# Patient Record
Sex: MALE
[De-identification: names, ages, dates, MRNs, and addresses within clinical notes are randomized per-mention and may not be internally consistent; named-entity substitution may affect disease eponyms.]

---

## 2019-07-18 ENCOUNTER — HOSPITAL ENCOUNTER (EMERGENCY)
Dept: HOSPITAL 88 - ER | Age: 79
LOS: 1 days | Discharge: OTHER INSTITUTION | End: 2019-07-19
Payer: COMMERCIAL

## 2019-07-18 VITALS — WEIGHT: 250 LBS | HEIGHT: 69 IN | BODY MASS INDEX: 37.03 KG/M2

## 2019-07-18 DIAGNOSIS — I25.2: ICD-10-CM

## 2019-07-18 DIAGNOSIS — E78.5: ICD-10-CM

## 2019-07-18 DIAGNOSIS — J44.9: ICD-10-CM

## 2019-07-18 DIAGNOSIS — K21.9: ICD-10-CM

## 2019-07-18 DIAGNOSIS — F03.90: ICD-10-CM

## 2019-07-18 DIAGNOSIS — I27.9: ICD-10-CM

## 2019-07-18 DIAGNOSIS — I50.9: ICD-10-CM

## 2019-07-18 DIAGNOSIS — R07.89: Primary | ICD-10-CM

## 2019-07-18 DIAGNOSIS — Z95.5: ICD-10-CM

## 2019-07-18 LAB
ALBUMIN SERPL-MCNC: 3.1 G/DL (ref 3.5–5)
ALBUMIN/GLOB SERPL: 0.6 {RATIO} (ref 0.8–2)
ALP SERPL-CCNC: 140 IU/L (ref 40–150)
ALT SERPL-CCNC: 20 IU/L (ref 0–55)
ANION GAP SERPL CALC-SCNC: 14.6 MMOL/L (ref 8–16)
BASOPHILS # BLD AUTO: 0.1 10*3/UL (ref 0–0.1)
BASOPHILS NFR BLD AUTO: 0.6 % (ref 0–1)
BUN SERPL-MCNC: 24 MG/DL (ref 7–26)
BUN/CREAT SERPL: 21 (ref 6–25)
CALCIUM SERPL-MCNC: 8.7 MG/DL (ref 8.4–10.2)
CHLORIDE SERPL-SCNC: 100 MMOL/L (ref 98–107)
CK MB SERPL-MCNC: 2.5 NG/ML (ref 0–5)
CK SERPL-CCNC: 39 IU/L (ref 30–200)
CO2 SERPL-SCNC: 27 MMOL/L (ref 22–29)
DEPRECATED NEUTROPHILS # BLD AUTO: 6.2 10*3/UL (ref 2.1–6.9)
EGFRCR SERPLBLD CKD-EPI 2021: > 60 ML/MIN (ref 60–?)
EOSINOPHIL # BLD AUTO: 0.3 10*3/UL (ref 0–0.4)
EOSINOPHIL NFR BLD AUTO: 3 % (ref 0–6)
ERYTHROCYTE [DISTWIDTH] IN CORD BLOOD: 18.2 % (ref 11.7–14.4)
GLOBULIN PLAS-MCNC: 4.8 G/DL (ref 2.3–3.5)
GLUCOSE SERPLBLD-MCNC: 264 MG/DL (ref 74–118)
HCT VFR BLD AUTO: 27.5 % (ref 38.2–49.6)
HGB BLD-MCNC: 8.5 G/DL (ref 14–18)
LYMPHOCYTES # BLD: 1.4 10*3/UL (ref 1–3.2)
LYMPHOCYTES NFR BLD AUTO: 16.1 % (ref 18–39.1)
MCH RBC QN AUTO: 26.6 PG (ref 28–32)
MCHC RBC AUTO-ENTMCNC: 30.9 G/DL (ref 31–35)
MCV RBC AUTO: 86.2 FL (ref 81–99)
MONOCYTES # BLD AUTO: 0.7 10*3/UL (ref 0.2–0.8)
MONOCYTES NFR BLD AUTO: 7.8 % (ref 4.4–11.3)
NEUTS SEG NFR BLD AUTO: 71.9 % (ref 38.7–80)
PLATELET # BLD AUTO: 234 X10E3/UL (ref 140–360)
POTASSIUM SERPL-SCNC: 3.6 MMOL/L (ref 3.5–5.1)
RBC # BLD AUTO: 3.19 X10E6/UL (ref 4.3–5.7)
SODIUM SERPL-SCNC: 138 MMOL/L (ref 136–145)

## 2019-07-18 PROCEDURE — 36415 COLL VENOUS BLD VENIPUNCTURE: CPT

## 2019-07-18 PROCEDURE — 71045 X-RAY EXAM CHEST 1 VIEW: CPT

## 2019-07-18 PROCEDURE — 93005 ELECTROCARDIOGRAM TRACING: CPT

## 2019-07-18 PROCEDURE — 84484 ASSAY OF TROPONIN QUANT: CPT

## 2019-07-18 PROCEDURE — 82550 ASSAY OF CK (CPK): CPT

## 2019-07-18 PROCEDURE — 99284 EMERGENCY DEPT VISIT MOD MDM: CPT

## 2019-07-18 PROCEDURE — 85025 COMPLETE CBC W/AUTO DIFF WBC: CPT

## 2019-07-18 PROCEDURE — 80053 COMPREHEN METABOLIC PANEL: CPT

## 2019-07-18 PROCEDURE — 82553 CREATINE MB FRACTION: CPT

## 2019-07-18 NOTE — XMS REPORT
Summary of Care: 2/3/16 - 2/3/16

                             Created on: 2097



DESIREE MCKEON

External Reference #: [id number]

: 1940

Sex: Male



Demographics







                          Address                   [address]

[place]  [address]-

 

                          Home Phone                [phone]

 

                          Preferred Language        English

 

                          Marital Status            

 

                          Hoahaoism Affiliation     None

 

                          Race                      White/

 

                          Ethnic Group              Non-





Author







                          Author                    [facility]

 

                          Organization              [facility]

 

                          Address                   Unknown

 

                          Phone                     Unavailable







Encounter





HQ Torie_scott(FIN) [id number] Date(s): 2/3/16 - 2/3/16

[facility] [address] [place] [address]-     [phone]
[phone]

Discharge Disposition: Home

Attending Physician: Betty Dover MD





Vital Signs





No data available for this section



Problem List







    



              Condition     Effective Dates     Status       Health Status     Informant

 

    



                           Acute MI(Confirmed)1      Resolved  

 

    



                           Atrial                    Active  



                                         fibrillation(Confirm    



                                         ed)    

 

    



                           CAD (coronary artery      Active  



                                         disease)(Confirmed)    

 

    



                           Diabetes(Confirmed)       Resolved  

 

    



                           Diastolic heart           Active  



                                         failure(Confirmed)    

 

    



                           Histoplasmosis(Confi      Resolved  



                                         rmed)    

 

    



                           Hypercholesteremia(C      Resolved  



                                         onfirmed)    

 

    



                           Hypertension(Confirm      Resolved  



                                         ed)    

 

    



                           HTN                       Active  



                                         (hypertension)(Confi    



                                         rmed)    

 

    



                           Hypothyroidism(Confi      Active  



                                         rmed)    

 

    



                           Diabetes mellitus         Active  



                                         type 2, insulin    



                                         dependent(Confirmed)    

 

    



                           PAUL (obstructive          Active  



                                         sleep    



                                         apnea)(Confirmed)    

 

    



                           Poliomyelitides(Conf      Resolved  



                                         irmed)    

 

    



                           Sleep                     Resolved  



                                         apnea(Confirmed)    

 

    



                           Stented coronary          Resolved  



                                         artery(Confirmed)2    

 

    



                           Systolic heart            Active  



                                         failure(Confirmed)    

 

    



                           Whooping                  Resolved  



                                         cough(Confirmed)    







1x 3



225 cardiac stents



Allergies, Adverse Reactions, Alerts







   



                 Substance       Reaction        Severity        Status

 

   



                           NKDA                      Active







Medications





No data available for this section



Results





No data available for this section



Immunizations







  



                     Vaccine             Date                Refusal Reason

 

  



                           pneumococcal 23-valent vaccine     3/31/15 

 

  



                     pneumococcal 23-valent vaccine     3/30/15             Patient Refuses







Procedures







   



                 Procedure       Date            Related Diagnosis     Body Site

 

   



                                         CABG x 3 - Coronary artery bypass grafts x 3   

 

   



                                         Cardiac catheterization, left heart   







Social History







 



                           Social History Type       Response

 

 



                           Substance Abuse           Use: None.

 

 



                           Alcohol                   Never

 

 



                           Smoking Status            Former smoker; Exposure to Tobacco Smoke None; Cigarette Smoking

 Last 365



                                         Days No; Reg Smoking Cessation Counseling No







Assessment and Plan





No data available for this section

## 2019-07-18 NOTE — XMS REPORT
Summary of Care: 16 - 16

                             Created on: 2025



DESIREE MCKEON

External Reference #: [id number]

: 1940

Sex: Male



Demographics







                          Address                   [address]

[place]  [address]

 

                          Home Phone                [phone]

 

                          Preferred Language        English

 

                          Marital Status            

 

                          Zoroastrian Affiliation     None

 

                          Race                      White/

 

                          Ethnic Group              Non-





Author







                          Author                    [facility]

 

                          Organization              [facility]

 

                          Address                   Unknown

 

                          Phone                     Unavailable







Encounter





CHLOÉ Pedersen(DAVID) [id number] Date(s): 16 - 16

[facility] [address] [place] [address]-     [phone]
[phone]

Discharge Diagnosis: Hx of insomnia

Discharge Disposition: Home

Attending Physician: Jeni Glaeson MD





Vital Signs







                    1                   2                   3



                                         Most recent to   



                                         oldest [Reference   



                                         Range]:   

 

                                        172.72 cm 

                    (16 4:37 PM)                         



                                         Height   

 

                                        98.0 DegF 

                          (16 10:00 PM)        98.4 DegF 

                          (16 7:00 PM)         97.4 DegF 

                                        (16 4:37 PM)



                                         Temperature Oral   



                                         [96.4-99.1 DegF]   

 

                                        130/79 mmHg 

                          (16 10:00 PM)        136/116 mmHg 

                          (16 7:00 PM)         116/70 mmHg 

                                        (16 6:43 PM)



                                         Blood Pressure   



                                         [/60-90 mmHg]   

 

                                        20 BRMIN 

                          (16 10:00 PM)        17 BRMIN 

                          (16 6:43 PM)         16 BRMIN 

                                        (16 6:03 PM)



                                         Respiratory Rate   



                                         [14-20 BRMIN]   

 

                                        72 bpm 

                    (16 4:37 PM)                         



                                         Peripheral Pulse   



                                         Rate [ bpm]   

 

                                        90.909 kg 

                    (16 4:37 PM)                         



                                         Weight   

 

                                        30.47 m2 

                    (16 4:37 PM)                         



                                         Body Mass Index   







Problem List







    



              Condition     Effective Dates     Status       Health Status     Informant

 

    



                           Acute MI(Confirmed)1      Resolved  

 

    



                           Atrial                    Active  



                                         fibrillation(Confirm    



                                         ed)    

 

    



                           CAD (coronary artery      Active  



                                         disease)(Confirmed)    

 

    



                           Diabetes(Confirmed)       Resolved  

 

    



                           Diastolic heart           Active  



                                         failure(Confirmed)    

 

    



                           Histoplasmosis(Confi      Resolved  



                                         rmed)    

 

    



                           Hypercholesteremia(C      Resolved  



                                         onfirmed)    

 

    



                           Hypertension(Confirm      Resolved  



                                         ed)    

 

    



                           HTN                       Active  



                                         (hypertension)(Confi    



                                         rmed)    

 

    



                           Hypothyroidism(Confi      Active  



                                         rmed)    

 

    



                           Diabetes mellitus         Active  



                                         type 2, insulin    



                                         dependent(Confirmed)    

 

    



                           PAUL (obstructive          Active  



                                         sleep    



                                         apnea)(Confirmed)    

 

    



                           Poliomyelitides(Conf      Resolved  



                                         irmed)    

 

    



                           Sleep                     Resolved  



                                         apnea(Confirmed)    

 

    



                           Stented coronary          Resolved  



                                         artery(Confirmed)2    

 

    



                           Systolic heart            Active  



                                         failure(Confirmed)    

 

    



                           Whooping                  Resolved  



                                         cough(Confirmed)    







1x 3



225 cardiac stents



Allergies, Adverse Reactions, Alerts







   



                 Substance       Reaction        Severity        Status

 

   



                           NKDA                      Active







Medications





Insulin regular

6 unit, 0.06 mL, Route: IV, Drug form: INJ, ONCE, Dosing Weight 90.909, kg, Star
t date: 16 18:25:00 CDT, Stop date: 16 18:25:00 CDT

Notes: (Same as: Humulin R and NovoLIN R)WASTE: F/P - Black; E - [employer] Trash
Bin  (Do not shake)

Start Date: 16

Stop Date: 16

Status: Completed



Results





ELECTROLYTES





  



                     Most recent to      1                   2



                                         oldest [Reference  



                                         Range]:  

 

  



                           Sodium Lvl [135-145       134 mEq/L 



                           mEq/L]                    *LOW* 



                                         (16 5:17 PM) 

 

  



                           Potassium Lvl             3.8 mEq/L 



                           [3.5-5.1 mEq/L]           (16 5:17 PM) 

 

  



                           Chloride Lvl [      99 mEq/L 



                           mEq/L]                    (16 5:17 PM) 

 

  



                           CO2 [24-32 mEq/L]         28 mEq/L 



                                         (16 5:17 PM) 

 

  



                           AGAP [10.0-20.0           10.8 mEq/L 



                           mEq/L]                    (16 5:17 PM) 







CHEM PANEL





  



                     Most recent to      1                   2



                                         oldest [Reference  



                                         Range]:  

 

  



                           Creatinine Lvl            0.94 mg/dL 



                           [0.50-1.40 mg/dL]         (16 5:17 PM) 

 

  



                           eGFR                      79 mL/min/1.73m2 1 



                                         *NA* 



                                         (16 5:17 PM) 

 

  



                           BUN [7-22 mg/dL]          13 mg/dL 



                                         (16 5:17 PM) 

 

  



                           B/C Ratio [6-25]          14 



                                         (16 5:17 PM) 

 

  



                           Glucose Lvl [70-99        361 mg/dL 



                           mg/dL]                    *HI* 



                                         (16 5:17 PM) 

 

  



                           Total Protein             7.4 g/dL 



                           [6.4-8.4 g/dL]            (16 5:17 PM) 

 

  



                           Albumin Lvl [3.5-5.0      3.2 g/dL 



                           g/dL]                     *LOW* 



                                         (16: PM) 

 

  



                           Globulin [2.0-4.0         4.2 g/dL 



                           g/dL]                     *HI* 



                                         (16:17 PM) 

 

  



                           A/G Ratio [0.7-1.6]       0.8 



                                         (16: PM) 

 

  



                           Calcium Lvl               8.4 mg/dL 



                           [8.5-10.5 mg/dL]          *LOW* 



                                         (16: PM) 

 

  



                           ALT [0-65 unit/L]         56 unit/L 



                                         (16: PM) 

 

  



                           AST [0-37 unit/L]         62 unit/L 



                                         *HI* 



                                         (16: PM) 

 

  



                           Alk Phos [          137 unit/L 



                           unit/L]                   *HI* 



                                         (16 5:17 PM) 

 

  



                           Bili Total [0.2-1.3       0.4 mg/dL 



                           mg/dL]                    (16:17 PM) 







1Result Comment: The eGFR is calculated using the CKD-EPI formula. In most 
young, healthy individuals the eGFR will be >90 mL/min/1.73m2. The eGFR declines
with age. An eGFR of 60-89 may be normal in some populations, particularly the 
elderly, for whom the CKD-EPI formula has not been extensively validated. Use of
the eGFR is not recommended in the following populations:



Individuals with unstable creatinine concentrations, including pregnant patients
and those with serious co-morbid conditions.



Patients with extremes in muscle mass or diet. 



The data above are obtained from the National Kidney Disease Education Program (
NKDEP) which additionally recommends that when the eGFR is used in patients with
extremes of body mass index for purposes of drug dosing, the eGFR should be mul
tiplied by the estimated BMI.



CARDIAC ENZYMES





  



                     Most recent to      1                   2



                                         oldest [Reference  



                                         Range]:  

 

  



                           Total CK [          71 unit/L 



                           unit/L]                   (16 5:17 PM) 

 

  



                           CK MB [0.5-3.6            2.4 ng/mL 



                           ng/mL]                    (16 5:17 PM) 

 

  



                           CK MB Index               3.4 



                           [0.0-2.5]                 *HI* 



                                         (16 5:17 PM) 

 

  



                     Troponin-I          <0.02 ng/mL         <0.02 ng/mL



                     [0.00-0.40 ng/mL]     (16 8:47 PM)     (16 5:17 PM)

 

  



                           BNP [<=100 pg/mL]         84 pg/mL 



                                         (16 5:17 PM) 







URINE AND STOOL





  



                     Most recent to      1                   2



                                         oldest [Reference  



                                         Range]:  

 

  



                           UA Turbidity [Clear]      Clear 



                                         (16 5:21 PM) 

 

  



                           UA Color                  Ltyellow 



                                         *NA* 



                                         (16 5:21 PM) 

 

  



                           UA pH [5.0-8.0]           5.0 



                                         (16 5:21 PM) 

 

  



                           UA Spec Grav              1.005 



                           [<=1.030]                 (16 5:21 PM) 

 

  



                           UA Glucose [Negative      500 mg/dL 



                           mg/dL]                    *ABN* 



                                         (16 5:21 PM) 

 

  



                           UA Blood [Negative]       Negative 



                                         (16 5:21 PM) 

 

  



                           UA Ketones [Negative      Negative mg/dL 



                           mg/dL]                    *NA* 



                                         (16 5:21 PM) 

 

  



                           UA Protein [Negative      Negative mg/dL 



                           mg/dL]                    (16 5:21 PM) 

 

  



                           UA Urobilinogen           <=1.0 mg/dL 



                           [0.1-1.0 mg/dL]           *NA* 



                                         (16 5:21 PM) 

 

  



                           UA Bili [Negative]        Negative 



                                         *NA* 



                                         (16 5:21 PM) 

 

  



                           UA Leuk Est               Negative 



                           [Negative]                (16 5:21 PM) 

 

  



                           UA Nitrite                Negative 



                           [Negative]                (16 5:21 PM) 

 

  



                           UA RBC [0-2 /HPF]         1 /HPF 



                                         (16 5:21 PM) 

 

  



                           UA Sq Epi [Few /LPF]      Occasional /LPF 



                                         *NA* 



                                         (16 5:21 PM) 







HEMATOLOGY





  



                     Most recent to      1                   2



                                         oldest [Reference  



                                         Range]:  

 

  



                           WBC [3.7-10.4 K/CMM]      7.7 K/CMM 



                                         (16 5:17 PM) 

 

  



                           RBC [4.70-6.10            4.52 M/CMM 



                           M/CMM]                    *LOW* 



                                         (16 5:17 PM) 

 

  



                           Hgb [14.0-18.0 g/dL]      10.9 g/dL 



                                         *LOW* 



                                         (16 5:17 PM) 

 

  



                           Hct [42.0-54.0 %]         35.4 % 



                                         *LOW* 



                                         (16 5:17 PM) 

 

  



                           MCV [80.0-94.0 fL]        78.3 fL 



                                         *LOW* 



                                         (16 5:17 PM) 

 

  



                           MCH [27.0-31.0 pg]        24.2 pg 



                                         *LOW* 



                                         (16 5:17 PM) 

 

  



                           MCHC [32.0-36.0           30.9 g/dL 



                           g/dL]                     *LOW* 



                                         (16:17 PM) 

 

  



                           RDW [11.5-14.5 %]         16.5 % 



                                         *HI* 



                                         (16 5:17 PM) 

 

  



                           Platelet [133-450         197 K/CMM 



                           K/CMM]                    (16 5:17 PM) 

 

  



                           MPV [7.4-10.4 fL]         8.9 fL 



                                         (16 5:17 PM) 

 

  



                           Segs [45.0-75.0]          ::::: 



                                         *NA* 



                                         (16 5:17 PM) 

 

  



                           Lymphocytes               ::::: 



                           [20.0-40.0]               *NA* 



                                         (16 5:17 PM) 

 

  



                           Monocytes [2.0-12.0]      ::::: 



                                         *NA* 



                                         (16 5:17 PM) 

 

  



                           Eosinophils               ::::: 



                           [0.0-4.0]                 *NA* 



                                         (16 5:17 PM) 

 

  



                           Basophils [0.0-1.0]       ::::: 



                                         *NA* 



                                         (16 5:17 PM) 

 

  



                           Segs-Bands #              ::::: 



                           [1.5-8.1]                 *NA* 



                                         (16 5:17 PM) 

 

  



                           Lymphocytes #             ::::: 



                           [1.0-5.5]                 *NA* 



                                         (16 5:17 PM) 

 

  



                           Monocytes #               ::::: 



                           [0.0-0.8]                 *NA* 



                                         (16 5:17 PM) 

 

  



                           Eosinophils #             ::::: 



                           [0.0-0.5]                 *NA* 



                                         (16 5:17 PM) 

 

  



                           Basophils #               ::::: 



                           [0.0-0.2]                 *NA* 



                                         (16 5:17 PM) 

 

  



                           Microcyte [None           1+ 



                           Seen]                     *ABN* 



                                         (16 5:17 PM) 

 

  



                           PT [12.0-14.7             21.7 seconds 



                           seconds]                  *HI* 



                                         (16 5:17 PM) 

 

  



                           INR [0.85-1.17]           1.85 



                                         *HI* 



                                         (16 5:17 PM) 







Immunizations







  



                     Vaccine             Date                Refusal Reason

 

  



                           pneumococcal 23-valent vaccine     3/31/15 

 

  



                     pneumococcal 23-valent vaccine     3/30/15             Patient Refuses







Procedures







   



                 Procedure       Date            Related Diagnosis     Body Site

 

   



                                         CABG x 3 - Coronary artery bypass grafts x 3   

 

   



                                         Cardiac catheterization, left heart   







Social History







 



                           Social History Type       Response

 

 



                           Substance Abuse           Use: None.

 

 



                           Alcohol                   Never

 

 



                           Smoking Status            Former smoker; Exposure to Tobacco Smoke None; Cigarette Smoking

 Last 365



                                         Days No; Reg Smoking Cessation Counseling No







Assessment and Plan





No data available for this section

## 2019-07-18 NOTE — XMS REPORT
Summary of Care: 18 - 18

                             Created on: 2071



DESIREE MCKEON

External Reference #: [id number]

: 1940

Sex: Male



Demographics







                          Address                   [address]

[place]  [address]

 

                          Home Phone                [phone]

 

                          Preferred Language        English

 

                          Marital Status            

 

                          Caodaism Affiliation     None

 

                          Race                      Other

 

                                        Additional Race(s)  

 

                          Ethnic Group              Non-





Author







                          Author                    [facility]

 

                          Organization              [facility]

 

                          Address                   Unknown

 

                          Phone                     Unavailable







Encounter





CHLOÉ Pedersen(DAVID) [id number] Date(s): 18 - 18

[facility] [address] [place] [address]-     [phone]
[phone]

Encounter Diagnosis

Unspecified displaced fracture of surgical neck of right humerus, initial encoun
ter for closed fracture (Final) - 18

Chronic diastolic (congestive) heart failure (Final) - 

Acute posthemorrhagic anemia (Final) - 

Urinary tract infection, site not specified (Final) - 

Encounter for immunization (Final) - 

Type 2 diabetes mellitus with hyperglycemia (Final) - 

Paroxysmal atrial fibrillation (Final) - 

Hypertensive heart disease with heart failure (Final) - 

Morbid (severe) obesity due to excess calories (Final) - 

Fall on same level, unspecified, initial encounter (Final) - 

Hypertensive urgency (Final) - 

Hypothyroidism, unspecified (Final) - 

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 
(Final) - 

Gastro-esophageal reflux disease without esophagitis (Final) - 

Contusion of right upper arm, initial encounter (Final) - 

Strain of muscle(s) and tendon(s) of the rotator cuff of right shoulder, initial
encounter (Final) - 

Enterococcus as the cause of diseases classified elsewhere (Final) - 

Body mass index (BMI) 33.0-33.9, adult (Final) - 

Long term (current) use of anticoagulants (Final) - 

Discharge Disposition: Skilled Nursing Facility

Attending Physician: Katie Mandujano MD

Admitting Physician: Katie Mandujano MD





Vital Signs







                    1                   2                   3



                                         Most recent to   



                                         oldest [Reference   



                                         Range]:   

 

                                        175.26 cm 

                          (18 5:04 PM)         170.18 cm 

                          (18 11:58 PM)          



                                         Height   

 

                                        98.5 DegF 

                          (18 11:33 AM)        98.5 DegF 

                          (18 8:03 AM)         97.7 DegF 

                                        (18 3:45 AM)



                                         Temperature Oral   



                                         [96.4-99.1 DegF]   

 

                                        131/64 mmHg 

                          (18 11:33 AM)        130/67 mmHg 

                          (18 8:03 AM)         124/71 mmHg 

                                        (18 3:45 AM)



                                         Blood Pressure   



                                         [/60-90 mmHg]   

 

                                        18 BRMIN 

                          (18 11:33 AM)        18 BRMIN 

                          (18 8:03 AM)         16 BRMIN 

                                        (18 3:45 AM)



                                         Respiratory Rate   



                                         [14-20 BRMIN]   

 

                                        72 bpm 

                          (18 11:33 AM)        75 bpm 

                          (18 8:03 AM)         70 bpm 

                                        (18 3:45 AM)



                                         Peripheral Pulse   



                                         Rate [ bpm]   

 

                                        101.903 kg 

                          (18 5:04 PM)         79.545 kg 

                          (18 11:58 PM)          



                                         Weight   

 

                                        33.18 m2 

                          (18 5:04 PM)         27.47 m2 

                          (18 11:58 PM)          



                                         Body Mass Index   







Problem List







    



              Condition     Effective Dates     Status       Health Status     Informant

 

    



                           Acute MI(Confirmed)1      Resolved  

 

    



                           Atrial                    Active  



                                         fibrillation(Confirm    



                                         ed)    

 

    



                           Atrial                    Active  



                                         fibrillation(Confirm    



                                         ed)    

 

    



                           CHF (congestive           Active  



                                         heart    



                                         failure)(Confirmed)    

 

    



                           CAD (coronary artery      Active  



                                         disease)(Confirmed)    

 

    



                           Diabetes(Confirmed)       Active  

 

    



                           Diastolic heart           Active  



                                         failure(Confirmed)    

 

    



                           Histoplasmosis(Confi      Resolved  



                                         rmed)    

 

    



                           Hx MRSA                   Resolved  



                                         infection(Confirmed)    

 

    



                           Hypercholesteremia(C      Resolved  



                                         onfirmed)    

 

    



                           Hypertension(Confirm      Active  



                                         ed)    

 

    



                           Hypertension(Confirm      Resolved  



                                         ed)    

 

    



                           HTN                       Active  



                                         (hypertension)(Confi    



                                         rmed)    

 

    



                           Hypothyroidism(Confi      Active  



                                         rmed)    

 

    



                           Diabetes mellitus         Active  



                                         type 2, insulin    



                                         dependent(Confirmed)    

 

    



                           PAUL (obstructive          Active  



                                         sleep    



                                         apnea)(Confirmed)    

 

    



                           Poliomyelitides(Conf      Resolved  



                                         irmed)    

 

    



                           Sleep                     Active  



                                         apnea(Confirmed)    

 

    



                           Stented coronary          Resolved  



                                         artery(Confirmed)2    

 

    



                           Systolic heart            Active  



                                         failure(Confirmed)    

 

    



                           Whooping                  Resolved  



                                         cough(Confirmed)    







1x 3



225 cardiac stents



Allergies, Adverse Reactions, Alerts







   



                 Substance       Reaction        Severity        Status

 

   



                           sulfa drugs               Active

 

   



                           Reglan                    Active

 

   



                           iodine                    Active

 

   



                           Latex                     Active







Medications





acetaminophen-hydrocodone 325 mg-5 mg oral tablet

2 tab, Route: PO, Drug Form: TAB, Dosing Weight 79.545, kg, Q4H, PRN Pain Score 
7-10, Start date: 18 2:48:00 CST, Duration: 30 day, Stop date: 18 2:
47:00 CST

Notes: (Same as: Norco 325/5)  Do not exceed 4gm/day of acetaminophen.

Start Date: 18

Stop Date: 18

Status: Discontinued



AMIODarone

200 mg, 1 tab, Route: PO, Drug form: TAB, Daily, Dosing Weight 79.545, kg, Start
date: 18 9:00:00 CST, Duration: 30 day, Stop date: 18 9:00:00 CST

Notes: (Same as: Cordarone)

Start Date: 18

Stop Date: 18

Status: Discontinued



aspirin 81 mg tablet, enteric coated

81 mg=1 tab, PO, Daily, # 90 tab, 3 Refill(s), Pharmacy: [id number]
[id number]

Start Date: 18

Status: Ordered



atorvastatin

5 mg, 0.5 tab, Route: PO, Drug form: TAB, Bedtime, Dosing Weight 79.545, kg, Sta
rt date: 18 23:39:00 CST, Duration: 30 day, Stop date: 18 21:00:00 C
ST

Notes: (Same As: Lipitor)

Start Date: 18

Stop Date: 18

Status: Discontinued



cefepime + Sodium Chloride 0.9%  mL

1 gm, Route: IV, Q8H, Dosing Weight 79.545, kg, Start date: 18 16:00:00 CS
T, Duration: 5 day, Stop date: 18 8:00:00 CST, ABX Indication: Pneumonia

Notes: (Same As: Maxipime)  *** MEDICATION WASTE ***Product Size:  1000 mgProduc
t Wasted:  ___ mg

Start Date: 18

Stop Date: 18

Status: Discontinued



clopidogrel

75 mg, 1 tab, Route: PO, Drug form: TAB, Daily, Dosing Weight 79.545, kg, Start 
date: 18 9:00:00 CST, Duration: 30 day, Stop date: 18 9:00:00 CST

Notes: (Same As: Plavix)

Start Date: 18

Stop Date: 18

Status: Discontinued



Dextrose 50% Syringe

25 gm, 50 mL, Route: IVP, Drug Form: INJ, Dosing Weight 79.545, kg, PRN, PRN Blo
od Glucose Results, Start date: 18 2:50:00 CST, Duration: 30 day, Stop hazel
e: 18 2:49:00 CST

Start Date: 18

Stop Date: 18

Status: Discontinued



Dextrose 50% Syringe

12.5 gm, 25 mL, Route: IVP, Drug Form: INJ, Dosing Weight 79.545, kg, PRN, PRN B
lood Glucose Results, Start date: 18 2:50:00 CST, Duration: 30 day, Stop d
ate: 18 2:49:00 CST

Start Date: 18

Stop Date: 18

Status: Discontinued



fentaNYL

25 microgram, Route: IVP, ONCE, Dosing Weight 79.545, kg, Priority: STAT, Start 
date: 18 1:22:00 CST, Stop date: 18 1:22:00 CST

Start Date: 18

Stop Date: 18

Status: Completed



gabapentin 600 mg oral tablet

600 mg, 2 cap, Route: PO, Drug form: CAP, Q8H, Dosing Weight 79.545, kg, Start d
ate: 18 0:00:00 CST, Duration: 30 day, Stop date: 18 16:00:00 CST

Notes: (Same as: Neurontin)

Start Date: 18

Stop Date: 18

Status: Discontinued



glucagon

1 mg, Route: IM, Drug form: PDR/INJ, PRN, Dosing Weight 79.545, kg, PRN Blood Gl
ucose Results, Start date: 18 2:50:00 CST, Duration: 30 day, Stop date:  2:49:00 CST

Start Date: 18

Stop Date: 18

Status: Discontinued



hydrALAZINE

10 mg, 0.5 mL, Route: IVP, Drug form: INJ, Q4H, Dosing Weight 79.545, kg, PRN Hy
pertension, Start date: 18 20:32:00 CST, Duration: 30 day, Stop date:  20:31:00 CST, sbp>[id number]

Notes: (Same as: Apresoline)Push over 5 minutes

Start Date: 18

Stop Date: 18

Status: Discontinued



hydrALAZINE

10 mg, 0.5 mL, Route: IVP, Drug form: INJ, Q4H, Dosing Weight 79.545, kg, Start 
date: 18 4:00:00 CST, Duration: 30 day, Stop date: 18 0:00:00 CST

Notes: (Same as: Apresoline)Push over 5 minutes

Start Date: 18

Stop Date: 1/10/18

Status: Discontinued



Imdur

30 mg, 1 tab, Route: PO, Drug form: ERTAB, QAM, Dosing Weight 79.545, kg, Start 
date: 18 6:30:00 CST, Duration: 30 day, Stop date: 18 6:30:00 CST

Notes: (Same as:Imdur)"Do Not Crush"  Take on empty stomach/ full glass of water
.  Do not crush

Start Date: 18

Stop Date: 18

Status: Discontinued



influenza virus vaccine, inactivated

0.5 mL, Route: IM, Drug Form: SUSP, Daily, Start date: 18 9:00:00 CST, Dur
ation: 1 doses or times, Stop date: 18 9:00:00 CST

Notes: (Same as: Fluzone Quadrivalent, Fluarix Quadrivalent)For 3 years of age a
nd older (0.5 mL IM)Shake well before use

Start Date: 18

Stop Date: 18

Status: Completed



insulin glargine 100 units/mL subcutaneous solution

20 unit, 0.2 mL, Route: SUB-Q, Drug form: SOLN, Bedtime, Dosing Weight 101.903, 
kg, Start date: 01/15/18 22:53:00 CST, Duration: 30 day, Stop date: 18 21:
00:00 CST

Notes: (Same as: Lantus)Do not hold insulin without contacting prescriberWASTE: 
F/P - Black; E - Municipal Trash Bin  "single patient use only"

Start Date: 1/15/18

Stop Date: 18

Status: Discontinued



insulin lispro

1 unit, 0.01 mL, Route: SUB-Q, Drug form: SOLN, Bedtime, Dosing Weight 79.545, k
g, PRN Blood Glucose Results, Start date: 18 2:50:00 CST, Duration: 30 day
, Stop date: 18 2:49:00 CST

Notes: Roll in palms of hands gently;  Do not shake `vigorously. (Same as: Malia duque )"Single Patient Use Only "WASTE: F/P - Black; E - [facility] Trash Bin  Stabl
e for 28 days at room temperature.Expires in _____ days from ______________Date

Start Date: 18

Stop Date: 18

Status: Discontinued



insulin lispro

4 unit, 0.04 mL, Route: SUB-Q, Drug form: SOLN, Bedtime, Dosing Weight 79.545, k
g, PRN Blood Glucose Results, Start date: 18 2:50:00 CST, Duration: 30 day
, Stop date: 18 2:49:00 CST

Notes: Roll in palms of hands gently;  Do not shake `vigorously. (Same as: Malia duque )"Single Patient Use Only "WASTE: F/P - Black; E - [facility] Trash [facility]  Stabl
e for 28 days at room temperature.Expires in _____ days from ______________Date

Start Date: 18

Stop Date: 18

Status: Discontinued



insulin lispro

2 unit, 0.02 mL, Route: SUB-Q, Drug form: SOLN, Bedtime, Dosing Weight 79.545, k
g, PRN Blood Glucose Results, Start date: 18 2:50:00 CST, Duration: 30 day
, Stop date: 18 2:49:00 CST

Notes: Roll in palms of hands gently;  Do not shake `vigorously. (Same as: Humal
og )"Single Patient Use Only "WASTE: F/P - Black; E - [facility] Trash [facility]  Stabl
e for 28 days at room temperature.Expires in _____ days from ______________Date

Start Date: 18

Stop Date: 18

Status: Discontinued



insulin lispro

3 unit, 0.03 mL, Route: SUB-Q, Drug form: SOLN, Bedtime, Dosing Weight 79.545, k
g, PRN Blood Glucose Results, Start date: 18 2:50:00 CST, Duration: 30 day
, Stop date: 18 2:49:00 CST

Notes: Roll in palms of hands gently;  Do not shake `vigorously. (Same as: Humal
og )"Single Patient Use Only "WASTE: F/P - Black; E - [facility] Trash [facility]  Stabl
e for 28 days at room temperature.Expires in _____ days from ______________Date

Start Date: 18

Stop Date: 18

Status: Discontinued



insulin lispro

10 unit, 0.1 mL, Route: SUB-Q, Drug form: SOLN, TID-Before Meals, Dosing Weight 
79.545, kg, PRN Blood Glucose Results, Start date: 18 2:50:00 CST, Duratio
n: 30 day, Stop date: 18 2:49:00 CST

Notes: Roll in palms of hands gently;  Do not shake `vigorously. (Same as: Humal
og )"Single Patient Use Only "WASTE: F/P - Black; E - [facility] Trash Bin  Stabl
e for 28 days at room temperature.Expires in _____ days from ______________Date

Start Date: 18

Stop Date: 18

Status: Discontinued



insulin lispro

8 unit, 0.08 mL, Route: SUB-Q, Drug form: SOLN, TID-Before Meals, Dosing Weight 
79.545, kg, PRN Blood Glucose Results, Start date: 18 2:50:00 CST, Duratio
n: 30 day, Stop date: 18 2:49:00 CST

Notes: Roll in palms of hands gently;  Do not shake `vigorously. (Same as: Humal
og )"Single Patient Use Only "WASTE: F/P - Black; E - [facility] Trash Bin  Stabl
e for 28 days at room temperature.Expires in _____ days from ______________Date

Start Date: 18

Stop Date: 18

Status: Discontinued



insulin lispro

6 unit, 0.06 mL, Route: SUB-Q, Drug form: SOLN, TID-Before Meals, Dosing Weight 
79.545, kg, PRN Blood Glucose Results, Start date: 18 2:50:00 CST, Duratio
n: 30 day, Stop date: 18 2:49:00 CST

Notes: Roll in palms of hands gently;  Do not shake `vigorously. (Same as: Humal
og )"Single Patient Use Only "WASTE: F/P - Black; E - [facility] Trash Bin  Stabl
e for 28 days at room temperature.Expires in _____ days from ______________Date

Start Date: 18

Stop Date: 18

Status: Discontinued



insulin lispro

2 unit, 0.02 mL, Route: SUB-Q, Drug form: SOLN, TID-Before Meals, Dosing Weight 
79.545, kg, PRN Blood Glucose Results, Start date: 18 2:50:00 CST, Duratio
n: 30 day, Stop date: 18 2:49:00 CST

Notes: Roll in palms of hands gently;  Do not shake `vigorously. (Same as: Humal
og )"Single Patient Use Only "WASTE: F/P - Black; E - [facility] Trash Bin  Stabl
e for 28 days at room temperature.Expires in _____ days from ______________Date

Start Date: 18

Stop Date: 18

Status: Discontinued



insulin lispro

4 unit, 0.04 mL, Route: SUB-Q, Drug form: SOLN, TID-Before Meals, Dosing Weight 
79.545, kg, PRN Blood Glucose Results, Start date: 18 2:50:00 CST, Duratio
n: 30 day, Stop date: 18 2:49:00 CST

Notes: Roll in palms of hands gently;  Do not shake `vigorously. (Same as: Humal
og )"Single Patient Use Only "WASTE: F/P - Black; E - [facility] Trash Bin  Stabl
e for 28 days at room temperature.Expires in _____ days from ______________Date

Start Date: 18

Stop Date: 18

Status: Discontinued



insulin lispro

10 unit, 0.1 mL, Route: SUB-Q, Drug form: SOLN, ONCE, Dosing Weight 79.545, kg, 
Priority: NOW, Start date: 01/10/18 3:42:00 CST, Stop date: 01/10/18 3:42:00 CST

Notes: Roll in palms of hands gently;  Do not shake `vigorously. (Same as: Humal
og )"Single Patient Use Only "WASTE: F/P - Black; E - [facility] Trash Bin  Stabl
e for 28 days at room temperature.Expires in _____ days from ______________Date

Start Date: 1/10/18

Stop Date: 1/10/18

Status: Completed



insulin lispro

15 unit, 0.15 mL, Route: SUB-Q, Drug form: SOLN, ONCE, Dosing Weight 79.545, kg,
Priority: NOW, Start date: 18 20:22:00 CST, Stop date: 18 20:22:00 
CST

Notes: Roll in palms of hands gently;  Do not shake `vigorously. (Same as: Humal
og )"Single Patient Use Only "WASTE: F/P - Black; E - [facility] Trash Bin  Stabl
e for 28 days at room temperature.Expires in _____ days from ______________Date

Start Date: 18

Stop Date: 18

Status: Completed



Januvia

See Instructions, PO Daily, 0 Refill(s)

Start Date: 18

Status: Ordered



Lantus 100 units/mL

85 unit, 0.85 mL, Route: SUB-Q, Drug form: SOLN, Daily, Dosing Weight 79.545, kg
, Start date: 18 9:00:00 CST, Duration: 30 day, Stop date: 02/10/18 9:00:0
0 CST

Notes: (Same as: Lantus)Do not hold insulin without contacting prescriberWASTE: 
F/P - Black; E - [facility] Trash Bin  "single patient use only"

Start Date: 18

Stop Date: 18

Status: Discontinued



Lantus 100 units/mL

72 unit, 0.72 mL, Route: SUB-Q, Drug form: SOLN, Daily, Dosing Weight 79.545, kg
, Start date: 01/10/18 9:00:00 CST, Duration: 30 day, Stop date: 18 9:00:0
0 CST

Notes: (Same as: Lantus)Do not hold insulin without contacting prescriberWASTE: 
F/P - Black; E - [facility] Trash Bin  "single patient use only"

Start Date: 1/10/18

Stop Date: 1/10/18

Status: Discontinued



Lantus Solostar Pen 100 units/mL subcutaneous solution

42 unit, 0.42 mL, Route: SUB-Q, Drug form: SOLN, Bedtime, Dosing Weight 79.545, 
kg, Start date: 01/10/18 21:00:00 CST, Duration: 30 day, Stop date: 18 21:
00:00 CST

Notes: (Same as: Lantus)Do not hold insulin without contacting prescriberWASTE: 
F/P - Black; E - Municipal Trash Bin  "single patient use only"

Start Date: 1/10/18

Stop Date: 1/15/18

Status: Discontinued



Lasix

20 mg, 2 mL, Route: IVP, Drug form: INJ, ONCE, Dosing Weight 101.903, kg, Start 
date: 18 11:01:00 CST, Stop date: 18 11:01:00 CST

Notes: (Same as: Lasix)

Start Date: 18

Stop Date: 18

Status: Completed



Levaquin 500 mg oral tablet

500 mg=1 tab, PO, Q24H, X 7 day, # 7 tab, 0 Refill(s), Pharmacy: [facility] [id number]

Start Date: 18

Stop Date: 18

Status: Completed



Maxipime + sterile water 10 mL

1 gm, Route: IVP, ONCE, Start date: 18 16:07:00 CST, Stop date: 18 1
6:07:00 CST, ABX Indication: Pneumonia

Notes: (Same As: Maxipime)  *** MEDICATION WASTE ***Product Size:  1000 mgProduc
t Wasted:  ___ mg

Start Date: 18

Stop Date: 18

Status: Completed



morphine Sulfate

6 mg, 3 mL, Route: PO, Drug form: SOLN, Q3H, Dosing Weight 79.545, kg, PRN Pain 
Score 7-10, Priority: STAT, Start date: 18 3:34:00 CST, Duration: 30 day, 
Stop date: 18 3:33:00 CST

Notes: (Same as:MORPhine Sulfate)

Start Date: 18

Stop Date: 18

Status: Discontinued



morphine Sulfate

2 mg, Route: IVP, Q4H, Dosing Weight 79.545, kg, PRN Pain Score 7-10, Start date
: 18 2:48:00 CST, Duration: 30 day, Stop date: 18 2:47:00 CST

Start Date: 18

Stop Date: 18

Status: Discontinued



morphine Sulfate

4 mg, Route: IVP, ONCE, Dosing Weight 79.545, kg, Priority: STAT, Start date:  0:25:00 CST, Stop date: 18 0:25:00 CST

Start Date: 18

Stop Date: 18

Status: Completed



Norco 10/325 oral tablet

1 tab, Route: PO, Drug Form: TAB, Dosing Weight 79.545, kg, ONCE, STAT, Start da
te: 18 1:52:00 CST, Stop date: 18 1:52:00 CST

Notes: Do not exceed 4gm/day of acetaminophen.  (Same as: Norco 325/10)

Start Date: 18

Stop Date: 18

Status: Completed



Norco 10/325 oral tablet

1 tab, PO, TID, PRN for pain, # 90 tab, 0 Refill(s), given to patient

Start Date: 18

Stop Date: 18

Status: Completed



normal saline 0.9%  mL

250 mL, Rate: 20 ml/hr, Infuse over: 12.5 hr, Route: IV, Dosing Weight 101.903 k
g, Total Volume: 250, Start date: 18 13:39:00 CST, Duration: 30 day, Stop 
date: 18 13:38:00 CST, 2.25, m2

Start Date: 18

Stop Date: 18

Status: Discontinued



ondansetron

4 mg, 2 mL, Route: IVP, Drug form: INJ, Q6H, Dosing Weight 79.545, kg, PRN Nause
a & Vomiting, Start date: 18 2:48:00 CST, Duration: 30 day, Stop date: 
18 2:47:00 CST

Notes: (Same as: Zofran)  *** MEDICATION WASTE ***Product Size:  4 mgProduct Was
sonali:  ___ mg

Start Date: 18

Stop Date: 18

Status: Discontinued



ondansetron

4 mg, Route: IVP, Drug form: INJ, ONCE, Dosing Weight 79.545, kg, Priority: STAT
, Start date: 18 0:25:00 CST, Stop date: 18 0:25:00 CST

Start Date: 18

Stop Date: 18

Status: Completed



pantoprazole

40 mg, 1 tab, Route: PO, Drug form: ECTAB, BID-Before Meals, Dosing Weight 79.54
5, kg, Start date: 18 7:30:00 CST, Duration: 30 day, Stop date: 18 1
6:30:00 CST

Notes: Tablet should not be chewed or crushed.(Same as: Protonix)

Start Date: 18

Stop Date: 18

Status: Discontinued



pneumococcal 13-valent vaccine

0.5 mL, Route: IM, Drug Form: INJ, Daily, Start date: 18 9:00:00 CST, Dura
tion: 1 doses or times, Stop date: 18 9:00:00 CST

Notes: Shake well prior to use  (Same as: Prevnar 13)

Start Date: 18

Stop Date: 18

Status: Completed



potassium chloride

40 mEq, 2 tab, Route: PO, Drug form: ERTAB, ONCE, Dosing Weight 101.903, kg, Sta
rt date: 18 8:38:00 CST, Stop date: 18 8:38:00 CST

Notes: (Same as: K-Shanique 20)"Do Not Crush"  With food and full glass of water

Start Date: 18

Stop Date: 18

Status: Completed



potassium chloride

40 mEq, 2 tab, Route: PO, Drug form: ERTAB, ONCE, Dosing Weight 79.545, kg, Star
t date: 18 12:08:00 CST, Stop date: 18 12:08:00 CST

Notes: (Same as: K-Dur 20)"Do Not Crush"  With food and full glass of water

Start Date: 18

Stop Date: 18

Status: Completed



Ranexa 500 mg oral tablet, extended release

500 mg, 1 tab, Route: PO, Drug form: TAB, BID, Dosing Weight 79.545, kg, Start d
ate: 18 9:00:00 CST, Duration: 30 day, Stop date: 18 17:00:00 CST

Notes: Same as Ranexa"Do Not Crush"

Start Date: 18

Stop Date: 18

Status: Discontinued



sertraline

100 mg, 1 tab, Route: PO, Drug form: TAB, Daily, Dosing Weight 79.545, kg, Start
date: 18 9:00:00 CST, Duration: 30 day, Stop date: 18 9:00:00 CST

Notes: (Same as: Zoloft)

Start Date: 18

Stop Date: 18

Status: Discontinued



Synthroid

50 microgram, 1 tab, Route: PO, Drug form: TAB, Q630AM, Dosing Weight 79.545, kg
, Start date: 18 6:30:00 CST, Duration: 30 day, Stop date: 18 6:30:0
0 CST

Notes: Take 1 hour before or 2 hours after meal; Enteral feeds may interefere wi
th the absorption of this medication.(Same as:Levothroid, Synthroid)

Start Date: 18

Stop Date: 18

Status: Discontinued



tamsulosin

0.4 mg, 1 cap, Route: PO, Drug form: CAP, Daily, Dosing Weight 79.545, kg, Start
date: 18 9:00:00 CST, Duration: 30 day, Stop date: 18 9:00:00 CST

Notes: (Same As: Flomax)  "Do Not Crush"

Start Date: 18

Stop Date: 18

Status: Discontinued



torsemide

20 mg, 1 tab, Route: PO, Drug form: TAB, BID, Dosing Weight 79.545, kg, Start da
te: 18 9:00:00 CST, Duration: 30 day, Stop date: 18 17:00:00 CST

Notes: (Same As: Demadex)

Start Date: 18

Stop Date: 18

Status: Discontinued



trazodone 50 mg oral tablet

50 mg, 1 tab, Route: PO, Drug form: TAB, Bedtime, Dosing Weight 79.545, kg, PRN 
Insomnia, Start date: 18 23:32:00 CST, Duration: 30 day, Stop date:  23:31:00 CST

Notes: (Same As: Desyrel)

Start Date: 18

Stop Date: 18

Status: Discontinued



Valium

5 mg, 1 tab, Route: PO, Drug form: TAB, ONCE, Dosing Weight 79.545, kg, Priority
: STAT, Start date: 18 2:10:00 CST, Stop date: 18 2:10:00 CST

Notes: (Same as: Valium)

Start Date: 18

Stop Date: 18

Status: Completed



Xarelto

20 mg, 1 tab, Route: PO, Drug form: TAB, QPM, Dosing Weight 79.545, kg, Start da
te: 18 23:39:00 CST, Duration: 30 day, Stop date: 18 17:00:00 CST

Notes: (Same as: Xarelto)Administer with food

Start Date: 18

Stop Date: 18

Status: Discontinued



Results





BLOOD BANK RESULTS





                    1                   2                   3



                                         Most recent to   



                                         oldest [Reference   



                                         Range]:   

 

                                        A NEG 

*Unknown*

                    (18 5:52 AM)                          



                                         ABO/Rh   

 

                                        Negative 

                    (18 5:52 AM)                          



                                         Antibody Scrn   

 

                                        Product available 

                    (18 11:01 AM)                          



                                         RBC product   







ELECTROLYTES





                    1                   2                   3



                                         Most recent to   



                                         oldest [Reference   



                                         Range]:   

 

                                        137 mEq/L 

                          (18 6:30 AM)         137 mEq/L 

                          (1/15/18 4:56 AM)         135 mEq/L 

                                        (18 5:52 AM) 



                                         Sodium Lvl [135-145   



                                         mEq/L]   

 

                                        3.2 mEq/L 

*LOW*

                          (18 6:30 AM)         3.6 mEq/L 

                          (1/15/18 4:56 AM)         3.6 mEq/L 

                                        (18 5:52 AM) 



                                         Potassium Lvl   



                                         [3.5-5.1 mEq/L]   

 

                                        94 mEq/L 

*LOW*

                          (18 6:30 AM)         93 mEq/L 

*LOW*

                          (1/15/18 4:56 AM)         93 mEq/L 

*LOW*

                                        (18 5:52 AM) 



                                         Chloride Lvl [   



                                         mEq/L]   

 

                                        34 mEq/L 

*HI*

                          (18 6:30 AM)         36 mEq/L 

*HI*

                          (1/15/18 4:56 AM)         33 mEq/L 

*HI*

                                        (18 5:52 AM) 



                                         CO2 [24-32 mEq/L]   

 

                                        12.2 mEq/L 

                          (18 6:30 AM)         11.6 mEq/L 

                          (1/15/18 4:56 AM)         12.6 mEq/L 

                                        (18 5:52 AM) 



                                         AGAP [10.0-20.0   



                                         mEq/L]   







CHEM PANEL





                    1                   2                   3



                                         Most recent to   



                                         oldest [Reference   



                                         Range]:   

 

                                        0.85 mg/dL 

                          (18 6:30 AM)         0.77 mg/dL 

                          (1/15/18 4:56 AM)         0.78 mg/dL 

                                        (18 5:52 AM) 



                                         Creatinine Lvl   



                                         [0.50-1.40 mg/dL]   

 

                                        85 mL/min/1.73m2 1

*NA*

                          (18 6:30 AM)         88 mL/min/1.73m2 2

*NA*

                          (1/15/18 4:56 AM)         88 mL/min/1.73m2 3

*NA*

                                        (18 5:52 AM) 



                                         eGFR   

 

                                        20 mg/dL 

                          (18 6:30 AM)         19 mg/dL 

                          (1/15/18 4:56 AM)         20 mg/dL 

                                        (18 5:52 AM) 



                                         BUN [7-22 mg/dL]   

 

                                        17 

                    (18 3:13 AM)                          



                                         B/C Ratio [6-25]   

 

                                        179 mg/dL 

*HI*

                          (18 6:30 AM)         69 mg/dL 

*LOW*

                          (1/15/18 4:56 AM)         68 mg/dL 

*LOW*

                                        (18 5:52 AM) 



                                         Glucose Lvl [70-99   



                                         mg/dL]   

 

                                        7.5 g/dL 

                    (18 3:13 AM)                          



                                         Total Protein   



                                         [6.4-8.4 g/dL]   

 

                                        3.2 g/dL 

*LOW*

                    (18 3:13 AM)                          



                                         Albumin Lvl [3.5-5.0   



                                         g/dL]   

 

                                        4.3 g/dL 

*HI*

                    (18 3:13 AM)                          



                                         Globulin [2.7-4.2   



                                         g/dL]   

 

                                        0.7 

                    (18 3:13 AM)                          



                                         A/G Ratio [0.7-1.6]   

 

                                        8.0 mg/dL 

*LOW*

                          (18 6:30 AM)         7.7 mg/dL 

*LOW*

                          (1/15/18 4:56 AM)         8.0 mg/dL 

*LOW*

                                        (18 5:52 AM) 



                                         Calcium Lvl   



                                         [8.5-10.5 mg/dL]   

 

                                        1.9 mg/dL 

                    (18 3:13 AM)                          



                                         Magnesium Lvl   



                                         [1.8-2.4 mg/dL]   

 

                                        34 unit/L 

                    (18 3:13 AM)                          



                                         ALT [0-65 unit/L]   

 

                                        37 unit/L 

                    (18 3:13 AM)                          



                                         AST [0-37 unit/L]   

 

                                        111 unit/L 

                    (18 3:13 AM)                          



                                         Alk Phos [   



                                         unit/L]   

 

                                        0.3 mg/dL 

                    (18 3:13 AM)                          



                                         Bili Total [0.2-1.3   



                                         mg/dL]   

 

                                        1.7 mMol/L 

                    (18 12:01 PM)                          



                                         Lactic Acid Lvl   



                                         [0.5-2.2 mMol/L]   







1Result Comment: The eGFR is calculated using the CKD-EPI formula. In most 
young, healthy individuals the eGFR will be >90 mL/min/1.73m2. The eGFR declines
with age. An eGFR of 60-89 may be normal in some populations, particularly the 
elderly, for whom the CKD-EPI formula has not been extensively validated. Use of
the eGFR is not recommended in the following populations:



Individuals with unstable creatinine concentrations, including pregnant patients
and those with serious co-morbid conditions.



Patients with extremes in muscle mass or diet. 



The data above are obtained from the National Kid[employer] Education Program (
NK[employer]) which additionally recommends that when the eGFR is used in patients with
extremes of body mass index for purposes of drug dosing, the eGFR should be mul
tiplied by the estimated BMI.



2Result Comment: The eGFR is calculated using the CKD-EPI formula. In most 
young, healthy individuals the eGFR will be >90 mL/min/1.73m2. The eGFR declines
with age. An eGFR of 60-89 may be normal in some populations, particularly the 
elderly, for whom the CKD-EPI formula has not been extensively validated. Use of
the eGFR is not recommended in the following populations:



Individuals with unstable creatinine concentrations, including pregnant patients
and those with serious co-morbid conditions.



Patients with extremes in muscle mass or diet. 



The data above are obtained from the National Kid[employer] Education Program (
[employer]) which additionally recommends that when the eGFR is used in patients with
extremes of body mass index for purposes of drug dosing, the eGFR should be mul
tiplied by the estimated BMI.



3Result Comment: The eGFR is calculated using the CKD-EPI formula. In most 
young, healthy individuals the eGFR will be >90 mL/min/1.73m2. The eGFR declines
with age. An eGFR of 60-89 may be normal in some populations, particularly the 
elderly, for whom the CKD-EPI formula has not been extensively validated. Use of
the eGFR is not recommended in the following populations:



Individuals with unstable creatinine concentrations, including pregnant patients
and those with serious co-morbid conditions.



Patients with extremes in muscle mass or diet. 



The data above are obtained from the National Kid[employer] Education Program (
[employer]) which additionally recommends that when the eGFR is used in patients with
extremes of body mass index for purposes of drug dosing, the eGFR should be mul
tiplied by the estimated BMI.



CARDIAC ENZYMES





                    1                   2                   3



                                         Most recent to   



                                         oldest [Reference   



                                         Range]:   

 

                                        97 unit/L 

                    (18 12:52 AM)                          



                                         Total CK [   



                                         unit/L]   

 

                                        3.1 ng/mL 

                    (18 12:52 AM)                          



                                         CK MB [0.5-3.6   



                                         ng/mL]   

 

                                        3.2 

*HI*

                    (18 12:52 AM)                          



                                         CK MB Index   



                                         [0.0-2.5]   

 

                                        <0.02 ng/mL 

                          (18 7:20 AM)          <0.02 ng/mL 

                          (18 3:13 AM)          <0.02 ng/mL 

                                        (18 12:52 AM) 



                                         Troponin-I   



                                         [0.00-0.40 ng/mL]   







ANEMIA STUDY





                    1                   2                   3



                                         Most recent to   



                                         oldest [Reference   



                                         Range]:   

 

                                        36 ug/dl 

*LOW*

                    (18 5:51 AM)                          



                                         Iron [ ug/dl]   

 

                                        64 ng/mL 

                    (18 5:51 AM)                          



                                         Ferritin Lvl [   



                                         ng/mL]   

 

                                        12 % 

                    (18 5:51 AM)                          



                                         % Satur Fe [12-57 %]   

 

                                        275 ug/dl 

                    (18 5:51 AM)                          



                                         UIBC [110-370 ug/dl]   

 

                                        311 ug/dl 

                    (18 5:51 AM)                          



                                         TIBC [228-428 ug/dl]   







URINE AND STOOL





                    1                   2                   3



                                         Most recent to   



                                         oldest [Reference   



                                         Range]:   

 

                                        Slight 

*ABN*

                    (1/15/18 6:16 PM)                          



                                         UA Turbidity [Clear]   

 

                                        Yellow 

*NA*

                    (1/15/18 6:16 PM)                          



                                         UA Color [Yellow]   

 

                                        6.0 

                    (1/15/18 6:16 PM)                          



                                         UA pH [5.0-8.0]   

 

                                        1.008 

                    (1/15/18 6:16 PM)                          



                                         UA Spec Grav   



                                         [<=1.030]   

 

                                        Negative mg/dL 

*NA*

                    (1/15/18 6:16 PM)                          



                                         UA Glucose [Negative   



                                         mg/dL]   

 

                                        Negative 

                    (1/15/18 6:16 PM)                          



                                         UA Blood [Negative]   

 

                                        Negative mg/dL 

*NA*

                    (1/15/18 6:16 PM)                          



                                         UA Ketones [Negative   



                                         mg/dL]   

 

                                        Negative mg/dL 

                    (1/15/18 6:16 PM)                          



                                         UA Protein [Negative   



                                         mg/dL]   

 

                                        4.0 mg/dL 

*HI*

                    (1/15/18 6:16 PM)                          



                                         UA Urobilinogen   



                                         [0.1-1.0 mg/dL]   

 

                                        Negative 

*NA*

                    (1/15/18 6:16 PM)                          



                                         UA Bili [Negative]   

 

                                        Large 

*ABN*

                    (1/15/18 6:16 PM)                          



                                         UA Leuk Est   



                                         [Negative]   

 

                                        Negative 

                    (1/15/18 6:16 PM)                          



                                         UA Nitrite   



                                         [Negative]   

 

                                        99 /HPF 

*HI*

                    (1/15/18 6:16 PM)                          



                                         UA WBC [0-5 /HPF]   

 

                                        3 /HPF 

*HI*

                    (1/15/18 6:16 PM)                          



                                         UA RBC [0-2 /HPF]   

 

                                        Occasional /HPF 

*NA*

                    (1/15/18 6:16 PM)                          



                                         UA Bacteria [None   



                                         Seen /HPF]   

 

                                        None Seen 

*NA*

                    (1/15/18 6:16 PM)                          



                                         UA Sq Epi   

 

                                        1 /LPF 

                    (1/15/18 6:16 PM)                          



                                         UA Hyal Cast [0-2   



                                         /LPF]   

 

                                        Positive 

*ABN*

                    (18 8:38 PM)                          



                                         Occult Bld Stl   



                                         [Negative]   







HEMATOLOGY





                    1                   2                   3



                                         Most recent to   



                                         oldest [Reference   



                                         Range]:   

 

                                        12.4 K/CMM 

*HI*

                          (18 6:30 AM)         15.8 K/CMM 

*HI*

                          (1/15/18 4:56 AM)         13.6 K/CMM 

*HI*

                                        (18 5:52 AM) 



                                         WBC [3.7-10.4 K/CMM]   

 

                                        3.73 M/CMM 

*LOW*

                          (18 6:30 AM)         3.59 M/CMM 

*LOW*

                          (1/15/18 4:56 AM)         3.20 M/CMM 

*LOW*

                                        (18 5:52 AM) 



                                         RBC [4.70-6.10   



                                         M/CMM]   

 

                                        9.4 g/dL 

*LOW*

                          (18 6:30 AM)         9.3 g/dL 

*LOW*

                          (1/15/18 4:56 AM)         7.7 g/dL 

*LOW*

                                        (18 5:52 AM) 



                                         Hgb [14.0-18.0 g/dL]   

 

                                        29.8 % 

*LOW*

                          (18 6:30 AM)         28.5 % 

*LOW*

                          (1/15/18 4:56 AM)         24.8 % 

*LOW*

                                        (18 5:52 AM) 



                                         Hct [42.0-54.0 %]   

 

                                        79.7 fL 

*LOW*

                          (18 6:30 AM)         79.2 fL 

*LOW*

                          (1/15/18 4:56 AM)         77.5 fL 

*LOW*

                                        (18 5:52 AM) 



                                         MCV [80.0-94.0 fL]   

 

                                        25.2 pg 

*LOW*

                          (18 6:30 AM)         26.0 pg 

*LOW*

                          (1/15/18 4:56 AM)         24.2 pg 

*LOW*

                                        (18 5:52 AM) 



                                         MCH [27.0-31.0 pg]   

 

                                        31.6 g/dL 

*LOW*

                          (18 6:30 AM)         32.8 g/dL 

                          (1/15/18 4:56 AM)         31.2 g/dL 

*LOW*

                                        (18 5:52 AM) 



                                         MCHC [32.0-36.0   



                                         g/dL]   

 

                                        18.3 % 

*HI*

                          (18 6:30 AM)         18.1 % 

*HI*

                          (1/15/18 4:56 AM)         18.4 % 

*HI*

                                        (18 5:52 AM) 



                                         RDW [11.5-14.5 %]   

 

                                        8.4 fL 

                          (18 6:30 AM)         8.9 fL 

                          (1/15/18 4:56 AM)         8.7 fL 

                                        (18 5:52 AM) 



                                         MPV [7.4-10.4 fL]   

 

                                        288 K/CMM 

                          (18 6:30 AM)         308 K/CMM 

                          (1/15/18 4:56 AM)         277 K/CMM 

                                        (18 5:52 AM) 



                                         Platelet [133-450   



                                         K/CMM]   

 

                                        76.1 % 

*HI*

                          (18 6:30 AM)         77.0 % 

*HI*

                          (1/15/18 4:56 AM)         73.4 % 

                                        (18 5:52 AM) 



                                         Segs [45.0-75.0 %]   

 

                                        11.5 % 

*LOW*

                          (18 6:30 AM)         10.7 % 

*LOW*

                          (1/15/18 4:56 AM)         15.8 % 

*LOW*

                                        (18 5:52 AM) 



                                         Lymphocytes   



                                         [20.0-40.0 %]   

 

                                        8.9 % 

                          (18 6:30 AM)         9.0 % 

                          (1/15/18 4:56 AM)         7.2 % 

                                        (18 5:52 AM) 



                                         Monocytes [2.0-12.0   



                                         %]   

 

                                        2.4 % 

                          (18 6:30 AM)         2.5 % 

                          (1/15/18 4:56 AM)         2.8 % 

                                        (18 5:52 AM) 



                                         Eosinophils [0.0-4.0   



                                         %]   

 

                                        1.1 % 

*HI*

                          (18 6:30 AM)         0.8 % 

                          (1/15/18 4:56 AM)         0.8 % 

                                        (18 5:52 AM) 



                                         Basophils [0.0-1.0   



                                         %]   

 

                                        9.4 K/CMM 

*HI*

                          (18 6:30 AM)         12.2 K/CMM 

*HI*

                          (1/15/18 4:56 AM)         10.0 K/CMM 

*HI*

                                        (18 5:52 AM) 



                                         Segs-Bands #   



                                         [1.5-8.1 K/CMM]   

 

                                        1.4 K/CMM 

                          (18 6:30 AM)         1.7 K/CMM 

                          (1/15/18 4:56 AM)         2.1 K/CMM 

                                        (18 5:52 AM) 



                                         Lymphocytes #   



                                         [1.0-5.5 K/CMM]   

 

                                        1.1 K/CMM 

*HI*

                          (18 6:30 AM)         1.4 K/CMM 

*HI*

                          (1/15/18 4:56 AM)         1.0 K/CMM 

*HI*

                                        (18 5:52 AM) 



                                         Monocytes # [0.0-0.8   



                                         K/CMM]   

 

                                        0.3 K/CMM 

                          (18 6:30 AM)         0.4 K/CMM 

                          (1/15/18 4:56 AM)         0.4 K/CMM 

                                        (18 5:52 AM) 



                                         Eosinophils #   



                                         [0.0-0.5 K/CMM]   

 

                                        0.1 K/CMM 

                          (18 6:30 AM)         0.1 K/CMM 

                          (1/15/18 4:56 AM)         0.1 K/CMM 

                                        (18 5:52 AM) 



                                         Basophils # [0.0-0.2   



                                         K/CMM]   

 

                                        1+ 

*ABN*

                          (18 5:52 AM)         1+ 

*ABN*

                          (18 5:09 AM)         1+ 

*ABN*

                                        (18 5:51 AM) 



                                         Microcyte [None   



                                         Seen]   

 

                                        19.9 seconds 

*HI*

                    (18 12:52 AM)                          



                                         PT [12.0-14.7   



                                         seconds]   

 

                                        1.68 

*HI*

                    (18 12:52 AM)                          



                                         INR [0.85-1.17]   

 

                                        32.1 seconds 

                    (18 12:52 AM)                          



                                         PTT [22.9-35.8   



                                         seconds]   







Immunizations





Given and Recorded





   



                 Vaccine         Date            Status          Refusal Reason

 

   



                     influenza virus vaccine, inactivated     18              Given 

 

   



                     pneumococcal 13-valent vaccine     18              Given 

 

   



                     diphtheria/pertussis, acel/tetanus adult     16              Given 

 

   



                     pneumococcal 23-valent vaccine     3/31/15             Given 









Not Given





   



                 Vaccine         Date            Status          Refusal Reason

 

   



                 pneumococcal 23-valent vaccine     3/30/15         Not Given       Patient Refuses







Procedures







    



              Procedure     Date         Related Diagnosis     Body Site     Status

 

    



                           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

 

    



                           Cardiac ablation using fluoroscopy guidance        Completed

 

    



                           Cardiac catheterization, left heart        Completed

 

    



                           Stent placement2          Completed







1ablation stents



2x27



Social History







 



                           Social History Type       Response

 

 



                           Substance Abuse           Use: None.

 

 



                           Alcohol                   Past

 

 



                           Smoking Status            Former smoker; Exposure to Tobacco Smoke None; Cigarette Smoking

 Last 365



                                         Days No; Reg Smoking Cessation Counseling No; Other Tobacco Frequency quit



                                         30 years ago;



                                         entered on: 18







Assessment and Plan

Extracted from:





  



                     Title: Clinical Document     Author: Jovita Ford MD     Date: 18











Progress Note

Gastroenterology and Hepatology



Chronic GI blood loss anemia-iron deficiency

Chronic anticoagulation and antiplatelet therapy for history of coronary artery 
disease and atrial fibrillation

GERD

Coronary artery disease/atrial fibrillation/CHF, increased risk for sedation

Status post fall and right shoulder fracture



RECOMMENDATIONS AND PLAN:

Given no active bleeding and recent endoscopic evaluation no plans for repeat 
EGD and colonoscopy at this time.

Follow-up with his outpatient cardiologist as discussed yesterday with in 1-2 
weeks after discharge.



SUBJECTIVE:

Patient seen and examined.  No reports of hematemesis or rectal bleeding.  
Hemoglobin has been stable.  Hemoglobin today is 9.4.  No nausea vomiting fever.



Review of Systems: 

                                        (-)=Negative,(+)=Positive

                                        1) Const: (-) fever, (-) weight change

                                        2) Skin: (-) rash, (-) bleeding

                                        3) HEENT: (-) difficulty swallowing, (-) swelling

                                        4) Eyes: (-) vision changes, (-) bleeding

                                        5) Neuro: (+) weakness, (-) headaches

                                        6) Resp: (+) dyspnea on exertion, (-) cough

                                        7) Cardio: (-) chest pain, (-) orthopnea

                                        8) GI: (-) blood in stool, (-) reflux

                                        9) : (-) dysuria, (-) bloody discharge

                                        10) Endo: (-) heat intolerance, (-) cold intolerance





OBJECTIVE:



Vitals: See below

General: NAD

Psych: alert , oriented x 3

Neck:supple

CVS: s1s2 RRR

Resp: CTA Bilaterally

Abd : Soft, NT, ND , BS +

Ext : No edema

Skin: No rash or echymossis

CNS:  No gross motor/sensory defect



Radiology Studies: Reviewed.



Labs: Reviewed. 













VitalsTmp(F)Tmp(C)[id number]

                                         11:3398.536.30xuam534/64---[id number] 4.0L/m---

                                         08:42-------------------------98 4.0L/m---

                                         08:0398.536.96jipc614/67---[id number]------

                                         03:4597.736.48nppm396/71---507753------

                                        01/15 23:5097.836.65rrer969/69---[id number]------



                                        24 Hr Tmax: 98.5F (36.94c) at  11:33Vital Signs are the last 5 in the past 

48 hours.

                                        24 Hr Tmin:  97.7F (36.50c) at  03:45Weights are the last 5 in 60 days, plus

 initial.



DateWt(kg)Wt(lb)Ht(cm)Ht(in)MethodBMI

                                        01.90 [phone] 69.00Measured 33.2

                                         (initial) 79.55 175.00Estimated 27.5

                                        170.18 67.00Stated



Most Recent Scores:



                                        18Pain Intensity NRS (0-10)6

                                        18Johns Jiménez Fall Score15

                                        18Glasgow Coma Score15

                                        18Braden Score16



Lines, Tubes, and Drains:



                                        01/15/2018 00:00 Peripheral Lines: Forearm Left 20 gauge Over the needle catheter



                                        2018 12:30 Peripheral Lines: Forearm Left 22 gauge Over the needle catheter





                                        (no surgical procedures documented)



I&ORecordInOutBal

                                        1624hr Tot  100    0  100

                                        1524hr Tot 1003  750  253



                                        24hr Labs

                                         1132

POC Performing LocatioSee Note  

Glucose XMB706  H

                                         0801

POC Performing LocatioSee Note  

Glucose SKB311  H

                                         0630

Glucose Vjf418  H

BUN20  

Creatinine Lvl0.85  

Sodium Ufc641  

Potassium Lvl3.2  L

Chloride Lvl94  L

  H

AGAP12.2  

Calcium Lvl8.0  L

eGFR85  

WBC12.4  H

RBC3.73  L

Hgb9.4  L

Hct29.8  L

MCV79.7  L

MCH25.2  L

MCHC31.6  L

RDW18.3  H

Eahcsuus159  

MPV8.4  

Segs76.1  H

Monocytes8.9  

Zdpagfyfdly91.5  L

Eosinophils2.4  

Basophils1.1  H

Segs-Bands #9.4  H

Lymphocytes #1.4  

Monocytes #1.1  H

Eosinophils #0.3  

Basophils #0.1  

                                         0157

POC Performing LocatioSee Note  

Glucose GOA352  H

                                        01/15 2235

POC Performing LocatioSee Note  

Glucose FKQ836  H

                                        01/15 2023

POC Performing LocatioSee Note  

Glucose KKV881  H

                                        01/15 1816

UA ColorYellow  

UA TurbiditySlight  

UA Spec Grav1.008  

UA pH6.0  

UA ProteinNegative  

UA GlucoseNegative  

UA KetonesNegative  

UA BiliNegative  

UA BloodNegative  

UA Urobilinogen4.0  H

UA NitriteNegative  

UA Leuk EstLarge  

UA RBC3  H

UA WBC99  H

UA BacteriaOccasional  

UA Sq EpiNone Seen  

UA Hyal Cast1  

                                        01/15 1636

POC Performing LocatioSee Note  

Glucose UPW801  H



Scheduled Meds (13):

AMIODarone 200 mg PO Daily

[eMAR Schedule: (18)  09:00]

[Future Dose:  18 09:00]

atorvastatin 5 mg PO Bedtime

[Last Rescheduled Dt/Tm: 18 23:39:00 CST]

[eMAR Schedule: (18)  21:00]

[Future Dose:  18 21:00]

cefepime + Sodium Chloride 0.9%  mL 1 gm IV Q8H 25 ml/hr

[Last Rescheduled Dt/Tm: 18 0:00:00 CST]

[eMAR Schedule: (18)  00:00, 08:00, 16:00]

gabapentin (gabapentin 600 mg oral tablet) 600 mg PO Q8H

[Last Rescheduled Dt/Tm: 18 0:00:00 CST]

[eMAR Schedule: (18)  00:00, 08:00, 16:00]

insulin glargine (Lantus 100 units/mL) 85 unit SUB-Q Daily 0 ml/hr

[eMAR Schedule: (18)  09:00]

[Future Dose:  18 09:00]

insulin glargine (insulin glargine 100 units/mL subcutaneous solution) 20 unit 
SUB-Q Bedtime 0 ml/hr

[Last Rescheduled Dt/Tm: 18 21:00:00 CST]

[eMAR Schedule: (18)  21:00]

[Future Dose:  18 21:00]

isosorbide mononitrate (Imdur) 30 mg PO QAM

[Last Rescheduled Dt/Tm: 18 6:30:00 CST]

[eMAR Schedule: (18)  06:30]

[Future Dose:  18 06:30]

levothyroxine (Synthroid) 50 microgram PO Q630AM

[Last Rescheduled Dt/Tm: 18 6:30:00 CST]

[eMAR Schedule: (18)  06:30]

[Future Dose:  18 06:30]

pantoprazole 40 mg PO BID-Before Meals

[Last Rescheduled Dt/Tm: 18 7:30:00 CST]

[eMAR Schedule: (18)  07:30, 16:30]

ranolazine (Ranexa 500 mg oral tablet, extended release) 500 mg PO BID

[eMAR Schedule: (18)  09:00, 17:00]

sertraline 100 mg PO Daily

[eMAR Schedule: (18)  09:00]

[Future Dose:  18 09:00]

tamsulosin 0.4 mg PO Daily

[eMAR Schedule: (18)  09:00]

[Future Dose:  18 09:00]

torsemide 20 mg PO BID

[eMAR Schedule: (18)  09:00, 17:00]

Unscheduled Meds: None

PRN Meds (17):

Dextrose 50% in Water IV (Dextrose 50% Syringe) 12.5 gm IVP PRN

Dextrose 50% in Water IV (Dextrose 50% Syringe) 25 gm IVP PRN

acetaminophen-hydrocodone (acetaminophen-hydrocodone 325 mg-5 mg oral tablet) 2 
tab PO Q4H

glucagon 1 mg IM PRN

hydrALAZINE 10 mg IVP Q4H

insulin lispro 2 unit SUB-Q TID-Before Meals

insulin lispro 4 unit SUB-Q TID-Before Meals

insulin lispro 6 unit SUB-Q TID-Before Meals

insulin lispro 8 unit SUB-Q TID-Before Meals

insulin lispro 10 unit SUB-Q TID-Before Meals

insulin lispro 1 unit SUB-Q Bedtime

insulin lispro 2 unit SUB-Q Bedtime

insulin lispro 3 unit SUB-Q Bedtime

insulin lispro 4 unit SUB-Q Bedtime

morphine Sulfate 6 mg PO Q3H

ondansetron 4 mg IVP Q6H

trazodone (trazodone 50 mg oral tablet) 50 mg PO Bedtime

One Time Meds (1):

                                        (Ordered) potassium chloride 40 mEq PO ONCE

Continuous Infusions (1):

Sodium Chloride 0.9%  mL (normal saline 0.9%  mL) 250 mL 20 ml/hr



Type of Bladder Control:  Voluntary

Type of Urinary Elimination:  Incontinent





Extracted from:





  



                     Title: Clinical Document     Author: Juliann Garibay     Date: 18



                                         MD 









                                        Chief Complaint:

Preop CV risk assessment for EGD and colonoscopy

HPI:

Patient is a 77-year-old male that presents this time to the hospital after 
mechanical fall.  He developed anemia that triggers concerns of possible GI 
bleed and cardiology was consulted for preop risk assessment for EGD and 
colonoscopy.



From a cardiac standpoint he has history of paroxysmal atrial fibrillation and 
is taking Xarelto as outpatient.  He also has history of CAD status post 
multiple PCI and takes clopidogrel.



EKG shows normal sinus rhythm without ST or T-wave abnormalities.  His 
disoriented and provides minimal information for this interview.



Review of his records reveals a nuclear stress test performed in 2017 
that showed partially reversible, mild to moderate inferolateral defect.



The patient denies any recent chest pain.

Review of Systems

General/Constitutional:

Fatigue no. Weakness no. Weight gain no. Headaches no.

Allergy/Immunology:

Colds no. Cough no.

HEENT/Neck:

Dizziness no. Change in vision no.

Respiratory:

Chest congestion no. Cough no. Pain with breathing no. Shortness of breath no. 
Swelling of the legs no. Wheezing no.

Cardiovascular:

Chest pain no. Claudication no. Dyspnea on exertion no. Palpitations no.

Gastrointestinal:

Abdominal pain no. Change in bowel habits no. Constipation no. Diarrhea no. 
Nausea no.

Hematology:

Easy bleeding no. Easy bruising no.

Musculoskeletal:

Back pain y. Myalgias no



Past medical history:

Stented coronary artery

Acute MI

Hypercholesteremia

Poliomyelitides

Whooping cough

Histoplasmosis

Hypertension

Past Surgical History:

Cardiac catheterization, left heart

CABG x 3 - Coronary artery bypass grafts x 3

Cardiac ablation using fluoroscopy guidance

Stent placement

Family History:



Brother: Cancer of prostate; Heart attack; Leukemia

Social History:

Alcohol

Details: Past

Tobacco

Details: Use: Former smoker.  Tobacco smoke exposure: None.  Did the Patient 
Smoke Cigarettes Anytime During the Last 365 Days? No.  Cessation Counseling 
Provided? No.

Details: Use: Former smoker.  Tobacco smoke exposure: None.  Other Tobacco 
Frequency quit 30 years ago.  Did the Patient Smoke Cigarettes Anytime During 
the Last 365 Days? No.  Cessation Counseling Provided? No.

Substance Abuse

Details: Use: None.

List of Medications:



Scheduled Meds (15):

                                        18 AMIODarone 200 mg PO Daily

                                        18 atorvastatin 5 mg PO Bedtime

                                        18 cefepime + Sodium Chloride 0.9%  mL 1 gm IV Q8H 25 ml/hr

                                        18 clopidogrel 75 mg PO Daily

                                        18 gabapentin (gabapentin 600 mg oral tablet) 600 mg PO Q8H

                                        01/10/18 insulin glargine (Lantus Solostar Pen 100 units/mL subcutaneous solution)

 42 unit SUB-Q Bedtime 0 ml/hr

                                        18 insulin glargine (Lantus 100 units/mL) 85 unit SUB-Q Daily 0 ml/hr

                                        18 isosorbide mononitrate (Imdur) 30 mg PO QAM

                                        18 levothyroxine (Synthroid) 50 microgram PO Q630AM

                                        18 pantoprazole 40 mg PO BID-Before Meals

                                        18 ranolazine (Ranexa 500 mg oral tablet, extended release) 500 mg PO BID

                                        18 rivaroxaban (Xarelto) 20 mg PO QPM

                                        18 sertraline 100 mg PO Daily

                                        18 tamsulosin 0.4 mg PO Daily

                                        18 torsemide 20 mg PO BID



Continuous Infusions (1):

                                        18 Sodium Chloride 0.9%  mL (normal saline 0.9%  mL) 250 mL 20

 ml/hr



Scheduled Meds (15):AMIODarone, atorvastatin, cefepime + Sodium Chloride 0.9% IV
100 mL, clopidogrel, gabapentin (gabapentin 600 mg oral tablet), insulin 
glargine (Lantus Solostar Pen 100 units/mL subcutaneous solution), insulin 
glargine (Lantus 100 units/mL), isosorbide mononitrate (Imdur), levothyroxine 
(Synthroid), pantoprazole, ranolazine (Ranexa 500 mg oral tablet, extended 
release), rivaroxaban (Xarelto), sertraline, tamsulosin, torsemide

Unscheduled Meds: None

PRN Meds (17):Dextrose 50% in Water IV (Dextrose 50% Syringe), Dextrose 50% in 
Water IV (Dextrose 50% Syringe), acetaminophen-hydrocodone (acetaminophen-
hydrocodone 325 mg-5 mg oral tablet), glucagon, hydrALAZINE, insulin lispro, 
insulin lispro, insulin lispro, insulin lispro, insulin lispro, insulin lispro, 
insulin lispro, insulin lispro, insulin lispro, morphine Sulfate, ondansetron, 
trazodone (trazodone 50 mg oral tablet)

One Time Meds (2):(Completed) cefepime + sterile water 10 mL (Maxipime + sterile
water 10 mL), (Ordered) furosemide (Lasix)

Continuous Infusions (1):Sodium Chloride 0.9%  mL (normal saline 0.9% IV 
250 mL)



Labs (Last four charted values)

WBC                 H 13.6()H 13.5()H 12.0()H 15.5()

Hgb                 L 7.7()L 7.5()L 7.9()L 8.9()

Hct                 L 24.8()L 23.9()L 24.9()L 27.7()

Plt                 277()258()264()254()

Na                  135()135()L 134()L 133()

K                   3.6()L 3.2()3.6()3.5()

CO2                 H 33()H 34()28()29()

Cl                  L 93()L 92()99()98()

Cr                  0.78()0.82()0.90()0.90()

BUN                 20()18()15()16()

Glucose Random      L 68()H 186()H 293()H 293()

Mg                  1.9()

Ca                  L 8.0()L 7.8()L 8.0()L 8.1()

PT                  H 19.9()

INR                 H 1.68()

PTT                 32.1()

Troponin            <0.02()<0.02()<0.02()

CK MB               3.1()

Total CK            97()Laboratory:



Physical Exam:



VitalsTmp(F)[id number]

                                         16:4697.113493/72--99---

                                         15:2897.017949/9744369---

                                         11:5097.789208/080159---

                                         08:0598.306024/60--98---

                                         07:34------------1897 36%





                                        24 Hr Tmax: 98.1F (36.72c) at  08:05Vital Signs are the last 5 in the past 

48 hours.



HEENT: Neck Supple, No JVD, no carotid bruits

CVS: Regular rate, Normal S1S2 no murmur rubs or gallops

LUNGS: Clear to auscultation

ABD: Soft, Non-tender, + BS, no bruits or organomegaly

EXT: No ankle edema, palpable distal pulses

Skin: No ulcers

Neuro: Grossly non-focal



Assessment and plan:

                                        77-year-old male presenting with mechanical fall and right shoulder fracture.  During

 hospitalization had drop in hemoglobin with positive occult blood in the 
stools.  He has been on Xarelto and clopidogrel due to paroxysmal atrial 
fibrillation and PCI.  Review of records from [facility] showed that the last 
PCI was performed in 2016.  Present he is in normal sinus rhythm.  He also 
had episodes of junctional escape in the past but this was in the presence of 
amiodarone and both tach simultaneously.



I will recommend discontinuation of Xarelto and clopidogrel.  He should be 
allowed to proceed with planned EGD and colonoscopy at moderate to high but 
acceptable CV risk.  He had abnormal MPI with moderate partially reversible 
inferolateral defect a month ago.  We will follow him closely



List of diagnosis:

Preop EGD colonoscopy CV risk assessment

GI bleed

Acute anemia

Mechanical fall with right humeral fracture

CAD status post multiple PCI and CABG

Chronic diastolic CHF

MPI in 2017 with partially reversible moderate inferolateral defect

Hypertension

Diabetes

Hyperlipidemia

Paroxysmal atrial fibrillation chads 2 vasc: 4

??  SSS, junctional escape while on both tach and amiodarone simultaneously in 
the past

## 2019-07-18 NOTE — XMS REPORT
Continuity of Care Document

                             Created on: 2019



DESIREE MCKEON

External Reference #: [id number]

: 1940

Sex: Male



Demographics







                          Address                   [address] [place] [address]  [id number]

 

                          Home Phone                [phone]

 

                          Preferred Language        English

 

                          Marital Status            Unknown

 

                          M[employer]on Affiliation     Unknown

 

                          Race                      Unknown

 

                          Ethnic Group              Unknown





Author







                          Author                    [facility]

 

                          Organization              [facility]

 

                          Address                   Unknown

 

                          Phone                     Unavailable







Care Team Providers







                    Care Team Member Name    Role                Phone

 

                    [facility]    Unavailable         Unavailable



                                    



Problems

                    





                    Problem                            Status                            Onset Date     

                          Classification                            Date Reported       

                          Comments                            Source                    

 

                    CHEST PAIN                            Active                            2019  

                                                                                       

                                        [facility]                    

 

                    ACUTE CHEST PAIN                            Active                            2019

                                                                                       

                                        [facility]                    

 

                    NON-ST ELEVATION MI                            Active                            2019

                                                                                       

                                        [facility]                    

 

                    ABNORMAL LABS                            Active                            2019

                                                                                       

                                        [facility]                    

 

                    Other chest pain                                                        2018  

                                                      04/15/2019                     

                                                      [facility]                    

 

                                        Fracture of nasal bones, initial encounter for closed fracture                  

                                                2018                                                        [facility]

                    

 

                    CKD                                                        2018                                    

                                        [facility]                    

 

                    Sundowning                                                        2018                             

                                        [facility]                    

 

                    Dementia                                                        2018                               

                                        [facility]                    

 

                    Hyperkalemia                                                        2018                         

                                                      [facility]                    

 

                    Closed head injury                                                        2018                   

                                                      [facility]                    

 

                    Closed fracture nasal bone                                                        2018                   

                                                      [facility]                    

 

                    FALL                            Active                            10/23/2018        

                                                                                       

                                        [facility]                    

 

                    NOSE BLEED                            Active                            10/11/2018  

                                                                                       

                                        [facility]                    

 

                          EPISTAXIS, CHF EXACERBATION                            Active                     

                    10/11/2018                                                                      

                                                      [facility]                    

 

                          FALL AT HOME, GENERALIZED WEAKNESS                            Active              

                    2018                                                               

                                                      [facility]                    

 

                    SEPSIS                            Active                            2018      

                                                                                       

                                        [facility]                    

 

                          LEUKOCYTOSIS, ELEVATED TROPONIN                            Active                 

                    2018                                                                  

                                                      [facility]                    

 

                          ACUTE CHEST PAIN, ACUTE PULMONARY EDEMA                            Active         

                    2018                                                          

                                                      [facility]                   

 

 

                    LEFT ARM PAIN                            Active                            2018

                                                                                       

                                        [facility]                    

 

                          CHANGE IN MENTAL STATUS                            Active                         

                    2018                                                                          

                                                      [facility]                    

 

                                        Hemorrhage due to vascular prosthetic devices, implants and grafts, initial encounter

                                                        2018                                        

                                        [facility]                    

 

                    HYPOGLUCEMIA, PICC                            Active                            02/10/2018

                                                                                       

                                        [facility]                    

 

                    BLEEDING PICC LINE                            Active                            02/10/2018

                                                                                       

                                        [facility]                    

 

                                        Unspecified displaced fracture of surgical neck of right humerus, initial encounter

 for closed fracture                                                        2018                   

                                                      [facility]                    

 

                          ACCIDENTAL FALL, HYPERTENSIVE URGENCY                            Active           

                    2018                                                            

                                                      [facility]                    

 

                          Discharge Diagnosis: Bilateral lower extremity edema                              

                          2017                                                        [facility]  

                  

 

                    Discharge Diagnosis: SOB                                                        2017                   

                                                       [facility]                    

 

                    CHEST PAIN / SOB                            Active                            2017

                                                                                       

                                         [facility]                    

 

                          ACUTE CHEST PAIN, EPISODE OF GERERALIZED                            Active        

                    2017                                                         

                                                        [facility]                  

  

 

                    CELLULITIS                            Active                            2016  

                                                                                       

                                         [facility]                    

 

                    Discharge Diagnosis: Fall                                                        2016                   

                                                       [facility]                    

 

                          Discharge Diagnosis: Cellulitis                                                   

                    2016      

                                                       [facility]                    

 

                    ACUTE ENCEPHALOPATHY                            Active                            2016

                                                                                       

                                         [facility]                    

 

                          Discharge Diagnosis: Hx of insomnia                                               

                    2016  

                                                       [facility]                   

 

 

                    WEAKNESS                            Active                            2016    

                                                                                       

                                         [facility]                    

 

                    S00.03XA                            Active                            2016    

                                                                                       

                                         [facility]                    

 

                    S00.03XA**STAT**                            Active                            2016

                                                                                       

                                         [facility]                    

 

                          Discharge Diagnosis: Accidental fall                                              

                    10/30/2015                                                        2015 

                                                        [facility]                  

  

 

                                        Discharge Diagnosis: Disease related peripheral neuropathy                      

                                                10/30/2015                                           

                    2015                                                         [facility]

                    

 

                          Discharge Diagnosis: Episode of generalized weakness                              

                          2015                                                 

                    08/15/2015                                                         [facility]  

                  

 

                    LEG PAIN                            Active                            2015    

                                                                                       

                                         [facility]                    

 

                    LETHARGY                            Active                            2015    

                                                                                       

                                         [facility]                    

 

                    DIRECT ADM                            Active                            2015  

                                                                                       

                                         [facility]                    

 

                    NSTEMI                            Active                            2015      

                                                                                       

                                         [facility]                    

 

                          Discharge Diagnosis: Diabetic hypoglycemia                                        

                    2015

                                                         [facility]                 

   

 

                    OVERDOSE                            Active                            2015    

                                                                                       

                                         [facility]                    

 

                    ALTERED MENTAL STATUS                            Active                            2015

                                                                                       

                                         [facility]                    

 

                    MENTAL STATUS CHANGE                            Active                            2015

                                                                                       

                                        [facility]                    

 

                    CHEST PAIN, NSTEMI                            Active                            2015

                                                                                       

                                        [facility]                    

 

                    Acute MI1                            Resolved                                       

                          Problem                            06/17/2019                       

                          x 3                            [facility]                    

 

                    Diabetes                            Active                                          

                    Problem                            2019                            

                                        [facility]                    

 

                    Histoplasmosis                            Resolved                                  

                          Problem                            2019                  

                                                      [facility]                    

 

                    Hypercholesteremia                            Resolved                              

                          Problem                            2019              

                                                      [facility]                    

 

                    Hypertension                            Active                                      

                          Problem                            2019                      

                                                      [facility]                    

 

                    Poliomyelitides                            Resolved                                 

                          Problem                            2019                 

                                                      [facility]                    

 

                    Sleep apnea                            Active                                       

                          Problem                            2019                       

                                                      [facility]                    

 

                          Stented coronary artery2                            Resolved                      

                                                Problem                            2019        

                          25 cardiac stents                            [facility]        

            

 

                    Whooping cough                            Resolved                                  

                          Problem                            2019                  

                                                      [facility]                    

 

                    Atrial fibrillation                            Active                               

                          Problem                            2019               

                                                      [facility]                    

 

                    CAD (Confirmed)                            Active                                   

                          Problem                            2019                   

                                                      [facility]                    

 

                          Diastolic heart failure                            Active                         

                                                Problem                            2019           

                                                      [facility]                    

 

                    HTN (Confirmed)                            Active                                   

                          Problem                            2019                   

                                                       [facility]                    

 

                    Hypothyroidism                            Active                                    

                          Problem                            2019                    

                                                      [facility]                    

 

                          Diabetes mellitus type 2, insulin dependent                            Active     

                                                   Problem                            2019

                                                        [facility]                 

   

 

                    PAUL (Confirmed)                            Active                                   

                          Problem                            2019                   

                                                      [facility]                    

 

                    Systolic heart failure                            Active                            

                            Problem                            2019            

                                                      [facility]                    

 

                    Final:                                                                              

                                                2015                                           

                                        [facility]                    

 

                          Chronic diastolic heart failure                                                   

                                                                            04/15/2019                

                                                      [facility]                    

 

                                        Type 2 diabetes mellitus with hypoglycemia without coma                         

                                                                                                    2019

                                                        [facility]                 

   

 

                          Unspecified atrial fibrillation                                                   

                                                                            2019                

                                                      [facility]                    

 

                          Urinary tract infection, site not specified                                       

                                                                            2018    

                                                      [facility]                    

 

                          Hypertensive heart disease with heart failure                                     

                                                                            2019  

                                                      [facility]                   

 

 

                          Unspecified fall, initial encounter                                               

                                                                            2019            

                                                      [facility]                    

 

                          Presence of aortocoronary bypass graft                                            

                                                                            2019         

                                                      [facility]                    

 

                                        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 

                                                                                       

                          2019                                                   

                                        [facility]                    

 

                                        Other displaced fracture of upper end of right humerus, subsequent encounter for

 fracture with routine healing                                                     

                                                                            2018                  

                                                      [facility]                    

 

                    CHF (Confirmed)                            Active                                   

                          Problem                            2019                   

                                                      [facility]                    

 

                    Hx MRSA infection                            Resolved                               

                          Problem                            2019               

                                                      [facility]                    

 

                    Acute posthemorrhagic anemia                                                        

                                                        2019                   

                                                      [facility]                    

 

                    Encounter for immunization                                                          

                                                      04/15/2019                     

                                                      [facility]                    

 

                          Type 2 diabetes mellitus with hyperglycemia                                       

                                                                            2019    

                                                      [facility]                    

 

                          Paroxysmal atrial fibrillation                                                    

                                                                            2019                 

                                                      [facility]                    

 

                          Morbid obesity due to excess calories                                             

                                                                            2018          

                                                      [facility]                    

 

                          Fall on same level, unspecified, initial encounter                                

                                                                                2018

                                                        [facility]                 

   

 

                    Hypertensive urgency                                                                

                                                2018                             

                                        [facility]                    

 

                    Hypothyroidism, unspecified                                                         

                                                       2019                    

                                                      [facility]                    

 

                          Gastro-esophageal reflux disease without esophagitis                              

                                                                                  2018

                                                        [facility]                 

   

 

                          Contusion of right upper arm, initial encounter                                   

                                                                             2018

                                                        [facility]                 

   

 

                                        Strain of muscle and tendon(s) of the rotator cuff of right shoulder, initial encounter

                                                                                       

                          2018                                                  

                                        [facility]                    

 

                                        Enterococcus as the cause of diseases classified elsewhere                      

                                                                                                     

                    2018                                                        [facility]      

              

 

                          Body mass index 33.0-33.9, adult                                                  

                                                                            2018               

                                                      [facility]                    

 

                          Long term use of anticoagulants                                                   

                                                                            2019                

                                                      [facility]                    

 

                          Pneumonia, unspecified organism                                                   

                                                                            2019                

                                                      [facility]                    

 

                    Encephalopathy, unspecified                                                         

                                                       2019                    

                                                      [facility]                    

 

                                        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              

                                                                                             

                    2019                                                        [facility]

                    

 

                          Presence of coronary angioplasty implant and graft                                

                                                                                2019

                                                        [facility]                 

   

 

                          Dependence on supplemental oxygen                                                 

                                                                            2019              

                                                      [facility]                    

 

                    Dependence on wheelchair                                                            

                                                      2019                       

                                                      [facility]                    

 

                          Anemia in other chronic diseases classified elsewhere                             

                                                                                   2019

                                                        [facility]                 

   

 

                          Pure hypercholesterolemia, unspecified                                            

                                                                            2019         

                                                      [facility]                    

 

                    Hyperlipidemia, unspecified                                                         

                                                       2019                    

                                                      [facility]                    

 

                          Obstructive sleep apnea (pediatric)                                               

                                                                            2019            

                                                      [facility]                    

 

                                        Calculus of gallbladder without cholecystitis without obstruction               

                                                                                              

                    2019                                                        [facility]

                    

 

                    Repeated falls                                                                      

                                                2019                                   

                                        [facility]                    

 

                    Long term use of insulin                                                            

                                                      2019                       

                                                      [facility]                    

 

                                        Personal history of transient ischemic attack , and cerebral infarction without 

residual deficits                                                                    

                                                2019                                 

                                        [facility]                    

 

                          Personal history of nicotine dependence                                           

                                                                            2019        

                                                      [facility]                    

 

                          Moderate protein-calorie malnutrition                            Active           

                                                Problem                            2019

                                                        [facility]                 

   

 

                          Unspecified dementia without behavioral disturbance                               

                                                                                 2019

                                                        [facility]                 

   

 

                          Delirium due to known physiological condition                                     

                                                                            2019  

                                                      [facility]                   

 

 

                                        Type 2 diabetes mellitus with diabetic chronic kidney disease                   

                                                                                                  

                    2019                                                        [facility]   

                 

 

                                        Hypertensive heart and chronic kidney disease with heart failure and stage 1 through

 stage 4 chronic kidney disease, or unspecified chronic kidney disease          
                                                                                          

                    2019                                                        [facility]

                    

 

                          Chronic kidney disease, unspecified                                               

                                                                            2019            

                                                      [facility]                    

 

                    Heart failure, unspecified                                                          

                                                      2019                     

                                                      [facility]                    

 

                    Long term use of aspirin                                                            

                                                      2019                       

                                                      [facility]                    

 

                          Moderate protein-calorie malnutrition                                             

                                                                            2019          

                                                      [facility]                    

 

                    Old myocardial infarction                                                           

                                                      2019                      

                                                      [facility]                    

 

                    Shortness of breath                                                                 

                                                2019                              

                                        [facility]                    

 

                                        Contusion of eyeball and orbital tissues, right eye, initial encounter          

                                                                                         

                    2019                                                        [facility]

                    

 

                          Other fall on same level, initial encounter                                       

                                                                            2019    

                                                      [facility]                    

 

                    History of falling                                                                  

                                                2019                               

                                        [facility]                    

 

                                        Personal history of Methicillin resistant Staphylococcus aureus infection       

                                                                                       

                    2019                                                        [facility]                    

 

                    Other long term drug therapy                                                        

                                                        2019                   

                                                      [facility]                    

 

                    Hormone replacement therapy                                                         

                                                       2019                    

                                                      [facility]                    

 

                          Fall from non-moving wheelchair, initial encounter                                

                                                                                2019

                                                        [facility]                 

   

 

                          Unspecified injury of head, initial encounter                                     

                                                                            2019  

                                                      [facility]                   

 

 

                    Dehydration                                                                         

                                                2019                                      

                                        [facility]                    

 

                          Other specified abnormal findings of blood chemistry                              

                                                                                  2019

                                                        [facility]                 

   

 

                                        Unspecified combined systolic and diastolic (congestive) heart failure          

                                                                                         

                    2019                                                        [facility]

                    

 

                          Type 2 diabetes mellitus without complications                                    

                                                                            2019 

                                                       [facility]                  

  

 

                          Fall from chair, initial encounter                                                

                                                                            2019             

                                                      [facility]                    

 

                                        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

 side                                                                                

                                                2019                                             

                                        [facility]                    

 

                    Epistaxis                                                                           

                                                2019                                        

                                        [facility]                    

 

                          Acute and chronic respiratory failure with hypoxia                                

                                                                                2019

                                                        [facility]                 

   

 

                    Metabolic encephalopathy                                                            

                                                      2019                       

                                                      [facility]                    

 

                          Acute on chronic diastolic heart failure                                          

                                                                            2019       

                                                      [facility]                    

 

                          Other restrictive cardiomyopathy                                                  

                                                                            2019               

                                                      [facility]                    

 

                          Adverse effect of methadone, initial encounter                                    

                                                                            2019 

                                                       [facility]                  

  

 

                    Long QT syndrome                                                                    

                                                2019                                 

                                        [facility]                    

 

                          Pulmonary hypertension, unspecified                                               

                                                                            2019            

                                                      [facility]                    

 

                          Nonrheumatic mitral insufficiency                                                 

                                                                            2019              

                                                      [facility]                    

 

                          Respiratory failure, unspecified with hypoxia                                     

                                                                            2019  

                                                      [facility]                   

 

 

                    Sleep apnea, unspecified                                                            

                                                      2019                       

                                                      [facility]                    

 

                          Personal history of poliomyelitis                                                 

                                                                            2019              

                                                      [facility]                    

 

                    Hypotension, unspecified                                                            

                                                      2019                       

                                                      [facility]                    

 

                                        Type 2 diabetes mellitus with diabetic neuropathy, unspecified                  

                                                                                                 

                    2019                                                        [facility]  

                  

 

                    Essential tremor                                                                    

                                                2019                                 

                                        [facility]                    

 

                    Syncope and collapse                                                                

                                                2019                             

                                        [facility]                    

 

                          Iron deficiency anemia, unspecified                                               

                                                                            04/15/2019            

                                                      [facility]                    

 

                    Hypokalemia                                                                         

                                                04/15/2019                                      

                                        [facility]                    

 

                    CHEST PAIN NOS                            Active                                    

                                                                                       

                                        [facility]                    

 

                    ALTERED MENTAL STATUS                            Active                             

                                                                                       

                                        [facility]                    

 

                          AMI NOS-INITIAL EPISODE                            Active                         

                                                                                                      

                                        [facility]                    

 

                    W06.XXXA, M54.17                            Active                                  

                                                                                       

                                        [facility]                    

 

                          CONTUSION OF SCALP, INITIAL ENCOUNTER                            Active           

                                                                                          

                                                      [facility]                    

 

                          RADICULOPATHY, LUMBOSACRAL REGION                            Active               

                                                                                              

                                                      [facility]                    

 

                          FALL FROM BED, INITIAL ENCOUNTER                            Active                

                                                                                               

                                                      [facility]                    

 

                          CELLULITIS, UNSPECIFIED                            Active                         

                                                                                                      

                                        [facility]                    

 

                          CHEST PAIN, UNSPECIFIED                            Active                         

                                                                                                      

                                        [facility]                    

 

                    WEAKNESS                            Active                                          

                                                                                             

                                        [facility]                    

 

                          UNSPECIFIED FALL, INITIAL ENCOUNTER                            Active             

                                                                                            

                                                      [facility]                    

 

                          HYPOGLYCEMIA, UNSPECIFIED                            Active                       

                                                                                                    

                                        [facility]                    

 

                          ENCOUNTER FOR ADJUSTMENT AND MANAGEMENT                            Active         

                                                                                        

                                                      [facility]                    

 

                          ALTERED MENTAL STATUS, UNSPECIFIED                            Active              

                                                                                             

                                                      [facility]                    

 

                    ACUTE PULMONARY EDEMA                            Active                             

                                                                                       

                                        [facility]                    

 

                          ELEVATED WHITE BLOOD CELL COUNT, UNSPECI                            Active        

                                                                                       

                                                      [facility]                    

 

                          ABNORMAL LEVELS OF OTHER SERUM ENZYMES                            Active          

                                                                                         

                                                      [facility]                    

 

                    EPISTAXIS                            Active                                         

                                                                                            

                                        [facility]                    

 

                          HEART FAILURE, UNSPECIFIED                            Active                      

                                                                                                     

                                                      [facility]                    

 

                          NON-ST ELEVATION (NSTEMI) MYOCARDIAL INF                            Active        

                                                                                       

                                                      [facility]                    



                                                                                
                                                                                
                                                                                
                                                                                
                                                                                
                                                                                
                                                                                
                                                                                
                                                                                
                                                                                
                                                                                
                                                                                
                                                                                
                                                                                
                                                                                
                                                                                
                                                                                
                                                                                
                                                                                
                                                                                
                                                                                
                                                                                
                                                                                
                                                                                
                                                                                
                                                                                
                                                                                
                                                                                
                                                                                
                                                                                
                                                                                
                                                                                
                                                                                
                                                                                
                                      



Medications

                    





                    Medication                            Details                            Route      

                          Status                            Patient Instructions         

                          Ordering Provider                            Order Date           

                                        Source                    

 

                          Vantin 200 mg oral tablet                            200 mg=1 tab, PO, Q12H, X 8 day,

 # 16 tab, 0 Refill(s), Pharmacy: [id number]                    
                                                Active                                              

                                                2019                            [facility]

                    

 

                          Rocephin + sterile water 10 mL                            1 gm, Route: IVP, [id number],

 Dosing Weight 88.636, kg, Start date: 19 11:00:00 CDT, Duration: 6 day, 
Stop date: 19 11:00:00 CDT, ABX Indication: Urinary Tract InfectionNotes: 
(Same As: Rocephin).  Use with 100 mL NS and infuse over 30 min   *** MEDICATION
WASTE *** Product Size:  1000 mg Product Wasted:  ___ mg                        
                                                Inactive                                                

                                                2019                            [facility] 

                   

 

                          Folic Acid                            1 mg, 1 tab, Route: PO, Drug form: TAB, Daily,

 Dosing Weight 88.636, kg, Start date: 19 9:00:00 CDT, Duration: 30 day, 
Stop date: 19 9:00:00 CDTNotes: (Same as: Folvite)                        
                                                Inactive                                                

                                                2019                            [facility] 

                   

 

                          Docusate Sodium 100 MG Oral Capsule [Colace]                            100 mg, 1 

cap, Route: PO, Drug form: CAP, BID, Dosing Weight 88.636, kg, Start date: 
19 9:00:00 CDT, Duration: 30 day, Stop date: 19 17:00:00 CDTNotes: 
(Same as: Colace) (Do Not Crush)                                                   

                    Inactive                                                                          

                          2019                            [facility]                    

 

                          Zyrtec                            10 mg, 2 tab, Route: PO, Drug form: TAB, Daily, 

Dosing Weight 88.636, kg, Start date: 19 9:00:00 CDT, Duration: 30 day, 
Stop date: 19 9:00:00 CDTNotes: (Same As: Zyrtec)                            

                            Inactive                                                 

                                                2019                            [facility]  

                  

 

                          Aspirin 81 MG Enteric Coated Tablet                            81 mg, 1 tab, Route:

 PO, Drug form: ECTAB, Daily, Dosing Weight 88.636, kg, Start date: 19 
9:00:00 CDT, Duration: 30 day, Stop date: 19 9:00:00 CDTNotes: Do not 
crush or chew. (Same As: Ecotrin)                                                  

                    Inactive                                                                         

                          2019                            [facility]                    

 

                          Amiodarone                            200 mg, 1 tab, Route: PO, Drug form: TAB, Daily,

 Dosing Weight 88.636, kg, Start date: 19 9:00:00 CDT, Duration: 30 day, 
Stop date: 19 9:00:00 CDTNotes: (Same as: Cordarone)                      
                                                Inactive                                             

                                                2019                            [facility]

                    

 

                          Ticagrelor                            90 mg, 1 tab, Route: PO, Drug form: TAB, BID,

 Dosing Weight 88.636, kg, Start date: 19 9:00:00 CDT, Duration: 30 day, 
Stop date: 19 17:00:00 CDTNotes: (Same as: Brilinta)                      
                                                Inactive                                             

                                                2019                            [facility]

                    

 

                          tamsulosin                            0.4 mg, 1 cap, Route: PO, Drug form: CAP, Daily,

 Dosing Weight 88.636, kg, Start date: 19 9:00:00 CDT, Duration: 30 day, 
Stop date: 19 9:00:00 CDTNotes: (Same As: Flomax)  "Do Not Crush"         
                                                Inactive                                

                                                      2019                       

                                        [facility]                    

 

                          Zoloft                            25 mg, 0.5 tab, Route: PO, Drug form: TAB, Daily,

 Dosing Weight 88.636, kg, Start date: 19 9:00:00 CDT, Duration: 30 day, 
Stop date: 19 9:00:00 CDTNotes: (Same as:  Zoloft)                        
                                                Inactive                                               

                                                2019                            [facility]

                    

 

                          pantoprazole                            40 mg, 1 tab, Route: PO, Drug form: ECTAB,

 Daily, Dosing Weight 88.636, kg, Start date: 19 9:00:00 CDT, Duration: 30
 day, Stop date: 19 9:00:00 CDTNotes: Tablet should not be chewed or cr
ushed. (Same as: Protonix)                                                        Inactive

                                                                                    2019

                                        [facility]                    

 

                          Potassium Chloride                            20 mEq, 1 tab, Route: PO, Drug form:

 ERTAB, BID, Dosing Weight 88.636, kg, Start date: 19 9:00:00 CDT, 
Duration: 30 day, Stop date: 19 17:00:00 CDTNotes: (Same as: K-Dur 20) "Do
 Not Crush"  Give with food and full glass of water  For patients unable to 
swallow tablet, dissolve in one half glass of water. Allow about 2 minutes for 
the tablets to disintegrate. Stir before giving to prepare slurry and 
administer. Please exclude Patients with feeding tube less than 14 French 
(Dobhoff, J-tube etc) and pediatric and  patients.                      
                                                Inactive                                             

                                                2019                            [facility]

                    

 

                          Magnesium Oxide                            400 mg, 1 tab, Route: PO, Drug form: TAB,

 Daily, Dosing Weight 88.636, kg, Start date: 19 9:00:00 CDT, Duration: 30
 day, Stop date: 19 9:00:00 CDTNotes: (Same as: Mag-Ox 400) Magnesium ox
quynh [id number] elemental magnesium Dose=____mg magnesium oxide (___mg elemental
 magnesium)                                                        Inactive          

                                                                            2019   

                                        [facility]                    

 

                          Thyroxine                            75 microgram, 1 tab, Route: PO, Drug form: TAB,

 Daily, Dosing Weight 88.636, kg, Start date: 19 9:00:00 CDT, Duration: 30
 day, Stop date: 19 9:00:00 CDTNotes: Take 1 hour before or 2 hours after 
meal; Enteral feeds may interefere with the absorption of this medication. (Same
 as:Synthroid, Levothroid)                                                        Inactive

                                                                                    2019

                                        [facility]                    

 

                          Isosorbide                            30 mg, 1 tab, Route: PO, Drug form: ERTAB, QAM,

 Dosing Weight 88.636, kg, Start date: 19 9:00:00 CDT, Duration: 30 day, 
Stop date: 19 9:00:00 CDTNotes: (Same as:Imdur) "Do Not Crush&quot;  Take 
on empty stomach/ full glass of water.  Do not crush                               

                          Inactive                                                    

                                                2019                            [facility]     

               

 

                          gabapentin 100 MG Oral Capsule                            100 mg, 1 cap, Route: PO,

 Drug form: CAP, TID, Dosing Weight 88.636, kg, Start date: 19 9:00:00 
CDT, Duration: 30 day, Stop date: 19 17:00:00 CDTNotes: (Same as: 
Neurontin)                                                        Inactive           

                                                                            2019    

                                        [facility]                    

 

                                        potassium chloride 20 mEq oral tablet, extended release                         

                                        20 mEq, 1 tab, Route: PO, Drug form: ERTAB, ONCE, Dosing Weight 88.636, kg, Start

 date: 19 8:49:00 CDT, Stop date: 19 8:49:00 CDTNotes: (Same as: K-
Dur 20) "Do Not Crush"  Give with food and full glass of water  For patients 
unable to swallow tablet, dissolve in one half glass of water. Allow about 2 
minutes for the tablets to disintegrate. Stir before giving to prepare slurry an
d administer. Please exclude Patients with feeding tube less than 14 French 
(Dobhoff, J-tube etc) and pediatric and  patients.                      
                                                Inactive                                             

                                                2019                            [facility]

                    

 

                          Insulin Lispro                            5 unit, 0.05 mL, Route: SUB-Q, Drug form:

 SOLN, TID-Before Meals, Dosing Weight 88.636, kg, PRN Blood Glucose Results, 
Start date: 19 22:30:00 CDT, Duration: 30 day, Stop date: 19 22:29:0
0 CDTNotes: (Same as: Humalog) Roll in palms of hands gently; Do not shake 
vigorously.    WASTE: F/P - Black; E - [facility]ash [facility]    Stable for 28 days
 at room temperature. Expires in _____ days from ______________Date             
                                                No Longer Active                            

                                                        2019                   

                                        [facility]                    

 

                          Dextrose 50% Syringe                            25 gm, 50 mL, Route: IVP, Drug Form:

 INJ, Dosing Weight 88.636, kg, PRN, PRN Blood Glucose Results, Start date: 
19 22:30:00 CDT, Duration: 30 day, Stop date: 19 22:29:00 CDT       
                                                      No Longer Active                    

                                                                            2019             

                                        [facility]                    

 

                          Glucagon                            1 mg, Route: IM, Drug form: PDR/INJ, PRN, Dosing

 Weight 88.636, kg, PRN Blood Glucose Results, Start date: 19 22:30:00 
CDT, Duration: 30 day, Stop date: 19 22:29:00 CDT                            

                            No Longer Active                                         

                                                2019                            [facility]

                    

 

                          atorvastatin                            40 mg, 1 tab, Route: PO, Drug form: TAB, Bedtime,

 Dosing Weight 88.636, kg, Start date: 19 21:00:00 CDT, Duration: 30 day, 
Stop date: 19 21:00:00 CDTNotes: (Same as: Lipitor)                       
                                                No Longer Active                                      

                                                2019                            [facility]                    

 

                          metoprolol tartrate                            25 mg, 1 tab, Route: PO, Drug form:

 TAB, Q12H, Dosing Weight 88.636, kg, Start date: 19 21:00:00 CDT, 
Duration: 30 day, Stop date: 19 9:00:00 CDTNotes: (Same as: Lopressor)    
                                                      No Longer Active                 

                                                                            2019          

                                        [facility]                    

 

                                        Insulin Glargine 100 UNT/ML Injectable Solution [Lantus]                        

                                        20 unit, 0.2 mL, Route: SUB-Q, Drug form: SOLN, Bedtime, Dosing Weight 88.636, 

kg, Start date: 19 21:00:00 CDT, Duration: 30 day, Stop date: 19 
21:00:00 CDTNotes: (Same as: Lantus) Do not hold insulin without contacting 
prescriber  WASTE: F/P - Black; E - [facility] Trash Bin  "single patient use 
only"  Stable for 28 days at room temperature   Expires in  _____ days from 
______________Date                                                        No Longer Active

                                                                                    2019

                                        [facility]                    

 

                          cholestyramine 4 g/9 g oral powder                            4 gm, PO, BID, PRN diarrhea,

 # 1134 gm, 0 Refill(s)                                                        Active

                                                                                    2019

                                        [facility]                    

 

                          bisacodyl 5 mg oral enteric coated tablet                            10 mg=2 tab, 

PO, Daily, PRN Constipation, # 20 tab, 0 Refill(s)                                 

                       Active                                                          

                          2019                            [facility]         

           

 

                                        Regular Insulin, Human 100 UNT/ML Injectable Solution [Humulin R]               

                                        SUB-Q, Before Meals & Bedtime, -250 give 2 units -300 give

 4 units -350 give 6 units -822 give 8 units Bg over 400 call MD, 0 
Refill(s)                                                        No Longer Active    

                                                                                2019

                                        [facility]                    

 

                          Artificial Tears                            BOTH EYES, PRN, for dry eye, 0 Refill(s)

                                                        Active                       

                                                                            2019                

                                        [facility]                    

 

                          melatonin 3 mg oral tablet                            3 mg=1 tab, PO, Bedtime, PRN

 for insomnia, # 60 tab, 0 Refill(s)                                               

                    Active                                                                        

                          2019                            [facility]                    

 

                          Acetaminophen 325 MG Oral Tablet                            650 mg, 2 tab, Route: 

PO, Drug form: TAB, Q6H, Dosing Weight 88.636, kg, PRN Pain 1-3/Temp > 100.4 F, 
Start date: 19 17:15:00 CDT, Duration: 30 day, Stop date: 19 
17:14:00 CDTNotes: Do not exceed 4 gm/day.  (Same as: Tylenol)                  
                                                No Longer Active                                  

                                                      2019                         

                                        [facility]                    

 

                          Enoxaparin                            40 mg, 0.4 mL, Route: SUB-Q, Drug form: INJ,

 [id number], Dosing Weight 88.636, kg, Start date: 19 17:08:00 CDT, Stop 
date: 19 17:08:00 CDTNotes: (Same as: Lovenox)                               

                          No Longer Active                                            

                                                2019                            [facility]

                    

 

                          magnesium oxide 400 mg oral tablet                            400 mg=1 tab, PO, Daily,

 0 Refill(s)                                                        Active           

                                                                            2019    

                                        [facility]                    

 

                          Thyroxine                            75 microgram, PO, Daily, 0 Refill(s)         

                                                Active                                  

                                                      2019                         

                                        [facility]                    

 

                          Potassium Chloride                            20 mEq, PO, BID, 0 Refill(s)        

                                                Active                                 

                                                      2019                        

                                        [facility]                    

 

                          Zyrtec                            10 mg, PO, Daily, 0 Refill(s)                   

                                                Active                                            

                                                2019                            [facility]

                    

 

                          Sertraline 25 MG Oral Tablet [Zoloft]                            25 mg=1 tab, PO, 

Daily, 0 Refill(s)                                                        Active     

                                                                               2019

                                        [facility]                    

 

                          torsemide                            20 mg, PO, Daily, # 20 tab, 0 Refill(s)      

                                                  Active                               

                                                      2019                      

                                        [facility]                    

 

                                        Insulin Glargine 100 UNT/ML Injectable Solution [Lantus]                        

                          20 unit, SUB-Q, Bedtime, # 10 mL, 3 Refill(s)                                    

                    Active                                                             

                          2019                            [facility]            

        

 

                          ticagrelor 90 mg oral tablet                            90 mg=1 tab, PO, BID, 0 Refill(s)

                                                        Active                       

                                                                            2019                

                                        [facility]                    

 

                          tamsulosin 0.4 mg oral capsule                            0.4 mg=1 cap, PO, Daily,

 0 Refill(s)                                                        Active           

                                                                            2019    

                                        [facility]                    

 

                          pantoprazole                            40 mg, PO, Daily, # 30 tab, 0 Refill(s)   

                                                     Active                            

                                                        2019                   

                                        [facility]                    

 

                          Folic Acid 1 MG Oral Tablet                            1 mg=1 tab, PO, Daily, # 30

 tab, 0 Refill(s)                                                        Active      

                                                                              2019

                                        [facility]                    

 

                          gabapentin 100 MG Oral Capsule                            100 mg=1 cap, PO, TID, #

 90 cap, 1 Refill(s)                                                        Active   

                                                                                 2019

                                        [facility]                    

 

                          Docusate Sodium 100 MG Oral Capsule [Colace]                            100 mg=1 cap,

 PO, BID, # 60 cap, 0 Refill(s)                                                    

                    Active                                                                             

                          2019                            [facility]                    

 

                          atorvastatin 40 mg oral tablet                            40 mg=1 tab, PO, Bedtime,

 # 30 tab, 0 Refill(s)                                                        Active 

                                                                                   2019

                                        [facility]                    

 

                          Lasix                            40 mg, 4 mL, Route: IVP, Drug form: INJ, BID Diuretic,

 Dosing Weight 88.636, kg, Start date: 19 16:00:00 CDT, Duration: 30 day, 
Stop date: 19 8:00:00 CDTNotes: (Same as: Lasix)   *** MEDICATION WASTE 
*** Product Size:  40 mg Product Wasted:  ___ mg                                   

                          No Longer Active                                                

                                                2019                            [facility] 

                   

 

                          Morphine                            6 mg, 3 mL, Route: PO, Drug form: SOLN, Q4H, Dosing

 Weight 88.636, kg, PRN Pain Score 7-10, Start date: 19 14:09:00 CDT, 
Duration: 30 day, Stop date: 19 14:08:00 CDTNotes: (Same as:MORPhine 
Sulfate)                                                        No Longer Active     

                                                                               2019

                                        [facility]                    

 

                                        Acetaminophen 325 MG / Hydrocodone Bitartrate 5 MG Oral Tablet [Norco 5/325]    

                                        1 tab, Route: PO, Drug Form: TAB, Dosing Weight 88.636, kg,

 Q6H, PRN Pain Score 4-6, Start date: 19 14:09:00 CDT, Duration: 30 day, 
Stop date: 19 14:08:00 CDTNotes: (Same as: Norco 325/5)  Do not exceed 
4gm/day of acetaminophen.                                                        No Longer

 Active                                                                              

                          2019                            [facility]                    

 

                          Acetaminophen                            650 mg, 2 tab, Route: PO, Drug form: TAB,

 Q4H, Dosing Weight 88.636, kg, PRN Pain 1-3/Temp > 100.4 F, Start date: 
19 14:08:00 CDT, Duration: 30 day, Stop date: 19 14:07:00 CDTNotes: 
Do not exceed 4 gm/day.  (Same as: Tylenol)                                        

                          No Longer Active                                                     

                                                2019                            [facility]      

              

 

                          Dextrose 50% Syringe                            12.5 gm, 25 mL, Route: IVP, Drug Form:

 INJ, Dosing Weight 88.636, kg, PRN, PRN Blood Glucose Results, Start date: 
19 14:08:00 CDT, Duration: 30 day, Stop date: 19 14:07:00 CDT       
                                                      No Longer Active                     

                                                                            2019              

                                        [facility]                    

 

                          Glucagon                            1 mg, Route: IM, Drug form: PDR/INJ, PRN, Dosing

 Weight 88.636, kg, PRN Blood Glucose Results, Start date: 19 14:08:00 
CDT, Duration: 30 day, Stop date: 19 14:07:00 CDT                            

                            No Longer Active                                         

                                                2019                            [facility]

                    

 

                          Ceftriaxone                            1 gm, Route: IVPB, ONCE, Dosing Weight 88.636,

 kg, Priority: STAT, Start date: 19 10:31:00 CDT, Stop date: 19 
10:31:00 CDT, ABX Indication: Urinary Tract InfectionNotes: (Same As: Rocephin).
 Use with 100 mL NS and infuse over 30 min   *** MEDICATION WASTE *** Product 
Size:  1000 mg Product Wasted:  ___ mg                                             

                    Inactive                                                                    

                          2019                            [facility]                   

 

 

                          Aspirin                            325 mg, 1 tab, Route: PO, Drug form: TAB, ONCE,

 Dosing Weight 88.636, kg, Priority: STAT, Start date: 19 8:04:00 CDT, 
Stop date: 19 8:04:00 CDTNotes: Take with food.                              

                          Inactive                                                   

                                                2019                            [facility]    

                

 

                          Ativan                            0.5 mg, Route: PO, Drug form: TAB, ONCE, Dosing 

Weight 86.364, kg, Priority: STAT, Start date: 18 16:36:00 CST, Stop date:
18 16:36:00 CST                                                        Inactive

                                                                                    2018

                                        [facility]                    

 

                          Kayexalate                            30 gm, Route: PO, ONCE, Dosing Weight 86.364,

 kg, Priority: STAT, Start date: 18 16:18:00 CST, Stop date: 18 
16:18:00 CST                                                        Inactive         

                                                                            2018  

                                        [facility]                    

 

                          Sodium Chloride 0.9% (Bolus) IV                            1,000 mL, 1000 ml/hr, Infuse

 Over: 1 hr, Route: IV, 1,000, Drug form: INJ, ONCE, Priority: STAT, Dosing 
Weight 86.364 kg, Start date: 18 22:27:00 CST, Stop date: 18 
22:27:00 CST                                                        Inactive         

                                                                            2018  

                                        [facility]                    

 

                          NS (Bolus) IV                            500 mL, 500 ml/hr, Infuse Over: 1 hr, Route:

 IV, 500, Drug form: INJ, ONCE, Priority: STAT, Dosing Weight 86.364 kg, Start 
date: 10/23/18 16:38:00 CDT, Stop date: 10/23/18 16:38:00 CDT                   
                                                Inactive                                           

                                                10/23/2018                            [facility]

                    

 

                          Saline Flush 0.9%                            10 mL, Route: IVP, Drug Form: INJ, Dosing

 Weight 86.364, kg, PRN, PRN Line Flush, Start date: 10/23/18 15:49:00 CDT, 
Duration: 30 day, Stop date: 18 14:48:00 CSTNotes: (Same as: BD Posiflush)
                                                       Inactive                      

                                                                            10/23/2018               

                                        [facility]                    

 

                          bumetanide 1 mg oral tablet                            2 mg=2 tab, PO, Daily, 0 Refill(s)

                                                        No Longer Active             

                                                                            10/18/2018      

                                        [facility]                    

 

                          AMIODarone 200 mg oral tablet                            200 mg=1 tab, PO, Daily, 

0 Refill(s)                                                        No Longer Active  

                                                                                  10/18/2018

                                        [facility]                    

 

                          Bumex                            2 mg, 2 tab, Route: PO, Drug form: TAB, Daily, Dosing

 Weight 94.045, kg, Start date: 10/17/18 9:00:00 CDT, Duration: 30 day, Stop 
date: 11/15/18 9:00:00 CSTNotes: (Same As: Bumex)                                  

                          No Longer Active                                               

                                                10/17/2018                            [facility]

                    

 

                          Bumex                            2 mg, 8 mL, Route: IVP, Drug form: INJ, ONCE, Dosing

 Weight 94.045, kg, Start date: 10/16/18 15:07:00 CDT, Stop date: 10/16/18 
15:07:00 CDTNotes: (Same As: Bumex)                                                

                    Inactive                                                                       

                          10/16/2018                            [facility]                    

 

                          Amiodarone                            100 mg, 0.5 tab, Route: PO, Drug form: TAB, 

Daily, Dosing Weight 94.045, kg, Start date: 10/16/18 9:00:00 CDT, Duration: 30 
day, Stop date: 18 9:00:00 CSTNotes: (Same as: Cordarone)                 
                                                No Longer Active                                 

                                                      10/16/2018                        

                                        [facility]                    

 

                          normal saline 0.9%  mL                            250 mL, Rate: 30 ml/hr, Infuse

 over: 8.3 hr, Route: IV, Dosing Weight 94.045 kg, Total Volume: 250, Start 
date: 10/14/18 21:08:00 CDT, Duration: 1 day, Stop date: 10/15/18 21:19:00 CDT, 
2.16, m2                                                        No Longer Active     

                                                                               10/15/2018

                                        [facility]                    

 

                          Lasix                            10 mg, 1 mL, Route: IVP, Drug form: INJ, ONCALL, 

Dosing Weight 94.045, kg, Start date: 10/14/18 17:00:00 CDT, Duration: 30 day, 
Stop date: 18 15:59:00 CSTNotes: (Same as: Lasix)   *** MEDICATION WASTE 
*** Product Size:  40 mg Product Wasted:  ___ mg                                   

                          No Longer Active                                                

                                                10/14/2018                            [facility] 

                   

 

                          Potassium Chloride                            40 mEq, 2 tab, Route: PO, Drug form:

 ERTAB, ONCE, Dosing Weight 94.045, kg, Start date: 10/14/18 14:22:00 CDT, Stop 
date: 10/14/18 14:22:00 CDTNotes: (Same as: K-Dur 20) "Do Not Crush" For pat
ients unable to swallow tablet, dissolve in one half glass of water. Allow about
2 minutes for the tablets to disintegrate. Stir before giving to prepare slurry 
and administer. Please exclude Patient&#[id number];s with feeding tube less than 14 
French (Dobhoff, J-tube etc) and pediatric and  patients.  With food and
full glass of water                                                        Inactive  

                                                                                  10/14/2018

                                        [facility]                    

 

                          potassium phosphate                            15 mmol, Route: IVPB, PRN, Dosing Weight

 94.045, kg, PRN Abnormal Lab Result, For NON-ICU Patients Only., Start date: 
10/14/18 13:26:00 CDT, Duration: 30 day, Stop date: 18 12:25:00 CST       
                                                Inactive                               

                                                      10/14/2018                      

                                        [facility]                    

 

                          Potassium Chloride                            10 mEq, Route: IVPB, PRN, Dosing Weight

 94.045, kg, PRN Abnormal Lab Result, For NON-ICU Patients Only, Start date: 
10/14/18 13:26:00 CDT, Duration: 30 day, Stop date: 18 12:25:00 CST       
                                                Inactive                               

                                                      10/14/2018                      

                                        [facility]                    

 

                                        potassium phosphate-sodium phosphate 250 mg-280 mg-160 mg oral powder for reconstitution

                                        2 pkt, Route: PO, Dosing Weight 94.045, kg, PRN, PRN Abnormal

 Lab Result, For NON-ICU Patients Only, Start date: 10/14/18 13:26:00 CDT, 
Duration: 30 day, Stop date: 18 12:25:00 CST                                 

                       Inactive                                                        

                            10/14/2018                            [facility]       

             

 

                          Calcium Gluconate                            2 gm, Route: IVPB, PRN, Dosing Weight

 94.045, kg, PRN Abnormal Lab Result, For NON-ICU Patients Only., Start date: 
10/14/18 13:26:00 CDT, Duration: 30 day, Stop date: 18 12:25:00 CST       
                                                Inactive                               

                                                      10/14/2018                      

                                        [facility]                    

 

                          Magnesium Sulfate                            2 gm, Route: IVPB, PRN, Dosing Weight

 94.045, kg, PRN Abnormal Lab Result, For NON-ICU Patients Only., Start date: 
10/14/18 13:26:00 CDT, Duration: 30 day, Stop date: 18 12:25:00 CST       
                                                Inactive                               

                                                      10/14/2018                      

                                        [facility]                    

 

                          Magnesium Oxide                            800 mg, Route: PO, PRN, Dosing Weight 94.045,

 kg, PRN Abnormal Lab Result, For NON-ICU Patients Only., Start date: 10/14/18 
13:26:00 CDT, Duration: 30 day, Stop date: 18 12:25:00 CST                
                                                Inactive                                        

                                                10/14/2018                            [facility]

                    

 

                          sodium phosphate                            30 mmol, Route: IVPB, PRN, Dosing Weight

 94.045, kg, PRN Abnormal Lab Result, For NON-ICU Patients Only., Start date: 
10/14/18 13:26:00 CDT, Duration: 30 day, Stop date: 18 12:25:00 CST       
                                                Inactive                               

                                                      10/14/2018                      

                                        [facility]                    

 

                          Amiodarone                            200 mg, 1 tab, Route: PO, Drug form: TAB, Daily,

 Dosing Weight 94.045, kg, Start date: 10/14/18 9:00:00 CDT, Duration: 30 day, 
Stop date: 18 9:00:00 CSTNotes: (Same as: Cordarone)                      
                                                No Longer Active                                      

                                                10/14/2018                            [facility]                    

 

                                        12 HR ranolazine 500 MG Extended Release Tablet [Ranexa]                        

                                        500 mg, 1 tab, Route: PO, Drug form: TAB, Daily, Dosing Weight 94.045, kg, Start

 date: 10/14/18 9:00:00 CDT, Duration: 30 day, Stop date: 18 9:00:00 
CSTNotes: Same as Ranexa "Do Not Crush"                                            

                    No Longer Active                                                           

                          10/14/2018                            [facility]          

          

 

                          pantoprazole                            40 mg, 1 tab, Route: PO, Drug form: ECTAB,

 Daily, Dosing Weight 94.045, kg, Start date: 10/14/18 9:00:00 CDT, Duration: 30
 day, Stop date: 18 9:00:00 CSTNotes: Tablet should not be chewed or cr
ushed. (Same as: Protonix)                                                        No 

Longer Active                                                                        

                          10/14/2018                            [facility]                    

 

                          Mag-Ox 400                            400 mg, 1 tab, Route: PO, Drug form: TAB, Daily,

 Dosing Weight 94.045, kg, Start date: 10/14/18 9:00:00 CDT, Duration: 30 day, 
Stop date: 18 9:00:00 CSTNotes: (Same as: Mag-Ox 400) Magnesium oxide 
[id number] elemental magnesium Dose=____mg magnesium oxide (___mg elemental 
magnesium)                                                        No Longer Active   

                                                                                 10/14/2018

                                        [facility]                    

 

                          Lisinopril                            2.5 mg, 0.5 tab, Route: PO, Drug form: TAB, 

Daily, Dosing Weight 94.045, kg, Start date: 10/14/18 9:00:00 CDT, Duration: 30 
day, Stop date: 18 9:00:00 CSTNotes: (Same as: Prinivil, Zestril)         
                                                      No Longer Active                      

                                                                            10/14/2018               

                                        [facility]                    

 

                          Isosorbide                            30 mg, 1 tab, Route: PO, Drug form: ERTAB, QAM,

 Dosing Weight 94.045, kg, Start date: 10/14/18 9:00:00 CDT, Duration: 30 day, 
Stop date: 18 9:00:00 CSTNotes: (Same as:Imdur) "Do Not Crush&quot;  Take 
on empty stomach/ full glass of water.  Do not crush                               

                          No Longer Active                                            

                                                10/14/2018                            [facility]

                    

 

                          tamsulosin                            0.4 mg, 1 cap, Route: PO, Drug form: CAP, Daily,

 Dosing Weight 94.045, kg, Start date: 10/14/18 9:00:00 CDT, Duration: 30 day, 
Stop date: 18 9:00:00 CSTNotes: (Same As: Flomax)  "Do Not Crush"         
                                                      No Longer Active                      

                                                                            10/14/2018               

                                        [facility]                    

 

                          Sertraline                            100 mg, 1 tab, Route: PO, Drug form: TAB, Daily,

 Dosing Weight 94.045, kg, Start date: 10/14/18 9:00:00 CDT, Duration: 30 day, 
Stop date: 18 9:00:00 CSTNotes: (Same as: Zoloft)                            

                            No Longer Active                                         

                                                10/14/2018                            [facility]

                    

 

                          Synthroid                            50 microgram, 1 tab, Route: PO, Drug form: TAB,

 Q630AM, Dosing Weight 94.045, kg, Start date: 10/14/18 6:30:00 CDT, Duration: 
30 day, Stop date: 18 6:30:00 CSTNotes: Take 1 hour before or 2 hours 
after meal; Enteral feeds may interefere with the absorption of this 
medication.(Same as:Levothroid, Synthroid)                                         

                    No Longer Active                                                        

                            10/14/2018                            [facility]       

             

 

                          Lorazepam                            1 mg, 1 tab, Route: PO, Drug form: TAB, Bedtime,

 Dosing Weight 94.045, kg, Start date: 10/13/18 23:00:00 CDT, Duration: 30 day, 
Stop date: 18 23:00:00 CSTNotes: (Same as: Ativan)                        
                                                No Longer Active                                       

                                                10/14/2018                            [facility]

                    

 

                          atorvastatin                            5 mg, 0.5 tab, Route: PO, Drug form: TAB, 

Bedtime, Dosing Weight 94.045, kg, Start date: 10/13/18 21:00:00 CDT, Duration: 
30 day, Stop date: 18 21:00:00 CSTNotes: (Same As: Lipitor)               
                                                No Longer Active                              

                                                      10/14/2018                     

                                        [facility]                    

 

                          niacin 1000 mg oral tablet, extended release                            2,000 mg, 

4 tab, Route: PO, Drug form: ERTAB, Bedtime, Dosing Weight 94.045, kg, Start 
date: 10/13/18 21:00:00 CDT, Duration: 30 day, Stop date: 18 21:00:00 
CSTNotes: (Same as: Niaspan) "Do Not Crush"  Non-Formulary Item  With food.     
                                                      No Longer Active                  

                                                                            10/14/2018           

                                        [facility]                    

 

                          metoprolol tartrate                            25 mg, 1 tab, Route: PO, Drug form:

 TAB, Q12H, Dosing Weight 94.045, kg, Start date: 10/13/18 21:00:00 CDT, 
Duration: 30 day, Stop date: 18 9:00:00 CSTNotes: (Same as: Lopressor)    
                                                      No Longer Active                 

                                                                            10/14/2018          

                                        [facility]                    

 

                          Lorazepam                            0.5 mg, 1 tab, Route: PO, Drug form: TAB, Daily,

 Dosing Weight 94.045, kg, Start date: 10/13/18 17:30:00 CDT, Duration: 30 day, 
Stop date: 18 9:00:00 CSTNotes: (Same as: Ativan)                            

                            No Longer Active                                         

                                                10/13/2018                            [facility]

                    

 

                                        potassium chloride 20 mEq oral tablet, extended release                         

                                        20 mEq, 1 tab, Route: PO, Drug form: ERTAB, BID, Dosing Weight 94.045, kg, Start

 date: 10/13/18 17:00:00 CDT, Duration: 30 day, Stop date: 18 9:00:00 
CSTNotes: (Same as: K-Dur 20) "Do Not Crush" For patients unable to swallow 
tablet, dissolve in one half glass of water. Allow about 2 minutes for the 
tablets to disintegrate. Stir before giving to prepare slurry and administer. 
Please exclude Patients with feeding tube less than 14 French (Dobhoff, J-tube 
etc) and pediatric and  patients.  With food and full glass of water    
                                                      No Longer Active                 

                                                                            10/13/2018          

                                        [facility]                    

 

                          gabapentin 600 MG Oral Tablet                            600 mg, 2 cap, Route: PO,

 Drug form: CAP, TID, Dosing Weight 94.045, kg, Start date: 10/13/18 17:00:00 
CDT, Duration: 30 day, Stop date: 18 13:00:00 CSTNotes: (Same as: 
Neurontin)                                                        No Longer Active   

                                                                                 10/13/2018

                                        [facility]                    

 

                          Aspirin 81 MG Enteric Coated Tablet                            81 mg, 1 tab, Route:

 PO, Drug form: ECTAB, Daily, Dosing Weight 94.045, kg, Start date: 10/13/18 
13:19:00 CDT, Duration: 30 day, Stop date: 18 9:00:00 CSTNotes: Do not 
crush or chew. (Same As: Ecotrin)                                                  

                    No Longer Active                                                                 

                          10/13/2018                            [facility]                

    

 

                          Tylenol                            650 mg, 2 tab, Route: PO, Drug form: TAB, Q6H, 

Dosing Weight 94.045, kg, PRN Pain Score 1-5, Start date: 10/13/18 13:01:00 CDT,
 Duration: 30 day, Stop date: 18 13:00:00 CSTNotes: Do not exceed 4 
gm/day.  (Same as: Tylenol)                                                        No

 Longer Active                                                                       

                          10/13/2018                            [facility]                    

 

                          Trazodone Hydrochloride 50 MG Oral Tablet                            50 mg, 1 tab,

 Route: PO, Drug form: TAB, Bedtime, Dosing Weight 94.045, kg, PRN Insomnia, 
Start date: 10/13/18 13:01:00 CDT, Duration: 30 day, Stop date: 18 
13:00:00 CSTNotes: (Same As: Desyrel)                                              

                    No Longer Active                                                             

                          10/13/2018                            [facility]            

        

 

                          Famotidine                            20 mg, 1 tab, Route: PO, Drug form: TAB, Q12H,

 Dosing Weight 94.045, kg, PRN Heartburn, Start date: 10/13/18 13:01:00 CDT, 
Duration: 30 day, Stop date: 18 13:00:00 CSTNotes: (Same as: Pepcid)      
                                                      No Longer Active                   

                                                                            10/13/2018            

                                        [facility]                    

 

                                        Acetaminophen 325 MG / Hydrocodone Bitartrate 10 MG Oral Tablet [Norco 10/325]  

                                        1 tab, Route: PO, Drug Form: TAB, Dosing Weight 94.045, kg,

 Q6H, PRN Pain Score 4-6, Start date: 10/13/18 2:15:00 CDT, Duration: 30 day, 
Stop date: 18 2:14:00 CSTNotes: Do not exceed 4gm/day of acetaminophen.  
(Same as: Norco 325/10)                                                        No Longer

 Active                                                                              

                          10/13/2018                            [facility]                    

 

                          Zosyn + Sodium Chloride 0.9%  mL                            3.375 gm, Route:

 IVPB, ABXQ8H, Dosing Weight 82.273, kg, CrCl >=20 ml/min infuse over 4 hours, 
Start date: 10/12/18 19:00:00 CDT, Duration: 7 day, Stop date: 10/19/18 11:00:00
CDT, ABX Indication: PneumoniaNotes: (Same as: Zosyn) Dosing based on 
Piperacillin component   *** MEDICATION WASTE *** Product Size:  3375 mg Product
Wasted:  ___ mg                                                        No Longer Active

                                                                                    10/13/2018

                                        [facility]                    

 

                                        Nurse please UPDATE patient's HEIGHT-WEIGHT-ALLERGY in [facility]                    

                                        Nurse please UPDATE patient's HE[facility]-WEIGHT-ALLERGY in [facility], 1, Drug form:

 MISC, Route: MISC, Q5Min, 10/12/18 10:20:00 CDT, Duration: 1 day, Stop date: 
10/13/18 10:15:00 CDT                                                        Inactive

                                                                                    10/12/2018

                                        [facility]                    

 

                          Saline Flush 0.9%                            10 ml, Route: IVP, Drug Form: INJ, Dosing

 Weight 82.273, kg, Q12H, Start date: 10/12/18 9:00:00 CDT, Duration: 30 day, 
Stop date: 11/10/18 21:00:00 CSTNotes: (Same as: BD Posiflush)                  
                                                No Longer Active                                  

                                                      10/12/2018                         

                                        [facility]                    

 

                          Lasix                            40 mg, 4 mL, Route: IV, Drug form: INJ, Q8H, Dosing

 Weight 82.273, kg, Start date: 10/12/18 8:00:00 CDT, Duration: 30 day, Stop 
date: 18 0:00:00 CSTNotes: (Same as: Lasix)   *** MEDICATION WASTE *** 
Product Size:  40 mg Product Wasted:  ___ mg                                       

                          No Longer Active                                                    

                                                10/12/2018                            [facility]     

               

 

                                        Nurse please UPDATE patient's HEIGHT-WEIGHT-ALLERGY in [facility]                    

                                        Nurse please UPDATE patient's HEIGHT-WEIGHT-ALLERGY in [facility], 1, Drug form:

 MISC, Route: MISC, Q30Min, 10/12/18 8:00:00 CDT, Duration: 5 doses or times, 
Stop date: 10/12/18 10:00:00 CDT                                                   

                    Inactive                                                                          

                          10/12/2018                            [facility]                    

 

                          Insulin Lispro                            1 unit, 0.01 mL, Route: SUB-Q, Drug form:

 SOLN, TID-Before Meals, Dosing Weight 82.273, kg, PRN Blood Glucose Results, 
Start date: 10/12/18 7:34:00 CDT, Duration: 30 day, Stop date: 18 7:33:00 
CSTNotes: (Same as: Humalog ) Roll in palms of hands gently;  Do not shake 
`vigorously. "Single Patient Use Only "     WASTE: F/P - Black; E - Municipal 
Trash Bin  Stable for 28 days at room temperature. Expires in _____ days from 
______________Date                                                        No Longer Active

                                                                                    10/12/2018

                                        [facility]                    

 

                          Dextrose 50% Syringe                            12.5 gm, 25 mL, Route: IVP, Drug Form:

 INJ, Dosing Weight 82.273, kg, PRN, PRN Blood Glucose Results, Start date: 
10/12/18 7:34:00 CDT, Duration: 30 day, Stop date: 18 6:33:00 CST         
                                                      No Longer Active                       

                                                                            10/12/2018                

                                        [facility]                    

 

                          Glucagon                            1 mg, Route: IM, Drug form: PDR/INJ, PRN, Dosing

 Weight 82.273, kg, PRN Blood Glucose Results, Start date: 10/12/18 7:34:00 CDT,
Duration: 30 day, Stop date: 18 6:33:00 CST                                  

                          No Longer Active                                               

                                                10/12/2018                            [facility]

                    

 

                          Magnesium Sulfate                            2 gm, 50 mL, Route: IVPB, Drug form: 

INJ, PRN, Dosing Weight 82.273, kg, PRN Abnormal Lab Result, For NON-ICU 
Patients Only., Start date: 10/12/18 7:26:00 CDT, Duration: 30 day, Stop date: 
18 6:25:00 CSTNotes: WASTE: F/P - Sink; E - [facility] Trash Bin           
                                                      No Longer Active                         

                                                                            10/12/2018                  

                                        [facility]                    

 

                          sodium phosphate                            30 mmol, 10 mL, Route: IVPB, PRN, Dosing

 Weight 82.273, kg, PRN Abnormal Lab Result, For NON-ICU Patients Only., Start 
date: 10/12/18 7:26:00 CDT, Duration: 30 day, Stop date: 18 6:25:00 CSTNot
es: Infuse over 4 hour. Do not infuse phosphorous concurrently in the same line 
as TPN or IVF that contains calcium. For double lumen central lines, phosphorous
may be infused in a separate lumen from TPN.                                       

                          No Longer Active                                                    

                                                10/12/2018                            [facility]     

               

 

                          Magnesium Oxide                            800 mg, 2 tab, Route: PO, Drug form: TAB,

 PRN, Dosing Weight 82.273, kg, PRN Abnormal Lab Result, For NON-ICU Patients 
Only., Start date: 10/12/18 7:26:00 CDT, Duration: 30 day, Stop date: 18 6
:25:00 CSTNotes: (Same as: Mag-Ox 400) Magnesium oxide 196mf=515hq elemental 
magnesium Dose=____mg magnesium oxide (___mg elemental magnesium)               
                                                No Longer Active                               

                                                      10/12/2018                      

                                        [facility]                    

 

                          Calcium Gluconate                            2 gm, 20 mL, Route: IVPB, PRN, Dosing

 Weight 82.273, kg, PRN Abnormal Lab Result, For NON-ICU Patients Only., Start 
date: 10/12/18 7:26:00 CDT, Duration: 30 day, Stop date: 18 6:25:00 
CSTNotes: WASTE: F/P - Sink; E - [facility] Trash Bin                               

                          No Longer Active                                            

                                                10/12/2018                            [facility]

                    

 

                          Saline Flush 0.9%                            10 ml, Route: IVP, Drug Form: INJ, Dosing

 Weight 82.273, kg, PRN, PRN Line Flush, Start date: 10/12/18 7:26:00 CDT, 
Duration: 30 day, Stop date: 18 6:25:00 CSTNotes: (Same as: BD Posiflush) 
                                                      No Longer Active               

                                                                            10/12/2018        

                                        [facility]                    

 

                          Potassium Chloride                            20 mEq, 1 tab, Route: PO, Drug form:

 ERTAB, PRN, Dosing Weight 82.273, kg, PRN Abnormal Lab Result, For NON-ICU 
Patients Only, Start date: 10/12/18 7:26:00 CDT, Duration: 30 day, Stop date: 
18 6:25:00 CSTNotes: (Same as: K-Dur 20) "Do Not Crush" For patients 
unable to swallow tablet, dissolve in one half glass of water. Allow about 2 
minutes for the tablets to disintegrate. Stir before giving to prepare slurry 
and administer. Please exclude Patients with feeding tube less than 14 French 
(Dobhoff, J-tube etc) and pediatric and  patients.  With food and full 
glass of water                                                        No Longer Active

                                                                                    10/12/2018

                                        [facility]                    

 

                          potassium phosphate                            15 mmol, 5 mL, Route: IVPB, PRN, Dosing

 Weight 82.273, kg, PRN Abnormal Lab Result, For NON-ICU Patients Only., Start 
date: 10/12/18 7:26:00 CDT, Duration: 30 day, Stop date: 18 6:25:00 
CSTNotes: (Same as: K Phosphate.)  Do not infuse phosphorous concurrently in the
same line as TPN or IVF that contains calcium. For double lumen central lines, 
phosphorous may be infused in a separate lumen from TPN.  1 mMol phoshate has 
1.47 mEq potassium  Infuse over 4 hours                                            

                    No Longer Active                                                           

                          10/12/2018                            [facility]          

          

 

                                        potassium phosphate-sodium phosphate 250 mg-280 mg-160 mg oral powder for reconstitution

                                        2 pkt, Route: PO, Drug Form: PDR/REC, Dosing Weight 82.273,

 kg, PRN, PRN Abnormal Lab Result, For NON-ICU Patients Only, Start date: 
10/12/18 7:26:00 CDT, Duration: 30 day, Stop date: 18 6:25:00 CSTNotes: 
(Same as: Phos-NaK)  Each 1.5 gm pkt has 250mg phosphorous. Mix w/2.5oz water 
and stir.                                                        No Longer Active    

                                                                                10/12/2018

                                        [facility]                    

 

                          Zosyn                            3.375 gm, Route: IVPB, ABXQ8H, Dosing Weight 82.273,

 kg, CrCl >=20 ml/min infuse over 4 hours, Start date: 10/12/18 7:00:00 CDT, 
Duration: 7 day, Stop date: 10/18/18 23:00:00 CDT, ABX Indication: 
PneumoniaNotes: (Same as: Zosyn) Dosing based on Piperacillin component   *** 
MEDICATION WASTE *** Product Size:  3375 mg Product Wasted:  ___ mg             
                                                Inactive                                     

                                                10/12/2018                            [facility]                    

 

                          Lasix                            40 mg, Route: IVP, Drug form: INJ, ONCE, Dosing Weight

 82.273, kg, Priority: STAT, Start date: 10/12/18 3:18:00 CDT, Stop date: 
10/12/18 3:18:00 CDT                                                        Inactive 

                                                                                   10/12/2018

                                        [facility]                    

 

                          Aspirin                            324 mg, Route: CHEW, Drug form: CHEWTAB, ONCE, 

Dosing Weight 82.273, kg, Priority: STAT, Start date: 10/12/18 3:18:00 CDT, Stop
date: 10/12/18 3:18:00 CDT                                                        Inactive

                                                                                    10/12/2018

                                        [facility]                    

 

                          Levaquin                            750 mg, 3 tab, Route: PO, Drug form: TAB, [id number],

 Dosing Weight 82.273, kg, Start date: 10/08/18 21:00:00 CDT, Duration: 10 day, 
Stop date: 10/16/18 23:00:00 CDT, ABX Indication: PneumoniaNotes: Do not give 
w/antacids, dairy pdt & minerals  Take 1 hr before or 2 hr after dairy pdt (Same
as:Levaquin)                                                        No Longer Active 

                                                                                   10/09/2018

                                        [facility]                    

 

                          Alprazolam 0.25 MG Oral Tablet [Xanax]                            0.25 mg, 1 tab, 

Route: PO, Drug form: TAB, Q8H, Dosing Weight 82.273, kg, PRN Anxiety, Start 
date: 10/05/18 13:18:00 CDT, Duration: 30 day, Stop date: 18 13:17:00 
CSTNotes: With food or milk (Same as: Xanax)                                       

                          No Longer Active                                                    

                                                10/05/2018                            [facility]     

               

 

                          Insulin Lispro                            4 unit, Route: SUB-Q, ONCE, Dosing Weight

 82.273, kg, Priority: NOW, Start date: 10/04/18 20:54:00 CDT, Stop date: 
10/04/18 20:54:00 CDT                                                        Inactive

                                                                                    10/05/2018

                                        [facility]                    

 

                                        Insulin Glargine 100 UNT/ML Injectable Solution [Lantus]                        

                                        10 unit, 0.1 mL, Route: SUB-Q, Drug form: SOLN, Bedtime, Dosing Weight 73, kg, 

Start date: 18 21:00:00 CDT, Duration: 30 day, Stop date: 10/27/18 
21:00:00 CDTNotes: (Same as: Lantus) Do not hold insulin without contacting 
prescriber  WASTE: F/P - Black; E - [facility] Trash Bin  "single patient use 
only"                                                        No Longer Active        

                                                                            2018 

                                        [facility]                    

 

                          atorvastatin                            5 mg, 0.5 tab, Route: PO, Drug form: TAB, 

Bedtime, Dosing Weight 73, kg, Start date: 18 21:00:00 CDT, Duration: 30 
day, Stop date: 10/27/18 21:00:00 CDTNotes: (Same As: Lipitor)                  
                                                No Longer Active                                 

                                                      2018                        

                                        [facility]                    

 

                          niacin 500 mg oral tablet, extended release                            2,000 mg, 4

 tab, Route: PO, Drug form: ERTAB, Bedtime, Dosing Weight 73, kg, Start date: 
18 21:00:00 CDT, Duration: 30 day, Stop date: 10/27/18 21:00:00 CDTNotes: 
(Same as: Niaspan) "Do Not Crush"  Non-Formulary Item  With food.               
                                                No Longer Active                              

                                                      2018                     

                                        [facility]                    

 

                          Xarelto                            20 mg, 1 tab, Route: PO, Drug form: TAB, QPM, Dosing

 Weight 73, kg, Start date: 18 17:00:00 CDT, Duration: 30 day, Stop date: 
10/27/18 17:00:00 CDTNotes: (Same as: Xarelto) Administer with food             
                                                No Longer Active                            

                                                        2018                   

                                        [facility]                    

 

                          Sertraline                            100 mg, 1 tab, Route: PO, Drug form: TAB, Daily,

 Dosing Weight 73, kg, Start date: 18 9:00:00 CDT, Duration: 30 day, Stop 
date: 10/27/18 9:00:00 CDTNotes: (Same as: Zoloft)                                 

                          No Longer Active                                              

                                                2018                            [facility]

                    

 

                                        12 HR ranolazine 500 MG Extended Release Tablet [Ranexa]                        

                                        500 mg, 1 tab, Route: PO, Drug form: TAB, Daily, Dosing Weight 73, kg, Start date:

 18 9:00:00 CDT, Duration: 30 day, Stop date: 10/27/18 9:00:00 CDTNotes: 
Same as Ranexa "Do Not Crush"                                                        

No Longer Active                                                                     

                          2018                            [facility]                    



 

                          pantoprazole                            40 mg, 1 tab, Route: PO, Drug form: ECTAB,

 Daily, Dosing Weight 73, kg, Start date: 18 9:00:00 CDT, Duration: 30 
day, Stop date: 10/27/18 9:00:00 CDTNotes: Tablet should not be chewed or crushe
d. (Same as: Protonix)                                                        No Longer

 Active                                                                              

                          2018                            [facility]                    

 

                          metoprolol tartrate                            25 mg, 1 tab, Route: PO, Drug form:

 TAB, Q12H, Dosing Weight 73, kg, Start date: 18 9:00:00 CDT, Duration: 30
 day, Stop date: 10/27/18 21:00:00 CDTNotes: (Same as: Lopressor)               
                                                No Longer Active                              

                                                      2018                     

                                        [facility]                    

 

                          Lisinopril                            2.5 mg, 0.5 tab, Route: PO, Drug form: TAB, 

Daily, Dosing Weight 73, kg, Start date: 18 9:00:00 CDT, Duration: 30 day,
 Stop date: 10/27/18 9:00:00 CDTNotes: (Same as: Prinivil, Zestril)             
                                                No Longer Active                            

                                                        2018                   

                                        [facility]                    

 

                                        Insulin Glargine 100 UNT/ML Injectable Solution [Lantus]                        

                                        30 unit, 0.3 mL, Route: SUB-Q, Drug form: SOLN, Daily, Dosing Weight 73, kg, Start

 date: 18 9:00:00 CDT, Duration: 30 day, Stop date: 10/27/18 9:00:00 
CDTNotes: (Same as: Lantus) Do not hold insulin without contacting prescriber  
WASTE: F/P - Black; E - Municipal Trash Bin  "single patient use only"          
                                                      No Longer Active                       

                                                                            2018                

                                        [facility]                    

 

                          gabapentin 600 MG Oral Tablet                            600 mg, 2 cap, Route: PO,

 Drug form: CAP, Q12H, Dosing Weight 73, kg, Start date: 18 9:00:00 CDT, 
Duration: 30 day, Stop date: 10/27/18 21:00:00 CDTNotes: (Same as: Neurontin)   
                                                      No Longer Active                

                                                                            2018         

                                        [facility]                    

 

                          Aspirin 81 MG Enteric Coated Tablet                            81 mg, 1 tab, Route:

 PO, Drug form: ECTAB, Daily, Dosing Weight 73, kg, Start date: 18 9:00:00
 CDT, Duration: 30 day, Stop date: 10/27/18 9:00:00 CDTNotes: Do not crush or 
chew. (Same As: Ecotrin)                                                        No Longer

 Active                                                                              

                          2018                            [facility]                    

 

                          Amiodarone                            200 mg, 1 tab, Route: PO, Drug form: TAB, Daily,

 Dosing Weight 73, kg, Start date: 18 9:00:00 CDT, Duration: 30 day, Stop 
date: 10/27/18 9:00:00 CDTNotes: (Same as: Cordarone)                              

                          No Longer Active                                           

                                                2018                            [facility]

                    

 

                          Docusate                            100 mg, 1 cap, Route: PO, Drug form: CAP, BID,

 Dosing Weight 73, kg, Start date: 18 9:00:00 CDT, Duration: 30 day, Stop 
date: 10/27/18 17:00:00 CDTNotes: (Same as: Colace) (Do Not Crush)              
                                                No Longer Active                             

                                                       2018                    

                                        [facility]                    

 

                          torsemide                            20 mg, 1 tab, Route: PO, Drug form: TAB, Daily,

 Dosing Weight 73, kg, Start date: 18 9:00:00 CDT, Duration: 30 day, Stop 
date: 10/27/18 9:00:00 CDTNotes: (Same As: Demadex)                                

                          No Longer Active                                             

                                                2018                            [facility]

                    

 

                          tamsulosin                            0.4 mg, 1 cap, Route: PO, Drug form: CAP, Daily,

 Dosing Weight 73, kg, Start date: 18 9:00:00 CDT, Duration: 30 day, Stop 
date: 10/27/18 9:00:00 CDTNotes: (Same As: Flomax)  "Do Not Crush"              
                                                No Longer Active                             

                                                       2018                    

                                        [facility]                    

 

                          Isosorbide                            30 mg, 1 tab, Route: PO, Drug form: ERTAB, Before

 Breakfast, Dosing Weight 73, kg, Start date: 18 7:30:00 CDT, Duration: 30
 day, Stop date: 10/27/18 7:30:00 CDTNotes: (Same as:Imdur) "Do Not Crush"  Take
 on empty stomach/ full glass of water.  Do not crush                              

                          No Longer Active                                           

                                                2018                            [facility]

                    

 

                          Synthroid                            50 microgram, 1 tab, Route: PO, Drug form: TAB,

 Q630AM, Dosing Weight 73, kg, Start date: 18 6:30:00 CDT, Duration: 30 
day, Stop date: 10/27/18 6:30:00 CDTNotes: Take 1 hour before or 2 hours after 
meal; Enteral feeds may interefere with the absorption of this medication.(Same 
as:Levothroid, Synthroid)                                                        No Longer

 Active                                                                              

                          2018                            [facility]                    

 

                          Glucagon                            1 mg, Route: IM, Drug form: PDR/INJ, PRN, Dosing

 Weight 73, kg, PRN Blood Glucose Results, Start date: 18 22:46:00 CDT, 
Duration: 30 day, Stop date: 10/27/18 22:45:00 CDT                                 

                          No Longer Active                                              

                                                2018                            [facility]

                    

 

                          Insulin Lispro                            1 unit, 0.01 mL, Route: SUB-Q, Drug form:

 SOLN, TID-Before Meals, Dosing Weight 73, kg, PRN Blood Glucose Results, Start 
date: 18 22:46:00 CDT, Duration: 30 day, Stop date: 10/27/18 22:45:00 CD
TNotes: (Same as: Humalog ) Roll in palms of hands gently;  Do not shake 
`vigorously. "Single Patient Use Only "     WASTE: F/P - Black; E - Municipal 
Trash Bin  Stable for 28 days at room temperature. Expires in _____ days from 
______________Date                                                        No Longer Active

                                                                                    2018

                                        [facility]                    

 

                          Dextrose 50% Syringe                            12.5 gm, 25 mL, Route: IVP, Drug Form:

 INJ, Dosing Weight 73, kg, PRN, PRN Blood Glucose Results, Start date: 18
 22:46:00 CDT, Duration: 30 day, Stop date: 10/27/18 22:45:00 CDT               
                                                No Longer Active                              

                                                      2018                     

                                        [facility]                    

 

                          Famotidine                            20 mg, 1 tab, Route: PO, Drug form: TAB, Q12H,

 Dosing Weight 73, kg, PRN Heartburn, Start date: 18 22:39:00 CDT, 
Duration: 30 day, Stop date: 10/27/18 22:38:00 CDTNotes: (Same as: Pepcid)      
                                                      No Longer Active                   

                                                                            2018            

                                        [facility]                    

 

                          Trazodone Hydrochloride 50 MG Oral Tablet                            50 mg, 1 tab,

 Route: PO, Drug form: TAB, Bedtime, Dosing Weight 73, kg, PRN Insomnia, Start 
date: 18 22:39:00 CDT, Duration: 30 day, Stop date: 10/27/18 22:38:00 
CDTNotes: (Same As: Desyrel)                                                        No

 Longer Active                                                                       

                          2018                            [facility]                    

 

                          Ondansetron                            4 mg, 2 mL, Route: IVP, Drug form: INJ, Q6H,

 Dosing Weight 73, kg, PRN Nausea & Vomiting, Start date: 18 22:37:00 CDT,
 Duration: 30 day, Stop date: 10/27/18 22:36:00 CDTNotes: (Same as: Zofran)   
*** MEDICATION WASTE *** Product Size:  4 mg Product Wasted:  ___ mg            
                                                      No Longer Active                         

                                                                            2018                  

                                        [facility]                    

 

                          Acetaminophen                            650 mg, 2 tab, Route: PO, Drug form: TAB,

 Q4H, Dosing Weight 73, kg, PRN Pain 1-3/Temp > 100.4 F, Start date: 18 
22:37:00 CDT, Duration: 30 day, Stop date: 10/27/18 22:36:00 CDTNotes: Do not ex
ceed 4 gm/day.  (Same as: Tylenol)                                                 

                    No Longer Active                                                                

                          2018                            [facility]               

     

 

                                        Acetaminophen 325 MG / Hydrocodone Bitartrate 5 MG Oral Tablet                  

                                        1 tab, Route: PO, Drug Form: TAB, Dosing Weight 73, kg, Q4H, PRN Pain Score

 4-6, Start date: 18 22:37:00 CDT, Duration: 30 day, Stop date: 10/27/18 
22:36:00 CDTNotes: (Same as: Norco 325/5)  Do not exceed 4gm/day of 
acetaminophen.                                                        No Longer Active

                                                                                    2018

                                        [facility]                    

 

                          Sodium Chloride 0.9% (Bolus) IV                            1,000 mL, 1000 ml/hr, Infuse

 Over: 1 hr, Route: IV, 1,000, Drug form: INJ, ONCE, Priority: STAT, Dosing 
Weight 73 kg, Start date: 18 21:38:00 CDT, Stop date: 18 21:38:00 
CDT                                                        Inactive                  

                                                                            2018           

                                        [facility]                    

 

                          Aspirin 81 MG Enteric Coated Tablet                            81 mg=1 tab, PO, Daily,

 # 30 tab, 11 Refill(s), Pharmacy: [facility] [id number]                   
                                                No Longer Active                                  

                                                      2018                         

                                        [facility]                    

 

                          metoprolol tartrate 25 mg oral tablet                            25 mg=1 tab, PO, 

Q12H, Hold if heart rate less than 60 bpm or systolic blood pressure less than 
100 mmHg, # 60 tab, 3 Refill(s), Pharmacy: [facility] [id number]           
                                                      No Longer Active                        

                                                                            2018                 

                                        [facility]                    

 

                          Aspirin 81 MG Enteric Coated Tablet                            81 mg=1 tab, PO, Daily,

 0 Refill(s)                                                        Inactive         

                                                                            2018  

                                        [facility]                    

 

                                        isosorbide mononitrate 30 mg oral tablet, extended release                      

                                        30 mg=1 tab, PO, QAM, # 30 tab, 3 Refill(s), Pharmacy: [facility] [id number]

                                                        No Longer Active             

                                                                            2018      

                                        [facility]                    

 

                                        12 HR ranolazine 500 MG Extended Release Tablet [Ranexa]                        

                          500 mg=1 tab, PO, Daily, # 30 tab, 3 Refill(s), other                            

                            No Longer Active                                         

                                                2018                            [facility]

                    

 

                                        potassium chloride 20 mEq oral tablet, extended release                         

                                        20 mEq, PO, BID, # 60 tab, 0 Refill(s), Pharmacy: [facility] [id number]    

                                                      No Longer Active                 

                                                                            2018          

                                        [facility]                    

 

                          Magnesium Sulfate                            2 gm, 50 mL, Route: IVPB, Drug form: 

INJ, ONCE, Dosing Weight 82.273, kg, Start date: 18 14:34:00 CDT, Stop 
date: 18 14:34:00 CDTNotes: WASTE: F/P - Sink; E - Municipal Trash Bin    
                                                      Inactive                         

                                                                            2018                  

                                        [facility]                    

 

                          Insulin Lispro                            15 unit, 0.15 mL, Route: SUB-Q, Drug form:

 SOLN, TID-Before Meals, Dosing Weight 82.273, kg, PRN Blood Glucose Results, 
Start date: 18 9:14:00 CDT, Duration: 30 day, Stop date: 10/26/18 9:13:00 
CDTNotes: (Same as: Humalog ) Roll in palms of hands gently;  Do not shake 
`vigorously. "Single Patient Use Only "     WASTE: F/P - Black; E - Municipal 
Trash Bin  Stable for 28 days at room temperature. Expires in _____ days from 
______________Date                                                        Inactive   

                                                                                 2018

                                        [facility]                    

 

                          Dextrose 50% Syringe                            25 gm, 50 mL, Route: IVP, Drug Form:

 INJ, Dosing Weight 82.273, kg, PRN, PRN Blood Glucose Results, Start date: 
18 9:14:00 CDT, Duration: 30 day, Stop date: 10/26/18 9:13:00 CDT         
                                                Inactive                                

                                                      2018                       

                                        [facility]                    

 

                          Glucagon                            1 mg, Route: IM, Drug form: PDR/INJ, PRN, Dosing

 Weight 82.273, kg, PRN Blood Glucose Results, Start date: 18 9:14:00 CDT,
 Duration: 30 day, Stop date: 10/26/18 9:13:00 CDT                                 

                       Inactive                                                        

                            2018                            [facility]       

             

 

                          Insulin Lispro                            1 unit, 0.01 mL, Route: SUB-Q, Drug form:

 SOLN, Bedtime, Dosing Weight 82.273, kg, PRN Blood Glucose Results, Start date:
 18 22:27:00 CDT, Duration: 30 day, Stop date: 10/25/18 22:26:00 CDTNotes:
 (Same as: Humalog ) Roll in palms of hands gently;  Do not shake `vigorously. 
"Single Patient Use Only "     WASTE: F/P - Black; E - Municipal Trash Bin  
Stable for 28 days at room temperature. Expires in _____ days from 
______________Date                                                        No Longer Active

                                                                                    2018

                                        [facility]                    

 

                          Dextrose 50% Syringe                            12.5 gm, 25 mL, Route: IVP, Drug Form:

 INJ, Dosing Weight 82.273, kg, PRN, PRN Blood Glucose Results, Start date: 
18 22:27:00 CDT, Duration: 30 day, Stop date: 10/25/18 22:26:00 CDT       
                                                      No Longer Active                    

                                                                            2018             

                                        [facility]                    

 

                          Glucagon                            1 mg, Route: IM, Drug form: PDR/INJ, PRN, Dosing

 Weight 82.273, kg, PRN Blood Glucose Results, Start date: 18 22:27:00 
CDT, Duration: 30 day, Stop date: 10/25/18 22:26:00 CDT                            

                            No Longer Active                                         

                                                2018                            [facility]

                    

 

                          Potassium Chloride                            40 mEq, 2 tab, Route: PO, Drug form:

 ERTAB, ONCE, Dosing Weight 82.273, kg, Start date: 18 22:25:00 CDT, Stop 
date: 18 22:25:00 CDTNotes: (Same as: K-Dur 20) "Do Not Crush" For pat
ients unable to swallow tablet, dissolve in one half glass of water. Allow about
 2 minutes for the tablets to disintegrate. Stir before giving to prepare slurry
 and administer. Please exclude Patient&#[id number];s with feeding tube less than 14 
French (Dobhoff, J-tube etc) and pediatric and  patients.  With food and
 full glass of water                                                        Inactive 

                                                                                   2018

                                        [facility]                    

 

                          Xarelto                            20 mg, 1 tab, Route: PO, Drug form: TAB, QPM, Dosing

 Weight 82.273, kg, Start date: 18 17:00:00 CDT, Duration: 30 day, Stop 
date: 10/24/18 17:00:00 CDTNotes: (Same as: Xarelto) Administer with food       
                                                      No Longer Active                    

                                                                            2018             

                                        [facility]                    

 

                                        12 HR ranolazine 500 MG Extended Release Tablet [Ranexa]                        

                                        500 mg, 1 tab, Route: PO, Drug form: TAB, BID, Dosing Weight 82.273, kg, Start 

date: 18 17:00:00 CDT, Duration: 30 day, Stop date: 10/25/18 9:00:00 
CDTNotes: Same as Ranexa "Do Not Crush"                                            

                    No Longer Active                                                           

                          2018                            [facility]          

          

 

                          Lisinopril                            2.5 mg, 0.5 tab, Route: PO, Drug form: TAB, 

Daily, Dosing Weight 82.273, kg, Priority: NOW, Start date: 18 12:03:00 
CDT, Duration: 30 day, Stop date: 10/25/18 9:00:00 CDTNotes: (Same as: Prinivil,
 Zestril)                                                        No Longer Active    

                                                                                2018

                                        [facility]                    

 

                          Lasix                            40 mg, 4 mL, Route: IVP, Drug form: INJ, ONCE, Dosing

 Weight 82.273, kg, Start date: 18 9:26:00 CDT, Stop date: 18 
9:26:00 CDTNotes: (Same as: Lasix)   *** MEDICATION WASTE *** Product Size:  40 
mg Product Wasted:  ___ mg                                                        Inactive

                                                                                    2018

                                        [facility]                    

 

                          Potassium Chloride 1.33 MEQ/ML Oral Solution                            40 mEq, 2 

tab, Route: PO, Drug form: ERTAB, ONCE, Dosing Weight 82.273, kg, Start date: 
18 9:25:00 CDT, Stop date: 18 9:25:00 CDTNotes: (Same as: K-Dur 20) 
For patients unable to swallow tablet, dissolve in one half glass of water. 
Allow about 2 minutes for the tablets to disintegrate. Stir before giving to 
prepare slurry and administer. Please exclude Patients with feeding tube less 
than 14 French (Dobhoff, J-tube etc) and pediatric and  patients.  With 
food and full glass of water                                                        Inactive

                                                                                    2018

                                        [facility]                    

 

                          isosorbide mononitrate extended release                            30 mg, 1 tab, Route:

 PO, Drug form: ERTAB, QAM, Dosing Weight 82.273, kg, Priority: NOW, Start date:
 18 9:25:00 CDT, Duration: 30 day, Stop date: 10/25/18 9:00:00 CDTNotes: 
(Same as:Imdur) "Do Not Crush"  Take on empty stomach/ full glass of water.  Do 
not crush                                                        No Longer Active    

                                                                                2018

                                        [facility]                    

 

                          Aspirin 81 MG Enteric Coated Tablet                            81 mg, 1 tab, Route:

 PO, Drug form: ECTAB, Daily, Dosing Weight 82.273, kg, Priority: NOW, Start 
date: 18 9:23:00 CDT, Duration: 30 day, Stop date: 10/25/18 9:00:00 
CDTNotes: Do not crush or chew. (Same As: Ecotrin)                                 

                          No Longer Active                                              

                                                2018                            [facility]

                    

 

                          metoprolol tartrate                            25 mg, 1 tab, Route: PO, Drug form:

 TAB, Q12H, Dosing Weight 82.273, kg, Priority: NOW, Start date: 18 
9:22:00 CDT, Duration: 30 day, Stop date: 10/25/18 9:00:00 CDTNotes: (Same as: 
Lopressor)                                                        No Longer Active   

                                                                                 2018

                                        [facility]                    

 

                          Potassium Chloride                            40 mEq, 2 tab, Route: PO, Drug form:

 ERTAB, ONCE, Dosing Weight 82.273, kg, Start date: 18 6:26:00 CDT, Stop 
date: 18 6:26:00 CDTNotes: (Same as: K-Dur 20) "Do Not Crush" For patients
 unable to swallow tablet, dissolve in one half glass of water. Allow about 2 
minutes for the tablets to disintegrate. Stir before giving to prepare slurry 
and administer. Please exclude Patient&#[id number];s with feeding tube less than 14 
French (Dobhoff, J-tube etc) and pediatric and  patients.  With food and
 full glass of water                                                        Inactive 

                                                                                   2018

                                        [facility]                    

 

                          Azithromycin                            500 mg, Route: IVPB, [id number], Dosing Weight

 82.273, kg, Start date: 18 2:00:00 CDT, Duration: 5 day, Stop date: 
18 2:00:00 CDT, ABX Indication: PneumoniaNotes: (Same As: Zithromax IV)   
                                                      No Longer Active                

                                                                            2018         

                                        [facility]                    

 

                          Rocephin + sterile water 10 mL                            1 gm, Route: IVP, [id number],

 Dosing Weight 82.273, kg, Start date: 18 2:00:00 CDT, Duration: 5 day, 
Stop date: 18 2:00:00 CDT, ABX Indication: PneumoniaNotes: (Same As: 
Rocephin).  Use with 100 mL NS and infuse over 30 min   *** MEDICATION WASTE ***
 Product Size:  1000 mg Product Wasted:  ___ mg                                    

                          No Longer Active                                                 

                                                2018                            [facility]  

                  

 

                          Ondansetron                            4 mg, 2 mL, Route: IVP, Drug form: INJ, Q6H,

 Dosing Weight 82.273, kg, PRN Nausea & Vomiting, Start date: 18 1:03:00 
CDT, Duration: 30 day, Stop date: 10/25/18 1:02:00 CDTNotes: (Same as: Zofran)  
 *** MEDICATION WASTE *** Product Size:  4 mg Product Wasted:  ___ mg           
                                                      No Longer Active                        

                                                                            2018                 

                                        [facility]                    

 

                          Acetaminophen                            650 mg, 2 tab, Route: PO, Drug form: TAB,

 Q4H, Dosing Weight 82.273, kg, PRN Pain 1-3/Temp > 100.4 F, Start date: 
18 1:03:00 CDT, Duration: 30 day, Stop date: 10/25/18 1:02:00 CDTNotes: Do
 not exceed 4 gm/day.  (Same as: Tylenol)                                          

                    No Longer Active                                                         

                           2018                            [facility]        

            

 

                                        Acetaminophen 325 MG / Hydrocodone Bitartrate 5 MG Oral Tablet                  

                                        1 tab, Route: PO, Drug Form: TAB, Dosing Weight 82.273, kg, Q4H, PRN Pain

 Score 4-6, Start date: 18 1:03:00 CDT, Duration: 30 day, Stop date: 
10/25/18 1:02:00 CDTNotes: (Same as: Norco 325/5)  Do not exceed 4gm/day of 
acetaminophen.                                                        No Longer Active

                                                                                    2018

                                        [facility]                    

 

                          Morphine                            2 mg, 1 mL, Route: IVP, Drug form: INJ, Q4H, Dosing

 Weight 82.273, kg, PRN Pain Score 7-10, Start date: 18 1:03:00 CDT, 
Duration: 30 day, Stop date: 10/25/18 1:02:00 CDTNotes: (Same as:MORPhine 
Sulfate)                                                        No Longer Active     

                                                                               2018

                                        [facility]                    

 

                          Magnesium Sulfate                            2 gm, 50 mL, Route: IV, Drug form: INJ,

 ONCE, Dosing Weight 82.273, kg, Priority: STAT, Start date: 18 19:26:00 
CDT, Stop date: 18 19:26:00 CDTNotes: WASTE: F/P - Sink; E - [facility] 
Trash Bin                                                        Inactive            

                                                                            2018     

                                        [facility]                    

 

                          K-Dur 20                            40 mEq, 2 tab, Route: PO, Drug form: ERTAB, ONCE,

 Start date: 18 19:11:00 CDT, Stop date: 18 19:11:00 CDTNotes: (Same
 as: K-Dur 20) "Do Not Crush" For patients unable to swallow tablet, dissolve in
 one half glass of water. Allow about 2 minutes for the tablets to disintegrate.
 Stir before giving to prepare slurry and administer. Please exclude Patients 
with feeding tube less than 14 French (Dobhoff, J-tube etc) and pediatric and 
 patients.  With food and full glass of water                              

                          Inactive                                                   

                                                2018                            [facility]    

                

 

                          Potassium Chloride 1.33 MEQ/ML Oral Solution                            40 mEq, 30

 mL, Route: PO, Drug form: LIQ, ONCE, Dosing Weight 82.273, kg, Priority: STAT, 
Start date: 18 17:47:00 CDT, Stop date: 18 17:47:00 CDTNotes: (Same 
as: Potassium Chloride)                                                        Inactive

                                                                                    2018

                                        [facility]                    

 

                          Lorazepam                            0.5 mg, 1 tab, Route: PO, Drug form: TAB, Bedtime,

 Dosing Weight 86.165, kg, Start date: 18 21:00:00 CDT, Duration: 30 day, 
Stop date: 18 21:00:00 CDTNotes: (Same as: Ativan)                        
                                                No Longer Active                                       

                                                2018                            [facility]

                    

 

                          torsemide                            20 mg, 1 tab, Route: PO, Drug form: TAB, Daily,

 Dosing Weight 86.165, kg, Start date: 18 9:00:00 CDT, Duration: 30 day, 
Stop date: 18 9:00:00 CDTNotes: (Same As: Demadex)                        
                                                No Longer Active                                       

                                                2018                            [facility]

                    

 

                          metoprolol tartrate 25 mg oral tablet                            12.5 mg=0.5 tab, 

PO, Q12H, 0 Refill(s)                                                        No Longer

 Active                                                                              

                          2018                            [facility]                    

 

                                        Insulin Glargine 100 UNT/ML Injectable Solution [Lantus]                        

                          10 unit, SUB-Q, Bedtime, # 3 mL, 0 Refill(s), other                              

                          No Longer Active                                           

                                                2018                            [facility]

                    

 

                          Doxycycline Monohydrate 100 MG Oral Tablet                            100 mg=1 tab,

 PO, Q12H, X 14 day, # 28 tab, 0 Refill(s), other                                  

                          No Longer Active                                               

                                                2018                            [facility]

                    

 

                                        Amoxicillin 875 MG / Clavulanate 125 MG Oral Tablet [Augmentin 875-mg]          

                                        875 mg=1 tab, PO, Q12H, X 14 day, # 28 tab, 0 Refill(s), other   

                                                      No Longer Active                

                                                                            2018         

                                        [facility]                    

 

                          insulin glargine                            20 unit, 0.2 mL, Route: SUB-Q, Drug form:

 SOLN, Bedtime, Start date: 18 21:00:00 CDT, Duration: 30 day, Stop date: 
18 21:00:00 CDTNotes: (Same as: Lantus) Do not hold insulin without cont
acting prescriber  WASTE: F/P - Black; E - [facility] Trash Bin  "single patient 
use only"                                                        No Longer Active    

                                                                                2018

                                        [facility]                    

 

                          metoprolol tartrate                            12.5 mg, 0.5 tab, Route: PO, Drug form:

 TAB, Q12H, Dosing Weight 86.165, kg, Priority: NOW, Start date: 18 
11:12:00 CDT, Duration: 30 day, Stop date: 18 9:00:00 CDTNotes: (Same as: 
Lopressor)                                                        No Longer Active   

                                                                                 2018

                                        [facility]                    

 

                          Ativan                            0.5 mg, 1 tab, Route: PO, Drug form: TAB, Daily,

 Dosing Weight 111.364, kg, Start date: 18 10:15:00 CDT, Duration: 30 day,
Stop date: 18 9:00:00 CDTNotes: (Same as: Ativan)                            

                            No Longer Active                                         

                                                2018                             [facility]

                    

 

                                        potassium chloride 20 mEq oral tablet, extended release                         

                                        40 mEq, 2 tab, Route: PO, Drug form: ERTAB, ONCE, Dosing Weight 86.165, kg, Start

 date: 18 9:40:00 CDT, Stop date: 18 9:40:00 CDTNotes: (Same as: K-
Dur 20) "Do Not Crush" For patients unable to swallow tablet, dissolve in one 
half glass of water. Allow about 2 minutes for the tablets to disintegrate. Stir
before giving to prepare slurry and administer. Please exclude Patient&#8217;s 
with feeding tube less than 14 French (Dobhoff, J-tube etc) and pediatric and 
 patients.  With food and full glass of water                              

                          Inactive                                                   

                                                2018                            [facility]    

                

 

                          Magnesium Sulfate                            2 gm, 50 mL, Route: IVPB, Drug form: 

INJ, ONCE, Dosing Weight 86.165, kg, Start date: 18 9:40:00 CDT, Stop 
date: 18 9:40:00 CDTNotes: WASTE: F/P - Sink; E - [facility] Trash [facility]     
                                                  Inactive                             

                                                       2018                    

                                        [facility]                    

 

                          Potassium Chloride                            40 mEq, 2 tab, Route: PO, Drug form:

 ERTAB, ONCE, Dosing Weight 86.165, kg, Start date: 18 15:32:00 CDT, Stop 
date: 18 15:32:00 CDTNotes: (Same as: K-Dur 20) "Do Not Crush" For pat
ients unable to swallow tablet, dissolve in one half glass of water. Allow about
2 minutes for the tablets to disintegrate. Stir before giving to prepare slurry 
and administer. Please exclude Patient&#8217;s with feeding tube less than 14 
French (Dobhoff, J-tube etc) and pediatric and  patients.  With food and
full glass of water                                                        Inactive  

                                                                                  2018

                                        [facility]                    

 

                          Lasix                            40 mg, 4 mL, Route: IVP, Drug form: INJ, TID, Dosing

 Weight 86.165, kg, Start date: 18 13:00:00 CDT, Duration: 30 day, Stop 
date: 09/15/18 9:00:00 CDTNotes: (Same as: Lasix)   *** MEDICATION WASTE *** 
Product Size:  40 mg Product Wasted:  ___ mg                                       

                          No Longer Active                                                    

                                                2018                            [facility]     

               

 

                          meropenem                            500 mg, Route: IVPB, [id number], Dosing Weight 86.165,

 kg, CrCL >=50ml/min, Extended infusion, infuse over 3 hours, Start date: 
18 3:00:00 CDT, Duration: 5 day, Stop date: 18 22:00:00 CDT, ABX 
Indication: PneumoniaNotes: Same as Merrem   *** MEDICATION WASTE *** Product 
Size:  500 mg Product Wasted:  ___ mg                                              

                    No Longer Active                                                             

                          2018                            [facility]            

        

 

                          benzonatate                            200 mg, 2 cap, Route: PO, Drug form: CAP, TID,

 Dosing Weight 86.165, kg, Priority: STAT, Start date: 08/15/18 22:30:00 CDT, 
Duration: 30 day, Stop date: 18 17:00:00 CDTNotes: (Same As: Tessalon 
Perles) "Do Not Crush"                                                        No Longer

 Active                                                                              

                          2018                            [facility]                    

 

                          meropenem + sterile water 10 mL                            500 mg, Route: IVP, ONCE,

 Start date: 08/15/18 20:39:00 CDT, Stop date: 08/15/18 20:39:00 CDT, ABX 
Indication: PneumoniaNotes: Same as Merrem   *** MEDICATION WASTE *** Product 
Size:  500 mg Product Wasted:  ___ mg                                              

                    Inactive                                                                     

                          2018                            [facility]                    



 

                                        **RN-Wait to give 9:00 vanc on 8/15 till trough drawn******                     

                                        **RN-Wait to give 9:00 vanc  on 8/15 till trough drawn******, Attn:RN, Drug 

form: MISC, Route: MISC, ONCE, 08/15/18 8:00:00 CDT, Stop date: 08/15/18 8:00:00
CDT                                                        Inactive                  

                                                                            08/15/2018           

                                        [facility]                    

 

                          Robitussin                            100 mg, 5 mL, Route: PO, Drug Form: LIQ, Dosing

 Weight 86.165, kg, Q4H, NOW, Start date: 18 14:58:00 CDT, Duration: 30 
day, Stop date: 18 12:00:00 CDTNotes: (Same as: Robitussin)               
                                                No Longer Active                               

                                                      2018                      

                                        [facility]                    

 

                          Lasix                            40 mg, 4 mL, Route: IVP, Drug form: INJ, Daily, Dosing

 Weight 86.165, kg, Priority: NOW, Start date: 18 14:57:00 CDT, Duration: 
30 day, Stop date: 18 9:00:00 CDTNotes: (Same as: Lasix)   *** MEDICATION 
WASTE *** Product Size:  40 mg Product Wasted:  ___ mg                             

                           No Longer Active                                          

                                                2018                            [facility]

                    

 

                          torsemide                            20 mg, 1 tab, Route: PO, Drug form: TAB, Daily,

 Dosing Weight 86.165, kg, Start date: 18 9:00:00 CDT, Duration: 30 day, 
Stop date: 18 9:00:00 CDTNotes: (Same As: Demadex)                        
                                                Inactive                                                

                                                2018                            [facility] 

                   

 

                          niacin 1000 mg oral tablet, extended release                            2,000 mg, 

4 tab, Route: PO, Drug form: ERTAB, Bedtime, Dosing Weight 111.364, kg, Start 
date: 18 21:00:00 CDT, Duration: 30 day, Stop date: 18 21:00:00 
CDTNotes: (Same as: Niaspan) "Do Not Crush"  Non-Formulary Item  With food.     
                                                      No Longer Active                   

                                                                            2018            

                                        [facility]                    

 

                          Lorazepam                            1 mg, 1 tab, Route: PO, Drug form: TAB, Bedtime,

 Dosing Weight 111.364, kg, Start date: 18 21:00:00 CDT, Duration: 30 day,
Stop date: 18 21:00:00 CDTNotes: (Same as: Ativan)                        
                                                No Longer Active                                        

                                                2018                            [facility]

                    

 

                                        Insulin Glargine 100 UNT/ML Injectable Solution [Lantus]                        

                                        40 unit, Route: SUB-Q, Drug form: SOLN, Bedtime, Dosing Weight 111.364, kg, Start

 date: 18 21:00:00 CDT, Duration: 30 day, Stop date: 18 21:00:00 CDT
                                                        No Longer Active             

                                                                            2018      

                                        [facility]                    

 

                          atorvastatin                            5 mg, 0.5 tab, Route: PO, Drug form: TAB, 

Bedtime, Dosing Weight 111.364, kg, Start date: 18 21:00:00 CDT, Duration:
 30 day, Stop date: 18 21:00:00 CDTNotes: (Same As: Lipitor)              
                                                No Longer Active                             

                                                       2018                    

                                        [facility]                    

 

                          Primidone                            12.5 mg, Route: PO, Bedtime, Dosing Weight 86.165,

 kg, Start date: 18 21:00:00 CDT, Duration: 30 day, Stop date: 18 
21:00:00 CDT                                                        Inactive         

                                                                            2018  

                                        [facility]                    

 

                          Vancomycin                            1,000 mg, Route: IVPB, [id number], Dosing Weight

 86.165, kg, Start date: 18 19:00:00 CDT, Duration: 10 day, Stop date: 
18 9:00:00 CDT, ABX Indication: Other (specify in Comments)Notes: TIME CRI
TICAL MEDICATION (Same As: Vancocin)  Infusion rate  2001 mg: infuse over 2.5 
hours  For adult patients only: Round to nearest 250 mg per Medical Staff 
approval   *** MEDICATION WASTE *** Product Size:  1000 mg Product Wasted:  ___ 
mg                                                        No Longer Active           

                                                                            2018    

                                        [facility]                    

 

                          Xarelto                            20 mg, Route: PO, Drug form: TAB, QPM, Dosing Weight

 111.364, kg, Start date: 18 17:00:00 CDT, Duration: 30 day, Stop date: 
18 17:00:00 CDT                                                        No Longer

 Active                                                                              

                          2018                            [facility]                    

 

                          cefepime                            1 gm, Route: IVPB, [id number], Dosing Weight 86.165,

 kg, (CrCl 30 - 49 ml/min), Start date: 18 16:00:00 CDT, Duration: 10 day,
 Stop date: 18 4:00:00 CDT, ABX Indication: PneumoniaNotes: (Same As: 
Maxipime)   *** MEDICATION WASTE *** Product Size:  1000 mg Product Wasted:  ___
 mg                                                        No Longer Active          

                                                                            2018   

                                        [facility]                    

 

                                        Albuterol 0.833 MG/ML / Ipratropium Bromide 0.167 MG/ML Inhalant Solution       

                                        3 ml, Route: NEB, Drug Form: SOLN, Dosing Weight 86.165, kg, RQ4H,

 Start date: 18 15:00:00 CDT, Duration: 30 day, Stop date: 18 
11:00:00 CDTNotes: (Same as: Duoneb)                                               

                    No Longer Active                                                              

                          2018                            [facility]             

       

 

                          Insulin Lispro                            2 unit, 0.02 mL, Route: SUB-Q, Drug form:

 SOLN, Bedtime, Dosing Weight 86.165, kg, PRN Blood Glucose Results, Start date:
 18 13:56:00 CDT, Duration: 30 day, Stop date: 18 13:55:00 CDTNotes:
 (Same as: Humalog ) Roll in palms of hands gently;  Do not shake `vigorously. 
"Single Patient Use Only "     WASTE: F/P - Black; E - [facility] Trash Bin  
Stable for 28 days at room temperature. Expires in _____ days from 
______________Date                                                        No Longer Active

                                                                                    2018

                                        [facility]                    

 

                          Glucagon                            1 mg, Route: IM, Drug form: PDR/INJ, PRN, Dosing

 Weight 86.165, kg, PRN Blood Glucose Results, Start date: 18 13:56:00 
CDT, Duration: 30 day, Stop date: 18 13:55:00 CDT                            

                            No Longer Active                                         

                                                2018                            [facility]

                    

 

                          Dextrose 50% Syringe                            25 gm, 50 mL, Route: IVP, Drug Form:

 INJ, Dosing Weight 86.165, kg, PRN, PRN Blood Glucose Results, Start date: 
18 13:56:00 CDT, Duration: 30 day, Stop date: 18 13:55:00 CDT       
                                                      No Longer Active                    

                                                                            2018             

                                        [facility]                    

 

                          Bumex                            1 mg, 4 mL, Route: IVP, Drug form: INJ, ONCE, Dosing

 Weight 86.165, kg, Start date: 18 9:49:00 CDT, Stop date: 18 
9:49:00 CDTNotes: (Same As: Bumex)                                                 

                    Inactive                                                                        

                          2018                            [facility]                    

 

                          tamsulosin                            0.4 mg, 1 cap, Route: PO, Drug form: CAP, Daily,

 Dosing Weight 111.364, kg, Start date: 18 9:00:00 CDT, Duration: 30 day, 
Stop date: 18 9:00:00 CDTNotes: (Same As: Flomax)  "Do Not Crush"         
                                                      No Longer Active                      

                                                                            2018               

                                        [facility]                    

 

                          insulin glargine                            76 unit, 0.76 mL, Route: SUB-Q, Drug form:

 SOLN, Daily, Start date: 18 9:00:00 CDT, Duration: 30 day, Stop date: 
18 9:00:00 CDTNotes: (Same as: Lantus) Do not hold insulin without contact
ing prescriber  WASTE: F/P - Black; E - [facility]  "single patient use
 only"                                                        No Longer Active       

                                                                             2018

                                        [facility]                    

 

                                        12 HR ranolazine 500 MG Extended Release Tablet [Ranexa]                        

                                        500 mg, 1 tab, Route: PO, Drug form: TAB, BID, Dosing Weight 111.364, kg, Start

 date: 18 9:00:00 CDT, Duration: 30 day, Stop date: 18 17:00:00 
CDTNotes: Same as Ranexa "Do Not Crush"                                            

                    No Longer Active                                                           

                          2018                            [facility]          

          

 

                          Potassium Chloride                            20 mEq, 1 tab, Route: PO, Drug form:

 ERTAB, Daily, Dosing Weight 111.364, kg, Start date: 18 9:00:00 CDT, 
Duration: 30 day, Stop date: 18 9:00:00 CDTNotes: (Same as: K-Dur 20) "Do 
Not Crush" For patients unable to swallow tablet, dissolve in one half glass of 
water. Allow about 2 minutes for the tablets to disintegrate. Stir before giving
 to prepare slurry and administer. Please exclude Patients with feeding tube 
less than 14 French (Dobhoff, J-tube etc) and pediatric and  patients.  
With food and full glass of water                                                  

                    No Longer Active                                                                 

                          2018                            [facility]                

    

 

                          pantoprazole                            40 mg, 1 tab, Route: PO, Drug form: ECTAB,

 Daily, Dosing Weight 111.364, kg, Start date: 18 9:00:00 CDT, Duration: 
30 day, Stop date: 18 9:00:00 CDTNotes: Tablet should not be chewed or c
rushed. (Same as: Protonix)                                                        Inactive

                                                                                    2018

                                        [facility]                    

 

                          Lorazepam                            0.5 mg, 1 tab, Route: PO, Drug form: TAB, Daily,

 Dosing Weight 111.364, kg, Start date: 18 9:00:00 CDT, Duration: 30 day, 
Stop date: 18 9:00:00 CDTNotes: (Same as: Ativan)                            

                            No Longer Active                                         

                                                2018                            [facility]

                    

 

                          Lisinopril                            2.5 mg, 0.5 tab, Route: PO, Drug form: TAB, 

Daily, Dosing Weight 111.364, kg, Start date: 18 9:00:00 CDT, Duration: 30
 day, Stop date: 18 9:00:00 CDTNotes: (Same as: Prinivil, Zestril)        
                                                      No Longer Active                     

                                                                            2018              

                                        [facility]                    

 

                                        Insulin Glargine 100 UNT/ML Injectable Solution [Lantus]                        

                                        76 unit, Route: SUB-Q, Drug form: SOLN, Daily, Dosing Weight 111.364, kg, Start

 date: 18 9:00:00 CDT, Duration: 30 day, Stop date: 18 9:00:00 CDT  
                                                      No Longer Active               

                                                                            2018        

                                        [facility]                    

 

                          gabapentin 600 MG Oral Tablet                            600 mg, 2 cap, Route: PO,

 Drug form: CAP, Q12H, Dosing Weight 111.364, kg, Start date: 18 9:00:00 
CDT, Duration: 30 day, Stop date: 18 21:00:00 CDTNotes: (Same as: 
Neurontin)                                                        No Longer Active   

                                                                                 2018

                                        [facility]                    

 

                          Aspirin 81 MG Enteric Coated Tablet                            81 mg, 1 tab, Route:

 PO, Drug form: ECTAB, Daily, Dosing Weight 86.165, kg, Start date: 18 
9:00:00 CDT, Duration: 30 day, Stop date: 18 9:00:00 CDTNotes: Do not 
crush or chew. (Same As: Ecotrin)                                                  

                    No Longer Active                                                                 

                          2018                            [facility]                

    

 

                          Amiodarone                            200 mg, 1 tab, Route: PO, Drug form: TAB, Daily,

 Dosing Weight 111.364, kg, Start date: 18 9:00:00 CDT, Duration: 30 day, 
Stop date: 18 9:00:00 CDTNotes: (Same as: Cordarone)                      
                                                No Longer Active                                     

                                                2018                            [facility]                    

 

                          Sertraline                            100 mg, 1 tab, Route: PO, Drug form: TAB, Daily,

 Dosing Weight 111.364, kg, Start date: 18 9:00:00 CDT, Duration: 30 day, 
Stop date: 18 9:00:00 CDTNotes: (Same as: Zoloft)                            

                            No Longer Active                                         

                                                2018                            [facility]

                    

 

                          Synthroid                            50 microgram, 1 tab, Route: PO, Drug form: TAB,

 Q630AM, Dosing Weight 111.364, kg, Start date: 18 6:30:00 CDT, Duration: 
30 day, Stop date: 18 6:30:00 CDTNotes: Take 1 hour before or 2 hours 
after meal; Enteral feeds may interefere with the absorption of this 
medication.(Same as:Levothroid, Synthroid)                                         

                    No Longer Active                                                        

                            2018                            [facility]       

             

 

                          insulin glargine                            40 unit, 0.4 mL, Route: SUB-Q, Drug form:

 SOLN, Bedtime, Start date: 18 22:27:00 CDT, Duration: 30 day, Stop date: 
18 21:00:00 CDTNotes: (Same as: Lantus) Do not hold insulin without cont
acting prescriber  WASTE: F/P - Black; E - [facility] Trash [facility]  "single patient 
use only"                                                        No Longer Active    

                                                                                2018

                                        [facility]                    

 

                          rivaroxaban                            20 mg, 1 tab, Route: PO, Drug form: TAB, QPM,

 Start date: 18 22:25:00 CDT, Duration: 30 day, Stop date: 18 
17:00:00 CDTNotes: (Same as: Xarelto) Administer with food                      
                                                No Longer Active                                     

                                                2018                            [facility]                    

 

                          rivaroxaban                            15 mg, 1 tab, Route: PO, Drug form: TAB, Bedtime,

 Start date: 18 22:21:00 CDT, Duration: 30 day, Stop date: 18 
21:00:00 CDTNotes: (Same as: Xarelto) Administer with food                      
                                                Inactive                                             

                                                2018                            [facility]

                    

 

                                        Acetaminophen 325 MG / Hydrocodone Bitartrate 10 MG Oral Tablet [Norco 10/325]  

                                        1 tab, Route: PO, Drug Form: TAB, Dosing Weight 111.364, 

kg, Q6H, PRN Pain Score 4-6, Start date: 18 22:01:00 CDT, Duration: 30 
day, Stop date: 18 22:00:00 CDTNotes: Do not exceed 4gm/day of 
acetaminophen.  (Same as: Norco 325/10)                                            

                    No Longer Active                                                           

                          2018                            [facility]          

          

 

                          Famotidine                            20 mg, 1 tab, Route: PO, Drug form: TAB, Q12H,

 Dosing Weight 111.364, kg, Start date: 18 22:01:00 CDT, Duration: 30 day,
Stop date: 09/10/18 21:00:00 CDTNotes: (Same as: Pepcid)                        
                                                No Longer Active                                        

                                                2018                            [facility]

                    

 

                          Benadryl                            25 mg, 1 tab, Route: PO, Drug form: TAB, Daily,

 Dosing Weight 111.364, kg, PRN Itching, Start date: 18 22:01:00 CDT, 
Duration: 30 day, Stop date: 18 22:00:00 CDT                                 

                          No Longer Active                                              

                                                2018                            [facility]

                    

 

                          Trazodone Hydrochloride 50 MG Oral Tablet                            50 mg, 1 tab,

 Route: PO, Drug form: TAB, Bedtime, Dosing Weight 111.364, kg, PRN Insomnia, 
Start date: 18 22:01:00 CDT, Duration: 30 day, Stop date: 18 
22:00:00 CDTNotes: (Same As: Desyrel)                                              

                    No Longer Active                                                             

                          2018                            [facility]            

        

 

                          Sodium Chloride 0.9% IV 1,000 mL                            1,000 mL, Rate: 125 ml/hr,

 Infuse over: 8 hr, Route: IV, Dosing Weight 111.364 kg, Total Volume: 1,000, 
Start date: 18 21:59:00 CDT, Duration: 30 day, Stop date: 18 
21:58:00 CDT, 2.34, m2                                                        No Longer

 Active                                                                              

                          2018                            [facility]                    

 

                          Saline Flush 0.9%                            10 ml, Route: IVP, Drug Form: INJ, Dosing

 Weight 111.364, kg, PRN, PRN Line Flush, Start date: 18 21:59:00 CDT, 
Duration: 30 day, Stop date: 18 21:58:00 CDTNotes: (Same as: BD Posiflush)
                                                       No Longer Active              

                                                                            2018       

                                        [facility]                    

 

                          Ondansetron                            4 mg, Route: IVP, Q6H, Dosing Weight 111.364,

 kg, PRN Nausea & Vomiting, Start date: 18 21:59:00 CDT, Duration: 30 day,
Stop date: 18 21:58:00 CDT                                                   

                    Inactive                                                                          

                          2018                            [facility]                    

 

                          Acetaminophen                            650 mg, 2 tab, Route: PO, Drug form: TAB,

 Q4H, Dosing Weight 111.364, kg, PRN Pain 1-3/Temp > 100.4 F, Start date: 
18 21:59:00 CDT, Duration: 30 day, Stop date: 18 21:58:00 CDTNotes: 
Do not exceed 4 gm/day.  (Same as: Tylenol)                                        

                          No Longer Active                                                     

                                                2018                            [facility]      

              

 

                          Vancomycin                            2,250 mg, Route: IVPB, ONCE, Dosing Weight 111.364,

 kg, Priority: STAT, Start date: 18 20:38:00 CDT, Stop date: 18 
20:38:00 CDT, ABX Indication: ED - Suspected SepsisNotes: TIME CRITICAL MED
ICATION (Same As: Vancocin)  Infusion rate  2001 mg: infuse over 2.5 hours  For 
adult patients only: Round to nearest 250 mg per Medical Staff approval   *** 
MEDICATION WASTE *** Product Size:  1000 mg Product Wasted:  _750_ mg           
                                                Inactive                                   

                                                 2018                            

[facility]                    

 

                          Zosyn                            3.375 gm, Route: IVPB, ONCE, Dosing Weight 111.364,

 kg, Priority: STAT, Start date: 18 20:38:00 CDT, Stop date: 18 
20:38:00 CDT, ABX Indication: ED - Suspected SepsisNotes: (Same as: Zosyn) 
Dosing based on Piperacillin component   *** MEDICATION WASTE *** Product Size: 
3375 mg Product Wasted:  ___ mg                                                    

                    Inactive                                                                           

                          2018                            [facility]                    

 

                                        Acetaminophen 325 MG / Hydrocodone Bitartrate 10 MG Oral Tablet [Norco 10/325]  

                                        1 tab, PO, Q6H, PRN Pain Score 4-6, 0 Refill(s)          

                                                      No Longer Active                       

                                                                            2018                

                                        [facility]                    

 

                          LORazepam 0.5 mg oral tablet                            1 mg=2 tab, PO, Bedtime, 0

 Refill(s)                                                        No Longer Active   

                                                                                 2018

                                        [facility]                    

 

                                        Acetaminophen 300 MG / Codeine Phosphate 30 MG Oral Tablet [Tylenol with Codeine

 #3]                                    1 - 2 tab, PO, Q4H, PRN Pain, X 4 day, # 36 tab, 0 

Refill(s), other                                                        Active       

                                                                             2018

                                        [facility]                    

 

                          insulin glargine                            70 unit, 0.7 mL, Route: SUB-Q, Drug form:

 SOLN, Daily, Start date: 18 9:00:00 CDT, Duration: 30 day, Stop date: 
18 9:00:00 CDTNotes: (Same as: Lantus) Do not hold insulin without 
contacting prescriber  WASTE: F/P - Black; E - [facility]  "single 
patient use only"                                                        Inactive    

                                                                                2018

                                        [facility]                    

 

                                        Insulin Glargine 100 UNT/ML Injectable Solution [Lantus]                        

                                        Route: SUB-Q, Drug form: SOLN, Daily, Dosing Weight 89.091, kg, Start date: 18

 9:00:00 CDT, Duration: 30 day, Stop date: 18 9:00:00 CDT                 
                                                No Longer Active                                

                                                      2018                       

                                        [facility]                    

 

                                        Insulin Glargine 100 UNT/ML Injectable Solution [Lantus]                        

                                        Route: SUB-Q, Drug form: SOLN, Bedtime, Dosing Weight 89.091, kg, Start date: 18

 21:00:00 CDT, Duration: 30 day, Stop date: 18 21:00:00 CDT               
                                                Inactive                                      

                                                2018                            [facility]                    

 

                          insulin glargine                            40 unit, 0.4 mL, Route: SUB-Q, Drug form:

 SOLN, Bedtime, Start date: 18 21:00:00 CDT, Duration: 30 day, Stop date: 
18 21:00:00 CDTNotes: (Same as: Lantus) Do not hold insulin without cont
acting prescriber  WASTE: F/P - Black; E - [facility]  "single patient 
use only"                                                        No Longer Active    

                                                                                2018

                                        [facility]                    

 

                          Insulin Lispro                            10 unit, 0.1 mL, Route: SUB-Q, Drug form:

 SOLN, ONCE, Dosing Weight 89.091, kg, Start date: 18 16:30:00 CDT, Stop 
date: 18 16:30:00 CDTNotes: (Same as: Humalog ) Roll in palms of hands ge
ntly;  Do not shake `vigorously. "Single Patient Use Only "     WASTE: F/P - 
Black; E - [facility]  Stable for 28 days at room temperature. Expires 
in _____ days from ______________Date                                              

                    Inactive                                                                     

                          2018                            [facility]                    



 

                                        isosorbide mononitrate 60 mg oral tablet, extended release                      

                                        60 mg=1 tab, PO, QAM, # 30 tab, 0 Refill(s), Pharmacy: [facility]

                                                        On Hold                      

                                                                            2018               

                                        [facility]                    

 

                          Humalog                            5 unit, 0.05 mL, Route: SUB-Q, Drug form: SOLN,

 ONCE, Start date: 18 12:55:00 CDT, Stop date: 18 12:55:00 CDTNotes:
 Roll in palms of hands gently;  Do not shake `vigorously. (Same as: Humalog )  
"Single Patient Use Only "  WASTE: F/P - Black; E - [facility]  Stable 
for 28 days at room temperature. Expires in _____ days from ______________Date  
                                                      Inactive                       

                                                                            2018                

                                        [facility]                    

 

                          Insulin, Aspart, Human                            5 unit, Route: SUB-Q, ONCE, Dosing

 Weight 89.091, kg, Start date: 18 12:53:00 CDT, Stop date: 18 
12:53:00 CDT                                                        Inactive         

                                                                            2018  

                                         [facility]                    

 

                          torsemide                            20 mg, 2 tab, Route: PO, Drug form: TAB, Daily,

 Dosing Weight 89.091, kg, Start date: 18 9:00:00 CDT, Duration: 30 day, 
Stop date: 18 9:00:00 CDTNotes: (Same As: Demadex)                        
                                                No Longer Active                                       

                                                2018                             [facility]

                    

 

                          K-Dur 20                            40 mEq, 2 tab, Route: PO, Drug form: ERTAB, ONCE,

 Dosing Weight 89.091, kg, Priority: NOW, Start date: 18 18:04:00 CDT, 
Stop date: 18 18:04:00 CDTNotes: (Same as: K-Dur 20) "Do Not Crush" For 
patients unable to swallow tablet, dissolve in one half glass of water. Allow 
about 2 minutes for the tablets to disintegrate. Stir before giving to prepare 
slurry and administer. Please exclude Patients with feeding tube less than 14 
French (Dobhoff, J-tube etc) and pediatric and  patients.  With food and
 full glass of water                                                        Inactive 

                                                                                   2018

                                         [facility]                    

 

                          Potassium Chloride                            40 mEq, 2 tab, Route: PO, Drug form:

 ERTAB, Q4H, Dosing Weight 89.091, kg, Priority: NOW, Start date: 18 
11:59:00 CDT, Duration: 2 doses or times, Stop date: 18 12:00:00 CDTNotes:
 (Same as: K-Dur 20) "Do Not Crush" For patients unable to swallow tablet, 
dissolve in one half glass of water. Allow about 2 minutes for the tablets to 
disintegrate. Stir before giving to prepare slurry and administer. Please 
exclude Patients with feeding tube less than 14 French (Dobhoff, J-tube etc) 
and pediatric and  patients.  With food and full glass of water         
                                                Inactive                                

                                                      2018                       

                                         [facility]                    

 

                          Xarelto                            20 mg, 1 tab, Route: PO, Drug form: TAB, QPM, Dosing

 Weight 89.091, kg, Start date: 18 17:00:00 CDT, Duration: 30 day, Stop 
date: 18 17:00:00 CDTNotes: (Same as: Xarelto) Administer with food       
                                                      No Longer Active                    

                                                                            2018             

                                        [facility]                    

 

                          Lisinopril                            5 mg, 1 tab, Route: PO, Drug form: TAB, Daily,

 Dosing Weight 89.091, kg, Start date: 18 9:00:00 CDT, Duration: 30 day, 
Stop date: 18 9:00:00 CDTNotes: (Same as: Prinivil, Zestril)              
                                                No Longer Active                             

                                                       2018                    

                                        [facility]                    

 

                          Potassium Chloride                            20 mEq, 1 tab, Route: PO, Drug form:

 ERTAB, Daily, Dosing Weight 89.091, kg, Start date: 18 9:00:00 CDT, 
Duration: 30 day, Stop date: 18 9:00:00 CDTNotes: (Same as: K-Dur 20) "Do 
Not Crush" For patients unable to swallow tablet, dissolve in one half glass of 
water. Allow about 2 minutes for the tablets to disintegrate. Stir before giving
 to prepare slurry and administer. Please exclude Patients with feeding tube 
less than 14 French (Dobhoff, J-tube etc) and pediatric and  patients.  
With food and full glass of water                                                  

                    No Longer Active                                                                 

                          2018                            [facility]                

    

 

                          Amiodarone                            200 mg, 1 tab, Route: PO, Drug form: TAB, Daily,

 Dosing Weight 89.091, kg, Start date: 18 9:00:00 CDT, Duration: 30 day, 
Stop date: 18 9:00:00 CDTNotes: (Same as: Cordarone)                      
                                                No Longer Active                                     

                                                2018                            [facility]                    

 

                          Mag-Ox 400                            400 mg, 1 tab, Route: PO, Drug form: TAB, Daily,

 Dosing Weight 89.091, kg, Start date: 18 9:00:00 CDT, Duration: 30 day, 
Stop date: 18 9:00:00 CDTNotes: (Same as: Mag-Ox 400) Magnesium oxide 
[id number] elemental magnesium Dose=____mg magnesium oxide (___mg elemental 
magnesium)                                                        No Longer Active   

                                                                                 2018

                                        [facility]                    

 

                          pantoprazole                            40 mg, 1 tab, Route: PO, Drug form: ECTAB,

 Daily, Dosing Weight 89.091, kg, Start date: 18 9:00:00 CDT, Duration: 30
 day, Stop date: 18 9:00:00 CDTNotes: Tablet should not be chewed or cr
ushed. (Same as: Protonix)                                                        No 

Longer Active                                                                        

                          2018                            [facility]                    

 

                          Imdur                            30 mg, Route: PO, Drug form: ERTAB, QAM, Dosing Weight

 89.091, kg, Start date: 18 9:00:00 CDT, Duration: 30 day, Stop date: 
18 9:00:00 CDT                                                        No Longer

 Active                                                                              

                          2018                            [facility]                    

 

                          torsemide                            20 mg, Route: PO, Drug form: TAB, Daily, Dosing

 Weight 89.091, kg, Start date: 18 9:00:00 CDT, Duration: 30 day, Stop 
date: 18 9:00:00 CDT                                                        No 

Longer Active                                                                        

                          2018                            [facility]                    

 

                          tamsulosin                            0.4 mg, 1 cap, Route: PO, Drug form: CAP, Daily,

 Dosing Weight 89.091, kg, Start date: 18 9:00:00 CDT, Duration: 30 day, 
Stop date: 18 9:00:00 CDTNotes: (Same As: Flomax)  "Do Not Crush"         
                                                      No Longer Active                      

                                                                            2018               

                                        [facility]                    

 

                          Sertraline                            100 mg, 1 tab, Route: PO, Drug form: TAB, Daily,

 Dosing Weight 89.091, kg, Start date: 18 9:00:00 CDT, Duration: 30 day, 
Stop date: 18 9:00:00 CDTNotes: (Same as: Zoloft)                            

                            No Longer Active                                         

                                                2018                            [facility]

                    

 

                          Synthroid                            50 microgram, 1 tab, Route: PO, Drug form: TAB,

 Q630AM, Dosing Weight 89.091, kg, Start date: 18 6:30:00 CDT, Duration: 
30 day, Stop date: 18 6:30:00 CDTNotes: Take 1 hour before or 2 hours 
after meal; Enteral feeds may interefere with the absorption of this 
medication.(Same as:Levothroid, Synthroid)                                         

                    No Longer Active                                                        

                            2018                            [facility]       

             

 

                          atorvastatin                            5 mg, Route: PO, Drug form: TAB, Bedtime, 

Dosing Weight 89.091, kg, Start date: 18 21:00:00 CDT, Duration: 30 day, 
Stop date: 18 21:00:00 CDT                                                   

                    Inactive                                                                          

                          2018                            [facility]                    

 

                          niacin 1000 mg oral tablet, extended release                            2,000 mg, 

4 tab, Route: PO, Drug form: ERTAB, Bedtime, Dosing Weight 89.091, kg, Start 
date: 18 21:00:00 CDT, Duration: 30 day, Stop date: 18 21:00:00 
CDTNotes: (Same as: Niaspan) "Do Not Crush"  Non-Formulary Item  With food.     
                                                      No Longer Active                  

                                                                            2018           

                                        [facility]                    

 

                          Xarelto                            20 mg, 1 tab, Route: PO, Drug form: TAB, QPM, Dosing

 Weight 89.091, kg, Start date: 18 17:00:00 CDT, Duration: 30 day, Stop 
date: 18 17:00:00 CDTNotes: (Same as: Xarelto) Administer with food       
                                                 Inactive                              

                                                      2018                     

                                        [facility]                    

 

                          gabapentin 600 MG Oral Tablet                            600 mg, 2 cap, Route: PO,

 Drug form: CAP, TID, Dosing Weight 89.091, kg, Start date: 18 17:00:00 
CDT, Duration: 30 day, Stop date: 18 13:00:00 CDTNotes: (Same as: 
Neurontin)                                                        No Longer Active   

                                                                                 2018

                                        [facility]                    

 

                          ranolazine                            500 mg, 1 tab, Route: PO, Drug form: TAB, BID,

 Dosing Weight 89.091, kg, Start date: 18 17:00:00 CDT, Duration: 30 day, 
Stop date: 18 9:00:00 CDTNotes: Same as Ranexa "Do Not Crush"             
                                                No Longer Active                            

                                                        2018                   

                                        [facility]                    

 

                                        12 HR ranolazine 500 MG Extended Release Tablet [Ranexa]                        

                                        500 mg, 1 tab, Route: PO, Drug form: TAB, BID, Dosing Weight 89.091, kg, Start 

date: 18 17:00:00 CDT, Duration: 30 day, Stop date: 18 9:00:00 CDT  
                                                      Inactive                       

                                                                            2018                

                                        [facility]                    

 

                          lidocaine topical 5% ointment                            1 appl, Route: TOP, TID, 

Drug form: OINT, Start date: 18 17:00:00 CDT, Duration: 30 day, Stop date:
 18 13:00:00 CDTNotes: (Same as: Xylocaine)                                  

                          No Longer Active                                               

                                                2018                            [facility]

                    

 

                          Bumex                            2 mg, 8 mL, Route: IVP, Drug form: INJ, BID, Dosing

 Weight 89.091, kg, Start date: 18 17:00:00 CDT, Duration: 30 day, Stop 
date: 18 9:00:00 CDTNotes: (Same As: Bumex)                                  

                          No Longer Active                                               

                                                2018                            [facility]

                    

 

                          Famotidine                            20 mg, 1 tab, Route: PO, Drug form: TAB, Q12H,

 Dosing Weight 89.091, kg, PRN Heartburn, Start date: 18 13:11:00 CDT, 
Duration: 30 day, Stop date: 18 13:10:00 CDTNotes: (Same as: Pepcid)      
                                                      No Longer Active                   

                                                                            2018            

                                        [facility]                    

 

                          Tylenol                            650 mg, 2 tab, Route: PO, Drug form: TAB, Q6H, 

Dosing Weight 89.091, kg, PRN Pain Score 1-5, Start date: 18 13:11:00 CDT,
 Duration: 30 day, Stop date: 18 13:10:00 CDTNotes: Do not exceed 4 
gm/day.  (Same as: Tylenol)                                                        No

 Longer Active                                                                       

                          2018                            [facility]                    

 

                                        Acetaminophen 325 MG / Hydrocodone Bitartrate 5 MG Oral Tablet [Norco 5/325]    

                                        1 tab, Route: PO, Drug Form: TAB, Dosing Weight 89.091, kg,

 Q4H, PRN Pain Score 6-10, Start date: 18 13:11:00 CDT, Duration: 30 day, 
Stop date: 18 13:10:00 CDTNotes: (Same as: Norco 325/5)  Do not exceed 
4gm/day of acetaminophen.                                                        No Longer

 Active                                                                              

                          2018                            [facility]                    

 

                          Trazodone Hydrochloride 50 MG Oral Tablet                            50 mg, 1 tab,

 Route: PO, Drug form: TAB, Bedtime, Dosing Weight 89.091, kg, PRN Sleep, Start 
date: 18 13:11:00 CDT, Duration: 30 day, Stop date: 18 13:10:00 
CDTNotes: (Same As: Desyrel)                                                        No

 Longer Active                                                                       

                          2018                            [facility]                    

 

                          Nitroglycerin 0.4 MG Sublingual Tablet [Nitrostat]                            0.4 

mg, 1 tab, Route: SL, Drug form: TAB, Q5Min, Dosing Weight 89.091, kg, PRN Chest
 Pain, Start date: 18 10:15:00 CDT, Duration: 3 doses or times, Stop date:
 Limited # of timesNotes: (Same as:Nitroquick, Nitrostat) "Do Not Crush"  
Sublingual tablet                                                        No Longer Active

                                                                                    2018

                                        [facility]                    

 

                          Imdur                            60 mg, 2 tab, Route: PO, Drug form: ERTAB, QAM, Dosing

 Weight 89.091, kg, Priority: NOW, Start date: 18 10:15:00 CDT, Duration: 
30 day, Stop date: 18 9:00:00 CDTNotes: (Same as:Imdur) "Do Not Crush"  
Take on empty stomach/ full glass of water.  Do not crush                       
                                                No Longer Active                                      

                                                2018                            [facility]                    

 

                          Calcium Gluconate                            2 gm, 20 mL, Route: IVPB, PRN, Dosing

 Weight 89.091, kg, PRN Abnormal Lab Result, For NON-ICU Patients Only., Start 
date: 18 10:10:00 CDT, Duration: 30 day, Stop date: 18 10:09:00 
CDTNotes: WASTE: F/P - Sink; E - Municipal Trash Bin                               

                          No Longer Active                                            

                                                2018                            [facility]

                    

 

                          Magnesium Sulfate                            1 gm, 100 mL, Route: IVPB, Drug form:

 INJ, PRN, Dosing Weight 89.091, kg, PRN Abnormal Lab Result, For NON-ICU 
Patients Only., Start date: 18 10:10:00 CDT, Duration: 30 day, Stop date: 
18 10:09:00 CDTNotes: WASTE: F/P - Sink; E - Municipal Trash Bin          
                                                      No Longer Active                       

                                                                            2018                

                                        [facility]                    

 

                          sodium phosphate                            30 mmol, 10 mL, Route: IVPB, PRN, Dosing

 Weight 89.091, kg, PRN Abnormal Lab Result, For NON-ICU Patients Only., Start 
date: 18 10:10:00 CDT, Duration: 30 day, Stop date: 18 10:09:00 CDT 
                                                       No Longer Active              

                                                                            2018       

                                        [facility]                    

 

                          Magnesium Oxide                            800 mg, 2 tab, Route: PO, Drug form: TAB,

 PRN, Dosing Weight 89.091, kg, PRN Abnormal Lab Result, For NON-ICU Patients 
Only., Start date: 18 10:10:00 CDT, Duration: 30 day, Stop date: 18 
10:09:00 CDTNotes: (Same as: Mag-Ox 400) Magnesium oxide [id number] elemental 
magnesium Dose=____mg magnesium oxide (___mg elemental magnesium)               
                                                No Longer Active                              

                                                      2018                     

                                        [facility]                    

 

                          Potassium Chloride                            20 mEq, 1 tab, Route: PO, Drug form:

 ERTAB, PRN, Dosing Weight 89.091, kg, PRN Abnormal Lab Result, For NON-ICU 
Patients Only, Start date: 18 10:10:00 CDT, Duration: 30 day, Stop date: 
18 10:09:00 CDTNotes: (Same as: K-Dur 20) "Do Not Crush" For patients 
unable to swallow tablet, dissolve in one half glass of water. Allow about 2 
minutes for the tablets to disintegrate. Stir before giving to prepare slurry 
and administer. Please exclude Patients with feeding tube less than 14 French 
(Dobhoff, J-tube etc) and pediatric and  patients.  With food and full 
glass of water                                                        No Longer Active

                                                                                    2018

                                        [facility]                    

 

                          potassium phosphate                            30 mmol, 10 mL, Route: IVPB, PRN, Dosing

 Weight 89.091, kg, PRN Abnormal Lab Result, For NON-ICU Patients Only., Start 
date: 18 10:10:00 CDT, Duration: 30 day, Stop date: 18 10:09:00 CDTN
otes: (Same as: K Phosphate.)  1 mMol phoshate has 1.47 mEq potassium  Infuse 
over 4 hours                                                        No Longer Active 

                                                                                   2018

                                        [facility]                    

 

                                        potassium phosphate-sodium phosphate 250 mg-280 mg-160 mg oral powder for reconstitution

                                        2 pkt, Route: PO, Drug Form: PDR/REC, Dosing Weight 89.091,

 kg, PRN, PRN Abnormal Lab Result, For NON-ICU Patients Only, Start date: 
18 10:10:00 CDT, Duration: 30 day, Stop date: 18 10:09:00 CDTNotes: 
(Same as: Phos-NaK)  Each 1.5 gm pkt has 250mg phosphorous. Mix w/2.5oz water 
and stir.                                                        No Longer Active    

                                                                                2018

                                        [facility]                    

 

                          Saline Flush 0.9%                            10 ml, Route: IVP, Drug Form: INJ, Dosing

 Weight 89.091, kg, Q12H, Start date: 18 9:00:00 CDT, Duration: 30 day, 
Stop date: 18 21:00:00 CDTNotes: (Same as: BD Posiflush)                  
                                                No Longer Active                                 

                                                      2018                        

                                        [facility]                    

 

                          Aspirin 81 MG Chewable Tablet                            81 mg, 1 tab, Route: PO, 

Drug form: CHEWTAB, Daily, Dosing Weight 89.091, kg, Start date: 18 
9:00:00 CDT, Duration: 30 day, Stop date: 18 9:00:00 CDTNotes: Take with 
food.                                                        No Longer Active        

                                                                            2018 

                                        [facility]                    

 

                          Insulin Lispro                            10 unit, 0.1 mL, Route: SUB-Q, Drug form:

 SOLN, TID-Before Meals, Dosing Weight 89.091, kg, PRN Blood Glucose Results, 
Start date: 18 8:40:00 CDT, Duration: 30 day, Stop date: 18 8:39:00 
CDTNotes: (Same as: Humalog ) Roll in palms of hands gently;  Do not shake 
`vigorously. "Single Patient Use Only "     WASTE: F/P - Black; E - Municipal 
Trash Bin  Stable for 28 days at room temperature. Expires in _____ days from 
______________Date                                                        No Longer Active

                                                                                    2018

                                        [facility]                    

 

                          Dextrose 50% Syringe                            25 gm, 50 mL, Route: IVP, Drug Form:

 INJ, Dosing Weight 89.091, kg, PRN, PRN Blood Glucose Results, Start date: 
18 8:40:00 CDT, Duration: 30 day, Stop date: 18 8:39:00 CDT         
                                                      No Longer Active                      

                                                                            2018               

                                        [facility]                    

 

                          Glucagon                            1 mg, Route: IM, Drug form: PDR/INJ, PRN, Dosing

 Weight 89.091, kg, PRN Blood Glucose Results, Start date: 18 8:40:00 CDT,
 Duration: 30 day, Stop date: 18 8:39:00 CDT                                 

                       Inactive                                                        

                            2018                            [facility]       

             

 

                          Saline Flush 0.9%                            10 ml, Route: IVP, Drug Form: INJ, Dosing

 Weight 89.091, kg, PRN, PRN Line Flush, Start date: 18 5:13:00 CDT, 
Duration: 30 day, Stop date: 18 5:12:00 CDTNotes: (Same as: BD Posiflush) 
                                                       No Longer Active              

                                                                            2018       

                                        [facility]                    

 

                          Fentanyl                            50 microgram, Route: IV, ONCE, Dosing Weight 104.545,

 kg, Start date: 18 3:00:00 CDT, Stop date: 18 3:00:00 CDT          
                                                Inactive                                 

                                                      2018                        

                                        [facility]                    

 

                          Aspirin                            81 mg, Route: PO, Drug form: ECTAB, ONCE, Dosing

 Weight 104.545, kg, Priority: STAT, Start date: 18 2:43:00 CDT, Stop 
date: 18 2:43:00 CDT                                                        Inactive

                                                                                    2018

                                        [facility]                    

 

                                        Albuterol 0.833 MG/ML / Ipratropium Bromide 0.167 MG/ML Inhalant Solution       

                                        3 mL, Route: NEB, Drug Form: SOLN, Dosing Weight 104.545, kg, 

ONCE, STAT, Start date: 18 23:58:00 CDT, Stop date: 18 23:58:00 CDT 
                                                       No Longer Active              

                                                                            2018       

                                        [facility]                    

 

                          Fentanyl                            50 microgram, Route: IVP, ONCE, Dosing Weight 

104.545, kg, Priority: STAT, Start date: 18 23:57:00 CDT, Stop date: 
18 23:57:00 CDT                                                        No Longer

 Active                                                                              

                          2018                            [facility]                    

 

                          Insulin Lispro                            4 unit, 0.04 mL, Route: SUB-Q, Drug form:

 SOLN, Bedtime, Dosing Weight 113.636, kg, PRN Blood Glucose Results, Start 
date: 04/15/18 14:43:00 CDT, Duration: 30 day, Stop date: 05/15/18 14:42:00 
CDTNotes: (Same as: Humalog ) Roll in palms of hands gently;  Do not shake 
`vigorously. "Single Patient Use Only "     WASTE: F/P - Black; E - [facility] 
Trash Bin  Stable for 28 days at room temperature. Expires in _____ days from 
______________Date                                                        No Longer Active

                                                                                    04/15/2018

                                        [facility]                    

 

                          Dextrose 50% Syringe                            12.5 gm, 25 mL, Route: IVP, Drug Form:

 INJ, Dosing Weight 113.636, kg, PRN, PRN Blood Glucose Results, Start date: 
04/15/18 14:43:00 CDT, Duration: 30 day, Stop date: 05/15/18 14:42:00 CDT       
                                                      No Longer Active                    

                                                                            04/15/2018             

                                        [facility]                    

 

                          Glucagon                            1 mg, Route: IM, Drug form: PDR/INJ, PRN, Dosing

 Weight 113.636, kg, PRN Blood Glucose Results, Start date: 04/15/18 14:43:00 
CDT, Duration: 30 day, Stop date: 05/15/18 14:42:00 CDT                            

                            No Longer Active                                         

                                                04/15/2018                            [facility]

                    

 

                          magnesium citrate 58.2 MG/ML Oral Solution                            300 ml, Route:

 PO, Drug Form: LIQ, Dosing Weight 113.636, kg, ONCE, Start date: 04/15/18 
11:31:00 CDT, Stop date: 04/15/18 11:31:00 CDTNotes: (Same as: Citrate of 
Magnesia) Concentration: 1.745 gm / 30 mL                                          

                    Inactive                                                                 

                          04/15/2018                            [facility]                

    

 

                          Dulcolax Laxative                            10 mg, 1 supp, Route: TX, Drug form: 

SUPP, Daily, Dosing Weight 113.636, kg, PRN Constipation, Start date: 04/15/18 
11:31:00 CDT, Duration: 30 day, Stop date: 05/15/18 11:30:00 CDTNotes: (Same As:
 Dulcolax, Bisco-Lax)                                                        No Longer

 Active                                                                              

                          04/15/2018                            [facility]                    

 

                          Lorazepam                            0.5 mg, 1 tab, Route: PO, Drug form: TAB, Q12H,

 Dosing Weight 113.636, kg, PRN as needed for anxiety, Start date: 18 
22:11:00 CDT, Duration: 30 day, Stop date: 18 22:10:00 CDTNotes: (Same as:
 Ativan)                                                        No Longer Active     

                                                                               04/15/2018

                                        [facility]                    

 

                                        3 ML Insulin Glargine 100 UNT/ML Prefilled Syringe [Lantus]                     

                                        17 unit, 0.17 mL, Route: SUB-Q, Drug form: SOLN, Bedtime, Dosing Weight 113.636,

 kg, Start date: 18 21:00:00 CDT, Duration: 30 day, Stop date: 18 
21:00:00 CDTNotes: (Same as: Lantus) Do not hold insulin without contacting 
prescriber  WASTE: F/P - Black; E - [facility] Trash Bin  "single patient use 
only"                                                        No Longer Active        

                                                                            04/15/2018 

                                        [facility]                    

 

                          niacin 1000 mg oral tablet, extended release                            2,000 mg, 

8 cap, Route: PO, Drug form: ERCAP, Bedtime, Dosing Weight 113.636, kg, Start 
date: 18 21:00:00 CDT, Duration: 30 day, Stop date: 18 21:00:00 
CDTNotes: With food.                                                        No Longer

 Active                                                                              

                          04/15/2018                            [facility]                    

 

                          atorvastatin                            5 mg, 0.5 tab, Route: PO, Drug form: TAB, 

Bedtime, Dosing Weight 113.636, kg, Start date: 18 21:00:00 CDT, Duration:
 30 day, Stop date: 18 21:00:00 CDTNotes: (Same As: Lipitor)              
                                                No Longer Active                             

                                                       04/15/2018                    

                                        [facility]                    

 

                          Xarelto                            20 mg, 1 tab, Route: PO, Drug form: TAB, QPM, Dosing

 Weight 113.636, kg, Start date: 18 17:00:00 CDT, Duration: 30 day, Stop 
date: 18 17:00:00 CDTNotes: (Same as: Xarelto) Administer with food       
                                                      No Longer Active                    

                                                                            2018             

                                        [facility]                    

 

                                        Insulin Glargine 100 UNT/ML Injectable Solution [Lantus]                        

                          70 units, SUB-Q, Daily, 0 Refill(s)                                              

                    Active                                                                       

                          2018                            [facility]                    

 

                          torsemide 20 mg oral tablet                            20 mg=1 tab, PO, Daily, 0 Refill(s)

                                                        Active                       

                                                                            2018                

                                        [facility]                    

 

                          Potassium Chloride                            20 mEq, PO, Daily, 0 Refill(s)      

                                                  Active                               

                                                      2018                      

                                        [facility]                    

 

                          Amiodarone                            200 mg, 1 tab, Route: PO, Drug form: TAB, Daily,

 Dosing Weight 113.636, kg, Start date: 18 9:00:00 CDT, Duration: 30 day, 
Stop date: 18 9:00:00 CDTNotes: (Same as: Cordarone)                      
                                                No Longer Active                                     

                                                2018                            [facility]                    

 

                          Aspirin 81 MG Enteric Coated Tablet                            81 mg, 1 tab, Route:

 PO, Drug form: ECTAB, Daily, Dosing Weight 113.636, kg, Start date: 18 
9:00:00 CDT, Duration: 30 day, Stop date: 18 9:00:00 CDTNotes: Do not 
crush or chew. (Same As: Ecotrin)                                                  

                    No Longer Active                                                                 

                          2018                            [facility]                

    

 

                          Saline Flush 0.9%                            10 ml, Route: IVP, Drug Form: INJ, Dosing

 Weight 113.636, kg, Q12H, Start date: 18 9:00:00 CDT, Duration: 30 day, 
Stop date: 18 21:00:00 CDTNotes: (Same as: BD Posiflush)                  
                                                No Longer Active                                 

                                                      2018                        

                                        [facility]                    

 

                          Mag-Ox 400                            400 mg, 1 tab, Route: PO, Drug form: TAB, Daily,

 Dosing Weight 113.636, kg, Start date: 18 9:00:00 CDT, Duration: 30 day, 
Stop date: 18 9:00:00 CDTNotes: (Same as: Mag-Ox 400) Magnesium oxide 
610wt=014pl elemental magnesium Dose=____mg magnesium oxide (___mg elemental 
magnesium)                                                        No Longer Active   

                                                                                 2018

                                        [facility]                    

 

                          Potassium Chloride                            20 mEq, 1 tab, Route: PO, Drug form:

 ERTAB, BID, Dosing Weight 113.636, kg, Start date: 18 9:00:00 CDT, 
Duration: 30 day, Stop date: 18 17:00:00 CDTNotes: (Same as: K-Dur 20) "Do
 Not Crush" For patients unable to swallow tablet, dissolve in one half glass of
 water. Allow about 2 minutes for the tablets to disintegrate. Stir before 
giving to prepare slurry and administer. Please exclude Patients with feeding 
tube less than 14 French (Dobhoff, J-tube etc) and pediatric and  
patients.  With food and full glass of water                                       

                          No Longer Active                                                    

                                                2018                            [facility]     

               

 

                          Lisinopril                            2.5 mg, 0.5 tab, Route: PO, Drug form: TAB, 

Daily, Dosing Weight 113.636, kg, Start date: 18 9:00:00 CDT, Duration: 30
 day, Stop date: 18 9:00:00 CDTNotes: (Same as: Prinivil, Zestril)        
                                                      No Longer Active                     

                                                                            2018              

                                        [facility]                    

 

                          tamsulosin                            0.4 mg, 1 cap, Route: PO, Drug form: CAP, Daily,

 Dosing Weight 113.636, kg, Start date: 18 9:00:00 CDT, Duration: 30 day, 
Stop date: 18 9:00:00 CDTNotes: (Same As: Flomax)  "Do Not Crush"         
                                                      No Longer Active                      

                                                                            2018               

                                        [facility]                    

 

                          Sertraline                            100 mg, 1 tab, Route: PO, Drug form: TAB, Daily,

 Dosing Weight 113.636, kg, Start date: 18 9:00:00 CDT, Duration: 30 day, 
Stop date: 18 9:00:00 CDTNotes: (Same as: Zoloft)                            

                            No Longer Active                                         

                                                2018                            [facility]

                    

 

                                        Insulin Glargine 100 UNT/ML Injectable Solution [Lantus]                        

                                        17 unit, 0.17 mL, Route: SUB-Q, Drug form: SOLN, Daily, Dosing Weight 113.636, 

kg, Start date: 18 9:00:00 CDT, Duration: 30 day, Stop date: 18 
9:00:00 CDTNotes: (Same as: Lantus) Do not hold insulin without contacting 
prescriber  WASTE: F/P - Black; E - [facility] Trash Bin  "single patient use 
only"                                                        No Longer Active        

                                                                            2018 

                                        [facility]                    

 

                                        12 HR ranolazine 500 MG Extended Release Tablet [Ranexa]                        

                                        500 mg, 1 tab, Route: PO, Drug form: TAB, BID, Dosing Weight 113.636, kg, Start

 date: 18 9:00:00 CDT, Duration: 30 day, Stop date: 18 17:00:00 
CDTNotes: Same as Ranexa "Do Not Crush"                                            

                    No Longer Active                                                           

                          2018                            [facility]          

          

 

                          torsemide                            20 mg, 1 tab, Route: PO, Drug form: TAB, BID 

Diuretic, Dosing Weight 113.636, kg, Start date: 18 8:00:00 CDT, Duration:
 30 day, Stop date: 18 16:00:00 CDTNotes: (Same As: Demadex)              
                                                No Longer Active                             

                                                       2018                    

                                        [facility]                    

 

                          pantoprazole                            40 mg, 1 tab, Route: PO, Drug form: ECTAB,

 Before Breakfast, Dosing Weight 113.636, kg, Start date: 18 7:30:00 CDT, 
Duration: 30 day, Stop date: 18 7:30:00 CDTNotes: Tablet should not be 
chewed or crushed. (Same as: Protonix)                                             

                    No Longer Active                                                            

                          2018                            [facility]           

         

 

                          Synthroid                            50 microgram, 1 tab, Route: PO, Drug form: TAB,

 Q630AM, Dosing Weight 113.636, kg, Start date: 18 6:30:00 CDT, Duration: 
30 day, Stop date: 18 6:30:00 CDTNotes: Take 1 hour before or 2 hours 
after meal; Enteral feeds may interefere with the absorption of this 
medication.(Same as:Levothroid, Synthroid)                                         

                    No Longer Active                                                        

                            2018                            [facility]       

             

 

                          Imdur                            30 mg, 1 tab, Route: PO, Drug form: ERTAB, Q630AM,

 Dosing Weight 113.636, kg, Start date: 18 6:30:00 CDT, Duration: 30 day, 
Stop date: 18 6:30:00 CDTNotes: (Same as:Imdur) "Do Not Crush"  Take on 
empty stomach/ full glass of water.  Do not crush                                  

                          No Longer Active                                               

                                                2018                            [facility]

                    

 

                          gabapentin 600 MG Oral Tablet                            600 mg, 2 cap, Route: PO,

 Drug form: CAP, Q8H, Dosing Weight 113.636, kg, Start date: 18 0:00:00 
CDT, Duration: 30 day, Stop date: 18 16:00:00 CDTNotes: (Same as: 
Neurontin)                                                        No Longer Active   

                                                                                 2018

                                        [facility]                    

 

                          Famotidine                            20 mg, 1 tab, Route: PO, Drug form: TAB, Q12H,

 Dosing Weight 113.636, kg, PRN Heartburn, Start date: 18 23:37:00 CDT, 
Duration: 30 day, Stop date: 18 23:36:00 CDTNotes: (Same as: Pepcid)      
                                                      No Longer Active                   

                                                                            2018            

                                        [facility]                    

 

                          Tylenol                            650 mg, 2 tab, Route: PO, Drug form: TAB, Q6H, 

Dosing Weight 113.636, kg, PRN Pain Score 1-5, Start date: 18 23:37:00 
CDT, Duration: 30 day, Stop date: 18 23:36:00 CDTNotes: Do not exceed 4 
gm/day.  (Same as: Tylenol)                                                        No

 Longer Active                                                                       

                          2018                            [facility]                    

 

                          Saline Flush 0.9%                            10 ml, Route: IVP, Drug Form: INJ, Dosing

 Weight 113.636, kg, PRN, PRN Line Flush, Start date: 18 22:44:00 CDT, 
Duration: 30 day, Stop date: 18 22:43:00 CDTNotes: (Same as: BD Posiflush)
                                                        No Longer Active             

                                                                            2018      

                                        [facility]                    

 

                          Acetaminophen                            650 mg, 2 tab, Route: PO, Drug form: TAB,

 Q4H, Dosing Weight 113.636, kg, PRN Pain 1-3/Temp > 100.4 F, Start date: 
18 22:44:00 CDT, Duration: 30 day, Stop date: 18 22:43:00 CDTNotes: 
Do not exceed 4 gm/day.  (Same as: Tylenol)                                        

                          No Longer Active                                                     

                                                2018                            [facility]      

              

 

                          Aspirin                            325 mg, 1 tab, Route: PO, Drug form: TAB, ONCE,

 Dosing Weight 113.636, kg, Priority: STAT, Start date: 18 20:24:00 CDT, 
Stop date: 18 20:24:00 CDTNotes: Take with food.                             

                           Inactive                                                  

                                                2018                            [facility]   

                 

 

                          Saline Flush 0.9%                            10 mL, Route: IVP, Drug Form: INJ, Dosing

 Weight 113.636, kg, PRN, PRN Line Flush, Start date: 18 14:21:00 CDT, 
Duration: 30 day, Stop date: 18 14:20:00 CDTNotes: (Same as: BD Posiflush)
                                                        No Longer Active             

                                                                            2018      

                                        [facility]                    

 

                                        Insulin Glargine 100 UNT/ML Injectable Solution [Lantus]                        

                                        40 unit, 0.4 mL, Route: SUB-Q, Drug form: SOLN, Daily, Dosing Weight 93.182, kg,

 Priority: NOW, Start date: 18 18:42:00 CST, Duration: 30 day, Stop date: 
18 9:00:00 CDTNotes: (Same as: Lantus) Do not hold insulin without 
contacting prescriber  WASTE: F/P - Black; E - [facility] Trash [facility]  "single 
patient use only"                                                        No Longer Active

                                                                                    02/15/2018

                                        [facility]                    

 

                          Mag-Ox 400                            400 mg, 1 tab, Route: PO, Drug form: TAB, Daily,

 Dosing Weight 93.182, kg, Start date: 18 9:00:00 CST, Duration: 30 day, 
Stop date: 18 9:00:00 CDTNotes: (Same as: Mag-Ox 400) Magnesium oxide 
980mo=182gq elemental magnesium Dose=____mg magnesium oxide (___mg elemental 
magnesium)                                                        No Longer Active   

                                                                                 2018

                                        [facility]                    

 

                          Lisinopril                            2.5 mg, 0.5 tab, Route: PO, Drug form: TAB, 

Daily, Dosing Weight 93.182, kg, Start date: 18 9:00:00 CST, Duration: 30 
day, Stop date: 18 9:00:00 CDTNotes: (Same as: Prinivil, Zestril)         
                                                      No Longer Active                      

                                                                            2018               

                                        [facility]                    

 

                          Imdur                            30 mg, 1 tab, Route: PO, Drug form: ERTAB, QAM, Dosing

 Weight 93.182, kg, Start date: 18 9:00:00 CST, Duration: 30 day, Stop 
date: 18 9:00:00 CDTNotes: (Same as:Imdur) "Do Not Crush&quot;  Take on 
empty stomach/ full glass of water.  Do not crush                                  

                          No Longer Active                                               

                                                2018                            [facility]

                    

 

                          tamsulosin                            0.4 mg, 1 cap, Route: PO, Drug form: CAP, Daily,

 Dosing Weight 93.182, kg, Start date: 18 9:00:00 CST, Duration: 30 day, 
Stop date: 18 9:00:00 CDTNotes: (Same As: Flomax)  "Do Not Crush"         
                                                      No Longer Active                      

                                                                            2018               

                                        [facility]                    

 

                          Aspirin 81 MG Enteric Coated Tablet                            81 mg, 1 tab, Route:

 PO, Drug form: ECTAB, Daily, Dosing Weight 93.182, kg, Start date: 18 
9:00:00 CST, Duration: 30 day, Stop date: 18 9:00:00 CDTNotes: Do not 
crush or chew. (Same As: Ecotrin)                                                  

                    No Longer Active                                                                 

                          2018                            [facility]                

    

 

                          Sertraline                            100 mg, 1 tab, Route: PO, Drug form: TAB, Daily,

 Dosing Weight 93.182, kg, Start date: 18 9:00:00 CST, Duration: 30 day, 
Stop date: 18 9:00:00 CDTNotes: (Same as: Zoloft)                            

                            No Longer Active                                         

                                                2018                            [facility]

                    

 

                          Amiodarone                            200 mg, 1 tab, Route: PO, Drug form: TAB, Daily,

 Dosing Weight 93.182, kg, Start date: 18 9:00:00 CST, Duration: 30 day, 
Stop date: 18 9:00:00 CDTNotes: (Same as: Cordarone)                      
                                                No Longer Active                                     

                                                2018                            [facility]                    

 

                          pantoprazole                            40 mg, 1 tab, Route: PO, Drug form: ECTAB,

 Daily, Dosing Weight 93.182, kg, Start date: 18 9:00:00 CST, Duration: 30
 day, Stop date: 18 9:00:00 CDTNotes: Tablet should not be chewed or cr
ushed. (Same as: Protonix)                                                        No 

Longer Active                                                                        

                          2018                            [facility]                    

 

                          Synthroid                            50 microgram, 1 tab, Route: PO, Drug form: TAB,

 Q6AM, Dosing Weight 93.182, kg, Start date: 18 6:00:00 CST, Duration: 30 
day, Stop date: 18 6:00:00 CDTNotes: Take 1 hour before or 2 hours after 
meal; Enteral feeds may interefere with the absorption of this medication.(Same 
as:Levothroid, Synthroid)                                                        No Longer

 Active                                                                              

                          2018                            [facility]                    

 

                          niacin 1000 mg oral tablet, extended release                            2,000 mg, 

8 cap, Route: PO, Drug form: ERCAP, Bedtime, Dosing Weight 93.182, kg, Start 
date: 18 21:00:00 CST, Duration: 30 day, Stop date: 18 21:00:00 
CDTNotes: With food.                                                        No Longer

 Active                                                                              

                          2018                            [facility]                    

 

                          atorvastatin                            5 mg, 0.5 tab, Route: PO, Drug form: TAB, 

Bedtime, Dosing Weight 93.182, kg, Start date: 18 21:00:00 CST, Duration: 
30 day, Stop date: 18 21:00:00 CDTNotes: (Same As: Lipitor)               
                                                No Longer Active                              

                                                      2018                     

                                        [facility]                    

 

                          Metformin hydrochloride 1000 MG Oral Tablet                            1,000 mg, 2

 tab, Route: PO, Drug form: TAB, BID-Meals, Dosing Weight 93.182, kg, Start 
date: 18 17:00:00 CST, Duration: 30 day, Stop date: 18 8:00:00 
CDTNotes: (Same as: Glucophage)  Take with meal                                    

                          No Longer Active                                                 

                                                2018                            [facility]  

                  

 

                          torsemide                            20 mg, 1 tab, Route: PO, Drug form: TAB, BID,

 Dosing Weight 93.182, kg, Start date: 18 17:00:00 CST, Duration: 30 day, 
Stop date: 18 9:00:00 CDTNotes: (Same As: Demadex)                        
                                                No Longer Active                                       

                                                2018                            [facility]

                    

 

                          Xarelto                            20 mg, 1 tab, Route: PO, Drug form: TAB, QPM, Dosing

 Weight 93.182, kg, Start date: 18 17:00:00 CST, Duration: 30 day, Stop 
date: 18 17:00:00 CDTNotes: (Same as: Xarelto) Administer with food       
                                                      No Longer Active                    

                                                                            2018             

                                        [facility]                    

 

                                        12 HR ranolazine 500 MG Extended Release Tablet [Ranexa]                        

                                        500 mg, 1 tab, Route: PO, Drug form: TAB, BID, Dosing Weight 93.182, kg, Start 

date: 18 17:00:00 CST, Duration: 30 day, Stop date: 18 9:00:00 
CDTNotes: Same as Ranexa "Do Not Crush"                                            

                    No Longer Active                                                           

                          2018                            [facility]          

          

 

                          Potassium Chloride                            20 mEq, 1 tab, Route: PO, Drug form:

 ERTAB, BID, Dosing Weight 93.182, kg, Start date: 18 17:00:00 CST, 
Duration: 30 day, Stop date: 18 9:00:00 CDTNotes: (Same as: K-Dur 20) "Do 
Not Crush"  With food and full glass of water                                      

                          No Longer Active                                                   

                                                2018                            [facility]    

                

 

                          gabapentin 600 MG Oral Tablet                            600 mg, 2 cap, Route: PO,

 Drug form: CAP, TID, Dosing Weight 93.182, kg, Start date: 18 13:00:00 
CST, Duration: 30 day, Stop date: 18 9:00:00 CDTNotes: (Same as: 
Neurontin)                                                        No Longer Active   

                                                                                 2018

                                        [facility]                    

 

                          Lorazepam                            0.5 mg, 1 tab, Route: PO, Drug form: TAB, Q12H,

 Dosing Weight 93.182, kg, PRN Anxiety, Start date: 18 9:27:00 CST, 
Duration: 30 day, Stop date: 18 9:26:00 CDTNotes: (Same as: Ativan)       
                                                      No Longer Active                    

                                                                            2018             

                                        [facility]                    

 

                                        Acetaminophen 325 MG / Hydrocodone Bitartrate 5 MG Oral Tablet [Norco 5/325]    

                                        1 tab, Route: PO, Drug Form: TAB, Dosing Weight 93.182, kg,

 Q4H, PRN Pain Score 6-10, Start date: 18 9:27:00 CST, Duration: 30 day, 
Stop date: 18 9:26:00 CDTNotes: (Same as: Norco 325/5)  Do not exceed 
4gm/day of acetaminophen.                                                        No Longer

 Active                                                                              

                          2018                            [facility]                    

 

                          Trazodone Hydrochloride 50 MG Oral Tablet                            50 mg, 1 tab,

 Route: PO, Drug form: TAB, PRN, Dosing Weight 93.182, kg, PRN Insomnia, Start 
date: 18 9:27:00 CST, Duration: 30 day, Stop date: 18 10:26:00 
CDTNotes: (Same As: Desyrel)                                                        No

 Longer Active                                                                       

                          2018                            [facility]                    

 

                          Famotidine                            20 mg, 1 tab, Route: PO, Drug form: TAB, Q12H,

 Dosing Weight 93.182, kg, PRN Heartburn, Start date: 18 9:27:00 CST, 
Duration: 30 day, Stop date: 18 9:26:00 CDTNotes: (Same as: Pepcid)       
                                                      No Longer Active                     

                                                                            2018              

                                        [facility]                    

 

                          Tylenol                            650 mg, 2 tab, Route: PO, Drug form: TAB, Q6H, 

Dosing Weight 93.182, kg, PRN Pain Score 1-5, Start date: 18 9:27:00 CST, 
Duration: 30 day, Stop date: 18 9:26:00 CDTNotes: Do not exceed 4 gm/day. 
(Same as: Tylenol)                                                        No Longer Active

                                                                                    2018

                                        [facility]                    

 

                          Acetaminophen 325 MG Oral Tablet [Tylenol]                            650 mg=2 tab,

 PO, Q6H, PRN Pain Score 1-5, 0 Refill(s)                                          

                    Active                                                                   

                          2018                            [facility]                  

  

 

                          Famotidine                            20 mg=1 tab, PO, Q12H, PRN Heartburn, # 60 tab,

 0 Refill(s)                                                        Active           

                                                                            2018    

                                        [facility]                    

 

                                        Acetaminophen 325 MG / Hydrocodone Bitartrate 5 MG Oral Tablet [Norco 5/325]    

                                        1 tab, PO, Q4H, PRN Pain Score 6-10, 0 Refill(s)           

                                                Active                                    

                                                2018                            [facility]                    

 

                          Loperamide Hydrochloride 2 MG Oral Tablet [Imodium]                            2 mg=1

 tab, PO, Q8H, PRN Loose Stools, # 60 tab, 0 Refill(s)                             

                           Active                                                    

                                                2018                            [facility]     

               

 

                          LORazepam 0.5 mg oral tablet                            0.5 mg=1 tab, PO, Q12H, PRN

 as needed for anxiety, 0 Refill(s)                                                

                    Active                                                                         

                          2018                            [facility]                    

 

                          rivaroxaban 20 MG Oral Tablet [Xarelto]                            20 mg=1 tab, PO,

 QPM, # 30 tab, 3 Refill(s)                                                        Active

                                                                                    2018

                                        [facility]                    

 

                          Insulin Lispro                            1 unit, 0.01 mL, Route: SUB-Q, Drug form:

 SOLN, TID-Before Meals, Dosing Weight 93.182, kg, PRN Blood Glucose Results, 
Start date: 02/10/18 21:10:00 CST, Duration: 30 day, Stop date: 18 21:09:0
0 CDTNotes: Roll in palms of hands gently;  Do not shake `vigorously. (Same as: 
Humalog )  "Single Patient Use Only "  WASTE: F/P - Black; E - Municipal Trash 
Bin  Stable for 28 days at room temperature. Expires in _____ days from 
______________Date                                                        No Longer Active

                                                                                    2018

                                        [facility]                    

 

                          Dextrose 50% Syringe                            12.5 gm, 25 mL, Route: IVP, Drug Form:

 INJ, Dosing Weight 93.182, kg, PRN, PRN Blood Glucose Results, Start date: 
02/10/18 21:10:00 CST, Duration: 30 day, Stop date: 18 22:09:00 CDT       
                                                      No Longer Active                    

                                                                            2018             

                                        [facility]                    

 

                          Glucagon                            1 mg, Route: IM, Drug form: PDR/INJ, PRN, Dosing

 Weight 93.182, kg, PRN Blood Glucose Results, Start date: 02/10/18 21:10:00 
CST, Duration: 30 day, Stop date: 18 22:09:00 CDT                            

                            No Longer Active                                         

                                                2018                            [facility]

                    

 

                          Docusate                            100 mg, 1 cap, Route: PO, Drug form: CAP, BID,

 Dosing Weight 93.182, kg, Start date: 02/10/18 17:00:00 CST, Duration: 30 day, 
Stop date: 18 9:00:00 CDTNotes: (Same as: Colace) (Do Not Crush)          
                                                      No Longer Active                       

                                                                            02/10/2018                

                                        [facility]                    

 

                          meropenem                            500 mg, Route: IV, ABXQ6H, Dosing Weight 93.182,

 kg, Start date: 02/10/18 17:00:00 CST, Duration: 30 day, Stop date: 18 
11:00:00 CDT, ABX Indication: Urinary Tract InfectionNotes: Same as Merrem   ***
 MEDICATION WASTE *** Product Size:  500 mg Product Wasted:  ___ mg             
                                                No Longer Active                            

                                                        02/10/2018                   

                                        [facility]                    

 

                          D5W 1/2NS 1,000 mL                            1,000 mL, Rate: 75 ml/hr, Infuse over:

 13.3 hr, Route: IV, Dosing Weight 93.182 kg, Total Volume: 1,000, Start date: 
02/10/18 12:03:00 CST, Duration: 30 day, Stop date: 18 12:02:00 CDT, 2.15,
 m2                                                        No Longer Active          

                                                                            02/10/2018   

                                        [facility]                    

 

                          meropenem + sterile water 10 mL                            500 mg, Route: IV, ONCE,

 Start date: 02/10/18 10:26:00 CST, Stop date: 02/10/18 10:26:00 CST, ABX 
Indication: Urinary Tract InfectionNotes: Same as Merrem   *** MEDICATION WASTE 
*** Product Size:  500 mg Product Wasted:  ___ mg                                  

                      Inactive                                                         

                           02/10/2018                            [facility]        

            

 

                          Ondansetron                            4 mg, 2 mL, Route: IVP, Drug form: INJ, Q6H,

 Dosing Weight 93.182, kg, PRN Nausea & Vomiting, Start date: 02/10/18 9:48:00 
CST, Duration: 30 day, Stop date: 18 9:47:00 CDTNotes: (Same as: Zofran)  
 *** MEDICATION WASTE *** Product Size:  4 mg Product Wasted:  ___ mg           
                                                      No Longer Active                        

                                                                            02/10/2018                 

                                        [facility]                    

 

                          D10W 1,000 mL                            1,000 mL, Rate: 100 ml/hr, Infuse over: 10

 hr, Route: IV, Dosing Weight 93.182 kg, Total Volume: 1,000, Start date: 
02/10/18 7:59:00 CST, Duration: 30 day, Stop date: 18 7:58:00 CDT, 2.15, 
m2                                                        No Longer Active           

                                                                            02/10/2018    

                                        [facility]                    

 

                          Dextrose 50% in Water (bolus) IV                            25 gm, Route: IVP, Dosing

 Weight 93.182, kg, ONCE, Start date: 02/10/18 4:20:00 CST, Stop date: 02/10/18 
4:20:00 CST                                                        Inactive          

                                                                            02/10/2018   

                                        [facility]                    

 

                          Aspirin 81 MG Enteric Coated Tablet                            81 mg=1 tab, PO, Daily,

 # 90 tab, 3 Refill(s), Pharmacy: [facility] [id number]                    
                                                Active                                             

                                                2018                            [facility]

                    

 

                          Levofloxacin 500 MG Oral Tablet [Levaquin]                            500 mg=1 tab,

 PO, Q24H, X 7 day, # 7 tab, 0 Refill(s), Pharmacy: [facility] [id number]  
                                                      No Longer Active               

                                                                            2018        

                                        [facility]                    

 

                          Potassium Chloride                            40 mEq, 2 tab, Route: PO, Drug form:

 ERTAB, ONCE, Dosing Weight 101.903, kg, Start date: 18 8:38:00 CST, Stop 
date: 18 8:38:00 CSTNotes: (Same as: K-Dur 20) "Do Not Crush"  With food 
and full glass of water                                                        Inactive

                                                                                    2018

                                        [facility]                    

 

                          Insulin Glargine 100 UNT/ML Injectable Solution                            20 unit,

 0.2 mL, Route: SUB-Q, Drug form: SOLN, Bedtime, Dosing Weight 101.903, kg, 
Start date: 01/15/18 22:53:00 CST, Duration: 30 day, Stop date: 18 
21:00:00 CSTNotes: (Same as: Lantus) Do not hold insulin without contacting 
prescriber  WASTE: F/P - Black; E - Municipal Trash Bin  "single patient use 
only"                                                        No Longer Active        

                                                                            2018 

                                        [facility]                    

 

                          normal saline 0.9%  mL                            250 mL, Rate: 20 ml/hr, Infuse

 over: 12.5 hr, Route: IV, Dosing Weight 101.903 kg, Total Volume: 250, Start 
date: 18 13:39:00 CST, Duration: 30 day, Stop date: 18 13:38:00 CST,
 2.25, m2                                                        No Longer Active    

                                                                                2018

                                        [facility]                    

 

                          Lasix                            20 mg, 2 mL, Route: IVP, Drug form: INJ, ONCE, Dosing

 Weight 101.903, kg, Start date: 18 11:01:00 CST, Stop date: 18 
11:01:00 CSTNotes: (Same as: Lasix)                                                

                    Inactive                                                                       

                          2018                            [facility]                    

 

                          Maxipime + sterile water 10 mL                            1 gm, Route: IVP, ONCE, 

Start date: 18 16:07:00 CST, Stop date: 18 16:07:00 CST, ABX 
Indication: PneumoniaNotes: (Same As: Maxipime)   *** MEDICATION WASTE *** 
Product Size:  1000 mg Product Wasted:  ___ mg                                     

                    Inactive                                                            

                          2018                            [facility]           

         

 

                          cefepime                            1 gm, Route: IV, Q8H, Dosing Weight 79.545, kg,

 Start date: 18 16:00:00 CST, Duration: 5 day, Stop date: 18 8:00:00
 CST, ABX Indication: PneumoniaNotes: (Same As: Maxipime)   *** MEDICATION WASTE
 *** Product Size:  1000 mg Product Wasted:  ___ mg                                

                          No Longer Active                                             

                                                2018                            [facility]

                    

 

                          Potassium Chloride                            40 mEq, 2 tab, Route: PO, Drug form:

 ERTAB, ONCE, Dosing Weight 79.545, kg, Start date: 18 12:08:00 CST, Stop 
date: 18 12:08:00 CSTNotes: (Same as: K-Dur 20) "Do Not Crush"  With food 
and full glass of water                                                        Inactive

                                                                                    2018

                                        [facility]                    

 

                                        Insulin Glargine 100 UNT/ML Injectable Solution [Lantus]                        

                                        85 unit, 0.85 mL, Route: SUB-Q, Drug form: SOLN, Daily, Dosing Weight 79.545, kg,

 Start date: 18 9:00:00 CST, Duration: 30 day, Stop date: 02/10/18 9:00:00
 CSTNotes: (Same as: Lantus) Do not hold insulin without contacting prescriber  
WASTE: F/P - Black; E - Municipal Trash Bin  "single patient use only"          
                                                      No Longer Active                       

                                                                            2018                

                                        [facility]                    

 

                          Hydralazine                            10 mg, 0.5 mL, Route: IVP, Drug form: INJ, 

Q4H, Dosing Weight 79.545, kg, PRN Hypertension, Start date: 18 20:32:00 
CST, Duration: 30 day, Stop date: 02/10/18 20:31:00 CST, [id number]: (Same as:
 Apresoline) Push over 5 minutes                                                   

                    No Longer Active                                                                  

                          2018                            [facility]                 

   

 

                                        3 ML Insulin Glargine 100 UNT/ML Prefilled Syringe [Lantus]                     

                                        42 unit, 0.42 mL, Route: SUB-Q, Drug form: SOLN, Bedtime, Dosing Weight 79.545,

 kg, Start date: 01/10/18 21:00:00 CST, Duration: 30 day, Stop date: 18 
21:00:00 CSTNotes: (Same as: Lantus) Do not hold insulin without contacting 
prescriber  WASTE: F/P - Black; E - Municipal Trash Bin  "single patient use 
only"                                                        No Longer Active        

                                                                            2018 

                                        [facility]                    

 

                                        Insulin Glargine 100 UNT/ML Injectable Solution [Lantus]                        

                                        72 unit, 0.72 mL, Route: SUB-Q, Drug form: SOLN, Daily, Dosing Weight 79.545, kg,

 Start date: 01/10/18 9:00:00 CST, Duration: 30 day, Stop date: 18 9:00:00
 CSTNotes: (Same as: Lantus) Do not hold insulin without contacting prescriber  
WASTE: F/P - Black; E - Municipal Trash Bin  "single patient use only"          
                                                Inactive                                 

                                                      01/10/2018                        

                                        [facility]                    

 

                          Insulin Lispro                            10 unit, 0.1 mL, Route: SUB-Q, Drug form:

 SOLN, ONCE, Dosing Weight 79.545, kg, Priority: NOW, Start date: 01/10/18 
3:42:00 CST, Stop date: 01/10/18 3:42:00 CSTNotes: Roll in palms of hands 
gently;  Do not shake `vigorously. (Same as: Humalog )  "Single Patient Use Only
 "  WASTE: F/P - Black; E - [employer] Trash Bin  Stable for 28 days at room 
temperature. Expires in _____ days from ______________Date                      
                                                Inactive                                             

                                                01/10/2018                            [facility]

                    

 

                          Insulin Lispro                            15 unit, 0.15 mL, Route: SUB-Q, Drug form:

 SOLN, ONCE, Dosing Weight 79.545, kg, Priority: NOW, Start date: 18 
20:22:00 CST, Stop date: 18 20:22:00 CSTNotes: Roll in palms of hands 
gently;  Do not shake `vigorously. (Same as: Humalog )  "Single Patient Use Only
 "  WASTE: F/P - Black; E - [facility] Trash Bin  Stable for 28 days at room 
temperature. Expires in _____ days from ______________Date                      
                                                Inactive                                             

                                                01/10/2018                            [facility]

                    

 

                          tamsulosin                            0.4 mg, 1 cap, Route: PO, Drug form: CAP, Daily,

 Dosing Weight 79.545, kg, Start date: 18 9:00:00 CST, Duration: 30 day, 
Stop date: 18 9:00:00 CSTNotes: (Same As: Flomax)  "Do Not Crush"         
                                                      No Longer Active                      

                                                                            2018               

                                        [facility]                    

 

                          clopidogrel                            75 mg, 1 tab, Route: PO, Drug form: TAB, Daily,

 Dosing Weight 79.545, kg, Start date: 18 9:00:00 CST, Duration: 30 day, 
Stop date: 18 9:00:00 CSTNotes: (Same As: Plavix)                            

                            No Longer Active                                         

                                                2018                            [facility]

                    

 

                          Sertraline                            100 mg, 1 tab, Route: PO, Drug form: TAB, Daily,

 Dosing Weight 79.545, kg, Start date: 18 9:00:00 CST, Duration: 30 day, 
Stop date: 18 9:00:00 CSTNotes: (Same as: Zoloft)                            

                            No Longer Active                                         

                                                2018                            [facility]

                    

 

                          Amiodarone                            200 mg, 1 tab, Route: PO, Drug form: TAB, Daily,

 Dosing Weight 79.545, kg, Start date: 18 9:00:00 CST, Duration: 30 day, 
Stop date: 18 9:00:00 CSTNotes: (Same as: Cordarone)                      
                                                No Longer Active                                     

                                                2018                            [facility]                    

 

                                        12 HR ranolazine 500 MG Extended Release Tablet [Ranexa]                        

                                        500 mg, 1 tab, Route: PO, Drug form: TAB, BID, Dosing Weight 79.545, kg, Start 

date: 18 9:00:00 CST, Duration: 30 day, Stop date: 18 17:00:00 
CSTNotes: Same as Ranexa "Do Not Crush"                                            

                    No Longer Active                                                           

                          2018                            [facility]          

          

 

                          torsemide                            20 mg, 1 tab, Route: PO, Drug form: TAB, BID,

 Dosing Weight 79.545, kg, Start date: 18 9:00:00 CST, Duration: 30 day, 
Stop date: 18 17:00:00 CSTNotes: (Same As: Demadex)                       
                                                No Longer Active                                      

                                                2018                            [facility]                    

 

                          pantoprazole                            40 mg, 1 tab, Route: PO, Drug form: ECTAB,

 BID-Before Meals, Dosing Weight 79.545, kg, Start date: 18 7:30:00 CST, 
Duration: 30 day, Stop date: 18 16:30:00 CSTNotes: Tablet should not be 
chewed or crushed. (Same as: Protonix)                                             

                    No Longer Active                                                            

                          2018                            [facility]           

         

 

                          Synthroid                            50 microgram, 1 tab, Route: PO, Drug form: TAB,

 Q630AM, Dosing Weight 79.545, kg, Start date: 18 6:30:00 CST, Duration: 
30 day, Stop date: 18 6:30:00 CSTNotes: Take 1 hour before or 2 hours 
after meal; Enteral feeds may interefere with the absorption of this 
medication.(Same as:Levothroid, Synthroid)                                         

                    No Longer Active                                                        

                            2018                            [facility]       

             

 

                          Imdur                            30 mg, 1 tab, Route: PO, Drug form: ERTAB, QAM, Dosing

 Weight 79.545, kg, Start date: 18 6:30:00 CST, Duration: 30 day, Stop 
date: 18 6:30:00 CSTNotes: (Same as:Imdur) "Do Not Crush&quot;  Take on 
empty stomach/ full glass of water.  Do not crush                                  

                          No Longer Active                                               

                                                2018                            [facility]

                    

 

                          gabapentin 600 MG Oral Tablet                            600 mg, 2 cap, Route: PO,

 Drug form: CAP, Q8H, Dosing Weight 79.545, kg, Start date: 18 0:00:00 
CST, Duration: 30 day, Stop date: 18 16:00:00 CSTNotes: (Same as: 
Neurontin)                                                        No Longer Active   

                                                                                 2018

                                        [facility]                    

 

                          atorvastatin                            5 mg, 0.5 tab, Route: PO, Drug form: TAB, 

Bedtime, Dosing Weight 79.545, kg, Start date: 18 23:39:00 CST, Duration: 
30 day, Stop date: 18 21:00:00 CSTNotes: (Same As: Lipitor)               
                                                No Longer Active                              

                                                      2018                     

                                        [facility]                    

 

                          Xarelto                            20 mg, 1 tab, Route: PO, Drug form: TAB, QPM, Dosing

 Weight 79.545, kg, Start date: 18 23:39:00 CST, Duration: 30 day, Stop 
date: 18 17:00:00 CSTNotes: (Same as: Xarelto) Administer with food       
                                                      No Longer Active                    

                                                                            2018             

                                        [facility]                    

 

                          Trazodone Hydrochloride 50 MG Oral Tablet                            50 mg, 1 tab,

 Route: PO, Drug form: TAB, Bedtime, Dosing Weight 79.545, kg, PRN Insomnia, 
Start date: 18 23:32:00 CST, Duration: 30 day, Stop date: 18 
23:31:00 CSTNotes: (Same As: Desyrel)                                              

                    No Longer Active                                                             

                          2018                            [facility]            

        

 

                                        Acetaminophen 325 MG / Hydrocodone Bitartrate 10 MG Oral Tablet [Norco 10/325]  

                                        1 tab, PO, TID, PRN for pain, # 90 tab, 0 Refill(s), given

 to patient                                                        No Longer Active  

                                                                                  2018

                                        [facility]                    

 

                                        Streptococcus pneumoniae serotype 1 capsular antigen diphtheria [id number] protein conjugate

 vaccine / Streptococcus pneumoniae serotype 14 capsular antigen diphtheria 
[id number] protein conjugate vaccine / Streptococcus pneumoniae serotype 18C 
capsular antigen d                            0.5 mL, Route: IM, Drug Form: INJ, Daily,

 Start date: 18 9:00:00 CST, Duration: 1 doses or times, Stop date: 
18 9:00:00 CSTNotes: Shake well prior to use  (Same as: Prevnar 13)       
                                                Inactive                               

                                                      2018                      

                                        [facility]                    

 

                          influenza virus vaccine, inactivated                            0.5 mL, Route: IM,

 Drug Form: SUSP, Daily, Start date: 18 9:00:00 CST, Duration: 1 doses or 
times, Stop date: 18 9:00:00 CSTNotes: (Same as: Fluzone Quadrivalent, 
Fluarix Quadrivalent)  For 3 years of age and older (0.5 mL IM) Shake well 
before use                                                        Inactive           

                                                                            2018    

                                        [facility]                    

 

                          Januvia                            See Instructions, PO Daily, 0 Refill(s)        

                                                Active                                 

                                                      2018                        

                                        [facility]                    

 

                          Hydralazine                            10 mg, 0.5 mL, Route: IVP, Drug form: INJ, 

Q4H, Dosing Weight 79.545, kg, Start date: 18 4:00:00 CST, Duration: 30 
day, Stop date: 18 0:00:00 CSTNotes: (Same as: Apresoline) Push over 5 m
inutes                                                        No Longer Active       

                                                                             2018

                                        [facility]                    

 

                          Morphine                            6 mg, 3 mL, Route: PO, Drug form: SOLN, Q3H, Dosing

 Weight 79.545, kg, PRN Pain Score 7-10, Priority: STAT, Start date: 18 
3:34:00 CST, Duration: 30 day, Stop date: 18 3:33:00 CSTNotes: (Same 
as:MORPhine Sulfate)                                                        No Longer

 Active                                                                              

                          2018                            [facility]                    

 

                          Insulin Lispro                            1 unit, 0.01 mL, Route: SUB-Q, Drug form:

 SOLN, Bedtime, Dosing Weight 79.545, kg, PRN Blood Glucose Results, Start date:
18 2:50:00 CST, Duration: 30 day, Stop date: 18 2:49:00 CSTNotes: Ro
ll in palms of hands gently;  Do not shake `vigorously. (Same as: Humalog )  
"Single Patient Use Only "  WASTE: F/P - Black; E - [employer] Trash Bin  Stable 
for 28 days at room temperature. Expires in _____ days from ______________Date  
                                                      No Longer Active                

                                                                            2018         

                                        [facility]                    

 

                          Glucagon                            1 mg, Route: IM, Drug form: PDR/INJ, PRN, Dosing

 Weight 79.545, kg, PRN Blood Glucose Results, Start date: 18 2:50:00 CST,
Duration: 30 day, Stop date: 18 2:49:00 CST                                  

                          No Longer Active                                               

                                                2018                            [facility]

                    

 

                          Dextrose 50% Syringe                            25 gm, 50 mL, Route: IVP, Drug Form:

 INJ, Dosing Weight 79.545, kg, PRN, PRN Blood Glucose Results, Start date: 
18 2:50:00 CST, Duration: 30 day, Stop date: 18 2:49:00 CST         
                                                      No Longer Active                       

                                                                            2018                

                                        [facility]                    

 

                          Ondansetron                            4 mg, 2 mL, Route: IVP, Drug form: INJ, Q6H,

 Dosing Weight 79.545, kg, PRN Nausea & Vomiting, Start date: 18 2:48:00 
CST, Duration: 30 day, Stop date: 18 2:47:00 CSTNotes: (Same as: Zofran)  
*** MEDICATION WASTE *** Product Size:  4 mg Product Wasted:  ___ mg            
                                                No Longer Active                            

                                                        2018                   

                                        [facility]                    

 

                                        Acetaminophen 325 MG / Hydrocodone Bitartrate 5 MG Oral Tablet                  

                                        2 tab, Route: PO, Drug Form: TAB, Dosing Weight 79.545, kg, Q4H, PRN Pain

 Score 7-10, Start date: 18 2:48:00 CST, Duration: 30 day, Stop date: 
18 2:47:00 CSTNotes: (Same as: Norco 325/5)  Do not exceed 4gm/day of 
acetaminophen.                                                        No Longer Active

                                                                                    2018

                                        [facility]                    

 

                          Morphine                            2 mg, Route: IVP, Q4H, Dosing Weight 79.545, kg,

 PRN Pain Score 7-10, Start date: 18 2:48:00 CST, Duration: 30 day, Stop 
date: 18 2:47:00 CST                                                        Inactive

                                                                                    2018

                                        [facility]                    

 

                          Valium                            5 mg, 1 tab, Route: PO, Drug form: TAB, ONCE, Dosing

 Weight 79.545, kg, Priority: STAT, Start date: 18 2:10:00 CST, Stop date:
18 2:10:00 CSTNotes: (Same as: Valium)                                       

                    Inactive                                                              

                          2018                            [facility]             

       

 

                                        Acetaminophen 325 MG / Hydrocodone Bitartrate 10 MG Oral Tablet [Norco 10/325]  

                                        1 tab, Route: PO, Drug Form: TAB, Dosing Weight 79.545, kg,

 ONCE, STAT, Start date: 18 1:52:00 CST, Stop date: 18 1:52:00 
CSTNotes: Do not exceed 4gm/day of acetaminophen.  (Same as: Norco 325/10)      
                                                 Inactive                              

                                                      2018                     

                                        [facility]                    

 

                          Fentanyl                            25 microgram, Route: IVP, ONCE, Dosing Weight 

79.545, kg, Priority: STAT, Start date: 18 1:22:00 CST, Stop date: 
18 1:22:00 CST                                                        Inactive 

                                                                                   2018

                                        [facility]                    

 

                          Ondansetron                            4 mg, Route: IVP, Drug form: INJ, ONCE, Dosing

 Weight 79.545, kg, Priority: STAT, Start date: 18 0:25:00 CST, Stop date:
18 0:25:00 CST                                                        Inactive 

                                                                                   2018

                                        [facility]                    

 

                          Morphine                            4 mg, Route: IVP, ONCE, Dosing Weight 79.545, 

kg, Priority: STAT, Start date: 18 0:25:00 CST, Stop date: 18 
0:25:00 CST                                                        Inactive          

                                                                            2018   

                                        [facility]                    

 

                          Sertraline                            100 mg, 1 tab, Route: PO, Drug form: TAB, Daily,

 Dosing Weight 99.688, kg, Start date: 17 9:00:00 CST, Duration: 30 day, 
Stop date: 01/10/18 9:00:00 CSTNotes: (Same as: Zoloft)                            

                            No Longer Active                                         

                                                2017                            [facility]

                    

 

                          clopidogrel                            75 mg, 1 tab, Route: PO, Drug form: TAB, Daily,

 Dosing Weight 99.688, kg, Start date: 17 9:00:00 CST, Duration: 30 day, 
Stop date: 01/10/18 9:00:00 CSTNotes: (Same As: Plavix)                            

                            No Longer Active                                         

                                                2017                            [facility]

                    

 

                          Amiodarone                            200 mg, 1 tab, Route: PO, Drug form: TAB, Daily,

 Dosing Weight 99.688, kg, Start date: 17 9:00:00 CST, Duration: 30 day, 
Stop date: 01/10/18 9:00:00 CSTNotes: (Same as: Cordarone)                      
                                                No Longer Active                                      

                                                2017                            [facility]                    

 

                          Mag-Ox 400                            400 mg, 1 tab, Route: PO, Drug form: TAB, Daily,

 Dosing Weight 99.688, kg, Start date: 17 9:00:00 CST, Duration: 30 day, 
Stop date: 01/10/18 9:00:00 CSTNotes: (Same as: Mag-Ox 400) Magnesium oxide 
[id number] elemental magnesium Dose=____mg magnesium oxide (___mg elemental 
magnesium)                                                        No Longer Active   

                                                                                 2017

                                        [facility]                    

 

                          Imdur                            30 mg, 1 tab, Route: PO, Drug form: ERTAB, QAM, Dosing

 Weight 99.688, kg, Start date: 17 9:00:00 CST, Duration: 30 day, Stop 
date: 01/10/18 9:00:00 CST                                                        No 

Longer Active                                                                        

                          2017                            [facility]                    

 

                          Lisinopril                            2.5 mg, 0.5 tab, Route: PO, Drug form: TAB, 

Daily, Dosing Weight 99.688, kg, Start date: 17 9:00:00 CST, Duration: 30 
day, Stop date: 01/10/18 9:00:00 CSTNotes: (Same as: Prinivil, Zestril)         
                                                      No Longer Active                       

                                                                            2017                

                                        [facility]                    

 

                          torsemide                            30 mg, Route: PO, Drug form: TAB, Daily, Dosing

 Weight 99.688, kg, Start date: 17 9:00:00 CST, Duration: 30 day, Stop 
date: 01/10/18 9:00:00 CST                                                        No 

Longer Active                                                                        

                          2017                            [facility]                    

 

                          Synthroid                            50 microgram, 1 tab, Route: PO, Drug form: TAB,

 Q630AM, Dosing Weight 99.688, kg, Start date: 17 6:30:00 CST, Duration: 
30 day, Stop date: 01/10/18 6:30:00 CST                                            

                    No Longer Active                                                           

                          2017                            [facility]          

          

 

                                        3 ML Insulin Glargine 100 UNT/ML Prefilled Syringe [Lantus]                     

                                        42 unit, 0.42 mL, Route: SUB-Q, Drug form: SOLN, Bedtime, Dosing Weight 99.688,

 kg, Start date: 17 21:00:00 CST, Duration: 30 day, Stop date: 18 
21:00:00 CSTNotes: (Same as: Lantus) Do not hold insulin without contacting 
prescriber  WASTE: F/P - Black; E - [facility] Trash Bin  "single patient use 
only"                                                        Inactive                

                                                                            2017         

                                        [facility]                    

 

                          atorvastatin                            5 mg, 0.5 tab, Route: PO, Drug form: TAB, 

Bedtime, Dosing Weight 99.688, kg, Start date: 17 21:00:00 CST, Duration: 
30 day, Stop date: 18 21:00:00 CSTNotes: (Same As: Lipitor)               
                                                Inactive                                      

                                                2017                            [facility]                    

 

                          niacin 500 mg oral capsule, extended release                            2,000 mg, 

4 tab, Route: PO, Drug form: ERTAB, Bedtime, Dosing Weight 99.688, kg, Start 
date: 17 21:00:00 CST, Duration: 30 day, Stop date: 18 21:00:00 
CSTNotes: (Same as: Niaspan) "Do Not Crush"  Non-Formulary Item  With food.     
                                                   Inactive                            

                                                        2017                   

                                        [facility]                    

 

                          Xarelto                            20 mg, Route: PO, Drug form: TAB, QPM, Dosing Weight

 99.688, kg, Start date: 17 17:00:00 CST, Duration: 30 day, Stop date: 
18 17:00:00 CST                                                        Inactive

                                                                                    2017

                                        [facility]                    

 

                          pregabalin                            600 mg, Route: PO, Drug form: CAP, BID, Dosing

 Weight 99.688, kg, Start date: 17 17:00:00 CST, Duration: 30 day, Stop 
date: 01/10/18 9:00:00 CST                                                        Inactive

                                                                                    2017

                                        [facility]                    

 

                          Potassium Chloride                            20 mEq, 1 tab, Route: PO, Drug form:

 ERTAB, BID, Dosing Weight 99.688, kg, Start date: 17 17:00:00 CST, 
Duration: 30 day, Stop date: 01/10/18 9:00:00 CSTNotes: (Same as: K-Dur 20)     
                                                   Inactive                            

                                                        2017                   

                                        [facility]                    

 

                          Metformin hydrochloride 1000 MG Oral Tablet                            1,000 mg, 2

 tab, Route: PO, Drug form: TAB, BID-Meals, Dosing Weight 99.688, kg, Start 
date: 17 17:00:00 CST, Duration: 30 day, Stop date: 01/10/18 8:00:00 
CSTNotes: (Same as: Glucophage)  Take with meal                                    

                    Inactive                                                           

                          2017                            [facility]          

          

 

                          Metformin                            500 mg, Route: PO, BID, Dosing Weight 99.688,

 kg, Start date: 17 17:00:00 CST, Duration: 30 day, Stop date: 01/10/18 
9:00:00 CST                                                        Inactive          

                                                                            2017   

                                        [facility]                    

 

                          torsemide                            20 mg, 1 tab, Route: PO, Drug form: TAB, BID,

 Dosing Weight 99.688, kg, Start date: 17 17:00:00 CST, Duration: 30 day, 
Stop date: 01/10/18 9:00:00 CSTNotes: (Same As: Demadex)                        
                                                Inactive                                               

                                                2017                            [facility]

                    

 

                          pantoprazole                            40 mg, 1 tab, Route: PO, Drug form: ECTAB,

 Before Dinner, Dosing Weight 99.688, kg, Start date: 17 16:30:00 CST, 
Duration: 30 day, Stop date: 18 16:30:00 CSTNotes: Tablet should not be ch
ewed or crushed. (Same as: Protonix)                                               

                    Inactive                                                                      

                          2017                            [facility]                    

 

                          gabapentin 600 MG Oral Tablet                            600 mg, 2 cap, Route: PO,

 Drug form: CAP, TID, Dosing Weight 99.688, kg, Start date: 17 15:00:00 
CST, Duration: 30 day, Stop date: 01/10/18 9:00:00 CSTNotes: (Same as: 
Neurontin)                                                        Inactive           

                                                                            2017    

                                        [facility]                    

 

                          Saline Flush 0.9%                            10 ml, Route: IVP, Drug Form: INJ, Dosing

 Weight 90.909, kg, Q12H, Start date: 17 9:00:00 CST, Duration: 30 day, 
Stop date: 18 21:00:00 CSTNotes: (Same as: BD Posiflush)                  
                                                Inactive                                         

                                                2017                            [facility]

                    

 

                                        12 HR ranolazine 500 MG Extended Release Tablet [Ranexa]                        

                          500 mg=1 tab, PO, BID, 0 Refill(s)                                               

                    Active                                                                        

                          2017                            [facility]                    

 

                          rivaroxaban 20 MG Oral Tablet [Xarelto]                            20 mg=1 tab, PO,

 QPM, # 30 tab, 3 Refill(s)                                                        Active

                                                                                    2017

                                        [facility]                    

 

                          ondansetron 4 mg oral tablet                            4 mg=1 tab, PO, Q6H, 0 Refill(s)

                                                        Active                       

                                                                            2017                

                                        [facility]                    

 

                          pantoprazole 40 mg oral enteric coated tablet                            40 mg=1 tab,

 PO, Daily, 0 Refill(s)                                                        Active

                                                                                    2017

                                        [facility]                    

 

                          tamsulosin 0.4 mg oral capsule                            0.4 mg=1 cap, PO, Daily,

 0 Refill(s)                                                        Active           

                                                                            2017    

                                        [facility]                    

 

                          sertraline 100 mg oral tablet                            100 mg=1 tab, PO, Daily, 

0 Refill(s)                                                        Active            

                                                                            2017     

                                        [facility]                    

 

                          torsemide 20 mg oral tablet                            20 mg=1 tab, PO, BID, 0 Refill(s)

                                                        Active                       

                                                                            2017                

                                        [facility]                    

 

                          pregabalin 300 mg oral capsule                            600 mg=2 cap, PO, BID, 0

 Refill(s)                                                        Active             

                                                                            2017      

                                        [facility]                    

 

                          Trazodone Hydrochloride 50 MG Oral Tablet                            50 mg=1 tab, 

PO, PRN, 0 Refill(s)                                                        Active   

                                                                                 2017

                                        [facility]                    

 

                          Magnesium Oxide 400 MG Oral Tablet [Mag-Ox 400]                            400 mg=1

 tab, PO, Daily, 0 Refill(s)                                                        Active

                                                                                    2017

                                        [facility]                    

 

                          Metformin                            500 mg, PO, BID, 0 Refill(s)                 

                                                Active                                          

                                                2017                            [facility]

                    

 

                          Aspirin 81 MG Chewable Tablet                            81 mg, 1 tab, Route: PO, 

Drug form: CHEWTAB, Daily, Dosing Weight 90.909, kg, Start date: 17 
2:45:00 CST, Duration: 30 day, Stop date: 18 9:00:00 CSTNotes: Take with 
food.                                                        Inactive                

                                                                            2017         

                                        [facility]                    

 

                          Nitroglycerin                            0.4 mg, 1 tab, Route: SL, Drug form: TAB,

 Q5Min, Dosing Weight 90.909, kg, PRN Chest Pain, Start date: 17 2:39:00 
CST, Duration: 3 doses or times, Stop date: Limited # of timesNotes: (Same as:N
itroquick, Nitrostat) "Do Not Crush"  Sublingual tablet                            

                            Inactive                                                 

                                                2017                            [facility]  

                  

 

                          Ondansetron                            4 mg, 1 tab, Route: PO, Drug form: TABDIS, 

Q8H, Dosing Weight 90.909, kg, PRN Nausea & Vomiting, Start date: 17 
2:39:00 CST, Duration: 30 day, Stop date: 01/10/18 2:38:00 CSTNotes: (Same as: 
Zofran ODT)                                                        Inactive          

                                                                            2017   

                                        [facility]                    

 

                          Morphine                            2 mg, 1 mL, Route: IVP, Drug form: INJ, Q15Min,

 Dosing Weight 90.909, kg, PRN Chest Pain, Start date: 17 2:39:00 CST, 
Duration: 2 doses or times, Stop date: Limited # of timesNotes: (Same as:MO
RPhine Sulfate)                                                        Inactive      

                                                                              2017

                                        [facility]                    

 

                          Saline Flush 0.9%                            10 ml, Route: IVP, Drug Form: INJ, Dosing

 Weight 90.909, kg, PRN, PRN Line Flush, Start date: 17 2:39:00 CST, 
Duration: 30 day, Stop date: 01/10/18 2:38:00 CSTNotes: (Same as: BD Posiflush) 
                                                      Inactive                       

                                                                            2017                

                                        [facility]                    

 

                          Saline Flush 0.9%                            10 mL, Route: IVP, Drug Form: INJ, Dosing

 Weight 90.909, kg, PRN, PRN Line Flush, Start date: 17 0:40:00 CST, 
Duration: 30 day, Stop date: 01/10/18 0:39:00 CSTNotes: (Same as: BD Posiflush) 
                                                      Inactive                       

                                                                            2017                

                                        [facility]                    

 

                          Aspirin                            325 mg, 1 tab, Route: PO, Drug form: TAB, ONCE,

 Dosing Weight 100, kg, Priority: STAT, Start date: 17 18:27:00 CDT, Stop 
date: 17 18:27:00 CDTNotes: Take with food.                                  

                      Inactive                                                         

                           2017                            [facility]        

            

 

                          Saline Flush 0.9%                            10 mL, Route: IVP, Drug Form: INJ, Dosing

 Weight 84.545, kg, PRN, PRN Line Flush, Start date: 17 14:24:00 CDT, 
Duration: 30 day, Stop date: 10/22/17 14:23:00 CDTNotes: (Same as: BD Posiflush)
                                                       Inactive                      

                                                                            2017               

                                        [facility]                    

 

                          Levemir FlexPen                            42 unit, 0.42 mL, Route: SUB-Q, Drug form:

 INJ, Bedtime, Start date: 17 21:00:00 CST, Duration: 30 day, Stop date: 
17 21:00:00 CDTNotes: Same as Levemir Do not hold insulin without contac
ting prescriber  WASTE: F/P - Black; E - Municipal Trash Bin  "single patient 
use only"                                                        No Longer Active    

                                                                                2017

                                        [facility]                    

 

                                        3 ML Insulin Glargine 100 UNT/ML Prefilled Syringe [Lantus]                     

                                        42 unit, Route: SUB-Q, Drug form: SOLN, Bedtime, Dosing Weight 84.545, kg, Start

 date: 17 21:00:00 CST, Duration: 30 day, Stop date: 17 21:00:00 CDT
                                                        Inactive                     

                                                                            2017              

                                        [facility]                    

 

                          atorvastatin                            5 mg, 0.5 tab, Route: PO, Drug form: TAB, 

Bedtime, Dosing Weight 84.545, kg, Start date: 17 21:00:00 CST, Duration: 
30 day, Stop date: 17 21:00:00 CDTNotes: (Same As: Lipitor)               
                                                No Longer Active                              

                                                      2017                     

                                        [facility]                    

 

                          Xarelto                            20 mg, 1 tab, Route: PO, Drug form: TAB, QPM, Dosing

 Weight 84.545, kg, Start date: 17 17:00:00 CST, Duration: 30 day, Stop 
date: 17 17:00:00 CDTNotes: (Same as: Xarelto) Administer with food       
                                                      No Longer Active                    

                                                                            2017             

                                        [facility]                    

 

                          Insulin Lispro                            Route: SUB-Q, Drug form: SOLN, TID-Before

 Meals, Dosing Weight 84.545, kg, Start date: 17 11:30:00 CST, Duration: 
30 day, Stop date: 17 7:30:00 CDT                                            

                    Inactive                                                                   

                          2017                            [facility]                  

  

 

                          Insulin, Aspart, Human                            10 unit, 0.1 mL, Route: SUB-Q, Drug

 form: SOLN, TID-Before Meals, Dosing Weight 84.545, kg, PRN Blood Glucose 
Results, Start date: 17 11:13:00 CST, Duration: 30 day, Stop date: 
17 11:12:00 CDTNotes: Roll in palms of hands gently;  Do not shake 
vigorously. (Same as: NovoLOG) "single patient use only"  WASTE: F/P - Black; E 
- Municipal Trash Bin  Stable for 28 days at room temperature. Expires in _____ 
days from ______________Date                                                        No

 Longer Active                                                                       

                          2017                            [facility]                    

 

                          Dextrose 50% Syringe                            25 gm, 50 mL, Route: IVP, Drug Form:

 INJ, Dosing Weight 84.545, kg, PRN, PRN Blood Glucose Results, Start date: 
17 11:13:00 CST, Duration: 30 day, Stop date: 17 12:12:00 CDT       
                                                      No Longer Active                    

                                                                            2017             

                                        [facility]                    

 

                          Glucagon                            1 mg, Route: IM, Drug form: PDR/INJ, PRN, Dosing

 Weight 84.545, kg, PRN Blood Glucose Results, Start date: 17 11:13:00 
CST, Duration: 30 day, Stop date: 17 12:12:00 CDT                            

                            No Longer Active                                         

                                                2017                            [facility]

                    

 

                          Lasix                            40 mg, 4 mL, Route: IVP, Drug form: INJ, Daily, Dosing

 Weight 84.545, kg, Priority: NOW, Start date: 17 9:29:00 CST, Duration: 
30 day, Stop date: 17 9:00:00 CDTNotes: (Same as: Lasix)   *** MEDICATION 
WASTE *** Product Size:  40 mg Product Wasted:  ___ mg                             

                           No Longer Active                                          

                                                2017                            [facility]

                    

 

                                        potassium chloride 20 mEq oral tablet, extended release                         

                                        40 mEq, 2 tab, Route: PO, Drug form: ERTAB, Daily, Dosing Weight 84.545, kg, Priority:

 NOW, Start date: 17 9:29:00 CST, Duration: 30 day, Stop date: 17 
9:00:00 CDTNotes: (Same as: K-Dur 20) "Do Not Crush"  With food and full glass 
of water                                                        No Longer Active     

                                                                               2017

                                        [facility]                    

 

                          Sertraline                            100 mg, 1 tab, Route: PO, Drug form: TAB, Daily,

 Dosing Weight 84.545, kg, Start date: 17 9:00:00 CST, Duration: 30 day, 
Stop date: 17 9:00:00 CDTNotes: (Same as: Zoloft)                            

                            No Longer Active                                         

                                                2017                            [facility]

                    

 

                                        12 HR ranolazine 500 MG Extended Release Tablet [Ranexa]                        

                                        500 mg, 1 tab, Route: PO, Drug form: TAB, BID, Dosing Weight 84.545, kg, Start 

date: 17 9:00:00 CST, Duration: 30 day, Stop date: 17 17:00:00 
CDTNotes: Same as Ranexa "Do Not Crush"                                            

                    No Longer Active                                                           

                          2017                            [facility]          

          

 

                          pantoprazole                            40 mg, 1 tab, Route: PO, Drug form: ECTAB,

 BID, Dosing Weight 84.545, kg, Start date: 17 9:00:00 CST, Duration: 30 
day, Stop date: 17 17:00:00 CDTNotes: Tablet should not be chewed or cru
shed. (Same as: Protonix)                                                        No Longer

 Active                                                                              

                          2017                            [facility]                    

 

                          Lisinopril                            2.5 mg, 0.5 tab, Route: PO, Drug form: TAB, 

Daily, Dosing Weight 84.545, kg, Start date: 17 9:00:00 CST, Duration: 30 
day, Stop date: 17 9:00:00 CDTNotes: (Same as: Prinivil, Zestril)         
                                                      No Longer Active                      

                                                                            2017               

                                        [facility]                    

 

                          lidocaine topical 5% ointment                            1 appl, Route: TOP, TID, 

Drug form: OINT, Start date: 17 9:00:00 CST, Duration: 30 day, Stop date: 
17 17:00:00 CDTNotes: (Same as: Xylocaine)                                   

                          No Longer Active                                                

                                                2017                            [facility] 

                   

 

                          Synthroid                            50 microgram, 1 tab, Route: PO, Drug form: TAB,

 Daily, Dosing Weight 84.545, kg, Start date: 17 9:00:00 CST, Duration: 30
 day, Stop date: 17 9:00:00 CDTNotes: Take 1 hour before or 2 hours after 
meal; Enteral feeds may interefere with the absorption of this medication.(Same 
as:Levothroid, Synthroid)                                                        No Longer

 Active                                                                              

                          2017                            [facility]                    

 

                          Imdur                            30 mg, 1 tab, Route: PO, Drug form: ERTAB, QAM, Dosing

 Weight 84.545, kg, Start date: 17 9:00:00 CST, Duration: 30 day, Stop 
date: 17 9:00:00 CDTNotes: (Same as:Imdur) "Do Not Crush&quot;  Take on 
empty stomach/ full glass of water.  Do not crush                                  

                          No Longer Active                                               

                                                2017                            [facility]

                    

 

                          gabapentin 600 MG Oral Tablet                            600 mg, 2 cap, Route: PO,

 Drug form: CAP, TID, Dosing Weight 84.545, kg, Start date: 17 9:00:00 
CST, Duration: 30 day, Stop date: 17 17:00:00 CDTNotes: (Same as: 
Neurontin)                                                        No Longer Active   

                                                                                 2017

                                        [facility]                    

 

                          Benadryl                            25 mg, 1 tab, Route: PO, Drug form: TAB, Daily,

 Dosing Weight 84.545, kg, Start date: 17 9:00:00 CST, Duration: 30 day, 
Stop date: 17 9:00:00 CDT                                                    

                    No Longer Active                                                                   

                          2017                            [facility]                  

  

 

                          clopidogrel                            75 mg, 1 tab, Route: PO, Drug form: TAB, Daily,

 Dosing Weight 84.545, kg, Start date: 17 9:00:00 CST, Duration: 30 day, 
Stop date: 17 9:00:00 CDTNotes: (Same As: Plavix)                            

                            No Longer Active                                         

                                                2017                            [facility]

                    

 

                          Amiodarone                            200 mg, 1 tab, Route: PO, Drug form: TAB, Daily,

 Dosing Weight 84.545, kg, Start date: 17 9:00:00 CST, Duration: 30 day, 
Stop date: 17 9:00:00 CDTNotes: (Same as: Cordarone)                      
                                                No Longer Active                                     

                                                2017                            [facility] [facility]                    

 

                                        Acetaminophen 325 MG / Hydrocodone Bitartrate 10 MG Oral Tablet [Norco 10/325]  

                                        1 tab, Route: PO, Drug Form: TAB, Dosing Weight 84.545, kg,

 QID, PRN Pain Score 4-6, Start date: 17 8:13:00 CST, Duration: 30 day, 
Stop date: 17 8:12:00 CDTNotes: Do not exceed 4gm/day of acetaminophen.  
(Same as: Norco 325/10)                                                        No Longer

 Active                                                                              

                          2017                            [facility]                    

 

                          potassium chloride                            40 mEq, 30 mL, Route: PO, Drug form:

 LIQ, ONCE, Dosing Weight 102.5, kg, PRN Abnormal Lab Result, Electrolyte 
replacement, Start date: 17 7:46:00 CSTNotes: (Same as: Potassium 
Chloride)                                                        Inactive            

                                                                            2017     

                                        [facility]                    

 

                          Sodium Chloride 0.154 MEQ/ML Injectable Solution                            1,000 

mL, Rate: 25 ml/hr, Infuse over: 40 hr, Route: IV, Dosing Weight 102.5 kg, Total
Volume: 1,000, Start date: 01/10/17 10:38:00 CST, Duration: 1 day, Stop date: 
17 10:37:00 CST                                                        Inactive

                                                                                    01/10/2017

                                        [facility]                    

 

                          polyethylene glycol 3350 with electrolytes                            4 Liter, Route:

 PO, Drug Form: PDR/REC, Dosing Weight 102.5, kg, ONCE, Start date: 17 
11:11:00 CST, Duration: 1 doses or times, Stop date: 17 11:11:00 CSTNotes:
(polyethylene glycol electrolyte solution 4 Liter  bottle)  (Same as: Golytely, 
Colyte)                                                        Inactive              

                                                                            2017       

                                        [facility]                    

 

                                        Acetaminophen 325 MG / Hydrocodone Bitartrate 10 MG Oral Tablet                 

                                        1 tab, Route: PO, Drug Form: TAB, Dosing Weight 102.5, kg, Q4H, PRN Pain

 Score 1-3, Start date: 17 10:26:00 CST, Duration: 30 day, Stop date: 
17 10:25:00 CSTNotes: Do not exceed 4gm/day of acetaminophen.  (Same as: 
Norco 325/10)                                                        No Longer Active

                                                                                    2017

                                        [facility]                    

 

                          Dilaudid                            0.5 mg, 0.5 mL, Route: IVP, Drug form: INJ, Q4H,

 Dosing Weight 102.5, kg, PRN Pain Score 7-10, Start date: 17 10:25:00 
CST, Duration: 30 day, Stop date: 17 10:24:00 CST                            

                            No Longer Active                                         

                                                2017                            [facility]

                    

 

                          Morphine                            2 mg, 0.5 mL, Route: IVP, Drug form: SOLN, ONCE,

 Dosing Weight 102.5, kg, Start date: 17 17:35:00 CST, Stop date: 17
17:35:00 CSTNotes: (Same as:MORPhine Sulfate)                                      

                    Inactive                                                             

                          2017                            [facility]            

        

 

                          lidocaine topical 5% ointment                            1 appl, Route: TOP, TID, 

Drug form: OINT, Start date: 17 13:00:00 CST, Duration: 30 day, Stop date:
17 9:00:00 CSTNotes: (Same as: Xylocaine)                                    

                          No Longer Active                                                 

                                                2017                            [facility]  

                  

 

                          Sodium Chloride 0.154 MEQ/ML Injectable Solution                            250 mL,

 Rate: 30 ml/hr, Infuse over: 8.3 hr, Route: IV, Dosing Weight 102.5 kg, Total 
Volume: 250, Start date: 17 10:11:00 CST, Duration: 30 day, Stop date: 
17 10:10:00 CST                                                        No Longer

 Active                                                                              

                          2017                            [facility]                    

 

                          Lasix                            40 mg, 4 mL, Route: IVP, Drug form: INJ, Q12H, Dosing

 Weight 102.5, kg, Start date: 17 9:00:00 CST, Duration: 30 day, Stop 
date: 17 21:00:00 CSTNotes: (Same as: Lasix)   *** MEDICATION WASTE *** 
Product Size:  40 mg Product Wasted:  ___ mg                                       

                          No Longer Active                                                    

                                                2017                            [facility]     

               

 

                          Aspirin 325 MG Enteric Coated Tablet                            325 mg, Route: PO,

 Drug form: ECTAB, Daily, Dosing Weight 102.5, kg, Start date: 17 9:00:00 
CST, Duration: 30 day, Stop date: 17 9:00:00 CST                             

                           No Longer Active                                          

                                                2017                            [facility]

                    

 

                          Klor-Con                            20 mEq, 1 tab, Route: PO, Drug form: ERTAB, TID,

 Dosing Weight 102.5, kg, Start date: 17 9:00:00 CST, Duration: 30 day, 
Stop date: 17 17:00:00 CSTNotes: (Same as: K-Dur 20) "Do Not Crush"  With 
food and full glass of water                                                        No

 Longer Active                                                                       

                          2017                            [facility]                    

 

                          lidocaine topical 5% ointment                            1 appl, TOP, TID, # 35 gm,

 0 Refill(s)                                                        Active           

                                                                            2017    

                                        [facility]                    

 

                          Atropine                            0.5 mg, 5 mL, Route: IV, Drug form: INJ, PRN, 

Dosing Weight 102.5, kg, PRN Bradycardia, Start date: 17 1:45:00 CST, 
Duration: 30 day, Stop date: 17 1:44:00 CST                                  

                          No Longer Active                                               

                                                2017                            [facility]

                    

 

                          Nitroglycerin 0.4 MG Sublingual Tablet                            0.4 mg, 1 tab, Route:

 SL, Drug form: TAB, Q5Min, Dosing Weight 102.5, kg, PRN Chest Pain, Start date:
17 1:45:00 CST, Duration: 30 day, Stop date: 17 1:44:00 CSTNotes: 
(Same as:Nitroquick, Nitrostat) "Do Not Crush"  Sublingual tablet               
                                                No Longer Active                               

                                                      2017                      

                                        [facility]                    

 

                          Saline Flush 0.9%                            10 ml, Route: IVP, Drug Form: INJ, Dosing

 Weight 102.5, kg, Q12H, Start date: 17 21:00:00 CST, Duration: 30 day, 
Stop date: 17 9:00:00 CSTNotes: (Same as: BD Posiflush)                   
                                                No Longer Active                                   

                                                 2017                            

[facility]                    

 

                          *Rn pls get Fleet Enema from [facility]*                            *Rn pls get Fleet Enema

 from [facility]*, Reminder, Drug form: MISC, Route: MISC, Q3H, 17 18:49:00 CST, 
Stop date: 17 0:00:00 CST                                                    

                    Inactive                                                                           

                          2017                            [facility]                    

 

                          Fleet Enema                            133 mL, Route: TX, Dosing Weight 102.5, kg,

 ONCE, Start date: 17 18:19:00 CST, Stop date: 17 18:19:00 CST      
                                                 Inactive                              

                                                      2017                     

                                        [facility]                    

 

                          Saline Flush 0.9%                            10 ml, Route: IVP, Drug Form: INJ, Dosing

 Weight 102.5, kg, PRN, PRN Line Flush, Start date: 17 14:52:00 CST, 
Duration: 30 day, Stop date: 17 14:51:00 CSTNotes: (Same as: BD Posiflush)
                                                       No Longer Active              

                                                                            2017       

                                        [facility]                    

 

                          Draw vancomcyin trough level 0-30 min prior to dose                            Draw

 vancomcyin trough level 0-30 min prior to dose, see order comments, Drug form: 
MISC, Route: MISC, Q24H, 17 9:00:00 CST, Duration: 1 doses or times, Stop 
date: 17 9:00:00 CST                                                        Inactive

                                                                                    2017

                                        [facility]                    

 

                          Deep Sea Nasal Spray                            1 spray, Route: Each Affected Nostril,

 PRN, Drug form: SOLN, PRN Congestion, Start date: 17 8:45:00 CST, 
Duration: 30 day, Stop date: 17 8:44:00 CSTNotes: (Same as: Gordon, Deep 
Sea Nasal Spray).                                                        No Longer Active

                                                                                    2017

                                        [facility]                    

 

                                        Sodium Chloride 0.111 MEQ/ML Nasal Spray [Ocean brand of sodium chloride]       

                                        1 appl, Route: Each Affected Nostril, PRN, Drug form: GEL, PRN

 Nasal dryness, Start date: 17 8:29:00 CST, Duration: 30 day, Stop date: 
17 8:28:00 CSTNotes: (Same As: Ayr)                                          

                    Inactive                                                                 

                          2017                            [facility]                

    

 

                          Lasix                            40 mg, 4 mL, Route: IVP, Drug form: INJ, Q8H, Dosing

 Weight 102.5, kg, Start date: 17 8:00:00 CST, Duration: 30 day, Stop 
date: 17 0:00:00 CSTNotes: (Same as: Lasix)   *** MEDICATION WASTE *** 
Product Size:  40 mg Product Wasted:  ___ mg                                       

                          No Longer Active                                                    

                                                2017                            [facility]     

               

 

                          Ativan                            0.5 mg, 1 tab, Route: PO, Drug form: TAB, TID, Dosing

 Weight 102.5, kg, PRN Anxiety, Start date: 17 7:55:00 CST, Duration: 30 
day, Stop date: 17 7:54:00 CSTNotes: (Same as: Ativan)                    
                                                No Longer Active                                    

                                                2017                            [facility]                    

 

                          Miralax                            17 gm, 1 pkt, Route: PO, Drug form: PWDR, BID, 

Dosing Weight 102.5, kg, BID until BM, Start date: 17 9:00:00 CST, 
Duration: 30 day, Stop date: 17 17:00:00 CSTNotes: Dissolve in 8 oz of wa
ter or juice. (Same as: Miralax)                                                   

                    No Longer Active                                                                  

                          2017                            [facility]                 

   

 

                          Lasix                            40 mg, 4 mL, Route: IVP, Drug form: INJ, Daily, Dosing

 Weight 102.5, kg, Start date: 17 9:00:00 CST, Duration: 30 day, Stop 
date: 17 9:00:00 CSTNotes: (Same as: Lasix)   *** MEDICATION WASTE *** 
Product Size:  40 mg Product Wasted:  ___ mg                                       

                          No Longer Active                                                    

                                                2017                            [facility]     

               

 

                          Furosemide                            40 mg, 4 mL, Route: IVP, Drug form: INJ, ONCE,

 Dosing Weight 102.5, kg, Start date: 17 20:33:00 CST, Stop date: 17
20:33:00 CSTNotes: (Same as: Lasix)   *** MEDICATION WASTE *** Product Size:  40
mg Product Wasted:  ___ mg                                                        Inactive

                                                                                    2017

                                        [facility]                    

 

                          Vancomycin                            1,000 mg, Route: IVPB, [id number], Dosing Weight

 92.727, kg, Start date: 17 10:00:00 CST, Duration: 30 day, Stop date: 
17 22:00:00 CSTNotes: TIME CRITICAL MEDICATION (Same As: Vancocin)  Infusi
on rate  2001 mg: infuse over 2.5 hours   *** MEDICATION WASTE *** Product Size:
 1000 mg Product Wasted:  ___ mg                                                   

                    No Longer Active                                                                  

                          2017                            [facility]                 

   

 

                          Insulin, Aspart, Human                            4 unit, 0.04 mL, Route: SUB-Q, Drug

 form: SOLN, Bedtime, Dosing Weight 92.727, kg, PRN Blood Glucose Results, Start
date: 17 9:41:00 CST, Duration: 30 day, Stop date: 17 9:40:00 
CSTNotes: Roll in palms of hands gently;  Do not shake vigorously. (Same as: 
NovoLOG) "single patient use only"  WASTE: F/P - Black; E - Municipal Trash Bin 
Stable for 28 days at room temperature. Expires in _____ days from 
______________Date                                                        No Longer Active

                                                                                    2017

                                        [facility]                    

 

                          Dextrose 50% Syringe                            25 gm, 50 mL, Route: IVP, Drug Form:

 INJ, Dosing Weight 92.727, kg, PRN, PRN Blood Glucose Results, Start date: 
17 9:27:00 CST, Duration: 30 day, Stop date: 17 9:26:00 CST         
                                                      No Longer Active                       

                                                                            2017                

                                        [facility]                    

 

                          Insulin, Aspart, Human                            15 unit, 0.15 mL, Route: SUB-Q, 

Drug form: SOLN, TID-Before Meals, Dosing Weight 92.727, kg, PRN Blood Glucose 
Results, Start date: 17 9:27:00 CST, Duration: 30 day, Stop date: 17
9:26:00 CSTNotes: Roll in palms of hands gently;  Do not shake vigorously. (Same
as: NovoLOG) "single patient use only"  WASTE: F/P - Black; E - Municipal Trash 
Bin  Stable for 28 days at room temperature. Expires in _____ days from 
______________Date                                                        No Longer Active

                                                                                    2017

                                        [facility]                    

 

                          insulin detemir                            20 unit, 0.2 mL, Route: SUB-Q, Drug form:

 INJ, Q12H, Dosing Weight 92.727, kg, Priority: STAT, Start date: 17 
9:27:00 CST, Duration: 30 day, Stop date: 17 9:00:00 CSTNotes: Same as 
Levemir Do not hold insulin without contacting prescriber  WASTE: F/P - Black; E
- Municipal Trash Bin  "single patient use only"                                   

                          No Longer Active                                                

                                                2017                            [facility] 

                   

 

                          Glucagon                            1 mg, Route: IM, Drug form: PDR/INJ, PRN, Dosing

 Weight 92.727, kg, PRN Blood Glucose Results, Start date: 17 9:27:00 CST,
Duration: 30 day, Stop date: 17 9:26:00 CST                                  

                          No Longer Active                                               

                                                2017                            [facility]

                    

 

                          Multaq                            400 mg, 1 tab, Route: PO, Drug form: TAB, BID, Dosing

 Weight 92.727, kg, Start date: 17 9:00:00 CST, Duration: 30 day, Stop 
date: 17 21:00:00 CSTNotes: Same as: Multaq                                  

                      Inactive                                                         

                           2017                            [facility]        

            

 

                          Fentanyl                            25 microgram, 0.5 mL, Route: IV, Drug form: INJ,

 Q4H, Dosing Weight 92.727, kg, PRN Pain Score 7-10, Start date: 17 
3:28:00 CST, Duration: 30 day, Stop date: 17 3:27:00 CSTNotes: (Same as: 
Sublimaze)  Preservative free.                                                     

                    No Longer Active                                                                    

                          2017                            [facility]                   

 

 

                          Lactulose                            30 ml, Route: OGT, Drug Form: SYRP, Dosing Weight

 92.727, kg, TID, Start date: 17 13:00:00 CST, Duration: 30 day, Stop 
date: 17 9:00:00 CST                                                        Inactive

                                                                                    2017

                                        [facility]                    

 

                          sodium phosphate + D5W 235 mL                            45 mmol, 15 mL, Route: IVPB,

 PRN, Dosing Weight 92.727, kg, PRN Abnormal Lab Result, Start date: 17 
10:13:00 CST, Duration: 30 day, Stop date: 17 10:12:00 CST, FOR ICU USE 
ONLY                                                        No Longer Active         

                                                                            2017  

                                        [facility]                    

 

                          sodium phosphate + D5W 245 mL                            15 mmol, 5 mL, Route: IVPB,

 PRN, Dosing Weight 92.727, kg, PRN Abnormal Lab Result, Start date: 17 
10:13:00 CST, Duration: 30 day, Stop date: 17 10:12:00 CST, FOR ICU USE 
ONLY                                                        No Longer Active         

                                                                            2017  

                                        [facility]                    

 

                          sodium phosphate + D5W 240 mL                            30 mmol, 10 mL, Route: IVPB,

 PRN, Dosing Weight 92.727, kg, PRN Abnormal Lab Result, Start date: 17 
10:13:00 CST, Duration: 30 day, Stop date: 17 10:12:00 CST, FOR ICU USE 
ONLY                                                        No Longer Active         

                                                                            2017  

                                        [facility]                    

 

                          potassium chloride                            20 mEq, 15 mL, Route: NJ, Drug form:

 LIQ, PRN, Dosing Weight 92.727, kg, PRN Abnormal Lab Result, Start date: 
17 10:13:00 CST, Duration: 30 day, Stop date: 17 10:12:00 CST, FOR 
ICU USE ONLYNotes: (Same as: Potassium Chloride)                                   

                          No Longer Active                                                

                                                2017                            [facility] 

                   

 

                          potassium phosphate + sodium chloride 0.9%  mL                            30

 mmol, 10 mL, Route: IVPB, PRN, Dosing Weight 92.727, kg, PRN Abnormal Lab 
Result, Start date: 17 10:13:00 CST, Duration: 30 day, Stop date: 17
10:12:00 CST, FOR ICU USE ONLYNotes: (Same as: K Phosphate.)  1 mMol phoshate 
has 1.47 mEq potassium  Infuse over 4 hours                                        

                          No Longer Active                                                     

                                                2017                            [facility]      

              

 

                          Magnesium Sulfate                            2 gm, 50 mL, Route: IVPB, Drug form: 

INJ, PRN, Dosing Weight 92.727, kg, PRN Abnormal Lab Result, Start date: 
17 10:13:00 CST, Duration: 30 day, Stop date: 17 10:12:00 CST, FOR 
ICU USE ONLYNotes: WASTE: F/P - Sink; E - Municipal Trash Bin                   
                                                No Longer Active                                   

                                                 2017                            

[facility]                    

 

                                        potassium phosphate-sodium phosphate 250 mg-280 mg-160 mg oral powder for reconstitution

                                        2 pkt, Route: PO, Drug Form: PDR/REC, Dosing Weight 92.727,

 kg, PRN, PRN Abnormal Lab Result, FOR ICU USE ONLY, Start date: 17 
10:13:00 CST, Duration: 30 day, Stop date: 17 10:12:00 CSTNotes: (Same as:
Phos-NaK)  Each 1.5 gm pkt has 250mg phosphorous. Mix w/2.5oz water and stir.   
                                                      No Longer Active                 

                                                                            2017          

                                        [facility]                    

 

                          potassium phosphate + sodium chloride 0.9%  mL                            45

 mmol, 15 mL, Route: IVPB, PRN, Dosing Weight 92.727, kg, PRN Abnormal Lab 
Result, Start date: 17 10:13:00 CST, Duration: 30 day, Stop date: 17
10:12:00 CST, FOR ICU USE ONLYNotes: (Same as: K Phosphate.)  1 mMol phoshate 
has 1.47 mEq potassium  Infuse over 4 hours                                        

                          No Longer Active                                                     

                                                2017                            [facility]      

              

 

                          Magnesium Oxide                            800 mg, 2 tab, Route: PO, Drug form: TAB,

 PRN, Dosing Weight 92.727, kg, PRN Abnormal Lab Result, FOR ICU USE ONLY, Start
date: 17 10:13:00 CST, Duration: 30 day, Stop date: 17 10:12:00 CSTN
otes: (Same as: Mag-Ox 400) Magnesium oxide [id number] elemental magnesium 
Dose=____mg magnesium oxide (___mg elemental magnesium)                            

                            No Longer Active                                         

                                                2017                            [facility]

                    

 

                          Calcium Carbonate 500 MG Chewable Tablet                            500 mg, 1 tab,

 Route: PO, Drug form: CHEWTAB, PRN, Dosing Weight 92.727, kg, PRN Abnormal Lab 
Result, FOR ICU USE ONLY, Start date: 17 10:13:00 CST, Duration: 30 day, 
Stop date: 17 10:12:00 CSTNotes: (Same As: Tums) Calcium Carbonate 500 
qx=710 mg elemental calcium   Dose=______ mg calcium carbonate (______ mg 
elemental calcium)                                                        No Longer Active

                                                                                    2017

                                        [facility]                    

 

                          Calcium Gluconate                            1 gm, 10 mL, Route: IVPB, PRN, Dosing

 Weight 92.727, kg, PRN Abnormal Lab Result, Start date: 17 10:13:00 CST, 
Duration: 30 day, Stop date: 17 10:12:00 CST, FOR ICU USE ONLYNotes: 
WASTE: F/P - Sink; E - Municipal Trash Bin                                         

                    No Longer Active                                                        

                            2017                            [facility]       

             

 

                          potassium chloride                            20 mEq, 100 mL, Route: IVPB, Drug form:

 INJ, PRN, Dosing Weight 92.727, kg, PRN Abnormal Lab Result, Via central line, 
Start date: 17 19:45:00 CST, Duration: 30 day, Stop date: 17 19:44:0
0 CST, FOR ICU USE ONLYNotes: (Same as: KCL)  Infuse no faster than 10 mEq/hr if
given peripherally.                                                        No Longer 

Active                                                                               

                          2017                            [facility]                    

 

                          sodium phosphate + D5W 250 mL                            15 mmol, 5 mL, Route: IVPB,

 PRN, Dosing Weight 92.727, kg, PRN Abnormal Lab Result, Start date: 17 
19:45:00 CST, Duration: 30 day, Stop date: 17 19:44:00 CST, FOR ICU USE 
ONLY                                                        No Longer Active         

                                                                            2017  

                                        [facility]                    

 

                          potassium phosphate + sodium chloride 0.9%  mL                            45

 mmol, 15 mL, Route: IVPB, PRN, Dosing Weight 92.727, kg, PRN Abnormal Lab 
Result, Start date: 17 19:45:00 CST, Duration: 30 day, Stop date: 17
19:44:00 CST, FOR ICU USE ONLYNotes: (Same as: K Phosphate.)  1 mMol phoshate 
has 1.47 mEq potassium  Infuse over 4 hours                                        

                          No Longer Active                                                     

                                                2017                            [facility]      

              

 

                                        potassium phosphate-sodium phosphate 250 mg-280 mg-160 mg oral powder for reconstitution

                                        2 pkt, Route: PO, Drug Form: PDR/REC, Dosing Weight 92.727,

 kg, PRN, PRN Abnormal Lab Result, FOR ICU USE ONLY, Start date: 17 
19:45:00 CST, Duration: 30 day, Stop date: 17 19:44:00 CSTNotes: (Same as:
Phos-NaK)  Each 1.5 gm pkt has 250mg phosphorous. Mix w/2.5oz water and stir.   
                                                      No Longer Active                 

                                                                            2017          

                                        [facility]                    

 

                          Calcium Carbonate 500 MG Chewable Tablet                            1,000 mg, 2 tab,

 Route: PO, Drug form: CHEWTAB, PRN, Dosing Weight 92.727, kg, PRN Abnormal Lab 
Result, FOR ICU USE ONLY, Start date: 17 19:45:00 CST, Duration: 30 day, 
Stop date: 17 19:44:00 CSTNotes: (Same As: Tums) Calcium Carbonate 500 
oz=521 mg elemental calcium   Dose=______ mg calcium carbonate (______ mg 
elemental calcium)                                                        No Longer Active

                                                                                    2017

                                        [facility]                    

 

                          Calcium Gluconate                            1 gm, 10 mL, Route: IVPB, PRN, Dosing

 Weight 92.727, kg, PRN Abnormal Lab Result, Start date: 17 19:45:00 CST, 
Duration: 30 day, Stop date: 17 19:44:00 CST, FOR ICU USE ONLYNotes: 
WASTE: F/P - Sink; E - [employer] Trash Bin                                         

                    No Longer Active                                                        

                            2017                            [facility]       

             

 

                          Magnesium Oxide                            800 mg, 2 tab, Route: PO, Drug form: TAB,

 PRN, Dosing Weight 92.727, kg, PRN Abnormal Lab Result, FOR ICU USE ONLY, Start
date: 17 19:45:00 CST, Duration: 30 day, Stop date: 17 19:44:00 CSTN
otes: (Same as: Mag-Ox 400) Magnesium oxide [id number] elemental magnesium 
Dose=____mg magnesium oxide (___mg elemental magnesium)                            

                            No Longer Active                                         

                                                2017                            [facility]

                    

 

                          Magnesium Sulfate                            2 gm, 50 mL, Route: IVPB, Drug form: 

INJ, PRN, Dosing Weight 92.727, kg, PRN Abnormal Lab Result, Start date: 
17 19:45:00 CST, Duration: 30 day, Stop date: 17 19:44:00 CST, FOR 
ICU USE ONLYNotes: WASTE: F/P - Sink; E - [employer] Trash Bin                   
                                                No Longer Active                                   

                                                 2017                            

[facility]                    

 

                          pantoprazole                            40 mg, Route: IVP, Drug form: INJ, Daily, 

Dosing Weight 92.727, kg, Patient is NPO, Start date: 17 9:00:00 CST, 
Duration: 30 day, Stop date: 17 9:00:00 CSTNotes: For IV push reconstitute
with 10 ml 0.9% sodium chloride and push over 2 minutes. (Same as: Protonix)    
                                                      No Longer Active                  

                                                                            2017           

                                        [facility]                    

 

                          Dextrose 50% Syringe                            12.5 gm, 25 mL, Route: IVP, Drug Form:

 INJ, Dosing Weight 92.727, kg, PRN, PRN Blood Glucose Results, Start date: 
17 1:04:00 CST, Duration: 30 day, Stop date: 17 1:03:00 CST         
                                                      No Longer Active                       

                                                                            2017                

                                        [facility]                    

 

                                        Insulin regular 100 unit + sodium chloride 0.9% INJ 99 mL                       

                                        99 mL, Rate: Start Insulin Drip Per ICU Protocol, Dosing Weight 92.727, kg, Route:

 IVPB, Total Volume: 100, Start Date: 17 1:04:00 CST, Duration: 30 day, 
Stop date: 17 1:03:00 CST, Replace Every: 24 hrNotes: (Same as: Humulin R 
and NovoLIN R)  WASTE: F/P - Black; E - Municipal Trash Bin  (Do not shake)     
                                                      No Longer Active                   

                                                                            2017            

                                        [facility]                    

 

                          Vancomycin                            1 gm, Route: IV, ONCE, Dosing Weight 92.727,

 kg, Start date: 17 1:02:00 CST, Stop date: 17 1:02:00 CSTNotes: 
TIME CRITICAL MEDICATION (Same As: Vancocin)  Infusion rate  2001 mg: infuse o
jon 2.5 hours   *** MEDICATION WASTE *** Product Size:  1000 mg Product Wasted: 
___ mg                                                        Inactive               

                                                                            2017        

                                        [facility]                    

 

                          Zosyn                            3.375 gm, Route: IVPB, ABXQ8H, Dosing Weight 92.727,

 kg, CrCl >=20 ml/min infuse over 4 hours, Start date: 17 1:00:00 CST, 
Duration: 30 day, Stop date: 17 1:30:00 CSTNotes: (Same as: Zosyn)  Dosing
based on Piperacillin component                                                    

                    No Longer Active                                                                   

                          2017                            [facility]                  

  

 

                          Insulin, Aspart, Human                            12 unit, 0.12 mL, Route: SUB-Q, 

Drug form: SOLN, Sliding Scale, Dosing Weight 92.727, kg, PRN Blood Glucose 
Results, Start date: 16 19:53:00 CST, Duration: 30 day, Stop date: 
17 19:52:00 CSTNotes: Roll in palms of hands gently;  Do not shake 
vigorously. (Same as: NovoLOG) "single patient use only"  WASTE: F/P - Black; E 
- Municipal Trash Bin  Stable for 28 days at room temperature. Expires in _____ 
days from ______________Date                                                        No

 Longer Active                                                                       

                          2017                            [facility]                    

 

                          Glucagon                            1 mg, Route: IM, Drug form: PDR/INJ, PRN, Dosing

 Weight 92.727, kg, PRN Blood Glucose Results, Start date: 16 19:53:00 
CST, Duration: 30 day, Stop date: 17 19:52:00 CST                            

                            No Longer Active                                         

                                                2017                            [facility]

                    

 

                          Dextrose 50% Syringe                            25 gm, 50 mL, Route: IVP, Drug Form:

 INJ, Dosing Weight 92.727, kg, PRN, PRN Blood Glucose Results, Start date: 
16 19:53:00 CST, Duration: 30 day, Stop date: 17 19:52:00 CST       
                                                      No Longer Active                     

                                                                            2017              

                                        [facility]                    

 

                          Fentanyl                            1,250 microgram, 250 mL, Rate: Titrate, Start 

Dose: 50 microgram/hr, Titration: 25 micrograms/hour every 15 minutes, Goal(s): 
-2, Max Dose: 300 microgram/hr, Route: IV, Dosing Weight 92.727 kg, Total Vo
lume: 250, Start date: 16 19:33:00 CST, Dur...Notes: Concentration: 5 
microgram / ml                                                        No Longer Active

                                                                                    2017

                                        [facility]                    

 

                          Midazolam                            50 mg, 50 mL, Rate: Titrate, Start Dose: 1 mg/hr,

 Titration: Rebolus 1 mg IV and/or Titrate by 1 milligram/hour every 30 minutes,
Goal(s): -2, Max Dose: 10 mg/hr, Route: IV, Dosing Weight 92.727 kg, Total 
Volume: 50, Start date: 16 19:33:00 CST,...Notes: (Same as: Versed)       
                                                      No Longer Active                     

                                                                            2017              

                                        [facility]                    

 

                                        sodium bicarbonate 8.4% additive 150 mEq + D5W 1,000 mL                         

                                        1,000 mL, Rate: 100 ml/hr, Infuse over: 11.5 hr, Route: IV, Dosing Weight 92.727

 kg, Total Volume: 1,150, Start date: 16 19:27:00 CST, Duration: 30 day, 
Stop date: 17 19:26:00 CSTNotes: (sodium bicarb 8.4% (1 mEq/ml) 50 ml VL) 
                                                      No Longer Active               

                                                                            2017        

                                        [facility]                    

 

                          Sodium Chloride 0.154 MEQ/ML Injectable Solution                            1,000 

mL, 1,000 ml/hr, Infuse Over: 1 hr, Route: IV, 1,000, Drug form: INJ, ONCE, 
Priority: STAT, Dosing Weight 92.727 kg, Start date: 16 19:22:00 CST, 
Duration: 1 doses or times, Stop date: 16 19:22:00 CST                    
                                                Inactive                                            

                                                2017                            [facility]

                    

 

                                        EPINEPHrine 1 mg/1 ml (PF) INJ AMP 8 mg + sodium chloride 0.9%  mL       

                                        8 mg, 8 mL, Rate: Titrate, Start Dose: 5 microgram/min, Titration:

 0.5 microgram/min every 2 min, Goal(s): MAP >=65 mmHg, Max Dose: 35 
microgram/min, Route: IV, Dosing Weight 92.727 kg, Total Volume: 250, Priority: 
STAT, Start date: 16 19:17:00...Notes: (Same as: Adrenalin)  [employer] - 
Injectable drug used as inhalation treatment.   *** MEDICATION WASTE *** Product
Size:  1 mg Product Wasted:  ___ mg                                                

                    No Longer Active                                                               

                          2017                            [facility]              

      

 

                          Dopamine                            800 mg, 250 mL, Rate: Titrate, Start Dose: 5 microgram/kg/min,

 Titration: 2.5 microgram/kg/min every 15 minutes, Goal(s): MAP >=65 mmHg, Max 
Dose: 20 microgram/kg/min, Route: IV, Dosing Weight 92.727 kg, Total Volume: 
250, Start date: 16 17:42:...Notes: (Same as: Intropin) Administer by 
either central venous catheter or peripherally-inserted central catheter (PICC) 
line.  Final conc=3.2 mg/ml. Premix solution.                                      

                          No Longer Active                                                   

                                                2016                            [facility]    

                

 

                          Ativan                            2 mg, 1 mL, Route: IVP, Drug form: INJ, ONCE, Dosing

 Weight 92.727, kg, PRN Anxiety, Priority: STAT, Start date: 16 16:59:00 
CSTNotes: (Same as: Ativan)                                                        Inactive

                                                                                    2016

                                        [facility]                    

 

                          Norepinephrine                            8 mg, 8 mL, Rate: Titrate, Start Dose: 5

 microgram/min, Titration: 2 microgram/min every 2-5 minutes, Goal(s): MAP >=65 
mmHg, Max Dose: 70 microgram/min, Route: IV, Dosing Weight 92.727 kg, Total Vol
ume: 250, Start date: 16 16:58:00 CST, Durati...Notes: Not for direct 
administration - DILUTE. Protect from light. (Same as:Levophed). Administer by 
either central venous catheter or peripherally-inserted central catheter (PICC) 
line.                                                        No Longer Active        

                                                                            2016 

                                        [facility]                    

 

                          Flexeril                            10 mg, 1 tab, Route: PO, Drug form: TAB, TID, 

Dosing Weight 92.727, kg, PRN Spasm, Start date: 16 11:08:00 CST, 
Duration: 30 day, Stop date: 17 11:07:00 CSTNotes: (Same As: Flexeril)    
                                                      No Longer Active                  

                                                                            2016           

                                        [facility]                    

 

                          Flexeril                            10 mg, Route: PO, Drug form: TAB, TID, Dosing 

Weight 92.727, kg, PRN Spasm, Start date: 16 10:59:00 CST, Duration: 30 
day, Stop date: 17 10:58:00 CST                                              

                    Inactive                                                                     

                          2016                            [facility]                    



 

                          Lisinopril                            2.5 mg, 0.5 tab, Route: PO, Drug form: TAB, 

Daily, Dosing Weight 92.727, kg, Start date: 16 9:00:00 CST, Duration: 30 
day, Stop date: 17 9:00:00 CSTNotes: (Same as: Prinivil, Zestril)         
                                                      No Longer Active                       

                                                                            2016                

                                        [facility]                    

 

                          Imdur                            30 mg, 1 tab, Route: PO, Drug form: ERTAB, QAM, Dosing

 Weight 92.727, kg, Start date: 16 9:00:00 CST, Duration: 30 day, Stop 
date: 17 9:00:00 CSTNotes: (Same as:Imdur) "Do Not Crush&quot;  Take on 
empty stomach/ full glass of water.  Do not crush                                  

                          No Longer Active                                               

                                                2016                            [facility]

                    

 

                          Synthroid                            50 microgram, 1 tab, Route: PO, Drug form: TAB,

 Q630AM, Dosing Weight 92.727, kg, Start date: 16 6:30:00 CST, Duration: 
30 day, Stop date: 17 6:30:00 CSTNotes: Take 1 hour before or 2 hours 
after meal; Enteral feeds may interefere with the absorption of this 
medication.(Same as:Levothroid, Synthroid)                                         

                    No Longer Active                                                        

                            2016                            [facility]       

             

 

                          Levemir FlexPen                            42 unit, 0.42 mL, Route: SUB-Q, Drug form:

 INJ, Bedtime, Start date: 16 21:00:00 CST, Duration: 30 day, Stop date: 
17 21:00:00 CSTNotes: Same as Levemir Do not hold insulin without contac
ting prescriber  WASTE: F/P - Black; E - Municipal Trash Bin  "single patient 
use only"                                                        No Longer Active    

                                                                                2016

                                        [facility]                    

 

                          niacin                            2,000 mg, 4 tab, Route: PO, Drug form: ERTAB, Bedtime,

 Dosing Weight 92.727, kg, Start date: 16 21:00:00 CST, Duration: 30 day, 
Stop date: 17 21:00:00 CSTNotes: (Same as: Niaspan) "Do Not Crush"  Non-
Formulary Item  With food.                                                        No 

Longer Active                                                                        

                          2016                            [facility]                    

 

                                        3 ML Insulin Glargine 100 UNT/ML Prefilled Syringe [Lantus]                     

                                        42 unit, Route: SUB-Q, Drug form: SOLN, Bedtime, Dosing Weight 92.727, kg, Start

 date: 16 21:00:00 CST, Duration: 30 day, Stop date: 17 21:00:00 CST
                                                        Inactive                     

                                                                            2016              

                                        [facility]                    

 

                          atorvastatin                            5 mg, 0.5 tab, Route: PO, Drug form: TAB, 

Bedtime, Dosing Weight 92.727, kg, Start date: 16 21:00:00 CST, Duration: 
30 day, Stop date: 17 21:00:00 CSTNotes: (Same As: Lipitor)               
                                                No Longer Active                              

                                                      2016                     

                                        [facility]                    

 

                          Xarelto                            20 mg, 1 tab, Route: PO, Drug form: TAB, QPM, Dosing

 Weight 92.727, kg, Start date: 16 17:00:00 CST, Duration: 30 day, Stop 
date: 17 17:00:00 CSTNotes: (Same as: Xarelto) Administer with food       
                                                      No Longer Active                    

                                                                            2016             

                                        [facility]                    

 

                          Metformin hydrochloride 1000 MG Oral Tablet                            1,000 mg, 2

 tab, Route: PO, Drug form: TAB, BID-Meals, Dosing Weight 92.727, kg, Start 
date: 16 17:00:00 CST, Duration: 30 day, Stop date: 17 8:00:00 
CSTNotes: (Same as: Glucophage)  Take with meal                                    

                          No Longer Active                                                 

                                                2016                            [facility]  

                  

 

                          gabapentin 600 MG Oral Tablet                            600 mg, 2 cap, Route: PO,

 Drug form: CAP, Q8H, Dosing Weight 92.727, kg, Start date: 16 16:00:00 
CST, Duration: 30 day, Stop date: 17 8:00:00 CSTNotes: (Same as: 
Neurontin)                                                        No Longer Active   

                                                                                 2016

                                        [facility]                    

 

                          Ancef + sodium chloride 0.9%  mL                            1 gm, Route: IVPB,

 ABXQ8H, Dosing Weight 92.727, kg, Start date: 16 16:00:00 CST, Duration: 
30 day, Stop date: 17 8:00:00 CSTNotes: (Same As: Ancef, Kefzol)    *** 
MEDICATION WASTE *** Product Size:  1000 mg Product Wasted:  ___ mg             
                                                No Longer Active                            

                                                        2016                   

                                        [facility]                    

 

                          Benadryl                            25 mg, 1 tab, Route: PO, Drug form: TAB, Daily,

 Dosing Weight 92.727, kg, Start date: 16 9:00:00 CST, Duration: 30 day, 
Stop date: 17 9:00:00 CST                                                    

                    No Longer Active                                                                   

                          2016                            [facility]                  

  

 

                          Sertraline                            100 mg, 1 tab, Route: PO, Drug form: TAB, Daily,

 Dosing Weight 92.727, kg, Start date: 16 9:00:00 CST, Duration: 30 day, 
Stop date: 17 9:00:00 CSTNotes: (Same as: Zoloft)                            

                            No Longer Active                                         

                                                2016                            [facility]

                    

 

                                        12 HR ranolazine 500 MG Extended Release Tablet [Ranexa]                        

                                        500 mg, 1 tab, Route: PO, Drug form: TAB, BID, Dosing Weight 92.727, kg, Start 

date: 16 9:00:00 CST, Duration: 30 day, Stop date: 17 17:00:00 
CSTNotes: Same as Ranexa "Do Not Crush"                                            

                    No Longer Active                                                           

                          2016                            [facility]          

          

 

                          torsemide                            30 mg, 3 tab, Route: PO, Drug form: TAB, Daily,

 Dosing Weight 92.727, kg, Start date: 16 9:00:00 CST, Duration: 30 day, 
Stop date: 17 9:00:00 CSTNotes: (Same As: Demadex)                        
                                                No Longer Active                                       

                                                2016                            [facility]

                    

 

                          potassium chloride                            20 mEq, 1 tab, Route: PO, Drug form:

 ERTAB, BID, Dosing Weight 92.727, kg, Start date: 16 9:00:00 CST, 
Duration: 30 day, Stop date: 17 17:00:00 CSTNotes: (Same as: K-Dur 20) "Do
 Not Crush"  With food and full glass of water                                     

                          No Longer Active                                                  

                                                2016                            [facility]   

                 

 

                          pantoprazole                            40 mg, 1 tab, Route: PO, Drug form: ECTAB,

 BID, Dosing Weight 92.727, kg, Start date: 16 9:00:00 CST, Duration: 30 
day, Stop date: 17 17:00:00 CSTNotes: Tablet should not be chewed or cru
shed. (Same as: Protonix)                                                        No Longer

 Active                                                                              

                          2016                            [facility]                    

 

                          Levemir FlexPen                            72 unit, 0.72 mL, Route: SUB-Q, Drug form:

 INJ, QAM, Start date: 16 9:00:00 CST, Duration: 30 day, Stop date: 
17 9:00:00 CSTNotes: Same as Levemir Do not hold insulin without 
contacting prescriber  WASTE: F/P - Black; E - Municipal Trash Bin  "single 
patient use only"                                                        No Longer Active

                                                                                    2016

                                        [facility]                    

 

                                        Insulin Glargine 100 UNT/ML Injectable Solution [Lantus]                        

                                        72 unit, Route: SUB-Q, Drug form: SOLN, Daily, Dosing Weight 92.727, kg, Start 

date: 16 9:00:00 CST, Duration: 30 day, Stop date: 17 9:00:00 CST   
                                                      Inactive                        

                                                                            2016                 

                                        [facility]                    

 

                          Multaq                            400 mg, 1 tab, Route: PO, Drug form: TAB, BID, Dosing

 Weight 92.727, kg, Start date: 16 9:00:00 CST, Duration: 30 day, Stop 
date: 17 17:00:00 CSTNotes: Same as: Multaq                                  

                          No Longer Active                                               

                                                2016                            [facility]

                    

 

                          clopidogrel                            75 mg, 1 tab, Route: PO, Drug form: TAB, Daily,

 Dosing Weight 92.727, kg, Start date: 16 9:00:00 CST, Duration: 30 day, 
Stop date: 17 9:00:00 CSTNotes: (Same As: Plavix)                            

                            No Longer Active                                         

                                                2016                            [facility]

                    

 

                                        Streptococcus pneumoniae serotype 1 capsular antigen diphtheria [id number] protein conjugate

 vaccine / Streptococcus pneumoniae serotype 14 capsular antigen diphtheria 
[id number] protein conjugate vaccine / Streptococcus pneumoniae serotype 18C 
capsular antigen d                            0.5 mL, Route: IM, Daily, Start date:

 16 9:00:00 CST, Duration: 1 doses or times, Stop date: 16 9:00:00 
CST                                                        No Longer Active          

                                                                            2016   

                                        [facility]                    

 

                          Amiodarone                            200 mg, 1 tab, Route: PO, Drug form: TAB, Daily,

 Dosing Weight 92.727, kg, Start date: 16 9:00:00 CST, Duration: 30 day, 
Stop date: 17 9:00:00 CSTNotes: (Same as: Cordarone)                      
                                                No Longer Active                                     

                                                2016                            [facility]                    

 

                          Docusate                            100 mg, 1 cap, Route: PO, Drug form: CAP, BID,

 Dosing Weight 91.364, kg, Start date: 16 9:00:00 CST, Duration: 30 day, 
Stop date: 17 17:00:00 CSTNotes: (Same as: Colace) (Do Not Crush)         
                                                      No Longer Active                      

                                                                            2016               

                                        [facility]                    

 

                          sodium phosphate + D5W 250 mL                            15 mmol, 5 mL, Route: IVPB,

 PRN, Dosing Weight 92.727, kg, PRN Abnormal Lab Result, Start date: 16 
8:11:00 CST, Duration: 30 day, Stop date: 17 8:10:00 CST                  
                                                No Longer Active                                 

                                                      2016                        

                                        [facility]                    

 

                                        Acetaminophen 325 MG / Hydrocodone Bitartrate 5 MG Oral Tablet [Norco 5/325]    

                                        1 tab, Route: PO, Drug Form: TAB, Dosing Weight 92.727, kg,

 Q4H, PRN Pain Score 7-10, Start date: 16 8:10:00 CST, Duration: 30 day, 
Stop date: 17 8:09:00 CSTNotes: (Same as: Norco 325/5)  Do not exceed 
4gm/day of acetaminophen.                                                        No Longer

 Active                                                                              

                          2016                            [facility]                    

 

                          Acetaminophen                            650 mg, 2 tab, Route: PO, Drug form: TAB,

 Q6H, Dosing Weight 92.727, kg, PRN Pain 1-3/Temp > 99.5 F, Start date: 16
8:10:00 CST, Duration: 30 day, Stop date: 17 8:09:00 CSTNotes: Do not e
xceed 4 gm/day.  (Same as: Tylenol)                                                

                    No Longer Active                                                               

                          2016                            [facility]              

      

 

                          tramadol hydrochloride 50 MG Oral Tablet                            50 mg, 1 tab, 

Route: PO, Drug form: TAB, Q6H, Dosing Weight 92.727, kg, PRN Pain Score 4-6, 
Start date: 16 8:10:00 CST, Duration: 30 day, Stop date: 17 8:09:00 
CSTNotes: Not to exceed 400mg/day. (Same As: Ultram)                               

                          No Longer Active                                            

                                                2016                            [facility]

                    

 

                          Alprazolam 0.5 MG Oral Tablet [Xanax]                            0.5 mg, 1 tab, Route:

 PO, Drug form: TAB, BID, Dosing Weight 92.727, kg, PRN as needed for anxiety, 
Start date: 16 8:07:00 CST, Duration: 30 day, Stop date: 17 8:06:00 
CSTNotes: With food or milk (Same as: Xanax)                                       

                          No Longer Active                                                    

                                                2016                            [facility]     

               

 

                          Insulin, Aspart, Human                            1 unit, 0.01 mL, Route: SUB-Q, Drug

 form: SOLN, Bedtime, Dosing Weight 91.364, kg, PRN Blood Glucose Results, Start
 date: 16 19:00:00 CST, Duration: 30 day, Stop date: 17 18:59:00 
CSTNotes: Roll in palms of hands gently;  Do not shake vigorously. (Same as: 
NovoLOG) "single patient use only"  WASTE: F/P - Black; E - Municipal Trash Bin 
 Stable for 28 days at room temperature. Expires in _____ days from 
______________Date                                                        No Longer Active

                                                                                    2016

                                        [facility]                    

 

                          Dextrose 50% Syringe                            12.5 gm, 25 mL, Route: IVP, Drug Form:

 INJ, Dosing Weight 91.364, kg, PRN, PRN Blood Glucose Results, Start date: 
16 19:00:00 CST, Duration: 30 day, Stop date: 17 18:59:00 CST       
                                                      No Longer Active                    

                                                                            2016             

                                        [facility]                    

 

                          Glucagon                            1 mg, Route: IM, Drug form: PDR/INJ, PRN, Dosing

 Weight 91.364, kg, PRN Blood Glucose Results, Start date: 16 19:00:00 
CST, Duration: 30 day, Stop date: 17 18:59:00 CST                            

                            No Longer Active                                         

                                                2016                            [facility]

                    

 

                                        Acetaminophen 325 MG / Hydrocodone Bitartrate 5 MG Oral Tablet                  

                                        1 tab, Route: PO, Drug Form: TAB, Dosing Weight 91.364, kg, Q4H, PRN Pain

 Score 4-6, Start date: 16 18:43:00 CST, Duration: 30 day, Stop date: 
17 18:42:00 CSTNotes: (Same as: Norco 325/5)  Do not exceed 4gm/day of 
acetaminophen.                                                        No Longer Active

                                                                                    2016

                                        [facility]                    

 

                          Morphine                            2 mg, 1 mL, Route: IVP, Drug form: INJ, Q4H, Dosing

 Weight 91.364, kg, PRN Pain Score 7-10, Start date: 16 18:43:00 CST, 
Duration: 30 day, Stop date: 17 18:42:00 CSTNotes: (Same as:MORPhine 
Sulfate)                                                        No Longer Active     

                                                                               2016

                                        [facility]                    

 

                          Acetaminophen                            650 mg, 2 tab, Route: PO, Drug form: TAB,

 Q4H, Dosing Weight 91.364, kg, PRN Pain 1-3/Temp > 100.4 F, Start date: 
16 18:43:00 CST, Duration: 30 day, Stop date: 17 18:42:00 CSTNotes: 
Do not exceed 4 gm/day.  (Same as: Tylenol)                                        

                          No Longer Active                                                     

                                                2016                            [facility]      

              

 

                          Ondansetron                            4 mg, 2 mL, Route: IVP, Drug form: INJ, Q6H,

 Dosing Weight 91.364, kg, PRN Nausea & Vomiting, Start date: 16 18:43:00 
CST, Duration: 30 day, Stop date: 17 18:42:00 CSTNotes: (Same as: Zofran) 
  *** MEDICATION WASTE *** Product Size:  4 mg Product Wasted:  ___ mg          
                                                      No Longer Active                       

                                                                            2016                

                                        [facility]                    

 

                          Zosyn                            3.375 gm, Route: IVPB, [id number], Dosing Weight 91.364,

 kg, CrCl >=20 ml/min infuse over 4 hours, Start date: 16 18:00:00 CST, 
Duration: 30 day, Stop date: 17 10:00:00 CSTNotes: (Same as: Zosyn)  
Dosing based on Piperacillin component                                             

                    No Longer Active                                                            

                          2016                            [facility]           

         

 

                          Vancomycin                            1,000 mg, Route: IVPB, [id number], Dosing Weight

 91.364, kg, Start date: 16 18:00:00 CST, Duration: 30 day, Stop date: 
17 9:00:00 CSTNotes: TIME CRITICAL MEDICATION (Same As: Vancocin)  Infusio
n rate  2001 mg: infuse over 2.5 hours   *** MEDICATION WASTE *** Product Size: 
 1000 mg Product Wasted:  ___ mg                                                   

                    No Longer Active                                                                  

                          2016                            [facility]                 

   

 

                                        Sulfamethoxazole 800 MG / Trimethoprim 160 MG Oral Tablet [Bactrim]             

                                        1 tab, PO, BID, X 14 day, # 28 tab, 0 Refill(s)                     

                                                Active                                              

                                                2016                            [facility]

                    

 

                          Saline Flush 0.9%                            10 mL, Route: IVP, Drug Form: INJ, Dosing

 Weight 91.364, kg, PRN, PRN Line Flush, Start date: 16 18:18:00 CST, 
Duration: 30 day, Stop date: 17 18:17:00 CSTNotes: (Same as: BD Posiflush)
                                                        No Longer Active             

                                                                            2016      

                                        [facility]                    

 

                          torsemide                            30 mg, 3 tab, Route: PO, Drug form: TAB, Daily,

 Dosing Weight 95.455, kg, Start date: 16 9:00:00 CDT, Duration: 30 day, 
Stop date: 16 9:00:00 CDTNotes: (Same As: Demadex)                        
                                                No Longer Active                                       

                                                2016                            [facility]

                    

 

                          Lisinopril                            2.5 mg, 0.5 tab, Route: PO, Drug form: TAB, 

Daily, Dosing Weight 95.455, kg, Start date: 16 9:00:00 CDT, Duration: 30 
day, Stop date: 16 9:00:00 CDTNotes: (Same as: Prinivil, Zestril)         
                                                      No Longer Active                      

                                                                            2016               

                                        [facility]                    

 

                          Imdur                            30 mg, 1 tab, Route: PO, Drug form: ERTAB, QAM, Dosing

 Weight 95.455, kg, Start date: 16 9:00:00 CDT, Duration: 30 day, Stop 
date: 16 9:00:00 CDTNotes: (Same as:Imdur) "Do Not Crush&quot;  Take on 
empty stomach/ full glass of water.  Do not crush                                  

                          No Longer Active                                               

                                                2016                            [facility]

                    

 

                          Synthroid                            50 microgram, 1 tab, Route: PO, Drug form: TAB,

 Daily, Dosing Weight 95.455, kg, Start date: 16 9:00:00 CDT, Duration: 30
 day, Stop date: 16 9:00:00 CDTNotes: Take 1 hour before or 2 hours after 
meal; Enteral feeds may interefere with the absorption of this medication.(Same 
as:Levothroid, Synthroid)                                                        No Longer

 Active                                                                              

                          2016                            [facility]                    

 

                                        Insulin Glargine 100 UNT/ML Injectable Solution [Lantus]                        

                                        72 unit, Route: SUB-Q, Drug form: SOLN, Daily, Dosing Weight 95.455, kg, Start 

date: 16 9:00:00 CDT, Duration: 30 day, Stop date: 16 9:00:00 CDT   
                                                      No Longer Active                

                                                                            2016         

                                        [facility]                    

 

                          clopidogrel                            75 mg, 1 tab, Route: PO, Drug form: TAB, Daily,

 Dosing Weight 95.455, kg, Start date: 16 9:00:00 CDT, Duration: 30 day, 
Stop date: 16 9:00:00 CDTNotes: (Same As: Plavix)                            

                            No Longer Active                                         

                                                2016                            [facility]

                    

 

                          Amiodarone                            200 mg, 1 tab, Route: PO, Drug form: TAB, Daily,

 Dosing Weight 95.455, kg, Start date: 16 9:00:00 CDT, Duration: 30 day, 
Stop date: 16 9:00:00 CDTNotes: (Same as: Cordarone)                      
                                                No Longer Active                                     

                                                2016                            [facility]                    

 

                          Ativan                            0.5 mg, 1 tab, Route: PO, Drug form: TAB, BID, Dosing

 Weight 95.455, kg, PRN as needed for anxiety, Start date: 16 22:27:00 
CDT, Duration: 30 day, Stop date: 16 22:26:00 CDTNotes: (Same as: Ativan) 
                                                       No Longer Active              

                                                                            2016       

                                        [facility]                    

 

                                        Acetaminophen 325 MG / Hydrocodone Bitartrate 10 MG Oral Tablet [Norco 10/325]  

                                        1 tab, Route: PO, Drug Form: TAB, Dosing Weight 95.455, kg,

 Q6H, PRN Pain Score 4-6, Start date: 16 22:26:00 CDT, Duration: 30 day, 
Stop date: 16 22:25:00 CDTNotes: Do not exceed 4gm/day of acetaminophen.  
(Same as: Norco 325/10)                                                        No Longer

 Active                                                                              

                          2016                            [facility]                    

 

                          Levemir FlexPen                            42 unit, 0.42 mL, Route: SUB-Q, Drug form:

 INJ, Bedtime, Start date: 16 21:00:00 CDT, Duration: 30 day, Stop date: 
16 21:00:00 CDTNotes: Same as Levemir Do not hold insulin without contac
ting prescriber  WASTE: F/P - Black; E - Municipal Trash Bin  "single patient 
use only"                                                        No Longer Active    

                                                                                2016

                                        [facility]                    

 

                          niacin 1000 mg oral tablet, extended release                            2,000 mg, 

4 tab, Route: PO, Drug form: ERTAB, Bedtime, Dosing Weight 95.455, kg, Start 
date: 16 21:00:00 CDT, Duration: 30 day, Stop date: 16 21:00:00 
CDTNotes: (Same as: Niaspan) "Do Not Crush"  Non-Formulary Item  With food.     
                                                      No Longer Active                   

                                                                            2016            

                                        [facility]                    

 

                                        3 ML Insulin Glargine 100 UNT/ML Prefilled Syringe [Lantus]                     

                                        42 unit, Route: SUB-Q, Drug form: SOLN, Bedtime, Dosing Weight 95.455, kg, Start

 date: 16 21:00:00 CDT, Duration: 30 day, Stop date: 16 21:00:00 CDT
                                                        Inactive                     

                                                                            2016              

                                        [facility]                    

 

                          Ativan                            2 mg, 1 mL, Route: IVP, Drug form: INJ, ONCE, Dosing

 Weight 95.455, kg, PRN Anxiety, Start date: 16 18:12:00 CDTNotes: (Same 
as: Ativan)                                                        No Longer Active  

                                                                                  2016

                                        [facility]                    

 

                          potassium chloride                            20 mEq, 1 tab, Route: PO, Drug form:

 ERTAB, BID, Dosing Weight 95.455, kg, Start date: 16 17:00:00 CDT, 
Duration: 30 day, Stop date: 16 9:00:00 CDTNotes: (Same as: K-Dur 20) "Do 
Not Crush"  With food and full glass of water                                      

                          No Longer Active                                                   

                                                2016                            [facility]    

                

 

                                        12 HR ranolazine 500 MG Extended Release Tablet [Ranexa]                        

                                        500 mg, 1 tab, Route: PO, Drug form: TAB, BID, Dosing Weight 95.455, kg, Start 

date: 16 17:00:00 CDT, Duration: 30 day, Stop date: 16 9:00:00 
CDTNotes: Same as Ranexa "Do Not Crush"                                            

                    No Longer Active                                                           

                          2016                            [facility]          

          

 

                          pantoprazole                            40 mg, 1 tab, Route: PO, Drug form: ECTAB,

 BID, Dosing Weight 95.455, kg, Start date: 16 17:00:00 CDT, Duration: 30 
day, Stop date: 16 9:00:00 CDTNotes: Tablet should not be chewed or cru
shed. (Same as: Protonix)                                                        No Longer

 Active                                                                              

                          2016                            [facility]                    

 

                          Haldol                            0.5 mg, 0.1 mL, Route: IM, Drug form: INJ, ONCE,

 Dosing Weight 95.455, kg, Start date: 16 16:30:00 CDT, Stop date: 
16 16:30:00 CDTNotes: (Same as: Haldol)                                      

                    Inactive                                                             

                          2016                            [facility]            

        

 

                          Haldol                            0.5 mg, 0.01 mL, Route: IM, Drug form: INJ, ONCE,

 Dosing Weight 95.455, kg, Start date: 16 11:27:00 CDT, Stop date: 
16 11:27:00 CDTNotes: (Same as: Haldol Decanoate)  not for IV use, long 
acting formulation.                                                        Inactive  

                                                                                  2016

                                        [facility]                    

 

                          Enoxaparin                            40 mg, 0.4 mL, Route: SUB-Q, Drug form: INJ,

 [id number], Dosing Weight 95.455, kg, Start date: 16 10:00:00 CDT, 
Duration: 30 day, Stop date: 16 10:00:00 CDTNotes: (Same as: Lovenox)     
                                                      No Longer Active                  

                                                                            2016           

                                        [facility]                    

 

                          Insulin, Aspart, Human                            4 unit, 0.04 mL, Route: SUB-Q, Drug

 form: SOLN, Bedtime, Dosing Weight 95.455, kg, PRN Blood Glucose Results, Start
 date: 16 9:27:00 CDT, Duration: 30 day, Stop date: 16 9:26:00 
CDTNotes: Roll in palms of hands gently;  Do not shake vigorously. (Same as: 
NovoLOG) "single patient use only"  WASTE: F/P - Black; E - Municipal Trash Bin 
 Stable for 28 days at room temperature. Expires in _____ days from 
______________Date                                                        No Longer Active

                                                                                    2016

                                        [facility]                    

 

                          Dextrose 50% Syringe                            25 gm, 50 mL, Route: IVP, Drug Form:

 INJ, Dosing Weight 95.455, kg, PRN, PRN Blood Glucose Results, Start date: 
16 9:27:00 CDT, Duration: 30 day, Stop date: 16 9:26:00 CDT         
                                                      No Longer Active                      

                                                                            2016               

                                        [facility]                    

 

                          Glucagon                            1 mg, Route: IM, Drug form: PDR/INJ, PRN, Dosing

 Weight 95.455, kg, PRN Blood Glucose Results, Start date: 16 9:27:00 CDT,
 Duration: 30 day, Stop date: 16 9:26:00 CDT                                 

                          No Longer Active                                              

                                                2016                            [facility]

                    

 

                          Saline Flush 0.9%                            10 ml, Route: IVP, Drug Form: INJ, Dosing

 Weight 90.909, kg, Q12H, Start date: 16 9:00:00 CDT, Duration: 30 day, 
Stop date: 16 21:00:00 CDTNotes: (Same as: BD Posiflush)                  
                                                No Longer Active                                 

                                                      2016                        

                                        [facility]                    

 

                          Ativan                            1 mg, 0.5 mL, Route: IV, Drug form: INJ, ONCE, Dosing

 Weight 95.455, kg, Start date: 16 5:22:00 CDT, Stop date: 16 
5:22:00 CDTNotes: (Same as: Ativan)                                                

                    Inactive                                                                       

                          2016                            [facility]                    

 

                          rivaroxaban 20 MG Oral Tablet [Xarelto]                            20 mg=1 tab, PO,

 QPM, # 30 tab, 3 Refill(s)                                                        Active

                                                                                    2016

                                        [facility]                    

 

                          lisinopril 2.5 mg oral tablet                            2.5 mg=1 tab, PO, Daily, 

# 90 tab, 1 Refill(s)                                                        Active  

                                                                                  2016

                                        [facility]                    

 

                          AMIODarone 200 mg oral tablet                            200 mg=1 tab, PO, Daily, 

# 90 tab, 3 Refill(s)                                                        Active  

                                                                                  2016

                                        [facility]                    

 

                          Saline Flush 0.9%                            10 ml, Route: IVP, Drug Form: INJ, Dosing

 Weight 90.909, kg, PRN, PRN Line Flush, Start date: 16 3:48:00 CDT, 
Duration: 30 day, Stop date: 16 3:47:00 CDTNotes: (Same as: BD Posiflush) 
                                                       No Longer Active              

                                                                            2016       

                                        [facility]                    

 

                          Aspirin                            325 mg, Route: PO, Drug form: ECTAB, ONCE, Dosing

 Weight 90.909, kg, Priority: STAT, Start date: 16 0:43:00 CDT, Stop date:
 16 0:43:00 CDT                                                        Inactive

                                                                                    2016

                                        [facility]                    

 

                          Aspirin 300 MG Rectal Suppository                            300 mg, 1 supp, Route:

 TX, Drug form: SUPP, ONCE, Dosing Weight 90.909, kg, Priority: STAT, Start 
date: 16 0:29:00 CDT, Stop date: 16 0:29:00 CDTNotes: Refrigerate.  
                                                      Inactive                       

                                                                            2016                

                                        [facility]                    

 

                          niacin 1000 mg oral tablet, extended release                            2,000 mg, 

8 cap, Route: PO, Drug form: ERCAP, Bedtime, Dosing Weight 98.636, kg, Start 
date: 16 21:00:00 CDT, Duration: 30 day, Stop date: 07/10/16 21:00:00 
CDTNotes: With food.                                                        Inactive 

                                                                                   2016

                                        [facility]                    

 

                          Insulin Glargine                            42 unit, Route: SUB-Q, Drug form: SOLN,

 Bedtime, Dosing Weight 98.636, kg, Start date: 16 21:00:00 CDT, Duration:
 30 day, Stop date: 07/10/16 21:00:00 CDT                                          

                    Inactive                                                                 

                          2016                            [facility]                

    

 

                                        12 HR ranolazine 500 MG Extended Release Tablet [Ranexa]                        

                          500 mg=1 tab, PO, BID, # 60 tab, 0 Refill(s)                                     

                    Active                                                              

                          2016                            [facility]             

       

 

                          Saline Flush 0.9%                            10 ml, Route: IVP, Drug Form: INJ, Dosing

 Weight 98.636, kg, Q12H, Start date: 16 9:00:00 CDT, Duration: 30 day, 
Stop date: 07/10/16 21:00:00 CDTNotes: (Same as: BD Posiflush)                  
                                                Inactive                                         

                                                2016                            [facility]

                    

 

                          clopidogrel                            75 mg, 1 tab, Route: PO, Drug form: TAB, Daily,

 Dosing Weight 98.636, kg, Start date: 16 9:00:00 CDT, Duration: 30 day, 
Stop date: 07/10/16 9:00:00 CDTNotes: (Same As: Plavix)                            

                            Inactive                                                 

                                                2016                            [facility]  

                  

 

                          potassium chloride                            20 mEq, 1 tab, Route: PO, Drug form:

 ERTAB, BID, Dosing Weight 98.636, kg, Start date: 16 9:00:00 CDT, 
Duration: 30 day, Stop date: 07/10/16 17:00:00 CDTNotes: (Same as: K-Dur 20) "Do
 Not Crush"  With food and full glass of water                                     

                    Inactive                                                            

                          2016                            [facility]           

         

 

                          Sertraline                            100 mg, 1 tab, Route: PO, Drug form: TAB, Daily,

 Dosing Weight 98.636, kg, Start date: 16 9:00:00 CDT, Duration: 30 day, 
Stop date: 07/10/16 9:00:00 CDTNotes: (Same as: Zoloft)                            

                            Inactive                                                 

                                                2016                            [facility]  

                  

 

                          pantoprazole                            40 mg, 1 tab, Route: PO, Drug form: ECTAB,

 BID, Dosing Weight 98.636, kg, Start date: 16 9:00:00 CDT, Duration: 30 
day, Stop date: 07/10/16 17:00:00 CDTNotes: Tablet should not be chewed or cru
shed. (Same as: Protonix)                                                        Inactive

                                                                                    2016

                                        [facility]                    

 

                          Imdur                            30 mg, 1 tab, Route: PO, Drug form: ERTAB, QAM, Dosing

 Weight 98.636, kg, Start date: 16 9:00:00 CDT, Duration: 30 day, Stop 
date: 07/10/16 9:00:00 CDTNotes: (Same as:Imdur) "Do Not Crush&quot;  Take on 
empty stomach/ full glass of water.  Do not crush                                  

                      Inactive                                                         

                           2016                            [facility]        

            

 

                          Lantus                            72 unit, Route: SUB-Q, Daily, Dosing Weight 98.636,

 kg, Start date: 16 9:00:00 CDT, Duration: 30 day, Stop date: 07/10/16 
9:00:00 CDT                                                        Inactive          

                                                                            2016   

                                        [facility]                    

 

                          gabapentin 600 MG Oral Tablet                            600 mg, 2 cap, Route: PO,

 Drug form: CAP, TID, Dosing Weight 98.636, kg, Start date: 16 9:00:00 
CDT, Duration: 30 day, Stop date: 07/10/16 17:00:00 CDTNotes: (Same as: 
Neurontin)                                                        Inactive           

                                                                            2016    

                                        [facility]                    

 

                          Benadryl                            25 mg, 1 tab, Route: PO, Drug form: TAB, Daily,

 Dosing Weight 98.636, kg, Start date: 16 9:00:00 CDT, Duration: 30 day, 
Stop date: 07/10/16 9:00:00 CDT                                                    

                    Inactive                                                                           

                          2016                            [facility]                    

 

                          Multaq                            400 mg, 1 tab, Route: PO, Drug form: TAB, BID, Dosing

 Weight 98.636, kg, Start date: 16 9:00:00 CDT, Duration: 30 day, Stop 
date: 07/10/16 17:00:00 CDTNotes: Same as: Multaq                                  

                      Inactive                                                         

                           2016                            [facility]        

            

 

                          Metformin hydrochloride 1000 MG Oral Tablet                            1,000 mg, 2

 tab, Route: PO, Drug form: TAB, BID-Meals, Dosing Weight 98.636, kg, Start 
date: 16 8:00:00 CDT, Duration: 30 day, Stop date: 07/10/16 17:00:00 
CDTNotes: (Same as: Glucophage)  Take with meal                                    

                    Inactive                                                           

                          2016                            [facility]          

          

 

                          Insulin Lispro                            Route: SUB-Q, Drug form: SOLN, TID-Before

 Meals, Dosing Weight 98.636, kg, Start date: 16 7:30:00 CDT, Duration: 30
 day, Stop date: 07/10/16 16:30:00 CDT                                             

                    Inactive                                                                    

                          2016                            [facility]                   

 

 

                          Synthroid                            50 microgram, 1 tab, Route: PO, Drug form: TAB,

 Q630AM, Dosing Weight 98.636, kg, Start date: 16 6:30:00 CDT, Duration: 
30 day, Stop date: 07/10/16 6:30:00 CDTNotes: Take 1 hour before or 2 hours 
after meal; Enteral feeds may interefere with the absorption of this 
medication.(Same as:Levothroid, Synthroid)                                         

                    Inactive                                                                

                          2016                            [facility]               

     

 

                          Nitroglycerin 0.02 MG/MG Topical Ointment                            0.5 inch, Route:

 TOP, Drug Form: OINT, Dosing Weight 98.636, kg, TID, Start date: 16 
6:00:00 CDT, Duration: 30 day, Stop date: 07/10/16 18:00:00 CDTNotes: 1 gram is 
approximately 1 inch of nitroglycerin ointment    (20 mg NTG per gram) (Same 
as:Nitro-Bid)                                                        Inactive        

                                                                            2016 

                                        [facility]                    

 

                          torsemide                            20 mg, 1 tab, Route: PO, Drug form: TAB, BID,

 Dosing Weight 98.636, kg, Start date: 16 0:22:00 CDT, Duration: 30 day, 
Stop date: 07/10/16 17:00:00 CDTNotes: (Same As: Demadex)                       
                                                Inactive                                              

                                                2016                            [facility]

                    

 

                          atorvastatin                            5 mg, 0.5 tab, Route: PO, Drug form: TAB, 

Bedtime, Dosing Weight 98.636, kg, Start date: 16 0:22:00 CDT, Duration: 
30 day, Stop date: 07/10/16 21:00:00 CDTNotes: (Same As: Lipitor)               
                                                Inactive                                      

                                                2016                            [facility]                    

 

                          Insulin, Aspart, Human                            4 unit, 0.04 mL, Route: SUB-Q, Drug

 form: SOLN, Bedtime, Dosing Weight 98.636, kg, PRN Blood Glucose Results, Start
 date: 16 0:09:00 CDT, Duration: 30 day, Stop date: 16 0:08:00 
CDTNotes: Roll in palms of hands gently;  Do not shake vigorously. (Same as: 
NovoLOG) "single patient use only"  WASTE: F/P - Black; E - Municipal Trash Bin 
 Stable for 28 days at room temperature. Expires in _____ days from 
______________Date                                                        Inactive   

                                                                                 2016

                                        [facility]                    

 

                          Dextrose 50% Syringe                            25 gm, 50 mL, Route: IVP, Drug Form:

 INJ, Dosing Weight 98.636, kg, PRN, PRN Blood Glucose Results, Start date: 
16 0:09:00 CDT, Duration: 30 day, Stop date: 16 0:08:00 CDT         
                                                Inactive                                

                                                      2016                       

                                        [facility]                    

 

                          Glucagon                            1 mg, Route: IM, Drug form: PDR/INJ, PRN, Dosing

 Weight 98.636, kg, PRN Blood Glucose Results, Start date: 16 0:09:00 CDT,
 Duration: 30 day, Stop date: 16 0:08:00 CDT                                 

                       Inactive                                                        

                            2016                            [facility]       

             

 

                          Morphine                            2 mg, 1 mL, Route: IV, Drug form: INJ, Q3H, Dosing

 Weight 98.636, kg, PRN Pain Score 7-10, Start date: 16 0:08:00 CDT, 
Duration: 30 day, Stop date: 16 0:07:00 CDTNotes: (Same as:MORPhine 
Sulfate)                                                        Inactive             

                                                                            2016      

                                        [facility]                    

 

                          metoprolol tartrate                            25 mg, 1 tab, Route: PO, Drug form:

 TAB, Q6H, Dosing Weight 98.636, kg, Start date: 16 0:00:00 CDT, Duration:
 30 day, Stop date: 07/10/16 18:00:00 CDTNotes: (Same as: Lopressor)            
                                                Inactive                                   

                                                 2016                            

[facility]                    

 

                          Alprazolam 0.5 MG Oral Tablet [Xanax]                            0.5 mg, 1 tab, Route:

 TX, Drug form: TAB, BID, Dosing Weight 98.636, kg, PRN as needed for anxiety, 
Start date: 06/10/16 23:56:00 CDT, Duration: 30 day, Stop date: 07/10/16 
23:55:00 CDTNotes: With food or milk (Same as: Xanax)                              

                          No Longer Active                                           

                                                2016                            [facility]

                    

 

                                        Acetaminophen 325 MG / Hydrocodone Bitartrate 10 MG Oral Tablet [Norco 10/325]  

                                        1 tab, Route: PO, Drug Form: TAB, Dosing Weight 98.636, kg,

 QID, PRN Pain Score 6-10, Start date: 06/10/16 23:56:00 CDT, Duration: 30 day, 
Stop date: 07/10/16 23:55:00 CDTNotes: Do not exceed 4gm/day of acetaminophen.  
(Same as: Norco 325/10)                                                        No Longer

 Active                                                                              

                          2016                            [facility]                    

 

                          Benadryl                            25 mg, PO, Daily, 0 Refill(s)                 

                                                Active                                          

                                                2016                            [facility]

                    

 

                          torsemide 20 mg oral tablet                            20 mg=1 tab, PO, BID, 0 Refill(s)

                                                        Active                       

                                                                            2016                

                                        [facility]                    

 

                          Saline Flush 0.9%                            10 ml, Route: IVP, Drug Form: INJ, Dosing

 Weight 98.636, kg, PRN, PRN Line Flush, Start date: 06/10/16 22:58:00 CDT, 
Duration: 30 day, Stop date: 07/10/16 22:57:00 CDTNotes: (Same as: BD Posiflush)
                                                       No Longer Active              

                                                                            2016       

                                        [facility]                    

 

                          Ondansetron                            4 mg, 1 tab, Route: PO, Drug form: TAB, Q8H,

 Dosing Weight 98.636, kg, PRN Nausea & Vomiting, Start date: 06/10/16 22:58:00 
CDT, Duration: 30 day, Stop date: 07/10/16 22:57:00 CDTNotes: (Same as: Zofran) 
                                                      No Longer Active               

                                                                            2016        

                                        [facility]                    

 

                          Nitroglycerin                            0.4 mg, 1 tab, Route: SL, Drug form: TAB,

 Q5Min, Dosing Weight 98.636, kg, PRN Chest Pain, Start date: 06/10/16 22:58:00 
CDT, Duration: 3 doses or times, Stop date: Limited # of timesNotes: (Same as:
Nitroquick, Nitrostat) "Do Not Crush"  Sublingual tablet                        
                                                No Longer Active                                        

                                                2016                            [facility]

                    

 

                          Morphine                            2 mg, 1 mL, Route: IVP, Drug form: INJ, Q15Min,

 Dosing Weight 98.636, kg, PRN Chest Pain, Start date: 06/10/16 22:58:00 CDT, 
Duration: 2 doses or times, Stop date: Limited # of timesNotes: (Same as:M
ORPhine Sulfate)                                                        No Longer Active

                                                                                    2016

                                        [facility]                    

 

                          Morphine                            4 mg, 2 mL, Route: IVP, Drug form: INJ, ONCE, 

Dosing Weight 98.636, kg, Priority: STAT, Start date: 06/10/16 19:05:00 CDT, 
Stop date: 06/10/16 19:05:00 CDTNotes: (Same as:MORPhine Sulfate)               
                                                Inactive                                       

                                                2016                            [facility]

                    

 

                          Nitroglycerin                            0.4 mg, 1 tab, Route: SL, Drug form: TAB,

 ONCE, Dosing Weight 98.636, kg, Priority: STAT, Start date: 06/10/16 19:05:00 
CDT, Stop date: 06/10/16 19:05:00 CDTNotes: (Same as:Nitroquick, Nitrostat) "Do 
Not Crush"  Sublingual tablet                                                        

Inactive                                                                             

                          2016                            [facility]                    

 

                          Saline Flush 0.9%                            10 mL, Route: IVP, Drug Form: INJ, Dosing

 Weight 98.636, kg, PRN, PRN Line Flush, Start date: 06/10/16 18:28:00 CDT, 
Duration: 30 day, Stop date: 07/10/16 18:27:00 CDTNotes: (Same as: BD Posiflush)
                                                       No Longer Active              

                                                                            06/10/2016       

                                        [facility]                    

 

                          Insulin regular                            6 unit, 0.06 mL, Route: IV, Drug form: 

INJ, ONCE, Dosing Weight 90.909, kg, Start date: 16 18:25:00 CDT, Stop 
date: 16 18:25:00 CDTNotes: (Same as: Humulin R and NovoLIN R)  WASTE: F/P
- Black; E - [employer] Trash [employer]  (Do not shake)                                   

                     Inactive                                                          

                          2016                            [facility]         

           

 

                          Cephalexin 500 MG Oral Capsule [Keflex]                            500 mg=1 cap, PO,

 QID, X 10 day, # 40 cap, 0 Refill(s)                                              

                    Active                                                                       

                          2015                            [facility]                    

 

                          Triamcinolone Acetonide 0.005 MG/MG Topical Ointment                            1 

appl, TOP, BID, X 14 day, # 15 gm, 0 Refill(s)                                     

                    Active                                                              

                          2015                            [facility]             

       

 

                          Morphine                            2 mg, Route: IVP, Drug form: INJ, ONCE, Dosing

 Weight 94.091, kg, Priority: STAT, Start date: 08/12/15 18:09:00, Stop date: 
08/12/15 18:09:00                                                        Inactive    

                                                                                2015

                                        [facility]                    

 

                          Morphine                            2 mg, Route: IVP, ONCE, Dosing Weight 94.091, 

kg, Start date: 08/12/15 17:08:00, Stop date: 08/12/15 17:08:00                 
                                                Inactive                                        

                                                2015                            [facility]

                    

 

                          torsemide 10 mg oral tablet                            30 mg=3 tab, PO, Daily, # 90

 tab, 0 Refill(s)                                                        Active      

                                                                              2015

                                        [facility]                    

 

                          Furosemide 40 MG Oral Tablet [Lasix]                            40 mg=1 tab, PO, BID,

 # 30 tab, 0 Refill(s)                                                        Inactive

                                                                                    2015

                                        [facility]                    

 

                          Levemir FlexPen                            50 unit, 0.5 mL, Route: SUB-Q, Drug form:

 INJ, QAM, Start date: 06/22/15 9:00:00, Duration: 30 day, Stop date: 07/21/15 
9:00:00Notes: Same as Levemir Do not hold insulin without contacting prescriber 
 "single patient use only"                                                        Inactive

                                                                                    2015

                                        [facility]                    

 

                          Insulin, Aspart, Human                            3 unit, 0.03 mL, Route: SUB-Q, Drug

 form: SOLN, TID-Before Meals, Dosing Weight 105, kg, PRN Blood Glucose Results,
 Start date: 06/21/15 16:24:00, Duration: 30 day, Stop date: 07/21/15 
16:23:00Notes: Roll in palms of hands gently;  Do not shake vigorously. (Same 
as: NovoLOG) "single patient use only"  Stable for 28 days at room temperature. 
Expires in _____ days from ______________Date                                      

                          No Longer Active                                                   

                                                2015                            [facility]    

                

 

                          Glucagon                            1 mg, Route: IM, Drug form: PDR/INJ, PRN, Dosing

 Weight 105, kg, PRN Blood Glucose Results, Start date: 06/21/15 16:24:00, 
Duration: 30 day, Stop date: 07/21/15 16:23:00                                     

                          No Longer Active                                                  

                                                2015                            [facility]   

                 

 

                          Dextrose 50% Syringe                            25 gm, 50 mL, Route: IVP, Drug Form:

 INJ, Dosing Weight 105, kg, PRN, PRN Blood Glucose Results, Start date: 
06/21/15 16:24:00, Duration: 30 day, Stop date: 07/21/15 16:23:00               
                                                No Longer Active                              

                                                      2015                     

                                        [facility]                    

 

                          Levemir FlexPen                            65 unit, 0.65 mL, Route: SUB-Q, Drug form:

 INJ, Bedtime, Start date: 06/20/15 21:00:00, Stop date: 07/19/15 21:00:00Notes:
 Same as Levemir Do not hold insulin without contacting prescriber  "single p
atient use only"                                                        No Longer Active

                                                                                    2015

                                        [facility]                    

 

                          nitroglycerin 0.4 mg sublingual tablet                            0.4 mg, 1 tab, Route:

 SL, Drug form: TAB, Q5Min, PRN Chest Pain, Start date: 06/20/15 14:23:00, 
Duration: 30 day, Stop date: 07/20/15 14:22:00Notes: (Same as:Nitroquick, 
Nitrostat) "Do Not Crush"  Sublingual tablet                                       

                          No Longer Active                                                    

                                                2015                            [facility]     

               

 

                          atropine                            0.5 mg, 5 mL, Route: IVP, Drug form: INJ, PRN,

 PRN Bradycardia, Start date: 06/20/15 14:23:00, Duration: 30 day, Stop date: 
07/20/15 14:22:00                                                        No Longer Active

                                                                                    2015

                                        [facility]                    

 

                          Potassium Chloride 20 MEQ Extended Release Tablet                            20 mEq,

 1 tab, Route: PO, Drug form: ERTAB, Q12H, Dosing Weight 105, kg, Priority: 
Routine, Start date: 06/20/15 9:00:00, Duration: 30 day, Stop date: 07/19/15 
21:00:00Notes: (Same as: K-Dur 20) "Do Not Crush"  With food and full glass of 
water                                                        No Longer Active        

                                                                            2015 

                                        [facility]                    

 

                          Lasix                            40 mg, 4 mL, Route: IVP, Drug form: INJ, BID, Dosing

 Weight 105, kg, Start date: 06/20/15 9:00:00, Duration: 30 day, Stop date: 
07/19/15 17:00:00Notes: (Same as: Lasix)   *** MEDICATION WASTE *** Product 
Size:  40 mg Product Wasted:  ___ mg                                               

                    No Longer Active                                                              

                          2015                            [facility]             

       

 

                          Lovenox                            40 mg, 0.4 mL, Route: SUB-Q, Drug form: INJ, [id number],

 Dosing Weight 105, kg, Priority: Routine, Start date: 06/20/15 8:00:00, 
Duration: 30 day, Stop date: 07/19/15 8:00:00Notes: (Same as: Lovenox)          
                                                      No Longer Active                       

                                                                            2015                

                                        [facility]                    

 

                          atorvastatin                            5 mg, 0.5 tab, Route: PO, Drug form: TAB, 

Bedtime, Dosing Weight 103.182, kg, Start date: 06/19/15 21:00:00, Duration: 30 
day, Stop date: 07/18/15 21:00:00Notes: (Same As: Lipitor)                      
                                                No Longer Active                                     

                                                2015                            [facility]                    

 

                          niacin 250 mg oral capsule, extended release                            2,000 mg, 

8 cap, Route: PO, Drug form: ERCAP, Bedtime, Dosing Weight 103.182, kg, Start 
date: 06/19/15 21:00:00, Duration: 30 day, Stop date: 07/18/15 21:00:00Notes: 
With food.                                                        No Longer Active   

                                                                                 2015

                                        [facility]                    

 

                          Insulin Glargine                            42 unit, Route: SUB-Q, Drug form: SOLN,

 Bedtime, Dosing Weight 103.182, kg, Start date: 06/19/15 21:00:00, Duration: 30
 day, Stop date: 07/18/15 21:00:00                                                 

                    No Longer Active                                                                

                          2015                            [facility]               

     

 

                          Levemir FlexPen                            42 unit, 0.42 mL, Route: SUB-Q, Drug form:

 INJ, Bedtime, Start date: 06/19/15 21:00:00, Duration: 30 day, Stop date: 
07/18/15 21:00:00Notes: Same as Levemir Do not hold insulin without contacting 
prescriber  "single patient use only"                                              

                    No Longer Active                                                             

                          2015                            [facility]            

        

 

                          pantoprazole                            40 mg, Route: IVP, Drug form: INJ, Before 

Dinner, Dosing Weight 103.182, kg, Patient is NPO, Start date: 06/19/15 
16:30:00, Duration: 30 day, Stop date: 07/18/15 16:30:00Notes: For IV push 
reconstitute with 10 ml 0.9% sodium chloride and push over 2 minutes. (Same as: 
Protonix)                                                        No Longer Active    

                                                                                2015

                                        [facility]                    

 

                          Morphine                            1 mg, 0.5 mL, Route: IV, Drug form: INJ, Q4H, 

Dosing Weight 105, kg, PRN Pain Score 1-3, Start date: 06/19/15 15:57:00, 
Duration: 30 day, Stop date: 07/19/15 15:56:00Notes: (Same as:MORPhine Sulfate) 
                                                       No Longer Active              

                                                                            2015       

                                        [facility]                    

 

                          Calcium Carbonate 500 MG Chewable Tablet                            1,000 mg, 2 tab,

 Route: PO, Drug form: CHEWTAB, PRN, Dosing Weight 105, kg, PRN Abnormal Lab 
Result, FOR ICU USE ONLY, Start date: 06/19/15 13:42:00, Duration: 30 day, Stop 
date: 07/19/15 13:41:00Notes: (Same As: Tums) Calcium Carbonate 500 fi=186 mg 
elemental calcium   Dose=______ mg calcium carbonate (______ mg elemental 
calcium)                                                        No Longer Active     

                                                                               2015

                                        [facility]                    

 

                          Calcium Gluconate                            1 gm, 10 mL, Route: IVPB, PRN, Dosing

 Weight 105, kg, PRN Abnormal Lab Result, Start date: 06/19/15 13:42:00, 
Duration: 30 day, Stop date: 07/19/15 13:41:00, FOR ICU USE ONLYSpecial 
Instructions: FOR ICU USE ONLY                                                     

                    No Longer Active                                                                    

                          2015                            [facility]                   

 

 

                          Magnesium Oxide                            800 mg, 2 tab, Route: PO, Drug form: TAB,

 PRN, Dosing Weight 105, kg, PRN Abnormal Lab Result, FOR ICU USE ONLY, Start 
date: 06/19/15 13:42:00, Duration: 30 day, Stop date: 07/19/15 13:41:00Notes: (S
olman as: Mag-Ox 400) Magnesium oxide [id number] elemental magnesium Dose=____mg 
magnesium oxide (___mg elemental magnesium)                                        

                          No Longer Active                                                     

                                                2015                            [facility]      

              

 

                          potassium chloride                            10 mEq, 100 mL, Route: IVPB, Drug form:

 INJ, PRN, Dosing Weight 105, kg, PRN Abnormal Lab Result, Via peripheral line, 
Start date: 06/19/15 13:42:00, Duration: 30 day, Stop date: 07/19/15 13:41:00, F
OR ICU USE ONLYSpecial Instructions: FOR ICU USE ONLYNotes: Infuse at a rate of 
10 mEq/hr. (Same as: KCL)                                                        No Longer

 Active                                                                              

                          2015                            [facility]                    

 

                          sodium phosphate + Dextrose 5% in Water  mL                            45 mmol,

 15 mL, Route: IVPB, PRN, Dosing Weight 105, kg, PRN Abnormal Lab Result, Start 
date: 06/19/15 13:42:00, Duration: 30 day, Stop date: 07/19/15 13:41:00, FOR ICU
 USE ONLYSpecial Instructions: FOR ICU USE ONLY                                    

                          No Longer Active                                                 

                                                2015                            [facility]  

                  

 

                          sodium phosphate + Dextrose 5% in Water  mL                            30 mmol,

 10 mL, Route: IVPB, PRN, Dosing Weight 105, kg, PRN Abnormal Lab Result, Start 
date: 06/19/15 13:42:00, Duration: 30 day, Stop date: 07/19/15 13:41:00, FOR ICU
 USE ONLYSpecial Instructions: FOR ICU USE ONLY                                    

                          No Longer Active                                                 

                                                2015                            [facility]  

                  

 

                          sodium phosphate + Dextrose 5% in Water  mL                            15 mmol,

 5 mL, Route: IVPB, PRN, Dosing Weight 105, kg, PRN Abnormal Lab Result, Start 
date: 06/19/15 13:42:00, Duration: 30 day, Stop date: 07/19/15 13:41:00, FOR ICU
 USE ONLYSpecial Instructions: FOR ICU USE ONLY                                    

                          No Longer Active                                                 

                                                2015                            [facility]  

                  

 

                          Neutra-Phos                            2 pkt, Route: PO, Drug Form: PDR/REC, Dosing

 Weight 105, kg, PRN, PRN Abnormal Lab Result, FOR ICU USE ONLY, Start date: 
06/19/15 13:42:00, Duration: 30 day, Stop date: 07/19/15 13:41:00Notes: (Same 
as: Neutra-Phos)  Each 1.25 gm pkt has 250mg phosphorous. Mix w/2.5oz water and 
stir.                                                        No Longer Active        

                                                                            2015 

                                        [facility]                    

 

                          potassium phosphate + Sodium Chloride 0.9%  mL                            45

 mmol, 15 mL, Route: IVPB, PRN, Dosing Weight 105, kg, PRN Abnormal Lab Result, 
Start date: 06/19/15 13:42:00, Duration: 30 day, Stop date: 07/19/15 13:41:00, 
FOR ICU USE ONLYSpecial Instructions: FOR ICU USE ONLYNotes: (Same as: K 
Phosphate.)  1 mMol phoshate has 1.47 mEq potassium  Infuse over 4 hours        
                                                      No Longer Active                     

                                                                            2015              

                                        [facility]                    

 

                          potassium phosphate + Sodium Chloride 0.9%  mL                            30

 mmol, 10 mL, Route: IVPB, PRN, Dosing Weight 105, kg, PRN Abnormal Lab Result, 
Start date: 06/19/15 13:42:00, Duration: 30 day, Stop date: 07/19/15 13:41:00, 
FOR ICU USE ONLYSpecial Instructions: FOR ICU USE ONLYNotes: (Same as: K 
Phosphate.)  1 mMol phoshate has 1.47 mEq potassium  Infuse over 4 hours        
                                                      No Longer Active                     

                                                                            2015              

                                        [facility]                    

 

                          potassium phosphate + Sodium Chloride 0.9%  mL                            15

 mmol, 5 mL, Route: IVPB, PRN, Dosing Weight 105, kg, PRN Abnormal Lab Result, 
Start date: 06/19/15 13:42:00, Duration: 30 day, Stop date: 07/19/15 13:41:00, 
FOR ICU USE ONLYSpecial Instructions: FOR ICU USE ONLYNotes: (Same as: K 
Phosphate.)  1 mMol phoshate has 1.47 mEq potassium  Infuse over 4 hours        
                                                      No Longer Active                     

                                                                            2015              

                                        [facility]                    

 

                          Magnesium Sulfate                            2 gm, 50 mL, Route: IVPB, Drug form: 

INJ, PRN, Dosing Weight 105, kg, PRN Abnormal Lab Result, Start date: 06/19/15 
13:42:00, Duration: 30 day, Stop date: 07/19/15 13:41:00, FOR ICU USE 
ONLYSpecial Instructions: FOR ICU USE ONLY                                         

                    No Longer Active                                                        

                            2015                            [facility]       

             

 

                          Multaq                            400 mg, 1 tab, Route: PO, Drug form: TAB, BID, Dosing

 Weight 103.182, kg, Start date: 06/19/15 9:00:00, Duration: 30 day, Stop date: 
07/18/15 17:00:00Notes: Same as: Multaq                                            

                    No Longer Active                                                           

                          2015                            [facility]          

          

 

                          clopidogrel                            75 mg, 1 tab, Route: PO, Drug form: TAB, Daily,

 Dosing Weight 103.182, kg, Start date: 06/19/15 9:00:00, Duration: 30 day, Stop
 date: 07/18/15 9:00:00Notes: (Same As: Plavix)                                    

                          No Longer Active                                                 

                                                2015                            [facility]  

                  

 

                          aspirin 325 mg tablet                            325 mg, 1 tab, Route: PO, Drug form:

 TAB, Daily, Start date: 06/19/15 9:00:00, Duration: 30 day, Stop date: 07/18/15
 9:00:00Notes: Take with food.                                                     

                    No Longer Active                                                                    

                          2015                            [facility]                   

 

 

                          gabapentin 600 MG Oral Tablet                            600 mg, 2 cap, Route: PO,

 Drug form: CAP, TID, Dosing Weight 103.182, kg, Start date: 06/19/15 9:00:00, 
Duration: 30 day, Stop date: 07/18/15 17:00:00Notes: (Same as: Neurontin)       
                                                      No Longer Active                    

                                                                            2015             

                                        [facility]                    

 

                          pantoprazole                            40 mg, Route: PO, Drug form: ECTAB, BID, Dosing

 Weight 103.182, kg, Start date: 06/19/15 9:00:00, Duration: 30 day, Stop date: 
07/18/15 17:00:00                                                        No Longer Active

                                                                                    2015

                                        [facility]                    

 

                          Imdur                            30 mg, 1 tab, Route: PO, Drug form: ERTAB, QAM, Dosing

 Weight 103.182, kg, Start date: 06/19/15 9:00:00, Duration: 30 day, Stop date: 
07/18/15 9:00:00Notes: (Same as:Imdur) "Do Not Crush"  Take on empty stomach/ 
full glass of water.  Do not crush                                                 

                    No Longer Active                                                                

                          2015                            [facility]               

     

 

                          Lasix                            40 mg, 4 mL, Route: IVP, Drug form: INJ, Q8H, Dosing

 Weight 105, kg, Priority: NOW, Start date: 06/19/15 8:39:00, Duration: 30 day, 
Stop date: 07/19/15 8:00:00Notes: (Same as: Lasix)   *** MEDICATION WASTE *** 
Product Size:  40 mg Product Wasted:  ___ mg                                       

                          No Longer Active                                                    

                                                2015                            [facility]     

               

 

                          Synthroid                            50 microgram, 1 tab, Route: PO, Drug form: TAB,

 Q630AM, Dosing Weight 103.182, kg, Start date: 06/19/15 6:30:00, Duration: 30 
day, Stop date: 07/18/15 6:30:00Notes: Take 1 hour before or 2 hours after meal;
 Enteral feeds may interefere with the absorption of this medication.(Same 
as:Levothroid, Synthroid)                                                        No Longer

 Active                                                                              

                          2015                            [facility]                    

 

                          Benadryl                            50 mg, 2 tab, Route: PO, Drug form: TAB, Before

 Transfusion, Dosing Weight 103.182, kg, PRN Blood Transfusion, Start date: 
06/18/15 22:34:00, Duration: 30 day, Stop date: 07/18/15 22:33:00               
                                                No Longer Active                              

                                                      2015                     

                                        [facility]                    

 

                          Tylenol                            650 mg, 2 tab, Route: PO, Drug form: TAB, Before

 Transfusion, Dosing Weight 103.182, kg, PRN Pain Score 1-3, Start date: 
06/18/15 22:32:00, Duration: 30 day, Stop date: 07/18/15 22:31:00Notes: Do not 
exceed 4 gm/day.  (Same as: Tylenol)                                               

                    No Longer Active                                                              

                          2015                            [facility]             

       

 

                          Aspirin 325 MG Oral Tablet                            325 mg, Route: PO, Drug form:

 TAB, Daily, Dosing Weight 103.182, kg, Priority: STAT, Start date: 06/18/15 
21:46:00, Duration: 30 day, Stop date: 07/18/15 9:00:00                            

                            Inactive                                                 

                                                2015                            [facility]  

                  

 

                          Ondansetron                            4 mg, 2 mL, Route: IVP, Drug form: INJ, Q8H,

 Dosing Weight 103.182, kg, PRN Nausea & Vomiting, Start date: 06/18/15 
21:46:00, Duration: 30 day, Stop date: 07/18/15 21:45:00Notes: (Same as: Zofran)
   *** MEDICATION WASTE *** Product Size:  4 mg Product Wasted:  ___ mg         
                                                      No Longer Active                      

                                                                            2015               

                                        [facility]                    

 

                                        Acetaminophen 325 MG / Hydrocodone Bitartrate 10 MG Oral Tablet                 

                                        1 tab, Route: PO, Drug Form: TAB, Dosing Weight 103.182, kg, Q4H, PRN Pain

 Score 4-6, Start date: 06/18/15 21:46:00, Duration: 30 day, Stop date: 07/18/15
 21:45:00Notes: Do not exceed 4gm/day of acetaminophen.  (Same as: Norco 325/10)
                                                        No Longer Active             

                                                                            2015      

                                        [facility]                    

 

                          Morphine                            2 mg, 1 mL, Route: IVP, Drug form: INJ, Q3H, Dosing

 Weight 103.182, kg, PRN Pain Score 4-6, Start date: 06/18/15 21:46:00, 
Duration: 30 day, Stop date: 07/18/15 21:45:00Notes: (Same as:MORPhine Sulfate) 
                                                       No Longer Active              

                                                                            2015       

                                        [facility]                    

 

                          Insulin, Aspart, Human                            5 unit, 0.05 mL, Route: SUB-Q, Drug

 form: SOLN, Sliding Scale, Dosing Weight 103.182, kg, PRN Blood Glucose 
Results, Start date: 06/18/15 21:43:00, Duration: 30 day, Stop date: 07/18/15 
21:42:00Notes: Roll in palms of hands gently;  Do not shake vigorously. (Same 
as: NovoLOG) "single patient use only"  Stable for 28 days at room temperature. 
Expires in _____ days from ______________Date                                      

                          No Longer Active                                                   

                                                2015                            [facility]    

                

 

                          Glucagon                            1 mg, Route: IM, Drug form: PDR/INJ, PRN, Dosing

 Weight 103.182, kg, PRN Blood Glucose Results, Start date: 06/18/15 21:43:00, 
Duration: 30 day, Stop date: 07/18/15 21:42:00                                     

                          No Longer Active                                                  

                                                2015                            [facility]   

                 

 

                          Dextrose 50% Syringe                            25 gm, 50 mL, Route: IVP, Drug Form:

 INJ, Dosing Weight 103.182, kg, PRN, PRN Blood Glucose Results, Start date: 
06/18/15 21:43:00, Duration: 30 day, Stop date: 07/18/15 21:42:00               
                                                No Longer Active                              

                                                      2015                     

                                        [facility]                    

 

                          Aspirin                            325 mg, 1 tab, Route: PO, Drug form: TAB, ONCE,

 Dosing Weight 103.182, kg, Start date: 06/18/15 21:33:00, Stop date: 06/18/15 
21:33:00Notes: Take with food.                                                     

                    Inactive                                                                            

                          2015                            [facility]                    

 

                          Alprazolam 0.5 MG Oral Tablet [Xanax]                            0.5 mg, 1 tab, Route:

 PO, Drug form: TAB, BID, Dosing Weight 103.182, kg, PRN as needed for anxiety, 
Start date: 06/18/15 21:29:00, Duration: 30 day, Stop date: 07/18/15 
21:28:00Notes: With food or milk (Same as: Xanax)                                  

                          No Longer Active                                               

                                                2015                            [facility]

                    

 

                                        3 ML Insulin Lispro 100 UNT/ML Prefilled Syringe [Humalog]                      

                                        sliding scale, SUB-Q, TID-Before Meals, # 3 mL, 0 Refill(s)                  

                                                Active                                           

                                                2015                            [facility]

                    

 

                                        Acetaminophen 325 MG / Hydrocodone Bitartrate 10 MG Oral Tablet [Norco 10/325]  

                                        1 tab, PO, QID, PRN for pain, # 24 tab, 0 Refill(s)      

                                                  Active                               

                                                      2015                      

                                        [facility]                    

 

                          Alprazolam 0.5 MG Oral Tablet [Xanax]                            0.5 mg=1 tab, PO,

 BID, PRN anxiety, stress, # 20 tab, 0 Refill(s)                                   

                     Active                                                            

                          2015                            [facility]           

         

 

                          torsemide 10 mg oral tablet                            30 mg=3 tab, PO, Daily, # 90

 tab, 0 Refill(s)                                                        No Longer Active

                                                                                    2015

                                        [facility]                    

 

                          sertraline 100 mg oral tablet                            100 mg=1 tab, PO, Daily, 

# 30 tab, 0 Refill(s)                                                        Active  

                                                                                  2015

                                        [facility]                    

 

                          pantoprazole 40 mg oral enteric coated tablet                            40 mg=1 tab,

 PO, BID, # 90 tab, 0 Refill(s)                                                    

                    Active                                                                             

                          2015                            [facility]                    

 

                                        Potassium Chloride 20 MEQ Extended Release Tablet [Klor-Con]                    

                          20 mEq=1 tab, PO, BID, # 180 tab, 0 Refill(s)                                

                        Active                                                         

                           2015                            [facility]        

            

 

                          niacin 1000 mg oral tablet, extended release                            2,000 mg=2

 tab, PO, Bedtime, # 90 tab, 0 Refill(s)                                           

                    Active                                                                    

                          2015                            [facility]                   

 

 

                          Metformin hydrochloride 1000 MG Oral Tablet                            1,000 mg=1 

tab, PO, BID-Meals, # 30 tab, 0 Refill(s)                                          

                    Active                                                                   

                          2015                            [facility]                  

  

 

                          Levothyroxine Sodium 0.05 MG Oral Tablet [Synthroid]                            50

 microgram=1 tab, PO, Daily, # 30 tab, 0 Refill(s)                                 

                       Active                                                          

                          2015                            [facility]         

           

 

                                        24 HR Isosorbide Mononitrate 30 MG Extended Release Tablet [Imdur]              

                          30 mg=1 tab, PO, QAM, # 30 tab, 0 Refill(s)                            

                            Active                                                   

                                                2015                            [facility]    

                

 

                          gabapentin 600 MG Oral Tablet                            600 mg=1 tab, PO, TID, # 

270 tab, 0 Refill(s)                                                        Active   

                                                                                 2015

                                        [facility]                    

 

                          clopidogrel 75 mg oral tablet                            75 mg=1 tab, PO, Daily, #

 30 tab, 0 Refill(s)                                                        Active   

                                                                                 2015

                                        [facility]                    

 

                          atorvastatin 10 mg oral tablet                            5 mg=0.5 tab, PO, Bedtime,

 # 30 tab, 0 Refill(s)                                                        Active 

                                                                                   2015

                                        [facility]                    

 

                                        heparin additive 25,000 unit [12 unit/kg/hr] + Premix Diluent Dextrose 5% 500 mL

                                        500 mL, Rate: 20.09 ml/hr, Infuse over: 24.9 hr, Route:

 IV, Dosing Weight 83.7 kg, Total Volume: 500 mL, Start date: 06/18/15 18:04:00,
Duration: 30 day, Stop date: 07/18/15 18:03:00                                     

                          No Longer Active                                                  

                                                2015                            [facility]   

                 

 

                                        heparin sodium, porcine 5000 UNT/ML Injectable Solution                         

                                        4,000 unit, 4 mL, Route: IV, Drug form: INJ, ONCE, Dosing Weight 103.182, kg, Priority:

 STAT, Start date: 06/18/15 18:04:00, Stop date: 06/18/15 18:04:00              
                                                Inactive                                      

                                                2015                            [facility]                    

 

                                        heparin sodium, porcine 1000 UNT/ML Injectable Solution                         

                                        Route: IVP, PRN, 5,000 unit, 5 mL, Drug form: INJ, PRN, Heparin Protocol, Start 

date: 06/18/15 18:04:00 Stop date: 07/18/15 18:03:00, 30 day                    
                                                No Longer Active                                    

                                                2015                            [facility]                    

 

                          Sodium Chloride 0.154 MEQ/ML Injectable Solution                            500 mL,

 500 ml/hr, Infuse Over: 1 hr, Route: IV, ONCE, Priority: STAT, Dosing Weight 
103.182 kg, Start date: 06/18/15 16:33:00, Duration: 1 doses or times, Stop 
date: 06/18/15 16:33:00                                                        Inactive

                                                                                    2015

                                        [facility]                    

 

                          Temazepam                            15 mg, 1 cap, Route: PO, Drug form: CAP, Bedtime,

 Dosing Weight 105.7, kg, PRN Sleep, Start date: 03/31/15 16:26:00, Duration: 30
day, Stop date: 04/30/15 16:25:00Notes: (Same As: Restoril)                     
                                                Inactive                                             

                                                2015                            [facility]

                    

 

                          temazepam 7.5 mg oral capsule                            7.5 mg=1 cap, PO, Bedtime,

 PRN Sleep, # 5 cap, 0 Refill(s)                                                   

                    Active                                                                            

                          2015                            [facility]                    

 

                          gabapentin 600 MG Oral Tablet                            600 mg=1 tab, PO, BID, # 

60 tab, 0 Refill(s)                                                        Active    

                                                                                2015

                                        [facility]                    

 

                                        isosorbide mononitrate 30 mg oral tablet, extended release                      

                          30 mg=1 tab, PO, QAM, # 30 tab, 0 Refill(s)                                    

                    Active                                                             

                          2015                            [facility]            

        

 

                          doxycycline monohydrate 100 mg oral tablet                            100 mg=1 tab,

 PO, [id number], # 10 tab, 0 Refill(s)                                                

                    Active                                                                         

                          2015                            [facility]                    

 

                          levothyroxine 50 mcg (0.05 mg) oral tablet                            50 microgram=1

 tab, PO, Q630AM, # 30 tab, 0 Refill(s)                                            

                    Active                                                                     

                          2015                            [facility]                    



 

                          pantoprazole 40 mg oral enteric coated tablet                            40 mg=1 tab,

 PO, Daily, # 30 tab, 0 Refill(s)                                                  

                    Active                                                                           

                          2015                            [facility]                    

 

                          sertraline 100 mg oral tablet                            100 mg=1 tab, PO, Daily, 

# 30 tab, 0 Refill(s)                                                        Active  

                                                                                  2015

                                        [facility]                    

 

                          torsemide 20 mg oral tablet                            20 mg=1 tab, PO, Daily, # 30

 tab, 0 Refill(s)                                                        Active      

                                                                              2015

                                        [facility]                    

 

                          atorvastatin 10 mg oral tablet                            10 mg=1 tab, PO, Bedtime,

 # 30 tab, 0 Refill(s)                                                        Active 

                                                                                   2015

                                        [facility]                    

 

                          clopidogrel 75 mg oral tablet                            75 mg=1 tab, PO, Daily, #

 30 tab, 0 Refill(s)                                                        Active   

                                                                                 2015

                                        [facility]                    

 

                          Potassium Chloride 20 MEQ Extended Release Tablet                            20 mEq=1

 tab, PO, BID, # 60 tab, 0 Refill(s)                                               

                    Active                                                                        

                          2015                            [facility]                    

 

                          niacin 1000 mg oral tablet, extended release                            2,000 mg=2

 tab, PO, Bedtime, # 60 tab, 0 Refill(s)                                           

                    Active                                                                    

                          2015                            [facility]                   

 

 

                          Metformin hydrochloride 1000 MG Oral Tablet                            1,000 mg=1 

tab, PO, BID, # 60 tab, 0 Refill(s)                                                

                    Active                                                                         

                          2015                            [facility]                    

 

                          dronedarone 400 mg oral tablet                            400 mg=1 tab, PO, BID, #

 60 tab, 0 Refill(s)                                                        Active   

                                                                                 2015

                                        [facility]                    

 

                          gabapentin 600 MG Oral Tablet                            600 mg, 2 cap, Route: PO,

 Drug form: CAP, BID, Dosing Weight 105.7, kg, Priority: NOW, Start date: 
03/31/15 9:55:00, Duration: 30 day, Stop date: 04/30/15 9:00:00Notes: (Same as: 
Neurontin)                                                        Inactive           

                                                                            2015    

                                        [facility]                    

 

                          torsemide                            20 mg, 1 tab, Route: PO, Drug form: TAB, Daily,

 Dosing Weight 105.7, kg, Start date: 03/31/15 9:45:00, Duration: 30 day, Stop 
date: 04/30/15 9:00:00Notes: (Same As: Demadex)                                    

                    Inactive                                                           

                          2015                            [facility]          

          

 

                          gabapentin 600 MG Oral Tablet                            600 mg, PO, BID, tab, 0 Refill(s)

                                                        Inactive                     

                                                                            2015              

                                        [facility]                    

 

                                        Acetaminophen 325 MG / Hydrocodone Bitartrate 5 MG Oral Tablet                  

                                        1 tab, Route: PO, Drug Form: TAB, Dosing Weight 105.7, kg, Q4H, PRN Pain 

Score 1-3, Start date: 03/30/15 21:48:00, Duration: 30 day, Stop date: 04/29/15 
21:47:00Notes: (Same as: Norco 325/5)  Do not exceed 4gm/day of acetaminophen.  
                                                      No Longer Active                

                                                                            2015         

                                        [facility]                    

 

                          Nitroglycerin 0.4 MG Sublingual Tablet                            0.4 mg, 1 tab, Route:

 SL, Drug form: TAB, Q5Min, Dosing Weight 105.7, kg, PRN Chest Pain, Start date:
03/30/15 18:32:00, Duration: 30 day, Stop date: 04/29/15 18:31:00Notes: (Same 
as:Nitroquick, Nitrostat) "Do Not Crush"  Sublingual tablet                     
                                                No Longer Active                                     

                                                2015                            [facility]                    

 

                          Atropine                            0.5 mg, 5 mL, Route: IVP, Drug form: INJ, ONCE,

 Dosing Weight 105.7, kg, PRN Bradycardia, Start date: 03/30/15 18:32:00        
                                                      No Longer Active                      

                                                                            2015               

                                        [facility]                    

 

                          Doxycycline                            100 mg, 1 tab, Route: PO, Drug form: TAB, [id number],

 Dosing Weight 105.7, kg, Start date: 03/30/15 18:00:00, Duration: 30 day, Stop 
date: 04/29/15 6:00:00Notes: NO MILK/ANTACIDS/IRON  Take 1 hour before or 2 
hours after dairy products                                                        No 

Longer Active                                                                        

                          2015                            [facility]                    

 

                                        pneumococcal capsular polysaccharide type 1 vaccine / pneumococcal capsular polysaccharide

 type 10A vaccine / pneumococcal capsular polysaccharide type 11A vaccine / 
pneumococcal capsular polysaccharide type 12F vaccine / pneumococcal capsular 
polysacchar                             0.5 ml, Route: IM, Drug Form: INJ, Daily, Start

 date: 03/30/15 9:00:00, Duration: 1 doses or times, Stop date: 03/30/15 
9:00:00Notes: (Same as: Pneumovax 23)  Refrigerate                                 

                       Inactive                                                        

                            2015                            [facility]       

             

 

                          Zoloft                            100 mg, 1 tab, Route: PO, Drug form: TAB, Daily,

 Dosing Weight 104.545, kg, Start date: 03/30/15 9:00:00, Duration: 30 day, Stop
date: 04/28/15 9:00:00Notes: (Same as: Zoloft)                                     

                          No Longer Active                                                  

                                                2015                            [facility]   

                 

 

                          Protonix                            40 mg, 1 tab, Route: PO, Drug form: ECTAB, Daily,

 Dosing Weight 104.545, kg, Start date: 03/30/15 9:00:00, Duration: 30 day, Stop
date: 04/28/15 9:00:00Notes: Tablet should not be chewed or crushed. (Same as: 
Protonix)                                                        No Longer Active    

                                                                                2015

                                        [facility]                    

 

                          Plavix                            75 mg, 1 tab, Route: PO, Drug form: TAB, Daily, 

Dosing Weight 104.545, kg, Start date: 03/30/15 9:00:00, Duration: 30 day, Stop 
date: 04/28/15 9:00:00Notes: (Same As: Plavix)                                     

                          No Longer Active                                                  

                                                2015                            [facility]   

                 

 

                          Imdur                            30 mg, 1 tab, Route: PO, Drug form: ERTAB, QAM, Dosing

 Weight 105.7, kg, Start date: 03/30/15 9:00:00, Duration: 30 day, Stop date: 
04/28/15 9:00:00Notes: (Same as:Imdur) "Do Not Crush"  Take on empty stomach/ 
full glass of water.  Do not crush                                                 

                    No Longer Active                                                                

                          2015                            [facility]               

     

 

                          Saline Flush 0.9%                            10 ml, Route: IVP, Drug Form: INJ, Dosing

 Weight 105.7, kg, Q12H, Start date: 03/30/15 9:00:00, Duration: 30 day, Stop 
date: 04/28/15 21:00:00Notes: (Same as: BD Posiflush)                              

                          No Longer Active                                           

                                                2015                            [facility]

                    

 

                          pantoprazole                            40 mg, Route: IVP, Drug form: INJ, Daily, 

Dosing Weight 105.7, kg, Start date: 03/30/15 9:00:00, Duration: 30 day, Stop 
date: 04/28/15 9:00:00                                                        Inactive

                                                                                    2015

                                        [facility]                    

 

                          Multaq                            400 mg, 1 tab, Route: PO, Drug form: TAB, BID, Dosing

 Weight 105.7, kg, Start date: 03/30/15 9:00:00, Duration: 30 day, Stop date: 
04/28/15 21:00:00Notes: Same as: Multaq                                            

                    No Longer Active                                                           

                          2015                            [facility]          

          

 

                          Enoxaparin                            40 mg, 0.4 mL, Route: SUB-Q, Drug form: INJ,

 [id number], Dosing Weight 105.7, kg, Start date: 03/30/15 9:00:00, Duration: 30 
day, Stop date: 04/28/15 9:00:00Notes: (Same as: Lovenox)                       
                                                No Longer Active                                       

                                                2015                            [facility]

                    

 

                          Docusate                            100 mg, 1 cap, Route: PO, Drug form: CAP, BID,

 Dosing Weight 105.7, kg, PRN Constipation, Start date: 03/30/15 8:21:00, 
Duration: 30 day, Stop date: 04/29/15 8:20:00Notes: (Same as: Colace) (Do Not C
rush)                                                        No Longer Active        

                                                                            2015 

                                        [facility]                    

 

                          Ondansetron                            4 mg, Route: IV, Drug form: INJ, Q8H, Dosing

 Weight 105.7, kg, PRN Nausea, Start date: 03/30/15 8:21:00, Duration: 30 day, 
Stop date: 04/29/15 8:20:00                                                        Inactive

                                                                                    2015

                                        [facility]                    

 

                          Synthroid                            50 microgram, 1 tab, Route: PO, Drug form: TAB,

 [id number], Dosing Weight 104.545, kg, Start date: 03/30/15 6:30:00, Duration: 30 
day, Stop date: 04/28/15 6:30:00Notes: Take 1 hour before or 2 hours after meal;
Enteral feeds may interefere with the absorption of this medication.(Same 
as:Levothroid, Synthroid)                                                        No Longer

 Active                                                                              

                          2015                            [facility]                    

 

                          Aspirin 300 MG Rectal Suppository                            300 mg, 1 supp, Route:

 TX, Drug form: SUPP, Daily, Dosing Weight 105.7, kg, Start date: 03/30/15 
1:38:00, Duration: 30 day, Stop date: 04/28/15 9:00:00Notes: Refrigerate.       
                                                Inactive                               

                                                      2015                      

                                        [facility]                    

 

                          Saline Flush 0.9%                            10 ml, Route: IVP, Drug Form: INJ, Dosing

 Weight 105.7, kg, PRN, PRN Line Flush, Start date: 03/30/15 1:30:00, Duration: 
30 day, Stop date: 04/29/15 1:29:00Notes: (Same as: BD Posiflush)               
                                                No Longer Active                               

                                                      2015                      

                                        [facility]                    

 

                          Vancomycin                            1 gm, 200 mL, Route: IVPB, Drug form: INJ, [id number],

 Dosing Weight 104.545, kg, Priority: STAT, Start date: 03/30/15 1:30:00, 
Duration: 30 day, Stop date: 04/28/15 11:00:00                                     

                    Inactive                                                            

                          2015                            [facility]           

         

 

                          Insulin, Aspart, Human                            3 unit, 0.03 mL, Route: SUB-Q, Drug

 form: SOLN, TID-Before Meals, Dosing Weight 105.7, kg, PRN Blood Glucose 
Results, Start date: 03/30/15 1:29:00, Duration: 30 day, Stop date: 04/29/15 
1:28:00Notes: Roll in palms of hands gently;  Do not shake vigorously. (Same as:
NovoLOG) "single patient use only"  Stable for 28 days at room temperature. 
Expires in _____ days from ______________Date                                      

                          No Longer Active                                                   

                                                2015                            [facility]    

                

 

                          Glucagon                            1 mg, Route: IM, Drug form: PDR/INJ, PRN, Dosing

 Weight 105.7, kg, PRN Blood Glucose Results, Start date: 03/30/15 1:29:00, 
Duration: 30 day, Stop date: 04/29/15 1:28:00                                      

                          No Longer Active                                                   

                                                2015                            [facility]    

                

 

                          Dextrose 50% Syringe                            12.5 gm, 25 mL, Route: IVP, Drug Form:

 INJ, Dosing Weight 105.7, kg, PRN, PRN Blood Glucose Results, Start date: 
03/30/15 1:29:00, Duration: 30 day, Stop date: 04/29/15 1:28:00                 
                                                No Longer Active                                 

                                                      2015                        

                                        [facility]                    

 

                          **Pt's own med-Niacin 1000mg ER tab 2000mg PO Bedtime                            **Pt's

 own med-Niacin 1000mg ER tab 2000mg PO Bedtime, 2,000 mg, Drug form: MISC, 
Route: PO, Bedtime, 03/29/15 22:28:00, Duration: 30 day, Stop date: 04/28/15 
21:00:00                                                        No Longer Active     

                                                                               2015

                                        [facility]                    

 

                          Levemir FlexPen                            42 unit, 0.42 mL, Route: SUB-Q, Drug form:

 INJ, Bedtime, Start date: 03/29/15 21:00:00, Duration: 30 day, Stop date: 
04/27/15 21:00:00Notes: Same as Levemir Do not hold insulin without contacting 
prescriber  "single patient use only"                                              

                    No Longer Active                                                             

                          2015                            [facility]            

        

 

                          niacin 1000 mg oral tablet, extended release                            2,000 mg, 

2 tab, Route: PO, Drug form: ERTAB, Bedtime, Dosing Weight 104.545, kg, Start 
date: 03/29/15 21:00:00, Duration: 30 day, Stop date: 04/27/15 21:00:00         
                                                Inactive                                 

                                                      2015                        

                                        [facility]                    

 

                          Insulin Glargine                            42 unit, Route: SUB-Q, Drug form: SOLN,

 Bedtime, Dosing Weight 104.545, kg, Start date: 03/29/15 21:00:00, Duration: 30
day, Stop date: 04/27/15 21:00:00                                                  

                    Inactive                                                                         

                          2015                            [facility]                    

 

                          atorvastatin                            10 mg, 1 tab, Route: PO, Drug form: TAB, Bedtime,

 Dosing Weight 104.545, kg, Start date: 03/29/15 21:00:00, Duration: 30 day, 
Stop date: 04/27/15 21:00:00Notes: (Same As: Lipitor)                              

                          No Longer Active                                           

                                                2015                            [facility]

                    

 

                          Acyclovir                            840 mg, Route: IVPB, Q8H, Dosing Weight 104.545,

 kg, Priority: STAT, Start date: 03/29/15 20:59:00, Duration: 30 day, Stop date:
04/28/15 22:00:00, HSV encephalitis or immunocompromised patientsSpecial 
Instructions: HSV encephalitis or immunocompromised patientsNotes: (Same as: 
Zovirax)                                                        No Longer Active     

                                                                               2015

                                        [facility]                    

 

                                        3 ML Insulin Glargine 100 UNT/ML Prefilled Syringe [Lantus]                     

                          42 unit, SUB-Q, Bedtime, # 10 ml, 0 Refill(s)                                 

                       Active                                                          

                          2015                            [facility]         

           

 

                          clopidogrel 75 MG Oral Tablet [Plavix]                            75 mg=1 tab, PO,

 Daily, # 30 tab, 0 Refill(s)                                                        

No Longer Active                                                                     

                          2015                            [facility]                    



 

                                        24 HR Isosorbide Mononitrate 30 MG Extended Release Tablet [Imdur]              

                          30 mg=1 tab, PO, QAM, # 30 tab, 0 Refill(s)                            

                            No Longer Active                                         

                                                2015                            [facility]

                    

 

                          atorvastatin 10 mg oral tablet                            10 mg=1 tab, PO, Bedtime,

 # 30 tab, 0 Refill(s)                                                        No Longer

 Active                                                                              

                          2015                            [facility]                    

 

                          niacin 1000 mg oral tablet, extended release                            2,000 mg=2

 tab, PO, Bedtime, # 30 tab, 0 Refill(s)                                           

                    No Longer Active                                                          

                          2015                            [facility]         

           

 

                          torsemide 20 mg oral tablet                            20 mg=1 tab, PO, BID, # 30 

tab, 0 Refill(s)                                                        No Longer Active

                                                                                    2015

                                        [facility]                    

 

                          normal saline 0.9% IV 1,000 mL                            1,000 mL, Rate: 999 ml/hr,

 Infuse over: 1 hr, Route: IV, Dosing Weight 104.545 kg, Total Volume: 1,000, 
Start date: 03/29/15 19:16:00, Duration: 30 day, Stop date: 04/28/15 19:15:00   
                                                      No Longer Active                

                                                                            2015         

                                        [facility]                    

 

                          Zosyn                            3.375 gm, 100 mL, Route: IVPB, Drug form: PDR/INJ,

 [id number], Dosing Weight 104.545, kg, Priority: STAT, Start date: 03/29/15 
19:16:00, Duration: 30 day, Stop date: 04/28/15 11:16:00Notes: (Same as: Zosyn) 
 Infuse over 4 hours.  Activate and reconstitute before use.  Dosing based on 
Piperacillin component                                                        No Longer

 Active                                                                              

                          2015                            [facility]                    

 

                          Sodium Chloride 0.154 MEQ/ML Injectable Solution                            1,000 

mL, 1000 ml/hr, Infuse Over: 1 hr, Route: IV, 1,000, Drug form: INJ, ONCE, 
Priority: STAT, Dosing Weight 104.545 kg, Start date: 03/29/15 19:02:00, 
Duration: 1 doses or times, Stop date: 03/29/15 19:02:00                        
                                                Inactive                                               

                                                2015                            [facility]

                    

 

                          Naloxone                            1 mg, 2.5 mL, Route: IVP, Drug form: INJ, ONCE,

 Dosing Weight 104.545, kg, Priority: STAT, Start date: 03/29/15 18:35:00, Stop 
date: 03/29/15 18:35:00Notes: Same as Narcan                                       

                    Inactive                                                              

                          2015                            [facility]             

       

 

                          Vancomycin                            2 gm, 500 mL, Route: IVPB, Drug form: SOLN, 

ONCE, Dosing Weight 104.545, kg, Priority: STAT, Start date: 03/29/15 18:17:00, 
Stop date: 03/29/15 18:17:00Notes: Same as: Vancocin  Infusion rate  2001 mg: 
infuse over 2.5 hours                                                        Inactive

                                                                                    2015

                                        [facility]                    

 

                          Zosyn                            3.375 gm, 100 mL, Route: IVPB, Drug form: PDR/INJ,

 ONCE, Dosing Weight 104.545, kg, Priority: STAT, Start date: 03/29/15 18:17:00,
 Stop date: 03/29/15 18:17:00Notes: (Same as: Zosyn)  Infuse over 4 hours.  
Activate and reconstitute before use.  Dosing based on Piperacillin component   
                                                      Inactive                        

                                                                            2015                 

                                        [facility]                    

 

                          Sodium Chloride 0.154 MEQ/ML Injectable Solution                            1,000 

mL, 1,000 ml/hr, Infuse Over: 1 hr, Route: IV, 1,000, Drug form: INJ, ONCE, 
Priority: STAT, Dosing Weight 104.545 kg, Start date: 03/29/15 16:45:00, 
Duration: 1 doses or times, Stop date: 03/29/15 16:45:00                        
                                                Inactive                                               

                                                2015                            [facility]

                    

 

                          Saline Flush 0.9%                            10 mL, Route: IVP, Drug Form: INJ, Dosing

 Weight 104.545, kg, PRN, PRN Line Flush, Start date: 03/29/15 16:45:00, 
Duration: 30 day, Stop date: 04/28/15 16:44:00Notes: (Same as: BD Posiflush)    
                                                      No Longer Active                 

                                                                            2015          

                                        [facility]                    

 

                          Cephalexin 500 MG Oral Capsule [Keflex]                            500 mg=1 cap, PO,

 TID, # 21 cap, 0 Refill(s)                                                        Inactive

                                                                                    2015

                                        [facility]                    

 

                                        120 ACTUAT Fluticasone propionate 0.05 MG/ACTUAT Nasal Inhaler [Flonase]        

                                        2 spray, Route: Each Affected Nostril, Drug Form: SPRY, Dosing 

Weight 103.182, kg, Daily, Start date: 01/16/15 9:00:00, Duration: 30 day, Stop 
date: 02/14/15 9:00:00Notes: (Same as: Flonase)                                    

                    Inactive                                                           

                          2015                            [facility]          

          

 

                          Lasix                            40 mg, 4 mL, Route: IVP, Drug form: INJ, Daily, Dosing

 Weight 103.182, kg, Start date: 01/16/15 9:00:00, Duration: 30 day, Stop date: 
02/14/15 9:00:00Notes: (Same as: Lasix)                                            

                    Inactive                                                                   

                          2015                            [facility]                  

  

 

                          Niacin                            1,000 mg, Route: PO, Bedtime, Dosing Weight 103.182,

 kg, Start date: 01/15/15 21:00:00, Duration: 30 day, Stop date: 02/13/15 
21:00:00                                                        Inactive             

                                                                            2015      

                                        [facility]                    

 

                          Cefuroxime 500 MG Oral Tablet [Ceftin]                            500 mg, 1 tab, Route:

 PO, Drug form: TAB, [id number], Dosing Weight 103.182, kg, Start date: 01/15/15 
21:00:00, Duration: 30 day, Stop date: 02/14/15 9:00:00Notes: With food. (Same 
As: Ceftin)                                                        No Longer Active  

                                                                                  2015

                                        [facility]                    

 

                          Robitussin                            400 mg, 20 mL, Route: PO, Drug Form: SYRP, Dosing

 Weight 103.182, kg, Q6H, PRN Cough, Start date: 01/15/15 19:09:00, Duration: 30
 day, Stop date: 02/14/15 19:08:00Notes: (Same as: Robitussin)                  
                                                No Longer Active                                 

                                                      2015                        

                                        [facility]                    

 

                          NovoLog                            10 unit, 0.1 mL, Route: SUB-Q, Drug form: SOLN,

 TID-Before Meals, Dosing Weight 103.182, kg, Start date: 01/15/15 11:30:00, 
Duration: 30 day, Stop date: 02/14/15 7:30:00Notes: Roll in palms of hands gen
tly;  Do not shake vigorously. (Same as: NovoLOG) "single patient use only"  
Stable for 28 days at room temperature. Expires in _____ days from 
______________Date                                                        No Longer Active

                                                                                    01/15/2015

                                        [facility]                    

 

                          clopidogrel                            75 mg, 1 tab, Route: PO, Drug form: TAB, Daily,

 Dosing Weight 103.182, kg, Start date: 01/15/15 9:30:00, Duration: 30 day, Stop
 date: 02/14/15 9:00:00Notes: (Same As: Plavix)                                    

                          No Longer Active                                                 

                                                01/15/2015                            [facility]  

                  

 

                          Aspirin 81 MG Enteric Coated Tablet                            81 mg, 1 tab, Route:

 PO, Drug form: ECTAB, Daily, Dosing Weight 103.182, kg, Start date: 01/15/15 
9:30:00, Duration: 30 day, Stop date: 02/14/15 9:00:00Notes: Do not crush or 
chew. (Same As: Ecotrin)                                                        No Longer

 Active                                                                              

                          01/15/2015                            [facility]                    

 

                          torsemide                            10 mg, 1 tab, Route: PO, Drug form: TAB, Daily,

 Dosing Weight 103.182, kg, Start date: 01/15/15 9:00:00, Duration: 30 day, Stop
 date: 02/13/15 9:00:00Notes: (Same As: Demadex)                                   

                     Inactive                                                          

                          01/15/2015                            [facility]         

           

 

                          Zoloft                            100 mg, 2 tab, Route: PO, Drug form: TAB, Daily,

 Dosing Weight 103.182, kg, Start date: 01/15/15 9:00:00, Duration: 30 day, Stop
 date: 02/13/15 9:00:00Notes: (Same as:  Zoloft)                                   

                          No Longer Active                                                

                                                01/15/2015                            [facility] 

                   

 

                          Potassium Chloride 20 MEQ Extended Release Tablet                            20 mEq,

 1 tab, Route: PO, Drug form: ERTAB, BID, Dosing Weight 103.182, kg, Start date:
 01/15/15 9:00:00, Duration: 30 day, Stop date: 02/13/15 17:00:00Notes: (Same 
as: K-Dur 20) "Do Not Crush"  With food and full glass of water                 
                                                No Longer Active                                

                                                      01/15/2015                       

                                        [facility]                    

 

                          Protonix                            40 mg, 1 tab, Route: PO, Drug form: ECTAB, Daily,

 Dosing Weight 103.182, kg, Start date: 01/15/15 9:00:00, Duration: 30 day, Stop
 date: 02/13/15 9:00:00Notes: Tablet should not be chewed or crushed. (Same as: 
Protonix)                                                        No Longer Active    

                                                                                01/15/2015

                                        [facility]                    

 

                          Lisinopril                            2.5 mg, 0.5 tab, Route: PO, Drug form: TAB, 

Daily, Dosing Weight 103.182, kg, Start date: 01/15/15 9:00:00, Duration: 30 
day, Stop date: 02/13/15 9:00:00Notes: (Same as: Prinivil, Zestril)             
                                                No Longer Active                            

                                                        01/15/2015                   

                                        [facility]                    

 

                          Metformin hydrochloride 1000 MG Oral Tablet                            1,000 mg, 2

 tab, Route: PO, Drug form: TAB, BID, Dosing Weight 103.182, kg, Start date: 
01/15/15 9:00:00, Duration: 30 day, Stop date: 02/13/15 17:00:00Notes: (Same as:
 Glucophage)  Take with meal                                                        No

 Longer Active                                                                       

                          01/15/2015                            [facility]                    

 

                          Synthroid                            50 microgram, 1 tab, Route: PO, Drug form: TAB,

 Q630AM, Dosing Weight 103.182, kg, Start date: 01/15/15 9:00:00, Duration: 30 
day, Stop date: 02/14/15 6:30:00Notes: Take 1 hour before or 2 hours after meal;
 Enteral feeds may interefere with the absorption of this medication.(Same 
as:Levothroid, Synthroid)                                                        No Longer

 Active                                                                              

                          01/15/2015                            [facility]                    

 

                          Isosorbide                            30 mg, 1 tab, Route: PO, Drug form: ERTAB, QAM,

 Dosing Weight 103.182, kg, Start date: 01/15/15 9:00:00, Duration: 30 day, Stop
 date: 02/13/15 9:00:00Notes: (Same as:Imdur) "Do Not Crush"  Take on empty 
stomach/ full glass of water.  Do not crush                                        

                          No Longer Active                                                     

                                                01/15/2015                            [facility]      

              

 

                          Lantus                            70 unit, Route: SUB-Q, Daily, Dosing Weight 103.182,

 kg, Start date: 01/15/15 9:00:00, Duration: 30 day, Stop date: 02/13/15 9:00:00
                                                        Inactive                     

                                                                            01/15/2015              

                                        [facility]                    

 

                          Multaq                            400 mg, 1 tab, Route: PO, Drug form: TAB, BID, Dosing

 Weight 103.182, kg, Start date: 01/15/15 9:00:00, Duration: 30 day, Stop date: 
02/13/15 17:00:00Notes: Same as: Multaq                                            

                    No Longer Active                                                           

                          01/15/2015                            [facility]          

          

 

                          Saline Flush 0.9%                            10 ml, Route: IVP, Drug Form: INJ, Dosing

 Weight 103.182, kg, Q12H, Start date: 01/15/15 9:00:00, Duration: 30 day, Stop 
date: 02/13/15 21:00:00Notes: (Same as: BD Posiflush)                              

                          No Longer Active                                           

                                                01/15/2015                            [facility]

                    

 

                          Levemir FlexPen                            70 unit, 0.7 mL, Route: SUB-Q, Drug form:

 INJ, Daily, Start date: 01/15/15 9:00:00, Duration: 30 day, Stop date: 02/13/15
 9:00:00Notes: Same as Levemir Do not hold insulin without contacting prescriber
  "single patient use only"                                                        No

 Longer Active                                                                       

                          01/15/2015                            [facility]                    

 

                                        Albuterol 0.833 MG/ML / Ipratropium Bromide 0.167 MG/ML Inhalant Solution [DuoNeb]

                                        3 mL, Route: INHALATION, Drug Form: SOLN, Dosing Weight

 103.182, kg, PRN, PRN Respiratory Protocol, Start date: 01/15/15 8:50:00, 
Duration: 30 day, Stop date: 02/14/15 8:49:00Notes: (Same as: Duoneb)           
                                                      No Longer Active                        

                                                                            01/15/2015                 

                                        [facility]                    

 

                          Alprazolam 0.5 MG Oral Tablet [Xanax]                            0.5 mg, 1 tab, Route:

 PO, Drug form: TAB, BID, Dosing Weight 103.182, kg, PRN as needed for anxiety, 
Start date: 01/15/15 8:36:00, Stop date: 02/14/15 8:35:00Notes: With food or 
milk (Same as: Xanax)                                                        No Longer

 Active                                                                              

                          01/15/2015                            [facility]                    

 

                                        Acetaminophen 325 MG / Hydrocodone Bitartrate 5 MG Oral Tablet                  

                                        2 tab, Route: PO, Drug Form: TAB, Dosing Weight 103.182, kg, Q6H, PRN Pain

 Score 4-6, Start date: 01/15/15 8:36:00, Stop date: 02/14/15 8:35:00Notes: 
(Same as: Norco 325/5)  Do not exceed 4gm/day of acetaminophen.                 
                                                No Longer Active                                

                                                      01/15/2015                       

                                        [facility]                    

 

                          atropine                            0.5 mg, 5 mL, Route: IVP, Drug form: INJ, PRN,

 PRN Bradycardia, Start date: 01/15/15 2:49:00, Duration: 30 day, Stop date: 
02/14/15 2:48:00                                                        No Longer Active

                                                                                    01/15/2015

                                        [facility]                    

 

                          Saline Flush 0.9%                            10 ml, Route: IVP, Drug Form: INJ, Dosing

 Weight 103.182, kg, PRN, PRN Line Flush, Start date: 01/15/15 2:27:00, 
Duration: 30 day, Stop date: 02/14/15 2:26:00Notes: (Same as: BD Posiflush)     
                                                      No Longer Active                  

                                                                            01/15/2015           

                                        [facility]                    

 

                                        heparin additive 25,000 unit [12 unit/kg/hr] + Premix Diluent Dextrose 5% 500 mL

                                        500 mL, Rate: 20.09 ml/hr, Infuse over: 24.9 hr, Route:

 IV, Dosing Weight 83.69 kg, Total Volume: 500 mL, Start date: 01/15/15 2:27:00,
Duration: 30 day, Stop date: 02/14/15 2:26:00                                      

                    Inactive                                                             

                          01/15/2015                            [facility]            

        

 

                                        heparin sodium, porcine 5000 UNT/ML Injectable Solution                         

                                        4,000 unit, 4 mL, Route: IV, Drug form: INJ, ONCE, Dosing Weight 103.182, kg, Priority:

 STAT, Start date: 01/15/15 2:27:00, Stop date: 01/15/15 2:27:00                
                                                Inactive                                        

                                                01/15/2015                            [facility]

                    

 

                                        heparin sodium, porcine 1000 UNT/ML Injectable Solution                         

                                        Route: IVP, PRN, 2,500 unit, 2.5 mL, Drug form: INJ, PRN, Heparin Protocol, Start

 date: 01/15/15 2:27:00 Stop date: 02/14/15 2:26:00, 30 day                     
                                                Inactive                                             

                                                01/15/2015                            [facility]

                    

 

                          Nitroglycerin                            0.4 mg, 1 tab, Route: SL, Drug form: TAB,

 Q5Min, Dosing Weight 103.182, kg, PRN Chest Pain, Start date: 01/15/15 2:27:00,
Duration: 3 doses or times, Stop date: Limited # of timesNotes: (Same as:Nitroqu
ick, Nitrostat) "Do Not Crush"  Sublingual tablet                                  

                          No Longer Active                                               

                                                01/15/2015                            [facility]

                    

 

                          Morphine                            2 mg, 1 mL, Route: IVP, Drug form: INJ, Q15Min,

 Dosing Weight 103.182, kg, PRN Chest Pain, Start date: 01/15/15 2:27:00, 
Duration: 2 doses or times, Stop date: Limited # of timesNotes: (Same as:MORPh
ine Sulfate)                                                        No Longer Active 

                                                                                   01/15/2015

                                        [facility]                    

 

                          Ondansetron                            4 mg, 1 tab, Route: PO, Drug form: TAB, Q8H,

 Dosing Weight 103.182, kg, PRN Nausea & Vomiting, Start date: 01/15/15 2:27:00,
Duration: 30 day, Stop date: 02/14/15 2:26:00Notes: (Same as: Zofran)           
                                                      No Longer Active                         

                                                                            01/15/2015                  

                                        [facility]                    

 

                          Acetaminophen                            650 mg, 2 tab, Route: PO, Drug form: TAB,

 Q4H, Dosing Weight 103.182, kg, PRN Headache 1-5, Start date: 01/15/15 2:27:00,
Duration: 30 day, Stop date: 02/14/15 2:26:00Notes: Do not exceed 4 gm/day.  
(Same as: Tylenol)                                                        No Longer Active

                                                                                    01/15/2015

                                        [facility]                    

 

                                        Acetaminophen 300 MG / Hydrocodone Bitartrate 5 MG Oral Tablet [Vicodin 5/300]  

                          0 Refill(s)                                                

                    Active                                                                         

                          01/15/2015                            [facility]                    

 

                          Alprazolam 0.5 MG Oral Tablet [Xanax]                            0.5 mg=1 tab, PO,

 BID, anxiety, stress, # 20 tab, 0 Refill(s)                                       

                    Active                                                                

                          01/15/2015                            [facility]               

     

 

                          Sertraline 100 MG Oral Tablet [Zoloft]                            100 mg=1 tab, PO,

 Daily, # 30 tab, 0 Refill(s)                                                        

Active                                                                               

                          01/15/2015                            [facility]                    

 

                    Niacin                            0 Refill(s)                                       

                          No Longer Active                                                    

                                                01/15/2015                            [facility]     

               

 

                          Levothyroxine Sodium 0.05 MG Oral Tablet [Synthroid]                            50

 microgram=1 tab, PO, Daily, # 30 tab, 0 Refill(s)                                 

                       Active                                                          

                          01/15/2015                            [facility]         

           

 

                          pantoprazole 40 MG Enteric Coated Tablet [Protonix]                            40 

mg=1 tab, PO, Daily, # 30 tab, 0 Refill(s)                                         

                    Active                                                                  

                          01/15/2015                            [facility]                 

   

 

                          Potassium Chloride 20 MEQ Extended Release Tablet                            20 mEq=1

 tab, PO, BID, # 10 tab, 0 Refill(s)                                               

                    Active                                                                        

                          01/15/2015                            [facility]                    

 

                          torsemide 10 mg oral tablet                            10 mg=1 tab, PO, Daily, # 30

 tab, 0 Refill(s)                                                        Active      

                                                                              01/15/2015

                                        [facility]                    

 

                          dronedarone 400 MG Oral Tablet [Multaq]                            400 mg=1 tab, PO,

 BID, # 60 tab, 0 Refill(s)                                                        Active

                                                                                    01/15/2015

                                        [facility]                    

 

                          lisinopril 2.5 mg oral tablet                            2.5 mg=1 tab, PO, Daily, 

# 30 tab, 0 Refill(s)                                                        Active  

                                                                                  01/15/2015

                                        [facility]                    

 

                          NovoLog                            10 unit, TID-Before Meals, pt has a sliding scale,

 0 Refill(s)Special Instructions: pt has a sliding scale                        
                                                Active                                                 

                                                01/15/2015                            [facility]  

                  

 

                          Lantus                            70 unit, SUB-Q, Daily, 0 Refill(s)              

                                                Active                                       

                                                01/15/2015                            [facility]

                    

 

                    Isosorbide                            0 Refill(s)                                   

                     Active                                                            

                          01/15/2015                            [facility]           

         

 

                          Metformin hydrochloride 1000 MG Oral Tablet                            1,000 mg=1 

tab, PO, BID, # 30 tab, 0 Refill(s)                                                

                    Active                                                                         

                          01/15/2015                            [facility]                    

 

                          Morphine                            2 mg, Route: IVP, ONCE, Dosing Weight 103.182,

 kg, Priority: STAT, Start date: 01/14/15 23:29:00, Stop date: 01/14/15 23:29:00
                                                        No Longer Active             

                                                                            01/15/2015      

                                        [facility]                    

 

                          Nitroglycerin                            0.4 mg, 1 tab, Route: SL, Drug form: TAB,

 Q5Min, Dosing Weight 103.182, kg, PRN Chest Pain, Start date: 01/14/15 
23:29:00, Duration: 3 doses or times, Stop date: Limited # of timesNotes: (Same 
as:Nitroquick, Nitrostat) "Do Not Crush"  Sublingual tablet                     
                                                No Longer Active                                    

                                                01/15/2015                            [facility]                    

 

                          aspirin                            324 mg, Route: PO, ONCE, Dosing Weight 103.182,

 kg, Priority: STAT, Start date: 01/14/15 23:29:00, Stop date: 01/14/15 23:29:00
                                                        No Longer Active             

                                                                            01/15/2015      

                                        [facility]                    

 

                          Saline Flush 0.9%                            10 mL, Route: IVP, Drug Form: INJ, Dosing

 Weight 103.182, kg, PRN, PRN Line Flush, Start date: 01/14/15 23:29:00, 
Duration: 30 day, Stop date: 02/13/15 23:28:00Notes: (Same as: BD Posiflush)    
                                                      No Longer Active                 

                                                                            01/15/2015          

                                        [facility]                    



                                                                                
                                                                                
                                                                                
                                                                                
                                                                                
                                                                                
                                                                                
                                                                                
                                                                                
                                                                                
                                                                                
                                                                                
                                                                                
                                                                                
                                                                                
                                                                                
                                                                                
                                                                                
                                                                                
                                                                                
                                                                                
                                                                                
                                                                                
                                                                                
                                                                                
                                                                                
                                                                                
                                                                                
                                                                                
                                                                                
                                                                                
                                                                                
                                                                                
                                                                                
                                                                                
                                                                                
                                                                                
                                                                                
                                                                                
                                                                                
                                                                                
                                                                                
                                                                                
                                                                                
                                                                                
                                                                                
                                                                                
                                                                                
                                                                                
                                                                                
                                                                                
                                                                                
                                                                                
                                                                                
                                                                                
                                                                                
                                                                                
                                                                                
                                                                                
                                                                                
                                                                                
                                                                                
                                                                                
                                                                                
                                                                                
                                                                                
                                                                                
                                                                                
                                                                                
                                                                                
                                                                                
                                                                                
                                                                                
                                                                                
                                                                                
                                                                                
                                                                                
                                                                                
                                                                                
                                                                                
                                                                                
                                                                                
                                                                                
                                                                                
                                                                                
                                                                                
                                                                                
                                                                                
                                                                                
                                                                                
                                                                                
                                                                                
                                                                                
                                                                                
                                                                                
                                                                                
                                                                                
                                                                                
                                                                                
                                                                                
                                                                                
                                                                                
                                                                                
                                                                                
                                                                                
                                                                                
                                                                                
                                                                                
                                                                                
                                                                                
                                                                                
                                                                                
                                                                                
                                                                                
                                                                                
                                                                                
                                                                                
                                                                                
                                                                                
                                                                                
                                                                                
                                                                                
                                                                                
                                                                                
                                                                                
                                                                                
                                                                                
                                                                                
                                                                                
                                                                                
                                                                                
                                                                                
                                                                                
                                                                                
                                                                                
                                                                                
                                                                                
                                                                                
                                                                                
                                                                                
                                                                                
                                                                                
                                                                                
                                                                                
                                                                                
                                                                                
                                                                                
                                                                                
                                                                                
                                                                                
                                                                                
                                                                                
                                                                                
                                                                                
                                                                                
                                                                                
                                                                                
                                                                                
                                                                                
                                                                                
                                                                                
                                                                                
                                                                                
                                                                                
                                                                                
                                                                                
                                    



Allergies, Adverse Reactions, Alerts

                    





                    Substance                            Category                            Reaction   

                          Severity                            Reaction type           

                          Status                            Date Reported                     

                          Comments                            Source                    

 

                    Latex                            Assertion                                          

                                                      Allergy to substance                       

                    Active                                                                            

                                        [facility]                    

 

                    sulfa drugs                            Assertion                                    

                                                      Drug allergy                         

                    Active                                                                              

                                        [facility]                    

 

                    Reglan                            Assertion                                         

                                                Drug allergy                            Active

                                                                                    [facility]                    

 

                    iodine                            Assertion                                         

                                                Drug allergy                            Active

                                                                                    [facility]                    



                                                                                



Immunizations

                    





                    Immunization                            Date Given                            Site  

                          Status                            Last Updated             

                          Comments                            Source                    

 

                          influenza virus vaccine, inactivated                            2018        

                          Left Deltoid                            completed                

                    Yamila                                                        [facility]

                    

 

                          influenza virus vaccine, inactivated                            2018        

                          Right Thigh                            completed                 

                    Edilia                                                        [facility]  

                  

 

                          pneumococcal 13-valent vaccine                            2018              

                          Left Deltoid                            [facility]                      

                    [facility]       

             

 

                          diphtheria/pertussis, acel/tetanus adult                            2016    

                          Left deltoid                            completed            

                    Mikael                                                        [facility]

                    

 

                          pneumococcal 23-valent vaccine                            2015              

                          Left deltoid                            completed                      

                    Chris                                                        [facility]        

            

 

                          pneumococcal 23-valent vaccine                            2015              

                                                Not Given                                    

                                                      [facility]                    

 

                          pneumococcal 23-valent vaccine<sup>1</sup>                            2015  

                                                      Not Given                      

                                                                            [facility]             

       



                                                                                
                                                            



Results







                    Order Name                            Results                            Value      

                          Reference Range                            Date                

                          Interpretation                            Comments                       

                                        Source                    

 

                CHEM PANEL                            Magnesium Lvl    1.9                            

1.8 - 2.4                            2019                                      

                                                      [facility]                    

 

                CHEM PANEL                            eGFR            67                                      

                    2019                                                        Result

 Comment: The eGFR is calculated using the CKD-EPI formula. In most young, 
healthy individuals the eGFR will be >90 mL/min/1.73m2. The eGFR declines with 
age. An eGFR of 60-89 may be normal in some populations, particularly the 
elderly, for whom the CKD-EPI formula has not been extensively validated. Use of
 the eGFR is not recommended in the following populations:<br/><br/>Individuals 
with unstable creatinine concentrations, including pregnant patients and those 
with serious co-morbid conditions.<br/><br/>Patients with extremes in muscle 
mass or diet. <br/><br/>The data above are obtained from the [employer] 
[employer] ([employer]) which additionally recommends that when the 
eGFR is used in patients with extremes of body mass index for purposes of drug 
dosing, the eGFR should be multiplied by the estimated BMI.                     
                                        [facility]                    

 

                CHEM PANEL                            Glucose Lvl     159                            70

 - 99                            2019                                          

                                                      [facility]                    

 

                    CHEM PANEL                            Creatinine Lvl      1.06                        

                    0.50 - 1.40                            2019                                  

                                                      [facility]                    

 

                CHEM PANEL                            BUN             16                            7 - 22     

                          2019                                                    

                                                      [facility]                    

 

                CHEM PANEL                            Sodium Lvl      138                            135

 - 145                            2019                                         

                                                      [facility]                    

 

                CHEM PANEL                            Calcium Lvl     8.2                            8.5

 - 10.5                            2019                                        

                                                      [facility]                    

 

                CHEM PANEL                            Chloride Lvl    104                            95

 - 109                            2019                                         

                                                      [facility]                    

 

                CHEM PANEL                            AGAP            9.3                            10.0 - 20.0

                            2019                                               

                                                      [facility]                    

 

                CHEM PANEL                            Potassium Lvl    3.3                            

3.5 - 5.1                            2019                                      

                                                      [facility]                    

 

                CHEM PANEL                            CO2             28                            24 - 32    

                          2019                                                   

                                                      [facility]                            Lymphocytes #    2.2                            

1.0 - 5.5                            2019                                      

                                                      [facility]                            Monocytes #     1.0                            0.0

 - 0.8                            2019                                         

                                                      [facility]                            Segs            69.5                            45.0 - 75.0

                            2019                                               

                                                      [facility]                    

 

                HEMATOLOGY                            Eosinophils     0.9                            0.0

 - 4.0                            2019                                         

                                                      [facility]                            Basophils       0.6                            0.0 

- 1.0                            2019                                          

                                                      [facility]                            Neutrophils #    7.7                            

1.5 - 8.1                            2019                                      

                                                      [facility]                    

 

                HEMATOLOGY                            Eosinophils #    0.1                            

0.0 - 0.5                            2019                                      

                                                      [facility]                            Basophils #     0.1                            0.0

 - 0.2                            2019                                         

                                                      [facility]                    

 

                HEMATOLOGY                            Monocytes       9.4                            2.0 

- 12.0                            2019                                         

                                                      [facility]                            Lymphocytes     19.6                            20.0

 - 40.0                            2019                                        

                                                      [facility]                            MPV             9.2                            7.4 - 10.4

                            2019                                               

                                                      [facility]                    

 

                HEMATOLOGY                            Platelet        197                            133 -

 450                            2019                                           

                                                      [facility]                    

 

                HEMATOLOGY                            Hct             30.5                            42.0 - 54.0

                            2019                                               

                                                      [facility]                    

 

                HEMATOLOGY                            RBC             3.74                            4.70 - 6.10

                            2019                                               

                                                      [facility]                    

 

                HEMATOLOGY                            Hgb             9.7                            14.0 - 18.0

                            2019                                               

                                                      [facility]                    

 

                HEMATOLOGY                            WBC             11.1                            3.7 - 10.4

                            2019                                               

                                                      [facility]                    

 

                HEMATOLOGY                            RDW             16.5                            11.5 - 14.5

                            2019                                               

                                                      [facility]                    

 

                HEMATOLOGY                            MCH             25.9                            27.0 - 31.0

                            2019                                               

                                                      [facility]                    

 

                HEMATOLOGY                            MCHC            31.8                            32.0 - 36.0

                            2019                                               

                                                      [facility]                    

 

                HEMATOLOGY                            MCV             81.5                            80.0 - 94.0

                            2019                                               

                                                      [facility]                    

 

                    SPECIAL CHEMISTRY                            Hgb A1C             8.0                         

                    <=5.6 %                            2019                                       

                                                      [facility]                    

 

                    CARDIAC ENZYMES                            Troponin-I          0.04                       

                    0.00 - 0.40                            2019                                 

                                                      [facility]                    

 

                    CARDIAC ENZYMES                            Troponin-I          <0.02                      

                    0.00 - 0.40                            2019                                

                                                      [facility]                    

 

                    URINE AND STOOL                            UA Mucus            Few /LPF                     

                    None Seen /LPF                            2019                            

                                                        [facility]                 

   

 

                    URINE AND STOOL                            UA Color            Ltyellow                     

                                                2019                                          

                                                      [facility]                    

 

                    URINE AND STOOL                            UA Urobilinogen     <=1.0 mg/dL           

                          0.1 - 1.0                            2019                     

                                                                            [facility]            

        

 

                    URINE AND STOOL                            UA Sq Epi           None Seen                   

                                                2019                                        

                                                      [facility]                    

 

                    URINE AND STOOL                            UA Leuk Est         Large 

*ABN*

                    (19 9:17 AM)                            Negative                            2019

                                                                                    [facility]                    

 

                URINE AND STOOL                            UA WBC          47                            0 -

 5                            2019                                             

                                                      [facility]                    

 

                URINE AND STOOL                            UA RBC          4                            0 - 

2                            2019                                              

                                                      [facility]                    

 

                    URINE AND STOOL                            UA Bacteria         Many /HPF                 

                          None Seen /HPF                            2019                      

                                                                            [facility]             

       

 

                    URINE AND STOOL                            UA Glucose          Negative mg/dL             

                          Negative mg/dL                            2019                  

                                                                            [facility]         

           

 

                    URINE AND STOOL                            UA Ketones          Negative mg/dL             

                          Negative mg/dL                            2019                  

                                                                            [facility]         

           

 

                    URINE AND STOOL                            UA Protein          Negative mg/dL             

                          Negative mg/dL                            2019                  

                                                                            [facility]         

           

 

                    URINE AND STOOL                            UA Blood            Negative 

                    (19 9:17 AM)                            Negative                            2019

                                                                                    [facility]                    

 

                    URINE AND STOOL                            UA Bili             Negative 

*NA*

                    (19 9:17 AM)                            Negative                            2019

                                                                                    [facility]                    

 

                    URINE AND STOOL                            UA Nitrite          Negative 

                    (19 9:17 AM)                            Negative                            2019

                                                                                    [facility]                    

 

                    URINE AND STOOL                            UA Turbidity        Clear 

                    (19 9:17 AM)                            Clear                            2019

                                                                                    [facility]                    

 

                URINE AND STOOL                            UA pH           7.0                            5.0

 - 8.0                            2019                                         

                                                      [facility]                    

 

                    URINE AND STOOL                            UA Spec Grav        1.012                    

                    <=1.030                            2019                                  

                                                      [facility]                    

 

                CARDIAC ENZYMES                            BNP             275                            <=100

 pg/mL                            2019                                         

                                                      [facility]                    

 

                    CARDIAC ENZYMES                            Troponin-I          <0.02                      

                    0.00 - 0.40                            2019                                

                                                      [facility]                    

 

                ELECTROLYTES                            AGAP            11.5                            10.0 -

 20.0                            2019                                          

                                                      [facility]                    

 

                ELECTROLYTES                            eGFR            68                                    

                    2019                                                        Result

 Comment: The eGFR is calculated using the CKD-EPI formula. In most young, 
healthy individuals the eGFR will be >90 mL/min/1.73m2. The eGFR declines with 
age. An eGFR of 60-89 may be normal in some populations, particularly the 
elderly, for whom the CKD-EPI formula has not been extensively validated. Use of
 the eGFR is not recommended in the following populations:<br/><br/>Individuals 
with unstable creatinine concentrations, including pregnant patients and those 
with serious co-morbid conditions.<br/><br/>Patients with extremes in muscle 
mass or diet. <br/><br/>The data above are obtained from the [employer] 
D[employer] Education Program (NKDEP) which additionally recommends that when the 
eGFR is used in patients with extremes of body mass index for purposes of drug 
dosing, the eGFR should be multiplied by the estimated BMI.                     
                                        [facility]                    

 

                ELECTROLYTES                            CO2             26                            24 - 32  

                          2019                                                 

                                                      [facility]                    

 

                ELECTROLYTES                            Calcium Lvl     8.2                            

8.5 - 10.5                            2019                                     

                                                      [facility]                    

 

                    ELECTROLYTES                            Chloride Lvl        107                         

                    95 - 109                            2019                                      

                                                      [facility]                    

 

                    ELECTROLYTES                            Creatinine Lvl      1.05                      

                    0.50 - 1.40                            2019                                

                                                      [facility]                    

 

                    ELECTROLYTES                            Potassium Lvl       3.5                        

                    3.5 - 5.1                            2019                                    

                                                      [facility]                    

 

                ELECTROLYTES                            Sodium Lvl      141                            135

 - 145                            2019                                         

                                                      [facility]                    

 

                ELECTROLYTES                            BUN             18                            7 - 22   

                          2019                                                  

                                                      [facility]                    

 

                ELECTROLYTES                            Glucose Lvl     99                            70

 - 99                            2019                                          

                                                      [facility]                    

 

                HEMATOLOGY                            Lymphocytes #    2.0                            

1.0 - 5.5                            2019                                      

                                                      [facility]                    

 

                HEMATOLOGY                            Basophils #     0.1                            0.0

 - 0.2                            2019                                         

                                                      [facility]                    

 

                HEMATOLOGY                            Monocytes #     1.2                            0.0

 - 0.8                            2019                                         

                                                      [facility]                    

 

                HEMATOLOGY                            Neutrophils #    8.0                            

1.5 - 8.1                            2019                                      

                                                      [facility]                    

 

                HEMATOLOGY                            Eosinophils #    0.2                            

0.0 - 0.5                            2019                                      

                                                      [facility]                    

 

                HEMATOLOGY                            Segs            70.1                            45.0 - 75.0

                            2019                                               

                                                      [facility]                            Lymphocytes     17.5                            20.0

 - 40.0                            2019                                        

                                                      [facility]                    

 

                HEMATOLOGY                            Basophils       0.7                            0.0 

- 1.0                            2019                                          

                                                      [facility]                            Monocytes       10.2                            2.0

 - 12.0                            2019                                        

                                                      [facility]                    

 

                HEMATOLOGY                            Eosinophils     1.5                            0.0

 - 4.0                            2019                                         

                                                      [facility]                            Platelet        196                            133 -

 450                            2019                                           

                                                      [facility]                            RDW             16.9                            11.5 - 14.5

                            2019                                               

                                                      [facility]                            MCHC            31.9                            32.0 - 36.0

                            2019                                               

                                                      [facility]                            MPV             9.7                            7.4 - 10.4

                            2019                                               

                                                      [facility]                            WBC             11.4                            3.7 - 10.4

                            2019                                               

                                                      [facility]                            Hct             31.6                            42.0 - 54.0

                            2019                                               

                                                      [facility]                            Hgb             10.1                            14.0 - 18.0

                            2019                                               

                                                      [facility]                            RBC             3.93                            4.70 - 6.10

                            2019                                               

                                                      [facility]                            MCH             25.6                            27.0 - 31.0

                            2019                                               

                                                      [facility]                            MCV             80.4                            80.0 - 94.0

                            2019                                               

                                                      [facility]                    

 

                    CARDIAC ENZYMES                            Troponin-I          <0.02                      

                    0.00 - 0.40                            2018                                

                                                      [facility]                    

 

                CARDIAC ENZYMES                            Total CK        41                            12

 - 191                            2018                                         

                                                      [facility]                    

 

                CHEM PANEL                            B/C Ratio       24                            6 - 25

                            2018                                               

                                                      [facility]                    

 

                CHEM PANEL                            AGAP            15.2                            10.0 - 20.0

                            2018                                               

                                                      [facility]                    

 

                CHEM PANEL                            A/G Ratio       0.7                            0.7 

- 1.6                            2018                                          

                                                      [facility]                    

 

                CHEM PANEL                            Globulin        4.6                            2.7 -

 4.2                            2018                                           

                                                      [facility]                    

 

                CHEM PANEL                            eGFR            39                                      

                    2018                                                        Result

 Comment: The eGFR is calculated using the CKD-EPI formula. In most young, 
healthy individuals the eGFR will be >90 mL/min/1.73m2. The eGFR declines with 
age. An eGFR of 60-89 may be normal in some populations, particularly the 
elderly, for whom the CKD-EPI formula has not been extensively validated. Use of
 the eGFR is not recommended in the following populations:<br/><br/>Individuals 
with unstable creatinine concentrations, including pregnant patients and those 
with serious co-morbid conditions.<br/><br/>Patients with extremes in muscle 
mass or diet. <br/><br/>The data above are obtained from the [employer] 
D[employer] Education Program (NKDEP) which additionally recommends that when the 
eGFR is used in patients with extremes of body mass index for purposes of drug 
dosing, the eGFR should be multiplied by the estimated BMI.                     
                                        [facility]                    

 

                CHEM PANEL                            Bili Total      0.4                            0.2

 - 1.3                            2018                                         

                                                      [facility]                    

 

                CHEM PANEL                            Albumin Lvl     3.1                            3.5

 - 5.0                            2018                                         

                                                      [facility]                    

 

                CHEM PANEL                            ALT             29                            0 - 65     

                          2018                                                    

                                                      [facility]                    

 

                CHEM PANEL                            Total Protein    7.7                            

6.4 - 8.4                            2018                                      

                                                      [facility]                    

 

                CHEM PANEL                            Alk Phos        125                            39 - 

136                            2018                                            

                                                      [facility]                    

 

                CHEM PANEL                            AST             48                            0 - 37     

                          2018                                                    

                                                      [facility]                    

 

                CHEM PANEL                            Glucose Lvl     172                            70

 - 99                            2018                                          

                                                      [facility]                    

 

                    CHEM PANEL                            Creatinine Lvl      1.68                        

                    0.50 - 1.40                            2018                                  

                                                      [facility]                    

 

                CHEM PANEL                            Sodium Lvl      139                            135

 - 145                            2018                                         

                                                      [facility]                    

 

                CHEM PANEL                            BUN             40                            7 - 22     

                          2018                                                    

                                                      [facility]                    

 

                CHEM PANEL                            Chloride Lvl    106                            95

 - 109                            2018                                         

                                                      [facility]                    

 

                CHEM PANEL                            Potassium Lvl    5.2                            

3.5 - 5.1                            2018                                      

                                                      [facility]                    

 

                CHEM PANEL                            Calcium Lvl     8.2                            8.5

 - 10.5                            2018                                        

                                                      [facility]                    

 

                CHEM PANEL                            CO2             23                            24 - 32    

                          2018                                                   

                                                      [facility]                    

 

                HEMATOLOGY                            PTT             33.5                            22.9 - 35.8

                            2018                                               

                                                      [facility]                    

 

                HEMATOLOGY                            INR             1.09                            0.85 - 1.17

                            2018                                               

                                                      [facility]                    

 

                HEMATOLOGY                            PT              13.9                            12.0 - 14.7

                            2018                                               

                                                      [facility]                    

 

                HEMATOLOGY                            Neutrophils #    7.0                            

1.5 - 8.1                            2018                                      

                                                      [facility]                    

 

                HEMATOLOGY                            Lymphocytes #    2.3                            

1.0 - 5.5                            2018                                      

                                                      [facility]                    

 

                HEMATOLOGY                            Monocytes #     1.0                            0.0

 - 0.8                            2018                                         

                                                      [facility]                    

 

                HEMATOLOGY                            Eosinophils #    1.0                            

0.0 - 0.5                            2018                                      

                                                      [facility]                    

 

                HEMATOLOGY                            Basophils #     0.1                            0.0

 - 0.2                            2018                                         

                                                      [facility]                    

 

                HEMATOLOGY                            Segs            60.7                            45.0 - 75.0

                            2018                                               

                                                      [facility]                    

 

                HEMATOLOGY                            Eosinophils     9.1                            0.0

 - 4.0                            2018                                         

                                                      [facility]                    

 

                HEMATOLOGY                            Monocytes       8.9                            2.0 

- 12.0                            2018                                         

                                                      [facility]                            Lymphocytes     20.2                            20.0

 - 40.0                            2018                                        

                                                      [facility]                            Basophils       1.1                            0.0 

- 1.0                            2018                                          

                                                      [facility]                            MPV             8.8                            7.4 - 10.4

                            2018                                               

                                                      [facility]                            Platelet        203                            133 -

 450                            2018                                           

                                                      [facility]                            WBC             11.5                            3.7 - 10.4

                            2018                                               

                                                      [facility]                            Hgb             9.6                            14.0 - 18.0

                            2018                                               

                                                      [facility]                            RBC             3.76                            4.70 - 6.10

                            2018                                               

                                                      [facility]                            MCH             25.6                            27.0 - 31.0

                            2018                                               

                                                      [facility]                            MCV             81.3                            80.0 - 94.0

                            2018                                               

                                                      [facility]                            Hct             30.6                            42.0 - 54.0

                            2018                                               

                                                      [facility]                            RDW             21.5                            11.5 - 14.5

                            2018                                               

                                                      [facility]                            MCHC            31.4                            32.0 - 36.0

                            2018                                               

                                                      [facility]                    

 

                    URINE AND STOOL                            Occult Bld Stl      Negative 

                    (18 1:31 PM)                            Negative                            2018

                                                                                    [facility]                    

 

                    ELECTROLYTES                            Potassium Lvl       5.0                        

                    3.5 - 5.1                            2018                                    

                                                      [facility]                    

 

                CHEM PANEL                            eGFR            36                                      

                    2018                                                        Result

 Comment: The eGFR is calculated using the CKD-EPI formula. In most young, 
healthy individuals the eGFR will be >90 mL/min/1.73m2. The eGFR declines with 
age. An eGFR of 60-89 may be normal in some populations, particularly the 
elderly, for whom the CKD-EPI formula has not been extensively validated. Use of
 the eGFR is not recommended in the following populations:<br/><br/>Individuals 
with unstable creatinine concentrations, including pregnant patients and those 
with serious co-morbid conditions.<br/><br/>Patients with extremes in muscle 
mass or diet. <br/><br/>The data above are obtained from the [employer] 
D[employer] Education Program (NKDEP) which additionally recommends that when the 
eGFR is used in patients with extremes of body mass index for purposes of drug 
dosing, the eGFR should be multiplied by the estimated BMI.                     
                                        [facility]                    

 

                CHEM PANEL                            BUN             30                            7 - 22     

                          2018                                                    

                                                      [facility]                    

 

                CHEM PANEL                            Sodium Lvl      136                            135

 - 145                            2018                                         

                                                      [facility]                    

 

                CHEM PANEL                            Chloride Lvl    105                            95

 - 109                            2018                                         

                                                      [facility]                    

 

                    CHEM PANEL                            Creatinine Lvl      1.76                        

                    0.50 - 1.40                            2018                                  

                                                      [facility]                    

 

                CHEM PANEL                            Potassium Lvl    6.1                            

3.5 - 5.1                            2018                                      

                                                      [facility]                    

 

                CHEM PANEL                            Calcium Lvl     8.4                            8.5

 - 10.5                            2018                                        

                                                      [facility]                    

 

                CHEM PANEL                            CO2             23                            24 - 32    

                          2018                                                   

                                                      [facility]                    

 

                CHEM PANEL                            Glucose Lvl     192                            70

 - 99                            2018                                          

                                                      [facility]                    

 

                CHEM PANEL                            AGAP            14.1                            10.0 - 20.0

                            2018                                               

                                                      [facility]                    

 

                HEMATOLOGY                            Basophils #     0.1                            0.0

 - 0.2                            2018                                         

                                                      [facility]                    

 

                HEMATOLOGY                            Monocytes       8.6                            2.0 

- 12.0                            2018                                         

                                                      [facility]                    

 

                HEMATOLOGY                            Lymphocytes     22.6                            20.0

 - 40.0                            2018                                        

                                                      [facility]                    

 

                HEMATOLOGY                            Segs            55.3                            45.0 - 75.0

                            2018                                               

                                                      [facility]                    

 

                HEMATOLOGY                            Basophils       1.2                            0.0 

- 1.0                            2018                                          

                                                      [facility]                    

 

                HEMATOLOGY                            Eosinophils     12.3                            0.0

 - 4.0                            2018                                         

                                                      [facility]                    

 

                HEMATOLOGY                            Eosinophils #    1.5                            

0.0 - 0.5                            2018                                      

                                                      [facility]                    

 

                HEMATOLOGY                            Monocytes #     1.0                            0.0

 - 0.8                            2018                                         

                                                      [facility]                    

 

                HEMATOLOGY                            Lymphocytes #    2.7                            

1.0 - 5.5                            2018                                      

                                                      [facility]                    

 

                HEMATOLOGY                            Neutrophils #    6.7                            

1.5 - 8.1                            2018                                      

                                                      [facility]                    

 

                HEMATOLOGY                            PTT             26.1                            22.9 - 35.8

                            2018                                               

                                                      [facility]                    

 

                HEMATOLOGY                            PT              13.5                            12.0 - 14.7

                            2018                                               

                                                      [facility]                    

 

                HEMATOLOGY                            INR             1.03                            0.85 - 1.17

                            2018                                               

                                                      [facility]                    

 

                HEMATOLOGY                            Platelet        224                            133 -

 450                            2018                                           

                                                      [facility]                    

 

                HEMATOLOGY                            MPV             8.8                            7.4 - 10.4

                            2018                                               

                                                      [facility]                    

 

                HEMATOLOGY                            WBC             12.2                            3.7 - 10.4

                            2018                                               

                                                      [facility]                    

 

                HEMATOLOGY                            RBC             4.33                            4.70 - 6.10

                            2018                                               

                                                      [facility]                    

 

                HEMATOLOGY                            Hgb             11.1                            14.0 - 18.0

                            2018                                               

                                                      [facility]                    

 

                HEMATOLOGY                            Hct             35.0                            42.0 - 54.0

                            2018                                               

                                                      [facility]                    

 

                HEMATOLOGY                            MCV             80.9                            80.0 - 94.0

                            2018                                               

                                                      [facility]                    

 

                HEMATOLOGY                            MCH             25.6                            27.0 - 31.0

                            2018                                               

                                                      [facility]                            RDW             21.6                            11.5 - 14.5

                            2018                                               

                                                      [facility]                            MCHC            31.7                            32.0 - 36.0

                            2018                                               

                                                      [facility]                    

 

                    CARDIAC ENZYMES                            Troponin-I          <0.02                      

                    0.00 - 0.40                            10/23/2018                                

                                                      [facility]                    

 

                CHEM PANEL                            eGFR            47                                      

                    10/23/2018                                                        Result

 Comment: The eGFR is calculated using the CKD-EPI formula. In most young, 
healthy individuals the eGFR will be >90 mL/min/1.73m2. The eGFR declines with 
age. An eGFR of 60-89 may be normal in some populations, particularly the 
elderly, for whom the CKD-EPI formula has not been extensively validated. Use of
 the eGFR is not recommended in the following populations:<br/><br/>Individuals 
with unstable creatinine concentrations, including pregnant patients and those 
with serious co-morbid conditions.<br/><br/>Patients with extremes in muscle 
mass or diet. <br/><br/>The data above are obtained from the [employer] 
D[employer] Education Program ([employer]) which additionally recommends that when the 
eGFR is used in patients with extremes of body mass index for purposes of drug 
dosing, the eGFR should be multiplied by the estimated BMI.                     
                                        [facility]                    

 

                    CHEM PANEL                            Creatinine Lvl      1.42                        

                    0.50 - 1.40                            10/23/2018                                  

                                                      [facility]                    

 

                CHEM PANEL                            BUN             21                            7 - 22     

                          10/23/2018                                                    

                                                      [facility]                    

 

                CHEM PANEL                            Potassium Lvl    5.4                            

3.5 - 5.1                            10/23/2018                                      

                                                      [facility]                    

 

                CHEM PANEL                            CO2             23                            24 - 32    

                          10/23/2018                                                   

                                                      [facility]                    

 

                CHEM PANEL                            Chloride Lvl    98                            95

 - 109                            10/23/2018                                         

                                                      [facility]                    

 

                CHEM PANEL                            Glucose Lvl     231                            70

 - 99                            10/23/2018                                          

                                                      [facility]                    

 

                CHEM PANEL                            Calcium Lvl     8.5                            8.5

 - 10.5                            10/23/2018                                        

                                                      [facility]                    

 

                CHEM PANEL                            Sodium Lvl      134                            135

 - 145                            10/23/2018                                         

                                                      [facility]                    

 

                CHEM PANEL                            AGAP            18.4                            10.0 - 20.0

                            10/23/2018                                               

                                                      [facility]                    

 

                HEMATOLOGY                            MPV             8.1                            7.4 - 10.4

                            10/23/2018                                               

                                                      [facility]                    

 

                HEMATOLOGY                            Platelet        341                            133 -

 450                            10/23/2018                                           

                                                      [facility]                    

 

                HEMATOLOGY                            RDW             21.9                            11.5 - 14.5

                            10/23/2018                                               

                                                      [facility]                    

 

                HEMATOLOGY                            WBC             15.1                            3.7 - 10.4

                            10/23/2018                                               

                                                      [facility]                    

 

                HEMATOLOGY                            MCHC            31.1                            32.0 - 36.0

                            10/23/2018                                               

                                                      [facility]                            MCH             24.6                            27.0 - 31.0

                            10/23/2018                                               

                                                      [facility]                    

 

                HEMATOLOGY                            MCV             78.9                            80.0 - 94.0

                            10/23/2018                                               

                                                      [facility]                    

 

                HEMATOLOGY                            Hct             37.5                            42.0 - 54.0

                            10/23/2018                                               

                                                      [facility]                    

 

                HEMATOLOGY                            Hgb             11.7                            14.0 - 18.0

                            10/23/2018                                               

                                                      [facility]                    

 

                HEMATOLOGY                            RBC             4.75                            4.70 - 6.10

                            10/23/2018                                               

                                                      [facility]                    

 

                HEMATOLOGY                            PTT             30.4                            22.9 - 35.8

                            10/23/2018                                               

                                                      [facility]                    

 

                HEMATOLOGY                            INR             1.07                            0.85 - 1.17

                            10/23/2018                                               

                                                      [facility]                    

 

                HEMATOLOGY                            PT              13.9                            12.0 - 14.7

                            10/23/2018                                               

                                                      [facility]                    

 

                HEMATOLOGY                            Eosinophils     2.2                            0.0

 - 4.0                            10/23/2018                                         

                                                      [facility]                    

 

                HEMATOLOGY                            Basophils       0.8                            0.0 

- 1.0                            10/23/2018                                          

                                                      [facility]                    

 

                HEMATOLOGY                            Monocytes       8.2                            2.0 

- 12.0                            10/23/2018                                         

                                                      [facility]                    

 

                HEMATOLOGY                            Segs            71.6                            45.0 - 75.0

                            10/23/2018                                               

                                                      [facility]                    

 

                HEMATOLOGY                            Lymphocytes     17.2                            20.0

 - 40.0                            10/23/2018                                        

                                                      [facility]                    

 

                HEMATOLOGY                            Basophils #     0.1                            0.0

 - 0.2                            10/23/2018                                         

                                                      [facility]                    

 

                    HEMATOLOGY                            Microcyte           1+ 

*ABN*

                    (10/23/18 3:56 PM)                            None Seen                            10/23/2018

                                                                                    [facility]                    

 

                    HEMATOLOGY                            Neutrophils #       10.8                         

                    1.5 - 8.1                            10/23/2018                                     

                                                      [facility]                    

 

                HEMATOLOGY                            Lymphocytes #    2.6                            

1.0 - 5.5                            10/23/2018                                      

                                                      [facility]                    

 

                HEMATOLOGY                            Eosinophils #    0.3                            

0.0 - 0.5                            10/23/2018                                      

                                                      [facility]                    

 

                HEMATOLOGY                            Monocytes #     1.2                            0.0

 - 0.8                            10/23/2018                                         

                                                       [facility]                    

 

                CHEM PANEL                            A/G Ratio       0.5                            0.7 

- 1.6                            10/18/2018                                          

                                                       [facility]                    

 

                CHEM PANEL                            Bili Direct     0.3                            0.0

 - 0.3                            10/18/2018                                         

                                                       [facility]                    

 

                CHEM PANEL                            Globulin        4.6                            2.7 -

 4.2                            10/18/2018                                           

                                                       [facility]                    

 

                CHEM PANEL                            Bili Indirect    0.3                            

0.0 - 1.0                            10/18/2018                                      

                                                       [facility]                    

 

                CHEM PANEL                            Bili Total      0.6                            0.2

 - 1.3                            10/18/2018                                         

                                                       [facility]                    

 

                CHEM PANEL                            Alk Phos        168                            39 - 

136                            10/18/2018                                            

                                                       [facility]                    

 

                CHEM PANEL                            ALT             44                            0 - 65     

                          10/18/2018                                                    

                                                       [facility]                    

 

                CHEM PANEL                            AST             70                            0 - 37     

                          10/18/2018                                                    

                                                       [facility]                    

 

                CHEM PANEL                            Total Protein    6.9                            

6.4 - 8.4                            10/18/2018                                      

                                                       [facility]                    

 

                CHEM PANEL                            Albumin Lvl     2.3                            3.5

 - 5.0                            10/18/2018                                         

                                                       [facility]                    

 

                CHEM PANEL                            Alk Phos        178                            39 - 

136                            10/17/2018                                            

                                                       [facility]                    

 

                CHEM PANEL                            Bili Total      0.6                            0.2

 - 1.3                            10/17/2018                                         

                                                       [facility]                    

 

                CHEM PANEL                            AST             79                            0 - 37     

                          10/17/2018                                                    

                                                       [facility]                    

 

                CHEM PANEL                            Globulin        4.8                            2.7 -

 4.2                            10/17/2018                                           

                                                       [facility]                    

 

                CHEM PANEL                            A/G Ratio       0.5                            0.7 

- 1.6                            10/17/2018                                          

                                                       [facility]                    

 

                CHEM PANEL                            Bili Direct     0.3                            0.0

 - 0.3                            10/17/2018                                         

                                                       [facility]                    

 

                CHEM PANEL                            Bili Indirect    0.3                            

0.0 - 1.0                            10/17/2018                                      

                                                       [facility]                    

 

                CHEM PANEL                            Albumin Lvl     2.4                            3.5

 - 5.0                            10/17/2018                                         

                                                       [facility]                    

 

                CHEM PANEL                            ALT             46                            0 - 65     

                          10/17/2018                                                    

                                                       [facility]                    

 

                CHEM PANEL                            Total Protein    7.2                            

6.4 - 8.4                            10/17/2018                                      

                                                       [facility]                    

 

                CHEM PANEL                            eGFR            88                                      

                    10/16/2018                                                        Result

 Comment: The eGFR is calculated using the CKD-EPI formula. In most young, 
healthy individuals the eGFR will be >90 mL/min/1.73m2. The eGFR declines with 
age. An eGFR of 60-89 may be normal in some populations, particularly the 
elderly, for whom the CKD-EPI formula has not been extensively validated. Use of
 the eGFR is not recommended in the following populations:<br/><br/>Individuals 
with unstable creatinine concentrations, including pregnant patients and those 
with serious co-morbid conditions.<br/><br/>Patients with extremes in muscle 
mass or diet. <br/><br/>The data above are obtained from the [employer] 
D[employer] Education Program (NKDEP) which additionally recommends that when the 
eGFR is used in patients with extremes of body mass index for purposes of drug 
dosing, the eGFR should be multiplied by the estimated BMI.                     
                                        [facility]                    

 

                CHEM PANEL                            Calcium Lvl     8.0                            8.5

 - 10.5                            10/16/2018                                        

                                                      [facility]                    

 

                CHEM PANEL                            Bili Total      0.9                            0.2

 - 1.3                            10/16/2018                                         

                                                      [facility]                    

 

                CHEM PANEL                            ALT             58                            0 - 65     

                          10/16/2018                                                    

                                                      [facility]                    

 

                CHEM PANEL                            Alk Phos        204                            39 - 

136                            10/16/2018                                            

                                                      [facility]                    

 

                CHEM PANEL                            AST             137                            0 - 37    

                          10/16/2018                                                   

                                                      [facility]                    

 

                CHEM PANEL                            BUN             18                            7 - 22     

                          10/16/2018                                                    

                                                      [facility]                    

 

                CHEM PANEL                            Chloride Lvl    100                            95

 - 109                            10/16/2018                                         

                                                      [facility]                    

 

                    CHEM PANEL                            Creatinine Lvl      0.76                        

                    0.50 - 1.40                            10/16/2018                                  

                                                      [facility]                    

 

                CHEM PANEL                            Sodium Lvl      138                            135

 - 145                            10/16/2018                                         

                                                      [facility]                    

 

                CHEM PANEL                            Potassium Lvl    4.2                            

3.5 - 5.1                            10/16/2018                                      

                                                      [facility]                    

 

                CHEM PANEL                            CO2             28                            24 - 32    

                          10/16/2018                                                   

                                                      [facility]                    

 

                CHEM PANEL                            Total Protein    7.0                            

6.4 - 8.4                            10/16/2018                                      

                                                      [facility]                    

 

                CHEM PANEL                            Albumin Lvl     2.4                            3.5

 - 5.0                            10/16/2018                                         

                                                      [facility]                    

 

                CHEM PANEL                            Glucose Lvl     293                            70

 - 99                            10/16/2018                                          

                                                      [facility]                    

 

                CHEM PANEL                            AGAP            14.2                            10.0 - 20.0

                            10/16/2018                                               

                                                      [facility]                    

 

                CHEM PANEL                            A/G Ratio       0.5                            0.7 

- 1.6                            10/16/2018                                          

                                                      [facility]                    

 

                CHEM PANEL                            Globulin        4.6                            2.7 -

 4.2                            10/16/2018                                           

                                                      [facility]                    

 

                CHEM PANEL                            B/C Ratio       24                            6 - 25

                            10/16/2018                                               

                                                      [facility]                    

 

                HEMATOLOGY                            Platelet        230                            133 -

 450                            10/16/2018                                           

                                                      [facility]                    

 

                HEMATOLOGY                            MCHC            31.7                            32.0 - 36.0

                            10/16/2018                                               

                                                      [facility]                    

 

                HEMATOLOGY                            MPV             8.3                            7.4 - 10.4

                            10/16/2018                                               

                                                      [facility]                    

 

                HEMATOLOGY                            RDW             21.4                            11.5 - 14.5

                            10/16/2018                                               

                                                      [facility]                            MCH             25.2                            27.0 - 31.0

                            10/16/2018                                               

                                                      [facility]                    

 

                HEMATOLOGY                            MCV             79.3                            80.0 - 94.0

                            10/16/2018                                               

                                                      [facility]                    

 

                HEMATOLOGY                            WBC             10.3                            3.7 - 10.4

                            10/16/2018                                               

                                                      [facility]                    

 

                HEMATOLOGY                            Hgb             10.3                            14.0 - 18.0

                            10/16/2018                                               

                                                      [facility]                    

 

                HEMATOLOGY                            RBC             4.10                            4.70 - 6.10

                            10/16/2018                                               

                                                      [facility]                    

 

                HEMATOLOGY                            Hct             32.5                            42.0 - 54.0

                            10/16/2018                                               

                                                      [facility]                    

 

                HEMATOLOGY                            Basophils       1.1                            0.0 

- 1.0                            10/16/2018                                          

                                                      [facility]                    

 

                HEMATOLOGY                            Neutrophils #    7.3                            

1.5 - 8.1                            10/16/2018                                      

                                                      [facility]                    

 

                HEMATOLOGY                            Eosinophils     3.7                            0.0

 - 4.0                            10/16/2018                                         

                                                      [facility]                    

 

                HEMATOLOGY                            Segs            71.2                            45.0 - 75.0

                            10/16/2018                                               

                                                      [facility]                    

 

                HEMATOLOGY                            Lymphocytes     16.7                            20.0

 - 40.0                            10/16/2018                                        

                                                      [facility]                    

 

                HEMATOLOGY                            Monocytes       7.3                            2.0 

- 12.0                            10/16/2018                                         

                                                      [facility]                    

 

                HEMATOLOGY                            Basophils #     0.1                            0.0

 - 0.2                            10/16/2018                                         

                                                      [facility]                    

 

                HEMATOLOGY                            Eosinophils #    0.4                            

0.0 - 0.5                            10/16/2018                                      

                                                      [facility]                    

 

                HEMATOLOGY                            Lymphocytes #    1.7                            

1.0 - 5.5                            10/16/2018                                      

                                                      [facility]                    

 

                HEMATOLOGY                            Monocytes #     0.8                            0.0

 - 0.8                            10/16/2018                                         

                                                      [facility]                    

 

                ANEMIA STUDY                            Iron            50                            45 - 160

                            10/16/2018                                               

                                                      [facility]                    

 

                ANEMIA STUDY                            TIBC            261                            228 - 428

                            10/16/2018                                               

                                                      [facility]                    

 

                ANEMIA STUDY                            % [place]      19                            12

 - 57                            10/16/2018                                          

                                                      [facility]                    

 

                ANEMIA STUDY                            UIBC            211                            110 - 370

                            10/16/2018                                               

                                                      [facility]                    

 

                    ANEMIA STUDY                            Ferritin Lvl        179                         

                    22 - 275                            10/16/2018                                      

                                                      [facility]                    

 

                CHEM PANEL                            eGFR            75                                      

                    10/15/2018                                                        Result

 Comment: The eGFR is calculated using the CKD-EPI formula. In most young, 
healthy individuals the eGFR will be >90 mL/min/1.73m2. The eGFR declines with 
age. An eGFR of 60-89 may be normal in some populations, particularly the 
elderly, for whom the CKD-EPI formula has not been extensively validated. Use of
 the eGFR is not recommended in the following populations:<br/><br/>Individuals 
with unstable creatinine concentrations, including pregnant patients and those 
with serious co-morbid conditions.<br/><br/>Patients with extremes in muscle 
mass or diet. <br/><br/>The data above are obtained from the National Kid[employer] 
Disease Education Program (NKDEP) which additionally recommends that when the 
eGFR is used in patients with extremes of body mass index for purposes of drug 
dosing, the eGFR should be multiplied by the estimated BMI.                     
                                        [facility]                    

 

                CHEM PANEL                            B/C Ratio       23                            6 - 25

                            10/15/2018                                               

                                                      [facility]                    

 

                CHEM PANEL                            Calcium Lvl     8.2                            8.5

 - 10.5                            10/15/2018                                        

                                                      [facility]                    

 

                CHEM PANEL                            Potassium Lvl    4.4                            

3.5 - 5.1                            10/15/2018                                      

                                                      [facility]                    

 

                CHEM PANEL                            Glucose Lvl     270                            70

 - 99                            10/15/2018                                          

                                                      [facility]                    

 

                CHEM PANEL                            AGAP            14.4                            10.0 - 20.0

                            10/15/2018                                               

                                                      [facility]                    

 

                CHEM PANEL                            CO2             29                            24 - 32    

                          10/15/2018                                                   

                                                      [facility]                    

 

                CHEM PANEL                            Chloride Lvl    99                            95

 - 109                            10/15/2018                                         

                                                      [facility]                    

 

                CHEM PANEL                            Sodium Lvl      138                            135

 - 145                            10/15/2018                                         

                                                      [facility]                    

 

                    CHEM PANEL                            Creatinine Lvl      0.97                        

                    0.50 - 1.40                            10/15/2018                                  

                                                      [facility]                    

 

                CHEM PANEL                            BUN             22                            7 - 22     

                          10/15/2018                                                    

                                                      [facility]                    

 

                HEMATOLOGY                            Hgb             10.1                            14.0 - 18.0

                            10/15/2018                                               

                                                      [facility]                    

 

                HEMATOLOGY                            Hct             31.5                            42.0 - 54.0

                            10/15/2018                                               

                                                      [facility]                    

 

                    IMMUNOLOGY                            Hep A IgM           Negative 

*NA*

                    (10/15/18 8:35 AM)                            Negative                            10/15/2018

                                                                                    [facility]                    

 

                    IMMUNOLOGY                            Hep C Ab            Negative 

*NA*

                    (10/15/18 8:35 AM)                                                        10/15/2018

                                                                                    [facility]                            Hep B Core IgM      Negative 

*NA*

                    (10/15/18 8:35 AM)                            Negative                            10/15/2018

                                                                                    [facility]                            Hep Bs Ag           Negative 

*NA*

                    (10/15/18 8:35 AM)                            Negative                            10/15/2018

                                                                                    [facility]                    

 

                    BLOOD BANK RESULTS                            Antibody Scrn       Negative 

                    (10/14/18 6:17 PM)                                                        10/14/2018

                                                                                    [facility]                    

 

                    BLOOD BANK RESULTS                            ABO/Rh              A NEG                       

                                                10/14/2018                                            

                                                      [facility]                    

 

                    ELECTROLYTES                            Potassium Lvl       3.3                        

                    3.5 - 5.1                            10/14/2018                                    

                                                      [facility]                    

 

                    BLOOD BANK RESULTS                            RBC product         Product available 4

                    (10/14/18 4:25 PM)                                                        10/14/2018

                                                        Result Comment: 10/14/2018 20:08

  [id number]<br/>spoke to Keke Smith on 10/14/2018 20:08 by Serg.                  
                                        [facility]                    

 

                HEMATOLOGY                            MCH             23.4                            27.0 - 31.0

                            10/14/2018                                               

                                                      [facility]                            MCHC            31.0                            32.0 - 36.0

                            10/14/2018                                               

                                                      [facility]                            MPV             8.1                            7.4 - 10.4

                            10/14/2018                                               

                                                      [facility]                            RDW             22.0                            11.5 - 14.5

                            10/14/2018                                               

                                                      [facility]                            Platelet        286                            133 -

 450                            10/14/2018                                           

                                                      [facility]                            WBC             11.3                            3.7 - 10.4

                            10/14/2018                                               

                                                      [facility]                            RBC             3.06                            4.70 - 6.10

                            10/14/2018                                               

                                                      [facility]                            Hct             23.2                            42.0 - 54.0

                            10/14/2018                                               

                                                      [facility]                            MCV             75.7                            80.0 - 94.0

                            10/14/2018                                               

                                                      [facility]                            Hgb             7.2                            14.0 - 18.0

                            10/14/2018                                               

                                                      [facility]                    

 

                ELECTROLYTES                            Sodium Lvl      141                            135

 - 145                            10/14/2018                                         

                                                      [facility]                    

 

                ELECTROLYTES                            Chloride Lvl    99                            

95 - 109                            10/14/2018                                       

                                                      [facility]                    

 

                ELECTROLYTES                            CO2             29                            24 - 32  

                          10/14/2018                                                 

                                                      [facility]                    

 

                ELECTROLYTES                            AGAP            16.0                            10.0 -

 20.0                            10/14/2018                                          

                                                      [facility]                    

 

                ELECTROLYTES                            Calcium Lvl     7.8                            

8.5 - 10.5                            10/14/2018                                     

                                                      [facility]                    

 

                ELECTROLYTES                            B/C Ratio       19                            6 -

 25                            10/14/2018                                            

                                                      [facility]                    

 

                ELECTROLYTES                            eGFR            83                                    

                    10/14/2018                                                        Result

 Comment: The eGFR is calculated using the CKD-EPI formula. In most young, 
healthy individuals the eGFR will be >90 mL/min/1.73m2. The eGFR declines with 
age. An eGFR of 60-89 may be normal in some populations, particularly the 
elderly, for whom the CKD-EPI formula has not been extensively validated. Use of
 the eGFR is not recommended in the following populations:<br/><br/>Individuals 
with unstable creatinine concentrations, including pregnant patients and those 
with serious co-morbid conditions.<br/><br/>Patients with extremes in muscle 
mass or diet. <br/><br/>The data above are obtained from the National Kid[employer] 
Disease Education Program (NKDEP) which additionally recommends that when the 
eGFR is used in patients with extremes of body mass index for purposes of drug 
dosing, the eGFR should be multiplied by the estimated BMI.                     
                                        [facility]                    

 

                ELECTROLYTES                            Glucose Lvl     168                            

70 - 99                            10/14/2018                                        

                                                      [facility]                    

 

                ELECTROLYTES                            BUN             16                            7 - 22   

                          10/14/2018                                                  

                                                      [facility]                    

 

                    ELECTROLYTES                            Creatinine Lvl      0.86                      

                    0.50 - 1.40                            10/14/2018                                

                                                      [facility]                    

 

                CHEM PANEL                            Phosphorus      3.1                            2.5

 - 4.5                            10/13/2018                                         

                                                      [facility]                    

 

                CHEM PANEL                            Magnesium Lvl    2.0                            

1.8 - 2.4                            10/13/2018                                      

                                                      [facility]                            Monocytes #     1.2                            0.0

 - 0.8                            10/13/2018                                         

                                                      [facility]                            Basophils #     0.1                            0.0

 - 0.2                            10/13/2018                                         

                                                      [facility]                            Microcyte           1+ 

*ABN*

                    (10/13/18 5:27 AM)                            None Seen                            10/13/2018

                                                                                    [facility]                            Monocytes       7.8                            2.0 

- 12.0                            10/13/2018                                         

                                                      [facility]                    

 

                HEMATOLOGY                            Eosinophils     0.3                            0.0

 - 4.0                            10/13/2018                                         

                                                      [facility]                            Neutrophils #       12.2                         

                    1.5 - 8.1                            10/13/2018                                     

                                                      [facility]                            Basophils       0.5                            0.0 

- 1.0                            10/13/2018                                          

                                                      [facility]                            Segs            79.1                            45.0 - 75.0

                            10/13/2018                                               

                                                      [facility]                            Lymphocytes     12.3                            20.0

 - 40.0                            10/13/2018                                        

                                                      [facility]                            Lymphocytes #    1.9                            

1.0 - 5.5                            10/13/2018                                      

                                                      [facility]                            RBC             3.26                            4.70 - 6.10

                            10/13/2018                                               

                                                      [facility]                            WBC             15.4                            3.7 - 10.4

                            10/13/2018                                               

                                                      [facility]                            MCV             75.5                            80.0 - 94.0

                            10/13/2018                                               

                                                      [facility]                            RDW             22.5                            11.5 - 14.5

                            10/13/2018                                               

                                                      [facility]                            MPV             8.3                            7.4 - 10.4

                            10/13/2018                                               

                                                      [facility]                            Platelet        329                            133 -

 450                            10/13/2018                                           

                                                      [facility]                    

 

                HEMATOLOGY                            MCH             23.2                            27.0 - 31.0

                            10/13/2018                                               

                                                      [facility]                    

 

                HEMATOLOGY                            MCHC            30.8                            32.0 - 36.0

                            10/13/2018                                               

                                                      [facility]                    

 

                    CARDIAC ENZYMES                            Troponin-I          <0.02                      

                    0.00 - 0.40                            10/12/2018                                

                                                      [facility]                    

 

                    CHEM PANEL                            Procalcitonin Lvl    0.10                     

                    0.00 - 0.10                            10/12/2018                               

                                                      [facility]                    



 

                CARDIAC ENZYMES                            BNP             993                            <=100

 pg/mL                            10/12/2018                                         

                                                      [facility]                    

 

                    CARDIAC ENZYMES                            Troponin-I          <0.02                      

                    0.00 - 0.40                            10/12/2018                                

                                                      [facility]                    

 

                HEMATOLOGY                            PT              28.2                            12.0 - 14.7

                            10/12/2018                                               

                                                      [facility]                    

 

                HEMATOLOGY                            INR             2.60                            0.85 - 1.17

                            10/12/2018                                               

                                                      [facility]                    

 

                HEMATOLOGY                            PTT             43.7                            22.9 - 35.8

                            10/12/2018                                               

                                                      [facility]                    

 

                HEMATOLOGY                            Monocytes #     1.0                            0.0

 - 0.8                            10/12/2018                                         

                                                      [facility]                    

 

                HEMATOLOGY                            Eosinophils #    0.1                            

0.0 - 0.5                            10/12/2018                                      

                                                      [facility] [place]                            Microcyte           1+ 

*ABN*

                    (10/11/18 11:59 PM)                            None Seen                            

10/12/2018                                                                           

                                        [facility]                    

 

                HEMATOLOGY                            Lymphocytes     7.9                            20.0

 - 40.0                            10/12/2018                                        

                                                      [facility]                    

 

                HEMATOLOGY                            Segs            84.1                            45.0 - 75.0

                            10/12/2018                                               

                                                      [facility]                    

 

                HEMATOLOGY                            Basophils       0.2                            0.0 

- 1.0                            10/12/2018                                          

                                                      [facility]                    

 

                HEMATOLOGY                            Lymphocytes #    1.1                            

1.0 - 5.5                            10/12/2018                                      

                                                      [facility]                    

 

                HEMATOLOGY                            Eosinophils     0.9                            0.0

 - 4.0                            10/12/2018                                         

                                                      [facility]                    

 

                    HEMATOLOGY                            Neutrophils #       12.1                         

                    1.5 - 8.1                            10/12/2018                                     

                                                      [facility]                    

 

                HEMATOLOGY                            Monocytes       6.9                            2.0 

- 12.0                            10/12/2018                                         

                                                      [facility]                    

 

                ELECTROLYTES                            AGAP            14.4                            10.0 -

 20.0                            10/11/2018                                          

                                                      [facility]                    

 

                ELECTROLYTES                            Calcium Lvl     7.9                            

8.5 - 10.5                            10/11/2018                                     

                                                      [facility]                    

 

                    ELECTROLYTES                            Chloride Lvl        100                         

                    95 - 109                            10/11/2018                                      

                                                      [facility]                    

 

                ELECTROLYTES                            CO2             29                            24 - 32  

                          10/11/2018                                                 

                                                      [facility]                    

 

                ELECTROLYTES                            eGFR            87                                    

                    10/11/2018                                                        Result

 Comment: The eGFR is calculated using the CKD-EPI formula. In most young, 
healthy individuals the eGFR will be >90 mL/min/1.73m2. The eGFR declines with 
age. An eGFR of 60-89 may be normal in some populations, particularly the 
elderly, for whom the CKD-EPI formula has not been extensively validated. Use of
 the eGFR is not recommended in the following populations:<br/><br/>Individuals 
with unstable creatinine concentrations, including pregnant patients and those 
with serious co-morbid conditions.<br/><br/>Patients with extremes in muscle 
mass or diet. <br/><br/>The data above are obtained from the [employer] Kid[employer] 
Disease Education Program (NKDEP) which additionally recommends that when the 
eGFR is used in patients with extremes of body mass index for purposes of drug 
dosing, the eGFR should be multiplied by the estimated BMI.                     
                                        [facility]                    

 

                    ELECTROLYTES                            Potassium Lvl       3.4                        

                    3.5 - 5.1                            10/11/2018                                    

                                                      [facility]                    

 

                ELECTROLYTES                            Sodium Lvl      140                            135

 - 145                            10/11/2018                                         

                                                      [facility]                    

 

                    ELECTROLYTES                            Creatinine Lvl      0.78                      

                    0.50 - 1.40                            10/11/2018                                

                                                      [facility]                    

 

                ELECTROLYTES                            BUN             23                            7 - 22   

                          10/11/2018                                                  

                                                      [facility]                    

 

                ELECTROLYTES                            Glucose Lvl     170                            

70 - 99                            10/11/2018                                        

                                                      [facility]                            Hct             26.6                            42.0 - 54.0

                            10/11/2018                                               

                                                      [facility]                            MCV             76.6                            80.0 - 94.0

                            10/11/2018                                               

                                                      [facility]                            Hgb             8.3                            14.0 - 18.0

                            10/11/2018                                               

                                                      [facility]                            WBC             12.8                            3.7 - 10.4

                            10/11/2018                                               

                                                      [facility]                            RBC             3.47                            4.70 - 6.10

                            10/11/2018                                               

                                                      [facility]                            Platelet        319                            133 -

 450                            10/11/2018                                           

                                                      [facility]                            MPV             8.7                            7.4 - 10.4

                            10/11/2018                                               

                                                      [facility]                            MCH             23.8                            27.0 - 31.0

                            10/11/2018                                               

                                                      [facility]                            MCHC            31.1                            32.0 - 36.0

                            10/11/2018                                               

                                                      [facility]                            RDW             22.3                            11.5 - 14.5

                            10/11/2018                                               

                                                      [facility]                            Monocytes       6.5                            2.0 

- 12.0                            10/11/2018                                         

                                                      [facility]                            Lymphocytes     11.8                            20.0

 - 40.0                            10/11/2018                                        

                                                      [facility]                            Segs            77.9                            45.0 - 75.0

                            10/11/2018                                               

                                                      [facility]                            Eosinophils     3.2                            0.0

 - 4.0                            10/11/2018                                         

                                                      [facility]                    

 

                    HEMATOLOGY                            Microcyte           1+ 

*ABN*

                    (10/11/18 5:17 AM)                            None Seen                            10/11/2018

                                                                                     

[facility]                    

 

                    HEMATOLOGY                            Neutrophils #       10.0                         

                    1.5 - 8.1                            10/11/2018                                     

                                                       [facility]                    

 

                HEMATOLOGY                            Basophils       0.6                            0.0 

- 1.0                            10/11/2018                                          

                                                       [facility]                    

 

                HEMATOLOGY                            Monocytes #     0.8                            0.0

 - 0.8                            10/11/2018                                         

                                                       [facility]                    

 

                HEMATOLOGY                            Lymphocytes #    1.5                            

1.0 - 5.5                            10/11/2018                                      

                                                       [facility]                    

 

                HEMATOLOGY                            Eosinophils #    0.4                            

0.0 - 0.5                            10/11/2018                                      

                                                       [facility]                    

 

                HEMATOLOGY                            Basophils #     0.1                            0.0

 - 0.2                            10/11/2018                                         

                                                       [facility]                    

 

                HEMATOLOGY                            Monocytes       7.7                            2.0 

- 12.0                            10/10/2018                                         

                                                       [facility]                    

 

                HEMATOLOGY                            Eosinophils     1.3                            0.0

 - 4.0                            10/10/2018                                         

                                                       [facility]                    

 

                HEMATOLOGY                            Segs            78.0                            45.0 - 75.0

                            10/10/2018                                               

                                                       [facility]                    

 

                HEMATOLOGY                            Lymphocytes     12.5                            20.0

 - 40.0                            10/10/2018                                        

                                                       [facility]                    

 

                HEMATOLOGY                            Eosinophils #    0.2                            

0.0 - 0.5                            10/10/2018                                      

                                                       [facility]                    

 

                HEMATOLOGY                            Basophils #     0.1                            0.0

 - 0.2                            10/10/2018                                         

                                                      [facility]                    

 

                    HEMATOLOGY                            Microcyte           1+ 

*ABN*

                    (10/10/18 4:03 AM)                            None Seen                            10/10/2018

                                                                                    [facility]                    

 

                    HEMATOLOGY                            Neutrophils #       10.8                         

                    1.5 - 8.1                            10/10/2018                                     

                                                       [facility]                    

 

                HEMATOLOGY                            Basophils       0.5                            0.0 

- 1.0                            10/10/2018                                          

                                                       [facility]                    

 

                HEMATOLOGY                            Lymphocytes #    1.7                            

1.0 - 5.5                            10/10/2018                                      

                                                      [facility]                    

 

                HEMATOLOGY                            Monocytes #     1.1                            0.0

 - 0.8                            10/10/2018                                         

                                                      [facility]                    

 

                HEMATOLOGY                            MCV             76.4                            80.0 - 94.0

                            10/10/2018                                               

                                                      [facility]                    

 

                HEMATOLOGY                            Platelet        343                            133 -

 450                            10/10/2018                                           

                                                      [facility]                    

 

                HEMATOLOGY                            MCH             23.8                            27.0 - 31.0

                            10/10/2018                                               

                                                      [facility]                    

 

                HEMATOLOGY                            MCHC            31.2                            32.0 - 36.0

                            10/10/2018                                               

                                                      [facility]                    

 

                HEMATOLOGY                            Hgb             8.7                            14.0 - 18.0

                            10/10/2018                                               

                                                      [facility]                    

 

                HEMATOLOGY                            Hct             27.8                            42.0 - 54.0

                            10/10/2018                                               

                                                      [facility]                    

 

                HEMATOLOGY                            RBC             3.64                            4.70 - 6.10

                            10/10/2018                                               

                                                      [facility]                    

 

                HEMATOLOGY                            WBC             13.8                            3.7 - 10.4

                            10/10/2018                                               

                                                      [facility]                    

 

                HEMATOLOGY                            MPV             9.0                            7.4 - 10.4

                            10/10/2018                                               

                                                      [facility]                    

 

                HEMATOLOGY                            RDW             21.8                            11.5 - 14.5

                            10/10/2018                                               

                                                      [facility]                    

 

                CHEM PANEL                            eGFR            62                                      

                    10/09/2018                                                        Result

 Comment: The eGFR is calculated using the CKD-EPI formula. In most young, 
healthy individuals the eGFR will be >90 mL/min/1.73m2. The eGFR declines with 
age. An eGFR of 60-89 may be normal in some populations, particularly the 
elderly, for whom the CKD-EPI formula has not been extensively validated. Use of
 the eGFR is not recommended in the following populations:<br/><br/>Individuals 
with unstable creatinine concentrations, including pregnant patients and those 
with serious co-morbid conditions.<br/><br/>Patients with extremes in muscle 
mass or diet. <br/><br/>The data above are obtained from the [employer] Kid[employer] 
Disease Education Program (NKDEP) which additionally recommends that when the 
eGFR is used in patients with extremes of body mass index for purposes of drug 
dosing, the eGFR should be multiplied by the estimated BMI.                     
                                        [facility]                    

 

                CHEM PANEL                            Potassium Lvl    4.1                            

3.5 - 5.1                            10/09/2018                                      

                                                      [facility]                    

 

                CHEM PANEL                            Calcium Lvl     7.8                            8.5

 - 10.5                            10/09/2018                                        

                                                      [facility]                    

 

                CHEM PANEL                            Chloride Lvl    98                            95

 - 109                            10/09/2018                                         

                                                      [facility]                    

 

                CHEM PANEL                            CO2             25                            24 - 32    

                          10/09/2018                                                   

                                                      [facility]                    

 

                CHEM PANEL                            Sodium Lvl      135                            135

 - 145                            10/09/2018                                         

                                                      [facility]                    

 

                CHEM PANEL                            BUN             33                            7 - 22     

                          10/09/2018                                                    

                                                      [facility]                    

 

                    CHEM PANEL                            Creatinine Lvl      1.14                        

                    0.50 - 1.40                            10/09/2018                                  

                                                      [facility]                    

 

                CHEM PANEL                            Glucose Lvl     214                            70

 - 99                            10/09/2018                                          

                                                      [facility]                    

 

                CHEM PANEL                            AGAP            16.1                            10.0 - 20.0

                            10/09/2018                                               

                                                      [facility]                    

 

                HEMATOLOGY                            Lymphocytes #    1.9                            

1.0 - 5.5                            10/09/2018                                      

                                                      [facility]                    

 

                HEMATOLOGY                            Monocytes #     1.1                            0.0

 - 0.8                            10/09/2018                                         

                                                      [facility]                    

 

                    HEMATOLOGY                            Neutrophils #       13.7                         

                    1.5 - 8.1                            10/09/2018                                     

                                                      [facility]                    

 

                HEMATOLOGY                            Basophils       0.5                            0.0 

- 1.0                            10/09/2018                                          

                                                      [facility]                    

 

                HEMATOLOGY                            Basophils #     0.1                            0.0

 - 0.2                            10/09/2018                                         

                                                      [facility] [place]                            Microcyte           1+ 

*ABN*

                    (10/9/18 4:00 AM)                            None Seen                            10/09/2018

                                                                                    [facility]                    

 

                HEMATOLOGY                            Eosinophils #    0.1                            

0.0 - 0.5                            10/09/2018                                      

                                                      [facility]                    

 

                HEMATOLOGY                            Monocytes       6.3                            2.0 

- 12.0                            10/09/2018                                         

                                                      [facility]                    

 

                HEMATOLOGY                            Lymphocytes     11.4                            20.0

 - 40.0                            10/09/2018                                        

                                                      [facility]                    

 

                HEMATOLOGY                            Eosinophils     0.8                            0.0

 - 4.0                            10/09/2018                                         

                                                      [facility]                    

 

                HEMATOLOGY                            Segs            81.0                            45.0 - 75.0

                            10/09/2018                                               

                                                      [facility]                            MPV             9.0                            7.4 - 10.4

                            10/09/2018                                               

                                                      [facility]                            MCHC            31.0                            32.0 - 36.0

                            10/09/2018                                               

                                                      [facility]                            RDW             21.5                            11.5 - 14.5

                            10/09/2018                                               

                                                      [facility]                            Platelet        359                            133 -

 450                            10/09/2018                                           

                                                      [facility]                            WBC             16.9                            3.7 - 10.4

                            10/09/2018                                               

                                                      [facility]                            RBC             3.73                            4.70 - 6.10

                            10/09/2018                                               

                                                      [facility]                            Hgb             8.8                            14.0 - 18.0

                            10/09/2018                                               

                                                      [facility]                            Hct             28.3                            42.0 - 54.0

                            10/09/2018                                               

                                                      [facility]                            MCV             75.9                            80.0 - 94.0

                            10/09/2018                                               

                                                      [facility]                            MCH             23.6                            27.0 - 31.0

                            10/09/2018                                               

                                                      [facility]                    

 

                    URINE AND STOOL                            UA Hyal Cast        3                        

                    0 - 2                            10/08/2018                                        

                                                      [facility]                    

 

                URINE AND STOOL                            UA RBC          1                            0 - 

2                            10/08/2018                                              

                                                      [facility]                    

 

                    URINE AND STOOL                            UA Urobilinogen     <=1.0 mg/dL           

                          0.1 - 1.0                            10/08/2018                     

                                                                            [facility]            

        

 

                    URINE AND STOOL                            UA Leuk Est         Negative 

                    (10/8/18 4:00 PM)                            Negative                            10/08/2018

                                                                                    [facility]                    

 

                    URINE AND STOOL                            UA Nitrite          Negative 

                    (10/8/18 4:00 PM)                            Negative                            10/08/2018

                                                                                    [facility]                    

 

                    URINE AND STOOL                            UA Blood            Negative 

                    (10/8/18 4:00 PM)                            Negative                            10/08/2018

                                                                                    [facility]                    

 

                URINE AND STOOL                            UA pH           5.0                            5.0

 - 8.0                            10/08/2018                                         

                                                      [facility]                    

 

                    URINE AND STOOL                            UA Spec Grav        1.013                    

                    <=1.030                            10/08/2018                                  

                                                      [facility]                    

 

                    URINE AND STOOL                            UA Bili             Negative 

*NA*

                    (10/8/18 4:00 PM)                            Negative                            10/08/2018

                                                                                    [facility]                    

 

                    URINE AND STOOL                            UA Ketones          Negative mg/dL             

                          Negative mg/dL                            10/08/2018                  

                                                                            [facility]         

           

 

                    URINE AND STOOL                            UA Turbidity        Clear 

                    (10/8/18 4:00 PM)                            Clear                            10/08/2018

                                                                                    [facility]                    

 

                    URINE AND STOOL                            UA Color            Yellow 

*NA*

                    (10/8/18 4:00 PM)                            Yellow                            10/08/2018

                                                                                    [facility]                    

 

                    URINE AND STOOL                            UA Glucose          Negative mg/dL             

                          Negative mg/dL                            10/08/2018                  

                                                                            [facility]         

           

 

                    URINE AND STOOL                            UA Protein          Negative mg/dL             

                          Negative mg/dL                            10/08/2018                  

                                                                            [facility]         

           

 

                    URINE AND STOOL                            UA Sq Epi           None Seen                   

                                                10/08/2018                                        

                                                      [facility]                    

 

                CHEM PANEL                            Ammonia         33.0                            <=45.0

 uMol/L                            10/08/2018                                        

                                                      [facility]                    

 

                ELECTROLYTES                            AGAP            14.2                            10.0 -

 20.0                            10/08/2018                                          

                                                      [facility]                    

 

                ELECTROLYTES                            eGFR            62                                    

                    10/08/2018                                                        Result

 Comment: The eGFR is calculated using the CKD-EPI formula. In most young, 
healthy individuals the eGFR will be >90 mL/min/1.73m2. The eGFR declines with 
age. An eGFR of 60-89 may be normal in some populations, particularly the 
elderly, for whom the CKD-EPI formula has not been extensively validated. Use of
 the eGFR is not recommended in the following populations:<br/><br/>Individuals 
with unstable creatinine concentrations, including pregnant patients and those 
with serious co-morbid conditions.<br/><br/>Patients with extremes in muscle 
mass or diet. <br/><br/>The data above are obtained from the [employer] Kid[employer] 
D[employer] Education Program (NKDEP) which additionally recommends that when the 
eGFR is used in patients with extremes of body mass index for purposes of drug 
dosing, the eGFR should be multiplied by the estimated BMI.                     
                                        [facility]                    

 

                ELECTROLYTES                            BUN             30                            7 - 22   

                          10/08/2018                                                  

                                                      [facility]                    

 

                    ELECTROLYTES                            Creatinine Lvl      1.13                      

                    0.50 - 1.40                            10/08/2018                                

                                                      [facility]                    

 

                ELECTROLYTES                            Glucose Lvl     176                            

70 - 99                            10/08/2018                                        

                                                      [facility]                    

 

                    ELECTROLYTES                            Potassium Lvl       4.2                        

                    3.5 - 5.1                            10/08/2018                                    

                                                      [facility]                    

 

                ELECTROLYTES                            Sodium Lvl      134                            135

 - 145                            10/08/2018                                         

                                                      [facility]                    

 

                ELECTROLYTES                            CO2             28                            24 - 32  

                          10/08/2018                                                 

                                                      [facility]                    

 

                ELECTROLYTES                            Calcium Lvl     8.0                            

8.5 - 10.5                            10/08/2018                                     

                                                      [facility]                    

 

                ELECTROLYTES                            Chloride Lvl    96                            

95 - 109                            10/08/2018                                       

                                                      [facility]                    

 

                    BLOOD BANK RESULTS                            ABO/Rh              A NEG                       

                                                10/08/2018                                            

                                                      [facility]                    

 

                    BLOOD BANK RESULTS                            Antibody Scrn       Negative 

                    (10/7/18 7:56 PM)                                                        10/08/2018 

                                                                                   [facility]

                    

 

                    BLOOD BANK RESULTS                            RBC product         Product available 4

                    (10/7/18 7:17 PM)                                                        10/08/2018 

                                                       Result Comment: 10/07/2018 21:00

  [id number]<br/>spoke to derrick nagel on 10/07/2018 21:00 by Serg.                
                                        [facility]                    

 

                    CARDIAC ENZYMES                            Troponin-I          <0.02                      

                    0.00 - 0.40                            10/05/2018                                

                                                      [facility]                    

 

                    CARDIAC ENZYMES                            Troponin-I          <0.02                      

                    0.00 - 0.40                            10/05/2018                                

                                                      [facility]                    

 

                    ANEMIA STUDY                            Vitamin B12 Lvl     912                      

                    254 - 1320                            2018                                 

                                                      [facility]                    

 

                CHEM PANEL                            Phosphorus      2.9                            2.5

 - 4.5                            2018                                         

                                                      [facility]                    

 

                CHEM PANEL                            Magnesium Lvl    2.0                            

1.8 - 2.4                            2018                                      

                                                      [facility]                    

 

                CARDIAC ENZYMES                            Total CK        27                            12

 - 191                            2018                                         

                                                      [facility]                    

 

                    CARDIAC ENZYMES                            Troponin-I          <0.02                      

                    0.00 - 0.40                            2018                                

                                                      [facility]                    

 

                CHEM PANEL                            ALT             111                            0 - 65    

                          2018                                                   

                                                      [facility]                    

 

                CHEM PANEL                            AST             258                            0 - 37    

                          2018                                                   

                                                      [facility]                    

 

                CHEM PANEL                            Total Protein    8.3                            

6.4 - 8.4                            2018                                      

                                                      [facility]                    

 

                CHEM PANEL                            Alk Phos        155                            39 - 

136                            2018                                            

                                                      [facility]                    

 

                CHEM PANEL                            Bili Total      0.4                            0.2

 - 1.3                            2018                                         

                                                      [facility]                    

 

                CHEM PANEL                            Albumin Lvl     3.0                            3.5

 - 5.0                            2018                                         

                                                      [facility]                    

 

                CHEM PANEL                            B/C Ratio       15                            6 - 25

                            2018                                               

                                                      [facility]                    

 

                CHEM PANEL                            Globulin        5.3                            2.7 -

 4.2                            2018                                           

                                                      [facility]                    

 

                CHEM PANEL                            A/G Ratio       0.6                            0.7 

- 1.6                            2018                                          

                                                      [facility]                    

 

                CHEM PANEL                            Magnesium Lvl    2.1                            

1.8 - 2.4                            2018                                      

                                                      [facility]                    

 

                CHEM PANEL                            Phosphorus      2.9                            2.5

 - 4.5                            2018                                         

                                                      [facility]                    

 

                HEMATOLOGY                            PTT             40.1                            22.9 - 35.8

                            2018                                               

                                                      [facility]                    

 

                HEMATOLOGY                            PT              26.5                            12.0 - 14.7

                            2018                                               

                                                      [facility]                    

 

                HEMATOLOGY                            INR             2.41                            0.85 - 1.17

                            2018                                               

                                                      [facility]                    

 

                CHEM PANEL                            eGFR            82                                      

                    2018                                                        Result

 Comment: The eGFR is calculated using the CKD-EPI formula. In most young, 
healthy individuals the eGFR will be >90 mL/min/1.73m2. The eGFR declines with 
age. An eGFR of 60-89 may be normal in some populations, particularly the 
elderly, for whom the CKD-EPI formula has not been extensively validated. Use of
 the eGFR is not recommended in the following populations:<br/><br/>Individuals 
with unstable creatinine concentrations, including pregnant patients and those 
with serious co-morbid conditions.<br/><br/>Patients with extremes in muscle 
mass or diet. <br/><br/>The data above are obtained from the [employer] 
D[employer] Education Program (NKDEP) which additionally recommends that when the 
eGFR is used in patients with extremes of body mass index for purposes of drug 
dosing, the eGFR should be multiplied by the estimated BMI.                     
                                        [facility]                    

 

                CHEM PANEL                            Calcium Lvl     8.0                            8.5

 - 10.5                            2018                                        

                                                      [facility]                    

 

                CHEM PANEL                            AGAP            16.8                            10.0 - 20.0

                            2018                                               

                                                      [facility]                    

 

                CHEM PANEL                            CO2             26                            24 - 32    

                          2018                                                   

                                                      [facility]                    

 

                CHEM PANEL                            Potassium Lvl    3.8                            

3.5 - 5.1                            2018                                      

                                                      [facility]                    

 

                CHEM PANEL                            Sodium Lvl      141                            135

 - 145                            2018                                         

                                                      [facility]                    

 

                CHEM PANEL                            Chloride Lvl    102                            95

 - 109                            2018                                         

                                                      [facility]                    

 

                    CHEM PANEL                            Creatinine Lvl      0.90                        

                    0.50 - 1.40                            2018                                  

                                                      [facility]                    

 

                CHEM PANEL                            BUN             11                            7 - 22     

                          2018                                                    

                                                      [facility]                    

 

                CHEM PANEL                            Glucose Lvl     268                            70

 - 99                            2018                                          

                                                      [facility]                    

 

                CHEM PANEL                            Magnesium Lvl    1.8                            

1.8 - 2.4                            2018                                      

                                                      [facility]                    

 

                HEMATOLOGY                            MPV             8.5                            7.4 - 10.4

                            2018                                               

                                                      [facility]                            WBC             12.7                            3.7 - 10.4

                            2018                                               

                                                      [facility]                    

 

                HEMATOLOGY                            RDW             20.7                            11.5 - 14.5

                            2018                                               

                                                      [facility]                    

 

                HEMATOLOGY                            MCH             22.6                            27.0 - 31.0

                            2018                                               

                                                      [facility]                            Platelet        293                            133 -

 450                            2018                                           

                                                      [facility]                            MCHC            30.8                            32.0 - 36.0

                            2018                                               

                                                      [facility]                    

 

                HEMATOLOGY                            MCV             73.3                            80.0 - 94.0

                            2018                                               

                                                      [facility]                            Hct             24.7                            42.0 - 54.0

                            2018                                               

                                                      [facility]                            RBC             3.37                            4.70 - 6.10

                            2018                                               

                                                      [facility]                            Hgb             7.6                            14.0 - 18.0

                            2018                                               

                                                      [facility]                            Hypochrom           1+ 

                    (18 6:50 AM)                            None Seen                            2018

                                                                                    [facility]                            Microcyte           1+ 

*ABN*

                    (18 6:50 AM)                            None Seen                            2018

                                                                                    [facility]                            Eosinophils #    0.3                            

0.0 - 0.5                            2018                                      

                                                      [facility]                            Basophils #     0.1                            0.0

 - 0.2                            2018                                         

                                                      [facility]                            Lymphocytes #    1.9                            

1.0 - 5.5                            2018                                      

                                                      [facility]                            Monocytes #     0.7                            0.0

 - 0.8                            2018                                         

                                                      [facility]                            Basophils       1.0                            0.0 

- 1.0                            2018                                          

                                                      [facility]                            Neutrophils #    9.7                            

1.5 - 8.1                            2018                                      

                                                      [facility]                            Segs            76.1                            45.0 - 75.0

                            2018                                               

                                                      [facility]                            Plt Morph           Normal 

                    (18 6:50 AM)                                                        2018 

                                                                                   [facility]                            Eosinophils     2.1                            0.0

 - 4.0                            2018                                         

                                                      [facility]                            Lymphocytes     15.0                            20.0

 - 40.0                            2018                                        

                                                      [facility]                            Monocytes       5.8                            2.0 

- 12.0                            2018                                         

                                                      [facility]                    

 

                    CHEM PANEL                            Creatinine Lvl      0.94                        

                    0.50 - 1.40                            2018                                  

                                                      [facility]                    

 

                CHEM PANEL                            Glucose Lvl     297                            70

 - 99                            2018                                          

                                                      [facility]                    

 

                CHEM PANEL                            BUN             9                            7 - 22      

                          2018                                                     

                                                      [facility]                    

 

                CHEM PANEL                            Potassium Lvl    2.9                            

3.5 - 5.1                            2018                                      

                                        Result Comment: Critical Result(s) called to a shepherd at 2018

 22:15 by serg.  Read back OK.                            [facility]               

     

 

                CHEM PANEL                            Chloride Lvl    99                            95

 - 109                            2018                                         

                                                      [facility]                    

 

                CHEM PANEL                            Sodium Lvl      138                            135

 - 145                            2018                                         

                                                      [facility]                    

 

                CHEM PANEL                            Calcium Lvl     7.5                            8.5

 - 10.5                            2018                                        

                                                      [facility]                    

 

                CHEM PANEL                            AGAP            9.9                            10.0 - 20.0

                            2018                                               

                                                      [facility]                    

 

                CHEM PANEL                            CO2             32                            24 - 32    

                          2018                                                   

                                                      [facility]                    

 

                CHEM PANEL                            eGFR            77                                      

                    2018                                                        Result

 Comment: The eGFR is calculated using the CKD-EPI formula. In most young, 
healthy individuals the eGFR will be >90 mL/min/1.73m2. The eGFR declines with 
age. An eGFR of 60-89 may be normal in some populations, particularly the 
elderly, for whom the CKD-EPI formula has not been extensively validated. Use of
 the eGFR is not recommended in the following populations:<br/><br/>Individuals 
with unstable creatinine concentrations, including pregnant patients and those 
with serious co-morbid conditions.<br/><br/>Patients with extremes in muscle 
mass or diet. <br/><br/>The data above are obtained from the [employer] 
D[employer] Education Program (NKDEP) which additionally recommends that when the 
eGFR is used in patients with extremes of body mass index for purposes of drug 
dosing, the eGFR should be multiplied by the estimated BMI.                     
                                        [facility]                    

 

                CHEM PANEL                            Magnesium Lvl    1.9                            

1.8 - 2.4                            2018                                      

                                                      [facility]                    

 

                    CARDIAC ENZYMES                            Troponin-I          0.03                       

                    0.00 - 0.40                            2018                                 

                                                      [facility]                    

 

                CHEM PANEL                            eGFR            85                                      

                    2018                                                        Result

 Comment: The eGFR is calculated using the CKD-EPI formula. In most young, 
healthy individuals the eGFR will be >90 mL/min/1.73m2. The eGFR declines with 
age. An eGFR of 60-89 may be normal in some populations, particularly the 
elderly, for whom the CKD-EPI formula has not been extensively validated. Use of
 the eGFR is not recommended in the following populations:<br/><br/>Individuals 
with unstable creatinine concentrations, including pregnant patients and those 
with serious co-morbid conditions.<br/><br/>Patients with extremes in muscle 
mass or diet. <br/><br/>The data above are obtained from the [employer] 
D[employer] Education Program (NKDEP) which additionally recommends that when the 
eGFR is used in patients with extremes of body mass index for purposes of drug 
dosing, the eGFR should be multiplied by the estimated BMI.                     
                                        [facility]                    

 

                    CHEM PANEL                            Creatinine Lvl      0.83                        

                    0.50 - 1.40                            2018                                  

                                                      [facility]                    

 

                CHEM PANEL                            Sodium Lvl      143                            135

 - 145                            2018                                         

                                                      [facility]                    

 

                CHEM PANEL                            CO2             28                            24 - 32    

                          2018                                                   

                                                      [facility]                    

 

                CHEM PANEL                            BUN             9                            7 - 22      

                          2018                                                     

                                                      [facility]                    

 

                CHEM PANEL                            Glucose Lvl     229                            70

 - 99                            2018                                          

                                                      [facility]                    

 

                CHEM PANEL                            Potassium Lvl    2.9                            

3.5 - 5.1                            2018                                      

                                        Result Comment: Critical Result(s) called to Nahomi Henderson at 2018

 05:36 by LT.  Read back OK.                            [facility]               

     

 

                CHEM PANEL                            Calcium Lvl     7.9                            8.5

 - 10.5                            2018                                        

                                                      [facility]                    

 

                CHEM PANEL                            AGAP            15.9                            10.0 - 20.0

                            2018                                               

                                                      [facility]                    

 

                CHEM PANEL                            Chloride Lvl    102                            95

 - 109                            2018                                         

                                                      [facility]                    

 

                    HEMATOLOGY                            Microcyte           1+ 

*ABN*

                    (18 5:09 AM)                            None Seen                            2018

                                                                                    [facility]                    

 

                HEMATOLOGY                            Eosinophils #    0.3                            

0.0 - 0.5                            2018                                      

                                                      [facility]                    

 

                HEMATOLOGY                            Basophils #     0.1                            0.0

 - 0.2                            2018                                         

                                                      [facility]                    

 

                HEMATOLOGY                            Segs            71.7                            45.0 - 75.0

                            2018                                               

                                                      [facility]                    

 

                HEMATOLOGY                            Lymphocytes     17.9                            20.0

 - 40.0                            2018                                        

                                                      [facility]                            Lymphocytes #    1.9                            

1.0 - 5.5                            2018                                      

                                                      [facility]                    

 

                HEMATOLOGY                            Neutrophils #    7.5                            

1.5 - 8.1                            2018                                      

                                                      [facility]                    

 

                HEMATOLOGY                            Monocytes #     0.6                            0.0

 - 0.8                            2018                                         

                                                      [facility]                    

 

                HEMATOLOGY                            Basophils       1.1                            0.0 

- 1.0                            2018                                          

                                                      [facility]                    

 

                HEMATOLOGY                            Eosinophils     3.4                            0.0

 - 4.0                            2018                                         

                                                      [facility]                    

 

                HEMATOLOGY                            Monocytes       5.9                            2.0 

- 12.0                            2018                                         

                                                      [facility]                    

 

                HEMATOLOGY                            RDW             20.8                            11.5 - 14.5

                            2018                                               

                                                      [facility]                    

 

                HEMATOLOGY                            MPV             8.7                            7.4 - 10.4

                            2018                                               

                                                      [facility]                            Platelet        294                            133 -

 450                            2018                                           

                                                      [facility]                    

 

                HEMATOLOGY                            RBC             3.48                            4.70 - 6.10

                            2018                                               

                                                      [facility]                    

 

                HEMATOLOGY                            MCHC            30.9                            32.0 - 36.0

                            2018                                               

                                                      [facility]                    

 

                HEMATOLOGY                            Hgb             7.8                            14.0 - 18.0

                            2018                                               

                                                      [facility]                    

 

                HEMATOLOGY                            MCH             22.4                            27.0 - 31.0

                            2018                                               

                                                      [facility]                    

 

                HEMATOLOGY                            MCV             72.5                            80.0 - 94.0

                            2018                                               

                                                      [facility]                    

 

                HEMATOLOGY                            Hct             25.3                            42.0 - 54.0

                            2018                                               

                                                      [facility]                    

 

                HEMATOLOGY                            WBC             10.4                            3.7 - 10.4

                            2018                                               

                                                      [facility]                    

 

                    CARDIAC ENZYMES                            Troponin-I          0.04                       

                    0.00 - 0.40                            2018                                 

                                                      [facility]                    

 

                CARDIAC ENZYMES                            BNP             729                            <=100

 pg/mL                            2018                                         

                                                      [facility]                    

 

                CARDIAC ENZYMES                            Total CK        37                            12

 - 191                            2018                                         

                                                      [facility]                    

 

                    CARDIAC ENZYMES                            Troponin-I          0.05                       

                    0.00 - 0.40                            2018                                 

                                                      [facility]                    

 

                CHEM PANEL                            Phosphorus      1.8                            2.5

 - 4.5                            2018                                         

                                                      [facility]                    

 

                CHEM PANEL                            Magnesium Lvl    1.7                            

1.8 - 2.4                            2018                                      

                                                      [facility]                    

 

                CHEM PANEL                            ALT             39                            0 - 65     

                          2018                                                    

                                                      [facility]                    

 

                CHEM PANEL                            Alk Phos        118                            39 - 

136                            2018                                            

                                                      [facility]                    

 

                CHEM PANEL                            Bili Total      0.4                            0.2

 - 1.3                            2018                                         

                                                      [facility]                    

 

                CHEM PANEL                            AST             40                            0 - 37     

                          2018                                                    

                                                      [facility]                    

 

                CHEM PANEL                            A/G Ratio       0.6                            0.7 

- 1.6                            2018                                          

                                                      [facility]                    

 

                CHEM PANEL                            Globulin        4.4                            2.7 -

 4.2                            2018                                           

                                                      [facility]                    

 

                CHEM PANEL                            Total Protein    7.1                            

6.4 - 8.4                            2018                                      

                                                      [facility]                    

 

                CHEM PANEL                            Albumin Lvl     2.7                            3.5

 - 5.0                            2018                                         

                                                      [facility]                    

 

                CHEM PANEL                            B/C Ratio       8                            6 - 25

                            2018                                               

                                                      [facility]                    

 

                HEMATOLOGY                            Platelet        326                            133 -

 450                            2018                                           

                                                      [facility]                    

 

                HEMATOLOGY                            MCV             73.0                            80.0 - 94.0

                            2018                                               

                                                      [facility]                    

 

                HEMATOLOGY                            Hct             25.0                            42.0 - 54.0

                            2018                                               

                                                      [facility]                    

 

                HEMATOLOGY                            Hgb             7.8                            14.0 - 18.0

                            2018                                               

                                                      [facility]                    

 

                HEMATOLOGY                            RBC             3.43                            4.70 - 6.10

                            2018                                               

                                                      [facility]                            WBC             10.4                            3.7 - 10.4

                            2018                                               

                                                      [facility]                            MPV             8.4                            7.4 - 10.4

                            2018                                               

                                                      [facility]                            RDW             20.8                            11.5 - 14.5

                            2018                                               

                                                      [facility]                            MCHC            31.1                            32.0 - 36.0

                            2018                                               

                                                      [facility]                            MCH             22.7                            27.0 - 31.0

                            2018                                               

                                                      [facility]                            PTT             37.1                            22.9 - 35.8

                            2018                                               

                                                      [facility]                            INR             2.36                            0.85 - 1.17

                            2018                                               

                                                      [facility]                            PT              26.1                            12.0 - 14.7

                            2018                                               

                                                      [facility]                            Microcyte           1+ 

*ABN*

                    (18 4:58 PM)                            None Seen                            2018

                                                                                    [facility]                            Neutrophils #    7.3                            

1.5 - 8.1                            2018                                      

                                                      [facility]                            Basophils #     0.1                            0.0

 - 0.2                            2018                                         

                                                      [facility]                            Lymphocytes #    2.2                            

1.0 - 5.5                            2018                                      

                                                      [facility]                            Eosinophils #    0.1                            

0.0 - 0.5                            2018                                      

                                                      [facility]                            Monocytes #     0.8                            0.0

 - 0.8                            2018                                         

                                                      [facility]                            Basophils       0.8                            0.0 

- 1.0                            2018                                          

                                                      [facility]                            Eosinophils     1.3                            0.0

 - 4.0                            2018                                         

                                                      [facility]                            Monocytes       7.3                            2.0 

- 12.0                            2018                                         

                                                      [facility]                            Lymphocytes     20.7                            20.0

 - 40.0                            2018                                        

                                                      [facility]                            Segs            69.9                            45.0 - 75.0

                            2018                                               

                                                      [facility]                    

 

                CHEM PANEL                            Magnesium Lvl    2.2                            

1.8 - 2.4                            2018                                      

                                                      [facility]                    

 

                CHEM PANEL                            eGFR            69                                      

                    2018                                                        Result

 Comment: The eGFR is calculated using the CKD-EPI formula. In most young, 
healthy individuals the eGFR will be >90 mL/min/1.73m2. The eGFR declines with 
age. An eGFR of 60-89 may be normal in some populations, particularly the 
elderly, for whom the CKD-EPI formula has not been extensively validated. Use of
 the eGFR is not recommended in the following populations:<br/><br/>Individuals 
with unstable creatinine concentrations, including pregnant patients and those 
with serious co-morbid conditions.<br/><br/>Patients with extremes in muscle 
mass or diet. <br/><br/>The data above are obtained from the [employer] Kid[employer] 
Disease Education Program (NKDEP) which additionally recommends that when the 
eGFR is used in patients with extremes of body mass index for purposes of drug 
dosing, the eGFR should be multiplied by the estimated BMI.                     
                                        [facility]                    

 

                CHEM PANEL                            CO2             33                            24 - 32    

                          2018                                                   

                                                      [facility]                    

 

                CHEM PANEL                            Chloride Lvl    99                            95

 - 109                            2018                                         

                                                      [facility]                    

 

                CHEM PANEL                            Potassium Lvl    4.1                            

3.5 - 5.1                            2018                                      

                                                      [facility]                    

 

                CHEM PANEL                            Sodium Lvl      141                            135

 - 145                            2018                                         

                                                      [facility]                    

 

                CHEM PANEL                            Calcium Lvl     7.9                            8.5

 - 10.5                            2018                                        

                                                      [facility]                    

 

                CHEM PANEL                            AGAP            13.1                            10.0 - 20.0

                            2018                                               

                                                      [facility]                    

 

                CHEM PANEL                            BUN             16                            7 - 22     

                          2018                                                    

                                                      [facility]                    

 

                    CHEM PANEL                            Creatinine Lvl      1.04                        

                    0.50 - 1.40                            2018                                  

                                                      [facility]                    

 

                CHEM PANEL                            Glucose Lvl     41                            70 

- 99                            2018                                           

                                        Result Comment: Critical Result(s) called to MILES Waters at 2018

 07:41 by bobby.  Read back OK.                            [facility]              

      

 

                CHEM PANEL                            Magnesium Lvl    2.4                            

1.8 - 2.4                            2018                                      

                                                      [facility]                    

 

                ELECTROLYTES                            AGAP            8.4                            10.0 - 

20.0                            2018                                           

                                                      [facility]                    

 

                ELECTROLYTES                            Globulin        4.3                            2.7

 - 4.2                            2018                                         

                                                      [facility]                    

 

                ELECTROLYTES                            A/G Ratio       0.6                            0.7

 - 1.6                            2018                                         

                                                      [facility]                    

 

                ELECTROLYTES                            B/C Ratio       19                            6 -

 25                            2018                                            

                                                      [facility]                    

 

                ELECTROLYTES                            Sodium Lvl      134                            135

 - 145                            2018                                         

                                                      [facility]                    

 

                ELECTROLYTES                            AST             54                            0 - 37   

                          2018                                                  

                                                      [facility]                    

 

                ELECTROLYTES                            Alk Phos        123                            39 

- 136                            2018                                          

                                                      [facility]                    

 

                    ELECTROLYTES                            Potassium Lvl       4.4                        

                    3.5 - 5.1                            2018                                    

                                                      [facility]                    

 

                ELECTROLYTES                            Chloride Lvl    97                            

95 - 109                            2018                                       

                                                      [facility]                    

 

                ELECTROLYTES                            CO2             33                            24 - 32  

                          2018                                                 

                                                      [facility]                    

 

                ELECTROLYTES                            Calcium Lvl     7.8                            

8.5 - 10.5                            2018                                     

                                                      [facility]                    

 

                    ELECTROLYTES                            Total Protein       6.7                        

                    6.4 - 8.4                            2018                                    

                                                      [facility]                    

 

                ELECTROLYTES                            Glucose Lvl     110                            

70 - 99                            2018                                        

                                                      [facility]                    

 

                ELECTROLYTES                            BUN             15                            7 - 22   

                          2018                                                  

                                                      [facility]                    

 

                    ELECTROLYTES                            Creatinine Lvl      0.81                      

                    0.50 - 1.40                            2018                                

                                                      [facility]                    

 

                ELECTROLYTES                            Albumin Lvl     2.4                            

3.5 - 5.0                            2018                                      

                                                      [facility]                    

 

                ELECTROLYTES                            eGFR            86                                    

                    2018                                                        Result

 Comment: The eGFR is calculated using the CKD-EPI formula. In most young, 
healthy individuals the eGFR will be >90 mL/min/1.73m2. The eGFR declines with 
age. An eGFR of 60-89 may be normal in some populations, particularly the 
elderly, for whom the CKD-EPI formula has not been extensively validated. Use of
 the eGFR is not recommended in the following populations:<br/><br/>Individuals 
with unstable creatinine concentrations, including pregnant patients and those 
with serious co-morbid conditions.<br/><br/>Patients with extremes in muscle 
mass or diet. <br/><br/>The data above are obtained from the [employer] Kid[employer] 
Disease Education Program (NKDEP) which additionally recommends that when the 
eGFR is used in patients with extremes of body mass index for purposes of drug 
dosing, the eGFR should be multiplied by the estimated BMI.                     
                                        [facility]                    

 

                ELECTROLYTES                            Bili Total      1.0                            0.2

 - 1.3                            2018                                         

                                                      [facility]                    

 

                ELECTROLYTES                            ALT             28                            0 - 65   

                          2018                                                  

                                                      [facility]                            Eosinophils #    0.4                            

0.0 - 0.5                            2018                                      

                                                      [facility]                    

 

                HEMATOLOGY                            Monocytes #     1.1                            0.0

 - 0.8                            2018                                         

                                                      [facility]                    

 

                HEMATOLOGY                            Segs            70.3                            45.0 - 75.0

                            2018                                               

                                                      [facility]                    

 

                HEMATOLOGY                            Basophils       1.7                            0.0 

- 1.0                            2018                                          

                                                      [facility]                    

 

                HEMATOLOGY                            Eosinophils     3.4                            0.0

 - 4.0                            2018                                         

                                                      [facility]                    

 

                HEMATOLOGY                            Lymphocytes #    2.1                            

1.0 - 5.5                            2018                                      

                                                      [facility]                    

 

                HEMATOLOGY                            Neutrophils #    9.2                            

1.5 - 8.1                            2018                                      

                                                      [facility]                            Basophils #     0.2                            0.0

 - 0.2                            2018                                         

                                                      [facility]                            Microcyte           1+ 

*ABN*

                    (18 7:11 AM)                            None Seen                            2018

                                                                                    [facility]                    

 

                HEMATOLOGY                            Monocytes       8.7                            2.0 

- 12.0                            2018                                         

                                                      [facility]                            Lymphocytes     15.9                            20.0

 - 40.0                            2018                                        

                                                      [facility]                            MPV             7.8                            7.4 - 10.4

                            2018                                               

                                                      [facility]                            Platelet        381                            133 -

 450                            2018                                           

                                                      [facility]                            Hgb             7.6                            14.0 - 18.0

                            2018                                               

                                                      [facility]                            Hct             24.3                            42.0 - 54.0

                            2018                                               

                                                      [facility]                            WBC             13.1                            3.7 - 10.4

                            2018                                               

                                                      [facility]                            RBC             3.22                            4.70 - 6.10

                            2018                                               

                                                      [facility]                            MCV             75.4                            80.0 - 94.0

                            2018                                               

                                                      [facility]                            RDW             20.2                            11.5 - 14.5

                            2018                                               

                                                      [facility]                            MCH             23.6                            27.0 - 31.0

                            2018                                               

                                                      [facility]                            MCHC            31.3                            32.0 - 36.0

                            2018                                               

                                                      [facility]                    

 

                    SPECIAL CHEMISTRY                            Hgb A1C             5.3                         

                    <=5.6 %                            2018                                       

                                                      [facility]                    

 

                CHEM PANEL                            Calcium Lvl     7.6                            8.5

 - 10.5                            2018                                        

                                                      [facility]                    

 

                CHEM PANEL                            CO2             31                            24 - 32    

                          2018                                                   

                                                      [facility]                    

 

                CHEM PANEL                            AGAP            12.4                            10.0 - 20.0

                            2018                                               

                                                      [facility]                    

 

                CHEM PANEL                            Chloride Lvl    98                            95

 - 109                            2018                                         

                                                      [facility]                    

 

                CHEM PANEL                            eGFR            87                                      

                    2018                                                        Result

 Comment: The eGFR is calculated using the CKD-EPI formula. In most young, 
healthy individuals the eGFR will be >90 mL/min/1.73m2. The eGFR declines with 
age. An eGFR of 60-89 may be normal in some populations, particularly the 
elderly, for whom the CKD-EPI formula has not been extensively validated. Use of
 the eGFR is not recommended in the following populations:<br/><br/>Individuals 
with unstable creatinine concentrations, including pregnant patients and those 
with serious co-morbid conditions.<br/><br/>Patients with extremes in muscle 
mass or diet. <br/><br/>The data above are obtained from the [employer] 
D[employer] Education Program (NKDEP) which additionally recommends that when the 
eGFR is used in patients with extremes of body mass index for purposes of drug 
dosing, the eGFR should be multiplied by the estimated BMI.                     
                                        [facility]                    

 

                    CHEM PANEL                            Creatinine Lvl      0.79                        

                    0.50 - 1.40                            2018                                  

                                                      [facility]                    

 

                CHEM PANEL                            BUN             18                            7 - 22     

                          2018                                                    

                                                      [facility]                    

 

                CHEM PANEL                            Glucose Lvl     102                            70

 - 99                            2018                                          

                                                      [facility]                    

 

                CHEM PANEL                            Potassium Lvl    3.4                            

3.5 - 5.1                            2018                                      

                                                      [facility]                    

 

                CHEM PANEL                            Sodium Lvl      138                            135

 - 145                            2018                                         

                                                      [facility]                    

 

                CHEM PANEL                            Magnesium Lvl    1.8                            

1.8 - 2.4                            2018                                      

                                                      [facility]                            MCH             23.0                            27.0 - 31.0

                            2018                                               

                                                      [facility]                            MCHC            30.0                            32.0 - 36.0

                            2018                                               

                                                      [facility]                            RDW             20.1                            11.5 - 14.5

                            2018                                               

                                                      [facility]                            WBC             12.5                            3.7 - 10.4

                            2018                                               

                                                      [facility]                            RBC             3.13                            4.70 - 6.10

                            2018                                               

                                                      [facility]                            Platelet        337                            133 -

 450                            2018                                           

                                                      [facility]                            MPV             7.9                            7.4 - 10.4

                            2018                                               

                                                      [facility]                            Hgb             7.2                            14.0 - 18.0

                            2018                                               

                                                      [facility]                            Hct             23.9                            42.0 - 54.0

                            2018                                               

                                                      [facility]                            MCV             76.5                            80.0 - 94.0

                            2018                                               

                                                      [facility]                            Microcyte           1+ 

*ABN*

                    (18 6:43 AM)                            None Seen                            2018

                                                                                    [facility]                            Basophils       0.7                            0.0 

- 1.0                            2018                                          

                                                      [facility]                            Neutrophils #    9.9                            

1.5 - 8.1                            2018                                      

                                                      [facility]                            Lymphocytes #    1.2                            

1.0 - 5.5                            2018                                      

                                                      [facility]                            Monocytes #     1.1                            0.0

 - 0.8                            2018                                         

                                                      [facility]                            Eosinophils #    0.2                            

0.0 - 0.5                            2018                                      

                                                      [facility]                            Basophils #     0.1                            0.0

 - 0.2                            2018                                         

                                                      [facility]                            Monocytes       9.1                            2.0 

- 12.0                            2018                                         

                                                      [facility]                            Eosinophils     1.9                            0.0

 - 4.0                            2018                                         

                                                      [facility]                    

 

                HEMATOLOGY                            Segs            78.5                            45.0 - 75.0

                            2018                                               

                                                      [facility]                    

 

                HEMATOLOGY                            Lymphocytes     9.8                            20.0

 - 40.0                            2018                                        

                                                      [facility]                    

 

                HEMATOLOGY                            WBC             12.8                            3.7 - 10.4

                            2018                                               

                                                      [facility]                    

 

                HEMATOLOGY                            RDW             20.5                            11.5 - 14.5

                            2018                                               

                                                      [facility]                    

 

                HEMATOLOGY                            MCHC            29.7                            32.0 - 36.0

                            2018                                               

                                                      [facility]                    

 

                HEMATOLOGY                            Hct             24.0                            42.0 - 54.0

                            2018                                               

                                                      [facility]                    

 

                HEMATOLOGY                            Hgb             7.1                            14.0 - 18.0

                            2018                                               

                                                      [facility]                            RBC             3.14                            4.70 - 6.10

                            2018                                               

                                                      [facility]                            Platelet        319                            133 -

 450                            2018                                           

                                                      [facility]                            MPV             7.7                            7.4 - 10.4

                            2018                                               

                                                      [facility]                            MCV             76.5                            80.0 - 94.0

                            2018                                               

                                                      [facility]                            MCH             22.7                            27.0 - 31.0

                            2018                                               

                                                      [facility]                    

 

                    CHEM PANEL                            Procalcitonin Lvl    0.44                     

                    0.00 - 0.10                            2018                               

                                                      [facility]                            Eosinophils #    0.3                            

0.0 - 0.5                            2018                                      

                                                      [facility]                            Microcyte           1+ 

*ABN*

                    (18 6:00 AM)                            None Seen                            2018

                                                                                    [facility]                            Basophils #     0.1                            0.0

 - 0.2                            2018                                         

                                                      [facility]                    

 

                HEMATOLOGY                            Segs            79.9                            45.0 - 75.0

                            2018                                               

                                                      [facility]                            Lymphocytes     7.8                            20.0

 - 40.0                            2018                                        

                                                      [facility]                    

 

                HEMATOLOGY                            Monocytes       8.8                            2.0 

- 12.0                            2018                                         

                                                      [facility]                    

 

                HEMATOLOGY                            Monocytes #     1.1                            0.0

 - 0.8                            2018                                         

                                                      [facility]                    

 

                HEMATOLOGY                            Eosinophils     2.6                            0.0

 - 4.0                            2018                                         

                                                      [facility]                            Lymphocytes #    1.0                            

1.0 - 5.5                            2018                                      

                                                      [facility]                    

 

                HEMATOLOGY                            Neutrophils #    9.8                            

1.5 - 8.1                            2018                                      

                                                      [facility]                    

 

                HEMATOLOGY                            Basophils       0.9                            0.0 

- 1.0                            2018                                          

                                                      [facility]                    

 

                CARDIAC ENZYMES                            proBNP          2611                            0

 - 450                            2018                                         

                                                      [facility]                    

 

                TOXICOLOGY                            Vanco Tr TND    0900                            

                            08/15/2018                                               

                                                      [facility]                    

 

                TOXICOLOGY                            Vanco Tr        17.1                                

                          08/15/2018                                                   

                                                      [facility]                    

 

                    CHEM PANEL                            Procalcitonin Lvl    4.43                     

                    0.00 - 0.10                            2018                               

                                        Result Comment: Critical Result(s) called to MILES [place]aran[place]ana

 at 2018 17:57 by bobby.  Read back OK.                            [facility]

                    

 

                    CARDIAC ENZYMES                            Troponin-I          0.22                       

                    0.00 - 0.40                            2018                                 

                                                      [facility]                    

 

                    CARDIAC ENZYMES                            Troponin-I          0.29                       

                    0.00 - 0.40                            2018                                 

                                                      [facility]                    

 

                    CHEM PANEL                            Lactic Acid Lvl     1.7                        

                    0.5 - 2.2                            2018                                    

                                                      [facility]                    

 

                    HEMATOLOGY                            Hypochrom           1+ 

                    (18 4:01 AM)                            None Seen                            2018

                                                                                    [facility]                    

 

                    HEMATOLOGY                            Target Cell         Moderate 

*ABN*

                    (18 4:01 AM)                            None Seen                            2018

                                                                                    [facility]                    

 

                    HEMATOLOGY                            Polychrom           Moderate 

*ABN*

                    (18 4:01 AM)                            None Seen                            2018

                                                                                    [facility]                    

 

                    HEMATOLOGY                            RBC Morph           See Note 

                    (18 4:01 AM)                                                        2018 

                                                                                   [facility]

                    

 

                    HEMATOLOGY                            Plt Morph           Normal 

                    (18 4:01 AM)                                                        2018 

                                                                                   [facility]

                    

 

                    CHEM PANEL                            Lactic Acid Lvl     2.1                        

                    0.5 - 2.2                            2018                                    

                                                      [facility]                    

 

                    CHEM PANEL                            Lactic Acid Lvl     2.8                        

                    0.5 - 2.2                            2018                                    

                                                      [facility]                    

 

                    URINE AND STOOL                            Occult Bld Stl      Negative 

                    (18 10:03 PM)                            Negative                            2018

                                                                                    [facility]                    

 

                    CARDIAC ENZYMES                            Troponin-I          0.34                       

                    0.00 - 0.40                            2018                                 

                                                      [facility]                    

 

                CARDIAC ENZYMES                            CK MB           1.1                            0.5

 - 3.6                            2018                                         

                                                      [facility]                    

 

                CARDIAC ENZYMES                            Total CK        35                            12

 - 191                            2018                                         

                                                      [facility] [facility]                    

 

                    CARDIAC ENZYMES                            CK MB Index         3.1                       

                    0.0 - 2.5                            2018                                   

                                                      [facility]                    

 

                    URINE AND STOOL                            UA Urobilinogen     <=1.0 mg/dL           

                          0.1 - 1.0                            2018                     

                                                                            [facility]            

        

 

                    URINE AND STOOL                            UA Mucus            Few /LPF                     

                    None Seen /LPF                            2018                            

                                                        [facility]                 

   

 

                    URINE AND STOOL                            UA Color            Yellow 

*NA*

                    (18 8:22 PM)                            Yellow                            2018

                                                                                    [facility]                    

 

                    URINE AND STOOL                            UA Blood            Negative 

                    (18 8:22 PM)                            Negative                            2018

                                                                                    [facility]                    

 

                    URINE AND STOOL                            UA Bili             Negative 

*NA*

                    (18 8:22 PM)                            Negative                            2018

                                                                                    [facility]                    

 

                    URINE AND STOOL                            UA Ketones          Negative mg/dL             

                          Negative mg/dL                            2018                  

                                                                            [facility]         

           

 

                    URINE AND STOOL                            UA Nitrite          Negative 

                    (18 8:22 PM)                            Negative                            2018

                                                                                    [facility]                    

 

                    URINE AND STOOL                            UA Leuk Est         Negative 

                    (18 8:22 PM)                            Negative                            2018

                                                                                    [facility]                    

 

                    URINE AND STOOL                            UA Sq Epi           Occasional /LPF             

                          Few /LPF                            2018                        

                                                                            [facility]               

     

 

                URINE AND STOOL                            UA pH           7.0                            5.0

 - 8.0                            2018                                         

                                                      [facility]                    

 

                    URINE AND STOOL                            UA Spec Grav        1.010                    

                    <=1.030                            2018                                  

                                                      [facility]                    

 

                    URINE AND STOOL                            UA Turbidity        Clear 

                    (18 8:22 PM)                            Clear                            2018

                                                                                    [facility]                    

 

                    URINE AND STOOL                            UA Glucose          Negative mg/dL             

                          Negative mg/dL                            2018                  

                                                                            [facility]         

           

 

                    URINE AND STOOL                            UA Protein          Negative mg/dL             

                          Negative mg/dL                            2018                  

                                                                            [facility]         

           

 

                CARDIAC ENZYMES                            BNP             579                            <=100

 pg/mL                            2018                                         

                                                      [facility]                    

 

                CARDIAC ENZYMES                            Total CK        31                            12

 - 191                            2018                                         

                                                      [facility]                    

 

                CARDIAC ENZYMES                            CK MB           1.1                            0.5

 - 3.6                            2018                                         

                                                      [facility]                    

 

                    CARDIAC ENZYMES                            CK MB Index         3.5                       

                    0.0 - 2.5                            2018                                   

                                                      [facility]                    

 

                CHEM PANEL                            Lipase Lvl      27                            73 -

 393                            2018                                           

                                                      [facility]                    

 

                CHEM PANEL                            Alk Phos        111                            39 - 

136                            2018                                            

                                                      [facility]                    

 

                CHEM PANEL                            Bili Indirect    0.4                            

0.0 - 1.0                            2018                                      

                                                      [facility]                    

 

                CHEM PANEL                            A/G Ratio       0.6                            0.7 

- 1.6                            2018                                          

                                                      [facility]                    

 

                CHEM PANEL                            Albumin Lvl     2.8                            3.5

 - 5.0                            2018                                         

                                                      [facility]                    

 

                CHEM PANEL                            ALT             25                            0 - 65     

                          2018                                                    

                                                      [facility]                    

 

                CHEM PANEL                            Bili Total      0.8                            0.2

 - 1.3                            2018                                         

                                                      [facility]                    

 

                CHEM PANEL                            AST             42                            0 - 37     

                          2018                                                    

                                                      [facility]                    

 

                CHEM PANEL                            Bili Direct     0.4                            0.0

 - 0.3                            2018                                         

                                                      [facility]                    

 

                CHEM PANEL                            Globulin        4.6                            2.7 -

 4.2                            2018                                           

                                                      [facility]                    

 

                CHEM PANEL                            Total Protein    7.4                            

6.4 - 8.4                            2018                                      

                                                      [facility]                    

 

                HEMATOLOGY                            Polychrom       Slight                             

                           2018                                                

                                                      [facility]                            Target Cell         Slight                         

                                                2018                                              

                                                      [facility]                            Atypical Lymphs     0.0                        

                    <=0.0 %                            2018                                      

                                                      [facility]                            Plt Morph           Normal 

                    (18 3:40 PM)                                                        2018 

                                                                                   [facility]                            Anisocyte           1+ 

*ABN*

                    (18 3:40 PM)                            None Seen                            2018

                                                                                    [facility]                            Bands           4.0                            0.0 - 11.0

                            2018                                               

                                                      [facility]                            INR             2.66                            0.85 - 1.17

                            2018                                               

                                                      [facility]                            PT              28.7                            12.0 - 14.7

                            2018                                               

                                                      [facility]                            PTT             42.1                            22.9 - 35.8

                            2018                                               

                                                      [facility]                    

 

                CHEM PANEL                            eGFR            70                                      

                    2018                                                        Result

 Comment: The eGFR is calculated using the CKD-EPI formula. In most young, 
healthy individuals the eGFR will be >90 mL/min/1.73m2. The eGFR declines with 
age. An eGFR of 60-89 may be normal in some populations, particularly the 
elderly, for whom the CKD-EPI formula has not been extensively validated. Use of
 the eGFR is not recommended in the following populations:<br/><br/>Individuals 
with unstable creatinine concentrations, including pregnant patients and those 
with serious co-morbid conditions.<br/><br/>Patients with extremes in muscle 
mass or diet. <br/><br/>The data above are obtained from the [employer] Kid[employer] 
D[employer] Education Program (NKDEP) which additionally recommends that when the 
eGFR is used in patients with extremes of body mass index for purposes of drug 
dosing, the eGFR should be multiplied by the estimated BMI.                     
                                        [facility]                    

 

                CHEM PANEL                            Calcium Lvl     8.3                            8.5

 - 10.5                            2018                                        

                                                      [facility]                    

 

                CHEM PANEL                            Chloride Lvl    97                            95

 - 109                            2018                                         

                                                      [facility]                    

 

                CHEM PANEL                            CO2             29                            24 - 32    

                          2018                                                   

                                                      [facility]                    

 

                CHEM PANEL                            Potassium Lvl    5.3                            

3.5 - 5.1                            2018                                      

                                                      [facility]                    

 

                    CHEM PANEL                            Creatinine Lvl      1.03                        

                    0.50 - 1.40                            2018                                  

                                                      [facility]                    

 

                CHEM PANEL                            Sodium Lvl      133                            135

 - 145                            2018                                         

                                                      [facility]                    

 

                CHEM PANEL                            Glucose Lvl     323                            70

 - 99                            2018                                          

                                                      [facility]                    

 

                CHEM PANEL                            BUN             16                            7 - 22     

                          2018                                                    

                                                      [facility]                    

 

                CHEM PANEL                            AGAP            12.3                            10.0 - 20.0

                            2018                                               

                                                      [facility]                    

 

                HEMATOLOGY                            Basophils #     0.1                            0.0

 - 0.2                            2018                                         

                                                      [facility]                    

 

                    HEMATOLOGY                            Microcyte           1+ 

*ABN*

                    (18 7:16 AM)                            None Seen                            2018

                                                                                    [facility]                    

 

                HEMATOLOGY                            Lymphocytes #    1.3                            

1.0 - 5.5                            2018                                      

                                                      [facility]                    

 

                HEMATOLOGY                            Monocytes #     0.8                            0.0

 - 0.8                            2018                                         

                                                      [facility]                            Monocytes       6.6                            2.0 

- 12.0                            2018                                         

                                                      [facility]                    

 

                HEMATOLOGY                            Eosinophils #    0.4                            

0.0 - 0.5                            2018                                      

                                                      [facility]                    

 

                HEMATOLOGY                            Basophils       1.0                            0.0 

- 1.0                            2018                                          

                                                      [facility]                            Eosinophils     3.5                            0.0

 - 4.0                            2018                                         

                                                      [facility]                            Segs-Bands #    9.3                            1.5

 - 8.1                            2018                                         

                                                      [facility]                            Lymphocytes     10.5                            20.0

 - 40.0                            2018                                        

                                                      [facility]                            Segs            78.4                            45.0 - 75.0

                            2018                                               

                                                      [facility]                            MPV             9.3                            7.4 - 10.4

                            2018                                               

                                                      [facility]                            Platelet        224                            133 -

 450                            2018                                           

                                                      [facility]                            MCV             78.6                            80.0 - 94.0

                            2018                                               

                                                      [facility]                            Hct             26.7                            42.0 - 54.0

                            2018                                               

                                                      [facility]                            MCHC            31.5                            32.0 - 36.0

                            2018                                               

                                                      [facility]                            MCH             24.7                            27.0 - 31.0

                            2018                                               

                                                      [facility]                            RDW             16.9                            11.5 - 14.5

                            2018                                               

                                                      [facility]                            Hgb             8.4                            14.0 - 18.0

                            2018                                               

                                                      [facility]                            RBC             3.40                            4.70 - 6.10

                            2018                                               

                                                      [facility]                            WBC             11.9                            3.7 - 10.4

                            2018                                               

                                                      [facility]                    

 

                CHEM PANEL                            eGFR            76                                      

                    2018                                                        Result

 Comment: The eGFR is calculated using the CKD-EPI formula. In most young, 
healthy individuals the eGFR will be >90 mL/min/1.73m2. The eGFR declines with 
age. An eGFR of 60-89 may be normal in some populations, particularly the 
elderly, for whom the CKD-EPI formula has not been extensively validated. Use of
 the eGFR is not recommended in the following populations:<br/><br/>Individuals 
with unstable creatinine concentrations, including pregnant patients and those 
with serious co-morbid conditions.<br/><br/>Patients with extremes in muscle 
mass or diet. <br/><br/>The data above are obtained from the [employer] Kid[employer] 
Disease Education Program (NKDEP) which additionally recommends that when the 
eGFR is used in patients with extremes of body mass index for purposes of drug 
dosing, the eGFR should be multiplied by the estimated BMI.                     
                                        [facility]                    

 

                CHEM PANEL                            CO2             29                            24 - 32    

                          2018                                                   

                                                      [facility]                    

 

                CHEM PANEL                            Chloride Lvl    99                            95

 - 109                            2018                                         

                                                      [facility]                    

 

                CHEM PANEL                            Potassium Lvl    3.4                            

3.5 - 5.1                            2018                                      

                                                      [facility]                    

 

                CHEM PANEL                            Sodium Lvl      135                            135

 - 145                            2018                                         

                                                      [facility]                    

 

                    CHEM PANEL                            Creatinine Lvl      0.96                        

                    0.50 - 1.40                            2018                                  

                                                      [facility]                    

 

                CHEM PANEL                            Calcium Lvl     7.8                            8.5

 - 10.5                            2018                                        

                                                      [facility]                    

 

                CHEM PANEL                            Glucose Lvl     288                            70

 - 99                            2018                                          

                                                      [facility]                    

 

                CHEM PANEL                            BUN             18                            7 - 22     

                          2018                                                    

                                                      [facility]                    

 

                CHEM PANEL                            AGAP            10.4                            10.0 - 20.0

                            2018                                               

                                                      [facility]                    

 

                    HEMATOLOGY                            Microcyte           1+ 

*ABN*

                    (18 5:31 AM)                            None Seen                            2018

                                                                                    [facility]                    

 

                HEMATOLOGY                            Basophils #     0.1                            0.0

 - 0.2                            2018                                         

                                                      [facility]                    

 

                HEMATOLOGY                            Eosinophils #    0.4                            

0.0 - 0.5                            2018                                      

                                                      [facility]                    

 

                HEMATOLOGY                            Monocytes #     0.8                            0.0

 - 0.8                            2018                                         

                                                      [facility]                    

 

                HEMATOLOGY                            Lymphocytes     17.1                            20.0

 - 40.0                            2018                                        

                                                      [facility]                    

 

                HEMATOLOGY                            Segs            69.9                            45.0 - 75.0

                            2018                                               

                                                      [facility]                            Lymphocytes #    1.6                            

1.0 - 5.5                            2018                                      

                                                      [facility]                    

 

                HEMATOLOGY                            Segs-Bands #    6.7                            1.5

 - 8.1                            2018                                         

                                                      [facility]                    

 

                HEMATOLOGY                            Basophils       1.0                            0.0 

- 1.0                            2018                                          

                                                      [facility]                    

 

                HEMATOLOGY                            Eosinophils     3.9                            0.0

 - 4.0                            2018                                         

                                                      [facility]                    

 

                HEMATOLOGY                            Monocytes       8.1                            2.0 

- 12.0                            2018                                         

                                                      [facility]                    

 

                HEMATOLOGY                            RDW             16.9                            11.5 - 14.5

                            2018                                               

                                                      [facility]                    

 

                HEMATOLOGY                            MPV             9.4                            7.4 - 10.4

                            2018                                               

                                                      [facility]                    

 

                HEMATOLOGY                            Platelet        213                            133 -

 450                            2018                                           

                                                      [facility]                    

 

                HEMATOLOGY                            MCV             77.6                            80.0 - 94.0

                            2018                                               

                                                      [facility]                            MCHC            31.9                            32.0 - 36.0

                            2018                                               

                                                      [facility]                            MCH             24.8                            27.0 - 31.0

                            2018                                               

                                                      [facility]                            Hct             25.1                            42.0 - 54.0

                            2018                                               

                                                      [facility]                            Hgb             8.0                            14.0 - 18.0

                            2018                                               

                                                      [facility]                    

 

                HEMATOLOGY                            WBC             9.5                            3.7 - 10.4

                            2018                                               

                                                      [facility]                    

 

                HEMATOLOGY                            RBC             3.24                            4.70 - 6.10

                            2018                                               

                                                      [facility]                    

 

                    SPECIAL CHEMISTRY                            Hgb A1C             8.8                         

                    <=5.6 %                            2018                                       

                                                      [facility]                    

 

                    CARDIAC ENZYMES                            Troponin-I          <0.02                      

                    0.00 - 0.40                            2018                                

                                                      [facility]                    

 

                CARDIAC ENZYMES                            Total CK        31                            12

 - 191                            2018                                         

                                                      [facility]                    

 

                CARDIAC ENZYMES                            BNP             411                            <=100

 pg/mL                            2018                                         

                                                      [facility]                    

 

                    CARDIAC ENZYMES                            Troponin-I          <0.02                      

                    0.00 - 0.40                            2018                                

                                                      [facility]                    

 

                CARDIAC ENZYMES                            Total CK        29                            12

 - 191                            2018                                         

                                                      [facility]                    

 

                HEMATOLOGY                            MPV             9.5                            7.4 - 10.4

                            2018                                               

                                                      [facility]                    

 

                HEMATOLOGY                            Platelet        229                            133 -

 450                            2018                                           

                                                      [facility]                    

 

                HEMATOLOGY                            RDW             17.0                            11.5 - 14.5

                            2018                                               

                                                      [facility]                    

 

                HEMATOLOGY                            Hgb             9.7                            14.0 - 18.0

                            2018                                               

                                                      [facility]                    

 

                HEMATOLOGY                            Hct             30.3                            42.0 - 54.0

                            2018                                               

                                                      [facility]                    

 

                HEMATOLOGY                            MCV             77.6                            80.0 - 94.0

                            2018                                               

                                                      [facility]                    

 

                HEMATOLOGY                            MCH             25.0                            27.0 - 31.0

                            2018                                               

                                                      [facility]                    

 

                HEMATOLOGY                            MCHC            32.2                            32.0 - 36.0

                            2018                                               

                                                      [facility]                    

 

                HEMATOLOGY                            WBC             10.6                            3.7 - 10.4

                            2018                                               

                                                      [facility]                    

 

                HEMATOLOGY                            RBC             3.90                            4.70 - 6.10

                            2018                                               

                                                      [facility]                    

 

                    HEMATOLOGY                            Microcyte           1+ 

*ABN*

                    (18 7:02 AM)                            None Seen                            2018

                                                                                    [facility]                    

 

                HEMATOLOGY                            Segs-Bands #    8.4                            1.5

 - 8.1                            2018                                         

                                                      [facility]                    

 

                HEMATOLOGY                            Lymphocytes #    1.1                            

1.0 - 5.5                            2018                                      

                                                      [facility]                    

 

                HEMATOLOGY                            Monocytes #     0.9                            0.0

 - 0.8                            2018                                         

                                                      [facility]                    

 

                HEMATOLOGY                            Eosinophils #    0.1                            

0.0 - 0.5                            2018                                      

                                                      [facility]                    

 

                HEMATOLOGY                            Basophils #     0.1                            0.0

 - 0.2                            2018                                         

                                                      [facility]                            Lymphocytes     10.7                            20.0

 - 40.0                            2018                                        

                                                      [facility]                    

 

                HEMATOLOGY                            Monocytes       8.2                            2.0 

- 12.0                            2018                                         

                                                      [facility]                    

 

                HEMATOLOGY                            Eosinophils     0.8                            0.0

 - 4.0                            2018                                         

                                                      [facility]                    

 

                HEMATOLOGY                            Basophils       0.7                            0.0 

- 1.0                            2018                                          

                                                      [facility]                    

 

                HEMATOLOGY                            Segs            79.6                            45.0 - 75.0

                            2018                                               

                                                      [facility]                    

 

                    CARDIAC ENZYMES                            Troponin-I          <0.02                      

                    0.00 - 0.40                            2018                                

                                                      [facility]                    

 

                CARDIAC ENZYMES                            Total CK        33                            12

 - 191                            2018                                         

                                                      [facility]                    

 

                URINE AND STOOL                            UA RBC          2                            0 - 

2                            2018                                              

                                                      [facility]                    

 

                    URINE AND STOOL                            UA Leuk Est         Negative 

                    (18 12:38 AM)                            Negative                            2018

                                                                                    [facility]                    

 

                    URINE AND STOOL                            UA Bili             Negative 

*NA*

                    (18 12:38 AM)                            Negative                            2018

                                                                                    [facility]                    

 

                    URINE AND STOOL                            UA Ketones          Trace mg/dL                

                          Negative mg/dL                            2018                     

                                                                            [facility]            

        

 

                    URINE AND STOOL                            UA Nitrite          Negative 

                    (18 12:38 AM)                            Negative                            2018

                                                                                    [facility]                    

 

                    URINE AND STOOL                            UA Blood            Negative 

                    (18 12:38 AM)                            Negative                            2018

                                                                                    [facility]                    

 

                    URINE AND STOOL                            UA Color            Ltyellow                     

                                                2018                                          

                                                      [facility]                    

 

                    URINE AND STOOL                            UA Urobilinogen     <=1.0 mg/dL           

                          0.1 - 1.0                            2018                     

                                                                            [facility]            

        

 

                    URINE AND STOOL                            UA Bacteria         Occasional /HPF           

                          None Seen /HPF                            2018                

                                                                            [facility]       

             

 

                    URINE AND STOOL                            UA Sq Epi           None Seen                   

                                                2018                                        

                                                      [facility]                    

 

                    URINE AND STOOL                            UA Spec Grav        1.012                    

                    <=1.030                            2018                                  

                                                      [facility]                    

 

                URINE AND STOOL                            UA pH           6.0                            5.0

 - 8.0                            2018                                         

                                                      [facility]                    

 

                    URINE AND STOOL                            UA Glucose          500 mg/dL                  

                          Negative mg/dL                            2018                       

                                                                            [facility]              

      

 

                    URINE AND STOOL                            UA Protein          Negative mg/dL             

                          Negative mg/dL                            2018                  

                                                                            [facility]         

           

 

                    URINE AND STOOL                            UA Turbidity        Clear 

                    (18 12:38 AM)                            Clear                            2018

                                                                                    [facility]                    

 

                CARDIAC ENZYMES                            CK MB           1.1                            0.5

 - 3.6                            2018                                         

                                                      [facility]                    

 

                    CARDIAC ENZYMES                            CK MB Index         3.1                       

                    0.0 - 2.5                            2018                                   

                                                      [facility]                    

 

                CHEM PANEL                            eGFR            85                                      

                    2018                                                        Result

 Comment: The eGFR is calculated using the CKD-EPI formula. In most young, 
healthy individuals the eGFR will be >90 mL/min/1.73m2. The eGFR declines with 
age. An eGFR of 60-89 may be normal in some populations, particularly the 
elderly, for whom the CKD-EPI formula has not been extensively validated. Use of
 the eGFR is not recommended in the following populations:<br/><br/>Individuals 
with unstable creatinine concentrations, including pregnant patients and those 
with serious co-morbid conditions.<br/><br/>Patients with extremes in muscle 
mass or diet. <br/><br/>The data above are obtained from the [employer] 
[employer] Program (NK[employer]) which additionally recommends that when the 
eGFR is used in patients with extremes of body mass index for purposes of drug 
dosing, the eGFR should be multiplied by the estimated BMI.                     
                                        [facility]                    

 

                CHEM PANEL                            Albumin Lvl     3.4                            3.5

 - 5.0                            2018                                         

                                                      [facility]                    

 

                CHEM PANEL                            AST             28                            0 - 37     

                          2018                                                    

                                                      [facility]                    

 

                CHEM PANEL                            ALT             24                            0 - 65     

                          2018                                                    

                                                      [facility]                    

 

                CHEM PANEL                            Alk Phos        109                            39 - 

136                            2018                                            

                                                      [facility]                    

 

                CHEM PANEL                            Calcium Lvl     8.0                            8.5

 - 10.5                            2018                                        

                                                      [facility]                    

 

                CHEM PANEL                            Total Protein    7.0                            

6.4 - 8.4                            2018                                      

                                                      [facility]                    

 

                    CHEM PANEL                            Creatinine Lvl      0.85                        

                    0.50 - 1.40                            2018                                  

                                                      [facility]                    

 

                CHEM PANEL                            Sodium Lvl      137                            135

 - 145                            2018                                         

                                                      [facility]                    

 

                CHEM PANEL                            CO2             28                            24 - 32    

                          2018                                                   

                                                      [facility]                    

 

                CHEM PANEL                            Potassium Lvl    3.4                            

3.5 - 5.1                            2018                                      

                                                      [facility]                    

 

                CHEM PANEL                            Chloride Lvl    103                            95

 - 109                            2018                                         

                                                      [facility]                    

 

                CHEM PANEL                            BUN             11                            7 - 22     

                          2018                                                    

                                                      [facility]                    

 

                CHEM PANEL                            Glucose Lvl     255                            70

 - 99                            2018                                          

                                                      [facility]                    

 

                CHEM PANEL                            AGAP            9.4                            10.0 - 20.0

                            2018                                               

                                                      [facility]                    

 

                CHEM PANEL                            B/C Ratio       13                            6 - 25

                            2018                                               

                                                      [facility] PANEL                            Bili Total      0.8                            0.2

 - 1.3                            2018                                         

                                                      [facility] PANEL                            A/G Ratio       0.9                            0.7 

- 1.6                            2018                                          

                                                      [facility]                            Globulin        3.6                            2.7 -

 4.2                            2018                                           

                                                      [facility]                    

 

                HEMATOLOGY                            PTT             41.7                            22.9 - 35.8

                            2018                                               

                                                      [facility]                    

 

                HEMATOLOGY                            PT              28.3                            12.0 - 14.7

                            2018                                               

                                                      [facility]                    

 

                HEMATOLOGY                            INR             2.61                            0.85 - 1.17

                            2018                                               

                                                      [facility]                    

 

                    CARDIAC ENZYMES                            Troponin-I          <0.02                      

                    0.00 - 0.40                            2018                                

                                                      [facility]                            eGFR            95                                      

                    2018                                                        Result

 Comment: The eGFR is calculated using the CKD-EPI formula. In most young, 
healthy individuals the eGFR will be >90 mL/min/1.73m2. The eGFR declines with 
age. An eGFR of 60-89 may be normal in some populations, particularly the 
elderly, for whom the CKD-EPI formula has not been extensively validated. Use of
 the eGFR is not recommended in the following populations:<br/><br/>Individuals 
with unstable creatinine concentrations, including pregnant patients and those 
with serious co-morbid conditions.<br/><br/>Patients with extremes in muscle 
mass or diet. <br/><br/>The data above are obtained from the [employer] 
D[employer] Education Program (NKDEP) which additionally recommends that when the 
eGFR is used in patients with extremes of body mass index for purposes of drug 
dosing, the eGFR should be multiplied by the estimated BMI.                     
                                        [facility]                    

 

                CHEM PANEL                            Calcium Lvl     8.5                            8.5

 - 10.5                            2018                                        

                                                      [facility]                    

 

                CHEM PANEL                            BUN             8                            7 - 22      

                          2018                                                     

                                                      [facility]                    

 

                CHEM PANEL                            Glucose Lvl     129                            70

 - 99                            2018                                          

                                                      [facility]                    

 

                CHEM PANEL                            CO2             30                            24 - 32    

                          2018                                                   

                                                      [facility]                    

 

                CHEM PANEL                            Chloride Lvl    103                            95

 - 109                            2018                                         

                                                      [facility]                    

 

                CHEM PANEL                            AGAP            11.3                            10.0 - 20.0

                            2018                                               

                                                      [facility]                    

 

                    CHEM PANEL                            Creatinine Lvl      0.65                        

                    0.50 - 1.40                            2018                                  

                                                      [facility]                    

 

                CHEM PANEL                            Sodium Lvl      141                            135

 - 145                            2018                                         

                                                      [facility]                    

 

                CHEM PANEL                            Potassium Lvl    3.3                            

3.5 - 5.1                            2018                                      

                                                      [facility]                    

 

                HEMATOLOGY                            RBC             4.16                            4.70 - 6.10

                            2018                                               

                                                      [facility]                    

 

                HEMATOLOGY                            WBC             13.7                            3.7 - 10.4

                            2018                                               

                                                      [facility]                    

 

                HEMATOLOGY                            Platelet        281                            133 -

 450                            2018                                           

                                                      [facility]                    

 

                HEMATOLOGY                            MPV             8.7                            7.4 - 10.4

                            2018                                               

                                                      [facility]                    

 

                HEMATOLOGY                            MCV             80.1                            80.0 - 94.0

                            2018                                               

                                                      [facility]                    

 

                HEMATOLOGY                            Hct             33.3                            42.0 - 54.0

                            2018                                               

                                                      [facility]                    

 

                HEMATOLOGY                            Hgb             10.4                            14.0 - 18.0

                            2018                                               

                                                      [facility]                    

 

                HEMATOLOGY                            RDW             17.2                            11.5 - 14.5

                            2018                                               

                                                      [facility]                    

 

                HEMATOLOGY                            MCH             25.1                            27.0 - 31.0

                            2018                                               

                                                      [facility]                    

 

                HEMATOLOGY                            MCHC            31.4                            32.0 - 36.0

                            2018                                               

                                                      [facility]                    

 

                HEMATOLOGY                            INR             1.08                            0.85 - 1.17

                            2018                                               

                                                      [facility]                    

 

                HEMATOLOGY                            PT              14.0                            12.0 - 14.7

                            2018                                               

                                                      [facility]                    

 

                HEMATOLOGY                            Segs            68.4                            45.0 - 75.0

                            2018                                               

                                                      [facility]                    

 

                HEMATOLOGY                            Lymphocytes     17.5                            20.0

 - 40.0                            2018                                        

                                                      [facility]                    

 

                HEMATOLOGY                            Eosinophils     4.4                            0.0

 - 4.0                            2018                                         

                                                      [facility]                    

 

                HEMATOLOGY                            Basophils       1.0                            0.0 

- 1.0                            2018                                          

                                                      [facility]                    

 

                HEMATOLOGY                            Monocytes       8.7                            2.0 

- 12.0                            2018                                         

                                                      [facility]                    

 

                HEMATOLOGY                            Segs-Bands #    9.4                            1.5

 - 8.1                            2018                                         

                                                      [facility]                    

 

                HEMATOLOGY                            Lymphocytes #    2.4                            

1.0 - 5.5                            2018                                      

                                                      [facility]                    

 

                HEMATOLOGY                            Eosinophils #    0.6                            

0.0 - 0.5                            2018                                      

                                                      [facility]                    

 

                HEMATOLOGY                            Basophils #     0.1                            0.0

 - 0.2                            2018                                         

                                                      [facility]                    

 

                HEMATOLOGY                            Monocytes #     1.2                            0.0

 - 0.8                            2018                                         

                                                      [facility]                    

 

                LIPIDS                            CHD Risk        4.58                            4.00 - 7.30

                            2018                                               

                                                      [facility]                    

 

                LIPIDS                            VLDL            27                                          

                    2018                                                               

                                        [facility]                    

 

                LIPIDS                            LDL (Calculated)    91                            <=99

 mg/dL                            2018                                         

                                                      [facility]                    

 

                LIPIDS                            HDL             33                            >=61 mg/dL     

                          2018                                                    

                                                      [facility]                    

 

                LIPIDS                            Chol            151                            <=199 mg/dL  

                          2018                                                 

                                                      [facility]                    

 

                LIPIDS                            Trig            135                            <=149 mg/dL  

                          2018                                                 

                                                      [facility]                    

 

                    SPECIAL CHEMISTRY                            Hgb A1C             7.1                         

                    <=5.6 %                            2018                                       

                                                      [facility]                    

 

                    VIRAL - SEROLOGY                            Influ B             Negative 

                    (18 1:29 AM)                            Negative                            2018

                                                                                    [facility]                    

 

                    VIRAL - SEROLOGY                            Influ A             Negative 

                    (18 1:29 AM)                            Negative                            2018

                                                                                    [facility]                    

 

                    CARDIAC ENZYMES                            Troponin-I          <0.02                      

                    0.00 - 0.40                            2018                                

                                                      [facility]                    

 

                CARDIAC ENZYMES                            Total CK        44                            12

 - 191                            2018                                         

                                                      [facility]                    

 

                CARDIAC ENZYMES                            CK MB           1.8                            0.5

 - 3.6                            2018                                         

                                                      [facility]                    

 

                    CARDIAC ENZYMES                            Troponin-I          <0.02                      

                    0.00 - 0.40                            2018                                

                                                      [facility]                    

 

                CARDIAC ENZYMES                            BNP             492                            <=100

 pg/mL                            2018                                         

                                                      [facility]                    

 

                    CARDIAC ENZYMES                            CK MB Index         4.1                       

                    0.0 - 2.5                            2018                                   

                                                      [facility]                    

 

                CHEM PANEL                            eGFR            93                                      

                    2018                                                        Result

 Comment: The eGFR is calculated using the CKD-EPI formula. In most young, 
healthy individuals the eGFR will be >90 mL/min/1.73m2. The eGFR declines with 
age. An eGFR of 60-89 may be normal in some populations, particularly the 
elderly, for whom the CKD-EPI formula has not been extensively validated. Use of
 the eGFR is not recommended in the following populations:<br/><br/>Individuals 
with unstable creatinine concentrations, including pregnant patients and those 
with serious co-morbid conditions.<br/><br/>Patients with extremes in muscle 
mass or diet. <br/><br/>The data above are obtained from the [employer] 
Disease Education Program (NKDEP) which additionally recommends that when the 
eGFR is used in patients with extremes of body mass index for purposes of drug 
dosing, the eGFR should be multiplied by the estimated BMI.                     
                                        [facility]                    

 

                CHEM PANEL                            A/G Ratio       0.8                            0.7 

- 1.6                            2018                                          

                                                       [facility]                    

 

                CHEM PANEL                            Globulin        4.3                            2.7 -

 4.2                            2018                                           

                                                       [facility]                    

 

                CHEM PANEL                            AGAP            8.4                            10.0 - 20.0

                            2018                                               

                                                      [facility]                    

 

                CHEM PANEL                            B/C Ratio       18                            6 - 25

                            2018                                               

                                                      [facility]                    

 

                CHEM PANEL                            Alk Phos        133                            39 - 

136                            2018                                            

                                                      [facility]                    

 

                CHEM PANEL                            Bili Total      0.7                            0.2

 - 1.3                            2018                                         

                                                      [facility]                    

 

                CHEM PANEL                            AST             32                            0 - 37     

                          2018                                                    

                                                      [facility]                    

 

                CHEM PANEL                            Albumin Lvl     3.4                            3.5

 - 5.0                            2018                                         

                                                      [facility]                    

 

                CHEM PANEL                            ALT             29                            0 - 65     

                          2018                                                    

                                                      [facility]                    

 

                CHEM PANEL                            Total Protein    7.7                            

6.4 - 8.4                            2018                                      

                                                      [facility]                    

 

                CHEM PANEL                            Calcium Lvl     8.8                            8.5

 - 10.5                            2018                                        

                                                       [facility]                    

 

                CHEM PANEL                            CO2             31                            24 - 32    

                          2018                                                   

                                                      [facility]                    

 

                CHEM PANEL                            Chloride Lvl    101                            95

 - 109                            2018                                         

                                                      [facility]                    

 

                CHEM PANEL                            Sodium Lvl      137                            135

 - 145                            2018                                         

                                                      [facility]                    

 

                    CHEM PANEL                            Creatinine Lvl      0.68                        

                    0.50 - 1.40                            2018                                  

                                                      [facility]                    

 

                CHEM PANEL                            BUN             12                            7 - 22     

                          2018                                                    

                                                      [facility]                    

 

                CHEM PANEL                            Potassium Lvl    3.4                            

3.5 - 5.1                            2018                                      

                                                      [facility]                    

 

                CHEM PANEL                            Glucose Lvl     198                            70

 - 99                            2018                                          

                                                      [facility]                    

 

                CHEM PANEL                            Ammonia         13.0                            <=45.0

 uMol/L                            2018                                        

                                                      [facility]                    

 

                    DRUG SCREEN                            U Phencyc Scr       Negative 

*NA*

                    (18 2:28 PM)                            Negative                            2018

                                                                                    [facility]                    

 

                    DRUG SCREEN                            U Cannab Scr        Negative 

*NA*

                    (18 2:28 PM)                            Negative                            2018

                                                                                    [facility]                    

 

                    DRUG SCREEN                            UDS Note            See Note 

                    (18 2:28 PM)                                                        2018 

                                                                                    [facility]

                    

 

                    DRUG SCREEN                            U Opiate Scr        Positive 

*ABN*

                    (18 2:28 PM)                            Negative                            2018

                                                                                    [facility]                    

 

                    DRUG SCREEN                            U Cocaine Scr       Negative 

*NA*

                    (18 2:28 PM)                            Negative                            2018

                                                                                    [facility]                    

 

                    DRUG SCREEN                            U Amph Scr          Negative 

*NA*

                    (18 2:28 PM)                            Negative                            2018

                                                                                     

[facility]                    

 

                    DRUG SCREEN                            U Gracie Scr          Negative 

*NA*

                    (18 2:28 PM)                            Negative                            2018

                                                                                    [facility]                    

 

                    DRUG SCREEN                            U Benzodia Scr      Negative 

*NA*

                    (18 2:28 PM)                            Negative                            2018

                                                                                    [facility]                    

 

                HEMATOLOGY                            MPV             8.6                            7.4 - 10.4

                            2018                                               

                                                      [facility]                    

 

                HEMATOLOGY                            RBC             4.36                            4.70 - 6.10

                            2018                                               

                                                      [facility]                    

 

                HEMATOLOGY                            WBC             14.6                            3.7 - 10.4

                            2018                                               

                                                      [facility]                            MCH             25.3                            27.0 - 31.0

                            2018                                               

                                                      [facility]                    

 

                HEMATOLOGY                            Hct             34.8                            42.0 - 54.0

                            2018                                               

                                                      [facility]                            Hgb             11.0                            14.0 - 18.0

                            2018                                               

                                                      [facility]                    

 

                HEMATOLOGY                            MCV             79.7                            80.0 - 94.0

                            2018                                               

                                                      [facility]                            MCHC            31.7                            32.0 - 36.0

                            2018                                               

                                                      [facility]                            RDW             16.9                            11.5 - 14.5

                            2018                                               

                                                      [facility]                    

 

                HEMATOLOGY                            Platelet        283                            133 -

 450                            2018                                           

                                                      [facility]                    

 

                HEMATOLOGY                            Segs            68.9                            45.0 - 75.0

                            2018                                               

                                                      [facility]                    

 

                HEMATOLOGY                            Eosinophils     4.9                            0.0

 - 4.0                            2018                                         

                                                      [facility]                    

 

                HEMATOLOGY                            Lymphocytes     15.1                            20.0

 - 40.0                            2018                                        

                                                      [facility]                    

 

                HEMATOLOGY                            Monocytes       9.9                            2.0 

- 12.0                            2018                                         

                                                      [facility]                    

 

                HEMATOLOGY                            Basophils       1.2                            0.0 

- 1.0                            2018                                          

                                                      [facility]                    

 

                HEMATOLOGY                            Basophils #     0.2                            0.0

 - 0.2                            2018                                         

                                                      [facility]                    

 

                HEMATOLOGY                            Eosinophils #    0.7                            

0.0 - 0.5                            2018                                      

                                                      [facility]                    

 

                HEMATOLOGY                            Segs-Bands #    10.1                            

1.5 - 8.1                            2018                                      

                                                      [facility]                    

 

                HEMATOLOGY                            Lymphocytes #    2.2                            

1.0 - 5.5                            2018                                      

                                                      [facility]                    

 

                HEMATOLOGY                            Monocytes #     1.5                            0.0

 - 0.8                            2018                                         

                                                      [facility]                    

 

                TOXICOLOGY                            Ethanol Lvl     <3                               

                          2018                                                  

                                                      [facility]                    

 

                TOXICOLOGY                            Etoh (%)        <0.003                              

                          2018                                                 

                                                      [facility]                    

 

                    TOXICOLOGY                            Acetaminoph Lvl     <2                         

                    10 - 20                            2018                                       

                                                      [facility]                    

 

                    TOXICOLOGY                            Salicylate Lvl      <1.7                        

                    0.0 - 30.0                            2018                                   

                                                      [facility]                    

 

                    URINE AND STOOL                            UA Protein          30 mg/dL                   

                          Negative mg/dL                            2018                        

                                                                            [facility]               

     

 

                    URINE AND STOOL                            UA Glucose          50 mg/dL                   

                          Negative mg/dL                            2018                        

                                                                            [facility]               

     

 

                    URINE AND STOOL                            UA Ketones          Trace mg/dL                

                          Negative mg/dL                            2018                     

                                                                            [facility]            

        

 

                    URINE AND STOOL                            UA Bili             Negative 

*NA*

                    (18 2:28 PM)                            Negative                            2018

                                                                                    [facility]                    

 

                    URINE AND STOOL                            UA Blood            Negative 

                    (18 2:28 PM)                            Negative                            2018

                                                                                    [facility]                    

 

                URINE AND STOOL                            UA pH           6.0                            5.0

 - 8.0                            2018                                         

                                                      [facility]                    

 

                    URINE AND STOOL                            UA Spec Grav        1.014                    

                    <=1.030                            2018                                  

                                                      [facility]                    

 

                    URINE AND STOOL                            UA Turbidity        Clear 

                    (18 2:28 PM)                            Clear                            2018

                                                                                    [facility]                    

 

                    URINE AND STOOL                            UA Color            Yellow 

*NA*

                    (18 2:28 PM)                            Yellow                            2018

                                                                                    [facility]                    

 

                URINE AND STOOL                            UA RBC          1                            0 - 

2                            2018                                              

                                                      [facility]                    

 

                    URINE AND STOOL                            UA Sq Epi           Occasional /LPF             

                          Few /LPF                            2018                        

                                                                            [facility]               

     

 

                    URINE AND STOOL                            UA Leuk Est         Negative 

                    (18 2:28 PM)                            Negative                            2018

                                                                                    [facility]                    

 

                    URINE AND STOOL                            UA Nitrite          Negative 

                    (18 2:28 PM)                            Negative                            2018

                                                                                    [facility]                    

 

                    URINE AND STOOL                            UA Urobilinogen     2.0                   

                    0.1 - 1.0                            2018                               

                                                      [facility]                    



 

                URINE AND STOOL                            UA WBC          <1                            0 -

 5                            2018                                             

                                                      [facility]                    

 

                CHEM PANEL                            Glucose Lvl     417                            70

 - 99                            2018                                          

                                        Result Comment: Critical Result(s) called to WILSON Storey at 2018 

18:14 by KYLAH.  Read back OK.                            [facility]                

    

 

                CHEM PANEL                            eGFR            94                                      

                    2018                                                        Result

 Comment: The eGFR is calculated using the CKD-EPI formula. In most young, 
healthy individuals the eGFR will be >90 mL/min/1.73m2. The eGFR declines with 
age. An eGFR of 60-89 may be normal in some populations, particularly the 
elderly, for whom the CKD-EPI formula has not been extensively validated. Use of
 the eGFR is not recommended in the following populations:<br/><br/>Individuals 
with unstable creatinine concentrations, including pregnant patients and those 
with serious co-morbid conditions.<br/><br/>Patients with extremes in muscle 
mass or diet. <br/><br/>The data above are obtained from the [employer] 
[employer] Education Program (NK[employer]) which additionally recommends that when the 
eGFR is used in patients with extremes of body mass index for purposes of drug 
dosing, the eGFR should be multiplied by the estimated BMI.                     
                                        [facility]                    

 

                CHEM PANEL                            Glucose Lvl     161                            70

 - 99                            2018                                          

                                                      [facility]                    

 

                CHEM PANEL                            BUN             12                            7 - 22     

                          2018                                                    

                                                      [facility]                    

 

                    CHEM PANEL                            Creatinine Lvl      0.66                        

                    0.50 - 1.40                            2018                                  

                                                      [facility]                    

 

                CHEM PANEL                            Chloride Lvl    98                            95

 - 109                            2018                                         

                                                      [facility]                    

 

                CHEM PANEL                            CO2             32                            24 - 32    

                          2018                                                   

                                                      [facility]                    

 

                CHEM PANEL                            Potassium Lvl    4.0                            

3.5 - 5.1                            2018                                      

                                                      [facility]                    

 

                CHEM PANEL                            Sodium Lvl      137                            135

 - 145                            2018                                         

                                                      [facility]                    

 

                CHEM PANEL                            Calcium Lvl     8.9                            8.5

 - 10.5                            2018                                        

                                                      [facility]                    

 

                CHEM PANEL                            AGAP            11.0                            10.0 - 20.0

                            2018                                               

                                                      [facility]                    

 

                HEMATOLOGY                            MPV             8.3                            7.4 - 10.4

                            2018                                               

                                                      [facility]                            Platelet        237                            133 -

 450                            2018                                           

                                                      [facility]                    

 

                HEMATOLOGY                            RDW             18.4                            11.5 - 14.5

                            2018                                               

                                                      [facility]                    

 

                HEMATOLOGY                            MCHC            32.7                            32.0 - 36.0

                            2018                                               

                                                      [facility]                    

 

                HEMATOLOGY                            MCH             26.2                            27.0 - 31.0

                            2018                                               

                                                      [facility]                    

 

                HEMATOLOGY                            MCV             80.0                            80.0 - 94.0

                            2018                                               

                                                      [facility]                    

 

                HEMATOLOGY                            Hct             30.3                            42.0 - 54.0

                            2018                                               

                                                      [facility]                            Hgb             9.9                            14.0 - 18.0

                            2018                                               

                                                      [facility]                            RBC             3.79                            4.70 - 6.10

                            2018                                               

                                                      [facility]                    

 

                HEMATOLOGY                            WBC             9.5                            3.7 - 10.4

                            2018                                               

                                                      [facility]                            Basophils #     0.1                            0.0

 - 0.2                            2018                                         

                                                      [facility]                            Eosinophils #    0.4                            

0.0 - 0.5                            2018                                      

                                                      [facility]                    

 

                HEMATOLOGY                            Monocytes #     1.1                            0.0

 - 0.8                            2018                                         

                                                      [facility] [place]                            Lymphocytes #    2.2                            

1.0 - 5.5                            2018                                      

                                                      [facility] [place]                            Segs-Bands #    5.6                            1.5

 - 8.1                            2018                                         

                                                      [facility] [place]                            Basophils       1.1                            0.0 

- 1.0                            2018                                          

                                                      [facility] [place]                            Eosinophils     4.3                            0.0

 - 4.0                            2018                                         

                                                      [facility] [place]                            Monocytes       11.9                            2.0

 - 12.0                            2018                                        

                                                      [facility] [place]                            Lymphocytes     23.5                            20.0

 - 40.0                            2018                                        

                                                      [facility] [place]                            Segs            59.2                            45.0 - 75.0

                            2018                                               

                                                      [facility]                    

 

                CHEM PANEL                            eGFR            93                                      

                    2018                                                        Result

 Comment: The eGFR is calculated using the CKD-EPI formula. In most young, 
healthy individuals the eGFR will be >90 mL/min/1.73m2. The eGFR declines with 
age. An eGFR of 60-89 may be normal in some populations, particularly the 
elderly, for whom the CKD-EPI formula has not been extensively validated. Use of
 the eGFR is not recommended in the following populations:<br/><br/>Individuals 
with unstable creatinine concentrations, including pregnant patients and those 
with serious co-morbid conditions.<br/><br/>Patients with extremes in muscle 
mass or diet. <br/><br/>The data above are obtained from the [employer] Kid[employer] 
D[employer] Education Program (NKDEP) which additionally recommends that when the 
eGFR is used in patients with extremes of body mass index for purposes of drug 
dosing, the eGFR should be multiplied by the estimated BMI.                     
                                        [facility]                    

 

                CHEM PANEL                            BUN             14                            7 - 22     

                          2018                                                    

                                                      [facility]                    

 

                CHEM PANEL                            Sodium Lvl      135                            135

 - 145                            2018                                         

                                                      [facility]                    

 

                    CHEM PANEL                            Creatinine Lvl      0.67                        

                    0.50 - 1.40                            2018                                  

                                                      [facility]                    

 

                CHEM PANEL                            Potassium Lvl    3.8                            

3.5 - 5.1                            2018                                      

                                                      [facility]                    

 

                CHEM PANEL                            Chloride Lvl    96                            95

 - 109                            2018                                         

                                                      [facility]                    

 

                CHEM PANEL                            CO2             30                            24 - 32    

                          2018                                                   

                                                      [facility]                    

 

                CHEM PANEL                            Calcium Lvl     8.4                            8.5

 - 10.5                            2018                                        

                                                      [facility]                    

 

                CHEM PANEL                            Glucose Lvl     147                            70

 - 99                            2018                                          

                                                      [facility]                    

 

                CHEM PANEL                            AGAP            12.8                            10.0 - 20.0

                            2018                                               

                                                      [facility]                    

 

                HEMATOLOGY                            MCH             25.8                            27.0 - 31.0

                            2018                                               

                                                      [facility]                    

 

                HEMATOLOGY                            MCHC            32.0                            32.0 - 36.0

                            2018                                               

                                                      [facility]                    

 

                HEMATOLOGY                            Hct             33.3                            42.0 - 54.0

                            2018                                               

                                                      [facility]                    

 

                HEMATOLOGY                            MCV             80.6                            80.0 - 94.0

                            2018                                               

                                                      [facility]                    

 

                HEMATOLOGY                            RDW             18.1                            11.5 - 14.5

                            2018                                               

                                                      [facility]                    

 

                HEMATOLOGY                            Platelet        264                            133 -

 450                            2018                                           

                                                      [facility]                    

 

                HEMATOLOGY                            MPV             8.3                            7.4 - 10.4

                            2018                                               

                                                      [facility]                    

 

                HEMATOLOGY                            WBC             10.8                            3.7 - 10.4

                            2018                                               

                                                      [facility]                    

 

                HEMATOLOGY                            Hgb             10.6                            14.0 - 18.0

                            2018                                               

                                                      [facility]                    

 

                HEMATOLOGY                            RBC             4.13                            4.70 - 6.10

                            2018                                               

                                                      [facility]                    

 

                HEMATOLOGY                            Basophils       1.2                            0.0 

- 1.0                            2018                                          

                                                      [facility]                    

 

                HEMATOLOGY                            Segs-Bands #    7.2                            1.5

 - 8.1                            2018                                         

                                                      [facility]                    

 

                HEMATOLOGY                            Lymphocytes #    2.1                            

1.0 - 5.5                            2018                                      

                                                      [facility]                    

 

                HEMATOLOGY                            Monocytes #     1.0                            0.0

 - 0.8                            2018                                         

                                                      [facility]                    

 

                HEMATOLOGY                            Eosinophils #    0.4                            

0.0 - 0.5                            2018                                      

                                                      [facility]                    

 

                HEMATOLOGY                            Basophils #     0.1                            0.0

 - 0.2                            2018                                         

                                                      [facility]                    

 

                HEMATOLOGY                            Segs            66.2                            45.0 - 75.0

                            2018                                               

                                                      [facility]                    

 

                HEMATOLOGY                            Eosinophils     3.9                            0.0

 - 4.0                            2018                                         

                                                      [facility]                    

 

                HEMATOLOGY                            Lymphocytes     19.0                            20.0

 - 40.0                            2018                                        

                                                      [facility]                    

 

                HEMATOLOGY                            Monocytes       9.7                            2.0 

- 12.0                            2018                                         

                                                      [facility]                    

 

                    URINE AND STOOL                            UA Urobilinogen     <=1.0 mg/dL           

                          0.1 - 1.0                            02/10/2018                     

                                                                             [facility]            

        

 

                    URINE AND STOOL                            UA Hyal Cast        19                       

                    0 - 2                            02/10/2018                                       

                                                       [facility]                    

 

                URINE AND STOOL                            UA RBC          <1                            0 -

 2                            02/10/2018                                             

                                                       [facility]                    

 

                    URINE AND STOOL                            UA Sq Epi           Occasional /LPF             

                          Few /LPF                            02/10/2018                        

                                                                             [facility]               

     

 

                URINE AND STOOL                            UA WBC          4                            0 - 

5                            02/10/2018                                              

                                                       [facility]                    

 

                    URINE AND STOOL                            UA Protein          Negative mg/dL             

                          Negative mg/dL                            02/10/2018                  

                                                                             [facility]         

           

 

                URINE AND STOOL                            UA pH           5.0                            5.0

 - 8.0                            02/10/2018                                         

                                                       [facility]                    

 

                    URINE AND STOOL                            UA Spec Grav        1.012                    

                    <=1.030                            02/10/2018                                  

                                                       [facility]                    

 

                    URINE AND STOOL                            UA Turbidity        Clear 

                    (2/10/18 8:31 AM)                            Clear                            02/10/2018

                                                                                    [facility]                    

 

                    URINE AND STOOL                            UA Color            Yellow 

*NA*

                    (2/10/18 8:31 AM)                            Yellow                            02/10/2018

                                                                                    [facility]                    

 

                    URINE AND STOOL                            UA Nitrite          Negative 

                    (2/10/18 8:31 AM)                            Negative                            02/10/2018

                                                                                    [facility]                    

 

                    URINE AND STOOL                            UA Leuk Est         Small 

*ABN*

                    (2/10/18 8:31 AM)                            Negative                            02/10/2018

                                                                                    [facility]                    

 

                    URINE AND STOOL                            UA Glucose          Negative mg/dL             

                          Negative mg/dL                            02/10/2018                  

                                                                             [facility]         

           

 

                    URINE AND STOOL                            UA Ketones          Negative mg/dL             

                          Negative mg/dL                            02/10/2018                  

                                                                            [facility]         

           

 

                    URINE AND STOOL                            UA Blood            Negative 

                    (2/10/18 8:31 AM)                            Negative                            02/10/2018

                                                                                    [facility]                    

 

                    URINE AND STOOL                            UA Bili             Negative 

*NA*

                    (2/10/18 8:31 AM)                            Negative                            02/10/2018

                                                                                    [facility]                    

 

                    CHEM PANEL                            Lactic Acid Lvl     1.5                        

                    0.5 - 2.2                            02/10/2018                                    

                                                      [facility]                    

 

                    CARDIAC ENZYMES                            CK MB Index         <2.8                      

                    0.0 - 2.5                            02/10/2018                                  

                                                      [facility]                    

 

                CARDIAC ENZYMES                            CK MB           <0.5                            0.5

 - 3.6                            02/10/2018                                         

                                                      [facility]                    

 

                    CARDIAC ENZYMES                            Troponin-I          <0.02                      

                    0.00 - 0.40                            02/10/2018                                

                                                      [facility]                    

 

                CARDIAC ENZYMES                            BNP             94                            <=100 

pg/mL                            02/10/2018                                          

                                                      [facility]                    

 

                CARDIAC ENZYMES                            Total CK        18                            12

 - 191                            02/10/2018                                         

                                                      [facility]                    

 

                CHEM PANEL                            eGFR            85                                      

                    02/10/2018                                                        Result

 Comment: The eGFR is calculated using the CKD-EPI formula. In most young, 
healthy individuals the eGFR will be >90 mL/min/1.73m2. The eGFR declines with 
age. An eGFR of 60-89 may be normal in some populations, particularly the 
elderly, for whom the CKD-EPI formula has not been extensively validated. Use of
 the eGFR is not recommended in the following populations:<br/><br/>Individuals 
with unstable creatinine concentrations, including pregnant patients and those 
with serious co-morbid conditions.<br/><br/>Patients with extremes in muscle 
mass or diet. <br/><br/>The data above are obtained from the [employer] Kid[employer] 
Disease Education Program (NKDEP) which additionally recommends that when the 
eGFR is used in patients with extremes of body mass index for purposes of drug 
dosing, the eGFR should be multiplied by the estimated BMI.                     
                                         [facility]                    

 

                CHEM PANEL                            AST             24                            0 - 37     

                          02/10/2018                                                    

                                                      [facility]                    

 

                CHEM PANEL                            Alk Phos        116                            39 - 

136                            02/10/2018                                            

                                                      [facility]                    

 

                CHEM PANEL                            Bili Total      0.4                            0.2

 - 1.3                            02/10/2018                                         

                                                      [facility]                    

 

                CHEM PANEL                            ALT             17                            0 - 65     

                          02/10/2018                                                    

                                                      [facility]                    

 

                CHEM PANEL                            B/C Ratio       27                            6 - 25

                            02/10/2018                                               

                                                      [facility]                    

 

                CHEM PANEL                            Total Protein    7.6                            

6.4 - 8.4                            02/10/2018                                      

                                                      [facility]                    

 

                CHEM PANEL                            Albumin Lvl     3.0                            3.5

 - 5.0                            02/10/2018                                         

                                                       [facility]                    

 

                CHEM PANEL                            Globulin        4.6                            2.7 -

 4.2                            02/10/2018                                           

                                                      [facility]                    

 

                CHEM PANEL                            A/G Ratio       0.7                            0.7 

- 1.6                            02/10/2018                                          

                                                      [facility]                    

 

                    CHEM PANEL                            Creatinine Lvl      0.83                        

                    0.50 - 1.40                            02/10/2018                                  

                                                       [facility]                    

 

                CHEM PANEL                            Sodium Lvl      136                            135

 - 145                            02/10/2018                                         

                                                       [facility]                    

 

                CHEM PANEL                            BUN             22                            7 - 22     

                          02/10/2018                                                    

                                                       [facility]                    

 

                CHEM PANEL                            Potassium Lvl    3.9                            

3.5 - 5.1                            02/10/2018                                      

                                                       [facility]                    

 

                CHEM PANEL                            AGAP            10.9                            10.0 - 20.0

                            02/10/2018                                               

                                                      [facility]                    

 

                CHEM PANEL                            Calcium Lvl     8.6                            8.5

 - 10.5                            02/10/2018                                        

                                                       [facility]                    

 

                CHEM PANEL                            Chloride Lvl    96                            95

 - 109                            02/10/2018                                         

                                                       [facility]                    

 

                CHEM PANEL                            CO2             33                            24 - 32    

                          02/10/2018                                                   

                                                       [facility]                    

 

                CHEM PANEL                            Magnesium Lvl    1.7                            

1.8 - 2.4                            02/10/2018                                      

                                                      [facility]                    

 

                CHEM PANEL                            Phosphorus      2.8                            2.5

 - 4.5                            02/10/2018                                         

                                                      [facility]                    

 

                HEMATOLOGY                            INR             1.56                            0.85 - 1.17

                            02/10/2018                                               

                                                      [facility]                    

 

                HEMATOLOGY                            PT              18.8                            12.0 - 14.7

                            02/10/2018                                               

                                                      [facility]                            PTT             41.2                            22.9 - 35.8

                            02/10/2018                                               

                                                      [facility]                            MCHC            31.6                            32.0 - 36.0

                            02/10/2018                                               

                                                      [facility]                            RDW             18.2                            11.5 - 14.5

                            02/10/2018                                               

                                                      [facility]                            Platelet        295                            133 -

 450                            02/10/2018                                           

                                                      [facility]                            MPV             7.8                            7.4 - 10.4

                            02/10/2018                                               

                                                      [facility]                            MCH             25.0                            27.0 - 31.0

                            02/10/2018                                               

                                                      [facility]                            Hgb             9.5                            14.0 - 18.0

                            02/10/2018                                               

                                                      [facility]                            Hct             29.9                            42.0 - 54.0

                            02/10/2018                                               

                                                      [facility]                            MCV             79.2                            80.0 - 94.0

                            02/10/2018                                               

                                                      [facility]                            WBC             12.1                            3.7 - 10.4

                            02/10/2018                                               

                                                      [facility]                            RBC             3.78                            4.70 - 6.10

                            02/10/2018                                               

                                                      [facility]                            Segs            55.6                            45.0 - 75.0

                            02/10/2018                                               

                                                      [facility]                            Lymphocytes     26.2                            20.0

 - 40.0                            02/10/2018                                        

                                                      [facility]                            Monocytes       12.6                            2.0

 - 12.0                            02/10/2018                                        

                                                      [facility]                            Eosinophils     4.4                            0.0

 - 4.0                            02/10/2018                                         

                                                      [facility]                            Monocytes #     1.5                            0.0

 - 0.8                            02/10/2018                                         

                                                      [facility]                            Eosinophils #    0.5                            

0.0 - 0.5                            02/10/2018                                      

                                                      [facility]                            Basophils #     0.1                            0.0

 - 0.2                            02/10/2018                                         

                                                      [facility]                            Basophils       1.2                            0.0 

- 1.0                            02/10/2018                                          

                                                      [facility]                            Segs-Bands #    6.7                            1.5

 - 8.1                            02/10/2018                                         

                                                      [facility]                            Lymphocytes #    3.2                            

1.0 - 5.5                            02/10/2018                                      

                                                      [facility]                    

 

                CHEM PANEL                            eGFR            85                                      

                    2018                                                        Result

 Comment: The eGFR is calculated using the CKD-EPI formula. In most young, 
healthy individuals the eGFR will be >90 mL/min/1.73m2. The eGFR declines with 
age. An eGFR of 60-89 may be normal in some populations, particularly the 
elderly, for whom the CKD-EPI formula has not been extensively validated. Use of
 the eGFR is not recommended in the following populations:<br/><br/>Individuals 
with unstable creatinine concentrations, including pregnant patients and those 
with serious co-morbid conditions.<br/><br/>Patients with extremes in muscle 
mass or diet. <br/><br/>The data above are obtained from the [employer] Kid[employer] 
Disease Education Program (NKDEP) which additionally recommends that when the 
eGFR is used in patients with extremes of body mass index for purposes of drug 
dosing, the eGFR should be multiplied by the estimated BMI.                     
                                        [facility]                    

 

                CHEM PANEL                            Potassium Lvl    3.2                            

3.5 - 5.1                            2018                                      

                                                      [facility]                    

 

                CHEM PANEL                            Chloride Lvl    94                            95

 - 109                            2018                                         

                                                      [facility]                    

 

                CHEM PANEL                            Sodium Lvl      137                            135

 - 145                            2018                                         

                                                      [facility]                    

 

                CHEM PANEL                            Calcium Lvl     8.0                            8.5

 - 10.5                            2018                                        

                                                      [facility]                    

 

                CHEM PANEL                            CO2             34                            24 - 32    

                          2018                                                   

                                                      [facility]                    

 

                    CHEM PANEL                            Creatinine Lvl      0.85                        

                    0.50 - 1.40                            2018                                  

                                                      [facility]                    

 

                CHEM PANEL                            BUN             20                            7 - 22     

                          2018                                                    

                                                      [facility]                    

 

                CHEM PANEL                            Glucose Lvl     179                            70

 - 99                            2018                                          

                                                      [facility]                    

 

                CHEM PANEL                            AGAP            12.2                            10.0 - 20.0

                            2018                                               

                                                      [facility]                    

 

                HEMATOLOGY                            Segs-Bands #    9.4                            1.5

 - 8.1                            2018                                         

                                                      [facility]                    

 

                HEMATOLOGY                            Basophils       1.1                            0.0 

- 1.0                            2018                                          

                                                      [facility]                    

 

                HEMATOLOGY                            Monocytes #     1.1                            0.0

 - 0.8                            2018                                         

                                                      [facility]                    

 

                HEMATOLOGY                            Lymphocytes #    1.4                            

1.0 - 5.5                            2018                                      

                                                      [facility]                    

 

                HEMATOLOGY                            Basophils #     0.1                            0.0

 - 0.2                            2018                                         

                                                      [facility]                    

 

                HEMATOLOGY                            Eosinophils #    0.3                            

0.0 - 0.5                            2018                                      

                                                      [facility]                    

 

                HEMATOLOGY                            Eosinophils     2.4                            0.0

 - 4.0                            2018                                         

                                                      [facility]                    

 

                HEMATOLOGY                            Monocytes       8.9                            2.0 

- 12.0                            2018                                         

                                                      [facility]                    

 

                HEMATOLOGY                            Segs            76.1                            45.0 - 75.0

                            2018                                               

                                                      [facility]                    

 

                HEMATOLOGY                            Lymphocytes     11.5                            20.0

 - 40.0                            2018                                        

                                                      [facility]                    

 

                HEMATOLOGY                            Platelet        288                            133 -

 450                            2018                                           

                                                      [facility]                    

 

                HEMATOLOGY                            MCH             25.2                            27.0 - 31.0

                            2018                                               

                                                      [facility]                    

 

                HEMATOLOGY                            MCV             79.7                            80.0 - 94.0

                            2018                                               

                                                      [facility]                    

 

                HEMATOLOGY                            Hct             29.8                            42.0 - 54.0

                            2018                                               

                                                      [facility]                    

 

                HEMATOLOGY                            RDW             18.3                            11.5 - 14.5

                            2018                                               

                                                      [facility]                            MCHC            31.6                            32.0 - 36.0

                            2018                                               

                                                      [facility]                    

 

                HEMATOLOGY                            MPV             8.4                            7.4 - 10.4

                            2018                                               

                                                      [facility]                    

 

                HEMATOLOGY                            Hgb             9.4                            14.0 - 18.0

                            2018                                               

                                                      [facility]                    

 

                HEMATOLOGY                            RBC             3.73                            4.70 - 6.10

                            2018                                               

                                                      [facility]                    

 

                HEMATOLOGY                            WBC             12.4                            3.7 - 10.4

                            2018                                               

                                                       [facility]                    

 

                    URINE AND STOOL                            UA Sq Epi           None Seen                   

                                                2018                                        

                                                       [facility]                    

 

                URINE AND STOOL                            UA WBC          99                            0 -

 5                            2018                                             

                                                       [facility]                    

 

                    URINE AND STOOL                            UA Leuk Est         Large 

*ABN*

                    (1/15/18 6:16 PM)                            Negative                            2018

                                                                                     

[facility]                    

 

                    URINE AND STOOL                            UA Hyal Cast        1                        

                    0 - 2                            2018                                        

                                                       [facility]                    

 

                    URINE AND STOOL                            UA Bacteria         Occasional /HPF           

                          None Seen /HPF                            2018                

                                                                             [facility]       

             

 

                    URINE AND STOOL                            UA Glucose          Negative mg/dL             

                          Negative mg/dL                            2018                  

                                                                             [facility]         

           

 

                    URINE AND STOOL                            UA Ketones          Negative mg/dL             

                          Negative mg/dL                            2018                  

                                                                             [facility]         

           

 

                    URINE AND STOOL                            UA Bili             Negative 

*NA*

                    (1/15/18 6:16 PM)                            Negative                            2018

                                                                                     

[facility]                    

 

                    URINE AND STOOL                            UA Blood            Negative 

                    (1/15/18 6:16 PM)                            Negative                            2018

                                                                                     

[facility]                    

 

                    URINE AND STOOL                            UA Urobilinogen     4.0                   

                    0.1 - 1.0                            2018                               

                                                       [facility]                    



 

                    URINE AND STOOL                            UA Nitrite          Negative 

                    (1/15/18 6:16 PM)                            Negative                            2018

                                                                                     

[facility]                    

 

                URINE AND STOOL                            UA RBC          3                            0 - 

2                            2018                                              

                                                       [facility]                    

 

                    URINE AND STOOL                            UA Protein          Negative mg/dL             

                          Negative mg/dL                            2018                  

                                                                            [facility]         

           

 

                    URINE AND STOOL                            UA Color            Yellow 

*NA*

                    (1/15/18 6:16 PM)                            Yellow                            2018

                                                                                    [facility]                    

 

                    URINE AND STOOL                            UA Turbidity        Slight 

*ABN*

                    (1/15/18 6:16 PM)                            Clear                            2018

                                                                                    [facility]                    

 

                    URINE AND STOOL                            UA Spec Grav        1.008                    

                    <=1.030                            2018                                  

                                                      [facility]                    

 

                URINE AND STOOL                            UA pH           6.0                            5.0

 - 8.0                            2018                                         

                                                      [facility]                    

 

                CHEM PANEL                            eGFR            88                                      

                    01/15/2018                                                        Result

 Comment: The eGFR is calculated using the CKD-EPI formula. In most young, 
healthy individuals the eGFR will be >90 mL/min/1.73m2. The eGFR declines with 
age. An eGFR of 60-89 may be normal in some populations, particularly the 
elderly, for whom the CKD-EPI formula has not been extensively validated. Use of
 the eGFR is not recommended in the following populations:<br/><br/>Individuals 
with unstable creatinine concentrations, including pregnant patients and those 
with serious co-morbid conditions.<br/><br/>Patients with extremes in muscle 
mass or diet. <br/><br/>The data above are obtained from the [employer] Kid[employer] 
Disease Education Program (NKDEP) which additionally recommends that when the 
eGFR is used in patients with extremes of body mass index for purposes of drug 
dosing, the eGFR should be multiplied by the estimated BMI.                     
                                        [facility]                    

 

                    CHEM PANEL                            Creatinine Lvl      0.77                        

                    0.50 - 1.40                            01/15/2018                                  

                                                      [facility]                    

 

                CHEM PANEL                            Sodium Lvl      137                            135

 - 145                            01/15/2018                                         

                                                      [facility]                    

 

                CHEM PANEL                            Potassium Lvl    3.6                            

3.5 - 5.1                            01/15/2018                                      

                                                      [facility]                    

 

                CHEM PANEL                            Chloride Lvl    93                            95

 - 109                            01/15/2018                                         

                                                      [facility]                    

 

                CHEM PANEL                            CO2             36                            24 - 32    

                          01/15/2018                                                   

                                                      [facility]                    

 

                CHEM PANEL                            Calcium Lvl     7.7                            8.5

 - 10.5                            01/15/2018                                        

                                                      [facility]                    

 

                CHEM PANEL                            Glucose Lvl     69                            70 

- 99                            01/15/2018                                           

                                                      [facility]                    

 

                CHEM PANEL                            BUN             19                            7 - 22     

                          01/15/2018                                                    

                                                      [facility]                    

 

                CHEM PANEL                            AGAP            11.6                            10.0 - 20.0

                            01/15/2018                                               

                                                      [facility]                    

 

                HEMATOLOGY                            MCH             26.0                            27.0 - 31.0

                            01/15/2018                                               

                                                      [facility]                    

 

                HEMATOLOGY                            MCV             79.2                            80.0 - 94.0

                            01/15/2018                                               

                                                      [facility]                            Hgb             9.3                            14.0 - 18.0

                            01/15/2018                                               

                                                      [facility]                            Hct             28.5                            42.0 - 54.0

                            01/15/2018                                               

                                                      [facility]                            RBC             3.59                            4.70 - 6.10

                            01/15/2018                                               

                                                      [facility]                            Platelet        308                            133 -

 450                            01/15/2018                                           

                                                      [facility]                            MPV             8.9                            7.4 - 10.4

                            01/15/2018                                               

                                                      [facility]                            MCHC            32.8                            32.0 - 36.0

                            01/15/2018                                               

                                                      [facility]                            RDW             18.1                            11.5 - 14.5

                            01/15/2018                                               

                                                      [facility]                            WBC             15.8                            3.7 - 10.4

                            01/15/2018                                               

                                                      [facility]                            Basophils       0.8                            0.0 

- 1.0                            01/15/2018                                          

                                                      [facility]                            Monocytes       9.0                            2.0 

- 12.0                            01/15/2018                                         

                                                      [facility]                            Eosinophils     2.5                            0.0

 - 4.0                            01/15/2018                                         

                                                      [facility]                            Lymphocytes     10.7                            20.0

 - 40.0                            01/15/2018                                        

                                                      [facility]                            Segs            77.0                            45.0 - 75.0

                            01/15/2018                                               

                                                      [facility]                            Eosinophils #    0.4                            

0.0 - 0.5                            01/15/2018                                      

                                                      [facility]                            Basophils #     0.1                            0.0

 - 0.2                            01/15/2018                                         

                                                      [facility]                            Lymphocytes #    1.7                            

1.0 - 5.5                            01/15/2018                                      

                                                      [facility]                            Monocytes #     1.4                            0.0

 - 0.8                            01/15/2018                                         

                                                      [facility]                            Segs-Bands #    12.2                            

1.5 - 8.1                            01/15/2018                                      

                                                      [facility]                    

 

                    BLOOD BANK RESULTS                            RBC product         Product available 

                    (18 11:01 AM)                                                        2018

                                                                                    [facility]                    

 

                    BLOOD BANK RESULTS                            Antibody Scrn       Negative 

                    (18 5:52 AM)                                                        2018 

                                                                                   [facility]

                    

 

                    BLOOD BANK RESULTS                            ABO/Rh              A NEG                       

                                                2018                                            

                                                      [facility]                    

 

                ELECTROLYTES                            AGAP            12.6                            10.0 -

 20.0                            2018                                          

                                                      [facility]                    

 

                ELECTROLYTES                            eGFR            88                                    

                    2018                                                        Result

 Comment: The eGFR is calculated using the CKD-EPI formula. In most young, 
healthy individuals the eGFR will be >90 mL/min/1.73m2. The eGFR declines with 
age. An eGFR of 60-89 may be normal in some populations, particularly the 
elderly, for whom the CKD-EPI formula has not been extensively validated. Use of
 the eGFR is not recommended in the following populations:<br/><br/>Individuals 
with unstable creatinine concentrations, including pregnant patients and those 
with serious co-morbid conditions.<br/><br/>Patients with extremes in muscle 
mass or diet. <br/><br/>The data above are obtained from the [employer] 
D[employer] Education Program (NKDEP) which additionally recommends that when the 
eGFR is used in patients with extremes of body mass index for purposes of drug 
dosing, the eGFR should be multiplied by the estimated BMI.                     
                                        [facility]                    

 

                ELECTROLYTES                            BUN             20                            7 - 22   

                          2018                                                  

                                                      [facility]                    

 

                ELECTROLYTES                            Glucose Lvl     68                            70

 - 99                            2018                                          

                                                      [facility]                    

 

                ELECTROLYTES                            Chloride Lvl    93                            

95 - 109                            2018                                       

                                                      [facility]                    

 

                ELECTROLYTES                            Calcium Lvl     8.0                            

8.5 - 10.5                            2018                                     

                                                      [facility]                    

 

                    ELECTROLYTES                            Creatinine Lvl      0.78                      

                    0.50 - 1.40                            2018                                

                                                      [facility]                    

 

                    ELECTROLYTES                            Potassium Lvl       3.6                        

                    3.5 - 5.1                            2018                                    

                                                      [facility]                    

 

                ELECTROLYTES                            Sodium Lvl      135                            135

 - 145                            2018                                         

                                                      [facility]                    

 

                ELECTROLYTES                            CO2             33                            24 - 32  

                          2018                                                 

                                                      [facility]                            Platelet        277                            133 -

 450                            2018                                           

                                                      [facility]                            MPV             8.7                            7.4 - 10.4

                            2018                                               

                                                      [facility]                            MCH             24.2                            27.0 - 31.0

                            2018                                               

                                                      [facility]                            MCHC            31.2                            32.0 - 36.0

                            2018                                               

                                                      [facility]                            RDW             18.4                            11.5 - 14.5

                            2018                                               

                                                      [facility]                            Hgb             7.7                            14.0 - 18.0

                            2018                                               

                                                      [facility]                            Hct             24.8                            42.0 - 54.0

                            2018                                               

                                                      [facility]                            MCV             77.5                            80.0 - 94.0

                            2018                                               

                                                      [facility]                            RBC             3.20                            4.70 - 6.10

                            2018                                               

                                                      [facility]                            WBC             13.6                            3.7 - 10.4

                            2018                                               

                                                      [facility]                            Microcyte           1+ 

*ABN*

                    (18 5:52 AM)                            None Seen                            2018

                                                                                    [facility]                            Eosinophils #    0.4                            

0.0 - 0.5                            2018                                      

                                                      [facility]                    

 

                HEMATOLOGY                            Basophils #     0.1                            0.0

 - 0.2                            2018                                         

                                                      [facility]                    

 

                HEMATOLOGY                            Monocytes       7.2                            2.0 

- 12.0                            2018                                         

                                                      [facility]                    

 

                HEMATOLOGY                            Segs            73.4                            45.0 - 75.0

                            2018                                               

                                                      [facility]                    

 

                HEMATOLOGY                            Lymphocytes     15.8                            20.0

 - 40.0                            2018                                        

                                                      [facility]                    

 

                HEMATOLOGY                            Eosinophils     2.8                            0.0

 - 4.0                            2018                                         

                                                      [facility]                    

 

                HEMATOLOGY                            Basophils       0.8                            0.0 

- 1.0                            2018                                          

                                                      [facility]                    

 

                HEMATOLOGY                            Monocytes #     1.0                            0.0

 - 0.8                            2018                                         

                                                      [facility]                    

 

                HEMATOLOGY                            Segs-Bands #    10.0                            

1.5 - 8.1                            2018                                      

                                                      [facility]                    

 

                HEMATOLOGY                            Lymphocytes #    2.1                            

1.0 - 5.5                            2018                                      

                                                      [facility]                    

 

                    CHEM PANEL                            Lactic Acid Lvl     1.7                        

                    0.5 - 2.2                            2018                                    

                                                      [facility]                    

 

                    HEMATOLOGY                            Microcyte           1+ 

*ABN*

                    (18 5:09 AM)                            None Seen                            2018

                                                                                    [facility]                    

 

                    URINE AND STOOL                            Occult Bld Stl      Positive 

*ABN*

                    (18 8:38 PM)                            Negative                            2018

                                                                                    [facility]                    

 

                ANEMIA STUDY                            Ferritin Lvl    64                            

22 - 275                            2018                                       

                                                      [facility]                    

 

                ANEMIA STUDY                            % Satur Fe      12                            12

 - 57                            2018                                          

                                                      [facility]                    

 

                ANEMIA STUDY                            UIBC            275                            110 - 370

                            2018                                               

                                                      [facility]                    

 

                ANEMIA STUDY                            Iron            36                            45 - 160

                            2018                                               

                                                      [facility]                    

 

                ANEMIA STUDY                            TIBC            311                            228 - 428

                            2018                                               

                                                      [facility]                    

 

                    HEMATOLOGY                            Microcyte           1+ 

*ABN*

                    (18 5:51 AM)                            None Seen                            2018

                                                                                    [facility]                    

 

                    CARDIAC ENZYMES                            Troponin-I          <0.02                      

                    0.00 - 0.40                            2018                                

                                                      [facility]                    

 

                    CARDIAC ENZYMES                            Troponin-I          <0.02                      

                    0.00 - 0.40                            2018                                

                                                      [facility]                    

 

                CHEM PANEL                            Magnesium Lvl    1.9                            

1.8 - 2.4                            2018                                      

                                                      [facility]                    

 

                CHEM PANEL                            A/G Ratio       0.7                            0.7 

- 1.6                            2018                                          

                                                      [facility]                    

 

                CHEM PANEL                            B/C Ratio       17                            6 - 25

                            2018                                               

                                                      [facility]                    

 

                CHEM PANEL                            Globulin        4.3                            2.7 -

 4.2                            2018                                           

                                                      [facility]                    

 

                CHEM PANEL                            Alk Phos        111                            39 - 

136                            2018                                            

                                                       [facility]                    

 

                CHEM PANEL                            Albumin Lvl     3.2                            3.5

 - 5.0                            2018                                         

                                                       [facility]                    

 

                CHEM PANEL                            ALT             34                            0 - 65     

                          2018                                                    

                                                       [facility]                    

 

                CHEM PANEL                            AST             37                            0 - 37     

                          2018                                                    

                                                       [facility]                    

 

                CHEM PANEL                            Total Protein    7.5                            

6.4 - 8.4                            2018                                      

                                                       [facility]                    

 

                CHEM PANEL                            Bili Total      0.3                            0.2

 - 1.3                            2018                                         

                                                       [facility]                    

 

                    CARDIAC ENZYMES                            CK MB Index         3.2                       

                    0.0 - 2.5                            2018                                   

                                                       [facility]                    

 

                CARDIAC ENZYMES                            Total CK        97                            12

 - 191                            2018                                         

                                                      [facility]                    

 

                CARDIAC ENZYMES                            CK MB           3.1                            0.5

 - 3.6                            2018                                         

                                                      [facility]                    

 

                    CARDIAC ENZYMES                            Troponin-I          <0.02                      

                    0.00 - 0.40                            2018                                

                                                      [facility]                    

 

                HEMATOLOGY                            PTT             32.1                            22.9 - 35.8

                            2018                                               

                                                      [facility]                    

 

                HEMATOLOGY                            INR             1.68                            0.85 - 1.17

                            2018                                               

                                                      [facility]                    

 

                HEMATOLOGY                            PT              19.9                            12.0 - 14.7

                            2018                                               

                                                      [facility]                    

 

                CARDIAC ENZYMES                            Total CK        64                            12

 - 191                            2017                                         

                                                      [facility]                    

 

                    CARDIAC ENZYMES                            Troponin-I          <0.02                      

                    0.00 - 0.40                            2017                                

                                                      [facility]                    

 

                CHEM PANEL                            ALT             29                            0 - 65     

                          2017                                                    

                                                      [facility]                    

 

                CHEM PANEL                            Albumin Lvl     3.3                            3.5

 - 5.0                            2017                                         

                                                      [facility]                    

 

                CHEM PANEL                            Alk Phos        90                            39 - 136

                            2017                                               

                                                      [facility]                    

 

                CHEM PANEL                            AST             32                            0 - 37     

                          2017                                                    

                                                      [facility]                    

 

                CHEM PANEL                            Bili Direct     0.1                            0.0

 - 0.3                            2017                                         

                                                       [facility]                    

 

                CHEM PANEL                            Bili Total      0.2                            0.2

 - 1.3                            2017                                         

                                                      [facility]                    

 

                CHEM PANEL                            Total Protein    7.1                            

6.4 - 8.4                            2017                                      

                                                       [facility]                    

 

                CHEM PANEL                            Globulin        3.8                            2.7 -

 4.2                            2017                                           

                                                       [facility]                    

 

                CHEM PANEL                            A/G Ratio       0.9                            0.7 

- 1.6                            2017                                          

                                                       [facility]                    

 

                CHEM PANEL                            Bili Indirect    0.1                            

0.0 - 1.0                            2017                                      

                                                       [facility]                    

 

                CHEM PANEL                            eGFR            86                                      

                    2017                                                        Result

 Comment: The eGFR is calculated using the CKD-EPI formula. In most young, 
healthy individuals the eGFR will be >90 mL/min/1.73m2. The eGFR declines with 
age. An eGFR of 60-89 may be normal in some populations, particularly the 
elderly, for whom the CKD-EPI formula has not been extensively validated. Use of
 the eGFR is not recommended in the following populations:<br/><br/>Individuals 
with unstable creatinine concentrations, including pregnant patients and those 
with serious co-morbid conditions.<br/><br/>Patients with extremes in muscle 
mass or diet. <br/><br/>The data above are obtained from the [employer] 
D[employer] Education Program (NKDEP) which additionally recommends that when the 
eGFR is used in patients with extremes of body mass index for purposes of drug 
dosing, the eGFR should be multiplied by the estimated BMI.                     
                                        [facility]                    

 

                    CHEM PANEL                            Creatinine Lvl      0.82                        

                    0.50 - 1.40                            2017                                  

                                                      [facility]                    

 

                CHEM PANEL                            BUN             14                            7 - 22     

                          2017                                                    

                                                      [facility]                    

 

                CHEM PANEL                            Sodium Lvl      140                            135

 - 145                            2017                                         

                                                      [facility]                    

 

                CHEM PANEL                            Potassium Lvl    4.1                            

3.5 - 5.1                            2017                                      

                                                      [facility]                    

 

                CHEM PANEL                            Calcium Lvl     8.1                            8.5

 - 10.5                            2017                                        

                                                      [facility]                    

 

                CHEM PANEL                            AGAP            12.1                            10.0 - 20.0

                            2017                                               

                                                      [facility]                    

 

                CHEM PANEL                            CO2             30                            24 - 32    

                          2017                                                   

                                                      [facility]                    

 

                CHEM PANEL                            Chloride Lvl    102                            95

 - 109                            2017                                         

                                                      [facility]                    

 

                CHEM PANEL                            Glucose Lvl     118                            70

 - 99                            2017                                          

                                                      [facility]                    

 

                HEMATOLOGY                            INR             1.23                            0.85 - 1.17

                            2017                                               

                                                      [facility]                    

 

                HEMATOLOGY                            PT              15.6                            12.0 - 14.7

                            2017                                               

                                                      [facility]                    

 

                HEMATOLOGY                            PTT             30.4                            22.9 - 35.8

                            2017                                               

                                                      [facility]                    

 

                HEMATOLOGY                            MPV             8.9                            7.4 - 10.4

                            2017                                               

                                                      [facility]                    

 

                HEMATOLOGY                            WBC             10.3                            3.7 - 10.4

                            2017                                               

                                                      [facility]                    

 

                HEMATOLOGY                            Platelet        200                            133 -

 450                            2017                                           

                                                      [facility]                    

 

                HEMATOLOGY                            RDW             17.4                            11.5 - 14.5

                            2017                                               

                                                      [facility]                    

 

                HEMATOLOGY                            MCHC            32.0                            32.0 - 36.0

                            2017                                               

                                                      [facility]                    

 

                HEMATOLOGY                            MCH             25.6                            27.0 - 31.0

                            2017                                               

                                                      [facility]                    

 

                HEMATOLOGY                            MCV             79.8                            80.0 - 94.0

                            2017                                               

                                                       [facility]                    

 

                HEMATOLOGY                            Hct             33.8                            42.0 - 54.0

                            2017                                               

                                                       [facility]                    

 

                HEMATOLOGY                            Hgb             10.8                            14.0 - 18.0

                            2017                                               

                                                       [facility]                    

 

                HEMATOLOGY                            RBC             4.23                            4.70 - 6.10

                            2017                                               

                                                       [facility]                    

 

                HEMATOLOGY                            Monocytes       8.3                            2.0 

- 12.0                            2017                                         

                                                       [facility]                    

 

                HEMATOLOGY                            Lymphocytes     24.6                            20.0

 - 40.0                            2017                                        

                                                       [facility]                    

 

                HEMATOLOGY                            Segs            63.0                            45.0 - 75.0

                            2017                                               

                                                       [facility]                    

 

                HEMATOLOGY                            Monocytes #     0.9                            0.0

 - 0.8                            2017                                         

                                                       [facility]                    

 

                HEMATOLOGY                            Lymphocytes #    2.5                            

1.0 - 5.5                            2017                                      

                                                       [facility]                    

 

                HEMATOLOGY                            Segs-Bands #    6.5                            1.5

 - 8.1                            2017                                         

                                                       [facility]                    

 

                HEMATOLOGY                            Basophils       1.2                            0.0 

- 1.0                            2017                                          

                                                       [facility]                    

 

                HEMATOLOGY                            Eosinophils     2.9                            0.0

 - 4.0                            2017                                         

                                                       [facility]                    

 

                HEMATOLOGY                            Eosinophils #    0.3                            

0.0 - 0.5                            2017                                      

                                                       [facility]                    

 

                HEMATOLOGY                            Basophils #     0.1                            0.0

 - 0.2                            2017                                         

                                                      [facility]                    

 

                LIPIDS                            CHD Risk        3.55                            4.00 - 7.30

                            2017                                               

                                                      [facility]                    

 

                LIPIDS                            LDL (Calculated)    55                            <=99

 mg/dL                            2017                                         

                                                      [facility]                    

 

                LIPIDS                            VLDL            52                                          

                    2017                                                               

                                        [facility]                    

 

                LIPIDS                            HDL             42                            >=61 mg/dL     

                          2017                                                    

                                                      [facility]                    

 

                LIPIDS                            Trig            262                            <=149 mg/dL  

                          2017                                                 

                                                      [facility]                    

 

                LIPIDS                            Chol            149                            <=199 mg/dL  

                          2017                                                 

                                                      [facility]                    

 

                    CARDIAC ENZYMES                            CK MB Index         3.1                       

                    0.0 - 2.5                            2017                                   

                                                      [facility]                    

 

                CARDIAC ENZYMES                            Total CK        95                            12

 - 191                            2017                                         

                                                      [facility]                    

 

                CARDIAC ENZYMES                            CK MB           2.9                            0.5

 - 3.6                            2017                                         

                                                      [facility]                    

 

                    CARDIAC ENZYMES                            Troponin-I          <0.02                      

                    0.00 - 0.40                            2017                                

                                                      [facility]                    

 

                CARDIAC ENZYMES                            BNP             95                            <=100 

pg/mL                            2017                                          

                                                      [facility]                    

 

                CHEM PANEL                            Lipase Lvl      100                            73 

- 393                            2017                                          

                                                      [facility]                    

 

                    CHEM PANEL                            Creatinine Lvl      1.07                        

                    0.50 - 1.40                            2017                                  

                                                       [facility]                    

 

                CHEM PANEL                            eGFR            67                                      

                    2017                                                        Result

 Comment: The eGFR is calculated using the CKD-EPI formula. In most young, 
healthy individuals the eGFR will be >90 mL/min/1.73m2. The eGFR declines with 
age. An eGFR of 60-89 may be normal in some populations, particularly the 
elderly, for whom the CKD-EPI formula has not been extensively validated. Use of
 the eGFR is not recommended in the following populations:<br/><br/>Individuals 
with unstable creatinine concentrations, including pregnant patients and those 
with serious co-morbid conditions.<br/><br/>Patients with extremes in muscle 
mass or diet. <br/><br/>The data above are obtained from the [employer] 
Disease Education Program ([employer]) which additionally recommends that when the 
eGFR is used in patients with extremes of body mass index for purposes of drug 
dosing, the eGFR should be multiplied by the estimated BMI.                     
                                         [facility]                    

 

                CHEM PANEL                            Chloride Lvl    99                            95

 - 109                            2017                                         

                                                       [facility]                    

 

                CHEM PANEL                            CO2             27                            24 - 32    

                          2017                                                   

                                                       [facility]                    

 

                CHEM PANEL                            AGAP            14.3                            10.0 - 20.0

                            2017                                               

                                                       [facility]                    

 

                CHEM PANEL                            Potassium Lvl    4.3                            

3.5 - 5.1                            2017                                      

                                                       [facility]                    

 

                CHEM PANEL                            Sodium Lvl      136                            135

 - 145                            2017                                         

                                                       [facility]                    

 

                CHEM PANEL                            A/G Ratio       0.7                            0.7 

- 1.6                            2017                                          

                                                       [facility]                    

 

                CHEM PANEL                            ALT             34                            0 - 65     

                          2017                                                    

                                                       [facility]                    

 

                CHEM PANEL                            Bili Total      0.2                            0.2

 - 1.3                            2017                                         

                                                       [facility]                    

 

                CHEM PANEL                            AST             43                            0 - 37     

                          2017                                                    

                                                       [facility]                    

 

                CHEM PANEL                            Alk Phos        88                            39 - 136

                            2017                                               

                                                       [facility]                    

 

                CHEM PANEL                            Calcium Lvl     8.2                            8.5

 - 10.5                            2017                                        

                                                       [facility]                    

 

                CHEM PANEL                            Albumin Lvl     3.2                            3.5

 - 5.0                            2017                                         

                                                       [facility]                    

 

                CHEM PANEL                            Globulin        4.3                            2.7 -

 4.2                            2017                                           

                                                       [facility]                    

 

                CHEM PANEL                            B/C Ratio       13                            6 - 25

                            2017                                               

                                                       [facility]                    

 

                CHEM PANEL                            Total Protein    7.5                            

6.4 - 8.4                            2017                                      

                                                       [facility]                    

 

                CHEM PANEL                            Glucose Lvl     241                            70

 - 99                            2017                                          

                                                       [facility]                    

 

                CHEM PANEL                            BUN             14                            7 - 22     

                          2017                                                    

                                                       [facility]                    

 

                HEMATOLOGY                            Lymphocytes     20.5                            20.0

 - 40.0                            2017                                        

                                                       [facility]                    

 

                HEMATOLOGY                            Segs            66.1                            45.0 - 75.0

                            2017                                               

                                                      [facility]                    

 

                HEMATOLOGY                            Basophils #     0.1                            0.0

 - 0.2                            2017                                         

                                                      [facility]                    

 

                HEMATOLOGY                            Segs-Bands #    8.0                            1.5

 - 8.1                            2017                                         

                                                       [facility]                    

 

                HEMATOLOGY                            Eosinophils #    0.3                            

0.0 - 0.5                            2017                                      

                                                       [facility]                    

 

                HEMATOLOGY                            Monocytes #     1.2                            0.0

 - 0.8                            2017                                         

                                                      [facility]                    

 

                HEMATOLOGY                            Lymphocytes #    2.5                            

1.0 - 5.5                            2017                                      

                                                       [facility]                    

 

                HEMATOLOGY                            Monocytes       9.6                            2.0 

- 12.0                            2017                                         

                                                       [facility]                    

 

                HEMATOLOGY                            Basophils       1.2                            0.0 

- 1.0                            2017                                          

                                                       [facility]                    

 

                HEMATOLOGY                            Eosinophils     2.6                            0.0

 - 4.0                            2017                                         

                                                      [facility]                    

 

                HEMATOLOGY                            INR             1.36                            0.85 - 1.17

                            2017                                               

                                                      [facility]                    

 

                HEMATOLOGY                            PT              16.8                            12.0 - 14.7

                            2017                                               

                                                      [facility]                    

 

                HEMATOLOGY                            PTT             26.6                            22.9 - 35.8

                            2017                                               

                                                      [facility]                    

 

                HEMATOLOGY                            MCHC            32.2                            32.0 - 36.0

                            2017                                               

                                                      [facility]                    

 

                HEMATOLOGY                            MCH             25.6                            27.0 - 31.0

                            2017                                               

                                                      [facility]                    

 

                HEMATOLOGY                            MCV             79.6                            80.0 - 94.0

                            2017                                               

                                                      [facility]                    

 

                HEMATOLOGY                            MPV             8.8                            7.4 - 10.4

                            2017                                               

                                                      [facility]                    

 

                HEMATOLOGY                            RDW             17.2                            11.5 - 14.5

                            2017                                               

                                                      [facility]                    

 

                HEMATOLOGY                            Platelet        195                            133 -

 450                            2017                                           

                                                      [facility]                    

 

                HEMATOLOGY                            Hgb             10.6                            14.0 - 18.0

                            2017                                               

                                                      [facility]                    

 

                HEMATOLOGY                            RBC             4.12                            4.70 - 6.10

                            2017                                               

                                                      [facility]                    

 

                HEMATOLOGY                            WBC             12.2                            3.7 - 10.4

                            2017                                               

                                                      [facility]                    

 

                HEMATOLOGY                            Hct             32.8                            42.0 - 54.0

                            2017                                               

                                                      [facility]                    

 

                    URINE AND STOOL                            UA Urobilinogen     <=1.0 mg/dL           

                          0.1 - 1.0                            2017                     

                                                                            [facility]            

        

 

                    URINE AND STOOL                            UA Color            Ltyellow                     

                                                2017                                          

                                                      [facility]                    

 

                URINE AND STOOL                            UA RBC          1                            0 - 

2                            2017                                              

                                                      [facility]                    

 

                    URINE AND STOOL                            UA Protein          Negative mg/dL             

                          Negative mg/dL                            2017                  

                                                                            [facility]         

           

 

                    URINE AND STOOL                            UA Glucose          500 mg/dL                  

                          Negative mg/dL                            2017                       

                                                                            [facility]              

      

 

                    URINE AND STOOL                            UA Spec Grav        1.003                    

                    <=1.030                            2017                                  

                                                      [facility]                    

 

                URINE AND STOOL                            UA pH           7.0                            5.0

 - 8.0                            2017                                         

                                                      [facility]                    

 

                    URINE AND STOOL                            UA Turbidity        Clear 

                    (17 7:04 PM)                            Clear                            2017

                                                                                    [facility]                    

 

                    URINE AND STOOL                            UA Sq Epi           Occasional /LPF             

                          Few /LPF                            2017                        

                                                                            [facility]               

     

 

                URINE AND STOOL                            UA WBC          <1                            0 -

 5                            2017                                             

                                                      [facility]                    

 

                    URINE AND STOOL                            UA Ketones          Negative mg/dL             

                          Negative mg/dL                            2017                  

                                                                            [facility]         

           

 

                    URINE AND STOOL                            UA Bili             Negative 

*NA*

                    (17 7:04 PM)                            Negative                            2017

                                                                                    [facility]                    

 

                    URINE AND STOOL                            UA Leuk Est         Negative 

                    (17 7:04 PM)                            Negative                            2017

                                                                                    [facility]                    

 

                    URINE AND STOOL                            UA Nitrite          Negative 

                    (17 7:04 PM)                            Negative                            2017

                                                                                    [facility]                    

 

                    URINE AND STOOL                            UA Blood            Negative 

                    (17 7:04 PM)                            Negative                            2017

                                                                                    [facility]                    

 

                CARDIAC ENZYMES                            CK MB           2.8                            0.5

 - 3.6                            2017                                         

                                                      [facility]                    

 

                    CARDIAC ENZYMES                            Troponin-I          <0.02                      

                    0.00 - 0.40                            2017                                

                                                      [facility]                    

 

                CARDIAC ENZYMES                            BNP             164                            <=100

 pg/mL                            2017                                         

                                                      [facility]                    

 

                CARDIAC ENZYMES                            Total CK        68                            12

 - 191                            2017                                         

                                                      [facility]                    

 

                    CARDIAC ENZYMES                            CK MB Index         4.1                       

                    0.0 - 2.5                            2017                                   

                                                      [facility]                    

 

                CHEM PANEL                            eGFR            79                                      

                    2017                                                        Result

 Comment: The eGFR is calculated using the CKD-EPI formula. In most young, 
healthy individuals the eGFR will be >90 mL/min/1.73m2. The eGFR declines with 
age. An eGFR of 60-89 may be normal in some populations, particularly the 
elderly, for whom the CKD-EPI formula has not been extensively validated. Use of
 the eGFR is not recommended in the following populations:<br/><br/>Individuals 
with unstable creatinine concentrations, including pregnant patients and those 
with serious co-morbid conditions.<br/><br/>Patients with extremes in muscle 
mass or diet. <br/><br/>The data above are obtained from the [employer] 
[employer] Program ([employer]) which additionally recommends that when the 
eGFR is used in patients with extremes of body mass index for purposes of drug 
dosing, the eGFR should be multiplied by the estimated BMI.                     
                                         [facility]                    

 

                CHEM PANEL                            A/G Ratio       0.8                            0.7 

- 1.6                            2017                                          

                                                       [facility]                    

 

                CHEM PANEL                            B/C Ratio       15                            6 - 25

                            2017                                               

                                                       [facility]                    

 

                CHEM PANEL                            Globulin        4.1                            2.7 -

 4.2                            2017                                           

                                                       [facility]                    

 

                CHEM PANEL                            AGAP            11.2                            10.0 - 20.0

                            2017                                               

                                                       [facility]                    

 

                CHEM PANEL                            Alk Phos        83                            39 - 136

                            2017                                               

                                                      [facility]                    

 

                CHEM PANEL                            Bili Total      0.4                            0.2

 - 1.3                            2017                                         

                                                      [facility]                    

 

                CHEM PANEL                            AST             34                            0 - 37     

                          2017                                                    

                                                       [facility]                    

 

                CHEM PANEL                            ALT             32                            0 - 65     

                          2017                                                    

                                                       [facility]                    

 

                CHEM PANEL                            Albumin Lvl     3.3                            3.5

 - 5.0                            2017                                         

                                                      [facility]                    

 

                CHEM PANEL                            Total Protein    7.4                            

6.4 - 8.4                            2017                                      

                                                      [facility]                    

 

                CHEM PANEL                            Calcium Lvl     8.4                            8.5

 - 10.5                            2017                                        

                                                       [facility]                    

 

                CHEM PANEL                            CO2             29                            24 - 32    

                          2017                                                   

                                                      [facility]                    

 

                CHEM PANEL                            Chloride Lvl    97                            95

 - 109                            2017                                         

                                                      [facility]                    

 

                CHEM PANEL                            Potassium Lvl    4.2                            

3.5 - 5.1                            2017                                      

                                                       [facility]                    

 

                CHEM PANEL                            BUN             14                            7 - 22     

                          2017                                                    

                                                      [facility]                    

 

                CHEM PANEL                            Glucose Lvl     319                            70

 - 99                            2017                                          

                                                      [facility]                    

 

                CHEM PANEL                            Sodium Lvl      133                            135

 - 145                            2017                                         

                                                      [facility]                    

 

                    CHEM PANEL                            Creatinine Lvl      0.93                        

                    0.50 - 1.40                            2017                                  

                                                      [facility]                    

 

                HEMATOLOGY                            MCH             28.1                            27.0 - 31.0

                            2017                                               

                                                      [facility]                    

 

                HEMATOLOGY                            RDW             16.7                            11.5 - 14.5

                            2017                                               

                                                      [facility]                    

 

                HEMATOLOGY                            MCHC            32.8                            32.0 - 36.0

                            2017                                               

                                                      [facility]                    

 

                HEMATOLOGY                            Platelet        209                            133 -

 450                            2017                                           

                                                      [facility]                    

 

                HEMATOLOGY                            MPV             9.3                            7.4 - 10.4

                            2017                                               

                                                      [facility]                    

 

                HEMATOLOGY                            MCV             85.6                            80.0 - 94.0

                            2017                                               

                                                      [facility]                    

 

                HEMATOLOGY                            Hct             32.0                            42.0 - 54.0

                            2017                                               

                                                      [facility]                    

 

                HEMATOLOGY                            Hgb             10.5                            14.0 - 18.0

                            2017                                               

                                                      [facility]                    

 

                HEMATOLOGY                            RBC             3.74                            4.70 - 6.10

                            2017                                               

                                                      [facility]                    

 

                HEMATOLOGY                            WBC             8.4                            3.7 - 10.4

                            2017                                               

                                                      [facility]                    

 

                HEMATOLOGY                            Lymphocytes     19.3                            20.0

 - 40.0                            2017                                        

                                                      [facility]                    

 

                HEMATOLOGY                            Monocytes       9.6                            2.0 

- 12.0                            2017                                         

                                                      [facility]                    

 

                HEMATOLOGY                            Segs            68.2                            45.0 - 75.0

                            2017                                               

                                                      [facility]                    

 

                HEMATOLOGY                            Eosinophils     1.8                            0.0

 - 4.0                            2017                                         

                                                      [facility]                    

 

                HEMATOLOGY                            Basophils       1.1                            0.0 

- 1.0                            2017                                          

                                                      [facility]                    

 

                HEMATOLOGY                            Lymphocytes #    1.6                            

1.0 - 5.5                            2017                                      

                                                      [facility]                    

 

                HEMATOLOGY                            Segs-Bands #    5.7                            1.5

 - 8.1                            2017                                         

                                                      [facility]                    

 

                HEMATOLOGY                            Eosinophils #    0.2                            

0.0 - 0.5                            2017                                      

                                                      [facility]                    

 

                HEMATOLOGY                            Monocytes #     0.8                            0.0

 - 0.8                            2017                                         

                                                      [facility]                    

 

                HEMATOLOGY                            Basophils #     0.1                            0.0

 - 0.2                            2017                                         

                                                      [facility]                    

 

                CARDIAC ENZYMES                            Total CK        55                            12

 - 191                            2017                                         

                                                      [facility]                    

 

                    CARDIAC ENZYMES                            Troponin-I          0.05                       

                    0.00 - 0.40                            2017                                 

                                                      [facility]                    

 

                    CARDIAC ENZYMES                            Troponin-I          0.07                       

                    0.00 - 0.40                            2017                                 

                                                      [facility]                    

 

                CARDIAC ENZYMES                            Total CK        31                            12

 - 191                            2017                                         

                                                      [facility]                    

 

                CARDIAC ENZYMES                            BNP             886                            <=100

 pg/mL                            2017                                         

                                                       [facility]                    

 

                URINE AND STOOL                            UA RBC          2                            0 - 

2                            2017                                              

                                                       [facility]                    

 

                    URINE AND STOOL                            UA Mucus            Few /LPF                     

                    None Seen /LPF                            2017                            

                                                         [facility]                 

   

 

                URINE AND STOOL                            UA WBC          <1                            0 -

 5                            2017                                             

                                                       [facility]                    

 

                    URINE AND STOOL                            UA Color            Ltyellow                     

                                                2017                                          

                                                       [facility]                    

 

                    URINE AND STOOL                            UA Sq Epi           None Seen                   

                                                2017                                        

                                                       [facility]                    

 

                    URINE AND STOOL                            UA Bili             Negative 

*NA*

                    (17 4:23 AM)                            Negative                            2017

                                                                                    [facility]                    

 

                    URINE AND STOOL                            UA Blood            Negative 

                    (17 4:23 AM)                            Negative                            2017

                                                                                    [facility]                    

 

                    URINE AND STOOL                            UA Urobilinogen     2.0                   

                    0.1 - 1.0                            2017                               

                                                      [facility]                    



 

                    URINE AND STOOL                            UA Nitrite          Negative 

                    (17 4:23 AM)                            Negative                            2017

                                                                                    [facility]                    

 

                    URINE AND STOOL                            UA Leuk Est         Negative 

                    (17 4:23 AM)                            Negative                            2017

                                                                                    [facility]                    

 

                    URINE AND STOOL                            UA Turbidity        Clear 

                    (17 4:23 AM)                            Clear                            2017

                                                                                    [facility]                    

 

                    URINE AND STOOL                            UA Glucose          50 mg/dL                   

                          Negative mg/dL                            2017                        

                                                                            [facility]               

     

 

                    URINE AND STOOL                            UA Ketones          Negative mg/dL             

                          Negative mg/dL                            2017                  

                                                                            [facility]         

           

 

                    URINE AND STOOL                            UA Spec Grav        1.014                    

                    <=1.030                            2017                                  

                                                      [facility]                    

 

                URINE AND STOOL                            UA pH           7.0                            5.0

 - 8.0                            2017                                         

                                                      [facility]                    

 

                    URINE AND STOOL                            UA Protein          Negative mg/dL             

                          Negative mg/dL                            2017                  

                                                                            [facility]         

           

 

                    CARDIAC ENZYMES                            CK MB Index         2.0                       

                    0.0 - 2.5                            2017                                   

                                                      [facility]                    

 

                CARDIAC ENZYMES                            Total CK        30                            12

 - 191                            2017                                         

                                                      [facility]                    

 

                CARDIAC ENZYMES                            CK MB           0.6                            0.5

 - 3.6                            2017                                         

                                                      [facility]                    

 

                    CARDIAC ENZYMES                            Troponin-I          0.03                       

                    0.00 - 0.40                            2017                                 

                                                      [facility]                    

 

                CHEM PANEL                            Lipase Lvl      74                            73 -

 393                            2017                                           

                                                      [facility]                    

 

                CHEM PANEL                            eGFR            85                                      

                    2017                                                        Result

 Comment: The eGFR is calculated using the CKD-EPI formula. In most young, 
healthy individuals the eGFR will be >90 mL/min/1.73m2. The eGFR declines with 
age. An eGFR of 60-89 may be normal in some populations, particularly the 
elderly, for whom the CKD-EPI formula has not been extensively validated. Use of
 the eGFR is not recommended in the following populations:<br/><br/>Individuals 
with unstable creatinine concentrations, including pregnant patients and those 
with serious co-morbid conditions.<br/><br/>Patients with extremes in muscle 
mass or diet. <br/><br/>The data above are obtained from the [employer] 
[employer] Education Program (NK[employer]) which additionally recommends that when the 
eGFR is used in patients with extremes of body mass index for purposes of drug 
dosing, the eGFR should be multiplied by the estimated BMI.                     
                                         [facility]                    

 

                CHEM PANEL                            Calcium Lvl     8.3                            8.5

 - 10.5                            2017                                        

                                                       [facility]                    

 

                CHEM PANEL                            Total Protein    7.2                            

6.4 - 8.4                            2017                                      

                                                      [facility]                    

 

                CHEM PANEL                            Albumin Lvl     3.2                            3.5

 - 5.0                            2017                                         

                                                       [facility]                    

 

                CHEM PANEL                            ALT             24                            0 - 65     

                          2017                                                    

                                                       [facility]                    

 

                CHEM PANEL                            CO2             26                            24 - 32    

                          2017                                                   

                                                       [facility]                    

 

                CHEM PANEL                            B/C Ratio       14                            6 - 25

                            2017                                               

                                                       [facility]                    

 

                CHEM PANEL                            Alk Phos        101                            39 - 

136                            2017                                            

                                                      [facility]                    

 

                CHEM PANEL                            AGAP            15.1                            10.0 - 20.0

                            2017                                               

                                                      [facility]                    

 

                CHEM PANEL                            AST             33                            0 - 37     

                          2017                                                    

                                                      [facility]                    

 

                CHEM PANEL                            Bili Total      0.6                            0.2

 - 1.3                            2017                                         

                                                      [facility]                    

 

                CHEM PANEL                            Globulin        4.0                            2.7 -

 4.2                            2017                                           

                                                      [facility]                    

 

                CHEM PANEL                            A/G Ratio       0.8                            0.7 

- 1.6                            2017                                          

                                                      [facility]                    

 

                CHEM PANEL                            Sodium Lvl      137                            135

 - 145                            2017                                         

                                                      [facility]                    

 

                CHEM PANEL                            Glucose Lvl     197                            70

 - 99                            2017                                          

                                                      [facility]                    

 

                CHEM PANEL                            Chloride Lvl    99                            95

 - 109                            2017                                         

                                                      [facility]                    

 

                    CHEM PANEL                            Creatinine Lvl      0.85                        

                    0.50 - 1.40                            2017                                  

                                                      [facility]                    

 

                CHEM PANEL                            Potassium Lvl    3.1                            

3.5 - 5.1                            2017                                      

                                                      [facility]                    

 

                CHEM PANEL                            BUN             12                            7 - 22     

                          2017                                                    

                                                      [facility]                    

 

                HEMATOLOGY                            Segs            81.9                            45.0 - 75.0

                            2017                                               

                                                      [facility]                    

 

                HEMATOLOGY                            Lymphocytes     8.4                            20.0

 - 40.0                            2017                                        

                                                      [facility]                    

 

                HEMATOLOGY                            Monocytes       8.2                            2.0 

- 12.0                            2017                                         

                                                      [facility]                    

 

                HEMATOLOGY                            Basophils #     0.2                            0.0

 - 0.2                            2017                                         

                                                      [facility]                    

 

                HEMATOLOGY                            Eosinophils #    0.1                            

0.0 - 0.5                            2017                                      

                                                      [facility]                    

 

                HEMATOLOGY                            Basophils       1.1                            0.0 

- 1.0                            2017                                          

                                                      [facility]                    

 

                HEMATOLOGY                            Segs-Bands #    12.5                            

1.5 - 8.1                            2017                                      

                                                      [facility]                    

 

                HEMATOLOGY                            Lymphocytes #    1.3                            

1.0 - 5.5                            2017                                      

                                                      [facility]                    

 

                HEMATOLOGY                            Monocytes #     1.2                            0.0

 - 0.8                            2017                                         

                                                      [facility]                    

 

                HEMATOLOGY                            Eosinophils     0.4                            0.0

 - 4.0                            2017                                         

                                                      [facility]                    

 

                HEMATOLOGY                            PTT             31.7                            22.9 - 35.8

                            2017                                               

                                                      [facility]                    

 

                HEMATOLOGY                            MCV             85.0                            80.0 - 94.0

                            2017                                               

                                                      [facility]                    

 

                HEMATOLOGY                            MCH             27.6                            27.0 - 31.0

                            2017                                               

                                                      [facility]                    

 

                HEMATOLOGY                            Hct             33.2                            42.0 - 54.0

                            2017                                               

                                                      [facility]                    

 

                HEMATOLOGY                            RDW             15.8                            11.5 - 14.5

                            2017                                               

                                                      [facility]                    

 

                HEMATOLOGY                            Platelet        267                            133 -

 450                            2017                                           

                                                      [facility]                            MCHC            32.4                            32.0 - 36.0

                            2017                                               

                                                      [facility]                            MPV             8.6                            7.4 - 10.4

                            2017                                               

                                                      [facility]                            Hgb             10.8                            14.0 - 18.0

                            2017                                               

                                                      [facility]                    

 

                HEMATOLOGY                            RBC             3.91                            4.70 - 6.10

                            2017                                               

                                                      [facility]                    

 

                HEMATOLOGY                            WBC             15.2                            3.7 - 10.4

                            2017                                               

                                                      [facility]                    

 

                HEMATOLOGY                            INR             1.17                            0.85 - 1.17

                            2017                                               

                                                      [facility]                    

 

                HEMATOLOGY                            PT              15.1                            12.0 - 14.7

                            2017                                               

                                                      [facility]                    

 

                CHEM PANEL                            Glucose Lvl     221                            70

 - 99                            2017                                          

                                                      [facility]                    

 

                CHEM PANEL                            BUN             11                            7 - 22     

                          2017                                                    

                                                      [facility]                    

 

                    CHEM PANEL                            Creatinine Lvl      0.59                        

                    0.50 - 1.40                            2017                                  

                                                      [facility]                    

 

                CHEM PANEL                            Potassium Lvl    2.9                            

3.5 - 5.1                            2017                                      

                                        Result Comment: Critical Result(s) called to griselda reyes at 2017

 07:34 by hp.  Read back OK.                            [facility]               

     

 

                CHEM PANEL                            Chloride Lvl    98                            95

 - 109                            2017                                         

                                                      [facility]                    

 

                CHEM PANEL                            Sodium Lvl      137                            135

 - 145                            2017                                         

                                                      [facility]                    

 

                CHEM PANEL                            CO2             31                            24 - 32    

                          2017                                                   

                                                      [facility]                    

 

                CHEM PANEL                            AGAP            10.9                            10.0 - 20.0

                            2017                                               

                                                      [facility]                    

 

                CHEM PANEL                            Calcium Lvl     8.1                            8.5

 - 10.5                            2017                                        

                                                      [facility]                    

 

                CHEM PANEL                            Total Protein    6.4                            

6.4 - 8.4                            2017                                      

                                                      [facility]                    

 

                CHEM PANEL                            Albumin Lvl     2.2                            3.5

 - 5.0                            2017                                         

                                                      [facility]                    

 

                CHEM PANEL                            Globulin        4.2                            2.7 -

 4.2                            2017                                           

                                                       [facility]                    

 

                CHEM PANEL                            B/C Ratio       19                            6 - 25

                            2017                                               

                                                       [facility]                    

 

                CHEM PANEL                            A/G Ratio       0.5                            0.7 

- 1.6                            2017                                          

                                                       [facility]                    

 

                CHEM PANEL                            ALT             67                            0 - 65     

                          2017                                                    

                                                       [facility]                    

 

                CHEM PANEL                            Bili Total      0.8                            0.2

 - 1.3                            2017                                         

                                                       [facility]                    

 

                CHEM PANEL                            AST             79                            0 - 37     

                          2017                                                    

                                                       [facility]                    

 

                CHEM PANEL                            Alk Phos        162                            39 - 

136                            2017                                            

                                                       [facility]                    

 

                CHEM PANEL                            eGFR            99                                      

                    2017                                                        Result

 Comment: The eGFR is calculated using the CKD-EPI formula. In most young, 
healthy individuals the eGFR will be >90 mL/min/1.73m2. The eGFR declines with 
age. An eGFR of 60-89 may be normal in some populations, particularly the 
elderly, for whom the CKD-EPI formula has not been extensively validated. Use of
 the eGFR is not recommended in the following populations:<br/><br/>Individuals 
with unstable creatinine concentrations, including pregnant patients and those 
with serious co-morbid conditions.<br/><br/>Patients with extremes in muscle 
mass or diet. <br/><br/>The data above are obtained from the National Kid[employer] 
Disease Education Program (NKDEP) which additionally recommends that when the 
eGFR is used in patients with extremes of body mass index for purposes of drug 
dosing, the eGFR should be multiplied by the estimated BMI.                     
                                        [facility]                    

 

                HEMATOLOGY                            Lymphocytes #    1.7                            

1.0 - 5.5                            2017                                      

                                                      [facility]                    

 

                HEMATOLOGY                            Monocytes       6.9                            2.0 

- 12.0                            2017                                         

                                                      [facility]                    

 

                HEMATOLOGY                            Segs-Bands #    6.9                            1.5

 - 8.1                            2017                                         

                                                      [facility]                    

 

                HEMATOLOGY                            Eosinophils     5.0                            0.0

 - 4.0                            2017                                         

                                                      [facility]                    

 

                HEMATOLOGY                            Basophils       1.1                            0.0 

- 1.0                            2017                                          

                                                      [facility]                    

 

                HEMATOLOGY                            Basophils #     0.1                            0.0

 - 0.2                            2017                                         

                                                      [facility]                    

 

                HEMATOLOGY                            Monocytes #     0.7                            0.0

 - 0.8                            2017                                         

                                                      [facility]                            Eosinophils #    0.5                            

0.0 - 0.5                            2017                                      

                                                      [facility]                            Lymphocytes     17.1                            20.0

 - 40.0                            2017                                        

                                                      [facility]                            Segs            69.9                            45.0 - 75.0

                            2017                                               

                                                      [facility]                            Platelet        371                            133 -

 450                            2017                                           

                                                      [facility]                            MPV             8.5                            7.4 - 10.4

                            2017                                               

                                                      [facility]                            MCHC            32.6                            32.0 - 36.0

                            2017                                               

                                                      [facility]                            RDW             17.8                            11.5 - 14.5

                            2017                                               

                                                      [facility]                            Hct             27.1                            42.0 - 54.0

                            2017                                               

                                                      [facility]                            MCH             27.8                            27.0 - 31.0

                            2017                                               

                                                      [facility]                            WBC             9.9                            3.7 - 10.4

                            2017                                               

                                                      [facility]                            Hgb             8.8                            14.0 - 18.0

                            2017                                               

                                                      [facility]                            RBC             3.18                            4.70 - 6.10

                            2017                                               

                                                      [facility]                            MCV             85.4                            80.0 - 94.0

                            2017                                               

                                                      [facility]                    

 

                ELECTROLYTES                            AGAP            11.6                            10.0 -

 20.0                            2017                                          

                                                      [facility]                    

 

                ELECTROLYTES                            Chloride Lvl    97                            

95 - 109                            2017                                       

                                                      [facility]                    

 

                    ELECTROLYTES                            Creatinine Lvl      0.47                      

                    0.50 - 1.40                            2017                                

                                                      [facility]                    

 

                ELECTROLYTES                            Sodium Lvl      136                            135

 - 145                            2017                                         

                                                      [facility]                    

 

                    ELECTROLYTES                            Potassium Lvl       3.6                        

                    3.5 - 5.1                            2017                                    

                                                      [facility]                    

 

                ELECTROLYTES                            BUN             11                            7 - 22   

                          2017                                                  

                                                      [facility]                    

 

                ELECTROLYTES                            Glucose Lvl     173                            

70 - 99                            2017                                        

                                                      [facility]                    

 

                ELECTROLYTES                            Calcium Lvl     7.7                            

8.5 - 10.5                            2017                                     

                                                      [facility]                    

 

                ELECTROLYTES                            CO2             31                            24 - 32  

                          2017                                                 

                                                      [facility]                    

 

                ELECTROLYTES                            eGFR            109                                   

                     2017                                                        

Result Comment: The eGFR is calculated using the CKD-EPI formula. In most young,
 healthy individuals the eGFR will be >90 mL/min/1.73m2. The eGFR declines with 
age. An eGFR of 60-89 may be normal in some populations, particularly the 
elderly, for whom the CKD-EPI formula has not been extensively validated. Use of
 the eGFR is not recommended in the following populations:<br/><br/>Individuals 
with unstable creatinine concentrations, including pregnant patients and those 
with serious co-morbid conditions.<br/><br/>Patients with extremes in muscle 
mass or diet. <br/><br/>The data above are obtained from the National Kid[employer] 
Disease Education Program (NKDEP) which additionally recommends that when the 
eGFR is used in patients with extremes of body mass index for purposes of drug 
dosing, the eGFR should be multiplied by the estimated BMI.                     
                                        [facility]                    

 

                HEMATOLOGY                            MCV             85.5                            80.0 - 94.0

                            2017                                               

                                                      [facility]                    

 

                HEMATOLOGY                            Hgb             9.2                            14.0 - 18.0

                            2017                                               

                                                      [facility]                            Hct             28.6                            42.0 - 54.0

                            2017                                               

                                                      [facility]                            RDW             17.8                            11.5 - 14.5

                            2017                                               

                                                      [facility]                            Platelet        301                            133 -

 450                            2017                                           

                                                      [facility]                            MCHC            32.2                            32.0 - 36.0

                            2017                                               

                                                      [facility]                            RBC             3.34                            4.70 - 6.10

                            2017                                               

                                                      [facility]                            MPV             8.5                            7.4 - 10.4

                            2017                                               

                                                      [facility]                            MCH             27.5                            27.0 - 31.0

                            2017                                               

                                                      [facility]                            WBC             12.8                            3.7 - 10.4

                            2017                                               

                                                      [facility]                            PTT             35.4                            22.9 - 35.8

                            2017                                               

                                                      [facility]                            Segs-Bands #    10.0                            

1.5 - 8.1                            2017                                      

                                                      [facility]                    

 

                HEMATOLOGY                            Basophils       1.2                            0.0 

- 1.0                            2017                                          

                                                      [facility]                    

 

                HEMATOLOGY                            Monocytes #     0.9                            0.0

 - 0.8                            2017                                         

                                                      [facility]                    

 

                HEMATOLOGY                            Lymphocytes #    1.3                            

1.0 - 5.5                            2017                                      

                                                      [facility]                    

 

                HEMATOLOGY                            Eosinophils     3.7                            0.0

 - 4.0                            2017                                         

                                                      [facility]                    

 

                HEMATOLOGY                            Basophils #     0.2                            0.0

 - 0.2                            2017                                         

                                                      [facility]                    

 

                HEMATOLOGY                            Eosinophils #    0.5                            

0.0 - 0.5                            2017                                      

                                                      [facility]                    

 

                HEMATOLOGY                            Monocytes       6.7                            2.0 

- 12.0                            2017                                         

                                                      [facility]                    

 

                HEMATOLOGY                            Lymphocytes     10.2                            20.0

 - 40.0                            2017                                        

                                                      [facility]                    

 

                HEMATOLOGY                            Segs            78.2                            45.0 - 75.0

                            2017                                               

                                                      [facility]                    

 

                    ELECTROLYTES                            Potassium Lvl       3.8                        

                    3.5 - 5.1                            2017                                    

                                                      [facility]                    

 

                CHEM PANEL                            eGFR            104                                     

                    2017                                                        Result

 Comment: The eGFR is calculated using the CKD-EPI formula. In most young, 
healthy individuals the eGFR will be >90 mL/min/1.73m2. The eGFR declines with 
age. An eGFR of 60-89 may be normal in some populations, particularly the 
elderly, for whom the CKD-EPI formula has not been extensively validated. Use of
 the eGFR is not recommended in the following populations:<br/><br/>Individuals 
with unstable creatinine concentrations, including pregnant patients and those 
with serious co-morbid conditions.<br/><br/>Patients with extremes in muscle 
mass or diet. <br/><br/>The data above are obtained from the National Kid[employer] 
Disease Education Program (NKDEP) which additionally recommends that when the 
eGFR is used in patients with extremes of body mass index for purposes of drug 
dosing, the eGFR should be multiplied by the estimated BMI.                     
                                        [facility]                    

 

                CHEM PANEL                            CO2             31                            24 - 32    

                          2017                                                   

                                                      [facility]                    

 

                CHEM PANEL                            Calcium Lvl     7.7                            8.5

 - 10.5                            2017                                        

                                                      [facility]                    

 

                CHEM PANEL                            Chloride Lvl    96                            95

 - 109                            2017                                         

                                                      [facility]                    

 

                CHEM PANEL                            Sodium Lvl      134                            135

 - 145                            2017                                         

                                                      [facility]                    

 

                CHEM PANEL                            Glucose Lvl     188                            70

 - 99                            2017                                          

                                                      [facility]                    

 

                    CHEM PANEL                            Creatinine Lvl      0.53                        

                    0.50 - 1.40                            2017                                  

                                                      [facility]                    

 

                CHEM PANEL                            BUN             11                            7 - 22     

                          2017                                                    

                                                      [facility]                    

 

                CHEM PANEL                            AGAP            10.4                            10.0 - 20.0

                            2017                                               

                                                      [facility]                    

 

                HEMATOLOGY                            Segs            75.8                            45.0 - 75.0

                            2017                                               

                                                      [facility]                    

 

                HEMATOLOGY                            Monocytes       7.2                            2.0 

- 12.0                            2017                                         

                                                      [facility]                    

 

                HEMATOLOGY                            Eosinophils     4.4                            0.0

 - 4.0                            2017                                         

                                                      [facility]                    

 

                HEMATOLOGY                            Basophils       1.5                            0.0 

- 1.0                            2017                                          

                                                      [facility]                    

 

                HEMATOLOGY                            Basophils #     0.2                            0.0

 - 0.2                            2017                                         

                                                      [facility]                    

 

                HEMATOLOGY                            Segs-Bands #    11.6                            

1.5 - 8.1                            2017                                      

                                                      [facility]                    

 

                HEMATOLOGY                            Lymphocytes     11.1                            20.0

 - 40.0                            2017                                        

                                                      [facility]                    

 

                HEMATOLOGY                            Lymphocytes #    1.7                            

1.0 - 5.5                            2017                                      

                                                      [facility]                    

 

                HEMATOLOGY                            Eosinophils #    0.7                            

0.0 - 0.5                            2017                                      

                                                      [facility]                    

 

                HEMATOLOGY                            Monocytes #     1.1                            0.0

 - 0.8                            2017                                         

                                                      [facility]                    

 

                HEMATOLOGY                            MCH             27.8                            27.0 - 31.0

                            2017                                               

                                                      [facility]                    

 

                HEMATOLOGY                            WBC             15.4                            3.7 - 10.4

                            2017                                               

                                                      [facility]                    

 

                HEMATOLOGY                            MCV             85.7                            80.0 - 94.0

                            2017                                               

                                                      [facility]                    

 

                HEMATOLOGY                            Hgb             8.3                            14.0 - 18.0

                            2017                                               

                                                      [facility]                    

 

                HEMATOLOGY                            RBC             2.98                            4.70 - 6.10

                            2017                                               

                                                      [facility]                    

 

                HEMATOLOGY                            Hct             25.5                            42.0 - 54.0

                            2017                                               

                                                      [facility]                            MPV             8.7                            7.4 - 10.4

                            2017                                               

                                                      [facility]                            MCHC            32.5                            32.0 - 36.0

                            2017                                               

                                                      [facility]                            Platelet        250                            133 -

 450                            2017                                           

                                                      [facility]                            RDW             18.1                            11.5 - 14.5

                            2017                                               

                                                      [facility]                            Hep C Ab            Negative 

*NA*

                    (17 4:51 AM)                                                        2017  

                                                                                  [facility]                            Hep B Core IgM      Negative 

*NA*

                    (17 4:51 AM)                            Negative                            2017

                                                                                    [facility]                            Hep Bs Ag           Negative 

*NA*

                    (17 4:51 AM)                            Negative                            2017

                                                                                    [facility]                            Hep A IgM           Negative 

*NA*

                    (17 4:51 AM)                            Negative                            2017

                                                                                    [facility]                    

 

                CHEM PANEL                            Globulin        4.1                            2.7 -

 4.2                            2017                                           

                                                      [facility]                    

 

                CHEM PANEL                            A/G Ratio       0.5                            0.7 

- 1.6                            2017                                          

                                                      [facility]                    

 

                CHEM PANEL                            ALT             114                            0 - 65    

                          2017                                                   

                                                      [facility]                    

 

                CHEM PANEL                            B/C Ratio       22                            6 - 25

                            2017                                               

                                                      [facility]                    

 

                CHEM PANEL                            Total Protein    6.3                            

6.4 - 8.4                            2017                                      

                                                      [facility]                    

 

                CHEM PANEL                            Albumin Lvl     2.2                            3.5

 - 5.0                            2017                                         

                                                      [facility]                    

 

                CHEM PANEL                            AST             58                            0 - 37     

                          2017                                                    

                                                      [facility]                    

 

                CHEM PANEL                            Bili Total      1.6                            0.2

 - 1.3                            2017                                         

                                                      [facility]                    

 

                CHEM PANEL                            Alk Phos        210                            39 - 

136                            2017                                            

                                                      [facility]                    

 

                    BLOOD BANK RESULTS                            ABO/Rh              A NEG                       

                                                2017                                            

                                                      [facility]                    

 

                    BLOOD BANK RESULTS                            Antibody Scrn       Negative 

                    (17 12:06 PM)                                                        2017 

                                                                                   [facility]

                    

 

                    BLOOD BANK RESULTS                            RBC product         Product available 1

                    (17 10:05 AM)                                                        2017 

                                                       Result Comment: 2017 13:47

  ASBHAVSA<br/>called  to  nisreen                            [facility]           

         

 

                    URINE AND STOOL                            Occult Bld Stl      Positive 

*ABN*

                    (17 9:17 PM)                            Negative                            2017

                                                                                    [facility]                    

 

                LIPIDS                            VLDL            23                                          

                    2017                                                               

                                        [facility]                    

 

                LIPIDS                            LDL (Calculated)    41                            <=99

 mg/dL                            2017                                         

                                                      [facility]                    

 

                LIPIDS                            Trig            117                            <=149 mg/dL  

                          2017                                                 

                                                      [facility]                    

 

                LIPIDS                            Chol            83                            <=199 mg/dL   

                          2017                                                  

                                                      [facility]                    

 

                LIPIDS                            HDL             19                            >=61 mg/dL     

                          2017                                                    

                                                      [facility]                    

 

                LIPIDS                            CHD Risk        4.37                            4.00 - 7.30

                            2017                                               

                                                      [facility]                    

 

                    URINE AND STOOL                            UA Color            Nahomi                        

                                                2017                                             

                                                      [facility]                    

 

                    URINE AND STOOL                            UA Sq Epi           None Seen                   

                                                2017                                        

                                                      [facility]                    

 

                URINE AND STOOL                            UA WBC          6                            0 - 

5                            2017                                              

                                                      [facility]                    

 

                    URINE AND STOOL                            UA Bacteria         Occasional /HPF           

                          None Seen /HPF                            2017                

                                                                            [facility]       

             

 

                URINE AND STOOL                            UA RBC          31                            0 -

 2                            2017                                             

                                                      [facility]                    

 

                    URINE AND STOOL                            UA Ketones          Negative mg/dL             

                          Negative mg/dL                            2017                  

                                                                            [facility]         

           

 

                    URINE AND STOOL                            UA Bili             Negative 

*NA*

                    (17 6:25 PM)                            Negative                            2017

                                                                                    [facility]                    

 

                    URINE AND STOOL                            UA Glucose          Negative mg/dL             

                          Negative mg/dL                            2017                  

                                                                            [facility]         

           

 

                    URINE AND STOOL                            UA Urobilinogen     4.0                   

                    0.1 - 1.0                            2017                               

                                                      [facility]                    



 

                    URINE AND STOOL                            UA Leuk Est         Trace 

*ABN*

                    (17 6:25 PM)                            Negative                            2017

                                                                                    [facility]                    

 

                    URINE AND STOOL                            UA Nitrite          Negative 

                    (17 6:25 PM)                            Negative                            2017

                                                                                    [facility]                    

 

                    URINE AND STOOL                            UA Blood            Moderate 

*ABN*

                    (17 6:25 PM)                            Negative                            2017

                                                                                    [facility]                    

 

                    URINE AND STOOL                            UA Turbidity        Clear 

                    (17 6:25 PM)                            Clear                            2017

                                                                                    [facility]                    

 

                URINE AND STOOL                            UA pH           5.0                            5.0

 - 8.0                            2017                                         

                                                      [facility]                    

 

                    URINE AND STOOL                            UA Spec Grav        1.019                    

                    <=1.030                            2017                                  

                                                      [facility]                    

 

                    URINE AND STOOL                            UA Protein          30 mg/dL                   

                          Negative mg/dL                            2017                        

                                                                            [facility]               

     

 

                    LIPIDS                            CHD Risk            See Note 

                    (17 5:21 PM)                            4.00 - 7.30                            2017

                                                                                    [facility]                    

 

                    LIPIDS                            Trig                See Note 1

                    (17 5:21 PM)                            <=149                            2017

                                                        Result Comment: sample qns to

 recollect spoke to jessica at 2017 17:42                            [facility]

                    

 

                    LIPIDS                            Chol                See Note 

                    (17 5:21 PM)                            <=199                            2017

                                                                                    [facility]                    

 

                    LIPIDS                            HDL                 See Note 

                    (17 5:21 PM)                            >=61                            2017

                                                                                    [facility]                    

 

                    LIPIDS                            LDL (Calculated)    See Note 

                    (17 5:21 PM)                            <=99                            2017

                                                                                    [facility]                    

 

                    LIPIDS                            VLDL                See Note 

                    (17 5:21 PM)                                                        2017  

                                                                                  [facility]

                    

 

                    SPECIAL CHEMISTRY                            Hgb A1C             7.7                         

                    <=5.6 %                            2017                                       

                                                      [facility]                    

 

                CARDIAC ENZYMES                            Total CK        147                            

12 - 191                            2017                                       

                                                      [facility]                    

 

                TOXICOLOGY                            Vanco Tr TND    1000                            

                            2017                                               

                                                      [facility]                    

 

                TOXICOLOGY                            Vanco Tr        9.5                                 

                          2017                                                    

                                                      [facility]                    

 

                CHEM PANEL                            B/C Ratio       22                            6 - 25

                            2017                                               

                                                      [facility]                    

 

                CHEM PANEL                            Globulin        4.2                            2.7 -

 4.2                            2017                                           

                                                      [facility]                    

 

                CHEM PANEL                            A/G Ratio       0.5                            0.7 

- 1.6                            2017                                          

                                                      [facility]                    

 

                CHEM PANEL                            Albumin Lvl     2.2                            3.5

 - 5.0                            2017                                         

                                                      [facility]                    

 

                CHEM PANEL                            ALT             233                            0 - 65    

                          2017                                                   

                                                      [facility]                    

 

                CHEM PANEL                            Total Protein    6.4                            

6.4 - 8.4                            2017                                      

                                                      [facility]                    

 

                CHEM PANEL                            Bili Total      1.4                            0.2

 - 1.3                            2017                                         

                                                      [facility]                    

 

                CHEM PANEL                            Alk Phos        215                            39 - 

136                            2017                                            

                                                      [facility]                    

 

                CHEM PANEL                            AST             144                            0 - 37    

                          2017                                                   

                                                      [facility]                    

 

                CHEM PANEL                            Phosphorus      2.1                            2.5

 - 4.5                            2017                                         

                                                      [facility]                    

 

                CHEM PANEL                            Magnesium Lvl    2.0                            

1.8 - 2.4                            2017                                      

                                                      [facility]                    

 

                    PARATHYROID PROFILE                            Ca Ion WB           1.05                    

                    1.05 - 1.25                            2017                              

                                                      [facility]                   

 

 

                    PARATHYROID PROFILE                            Ca Norm WB          1.04                   

                    1.05 - 1.25                            2017                             

                                                       [facility]                  

  

 

                CHEM PANEL                            Phosphorus      2.6                            2.5

 - 4.5                            2017                                         

                                                      [facility]                    

 

                CHEM PANEL                            Magnesium Lvl    1.9                            

1.8 - 2.4                            2017                                      

                                                      [facility]                    

 

                    SPECIAL CHEMISTRY                            Hgb A1C             8.0                         

                    <=5.6 %                            2017                                       

                                                      [facility]                    

 

                CHEM PANEL                            Phosphorus      2.2                            2.5

 - 4.5                            2017                                         

                                                      [facility]                    

 

                CHEM PANEL                            Magnesium Lvl    2.0                            

1.8 - 2.4                            2017                                      

                                                      [facility]                    

 

                    BLOOD BANK RESULTS                            ABO/Rh              A NEG                       

                                                2017                                            

                                                      [facility]                    

 

                    BLOOD BANK RESULTS                            Antibody Scrn       Negative 

                    (17 11:19 AM)                                                        2017 

                                                                                   [facility]

                    

 

                    BLOOD BANK RESULTS                            RBC product         Product available 2

                    (17 9:50 AM)                                                        2017  

                                                      Result Comment: 2017 12:45

  MONTY<br/>called  to  donal                            [facility]           

         

 

                TOXICOLOGY                            Vanco Lvl       2.3                                

                          2017                                                   

                                                      [facility]                    

 

                LIPIDS                            VLDL            19                                          

                    2017                                                               

                                        [facility]                    

 

                LIPIDS                            LDL (Calculated)    28                            <=99

 mg/dL                            2017                                         

                                                      [facility]                    

 

                LIPIDS                            CHD Risk        2.74                            4.00 - 7.30

                            2017                                               

                                                      [facility]                    

 

                LIPIDS                            HDL             27                            >=61 mg/dL     

                          2017                                                    

                                                      [facility]                    

 

                LIPIDS                            Chol            74                            <=199 mg/dL   

                          2017                                                  

                                                      [facility]                    

 

                LIPIDS                            Trig            95                            <=149 mg/dL   

                          2017                                                  

                                                      [facility]                    

 

                CARDIAC ENZYMES                            CK MB           16.4                            0.5

 - 3.6                            2017                                         

                                                      [facility]                    

 

                    CARDIAC ENZYMES                            CK MB Index         0.8                       

                    0.0 - 2.5                            2017                                   

                                                      [facility]                    

 

                    CARDIAC ENZYMES                            Troponin-I          0.14                       

                    0.00 - 0.40                            2017                                 

                                                      [facility]                    

 

                    CARDIAC ENZYMES                            Total CK            2180                         

                    12 - 191                            2017                                      

                                                      [facility]                    

 

                URINE AND STOOL                            UA RBC          1                            0 - 

2                            2017                                              

                                                      [facility]                    

 

                    URINE AND STOOL                            UA Bacteria         Occasional /HPF           

                          None Seen /HPF                            2017                

                                                                            [facility]       

             

 

                URINE AND STOOL                            UA WBC          9                            0 - 

5                            2017                                              

                                                       [facility]                    

 

                    URINE AND STOOL                            UA Mucus            Few /LPF                     

                    None Seen /LPF                            2017                            

                                                         [facility]                 

   

 

                    URINE AND STOOL                            UA Urobilinogen     <=1.0 mg/dL           

                          0.1 - 1.0                            2017                     

                                                                             [facility]            

        

 

                    URINE AND STOOL                            UA Leuk Est         Small 

*ABN*

                    (17 4:26 AM)                            Negative                            2017

                                                                                     

[facility]                    

 

                    URINE AND STOOL                            UA Sq Epi           Occasional /LPF             

                          Few /LPF                            2017                        

                                                                             [facility]               

     

 

                    URINE AND STOOL                            UA Nitrite          Negative 

                    (17 4:26 AM)                            Negative                            2017

                                                                                     

[facility]                    

 

                    URINE AND STOOL                            UA Color            Yellow 

*NA*

                    (17 4:26 AM)                            Yellow                            2017

                                                                                     

[facility]                    

 

                    URINE AND STOOL                            UA Turbidity        Slight 

*ABN*

                    (17 4:26 AM)                            Clear                            2017

                                                                                     

[facility]                    

 

                    URINE AND STOOL                            UA Spec Grav        1.012                    

                    <=1.030                            2017                                  

                                                       [facility]                    

 

                URINE AND STOOL                            UA pH           5.0                            5.0

 - 8.0                            2017                                         

                                                       [facility]                    

 

                    URINE AND STOOL                            UA Protein          Negative mg/dL             

                          Negative mg/dL                            2017                  

                                                                             [facility]         

           

 

                    URINE AND STOOL                            UA Bili             Negative 

*NA*

                    (17 4:26 AM)                            Negative                            2017

                                                                                     

[facility]                    

 

                    URINE AND STOOL                            UA Glucose          150 mg/dL                  

                          Negative mg/dL                            2017                       

                                                                             [facility]              

      

 

                    URINE AND STOOL                            UA Ketones          Negative mg/dL             

                          Negative mg/dL                            2017                  

                                                                             [facility]         

           

 

                    URINE AND STOOL                            UA Blood            Large 

*ABN*

                    (17 4:26 AM)                            Negative                            2017

                                                                                    [facility]                    

 

                    BACTERIAL - SEROLOGY                            MRSA by PCR         Negative 

                    (16 8:09 PM)                                                        2017

                                                                                    [facility]                    

 

                CARDIAC ENZYMES                            CK MB           1.5                            0.5

 - 3.6                            2016                                         

                                                      [facility]                    

 

                    CARDIAC ENZYMES                            CK MB Index         1.0                       

                    0.0 - 2.5                            2016                                   

                                                      [facility]                    

 

                    CARDIAC ENZYMES                            Troponin-I          <0.02                      

                    0.00 - 0.40                            2016                                

                                                      [facility]                    

 

                CARDIAC ENZYMES                            Total CK        151                            

12 - 191                            2016                                       

                                                      [facility]                    

 

                    CARDIAC ENZYMES                            CK MB Index         1.4                       

                    0.0 - 2.5                            2016                                   

                                                      [facility]                    

 

                    CARDIAC ENZYMES                            Troponin-I          <0.02                      

                    0.00 - 0.40                            2016                                

                                                      [facility]                    

 

                CARDIAC ENZYMES                            CK MB           3.5                            0.5

 - 3.6                            2016                                         

                                                      [facility]                    

 

                HEMATOLOGY                            PT              24.4                            12.0 - 14.7

                            2016                                               

                                                      [facility]                    

 

                HEMATOLOGY                            INR             2.15                            0.85 - 1.17

                            2016                                               

                                                      [facility]                    

 

                HEMATOLOGY                            PTT             41.7                            22.9 - 35.8

                            2016                                               

                                                      [facility]                    

 

                CARDIAC ENZYMES                            BNP             91                            <=100 

pg/mL                            2016                                          

                                                      [facility]                    

 

                    CARDIAC ENZYMES                            Troponin-I          <0.02                      

                    0.00 - 0.40                            2016                                

                                                      [facility]                    

 

                CHEM PANEL                            eGFR            65                                      

                    2016                                                        Result

 Comment: The eGFR is calculated using the CKD-EPI formula. In most young, 
healthy individuals the eGFR will be >90 mL/min/1.73m2. The eGFR declines with 
age. An eGFR of 60-89 may be normal in some populations, particularly the 
elderly, for whom the CKD-EPI formula has not been extensively validated. Use of
 the eGFR is not recommended in the following populations:<br/><br/>Individuals 
with unstable creatinine concentrations, including pregnant patients and those 
with serious co-morbid conditions.<br/><br/>Patients with extremes in muscle 
mass or diet. <br/><br/>The data above are obtained from the [employer] 
[employer] Education Program (NK[employer]) which additionally recommends that when the 
eGFR is used in patients with extremes of body mass index for purposes of drug 
dosing, the eGFR should be multiplied by the estimated BMI.                     
                                        [facility]                    

 

                CHEM PANEL                            ALT             25                            0 - 65     

                          2016                                                    

                                                      [facility]                    

 

                CHEM PANEL                            Alk Phos        120                            39 - 

136                            2016                                            

                                                      [facility]                    

 

                CHEM PANEL                            Bili Total      0.6                            0.2

 - 1.3                            2016                                         

                                                      [facility]                    

 

                CHEM PANEL                            AST             24                            0 - 37     

                          2016                                                    

                                                      [facility]                    

 

                CHEM PANEL                            CO2             33                            24 - 32    

                          2016                                                   

                                                      [facility]                    

 

                CHEM PANEL                            Chloride Lvl    94                            95

 - 109                            2016                                         

                                                      [facility]                    

 

                CHEM PANEL                            Albumin Lvl     3.4                            3.5

 - 5.0                            2016                                         

                                                      [facility]                    

 

                CHEM PANEL                            Total Protein    6.9                            

6.4 - 8.4                            2016                                      

                                                      [facility]                    

 

                CHEM PANEL                            Calcium Lvl     8.0                            8.5

 - 10.5                            2016                                        

                                                      [facility]                    

 

                CHEM PANEL                            Glucose Lvl     136                            70

 - 99                            2016                                          

                                                      [facility]                    

 

                CHEM PANEL                            Sodium Lvl      134                            135

 - 145                            2016                                         

                                                       [facility]                    

 

                CHEM PANEL                            BUN             17                            7 - 22     

                          2016                                                    

                                                      [facility]                    

 

                    CHEM PANEL                            Creatinine Lvl      1.10                        

                    0.50 - 1.40                            2016                                  

                                                       [facility]                    

 

                CHEM PANEL                            Potassium Lvl    3.5                            

3.5 - 5.1                            2016                                      

                                                       [facility]                    

 

                CHEM PANEL                            AGAP            10.5                            10.0 - 20.0

                            2016                                               

                                                       [facility]                    

 

                CHEM PANEL                            Globulin        3.5                            2.7 -

 4.2                            2016                                           

                                                       [facility]                    

 

                CHEM PANEL                            B/C Ratio       15                            6 - 25

                            2016                                               

                                                       [facility]                    

 

                CHEM PANEL                            A/G Ratio       1.0                            0.7 

- 1.6                            2016                                          

                                                       [facility]                    

 

                CHEM PANEL                            Ammonia         17.0                            <=45.0

 uMol/L                            2016                                        

                                                      [facility]                    

 

                HEMATOLOGY                            Platelet        239                            133 -

 450                            2016                                           

                                                      [facility]                    

 

                HEMATOLOGY                            MPV             8.2                            7.4 - 10.4

                            2016                                               

                                                      [facility]                    

 

                HEMATOLOGY                            RDW             17.0                            11.5 - 14.5

                            2016                                               

                                                      [facility]                    

 

                HEMATOLOGY                            MCHC            33.1                            32.0 - 36.0

                            2016                                               

                                                      [facility]                    

 

                HEMATOLOGY                            MCH             26.8                            27.0 - 31.0

                            2016                                               

                                                      [facility]                    

 

                HEMATOLOGY                            WBC             13.5                            3.7 - 10.4

                            2016                                               

                                                      [facility]                    

 

                HEMATOLOGY                            RBC             4.08                            4.70 - 6.10

                            2016                                               

                                                      [facility]                    

 

                HEMATOLOGY                            MCV             81.2                            80.0 - 94.0

                            2016                                               

                                                      [facility]                    

 

                HEMATOLOGY                            Hct             33.1                            42.0 - 54.0

                            2016                                               

                                                      [facility]                    

 

                HEMATOLOGY                            Hgb             10.9                            14.0 - 18.0

                            2016                                               

                                                      [facility]                    

 

                HEMATOLOGY                            Segs            72.6                            45.0 - 75.0

                            2016                                               

                                                      [facility]                    

 

                HEMATOLOGY                            Monocytes       8.9                            2.0 

- 12.0                            2016                                         

                                                       [facility]                    

 

                HEMATOLOGY                            Eosinophils     1.9                            0.0

 - 4.0                            2016                                         

                                                       [facility]                    

 

                HEMATOLOGY                            Lymphocytes     15.9                            20.0

 - 40.0                            2016                                        

                                                       [facility]                    

 

                HEMATOLOGY                            Eosinophils #    0.3                            

0.0 - 0.5                            2016                                      

                                                       [facility]                    

 

                HEMATOLOGY                            Basophils #     0.1                            0.0

 - 0.2                            2016                                         

                                                       [facility]                    

 

                HEMATOLOGY                            Lymphocytes #    2.1                            

1.0 - 5.5                            2016                                      

                                                      [facility]                    

 

                HEMATOLOGY                            Monocytes #     1.2                            0.0

 - 0.8                            2016                                         

                                                      [facility]                    

 

                HEMATOLOGY                            Basophils       0.7                            0.0 

- 1.0                            2016                                          

                                                      [facility]                    

 

                HEMATOLOGY                            Segs-Bands #    9.8                            1.5

 - 8.1                            2016                                         

                                                      [facility]                    

 

                    URINE AND STOOL                            UA Urobilinogen     <=1.0 mg/dL           

                          0.1 - 1.0                            2016                     

                                                                            [facility]            

        

 

                    URINE AND STOOL                            UA Color            Ltyellow                     

                                                2016                                          

                                                      [facility]                    

 

                    URINE AND STOOL                            UA Sq Epi           None Seen                   

                                                2016                                        

                                                      [facility]                    

 

                    URINE AND STOOL                            UA Bacteria         Occasional /HPF           

                          None Seen /HPF                            2016                

                                                                            [facility]       

             

 

                    URINE AND STOOL                            UA Leuk Est         Negative 

                    (16 7:28 PM)                            Negative                            2016

                                                                                    [facility]                    

 

                URINE AND STOOL                            UA WBC          <1                            0 -

 5                            2016                                             

                                                      [facility]                    

 

                    URINE AND STOOL                            UA Blood            Negative 

                    (16 7:28 PM)                            Negative                            2016

                                                                                    [facility]                    

 

                    URINE AND STOOL                            UA Nitrite          Negative 

                    (16 7:28 PM)                            Negative                            2016

                                                                                    [facility]                    

 

                    URINE AND STOOL                            UA Bili             Negative 

*NA*

                    (16 7:28 PM)                            Negative                            2016

                                                                                    [facility]                    

 

                    URINE AND STOOL                            UA Protein          Negative mg/dL             

                          Negative mg/dL                            2016                  

                                                                            [facility]         

           

 

                    URINE AND STOOL                            UA Ketones          Negative mg/dL             

                          Negative mg/dL                            2016                  

                                                                            [facility]         

           

 

                    URINE AND STOOL                            UA Glucose          Negative mg/dL             

                          Negative mg/dL                            2016                  

                                                                            [facility]         

           

 

                URINE AND STOOL                            UA pH           5.0                            5.0

 - 8.0                            2016                                         

                                                      [facility]                    

 

                    URINE AND STOOL                            UA Hyal Cast        1                        

                    0 - 2                            2016                                        

                                                      [facility]                    

 

                URINE AND STOOL                            UA RBC          3                            0 - 

2                            2016                                              

                                                      [facility]                    

 

                    URINE AND STOOL                            UA Turbidity        Clear 

                    (16 7:28 PM)                            Clear                            2016

                                                                                    [facility]                    

 

                    URINE AND STOOL                            UA Spec Grav        1.006                    

                    <=1.030                            2016                                  

                                                      [facility]                    

 

                    ANEMIA STUDY                            Vitamin B12 Lvl     370                      

                    254 - 1320                            2016                                 

                                                      [facility]                    

 

                CHEM PANEL                            Ammonia         23.0                            <=45.0

 uMol/L                            2016                                        

                                                      [facility]                    

 

                    CHEM PANEL                            Creatinine Lvl      0.69                        

                    0.50 - 1.40                            2016                                  

                                                      [facility]                    

 

                CHEM PANEL                            eGFR            93                                      

                    2016                                                        Result

 Comment: The eGFR is calculated using the CKD-EPI formula. In most young, 
healthy individuals the eGFR will be >90 mL/min/1.73m2. The eGFR declines with 
age. An eGFR of 60-89 may be normal in some populations, particularly the 
elderly, for whom the CKD-EPI formula has not been extensively validated. Use of
 the eGFR is not recommended in the following populations:<br/><br/>Individuals 
with unstable creatinine concentrations, including pregnant patients and those 
with serious co-morbid conditions.<br/><br/>Patients with extremes in muscle 
mass or diet. <br/><br/>The data above are obtained from the [employer] 
D[employer] Education Program (NKDEP) which additionally recommends that when the 
eGFR is used in patients with extremes of body mass index for purposes of drug 
dosing, the eGFR should be multiplied by the estimated BMI.                     
                                        [facility]                    

 

                CHEM PANEL                            Ammonia         26.0                            <=45.0

 uMol/L                            2016                                        

                                                      [facility]                    

 

                HEMATOLOGY                            Platelet        237                            133 -

 450                            2016                                           

                                                      [facility]                    

 

                LIPIDS                            CHD Risk        6.05                            4.00 - 7.30

                            2016                                               

                                                      [facility]                    

 

                LIPIDS                            Chol            242                            <=199 mg/dL  

                          2016                                                 

                                                      [facility]                    

 

                LIPIDS                            Trig            265                            <=149 mg/dL  

                          2016                                                 

                                                      [facility]                    

 

                LIPIDS                            HDL             40                            >=61 mg/dL     

                          2016                                                    

                                                      [facility]                    

 

                LIPIDS                            LDL (Calculated)    149                            <=99

 mg/dL                            2016                                         

                                                      [facility]                    

 

                LIPIDS                            VLDL            53                                          

                    2016                                                               

                                        [facility]                    

 

                    SPECIAL CHEMISTRY                            Hgb A1C             11.0                        

                    <=5.6 %                            2016                                      

                                                      [facility]                    

 

                CHEM PANEL                            Ammonia         <10.0                            <=45.0

 uMol/L                            2016                                        

                                                      [facility]                    

 

                    DRUG SCREEN                            UDS Note            See Note 

*NA*

                    (16 11:23 PM)                                                        2016 

                                                                                   [facility]

                    

 

                    DRUG SCREEN                            U Cannab Scr        Negative 

*NA*

                    (16 11:23 PM)                            Negative                            2016

                                                                                    [facility]                    

 

                    DRUG SCREEN                            U Phencyc Scr       Negative 

*NA*

                    (16 11:23 PM)                            Negative                            2016

                                                                                    [facility]                    

 

                    DRUG SCREEN                            U Opiate Scr        Negative 

*NA*

                    (16 11:23 PM)                            Negative                            2016

                                                                                    [facility]                    

 

                    DRUG SCREEN                            U Amph Scr          Negative 

*NA*

                    (16 11:23 PM)                            Negative                            2016

                                                                                    [facility]                    

 

                    DRUG SCREEN                            U Gracie Scr          Negative 

*NA*

                    (16 11:23 PM)                            Negative                            2016

                                                                                    [facility]                    

 

                    DRUG SCREEN                            U Benzodia Scr      Negative 

*NA*

                    (16 11:23 PM)                            Negative                            2016

                                                                                    [facility]                    

 

                    DRUG SCREEN                            U Cocaine Scr       Negative 

*NA*

                    (16 11:23 PM)                            Negative                            2016

                                                                                    [facility]                    

 

                URINE AND STOOL                            UA RBC          1                            0 - 

2                            2016                                              

                                                      [facility]                    

 

                    URINE AND STOOL                            UA Sq Epi           None Seen                   

                                                2016                                        

                                                      [facility]                    

 

                URINE AND STOOL                            UA WBC          <1                            0 -

 5                            2016                                             

                                                      [facility]                    

 

                    URINE AND STOOL                            UA Urobilinogen     <=1.0 mg/dL           

                          0.1 - 1.0                            2016                     

                                                                            [facility]            

        

 

                    URINE AND STOOL                            UA Color            Ltyellow                     

                                                2016                                          

                                                      [facility]                    

 

                    URINE AND STOOL                            UA Glucose          50 mg/dL                   

                          Negative mg/dL                            2016                        

                                                                            [facility]               

     

 

                    URINE AND STOOL                            UA Protein          Negative mg/dL             

                          Negative mg/dL                            2016                  

                                                                            [facility]         

           

 

                    URINE AND STOOL                            UA Leuk Est         Negative 

                    (16 11:23 PM)                            Negative                            2016

                                                                                    [facility]                    

 

                    URINE AND STOOL                            UA Turbidity        Clear 

                    (16 11:23 PM)                            Clear                            2016

                                                                                    [facility]                    

 

                URINE AND STOOL                            UA pH           5.0                            5.0

 - 8.0                            2016                                         

                                                      [facility]                    

 

                    URINE AND STOOL                            UA Spec Grav        1.014                    

                    <=1.030                            2016                                  

                                                      [facility]                    

 

                    URINE AND STOOL                            UA Nitrite          Negative 

                    (16 11:23 PM)                            Negative                            2016

                                                                                    [facility]                    

 

                    URINE AND STOOL                            UA Ketones          Negative mg/dL             

                          Negative mg/dL                            2016                  

                                                                            [facility]         

           

 

                    URINE AND STOOL                            UA Blood            Negative 

                    (16 11:23 PM)                            Negative                            2016

                                                                                    [facility]                    

 

                    URINE AND STOOL                            UA Bili             Negative 

*NA*

                    (16 11:23 PM)                            Negative                            2016

                                                                                    [facility]                    

 

                CARDIAC ENZYMES                            Total CK        146                            

12 - 191                            2016                                       

                                                      [facility]                    

 

                CARDIAC ENZYMES                            CK MB           4.5                            0.5

 - 3.6                            2016                                         

                                                      [facility]                    

 

                    CARDIAC ENZYMES                            Troponin-I          <0.02                      

                    0.00 - 0.40                            2016                                

                                                      [facility]                    

 

                CHEM PANEL                            A/G Ratio       1.0                            0.7 

- 1.6                            2016                                          

                                                      [facility]                    

 

                CHEM PANEL                            Globulin        4.0                            2.7 -

 4.2                            2016                                           

                                                      [facility]                    

 

                CHEM PANEL                            AGAP            10.6                            10.0 - 20.0

                            2016                                               

                                                      [facility]                    

 

                CHEM PANEL                            B/C Ratio       20                            6 - 25

                            2016                                               

                                                      [facility]                    

 

                CHEM PANEL                            eGFR            82                                      

                    2016                                                        Result

 Comment: The eGFR is calculated using the CKD-EPI formula. In most young, 
healthy individuals the eGFR will be >90 mL/min/1.73m2. The eGFR declines with 
age. An eGFR of 60-89 may be normal in some populations, particularly the 
elderly, for whom the CKD-EPI formula has not been extensively validated. Use of
 the eGFR is not recommended in the following populations:<br/><br/>Individuals 
with unstable creatinine concentrations, including pregnant patients and those 
with serious co-morbid conditions.<br/><br/>Patients with extremes in muscle 
mass or diet. <br/><br/>The data above are obtained from the [employer] 
D[employer] Education Program (NKDEP) which additionally recommends that when the 
eGFR is used in patients with extremes of body mass index for purposes of drug 
dosing, the eGFR should be multiplied by the estimated BMI.                     
                                         [facility]                    

 

                CHEM PANEL                            Bili Total      0.6                            0.2

 - 1.3                            2016                                         

                                                      [facility]                    

 

                CHEM PANEL                            Glucose Lvl     236                            70

 - 99                            2016                                          

                                                      [facility]                    

 

                CHEM PANEL                            BUN             18                            7 - 22     

                          2016                                                    

                                                       [facility]                    

 

                CHEM PANEL                            CO2             27                            24 - 32    

                          2016                                                   

                                                      [facility]                    

 

                CHEM PANEL                            Chloride Lvl    100                            95

 - 109                            2016                                         

                                                      [facility]                    

 

                CHEM PANEL                            Potassium Lvl    3.6                            

3.5 - 5.1                            2016                                      

                                                       [facility]                    

 

                CHEM PANEL                            Alk Phos        131                            39 - 

136                            2016                                            

                                                      [facility]                    

 

                CHEM PANEL                            AST             39                            0 - 37     

                          2016                                                    

                                                       [facility]                    

 

                CHEM PANEL                            Sodium Lvl      134                            135

 - 145                            2016                                         

                                                      [facility]                    

 

                    CHEM PANEL                            Creatinine Lvl      0.91                        

                    0.50 - 1.40                            2016                                  

                                                      [facility]                    

 

                CHEM PANEL                            Total Protein    7.8                            

6.4 - 8.4                            2016                                      

                                                       [facility]                    

 

                CHEM PANEL                            ALT             32                            0 - 65     

                          2016                                                    

                                                      [facility]                    

 

                CHEM PANEL                            Albumin Lvl     3.8                            3.5

 - 5.0                            2016                                         

                                                      [facility]                    

 

                CHEM PANEL                            Calcium Lvl     8.4                            8.5

 - 10.5                            2016                                        

                                                      [facility]                    

 

                HEMATOLOGY                            Lymphocytes #    2.0                            

1.0 - 5.5                            2016                                      

                                                      [facility]                    

 

                HEMATOLOGY                            Monocytes #     1.0                            0.0

 - 0.8                            2016                                         

                                                      [facility]                    

 

                HEMATOLOGY                            Basophils       1.0                            0.0 

- 1.0                            2016                                          

                                                      [facility]                    

 

                HEMATOLOGY                            Eosinophils     1.6                            0.0

 - 4.0                            2016                                         

                                                      [facility]                    

 

                HEMATOLOGY                            Segs-Bands #    5.8                            1.5

 - 8.1                            2016                                         

                                                      [facility]                    

 

                HEMATOLOGY                            Lymphocytes     21.8                            20.0

 - 40.0                            2016                                        

                                                      [facility]                    

 

                HEMATOLOGY                            Segs            64.5                            45.0 - 75.0

                            2016                                               

                                                      [facility]                    

 

                HEMATOLOGY                            Monocytes       11.1                            2.0

 - 12.0                            2016                                        

                                                      [facility]                    

 

                    HEMATOLOGY                            Microcyte           1+ 

*ABN*

                    (16 10:22 PM)                            None Seen                            2016

                                                                                    [facility]                    

 

                HEMATOLOGY                            Eosinophils #    0.1                            

0.0 - 0.5                            2016                                      

                                                      [facility]                    

 

                HEMATOLOGY                            Basophils #     0.1                            0.0

 - 0.2                            2016                                         

                                                      [facility]                    

 

                HEMATOLOGY                            PTT             34.5                            22.9 - 35.8

                            2016                                               

                                                      [facility]                    

 

                HEMATOLOGY                            PT              22.2                            12.0 - 14.7

                            2016                                               

                                                      [facility]                    

 

                HEMATOLOGY                            INR             1.91                            0.85 - 1.17

                            2016                                               

                                                      [facility]                    

 

                HEMATOLOGY                            MCV             75.2                            80.0 - 94.0

                            2016                                               

                                                      [facility]                    

 

                HEMATOLOGY                            RDW             16.9                            11.5 - 14.5

                            2016                                               

                                                      [facility]                            MCHC            31.6                            32.0 - 36.0

                            2016                                               

                                                      [facility]                            MCH             23.8                            27.0 - 31.0

                            2016                                               

                                                      [facility]                            MPV             8.4                            7.4 - 10.4

                            2016                                               

                                                      [facility]                            Platelet        225                            133 -

 450                            2016                                           

                                                      [facility]                    

 

                HEMATOLOGY                            RBC             4.70                            4.70 - 6.10

                            2016                                               

                                                      [facility]                    

 

                HEMATOLOGY                            WBC             9.0                            3.7 - 10.4

                            2016                                               

                                                      [facility]                    

 

                HEMATOLOGY                            Hct             35.4                            42.0 - 54.0

                            2016                                               

                                                      [facility]                            Hgb             11.2                            14.0 - 18.0

                            2016                                               

                                                      [facility]                    

 

                    CARDIAC ENZYMES                            Troponin-I          0.07                       

                    0.00 - 0.40                            2016                                 

                                                      [facility]                    

 

                CARDIAC ENZYMES                            Total CK        115                            

12 - 191                            2016                                       

                                                      [facility]                    

 

                    CARDIAC ENZYMES                            CK MB Index         4.0                       

                    0.0 - 2.5                            2016                                   

                                                      [facility]                    

 

                CARDIAC ENZYMES                            CK MB           4.6                            0.5

 - 3.6                            2016                                         

                                                      [facility]                    

 

                    CARDIAC ENZYMES                            Troponin-I          0.03                       

                    0.00 - 0.40                            2016                                 

                                                      [facility]                    

 

                CARDIAC ENZYMES                            Total CK        110                            

12 - 191                            2016                                       

                                                      [facility]                    

 

                    CARDIAC ENZYMES                            CK MB Index         3.5                       

                    0.0 - 2.5                            2016                                   

                                                      [facility]                    

 

                CARDIAC ENZYMES                            CK MB           3.9                            0.5

 - 3.6                            2016                                         

                                                      [facility]                    

 

                CHEM PANEL                            Alk Phos        112                            39 - 

136                            2016                                            

                                                      [facility]                    

 

                CHEM PANEL                            AST             31                            0 - 37     

                          2016                                                    

                                                      [facility]                    

 

                CHEM PANEL                            ALT             29                            0 - 65     

                          2016                                                    

                                                      [facility]                    

 

                CHEM PANEL                            Total Protein    7.6                            

6.4 - 8.4                            2016                                      

                                                      [facility]                    

 

                CHEM PANEL                            Albumin Lvl     3.4                            3.5

 - 5.0                            2016                                         

                                                      [facility]                    

 

                CHEM PANEL                            Globulin        4.2                            2.0 -

 4.0                            2016                                           

                                                      [facility]                    

 

                CHEM PANEL                            A/G Ratio       0.8                            0.7 

- 1.6                            2016                                          

                                                      [facility]                    

 

                CHEM PANEL                            Bili Indirect    0.1                            

0.0 - 1.0                            2016                                      

                                                      [facility]                    

 

                CHEM PANEL                            Bili Total      0.2                            0.2

 - 1.3                            2016                                         

                                                      [facility]                    

 

                CHEM PANEL                            Bili Direct     0.1                            0.0

 - 0.3                            2016                                         

                                                      [facility]                    

 

                LIPIDS                            LDL (Calculated)    63                            <=99

 mg/dL                            2016                                         

                                                      [facility]                    

 

                LIPIDS                            VLDL            77                                          

                    2016                                                               

                                        [facility]                    

 

                LIPIDS                            CHD Risk        5.00                            4.00 - 7.30

                            2016                                               

                                                      [facility]                    

 

                LIPIDS                            HDL             35                            >=61 mg/dL     

                          2016                                                    

                                                      [facility]                    

 

                LIPIDS                            Chol            175                            <=199 mg/dL  

                          2016                                                 

                                                      [facility]                    

 

                LIPIDS                            Trig            386                            <=149 mg/dL  

                          2016                                                 

                                                      [facility]                    

 

                    DRUG SCREEN                            U Benzodia Scr      Positive 

*ABN*

                    (6/10/16 8:57 PM)                            Negative                            2016

                                                                                    [facility]                    

 

                    DRUG SCREEN                            U Gracie Scr          Negative 

*NA*

                    (6/10/16 8:57 PM)                            Negative                            2016

                                                                                    [facility]                    

 

                    DRUG SCREEN                            U Amph Scr          Negative 

*NA*

                    (6/10/16 8:57 PM)                            Negative                            2016

                                                                                    [facility]                    

 

                    DRUG SCREEN                            U Opiate Scr        Positive 

*ABN*

                    (6/10/16 8:57 PM)                            Negative                            2016

                                                                                    [facility]                    

 

                    DRUG SCREEN                            U Phencyc Scr       Negative 

*NA*

                    (6/10/16 8:57 PM)                            Negative                            2016

                                                                                    [facility]                    

 

                    DRUG SCREEN                            U Cannab Scr        Negative 

*NA*

                    (6/10/16 8:57 PM)                            Negative                            2016

                                                                                    [facility]                    

 

                    DRUG SCREEN                            UDS Note            See Note 

                    (6/10/16 8:57 PM)                                                        2016 

                                                                                   [facility]

                    

 

                    DRUG SCREEN                            U Cocaine Scr       Negative 

*NA*

                    (6/10/16 8:57 PM)                            Negative                            2016

                                                                                    [facility]                    

 

                    CARDIAC ENZYMES                            CK MB Index         3.0                       

                    0.0 - 2.5                            06/10/2016                                   

                                                      [facility]                    

 

                CARDIAC ENZYMES                            BNP             91                            <=100 

pg/mL                            06/10/2016                                          

                                                      [facility]                    

 

                    CARDIAC ENZYMES                            Troponin-I          <0.02                      

                    0.00 - 0.40                            06/10/2016                                

                                                      [facility]                    

 

                CARDIAC ENZYMES                            CK MB           3.5                            0.5

 - 3.6                            06/10/2016                                         

                                                      [facility]                    

 

                CARDIAC ENZYMES                            Total CK        117                            

12 - 191                            06/10/2016                                       

                                                      [facility]                    

 

                CHEM PANEL                            eGFR            80                                      

                    06/10/2016                                                        Result

 Comment: The eGFR is calculated using the CKD-EPI formula. In most young, 
healthy individuals the eGFR will be >90 mL/min/1.73m2. The eGFR declines with 
age. An eGFR of 60-89 may be normal in some populations, particularly the 
elderly, for whom the CKD-EPI formula has not been extensively validated. Use of
 the eGFR is not recommended in the following populations:<br/><br/>Individuals 
with unstable creatinine concentrations, including pregnant patients and those 
with serious co-morbid conditions.<br/><br/>Patients with extremes in muscle 
mass or diet. <br/><br/>The data above are obtained from the [employer] Kid[employer] 
Disease Education Program (NKDEP) which additionally recommends that when the 
eGFR is used in patients with extremes of body mass index for purposes of drug 
dosing, the eGFR should be multiplied by the estimated BMI.                     
                                        [facility]                    

 

                CHEM PANEL                            A/G Ratio       0.8                            0.7 

- 1.6                            06/10/2016                                          

                                                      [facility]                    

 

                CHEM PANEL                            Bili Total      0.2                            0.2

 - 1.3                            06/10/2016                                         

                                                      [facility]                    

 

                CHEM PANEL                            AST             33                            0 - 37     

                          06/10/2016                                                    

                                                       [facility]                    

 

                CHEM PANEL                            AGAP            15.6                            10.0 - 20.0

                            06/10/2016                                               

                                                       [facility]                    

 

                CHEM PANEL                            B/C Ratio       14                            6 - 25

                            06/10/2016                                               

                                                       [facility]                    

 

                CHEM PANEL                            Globulin        3.7                            2.0 -

 4.0                            06/10/2016                                           

                                                       [facility]                    

 

                CHEM PANEL                            Alk Phos        99                            39 - 136

                            06/10/2016                                               

                                                       [facility]                    

 

                CHEM PANEL                            Calcium Lvl     8.2                            8.5

 - 10.5                            06/10/2016                                        

                                                       [facility]                    

 

                CHEM PANEL                            Chloride Lvl    102                            95

 - 109                            06/10/2016                                         

                                                       [facility]                    

 

                CHEM PANEL                            Albumin Lvl     3.1                            3.5

 - 5.0                            06/10/2016                                         

                                                       [facility]                    

 

                CHEM PANEL                            Total Protein    6.8                            

6.4 - 8.4                            06/10/2016                                      

                                                       [facility]                    

 

                CHEM PANEL                            ALT             27                            0 - 65     

                          06/10/2016                                                    

                                                       [facility]                    

 

                CHEM PANEL                            CO2             24                            24 - 32    

                          06/10/2016                                                   

                                                       [facility]                    

 

                CHEM PANEL                            Glucose Lvl     195                            70

 - 99                            06/10/2016                                          

                                                       [facility]                    

 

                CHEM PANEL                            BUN             13                            7 - 22     

                          06/10/2016                                                    

                                                       [facility]                    

 

                CHEM PANEL                            Potassium Lvl    3.6                            

3.5 - 5.1                            06/10/2016                                      

                                                       [facility]                    

 

                CHEM PANEL                            Sodium Lvl      138                            135

 - 145                            06/10/2016                                         

                                                       [facility]                    

 

                    CHEM PANEL                            Creatinine Lvl      0.93                        

                    0.50 - 1.40                            06/10/2016                                  

                                                      [facility]                    

 

                HEMATOLOGY                            Monocytes #     0.7                            0.0

 - 0.8                            06/10/2016                                         

                                                      [facility]                    

 

                HEMATOLOGY                            Eosinophils #    0.1                            

0.0 - 0.5                            06/10/2016                                      

                                                      [facility]                    

 

                HEMATOLOGY                            Basophils #     0.1                            0.0

 - 0.2                            06/10/2016                                         

                                                      [facility]                    

 

                    HEMATOLOGY                            Hypochrom           1+ 

                    (6/10/16 6:56 PM)                            None Seen                            06/10/2016

                                                                                    [facility]                    

 

                    HEMATOLOGY                            Microcyte           1+ 

*ABN*

                    (6/10/16 6:56 PM)                            None Seen                            06/10/2016

                                                                                    [facility]                    

 

                HEMATOLOGY                            Segs-Bands #    4.7                            1.5

 - 8.1                            06/10/2016                                         

                                                      [facility]                    

 

                HEMATOLOGY                            Lymphocytes #    1.8                            

1.0 - 5.5                            06/10/2016                                      

                                                      [facility]                    

 

                    HEMATOLOGY                            RBC Morph           See Note 

                    (6/10/16 6:56 PM)                                                        06/10/2016 

                                                                                   [facility]

                    

 

                    HEMATOLOGY                            Plt Morph           Normal 

                    (6/10/16 6:56 PM)                                                        06/10/2016 

                                                                                   [facility]

                    

 

                HEMATOLOGY                            Monocytes       10.2                            2.0

 - 12.0                            06/10/2016                                        

                                                      [facility]                    

 

                HEMATOLOGY                            Segs            63.5                            45.0 - 75.0

                            06/10/2016                                               

                                                      [facility]                    

 

                HEMATOLOGY                            Lymphocytes     23.9                            20.0

 - 40.0                            06/10/2016                                        

                                                      [facility]                    

 

                HEMATOLOGY                            Basophils       0.9                            0.0 

- 1.0                            06/10/2016                                          

                                                      [facility]                    

 

                HEMATOLOGY                            Eosinophils     1.5                            0.0

 - 4.0                            06/10/2016                                         

                                                      [facility]                    

 

                HEMATOLOGY                            MPV             9.0                            7.4 - 10.4

                            06/10/2016                                               

                                                      [facility]                    

 

                HEMATOLOGY                            Hct             36.7                            42.0 - 54.0

                            06/10/2016                                               

                                                      [facility]                    

 

                HEMATOLOGY                            MCV             77.9                            80.0 - 94.0

                            06/10/2016                                               

                                                      [facility]                    

 

                HEMATOLOGY                            RDW             16.7                            11.5 - 14.5

                            06/10/2016                                               

                                                      [facility]                    

 

                HEMATOLOGY                            Platelet        179                            133 -

 450                            06/10/2016                                           

                                                      [facility]                    

 

                HEMATOLOGY                            Hgb             11.2                            14.0 - 18.0

                            06/10/2016                                               

                                                      [facility]                            MCHC            30.6                            32.0 - 36.0

                            06/10/2016                                               

                                                      [facility]                            MCH             23.8                            27.0 - 31.0

                            06/10/2016                                               

                                                      [facility]                    

 

                HEMATOLOGY                            RBC             4.71                            4.70 - 6.10

                            06/10/2016                                               

                                                      [facility]                    

 

                HEMATOLOGY                            WBC             7.3                            3.7 - 10.4

                            06/10/2016                                               

                                                      [facility]                    

 

                HEMATOLOGY                            PTT             23.5                            22.9 - 35.8

                            06/10/2016                                               

                                                      [facility]                    

 

                HEMATOLOGY                            PT              15.9                            12.0 - 14.7

                            06/10/2016                                               

                                                      [facility]                    

 

                HEMATOLOGY                            INR             1.24                            0.85 - 1.17

                            06/10/2016                                               

                                                      [facility]                    

 

                    CARDIAC ENZYMES                            Troponin-I          <0.02                      

                    0.00 - 0.40                            2016                                

                                                      [facility]                    

 

                    URINE AND STOOL                            UA Leuk Est         Negative 

                    (16 5:21 PM)                            Negative                            2016

                                                                                    [facility]                    

 

                    URINE AND STOOL                            UA Urobilinogen     <=1.0 mg/dL           

                          0.1 - 1.0                            2016                     

                                                                            [facility]            

        

 

                    URINE AND STOOL                            UA Color            Ltyellow                     

                                                2016                                          

                                                      [facility]                    

 

                URINE AND STOOL                            UA RBC          1                            0 - 

2                            2016                                              

                                                      [facility]                    

 

                    URINE AND STOOL                            UA Sq Epi           Occasional /LPF             

                          Few /LPF                            2016                        

                                                                            [facility]               

     

 

                    URINE AND STOOL                            UA Blood            Negative 

                    (16 5:21 PM)                            Negative                            2016

                                                                                    [facility]                    

 

                    URINE AND STOOL                            UA Bili             Negative 

*NA*

                    (4/11/16 5:21 PM)                            Negative                            2016

                                                                                    [facility]                    

 

                    URINE AND STOOL                            UA Ketones          Negative mg/dL             

                          Negative mg/dL                            2016                  

                                                                            [facility]         

           

 

                    URINE AND STOOL                            UA Nitrite          Negative 

                    (16 5:21 PM)                            Negative                            2016

                                                                                    [facility]                    

 

                URINE AND STOOL                            UA pH           5.0                            5.0

 - 8.0                            2016                                         

                                                      [facility]                    

 

                    URINE AND STOOL                            UA Spec Grav        1.005                    

                    <=1.030                            2016                                  

                                                      [facility]                    

 

                    URINE AND STOOL                            UA Turbidity        Clear 

                    (16 5:21 PM)                            Clear                            2016

                                                                                    [facility]                    

 

                    URINE AND STOOL                            UA Glucose          500 mg/dL                  

                          Negative mg/dL                            2016                       

                                                                            [facility]              

      

 

                    URINE AND STOOL                            UA Protein          Negative mg/dL             

                          Negative mg/dL                            2016                  

                                                                            [facility]         

           

 

                CARDIAC ENZYMES                            Total CK        71                            12

 - 191                            2016                                         

                                                      [facility]                    

 

                CARDIAC ENZYMES                            CK MB           2.4                            0.5

 - 3.6                            2016                                         

                                                      [facility]                    

 

                    CARDIAC ENZYMES                            CK MB Index         3.4                       

                    0.0 - 2.5                            2016                                   

                                                      [facility]                    

 

                CARDIAC ENZYMES                            BNP             84                            <=100 

pg/mL                            2016                                          

                                                      [facility]                    

 

                    CARDIAC ENZYMES                            Troponin-I          <0.02                      

                    0.00 - 0.40                            2016                                

                                                      [facility]                    

 

                CHEM PANEL                            eGFR            79                                      

                    2016                                                        Result

 Comment: The eGFR is calculated using the CKD-EPI formula. In most young, 
healthy individuals the eGFR will be >90 mL/min/1.73m2. The eGFR declines with 
age. An eGFR of 60-89 may be normal in some populations, particularly the 
elderly, for whom the CKD-EPI formula has not been extensively validated. Use of
 the eGFR is not recommended in the following populations:<br/><br/>Individuals 
with unstable creatinine concentrations, including pregnant patients and those 
with serious co-morbid conditions.<br/><br/>Patients with extremes in muscle 
mass or diet. <br/><br/>The data above are obtained from the National Kid[employer] 
Disease Education Program (NKDEP) which additionally recommends that when the 
eGFR is used in patients with extremes of body mass index for purposes of drug 
dosing, the eGFR should be multiplied by the estimated BMI.                     
                                        [facility]                    

 

                CHEM PANEL                            Globulin        4.2                            2.0 -

 4.0                            2016                                           

                                                      [facility]                    

 

                CHEM PANEL                            Alk Phos        137                            39 - 

136                            2016                                            

                                                      [facility]                    

 

                CHEM PANEL                            Bili Total      0.4                            0.2

 - 1.3                            2016                                         

                                                       [facility]                    

 

                CHEM PANEL                            B/C Ratio       14                            6 - 25

                            2016                                               

                                                       [facility]                    

 

                CHEM PANEL                            AGAP            10.8                            10.0 - 20.0

                            2016                                               

                                                      [facility]                    

 

                CHEM PANEL                            A/G Ratio       0.8                            0.7 

- 1.6                            2016                                          

                                                       [facility]                    

 

                CHEM PANEL                            Glucose Lvl     361                            70

 - 99                            2016                                          

                                                       [facility]                    

 

                CHEM PANEL                            BUN             13                            7 - 22     

                          2016                                                    

                                                       [facility]                    

 

                    CHEM PANEL                            Creatinine Lvl      0.94                        

                    0.50 - 1.40                            2016                                  

                                                       [facility]                    

 

                CHEM PANEL                            Sodium Lvl      134                            135

 - 145                            2016                                         

                                                       [facility]                    

 

                CHEM PANEL                            Chloride Lvl    99                            95

 - 109                            2016                                         

                                                       [facility]                    

 

                CHEM PANEL                            AST             62                            0 - 37     

                          2016                                                    

                                                      [facility]                    

 

                CHEM PANEL                            Albumin Lvl     3.2                            3.5

 - 5.0                            2016                                         

                                                      [facility]                    

 

                CHEM PANEL                            ALT             56                            0 - 65     

                          2016                                                    

                                                      [facility]                    

 

                CHEM PANEL                            Total Protein    7.4                            

6.4 - 8.4                            2016                                      

                                                       [facility]                    

 

                CHEM PANEL                            CO2             28                            24 - 32    

                          2016                                                   

                                                       [facility]                    

 

                CHEM PANEL                            Calcium Lvl     8.4                            8.5

 - 10.5                            2016                                        

                                                      [facility]                    

 

                CHEM PANEL                            Potassium Lvl    3.8                            

3.5 - 5.1                            2016                                      

                                                      [facility]                    

 

                    HEMATOLOGY                            Eosinophils #       :::::                        

                    0.0 - 0.5                            2016                                    

                                                      [facility]                    

 

                HEMATOLOGY                            Basophils #     :::::                            

0.0 - 0.2                            2016                                      

                                                      [facility]                            Microcyte           1+ 

*ABN*

                    (16 5:17 PM)                            None Seen                            2016

                                                                                    [facility]                            Monocytes #     :::::                            

0.0 - 0.8                            2016                                      

                                                      [facility]                            Segs                ::::: 

*NA*

                    (16 5:17 PM)                            45.0 - 75.0                            

2016                                                                           

                                        [facility]                    

 

                    HEMATOLOGY                            Lymphocytes         ::::: 

*NA*

                    (16 5:17 PM)                            20.0 - 40.0                            

2016                                                                           

                                        [facility]                            Monocytes           ::::: 

*NA*

                    (16 5:17 PM)                            2.0 - 12.0                            2016

                                                                                    [facility]                            Segs-Bands #        :::::                         

                    1.5 - 8.1                            2016                                     

                                                      [facility]                            Lymphocytes #       :::::                        

                    1.0 - 5.5                            2016                                    

                                                      [facility]                            Eosinophils         ::::: 

*NA*

                    (16 5:17 PM)                            0.0 - 4.0                            2016

                                                                                    [facility]                    

 

                    HEMATOLOGY                            Basophils           ::::: 

*NA*

                    (16 5:17 PM)                            0.0 - 1.0                            2016

                                                                                    [facility]                    

 

                HEMATOLOGY                            INR             1.85                            0.85 - 1.17

                            2016                                               

                                                      [facility]                    

 

                HEMATOLOGY                            PT              21.7                            12.0 - 14.7

                            2016                                               

                                                      [facility]                            RDW             16.5                            11.5 - 14.5

                            2016                                               

                                                      [facility]                            Platelet        197                            133 -

 450                            2016                                           

                                                      [facility]                            MPV             8.9                            7.4 - 10.4

                            2016                                               

                                                      [facility]                            MCH             24.2                            27.0 - 31.0

                            2016                                               

                                                      [facility]                            MCHC            30.9                            32.0 - 36.0

                            2016                                               

                                                      [facility]                            MCV             78.3                            80.0 - 94.0

                            2016                                               

                                                      [facility]                            Hct             35.4                            42.0 - 54.0

                            2016                                               

                                                      [facility]                            Hgb             10.9                            14.0 - 18.0

                            2016                                               

                                                      [facility]                            WBC             7.7                            3.7 - 10.4

                            2016                                               

                                                      [facility]                            RBC             4.52                            4.70 - 6.10

                            2016                                               

                                                      [facility]                    

 

                    URINE AND STOOL                            UA Urobilinogen     <=1.0 mg/dL           

                          0.1 - 1.0                            2015                     

                                                                            [facility]            

        

 

                URINE AND STOOL                            UA pH           6.0                            5.0

 - 8.0                            2015                                         

                                                      [facility]                    

 

                    URINE AND STOOL                            UA Spec Grav        1.015                    

                    <=1.030                            2015                                  

                                                      [facility]                    

 

                    URINE AND STOOL                            UA Color            Yellow 

*NA*

                    (8/12/15 4:46 PM)                            Yellow                            2015

                                                                                    [facility]                    

 

                    URINE AND STOOL                            UA Turbidity        Clear 

                    (8/12/15 4:46 PM)                            Clear                            2015

                                                                                    [facility]                    

 

                    URINE AND STOOL                            UA Glucose          500 mg/dL                  

                          Negative mg/dL                            2015                       

                                                                            [facility]              

      

 

                    URINE AND STOOL                            UA Protein          Negative mg/dL             

                          Negative mg/dL                            2015                  

                                                                            [facility]         

           

 

                    URINE AND STOOL                            UA Bili             Negative 

*NA*

                    (8/12/15 4:46 PM)                            Negative                            2015

                                                                                    [facility]                    

 

                    URINE AND STOOL                            UA Blood            Negative 

                    (8/12/15 4:46 PM)                            Negative                            2015

                                                                                    [facility]                    

 

                    URINE AND STOOL                            UA Ketones          Negative mg/dL             

                          Negative mg/dL                            2015                  

                                                                            [facility]         

           

 

                    URINE AND STOOL                            UA Leuk Est         Negative 

                    (8/12/15 4:46 PM)                            Negative                            2015

                                                                                    [facility]                    

 

                    URINE AND STOOL                            UA Sq Epi           Occasional /LPF             

                          Few /LPF                            2015                        

                                                                            [facility]               

     

 

                    URINE AND STOOL                            UA Nitrite          Negative 

                    (8/12/15 4:46 PM)                            Negative                            2015

                                                                                    [facility]                    

 

                URINE AND STOOL                            UA WBC          <1                            0 -

 5                            2015                                             

                                                      [facility]                    

 

                URINE AND STOOL                            UA RBC          3                            0 - 

2                            2015                                              

                                                      [facility]                    

 

                CARDIAC ENZYMES                            Total CK        52                            12

 - 191                            2015                                         

                                                      [facility]                    

 

                CARDIAC ENZYMES                            BNP             211                            <=100

 pg/mL                            2015                                         

                                                      [facility]                    

 

                    CARDIAC ENZYMES                            Troponin-I          <0.02                      

                    0.00 - 0.40                            2015                                

                                                      [facility]                    

 

                CHEM PANEL                            Magnesium Lvl    1.7                            

1.8 - 2.4                            2015                                      

                                                      [facility]                    

 

                    ELECTROLYTES                            Potassium Lvl       3.6                        

                    3.5 - 5.1                            2015                                    

                                                      [facility]                    

 

                ELECTROLYTES                            Chloride Lvl    98                            

95 - 109                            2015                                       

                                                      [facility]                    

 

                ELECTROLYTES                            Sodium Lvl      135                            135

 - 145                            2015                                         

                                                      [facility]                    

 

                ELECTROLYTES                            eGFR            88                                    

                    2015                                                        Result

 Comment: The eGFR is calculated using the CKD-EPI formula. In most young, 
healthy individuals the eGFR will be >90 mL/min/1.73m2. The eGFR declines with 
age. An eGFR of 60-89 may be normal in some populations, particularly the 
elderly, for whom the CKD-EPI formula has not been extensively validated. Use of
 the eGFR is not recommended in the following populations:<br/><br/>Individuals 
with unstable creatinine concentrations, including pregnant patients and those 
with serious co-morbid conditions.<br/><br/>Patients with extremes in muscle 
mass or diet. <br/><br/>The data above are obtained from the [employer] 
[employer] Program ([employer]) which additionally recommends that when the 
eGFR is used in patients with extremes of body mass index for purposes of drug 
dosing, the eGFR should be multiplied by the estimated BMI.                     
                                        [facility]                    

 

                ELECTROLYTES                            Alk Phos        141                            39 

- 136                            2015                                          

                                                       [facility]                    

 

                ELECTROLYTES                            AST             33                            0 - 37   

                          2015                                                  

                                                       [facility]                    

 

                ELECTROLYTES                            Bili Total      0.3                            0.2

 - 1.3                            2015                                         

                                                       [facility]                    

 

                ELECTROLYTES                            ALT             31                            0 - 65   

                          2015                                                  

                                                       [facility]                    

 

                ELECTROLYTES                            Albumin Lvl     3.7                            

3.5 - 5.0                            2015                                      

                                                       [facility]                    

 

                    ELECTROLYTES                            Total Protein       8.2                        

                    6.4 - 8.4                            2015                                    

                                                       [facility]                    

 

                ELECTROLYTES                            Calcium Lvl     8.9                            

8.5 - 10.5                            2015                                     

                                                       [facility]                    

 

                ELECTROLYTES                            CO2             27                            24 - 32  

                          2015                                                 

                                                       [facility]                    

 

                    ELECTROLYTES                            Creatinine Lvl      0.8                       

                    0.5 - 1.4                            2015                                   

                                                       [facility]                    

 

                ELECTROLYTES                            BUN             12                            7 - 22   

                          2015                                                  

                                                       [facility]                    

 

                ELECTROLYTES                            Glucose Lvl     279                            

70 - 99                            2015                                        

                                                       [facility]                    

 

                ELECTROLYTES                            A/G Ratio       0.8                            0.7

 - 1.6                            2015                                         

                                                       [facility]                    

 

                ELECTROLYTES                            Globulin        4.5                            2.0

 - 4.0                            2015                                         

                                                       [facility]                    

 

                ELECTROLYTES                            B/C Ratio       15                            6 -

 25                            2015                                            

                                                       [facility]                    

 

                ELECTROLYTES                            AGAP            13.6                            10.0 -

 20.0                            2015                                          

                                                       [facility]                    

 

                HEMATOLOGY                            INR             2.05                            0.85 - 1.17

                            2015                                               

                                                      [facility]                    

 

                HEMATOLOGY                            PT              23.7                            12.0 - 14.7

                            2015                                               

                                                      [facility]                    

 

                HEMATOLOGY                            PTT             38.8                            22.9 - 35.8

                            2015                                               

                                                      [facility]                    

 

                HEMATOLOGY                            MPV             8.5                            7.4 - 10.4

                            2015                                               

                                                      [facility]                    

 

                HEMATOLOGY                            WBC             9.5                            3.7 - 10.4

                            2015                                               

                                                      [facility]                    

 

                HEMATOLOGY                            MCHC            32.0                            32.0 - 36.0

                            2015                                               

                                                      [facility]                    

 

                HEMATOLOGY                            RDW             23.0                            11.5 - 14.5

                            2015                                               

                                                       [facility]                    

 

                HEMATOLOGY                            Hct             37.7                            42.0 - 54.0

                            2015                                               

                                                      [facility]                    

 

                HEMATOLOGY                            RBC             4.89                            4.70 - 6.10

                            2015                                               

                                                      [facility]                    

 

                HEMATOLOGY                            Hgb             12.1                            14.0 - 18.0

                            2015                                               

                                                      [facility]                    

 

                HEMATOLOGY                            Platelet        304                            133 -

 450                            2015                                           

                                                      [facility]                    

 

                HEMATOLOGY                            MCV             77.2                            80.0 - 94.0

                            2015                                               

                                                      [facility]                    

 

                HEMATOLOGY                            MCH             24.7                            27.0 - 31.0

                            2015                                               

                                                      [facility]                            Microcyte           1+ 

*ABN*

                    (8/12/15 3:39 PM)                            None Seen                            2015

                                                                                    [facility]                    

 

                HEMATOLOGY                            Segs            61.8                            45.0 - 75.0

                            2015                                               

                                                      [facility]                    

 

                HEMATOLOGY                            Eosinophils     0.8                            0.0

 - 4.0                            2015                                         

                                                      [facility]                            Monocytes       9.3                            2.0 

- 12.0                            2015                                         

                                                      [facility]                            Lymphocytes     27.2                            20.0

 - 40.0                            2015                                        

                                                      [facility]                            Lymphocytes #    2.6                            

1.0 - 5.5                            2015                                      

                                                      [facility]                            Segs-Bands #    5.9                            1.5

 - 8.1                            2015                                         

                                                      [facility]                            Monocytes #     0.9                            0.0

 - 0.8                            2015                                         

                                                      [facility]                    

 

                HEMATOLOGY                            Eosinophils #    0.1                            

0.0 - 0.5                            2015                                      

                                                      [facility]                            Basophils #     0.1                            0.0

 - 0.2                            2015                                         

                                                      [facility]                            Basophils       0.9                            0.0 

- 1.0                            2015                                          

                                                      [facility]                    

 

                CHEM PANEL                            eGFR            74                                      

                    2015                                                        <sup>2</sup>Result

 Comment: The eGFR is calculated using the CKD-EPI formula. In most young, 
healthy individuals the eGFR will be >90 mL/min/1.73m2. The eGFR declines with 
age. An eGFR of 60-89 may be normal in some populations, particularly the 
elderly, for whom the CKD-EPI formula has not been extensively validated. Use of
 the eGFR is not recommended in the following populations:&
lt;br/><br/>Individuals with unstable creatinine concentrations, including 
pregnant patients and those with serious co-morbid conditions.<br/><br/>Patients
 with extremes in muscle mass or diet. <br/><br/>The data above are obtained 
from the National Kidney Disease Education Program (NKDEP) which additionally 
recommends that when the eGFR is used in patients with extremes of body mass 
index for purposes of drug dosing, the eGFR should be multiplied by the 
estimated BMI.                            [facility]                    

 

                CHEM PANEL                            BUN             16                            7 - 22     

                          2015                                                    

                                                      [facility]                    

 

                    CHEM PANEL                            Creatinine Lvl      1.0                         

                    0.5 - 1.4                            2015                                     

                                                      [facility]                    

 

                CHEM PANEL                            CO2             31                            24 - 32    

                          2015                                                   

                                                      [facility]                    

 

                CHEM PANEL                            Calcium Lvl     8.6                            8.5

 - 10.5                            2015                                        

                                                      [facility]                    

 

                CHEM PANEL                            Glucose Lvl     301                            70

 - 99                            2015                                          

                                        <sup>5</sup>Interpretive Data: Adult reference range values reflect the

 clinical guidelines<br/>of the [employer].                  
                                        [facility]                    

 

                CHEM PANEL                            Chloride Lvl    97                            95

 - 109                            2015                                         

                                                      [facility]                    

 

                CHEM PANEL                            Sodium Lvl      137                            135

 - 145                            2015                                         

                                                      [facility]                    

 

                CHEM PANEL                            Potassium Lvl    4.8                            

3.5 - 5.1                            2015                                      

                                                      [facility]                    

 

                CHEM PANEL                            AGAP            13.8                            10.0 - 20.0

                            2015                                               

                                                      [facility]                    

 

                CHEM PANEL                            Magnesium Lvl    2.0                            

1.8 - 2.4                            2015                                      

                                                      [facility]                    

 

                    HEMATOLOGY                            Microcyte           1+ 

*ABN*

                    (6/22/15 6:01 AM)                            None Seen                            2015

                                                                                    [facility]                    

 

                HEMATOLOGY                            Eosinophils #    0.3                            

0.0 - 0.5                            2015                                      

                                                      [facility]                    

 

                HEMATOLOGY                            Basophils #     0.2                            0.0

 - 0.2                            2015                                         

                                                      [facility]                    

 

                HEMATOLOGY                            Basophils       1.9                            0.0 

- 1.0                            2015                                          

                                                      [facility]                    

 

                HEMATOLOGY                            Segs-Bands #    5.1                            1.5

 - 8.1                            2015                                         

                                                      [facility]                    

 

                HEMATOLOGY                            Eosinophils     3.6                            0.0

 - 4.0                            2015                                         

                                                      [facility]                    

 

                HEMATOLOGY                            Monocytes       8.7                            2.0 

- 12.0                            2015                                         

                                                      [facility]                    

 

                HEMATOLOGY                            Segs            61.7                            45.0 - 75.0

                            2015                                               

                                                      [facility]                    

 

                HEMATOLOGY                            Lymphocytes     24.1                            20.0

 - 40.0                            2015                                        

                                                      [facility]                    

 

                HEMATOLOGY                            Monocytes #     0.7                            0.0

 - 0.8                            2015                                         

                                                      [facility]                    

 

                HEMATOLOGY                            Lymphocytes #    2.0                            

1.0 - 5.5                            2015                                      

                                                      [facility]                    

 

                HEMATOLOGY                            WBC             8.2                            3.7 - 10.4

                            2015                                               

                                                      [facility]                    

 

                HEMATOLOGY                            MCV             74.9                            80.0 - 94.0

                            2015                                               

                                                      [facility]                    

 

                HEMATOLOGY                            MCH             23.3                            27.0 - 31.0

                            2015                                               

                                                      [facility]                    

 

                HEMATOLOGY                            Hgb             9.7                            14.0 - 18.0

                            2015                                               

                                                      [facility]                    

 

                HEMATOLOGY                            Hct             31.2                            42.0 - 54.0

                            2015                                               

                                                      [facility]                    

 

                HEMATOLOGY                            RBC             4.17                            4.70 - 6.10

                            2015                                               

                                                      [facility]                    

 

                HEMATOLOGY                            MPV             8.8                            7.4 - 10.4

                            2015                                               

                                                      [facility]                    

 

                HEMATOLOGY                            Platelet        226                            133 -

 450                            2015                                           

                                                      [facility]                    

 

                HEMATOLOGY                            MCHC            31.1                            32.0 - 36.0

                            2015                                               

                                                      [facility]                    

 

                HEMATOLOGY                            RDW             18.8                            11.5 - 14.5

                            2015                                               

                                                      [facility]                    

 

                    URINE AND STOOL                            UA Bili             Negative 

*NA*

                    (6/21/15 10:12 AM)                            Negative                            2015

                                                                                    [facility]                    

 

                    URINE AND STOOL                            UA Ketones          Negative mg/dL             

                          Negative mg/dL                            2015                  

                                                                            [facility]         

           

 

                    URINE AND STOOL                            UA Blood            Negative 

                    (6/21/15 10:12 AM)                            Negative                            2015

                                                                                    [facility]                    

 

                    URINE AND STOOL                            UA Nitrite          Negative 

                    (6/21/15 10:12 AM)                            Negative                            2015

                                                                                    [facility]                    

 

                    URINE AND STOOL                            UA Leuk Est         Negative 

                    (6/21/15 10:12 AM)                            Negative                            2015

                                                                                    [facility]                    

 

                URINE AND STOOL                            UA RBC          <1                            0 -

 2                            2015                                             

                                                      [facility]                    

 

                URINE AND STOOL                            UA WBC          <1                            0 -

 5                            2015                                             

                                                      [facility]                    

 

                    URINE AND STOOL                            UA Sq Epi           None Seen                   

                                                2015                                        

                                                      [facility]                    

 

                    URINE AND STOOL                            UA Color            Ltyellow                     

                                                2015                                          

                                                      [facility]                    

 

                    URINE AND STOOL                            UA Urobilinogen     <=1.0 mg/dL           

                          0.1 - 1.0                            2015                     

                                                                            [facility]            

        

 

                    URINE AND STOOL                            UA Turbidity        Clear 

                    (6/21/15 10:12 AM)                            Clear                            2015

                                                                                    [facility]                    

 

                URINE AND STOOL                            UA pH           7.0                            5.0

 - 8.0                            2015                                         

                                                      [facility]                    

 

                    URINE AND STOOL                            UA Spec Grav        1.005                    

                    <=1.030                            2015                                  

                                                      [facility]                    

 

                    URINE AND STOOL                            UA Protein          Negative mg/dL             

                          Negative mg/dL                            2015                  

                                                                            [facility]         

           

 

                    URINE AND STOOL                            UA Glucose          50 mg/dL                   

                          Negative mg/dL                            2015                        

                                                                            [facility]               

     

 

                CHEM PANEL                            Magnesium Lvl    2.2                            

1.8 - 2.4                            2015                                      

                                                      [facility]                    

 

                CHEM PANEL                            Glucose Lvl     236                            70

 - 99                            2015                                          

                                        <sup>6</sup>Interpretive Data: Adult reference range values reflect the

 clinical guidelines<br/>of the [employer] Diabetes [employer].                  
                                        [facility]                    

 

                CHEM PANEL                            BUN             11                            7 - 22     

                          2015                                                    

                                                      [facility]                    

 

                    CHEM PANEL                            Creatinine Lvl      0.9                         

                    0.5 - 1.4                            2015                                     

                                                      [facility]                    

 

                CHEM PANEL                            CO2             31                            24 - 32    

                          2015                                                   

                                                      [facility]                    

 

                CHEM PANEL                            eGFR            84                                      

                    2015                                                        <sup>3</sup>Result

 Comment: The eGFR is calculated using the CKD-EPI formula. In most young, 
healthy individuals the eGFR will be >90 mL/min/1.73m2. The eGFR declines with 
age. An eGFR of 60-89 may be normal in some populations, particularly the 
elderly, for whom the CKD-EPI formula has not been extensively validated. Use of
 the eGFR is not recommended in the following populations:&
lt;br/><br/>Individuals with unstable creatinine concentrations, including 
pregnant patients and those with serious co-morbid conditions.<br/><br/>Patients
 with extremes in muscle mass or diet. <br/><br/>The data above are obtained 
from the National Kidney Disease Education Program (NKDEP) which additionally 
recommends that when the eGFR is used in patients with extremes of body mass 
index for purposes of drug dosing, the eGFR should be multiplied by the 
estimated BMI.                            [facility]                    

 

                CHEM PANEL                            AGAP            11.3                            10.0 - 20.0

                            2015                                               

                                                      [facility]                    

 

                CHEM PANEL                            Calcium Lvl     8.7                            8.5

 - 10.5                            2015                                        

                                                      [facility]                    

 

                CHEM PANEL                            Chloride Lvl    99                            95

 - 109                            2015                                         

                                                      [facility]                    

 

                CHEM PANEL                            Sodium Lvl      137                            135

 - 145                            2015                                         

                                                      [facility]                    

 

                CHEM PANEL                            Potassium Lvl    4.3                            

3.5 - 5.1                            2015                                      

                                                      [facility]                    

 

                HEMATOLOGY                            PTT             46.9                            22.9 - 35.8

                            2015                                               

                                        <sup>15</sup>Interpretive Data: Heparin Therapeutic Range:  57 - 92 Seconds

                                        [facility]                    

 

                CHEM PANEL                            Magnesium Lvl    2.0                            

1.8 - 2.4                            2015                                      

                                                      [facility]                    

 

                ELECTROLYTES                            AGAP            12.7                            10.0 -

 20.0                            2015                                          

                                                      [facility]                    

 

                    ELECTROLYTES                            Chloride Lvl        100                         

                    95 - 109                            2015                                      

                                                      [facility]                    

 

                    ELECTROLYTES                            Potassium Lvl       3.7                        

                    3.5 - 5.1                            2015                                    

                                                      [facility]                    

 

                ELECTROLYTES                            Sodium Lvl      136                            135

 - 145                            2015                                         

                                                      [facility]                    

 

                ELECTROLYTES                            eGFR            89                                    

                    2015                                                        <sup>4</sup>Result

 Comment: The eGFR is calculated using the CKD-EPI formula. In most young, 
healthy individuals the eGFR will be >90 mL/min/1.73m2. The eGFR declines with 
age. An eGFR of 60-89 may be normal in some populations, particularly the 
elderly, for whom the CKD-EPI formula has not been extensively validated. Use of
 the eGFR is not recommended in the following populations:&
lt;br/><br/>Individuals with unstable creatinine concentrations, including 
pregnant patients and those with serious co-morbid conditions.<br/><br/>Patients
 with extremes in muscle mass or diet. <br/><br/>The data above are obtained 
from the National Kidney Disease Education Program (NKDEP) which additionally 
recommends that when the eGFR is used in patients with extremes of body mass 
index for purposes of drug dosing, the eGFR should be multiplied by the 
estimated BMI.                            [facility]                    

 

                ELECTROLYTES                            Calcium Lvl     8.3                            

8.5 - 10.5                            2015                                     

                                                      [facility]                    

 

                ELECTROLYTES                            Glucose Lvl     269                            

70 - 99                            2015                                        

                                        <sup>7</sup>Interpretive Data: Adult reference range values reflect

 the clinical guidelines<br/>of the [employer].              
                                        [facility]                    

 

                    ELECTROLYTES                            Creatinine Lvl      0.8                       

                    0.5 - 1.4                            2015                                   

                                                      [facility]                    

 

                ELECTROLYTES                            BUN             10                            7 - 22   

                          2015                                                  

                                                      [facility]                    

 

                ELECTROLYTES                            CO2             27                            24 - 32  

                          2015                                                 

                                                      [facility]                    

 

                HEMATOLOGY                            PTT             >200 seconds                            22.9

 - 35.8                            2015                                        

                                        <sup>16</sup>Result Comment: Critical Result(s) called to nelsy dixon

 at 2015 05:59 by glenna.  Read back OK.<br/><br/>will recollect another 
specimen<br/><sup>17&lt;/sup>Interpretive Data: Heparin Therapeutic Range:  57 -
 92 Seconds                             [facility]                    

 

                HEMATOLOGY                            Segs            75.9                            45.0 - 75.0

                            2015                                               

                                                      [facility]                            Lymphocytes     14.1                            20.0

 - 40.0                            2015                                        

                                                      [facility]                            Lymphocytes #    1.3                            

1.0 - 5.5                            2015                                      

                                                      [facility]                    

 

                HEMATOLOGY                            Segs-Bands #    7.3                            1.5

 - 8.1                            2015                                         

                                                      [facility]                    

 

                HEMATOLOGY                            Eosinophils     1.9                            0.0

 - 4.0                            2015                                         

                                                      [facility]                    

 

                HEMATOLOGY                            Monocytes       7.7                            2.0 

- 12.0                            2015                                         

                                                      [facility]                    

 

                HEMATOLOGY                            Basophils       0.4                            0.0 

- 1.0                            2015                                          

                                                      [facility]                    

 

                HEMATOLOGY                            Eosinophils #    0.2                            

0.0 - 0.5                            2015                                      

                                                      [facility]                    

 

                    HEMATOLOGY                            Microcyte           1+ 

*ABN*

                    (6/20/15 5:07 AM)                            None Seen                            2015

                                                                                    [facility]                    

 

                HEMATOLOGY                            Monocytes #     0.7                            0.0

 - 0.8                            2015                                         

                                                      [facility]                    

 

                HEMATOLOGY                            MCV             74.0                            80.0 - 94.0

                            2015                                               

                                                      [facility]                    

 

                HEMATOLOGY                            RBC             4.14                            4.70 - 6.10

                            2015                                               

                                                      [facility]                    

 

                HEMATOLOGY                            WBC             9.5                            3.7 - 10.4

                            2015                                               

                                                      [facility]                    

 

                HEMATOLOGY                            Platelet        220                            133 -

 450                            2015                                           

                                                      [facility]                            RDW             19.0                            11.5 - 14.5

                            2015                                               

                                                      [facility]                            MPV             8.6                            7.4 - 10.4

                            2015                                               

                                                      [facility]                            MCHC            32.0                            32.0 - 36.0

                            2015                                               

                                                      [facility]                            MCH             23.6                            27.0 - 31.0

                            2015                                               

                                                      [facility]                            Hct             30.6                            42.0 - 54.0

                            2015                                               

                                                      [facility]                            Hgb             9.8                            14.0 - 18.0

                            2015                                               

                                                      [facility]                    

 

                CHEM PANEL                            Phosphorus      3.3                            2.5

 - 4.5                            2015                                         

                                                      [facility]                    

 

                HEMATOLOGY                            PTT             62.7                            22.9 - 35.8

                            2015                                               

                                        <sup>18</sup>Interpretive Data: Heparin Therapeutic Range:  57 - 92 Seconds

                                        [facility]                    

 

                    CARDIAC ENZYMES                            CK MB Index         3.8                       

                    0.0 - 2.5                            2015                                   

                                                      [facility]                    

 

                    CARDIAC ENZYMES                            Troponin-I          0.52                       

                    0.00 - 0.40                            2015                                 

                                        <sup>8</sup>Result Comment: Critical Result(s) called to l duogo

 at 2015 08:33 by sb.  Read back OK.                            [facility] 

                   

 

                CARDIAC ENZYMES                            CK MB           6.5                            0.5

 - 3.6                            2015                                         

                                                      [facility]                    

 

                CARDIAC ENZYMES                            Total CK        173                            

12 - 191                            2015                                       

                                                      [facility]                    

 

                CARDIAC ENZYMES                            Total CK        173                            

12 - 191                            2015                                       

                                                      [facility]                    

 

                    HEMATOLOGY                            Microcyte           1+ 

*ABN*

                    (6/19/15 7:53 AM)                            None Seen                            2015

                                                                                    [facility]                    

 

                HEMATOLOGY                            Eosinophils     4.4                            0.0

 - 4.0                            2015                                         

                                                      [facility]                    

 

                HEMATOLOGY                            Monocytes       8.2                            2.0 

- 12.0                            2015                                         

                                                      [facility]                    

 

                HEMATOLOGY                            Segs            53.8                            45.0 - 75.0

                            2015                                               

                                                      [facility]                    

 

                HEMATOLOGY                            Lymphocytes     31.0                            20.0

 - 40.0                            2015                                        

                                                      [facility]                    

 

                HEMATOLOGY                            Lymphocytes #    2.1                            

1.0 - 5.5                            2015                                      

                                                      [facility]                    

 

                HEMATOLOGY                            Segs-Bands #    3.7                            1.5

 - 8.1                            2015                                         

                                                      [facility]                    

 

                HEMATOLOGY                            Basophils #     0.2                            0.0

 - 0.2                            2015                                         

                                                      [facility]                    

 

                HEMATOLOGY                            Eosinophils #    0.3                            

0.0 - 0.5                            2015                                      

                                                      [facility]                    

 

                HEMATOLOGY                            Monocytes #     0.6                            0.0

 - 0.8                            2015                                         

                                                      [facility]                    

 

                HEMATOLOGY                            Basophils       2.6                            0.0 

- 1.0                            2015                                          

                                                      [facility]                    

 

                HEMATOLOGY                            INR             1.23                            0.85 - 1.17

                            2015                                               

                                        <sup>12</sup>Interpretive Data: RECOMMENDED RANGES FOR PROTIME INR:<br/>  

 2.0-3.0 for most medical and surgical thromboembolic states.<br/>   2.5-3.5 for
 artificial heart valves and recurrent embolism.<br/><br/>INR SHOULD BE USED 
ONLY FOR PATIENTS ON STABLE ANTICOAGULANT THERAPY.                            [facility]

                    

 

                HEMATOLOGY                            PT              15.6                            12.0 - 14.7

                            2015                                               

                                                      [facility]                            WBC             6.9                            3.7 - 10.4

                            2015                                               

                                                      [facility]                            MCV             75.8                            80.0 - 94.0

                            2015                                               

                                                      [facility]                            Hct             31.4                            42.0 - 54.0

                            2015                                               

                                                      [facility]                            Hgb             9.6                            14.0 - 18.0

                            2015                                               

                                                      [facility]                            RBC             4.15                            4.70 - 6.10

                            2015                                               

                                                      [facility]                            MCHC            30.6                            32.0 - 36.0

                            2015                                               

                                                      [facility]                            MCH             23.2                            27.0 - 31.0

                            2015                                               

                                                      [facility]                            MPV             8.7                            7.4 - 10.4

                            2015                                               

                                                      [facility]                            RDW             18.8                            11.5 - 14.5

                            2015                                               

                                                      [facility]                            Platelet        209                            133 -

 450                            2015                                           

                                                      [facility]                    

 

                LIPIDS                            VLDL            14                                          

                    2015                                                               

                                        [facility]                    

 

                LIPIDS                            HDL             33                            >=61 mg/dL     

                          2015                                                    

                                                      [facility]                    

 

                LIPIDS                            Trig            71                            <=149 mg/dL   

                          2015                                                  

                                                      [facility]                    

 

                LIPIDS                            Chol            72                            <=199 mg/dL   

                          2015                                                  

                                                      [facility]                    

 

                LIPIDS                            LDL (Calculated)    25                            <=99

 mg/dL                            2015                                         

                                                      [facility]                    

 

                LIPIDS                            CHD Risk        2.18                            4.00 - 7.30

                            2015                                               

                                                      [facility]                    

 

                    URINE AND STOOL                            UA Spec Grav        1.011                    

                    <=1.030                            2015                                  

                                                      [facility]                    

 

                    URINE AND STOOL                            UA Turbidity        Clear 

                    (6/19/15 3:22 AM)                            Clear                            2015

                                                                                    [facility]                    

 

                    URINE AND STOOL                            UA Protein          Negative mg/dL             

                          Negative mg/dL                            2015                  

                                                                            [facility]         

           

 

                URINE AND STOOL                            UA pH           5.0                            5.0

 - 8.0                            2015                                         

                                                      [facility]                    

 

                URINE AND STOOL                            UA WBC          <1                            0 -

 5                            2015                                             

                                                      [facility]                    

 

                    URINE AND STOOL                            UA Leuk Est         Negative 

                    (6/19/15 3:22 AM)                            Negative                            2015

                                                                                    [facility]                    

 

                    URINE AND STOOL                            UA Mucus            Few /LPF                     

                    None Seen /LPF                            2015                            

                                                        [facility]                 

   

 

                URINE AND STOOL                            UA RBC          <1                            0 -

 2                            2015                                             

                                                      [facility]                    

 

                    URINE AND STOOL                            UA Hyal Cast        2                        

                    0 - 2                            2015                                        

                                                      [facility]                    

 

                    URINE AND STOOL                            UA Ketones          Negative mg/dL             

                          Negative mg/dL                            2015                  

                                                                            [facility]         

           

 

                    URINE AND STOOL                            UA Glucose          Negative mg/dL             

                          Negative mg/dL                            2015                  

                                                                            [facility]         

           

 

                    URINE AND STOOL                            UA Blood            Negative 

                    (6/19/15 3:22 AM)                            Negative                            2015

                                                                                    [facility]                    

 

                    URINE AND STOOL                            UA Bili             Negative 

*NA*

                    (6/19/15 3:22 AM)                            Negative                            2015

                                                                                    [facility]                    

 

                    URINE AND STOOL                            UA Nitrite          Negative 

                    (6/19/15 3:22 AM)                            Negative                            2015

                                                                                    [facility]                    

 

                    URINE AND STOOL                            UA Urobilinogen     <=1.0 mg/dL           

                          0.1 - 1.0                            2015                     

                                                                            [facility]            

        

 

                    URINE AND STOOL                            UA Color            Ltyellow                     

                                                2015                                          

                                                      [facility]                    

 

                    URINE AND STOOL                            UA Sq Epi           None Seen                   

                                                2015                                        

                                                      [facility]                    

 

                    BLOOD BANK RESULTS                            Antibody Scrn       Negative 

                    (6/18/15 10:08 PM)                                                        2015

                                                                                    [facility]                    

 

                    BLOOD BANK RESULTS                            ABO/Rh              A NEG                       

                                                2015                                            

                                                      [facility]                    

 

                    CARDIAC ENZYMES                            CK MB Index         4.0                       

                    0.0 - 2.5                            2015                                   

                                                      [facility]                    

 

                CARDIAC ENZYMES                            Total CK        200                            

12 - 191                            2015                                       

                                                      [facility]                    

 

                    CARDIAC ENZYMES                            Troponin-I          0.83                       

                    0.00 - 0.40                            2015                                 

                                        <sup>9</sup>Result Comment: Critical Result(s) called to kameron soto at 2015 22:56 by lgb.  Read back OK.                            [facility]

                    

 

                CARDIAC ENZYMES                            CK MB           8.0                            0.5

 - 3.6                            2015                                         

                                                      [facility]                    

 

                HEMATOLOGY                            INR             1.35                            0.85 - 1.17

                            2015                                               

                                        <sup>13</sup>Interpretive Data: RECOMMENDED RANGES FOR PROTIME INR:<br/>  

 2.0-3.0 for most medical and surgical thromboembolic states.<br/>   2.5-3.5 for
 artificial heart valves and recurrent embolism.<br/><br/>INR SHOULD BE USED 
ONLY FOR PATIENTS ON STABLE ANTICOAGULANT THERAPY.                            [facility]

                    

 

                HEMATOLOGY                            PT              16.8                            12.0 - 14.7

                            2015                                               

                                                      [facility]                    

 

                    BLOOD BANK RESULTS                            RBC product         Product available 1

                    (6/18/15 9:43 PM)                                                        2015 

                                                       <sup>1</sup>Result Comment: 2015

 23:35  [id number]<br/>Called to WILSON Luther at 2015 23:35 by Mendel Heredia. 
                                        [facility]                    

 

                CARDIAC ENZYMES                            BNP             284                            <=100

 pg/mL                            2015                                         

                                        <sup>11</sup>Interpretive Data: Elevated results are in line with increasing

 severity of<br/>congestive heart failure. Minor elevations between 100 and 
300<br/>may be seen with Myocardial Ischemia, Sodium retaining drugs,<br/>and 
compensated/treated heart failure.                            [facility]         

           

 

                    CARDIAC ENZYMES                            CK MB Index         4.1                       

                    0.0 - 2.5                            2015                                   

                                                      [facility]                    

 

                CARDIAC ENZYMES                            CK MB           9.3                            0.5

 - 3.6                            2015                                         

                                                      [facility]                    

 

                    CARDIAC ENZYMES                            Troponin-I          1.01                       

                    0.00 - 0.40                            2015                                 

                                        <sup>10</sup>Result Comment: Critical Result(s) called to jenise strange at 2015 16:58_ byAN_.  Read back OK.                            [facility]

                    

 

                    CHEM PANEL                            Lactic Acid Lvl     1.8                        

                    0.5 - 2.2                            2015                                    

                                                      [facility]                    

 

                CHEM PANEL                            Globulin        4.0                            2.0 -

 4.0                            2015                                           

                                                      [facility]                    

 

                CHEM PANEL                            B/C Ratio       12                            6 - 25

                            2015                                               

                                                      [facility]                    

 

                CHEM PANEL                            A/G Ratio       0.8                            0.7 

- 1.6                            2015                                          

                                                      [facility]                    

 

                CHEM PANEL                            Albumin Lvl     3.2                            3.5

 - 5.0                            2015                                         

                                                      [facility]                    

 

                CHEM PANEL                            Bili Total      0.5                            0.2

 - 1.3                            2015                                         

                                                      [facility]                    

 

                CHEM PANEL                            Total Protein    7.2                            

6.4 - 8.4                            2015                                      

                                                       [facility]                    

 

                CHEM PANEL                            ALT             66                            0 - 65     

                          2015                                                    

                                                      [facility]                    

 

                CHEM PANEL                            AST             76                            0 - 37     

                          2015                                                    

                                                      [facility]                    

 

                CHEM PANEL                            Alk Phos        179                            39 - 

136                            2015                                            

                                                      [facility]                    

 

                HEMATOLOGY                            Basophils #     0.1                            0.0

 - 0.2                            2015                                         

                                                      [facility]                    

 

                HEMATOLOGY                            INR             1.24                            0.85 - 1.17

                            2015                                               

                                        <sup>14</sup>Interpretive Data: RECOMMENDED RANGES FOR PROTIME INR:<br/>  

 2.0-3.0 for most medical and surgical thromboembolic states.<br/>   2.5-3.5 for
 artificial heart valves and recurrent embolism.<br/><br/>INR SHOULD BE USED 
ONLY FOR PATIENTS ON STABLE ANTICOAGULANT THERAPY.                            [facility]

                    

 

                HEMATOLOGY                            PT              15.7                            12.0 - 14.7

                            2015                                               

                                                      [facility] PANEL                            eGFR            74                                      

                    2015                                                        <sup>1</sup>Result

 Comment: The eGFR is calculated using the CKD-EPI formula. In most young, 
healthy individuals the eGFR will be >90 mL/min/1.73m2. The eGFR declines with 
age. An eGFR of 60-89 may be normal in some populations, particularly the 
elderly, for whom the CKD-EPI formula has not been extensively validated. Use of
 the eGFR is not recommended in the following populations:&
lt;br/><br/>Individuals with unstable creatinine concentrations, including 
pregnant patients and those with serious co-morbid conditions.<br/><br/>Patients
 with extremes in muscle mass or diet. <br/><br/>The data above are obtained 
from the National Kidney Disease Education Program (NKDEP) which additionally 
recommends that when the eGFR is used in patients with extremes of body mass 
index for purposes of drug dosing, the eGFR should be multiplied by the 
estimated BMI.                            [facility]                    

 

                CHEM PANEL                            Calcium Lvl     8.2                            8.5

 - 10.5                            2015                                        

                                                      [facility]                    

 

                CHEM PANEL                            CO2             28                            24 - 32    

                          2015                                                   

                                                      [facility]                    

 

                CHEM PANEL                            AGAP            8.8                            10.0 - 20.0

                            2015                                               

                                                      [facility]                    

 

                CHEM PANEL                            Potassium Lvl    3.8                            

3.5 - 5.1                            2015                                      

                                                      [facility]                    

 

                CHEM PANEL                            Chloride Lvl    104                            95

 - 109                            2015                                         

                                                      [facility]                    

 

                    CHEM PANEL                            Creatinine Lvl      1.0                         

                    0.5 - 1.4                            2015                                     

                                                      [facility]                    

 

                CHEM PANEL                            Glucose Lvl     235                            70

 - 99                            2015                                          

                                        <sup>4</sup>Interpretive Data: Adult reference range values reflect the

 clinical guidelines<br/>of the American Diabetes [employer].                  
                                        [facility]                    

 

                CHEM PANEL                            BUN             12                            7 - 22     

                          2015                                                    

                                                      [facility]                    

 

                CHEM PANEL                            Sodium Lvl      137                            135

 - 145                            2015                                         

                                                      [facility]                    

 

                    CARDIAC ENZYMES                            Troponin-I          <0.02                      

                    0.00 - 0.40                            2015                                

                                                      [facility]                    

 

                CHEM PANEL                            Total Protein    6.3                            

6.4 - 8.4                            2015                                      

                                                      [facility]                    

 

                CHEM PANEL                            B/C Ratio       16                            6 - 25

                            2015                                               

                                                      [facility]                    

 

                CHEM PANEL                            AGAP            12.6                            10.0 - 20.0

                            2015                                               

                                                      [facility]                    

 

                CHEM PANEL                            Globulin        3.4                            2.0 -

 4.0                            2015                                           

                                                      [facility]                    

 

                CHEM PANEL                            A/G Ratio       0.9                            0.7 

- 1.6                            2015                                          

                                                      [facility]                    

 

                CHEM PANEL                            Alk Phos        121                            39 - 

136                            2015                                            

                                                      [facility]                    

 

                CHEM PANEL                            Bili Total      0.4                            0.2

 - 1.3                            2015                                         

                                                      [facility]                    

 

                CHEM PANEL                            AST             41                            0 - 37     

                          2015                                                    

                                                      [facility]                    

 

                CHEM PANEL                            ALT             44                            0 - 65     

                          2015                                                    

                                                      [facility]                    

 

                CHEM PANEL                            Glucose Lvl     61                            70 

- 99                            2015                                           

                                        <sup>5</sup>Interpretive Data: Adult reference range values reflect the

 clinical guidelines<br/>of the American Diabetes [employer].                  
                                        [facility]                    

 

                CHEM PANEL                            BUN             14                            7 - 22     

                          2015                                                    

                                                      [facility]                    

 

                CHEM PANEL                            CO2             25                            24 - 32    

                          2015                                                   

                                                      [facility]                    

 

                CHEM PANEL                            eGFR            84                                      

                    2015                                                        <sup>2</sup>Result

 Comment: The eGFR is calculated using the CKD-EPI formula. In most young, 
healthy individuals the eGFR will be >90 mL/min/1.73m2. The eGFR declines with 
age. An eGFR of 60-89 may be normal in some populations, particularly the 
elderly, for whom the CKD-EPI formula has not been extensively validated. Use of
 the eGFR is not recommended in the following populations:&
lt;br/><br/>Individuals with unstable creatinine concentrations, including 
pregnant patients and those with serious co-morbid conditions.<br/><br/>Patients
 with extremes in muscle mass or diet. <br/><br/>The data above are obtained 
from the National Kidney Disease Education Program (NKDEP) which additionally 
recommends that when the eGFR is used in patients with extremes of body mass 
index for purposes of drug dosing, the eGFR should be multiplied by the 
estimated BMI.                            [facility]                    

 

                CHEM PANEL                            Albumin Lvl     2.9                            3.5

 - 5.0                            2015                                         

                                                      [facility]                    

 

                    CHEM PANEL                            Creatinine Lvl      0.9                         

                    0.5 - 1.4                            2015                                     

                                                      [facility]                    

 

                CHEM PANEL                            Potassium Lvl    3.6                            

3.5 - 5.1                            2015                                      

                                                      [facility]                    

 

                CHEM PANEL                            Chloride Lvl    108                            95

 - 109                            2015                                         

                                                      [facility]                    

 

                CHEM PANEL                            Sodium Lvl      142                            135

 - 145                            2015                                         

                                                      [facility]                    

 

                CHEM PANEL                            Calcium Lvl     7.8                            8.5

 - 10.5                            2015                                        

                                                      [facility]                    

 

                CHEM PANEL                            Bili Direct     0.1                            0.0

 - 0.3                            2015                                         

                                                      [facility]                    

 

                CHEM PANEL                            Alk Phos        124                            39 - 

136                            2015                                            

                                                      [facility]                    

 

                CHEM PANEL                            AST             46                            0 - 37     

                          2015                                                    

                                                      [facility]                    

 

                CHEM PANEL                            ALT             45                            0 - 65     

                          2015                                                    

                                                      [facility]                    

 

                CHEM PANEL                            Bili Total      0.6                            0.2

 - 1.3                            2015                                         

                                                      [facility]                    

 

                LIPIDS                            VLDL            20                                          

                    2015                                                               

                                        [facility]                    

 

                LIPIDS                            LDL (Calculated)    20                            <=99

 mg/dL                            2015                                         

                                                      [facility]                    

 

                LIPIDS                            Chol            67                            <=199 mg/dL   

                          2015                                                  

                                                      [facility]                    

 

                LIPIDS                            CHD Risk        2.48                            4.00 - 7.30

                            2015                                               

                                                      [facility]                    

 

                LIPIDS                            HDL             27                            >=61 mg/dL     

                          2015                                                    

                                                      [facility]                    

 

                LIPIDS                            Trig            99                            <=149 mg/dL   

                          2015                                                  

                                                      [facility]                    

 

                    BACTERIAL - SEROLOGY                            MRSA by PCR         Negative 13

                    (3/29/15 9:32 PM)                                                        2015 

                                                       <sup>13</sup>Interpretive Data:

 Interpretive Data: The Roche LightCycler MRSA assay is a qualitative test for 
the direct detection of nasal colonization with methicillin-resistant Staphyloco
ccus aureus (MRSA) to aid in the prevention and control of MRSA infections in 
healthcare settings. A positive result does not indicate an infection or require
 treatment. A negative result does not exclude colonization or 
infection.<br/><br/>The polymerase chain reaction (PCR) assay detects a 
proprietary sequence indicative of the integration of the SCCmec cassette into 
the Staphylococcus aureus chromosome, indicating the presence of MRSA DNA.  The 
assay utilizes FDA cleared IVD reagents. Performance characteristics have been 
verified by the [facility] within the [facility]. The [facility] is authorized under the Clinical 
Laboratory Improvement Amendment of 1988 (CLIA-88) to perform high complexity 
testing.                                [facility]                    

 

                    CHEM PANEL                            Lactic Acid Lvl     1.7                        

                    0.5 - 2.2                            2015                                    

                                                      [facility]                    

 

                    DRUG SCREEN                            UDS Note            See Note 8

                    (3/29/15 5:56 PM)                                                        2015 

                                                       <sup>8</sup>Interpretive Data:

 Drugs reported as positive have not been confirmed by a second<br/>method and 
should be used for medical purposes only. To order<br/>confirmation, contact 
laboratory.<br/><br/>**note: Below are cut-off concentrations for all urine 
drugs of<br/>abuse performed in the laboratory. Some drugs listed in the 
table<br/>may not be included in this panel.<br/><br/>Description               
Cut-off 
concentration<br/>--------------------------------------------------<br/>Amphetamine
                  1000 ng/mL<br/>Barbiturates                  200 
ng/mL<br/>Benzodiazepines               300 ng/mL<br/>Cocaine metabolites       
    300 ng/mL<br/>Opiates                       300 ng/mL&lt;br/>Phencyclidine  
                25 ng/mL<br/>Propoxyphene                  300 
ng/mL<br/>Marijuana metabolites          50 ng/mL<br/>Methadone                 
    300 ng/mL&lt;br/>Urine alcohol                  20 mg/dL                    
                                        [facility]                    

 

                    DRUG SCREEN                            U Phencyc Scr       Negative 

*NA*

                    (3/29/15 5:56 PM)                            Negative                            2015

                                                                                     

[facility]                    

 

                    DRUG SCREEN                            U Benzodia Scr      Positive 

*ABN*

                    (3/29/15 5:56 PM)                            Negative                            2015

                                                                                     

[facility]                    

 

                    DRUG SCREEN                            U Gracie Scr          Negative 

*NA*

                    (3/29/15 5:56 PM)                            Negative                            2015

                                                                                     

[facility]                    

 

                    DRUG SCREEN                            U Cannab Scr        Negative 

*NA*

                    (3/29/15 5:56 PM)                            Negative                            2015

                                                                                     

[facility]                    

 

                    DRUG SCREEN                            U Cocaine Scr       Negative 

*NA*

                    (3/29/15 5:56 PM)                            Negative                            2015

                                                                                     

[facility]                    

 

                    DRUG SCREEN                            U Opiate Scr        Positive 

*ABN*

                    (3/29/15 5:56 PM)                            Negative                            2015

                                                                                     

[facility]                    

 

                    DRUG SCREEN                            U Amph Scr          Negative 

*NA*

                    (3/29/15 5:56 PM)                            Negative                            2015

                                                                                    [facility]                    

 

                    URINE AND STOOL                            UA Color            Ltyellow                     

                                                2015                                          

                                                      [facility]                    

 

                    URINE AND STOOL                            UA Sq Epi           None Seen                   

                                                2015                                        

                                                      [facility]                    

 

                    URINE AND STOOL                            UA Bacteria         Occasional /HPF           

                          None Seen /HPF                            2015                

                                                                             [facility]       

             

 

                    URINE AND STOOL                            UA Leuk Est         Negative 

                    (3/29/15 5:56 PM)                            Negative                            2015

                                                                                     

[facility]                    

 

                    URINE AND STOOL                            UA Nitrite          Negative 

                    (3/29/15 5:56 PM)                            Negative                            2015

                                                                                     

[facility]                    

 

                    URINE AND STOOL                            UA Urobilinogen     <=1.0 mg/dL           

                          0.1 - 1.0                            2015                     

                                                                            [facility]            

        

 

                    URINE AND STOOL                            UA Hyal Cast        2                        

                    0 - 2                            2015                                        

                                                      [facility]                    

 

                    URINE AND STOOL                            UA Mucus            Few /LPF                     

                    None Seen /LPF                            2015                            

                                                         [facility]                 

   

 

                    URINE AND STOOL                            UA Bili             Negative 

*NA*

                    (3/29/15 5:56 PM)                            Negative                            2015

                                                                                    [facility]                    

 

                    URINE AND STOOL                            UA Blood            Negative 

                    (3/29/15 5:56 PM)                            Negative                            2015

                                                                                     

[facility]                    

 

                    URINE AND STOOL                            UA Glucose          Negative mg/dL             

                          Negative mg/dL                            2015                  

                                                                            [facility]         

           

 

                    URINE AND STOOL                            UA Ketones          Negative mg/dL             

                          Negative mg/dL                            2015                  

                                                                            [facility]         

           

 

                URINE AND STOOL                            UA pH           5.0                            5.0

 - 8.0                            2015                                         

                                                       [facility]                    

 

                    URINE AND STOOL                            UA Protein          Negative mg/dL             

                          Negative mg/dL                            2015                  

                                                                            [facility]         

           

 

                    URINE AND STOOL                            UA Spec Grav        1.010                    

                    <=1.030                            2015                                  

                                                      [facility]                    

 

                    URINE AND STOOL                            UA Turbidity        Clear 

                    (3/29/15 5:56 PM)                            Clear                            2015

                                                                                    [facility]                    

 

                CARDIAC ENZYMES                            CK MB           2.6                            0.5

 - 3.6                            2015                                         

                                                      [facility]                    

 

                    CARDIAC ENZYMES                            Troponin-I          <0.02                      

                    0.00 - 0.40                            2015                                

                                                      [facility]                    

 

                CARDIAC ENZYMES                            Total CK        76                            12

 - 191                            2015                                         

                                                      [facility]                    

 

                    CARDIAC ENZYMES                            CK MB Index         3.4                       

                    0.0 - 2.5                            2015                                   

                                                      [facility]                    

 

                CARDIAC ENZYMES                            BNP             266                            <=100

 pg/mL                            2015                                         

                                        <sup>7</sup>Interpretive Data: Elevated results are in line with increasing

 severity of<br/>congestive heart failure. Minor elevations between 100 and 
300<br/>may be seen with Myocardial Ischemia, Sodium retaining drugs,<br/>and 
compensated/treated heart failure.                            [facility]         

           

 

                CHEM PANEL                            Bili Total      0.3                            0.2

 - 1.3                            2015                                         

                                                      [facility]                    

 

                CHEM PANEL                            AGAP            10.5                            10.0 - 20.0

                            2015                                               

                                                      [facility]                    

 

                CHEM PANEL                            B/C Ratio       13                            6 - 25

                            2015                                               

                                                      [facility]                    

 

                CHEM PANEL                            Globulin        3.7                            2.0 -

 4.0                            2015                                           

                                                      [facility]                    

 

                CHEM PANEL                            A/G Ratio       0.8                            0.7 

- 1.6                            2015                                          

                                                      [facility]                    

 

                CHEM PANEL                            Total Protein    6.5                            

6.4 - 8.4                            2015                                      

                                                      [facility]                    

 

                CHEM PANEL                            Albumin Lvl     2.8                            3.5

 - 5.0                            2015                                         

                                                      [facility]                    

 

                CHEM PANEL                            AST             48                            0 - 37     

                          2015                                                    

                                                      [facility]                    

 

                CHEM PANEL                            Alk Phos        123                            39 - 

136                            2015                                            

                                                      [facility]                    

 

                CHEM PANEL                            ALT             46                            0 - 65     

                          2015                                                    

                                                      [facility]                    

 

                CHEM PANEL                            Glucose Lvl     105                            70

 - 99                            2015                                          

                                        <sup>6</sup>Interpretive Data: Adult reference range values reflect the

 clinical guidelines<br/>of the [employer].                  
                                        [facility]                    

 

                CHEM PANEL                            BUN             14                            7 - 22     

                          2015                                                    

                                                      [facility]                    

 

                CHEM PANEL                            CO2             29                            24 - 32    

                          2015                                                   

                                                      [facility]                    

 

                CHEM PANEL                            eGFR            66                                      

                    2015                                                        <sup>3</sup>Result

 Comment: The eGFR is calculated using the CKD-EPI formula. In most young, 
healthy individuals the eGFR will be >90 mL/min/1.73m2. The eGFR declines with 
age. An eGFR of 60-89 may be normal in some populations, particularly the 
elderly, for whom the CKD-EPI formula has not been extensively validated. Use of
 the eGFR is not recommended in the following populations:&
lt;br/><br/>Individuals with unstable creatinine concentrations, including 
pregnant patients and those with serious co-morbid conditions.<br/><br/>Patients
 with extremes in muscle mass or diet. <br/><br/>The data above are obtained 
from the National Kidney Disease Education Program (NKDEP) which additionally 
recommends that when the eGFR is used in patients with extremes of body mass 
index for purposes of drug dosing, the eGFR should be multiplied by the 
estimated BMI.                            [facility]                    

 

                CHEM PANEL                            Potassium Lvl    3.5                            

3.5 - 5.1                            2015                                      

                                                      [facility]                    

 

                CHEM PANEL                            Sodium Lvl      140                            135

 - 145                            2015                                         

                                                      [facility]                    

 

                    CHEM PANEL                            Creatinine Lvl      1.1                         

                    0.5 - 1.4                            2015                                     

                                                      [facility]                    

 

                CHEM PANEL                            Chloride Lvl    104                            95

 - 109                            2015                                         

                                                      [facility]                    

 

                CHEM PANEL                            Calcium Lvl     7.9                            8.5

 - 10.5                            2015                                        

                                                      [facility]                    

 

                CHEM PANEL                            Ammonia         36.0                            <=45.0

 uMol/L                            2015                                        

                                                      [facility]                            PTT             28.1                            22.9 - 35.8

                            2015                                               

                                        <sup>12</sup>Interpretive Data: Heparin Therapeutic Range:  57 - 92 Seconds

                                        [facility]                            INR             1.51                            0.85 - 1.17

                            2015                                               

                                        <sup>11</sup>Interpretive Data: RECOMMENDED RANGES FOR PROTIME INR:<br/>  

 2.0-3.0 for most medical and surgical thromboembolic states.<br/>   2.5-3.5 for
 artificial heart valves and recurrent embolism.<br/><br/>INR SHOULD BE USED 
ONLY FOR PATIENTS ON STABLE ANTICOAGULANT THERAPY.                            [facility]                            PT              18.5                            12.0 - 14.7

                            2015                                               

                                                      [facility]                            Hct             30.0                            42.0 - 54.0

                            2015                                               

                                                      [facility]                            MCH             25.0                            27.0 - 31.0

                            2015                                               

                                                      [facility]                            RDW             18.7                            11.5 - 14.5

                            2015                                               

                                                      [facility]                            MCV             78.8                            80.0 - 94.0

                            2015                                               

                                                      [facility]                    

 

                HEMATOLOGY                            MCHC            31.7                            32.0 - 36.0

                            2015                                               

                                                      [facility]                    

 

                HEMATOLOGY                            WBC             9.5                            3.7 - 10.4

                            2015                                               

                                                      [facility]                            RBC             3.81                            4.70 - 6.10

                            2015                                               

                                                      [facility]                            Hgb             9.5                            14.0 - 18.0

                            2015                                               

                                                      [facility]                    

 

                HEMATOLOGY                            Platelet        219                            133 -

 450                            2015                                           

                                                      [facility]                            MPV             8.7                            7.4 - 10.4

                            2015                                               

                                                      [facility]                            Microcyte           1+ 

*ABN*

                    (3/29/15 5:14 PM)                            None Seen                            2015

                                                                                    [facility]                    

 

                HEMATOLOGY                            Basophils #     0.1                            0.0

 - 0.2                            2015                                         

                                                      [facility]                            Eosinophils #    0.2                            

0.0 - 0.5                            2015                                      

                                                      [facility]                    

 

                HEMATOLOGY                            Monocytes #     0.8                            0.0

 - 0.8                            2015                                         

                                                      [facility]                            Lymphocytes #    2.2                            

1.0 - 5.5                            2015                                      

                                                      [facility]                            Monocytes       8.4                            2.0 

- 12.0                            2015                                         

                                                      [facility]                            Segs-Bands #    6.1                            1.5

 - 8.1                            2015                                         

                                                      [facility]                            Eosinophils     2.2                            0.0

 - 4.0                            2015                                         

                                                      [facility]                            Basophils       1.3                            0.0 

- 1.0                            2015                                          

                                                      [facility]                    

 

                HEMATOLOGY                            Segs            64.7                            45.0 - 75.0

                            2015                                               

                                                      [facility]                            Lymphocytes     23.4                            20.0

 - 40.0                            2015                                        

                                                      [facility]                    

 

                THYROID PANEL                            TSH             1.490                            0.360

 - 3.740                            2015                                       

                                                      [facility]                    

 

                    TOXICOLOGY                            Acetaminoph Lvl     <2 

                    (3/29/15 5:14 PM)                            10 - 20                            2015

                                                                                    [facility]                    

 

                TOXICOLOGY                            Etoh (%)        <0.003                              

                          2015                                                 

                                        <sup>9</sup>Interpretive Data: Ethanol testing results should be used for medical

 purposes only.<br/>Negative Range: <0.003%<br/>Toxic Range:     >0.25%         
                                        [facility]                    

 

                TOXICOLOGY                            Ethanol Lvl     <3                               

                          2015                                                  

                                        <sup>10</sup>Interpretive Data: Negative Range:   <3 mg/dL<br/>Toxic Range:  

  >250 mg/dL                            [facility]                    

 

                ELECTROLYTES                            Sodium Lvl      137                            135

 - 145                            2015                                         

                                                      [facility]                    

 

                    ELECTROLYTES                            Potassium Lvl       3.9                        

                    3.5 - 5.1                            2015                                    

                                                      [facility]                    

 

                    ELECTROLYTES                            Chloride Lvl        101                         

                    95 - 109                            2015                                      

                                                      [facility]                    

 

                ELECTROLYTES                            AGAP            13.9                            10.0 -

 20.0                            2015                                          

                                                      [facility]                    

 

                ELECTROLYTES                            Calcium Lvl     8.5                            

8.5 - 10.5                            2015                                     

                                                      [facility]                    

 

                ELECTROLYTES                            CO2             26                            24 - 32  

                          2015                                                 

                                                      [facility]                    

 

                ELECTROLYTES                            eGFR            89                                    

                    2015                                                        <sup>1</sup>Result

 Comment: The eGFR is calculated using the CKD-EPI formula. In most young, 
healthy individuals the eGFR will be >90 mL/min/1.73m2. The eGFR declines with 
age. An eGFR of 60-89 may be normal in some populations, particularly the 
elderly, for whom the CKD-EPI formula has not been extensively validated. Use of
 the eGFR is not recommended in the following populations:&
lt;br/><br/>Individuals with unstable creatinine concentrations, including 
pregnant patients and those with serious co-morbid conditions.<br/><br/>Patients
 with extremes in muscle mass or diet. <br/><br/>The data above are obtained 
from the National Kid[employer] Education Program (NKDEP) which additionally 
recommends that when the eGFR is used in patients with extremes of body mass 
index for purposes of drug dosing, the eGFR should be multiplied by the 
estimated BMI.                            [facility]                    

 

                    ELECTROLYTES                            Creatinine Lvl      0.8                       

                    0.5 - 1.4                            2015                                   

                                                      [facility]                    

 

                ELECTROLYTES                            Glucose Lvl     127                            

70 - 99                            2015                                        

                                        <sup>3</sup>Interpretive Data: Adult reference range values reflect

 the clinical guidelines<br/>of the [employer].              
                                        [facility]                    

 

                ELECTROLYTES                            BUN             10                            7 - 22   

                          2015                                                  

                                                      [facility]                    

 

                    CARDIAC ENZYMES                            CK MB Index         2.4                       

                    0.0 - 2.5                            01/15/2015                                   

                                                      [facility]                    

 

                CARDIAC ENZYMES                            CK MB           2.1                            0.5

 - 3.6                            01/15/2015                                         

                                                      [facility]                    

 

                    CARDIAC ENZYMES                            Troponin-I          0.07                       

                    0.00 - 0.40                            01/15/2015                                 

                                                      [facility]                    

 

                CARDIAC ENZYMES                            Total CK        89                            12

 - 191                            01/15/2015                                         

                                                      [facility]                    

 

                HEMATOLOGY                            PTT             28.6                            22.9 - 35.8

                            01/15/2015                                               

                                        <sup>8</sup>Interpretive Data: Heparin Therapeutic Range:  57 - 92 Seconds

                                        [facility]                    

 

                    CARDIAC ENZYMES                            Troponin-I          0.11                       

                    0.00 - 0.40                            01/15/2015                                 

                                                      [facility]                    

 

                CARDIAC ENZYMES                            Total CK        98                            12

 - 191                            01/15/2015                                         

                                                      [facility]                    

 

                    CARDIAC ENZYMES                            CK MB Index         1.9                       

                    0.0 - 2.5                            01/15/2015                                   

                                                      [facility]                    

 

                CARDIAC ENZYMES                            CK MB           1.9                            0.5

 - 3.6                            01/15/2015                                         

                                                      [facility]                    

 

                HEMATOLOGY                            PT              14.7                            12.0 - 14.7

                            01/15/2015                                               

                                                      [facility]                    

 

                HEMATOLOGY                            INR             1.14                            0.85 - 1.17

                            01/15/2015                                               

                                        <sup>6</sup>Interpretive Data: RECOMMENDED RANGES FOR PROTIME INR:<br/>   

2.0-3.0 for most medical and surgical thromboembolic states.<br/>   2.5-3.5 for 
artificial heart valves and recurrent embolism.<br/><br/>INR SHOULD BE USED ONLY
 FOR PATIENTS ON STABLE ANTICOAGULANT THERAPY.                            [facility]

                    

 

                HEMATOLOGY                            PTT             31.7                            22.9 - 35.8

                            01/15/2015                                               

                                        <sup>9</sup>Interpretive Data: Heparin Therapeutic Range:  57 - 92 Seconds

                                        [facility]                    

 

                LIPIDS                            HDL             31                            >=61 mg/dL     

                          01/15/2015                                                    

                                                      [facility]                    

 

                LIPIDS                            Chol            92                            <=199 mg/dL   

                          01/15/2015                                                  

                                                      [facility]                    

 

                LIPIDS                            Trig            135                            <=149 mg/dL  

                          01/15/2015                                                 

                                                      [facility]                    

 

                LIPIDS                            VLDL            27                                          

                    01/15/2015                                                               

                                        [facility]                    

 

                LIPIDS                            LDL (Calculated)    34                            <=99

 mg/dL                            01/15/2015                                         

                                                      [facility]                    

 

                LIPIDS                            CHD Risk        2.97                            4.00 - 7.30

                            01/15/2015                                               

                                                      [facility]                    

 

                    URINE AND STOOL                            UA Sq Epi           None Seen                   

                                                01/15/2015                                        

                                                      [facility]                    

 

                    URINE AND STOOL                            UA Urobilinogen     <=1.0 mg/dL           

                          0.1 - 1.0                            01/15/2015                     

                                                                            [facility]            

        

 

                URINE AND STOOL                            UA WBC          <1                            0 -

 5                            01/15/2015                                             

                                                      [facility]                    

 

                    URINE AND STOOL                            UA Bacteria         Occasional /HPF           

                          None Seen /HPF                            01/15/2015                

                                                                            [facility]       

             

 

                URINE AND STOOL                            UA RBC          2                            0 - 

2                            01/15/2015                                              

                                                       [facility]                    

 

                    URINE AND STOOL                            UA Leuk Est         Negative 

                    (1/15/15 12:16 AM)                            Negative                            01/15/2015

                                                                                    [facility]                    

 

                    URINE AND STOOL                            UA Glucose          Negative mg/dL             

                          Negative mg/dL                            01/15/2015                  

                                                                            [facility]         

           

 

                URINE AND STOOL                            UA pH           5.0                            5.0

 - 8.0                            01/15/2015                                         

                                                      [facility]                    

 

                    URINE AND STOOL                            UA Protein          Negative mg/dL             

                          Negative mg/dL                            01/15/2015                  

                                                                            [facility]         

           

 

                    URINE AND STOOL                            UA Turbidity        Clear 

                    (1/15/15 12:16 AM)                            Clear                            01/15/2015

                                                                                    [facility]                    

 

                    URINE AND STOOL                            UA Color            Yellow 

*NA*

                    (1/15/15 12:16 AM)                            Yellow                            01/15/2015

                                                                                    [facility]                    

 

                    URINE AND STOOL                            UA Ketones          Negative mg/dL             

                          Negative mg/dL                            01/15/2015                  

                                                                            [facility]         

           

 

                    URINE AND STOOL                            UA Nitrite          Negative 

                    (1/15/15 12:16 AM)                            Negative                            01/15/2015

                                                                                    [facility]                    

 

                    URINE AND STOOL                            UA Spec Grav        1.012                    

                    <=1.030                            01/15/2015                                  

                                                      [facility]                    

 

                    URINE AND STOOL                            UA Blood            Negative 

                    (1/15/15 12:16 AM)                            Negative                            01/15/2015

                                                                                    [facility]                    

 

                    URINE AND STOOL                            UA Bili             Negative 

*NA*

                    (1/15/15 12:16 AM)                            Negative                            01/15/2015

                                                                                    [facility]                    

 

                    CARDIAC ENZYMES                            CK MB Index         1.9                       

                    0.0 - 2.5                            01/15/2015                                   

                                                      [facility]                    

 

                CARDIAC ENZYMES                            BNP             299                            <=100

 pg/mL                            01/15/2015                                         

                                        <sup>5</sup>Interpretive Data: Elevated results are in line with increasing

 severity of<br/>congestive heart failure. Minor elevations between 100 and 
300<br/>may be seen with Myocardial Ischemia, Sodium retaining drugs,<br/>and 
compensated/treated heart failure.                            [facility]         

           

 

                    CARDIAC ENZYMES                            Troponin-I          0.12                       

                    0.00 - 0.40                            01/15/2015                                 

                                                      [facility]                    

 

                CARDIAC ENZYMES                            CK MB           2.3                            0.5

 - 3.6                            01/15/2015                                         

                                                      [facility]                    

 

                CARDIAC ENZYMES                            Total CK        121                            

12 - 191                            01/15/2015                                       

                                                      [facility]                    

 

                CHEM PANEL                            eGFR            74                                      

                    01/15/2015                                                        <sup>2</sup>Result

 Comment: The eGFR is calculated using the CKD-EPI formula. In most young, 
healthy individuals the eGFR will be >90 mL/min/1.73m2. The eGFR declines with 
age. An eGFR of 60-89 may be normal in some populations, particularly the 
elderly, for whom the CKD-EPI formula has not been extensively validated. Use of
 the eGFR is not recommended in the following populations:&
lt;br/><br/>Individuals with unstable creatinine concentrations, including 
pregnant patients and those with serious co-morbid conditions.<br/><br/>Patients
 with extremes in muscle mass or diet. <br/><br/>The data above are obtained 
from the [employer] Education Program (NKDEP) which additionally 
recommends that when the eGFR is used in patients with extremes of body mass 
index for purposes of drug dosing, the eGFR should be multiplied by the 
estimated BMI.                            [facility]                    

 

                CHEM PANEL                            BUN             10                            7 - 22     

                          01/15/2015                                                    

                                                      [facility]                    

 

                    CHEM PANEL                            Creatinine Lvl      1.0                         

                    0.5 - 1.4                            01/15/2015                                     

                                                      [facility]                    

 

                CHEM PANEL                            Glucose Lvl     233                            70

 - 99                            01/15/2015                                          

                                        <sup>4</sup>Interpretive Data: Adult reference range values reflect the

 clinical guidelines<br/>of the [employer].                  
                                        [facility]                    

 

                CHEM PANEL                            ALT             38                            0 - 65     

                          01/15/2015                                                    

                                                      [facility]                    

 

                CHEM PANEL                            AST             39                            0 - 37     

                          01/15/2015                                                    

                                                      [facility]                    

 

                CHEM PANEL                            Alk Phos        118                            39 - 

136                            01/15/2015                                            

                                                      [facility]                    

 

                CHEM PANEL                            Globulin        3.9                            2.0 -

 4.0                            01/15/2015                                           

                                                      [facility]                    

 

                CHEM PANEL                            AGAP            12.4                            10.0 - 20.0

                            01/15/2015                                               

                                                      [facility]                    

 

                CHEM PANEL                            B/C Ratio       10                            6 - 25

                            01/15/2015                                               

                                                      [facility]                    

 

                CHEM PANEL                            Bili Total      0.7                            0.2

 - 1.3                            01/15/2015                                         

                                                      [facility]                    

 

                CHEM PANEL                            CO2             26                            24 - 32    

                          01/15/2015                                                   

                                                      [facility]                    

 

                CHEM PANEL                            Total Protein    7.3                            

6.4 - 8.4                            01/15/2015                                      

                                                      [facility]                    

 

                CHEM PANEL                            Albumin Lvl     3.4                            3.5

 - 5.0                            01/15/2015                                         

                                                      [facility]                    

 

                CHEM PANEL                            A/G Ratio       0.9                            0.7 

- 1.6                            01/15/2015                                          

                                                      [facility]                    

 

                CHEM PANEL                            Calcium Lvl     8.6                            8.5

 - 10.5                            01/15/2015                                        

                                                      [facility]                    

 

                CHEM PANEL                            Potassium Lvl    3.4                            

3.5 - 5.1                            01/15/2015                                      

                                                      [facility]                    

 

                CHEM PANEL                            Sodium Lvl      134                            135

 - 145                            01/15/2015                                         

                                                      [facility]                    

 

                CHEM PANEL                            Chloride Lvl    99                            95

 - 109                            01/15/2015                                         

                                                      [facility]                    

 

                HEMATOLOGY                            Eosinophils #    0.2                            

0.0 - 0.5                            01/15/2015                                      

                                                      [facility]                    

 

                HEMATOLOGY                            Lymphocytes #    2.6                            

1.0 - 5.5                            01/15/2015                                      

                                                      [facility]                    

 

                HEMATOLOGY                            Monocytes #     1.0                            0.0

 - 0.8                            01/15/2015                                         

                                                      [facility]                            Eosinophils     1.8                            0.0

 - 4.0                            01/15/2015                                         

                                                      [facility]                            Basophils       0.4                            0.0 

- 1.0                            01/15/2015                                          

                                                      [facility]                            Basophils #     0.1                            0.0

 - 0.2                            01/15/2015                                         

                                                      [facility]                            Monocytes       8.8                            2.0 

- 12.0                            01/15/2015                                         

                                                      [facility]                            Segs-Bands #    7.7                            1.5

 - 8.1                            01/15/2015                                         

                                                      [facility]                            Segs            66.7                            45.0 - 75.0

                            01/15/2015                                               

                                                      [facility]                            Lymphocytes     22.3                            20.0

 - 40.0                            01/15/2015                                        

                                                      [facility]                            D-Dimer         0.58                                 

                          01/15/2015                                                    

                                        <sup>7</sup>Interpretive Data: In [facility], quantitative D-Dimer is generally greater

 than<br/>0.66 ug/mL FEU.  Values of quantitative D-Dimer less than<br/>0.40 
ug/mL FEU have been reported to be associated with a low<br/>probability of deep
 vein thrombosis/pulmonary embolism.<br/>This test alone should not be used to 
rule out DVT/PE.                            [facility]                            Platelet        172                            133 -

 450                            01/15/2015                                           

                                                      [facility]                            MCHC            31.3                            32.0 - 36.0

                            01/15/2015                                               

                                                      [facility]                            RDW             16.2                            11.5 - 14.5

                            01/15/2015                                               

                                                      [facility]                            MPV             9.3                            7.4 - 10.4

                            01/15/2015                                               

                                                      [facility]                            WBC             11.5                            3.7 - 10.4

                            01/15/2015                                               

                                                      [facility]                            Hgb             9.8                            14.0 - 18.0

                            01/15/2015                                               

                                                      [facility]                            RBC             3.93                            4.70 - 6.10

                            01/15/2015                                               

                                                      [facility]                            MCV             80.0                            80.0 - 94.0

                            01/15/2015                                               

                                                      [facility]                            MCH             25.0                            27.0 - 31.0

                            01/15/2015                                               

                                                      [facility]                            Hct             31.4                            42.0 - 54.0

                            01/15/2015                                               

                                                      [facility]                    



                                                                                
                                                                                
                                                                                
                                                                                
                                                                                
                                                                                
                                                                                
                                                                                
                                                                                
                                                                                
                                                                                
                                                                                
                                                                                
                                                                                
                                                                                
                                                                                
                                                                                
                                                                                
                                                                                
                                                                                
                                                                                
                                                                                
                                                                                
                                                                                
                                                                                
                                                                                
                                                                                
                                                                                
                                                                                
                                                                                
                                                                                
                                                                                
                                                                                
                                                                                
                                                                                
                                                                                
                                                                                
                                                                                
                                                                                
                                                                                
                                                                                
                                                                                
                                                                                
                                                                                
                                                                                
                                                                                
                                                                                
                                                                                
                                                                                
                                                                                
                                                                                
                                                                                
                                                                                
                                                                                
                                                                                
                                                                                
                                                                                
                                                                                
                                                                                
                                                                                
                                                                                
                                                                                
                                                                                
                                                                                
                                                                                
                                                                                
                                                                                
                                                                                
                                                                                
                                                                                
                                                                                
                                                                                
                                                                                
                                                                                
                                                                                
                                                                                
                                                                                
                                                                                
                                                                                
                                                                                
                                                                                
                                                                                
                                                                                
                                                                                
                                                                                
                                                                                
                                                                                
                                                                                
                                                                                
                                                                                
                                                                                
                                                                                
                                                                                
                                                                                
                                                                                
                                                                                
                                                                                
                                                                                
                                                                                
                                                                                
                                                                                
                                                                                
                                                                                
                                                                                
                                                                                
                                                                                
                                                                                
                                                                                
                                                                                
                                                                                
                                                                                
                                                                                
                                                                                
                                                                                
                                                                                
                                                                                
                                                                                
                                                                                
                                                                                
                                                                                
                                                                                
                                                                                
                                                                                
                                                                                
                                                                                
                                                                                
                                                                                
                                                                                
                                                                                
                                                                                
                                                                                
                                                                                
                                                                                
                                                                                
                                                                                
                                                                                
                                                                                
                                                                                
                                                                                
                                                                                
                                                                                
                                                                                
                                                                                
                                                                                
                                                                                
                                                                                
                                                                                
                                                                                
                                                                                
                                                                                
                                                                                
                                                                                
                                                                                
                                                                                
                                                                                
                                                                                
                                                                                
                                                                                
                                                                                
                                                                                
                                                                                
                                                                                
                                                                                
                                                                                
                                                                                
                                                                                
                                                                                
                                                                                
                                                                                
                                                                                
                                                                                
                                                                                
                                                                                
                                                                                
                                                                                
                                                                                
                                                                                
                                                                                
                                                                                
                                                                                
                                                                                
                                                                                
                                                                                
                                                                                
                                                                                
                                                                                
                                                                                
                                                                                
                                                                                
                                                                                
                                                                                
                                                                                
                                                                                
                                                                                
                                                                                
                                                                                
                                                                                
                                                                                
                                                                                
                                                                                
                                                                                
                                                                                
                                                                                
                                                                                
                                                                                
                                                                                
                                                                                
                                                                                
                                                                                
                                                                                
                                                                                
                                                                                
                                                                                
                                                                                
                                                                                
                                                                                
                                                                                
                                                                                
                                                                                
                                                                                
                                                                                
                                                                                
                                                                                
                                                                                
                                                                                
                                                                                
                                                                                
                                                                                
                                                                                
                                                                                
                                                                                
                                                                                
                                                                                
                                                                                
                                                                                
                                                                                
                                                                                
                                                                                
                                                                                
                                                                                
                                                                                
                                                                                
                                                                                
                                                                                
                                                                                
                                                                                
                                                                                
                                                                                
                                                                                
                                                                                
                                                                                
                                                                                
                                                                                
                                                                                
                                                                                
                                                                                
                                                                                
                                                                                
                                                                                
                                                                                
                                                                                
                                                                                
                                                                                
                                                                                
                                                                                
                                                                                
                                                                                
                                                                                
                                                                                
                                                                                
                                                                                
                                                                                
                                                                             



Pathology Reports







                                        No Data Provided for This Section                    



                                                



Diagnostic Reports

                    





                    Report                            Value                            Date             

                                        Source                    

 

                          Chest 1view DX                            Clinical indication:  - chest pain

Comparison: Chest 1 view 2019

TECHNIQUE: AP chest

FINDINGS: 

Lines, tubes and hardware: A loop recorder device overlies the heart.

Lungs and pleura: There is prominence of the central pulmonary vasculature, 
compatible with pulmonary venous congestion. No focal airspace consolidation. No
 appreciable pleural effusion or pneumothorax. 

Heart and mediastinum: The cardiomediastinal silhouette is moderately enlarged 
with postsurgical changes of prior coronary artery bypass graft.

Bones: No acute bony abnormality is identified.

IMPRESSION:  

Cardiomegaly with pulmonary venous congestion.

SL:  [id number]

                            2019                            [facility]       

             

 

                          Retroperitoneal Complete US                            PROCEDURE: RENAL ULTRASOUND



INDICATION: Renal failure.

COMPARISON: None.

TECHNIQUE:

Sonographic evaluation of the kidneys and urinary bladder was performed.

FINDINGS:

KIDNEYS:

The right kidney measures 9 cm in length. Normal contour and parenchymal 
echogenicity. There is no hydronephrosis, mass lesion, or perinephric fluid 
collection.

The left kidney measures 10.4 cm in length. Normal contour and parenchymal 
echogenicity. There is no hydronephrosis, mass lesion, or perinephric fluid  
collection.

BLADDER: Normal.

Small portion of the abdominal aorta is visualized. Aortic bifurcation into the 
common iliac arteries is obscured. Intrahepatic inferior vena cava is partially 
visualized.

IMPRESSION: Normal renal ultrasound.



SL: DAMARI

                            2019                            [facility] 1view DX                            Patient Name: DESIREE MCKEON

: 1940; Age: 78 years  y/o Male

MR: [id number]

Study: Chest 1view DX dated 2019.

Clinical Indication:  - Central Line placement;

Comparison: 2019

Patient is status post median sternotomy. Coronary artery stent in place. 
Battery pack projects over the left lung base similar to prior study. Right 
internal jugular catheter tip in expected region of the SVC/right atrial 
junction. Enlarged cardiac silhouette and mediastinal structures are stable. 
Prominence of the interstitium about the lungs may represent a mild edema. No 
focal infiltrates within the lungs and no pneumothorax.



SL: [id number]



                            2019                            [facility] 1view DX                            PROCEDURE: CHEST SINGLE VIEW

INDICATION: Shortness of breath

COMPARISON: 2019

FINDINGS: Calcified granulomata are noted bilaterally. The lungs are 
hypoinflated but otherwise clear. The pleura, cardiomediastinal silhouette and 
bony thorax are normal. No vascular congestion is present. Midline sternotomy 
wires are noted.

IMPRESSION: No acute cardiopulmonary process.

SL:  [place]

                            2019                            [facility] 1view DX                         

EXAM: Chest radiograph

HISTORY: Chest pain

COMPARISON: 2018

TECHNIQUE: Frontal view of the chest

FINDINGS/IMPRESSION: Patient is rotated to the right.

Multiple calcified granulomas in both lungs are stable. The lungs are well-
inflated. No appreciable new airspace disease or significant pleural effusion. 
Stable cardiomegaly, coronary artery stent, loop recorder versus defibrillator 
and median sternotomy.

IMPRESSION:

No acute finding.

 











SL:  [id number]

                            2019                            [facility]       

             

 

                          Brain wo contrast CT                            EXAM: CT BRAIN WITHOUT CONTRAST

DATE: 2018 1:26 PM CST

INDICATION:  - multiple falls,minimal bruising to face. 

ADDITIONAL INFORMATION:    .

COMPARISON: CT head of 2018.

TECHNIQUE: Routine axial CT images of the brain were obtained.    

IV contrast: None.

CT imaging performed at this location utilizes radiation dose optimization 
techniques which include one or more of the following:

                                        -Automated exposure control

                                        -Adjustment of the mA and/or kV according to patient size

                                        -Use of iterative reconstruction technique

CT Radiation Dose .75 mGy-cm

FINDINGS: 

Non-contrast images of the head demonstrate no edema, hemorrhage, mass lesion or
 other acute intracranial abnormality. 

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 
present. Diffuse cerebral atrophy and mild chronic small vessel ischemic change.
 Grey-white matter distinction is preserved. The ventricles are normal. The 
basal cisterns and sulci are normal in size. 

Mild mucosal thickening of the ethmoid air cells. The mastoid air cells are 
clear.

IMPRESSION:  

                                        1. No definite acute infarct or intracranial hemorrhage detected. 

                                        2. 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

 Diffuse cerebral atrophy and mild chronic small vessel ischemic change. 





If there is further concern for intracranial pathology or acute stroke, MRI of 
the brain may be performed for complete assessment.





SL: [id number]

                            2018                            [facility]       

             

 

                          Facial bone wo contrast CT                            CT MAXILLOFACIAL WITHOUT CONTRAST



Clinical Indication:  - eval for maxillary fracture. 

Comparison: CT head 2018.

TECHNIQUE: CT of the face is performed with a multi-detector CT. Coronal and 
sagittal reconstructions were obtained. CT imaging was performed with exposure 
control parameters to reduce radiation dose.

CT Radiation Dose .78 mGy-cm

FINDINGS: 

Soft tissues: Soft tissue swelling is present around the nose.

Nasal bones: Comminuted and minimally displaced right nasal bone fracture. Mild 
displaced left nasal bone fracture.

Mandible: No mandibular fracture or dislocation is noted.

Orbits: No acute orbital wall fracture is identified. The globes and extraocular
 muscles appear unremarkable. 

Zygoma: The zygomatic arches are intact.   

Maxilla: The maxilla is intact without evidence of displaced fracture. Punctate 
focus of gas along the right retromaxillary fat pad appears to be within a 
vessel and may represent iatrogenic venous gas.

Other: Grade 1 C3 over C4 anterolisthesis is identified likely secondary to 
facet arthrosis at this level. A 2-3 mm disc bulge at this level suspected 
resulting in mild anterior thecal sac narrowing.

Calcification along the bilateral carotid bifurcations.

PARANASAL SINUSES: Mild mucosal thickening is noted within the left maxillary 
sinus.

IMPRESSION:

Acute bilateral nasal bone fractures. No maxillary or other acute facial 
fracture is identified.

   



SL:  SALIMA

                            2018                            [facility]       

             

 

                          Pelvis AP DX                            Patient Name: DESIREE MCKEON

: 1940; Age: 78 years  y/o Male

MR: [id number]



*  PELVIS, 1 view



HISTORY: 

Trauma, injury to pelvis. Status post fall. Pelvic pain.     

TECHNIQUE: 

A single frontal view of the pelvis was obtained.    

A prior study 2018 was reviewed.



FINDINGS: 

The pelvic ring is intact. There is no evidence of fracture or other osseous 
abnormality.    

The hips are grossly normal. If there is a suspected abnormality to either hip, 
specific radiographs of the affected hip are suggested. 

   

IMPRESSION:

                                        1. Negative pelvis.    

    

    

SL:  MINAL

                            2018                            [facility]       

             

 

                          Spine cervical wo contrast CT                            Clinical Indication: Pain

 Post Trauma - fall at nursing home;    

Comparison: 10/23/2018

Technique: 

Multi-detector CT imaging of the cervical spine is performed. Coronal and 
sagittal reconstructions were obtained.

CT Radiation Dose .60 mGy-cm

FINDINGS: 

ALIGNMENT AND GENERAL ASSESSMENT: There is reversal of the cervical lordosis and
 right scoliosis of the cervicothoracic spine, most likely due to muscle spasm. 
There are no fractures or subluxations. The craniocervical junction is normal. 
The atlanto-dental alignment appears unremarkable. The posterior elements and 
spinous processes are unremarkable. The facet joint, spinolaminar and spinous 
process alignment are normal.  

DISK SPACES AND SOFT TISSUES: 

The prevertebral soft tissues are normal. There is intervertebral disc space 
narrowing, marginal vertebral body deformity and marginal spurs at C4-C5, C5-C6 
and C6-C7. There is no central or foraminal stenosis.

MRI is the gold standard to assess for disk disease.

VISUALIZED LUNG APICES: As far granulomas are noted in the left apical lung.

A 5 mm noncalcified and 7 mm calcified nodule is noted in the left thyroid 
lobe..

Calcified plaques are noted in bilateral carotid arteries.

CT myelogram or MRI of the cervical spine may be performed, if there is further 
concern.

IMPRESSION:

No fractures or subluxations of the cervical spine.

There is reversal of the cervical lordosis and right scoliosis of the 
cervicothoracic spine, most likely due to muscle spasm

Vascular calcification.



SL:  FILEMON-LACHELLE

                            2018                            [facility]       

             

 

                          Brain wo contrast CT                            Clinical Indication: Fall.

Comparison: Brain CT 10/23/2018

TECHNIQUE: CT images were obtained from the foramen magnum to the vertex without
 the use of intravenous contrast on a multidetector CT. Axial, coronal and 
sagittal reformats created.

CT imaging performed at this location utilizes radiation dose optimization 
techniques which include one or more of the following:

                                        -Automated exposure control

                                        -Adjustment of the mA and/or kV according to patient size

                                        -Use of iterative reconstruction technique

CT Radiation Dose DLP: 1105 mGy-cm.  DLP means Dose Length Product, a radiation 
dose metric that does not report individual patient dose, but is a reference 
value related to the radiation output of the scanner used for this exam.

FINDINGS:

Calvarium and scalp: No acute skull fracture or scalp hematoma. There are 
bilateral nasal bone fractures with swelling of the nasal soft tissues. Punctate
 locule of air in the right retromaxillary soft tissues and right infratemporal 
fossa (axial images 13 and 6).

Ventricles and sulci: Widened, consistent with moderate cerebral volume loss. No
 hydrocephalus.

Extra-axial spaces: No acute extra axial hemorrhage, fluid collection or mass 
effect.

BRAIN PARENCHYMA: No acute parenchymal hemorrhage, or large subacute vascular 
territory infarction. There are chronic lacunar infarctions in the right 
external capsule and left lentiform nucleus. In addition, there is a chronic 
infarction in the right occipital lobe. Evaluation of the posterior fossa 
suboptimal due toThere are scattered areas of hypoattenuation in the 
supratentorial white matter, which are nonspecific, but likely represent chronic
 microvascular ischemic changes. There is no mass effect, midline shift or 
edema. 

Paranasal sinuses and mastoid air cells: Mild mucosal thickening of the inferior
 left maxillary sinus. Paranasal sinuses are otherwise unremarkable. The mastoid
 air cells are clear. 

If there is further concern for intracranial pathology or acute stroke, MRI of 
the brain may be performed for complete assessment.



IMPRESSION: 

No acute intracranial abnormality or change in the appearance of the brain since
 the brain CT performed on 10/23/2018.

Bilateral nasal bone fractures and swelling of the nasal soft tissues.

Punctate locule of air in the right retromaxillary soft tissues and right 
infratemporal fossa, may represent an incompletely imaged fracture on the right.
 Recommend maxillofacial bone CT for further characterization given the presence
 of bilateral nasal bone fractures.



SL:  AUGUSTIN

                            2018                            [facility]       

             

 

                          Chest 1view DX                            Clinical Indication: Fall.

Comparison: Chest x-ray 10/13/2018

Technique: Frontal view of the chest.

Findings:

Lines and Tubes: None.

Lungs and Pleura: No new airspace opacity. Improved aeration of the lungs 
compared to 10/13/2018. Scattered, small granulomas noted.

Heart and Mediastinum: Enlarged cardiac silhouette. Coronary artery stent noted.
 There are median sternotomy wires.

Musculoskeletal:  No acute osseous abnormality. Implantable cardiac recording 
device in the anterior soft tissues of the left thorax.

IMPRESSION:

No new pneumonia or pulmonary edema.

Improved aeration of the lungs since 10/13/2018.

SL:  [id number]

                            2018                            [facility]       

             

 

                          Spine cervical wo contrast CT                            EXAM: CT CERVICAL SPINE WITHOUT

 CONTRAST

DATE: 10/23/2018 3:46 PM CDT : 1940; Age: 78 years  y/o Male

INDICATION:  - evaluate head trauma    

COMPARISON: 2018

TECHNIQUE:  Volumetric CT of the cervical spine is acquired without contrast. 
Axial, coronal and sagittal images are provided. 

IV contrast: None.

DLP: 742 mGy-cm

CT imaging performed at this location utilizes radiation dose optimization 
techniques which include one or more of the following:

                                        -Automated exposure control

                                        -Adjustment of the mA and/or kV according to patient size

                                        -Use of iterative reconstruction technique

FINDINGS: No acute fracture, traumatic malalignment or other acute bony 
abnormality is identified.  No acute soft tissue abnormality is identified.  
Unchanged midcervical spine degenerative changes. Minimal anterolisthesis at C3-
4 is again seen.

Layering left pleural effusion is partially seen. 



IMPRESSION: 

No acute abnormality.

Left pleural effusion.



SL:  [id number]

                            10/23/2018                            [facility]       

             

 

                          Brain wo contrast CT                            STUDY: Brain wo contrast CT 10/23/2018

 3:45 PM CDT

Ordering Physician: Aubrey Morales MD

Patient Name: DESIREE MCKEON MR: [id number]

: 1940; Age: 78 years  y/o Male

Clinical Indication: Right head injury related to a fall.    

Comparison: 2018.

TECHNIQUE: Multiple contiguous transaxial noncontrast CT images were obtained 
through the head. Coronal and sagittal reformatted images were prepared.

CT imaging performed at this location utilizes radiation dose optimization 
techniques which include one or more of the following:

                                        -Automated exposure control

                                        -Adjustment of the mA and/or kV according to patient size

                                        -Use of iterative reconstruction technique

DLP: 0.17 mGy-cm



FINDINGS:

BRAIN PARENCHYMA:

                                        1.  Mild to moderate diffuse atrophy is present associated with mild nonspecific

 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 
ischemic change.

                                        2.  No evidence of acute intracranial hemorrhage, mass lesion, mass effect, midline

 shift, or extra-axial fluid collection. 

                                        3.  Stable small old left basal ganglia lacunar infarction.

                                        4.  Stable old infarction within the right external capsule.

                                        5.  Mild to moderate vascular calcification within both internal carotid artery 

siphons.

                                        6.  Empty sella.

VENTRICLES: The lateral ventricles, third ventricle, fourth ventricle, and 
basilar cisterns are appropriate for degree of atrophy present.

PARANASAL SINUSES: Mild diffuse mucoperiosteal thickening in the ethmoid sinus. 
The visualized portions of the remaining paranasal sinuses are clear.

MASTOIDS: Clear.

ORBITS: The visualized portions of the orbits are normal.

SOFT TISSUES: No significant abnormality.

SKULL: No acute fracture or suspicious osseous lesion.



IMPRESSION: 

                                        1.  Mild to moderate diffuse atrophy is present associated with mild nonspecific

 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 
ischemic change. 

                                        2.  No acute intracranial abnormality.

                                        3.  Mild chronic sinusitis.





SL:  [id number]

                            10/23/2018                            [facility]       

             

 

                          Gallbladder scan HIDA w meds NM                            Patient Name: DESIREE MCKEON

: 1940; Age: 78 years Male

MR: [id number]

Study: Gallbladder scan HIDA w meds NM 10/17/2018 8:00 AM CDT

Clinical Indication: Abnormal Lab tests- LFT - elevated LFTs. Sludge and stones 
on recent ultrasound. Hepatomegaly.

COMPARISON: Ultrasound October 15, 2018.

TECHNIQUE:

Hepatobiliary scan is performed using 6.7 mCi of Tc-99m Choletec, which was 
administered intravenously. 60 minutes of static imaging was performed at 5 
minute intervals in the frontal plane - starting 5 minutes after radiotracer 
administration.  1.7 ug of CCK was used for the exam, which was administered 
intravenously. 30 minutes of further static imaging was performed at 5 minute 
intervals after CCK administration in the frontal plane for gallbladder ejection
 fraction calculation. 

INJECTION SITE:  Right antecubital

FINDINGS: 

Prompt hepatic uptake and excretion. There is progression of the radiotracer 
through a non dilated biliary tree and into small bowel.  

Gallbladder filling is first noted at 25 minutes past radiotracer injection. 

After Cholecystokinin infusion the gallbladder ejection fraction is noted to be 
0%. (GB ejection fraction - normal greater than 50%, indeterminate 35-50%, 
abnormal <35%) 

Technologist Comment: No pain reported during CCK administration



IMPRESSION:

                                        1.  Normal gallbladder filling.

                                        2.  No gallbladder ejection identified.



SL:  [id number]

                            10/17/2018                             [facility]       

             

 

                          Liver w Liver vessels Doppler US                            Clinical Indication:  

- elevated LFTs

Comparison: None

TECHNIQUE:

Grayscale sonographic evaluation of the liver was performed with standard 
technique. Color flow Doppler and spectral waveform analysis of the liver 
vessels was performed.

FINDINGS: 

LIVER:

The visualized liver shows normal contour and morphology with normal parenchymal
 echo texture.  There is normal hepatopedal flow of the hepatic artery and 
portal vein. The main portal vein is normal in caliber. The hepatic veins 
demonstrate normal flow towards the IVC. There is trace hepatic ascites and an 
echogenic focus measuring 1 cm in size in the right hepatic lobe. The liver is 
enlarged measuring 18.9 cm in diameter.

BILE DUCTS:

The intrahepatic and extrahepatic bile ducts are not dilated with the common 
bile duct measuring  5 mm. 

The distal common bile duct is not well seen.

GALLBLADDER:

Multiple echogenic foci are noted within the gallbladder and low level echoes, 
there is posterior shadowing noted. The gallbladder wall is mildly thickened 
measuring 4 mm in diameter.

PANCREAS:

The visualized pancreas appears unremarkable.

KIDNEY:

The right kidney measures 10.8 cm.  

There is normal renal contour and morphology, with normal parenchymal 
echotexture. There is no hydronephrosis.

AORTA AND INFERIOR VENA CAVA:

Visualized portions appear unremarkable.

ASCITES:

There is no  right abdominal ascites.

IMPRESSION:

                                        1.  Hepatomegaly. Right liver lobe calcification. Normal liver Doppler.

                                        2.  Sludge and stones within the gallbladder with gallbladder wall thickening, further

 evaluation with a HIDA scan may be obtained if there is concern for acute 
cholecystitis.



SL:  [id number]

                            10/15/2018                            [facility] 1view DX                            Patient Name: DESIREE MCKEON

: 1940; Age: 78 years  y/o Male

MR: [id number]

Study: Chest 1view DX 10/13/2018 3:00 AM CDT

Ordering Physician: Robbie Padgett DO

Comparison: Chest radiograph 10/12/2018

Clinical Indication: Tube placement/removal/reposition - respiratory failure

Findings: Stable position of a loop recorder. Intact sternotomy wires. Coronary 
artery stent.

Progressively decreased pulmonary edema. Trace left pleural effusion.

Stable enlargement of cardiac silhouette. Atherosclerotic calcifications of the 
aortic arch. Midline trachea.

Chronic fracture of the right humeral neck.



IMPRESSION:

Progressively decreasing pulmonary edema.



SL: [id number]

                            10/13/2018                            [facility] 1view DX                            Patient Name: DESIREE MCKEON

: 1940; Age: 78 years Male

MR: [id number]

Study: Chest 1view DX  Order Time: 10/12/2018 7:26 AM CDT

CLINICAL INDICATION: Heart failure - decompensated heart failure, dyspnea 

COMPARISON: Chest radiograph on 10/12/2018

FINDINGS: 

Lines:  None.

Lungs: Decreasing perihilar interstitial opacities. No significant effusion or 
pneumothorax.

Mediastinum: The cardiac silhouette is mildly enlarged. Midline trachea.

Bones and soft tissues: Postoperative changes of the thorax.



IMPRESSION:

Decreasing pulmonary edema since the comparison radiograph.



SL:  [id number]

                            10/12/2018                            [facility] 1view DX                            Patient Name: DESIREE MCKEON

: 1940; Age: 78 years  y/o Male

MR: [id number]



*  CHEST, portable, 1 view 



HISTORY:  - dyspnea.     

COMPARISON: 10/08/2018. Studies of 2018 and 2018 and a chest computed 
tomography scan of 08/15/2018 were reviewed.    

   

IMPRESSION:

                                        1. SIGNIFICANT WORSENING IN THE APPEARANCE THE CHEST. Extensive diffuse bilateral

 pulmonary opacities which may represent edema and/or pneumonia.

                                        2. No definite pleural effusions.

                                        3. Mild cardiomegaly.

                                        4. Poststernotomy changes.

                                        5. Cardiac loop recorder device is noted.

                                        6. Old right humeral neck fracture with deformity.

   

    

SL:  MINAL

                            10/12/2018                            [facility]       

             

 

                          Chest 1view DX                            Clinical Indication:  elevated wbc

Comparison: 2018

FINDINGS: 

The frontal chest radiograph shows normal lung volumes.

There is moderate pulmonary edema without focal opacities, pleural effusions or 
pneumothorax.

The cardiomediastinal contours are normal for the age of the patient with aortic
 tortuosity. The patient has undergone prior sternotomy.

There deformity of the right shoulder from prior trauma. 

IMPRESSION:

Moderate pulmonary edema versus atypical pneumonia.

SL:  [id number]

                            10/08/2018                            [facility]       

             

 

                          Carotid artery Doppler bilat US                            Patient Name: DESIREE MCKEON

: 1940; Age: 78 years Male

MR: [id number]

Study: Carotid artery Doppler bilat US 2018 10:47 PM CDT

CLINICAL INDICATION:  - hx multiple falls. , Confusion

COMPARISON: None

TECHNIQUE: 

Gray-scale, color Doppler and spectral Doppler of the carotid arteries was 
performed.  Any reported ICA stenoses indirectly references the distal internal 
carotid diameter as the denominator for the stenosis measurement, utilizing 
consensus panel criteria.

FINDINGS: 

RIGHT:                  Mild calcified plaque within the CCA, carotid bulb, and 
proximal internal carotid artery

ICA PSV                112 cm/sec 

CCA PSV              54 cm/sec

ICA/CCA ratio         2

Vertebral flow is antegrade.

External carotid artery is patent.

LEFT:                    Mild calcified plaque within the carotid bulb and 
proximal internal carotid artery

ICA PSV                74 cm/sec 

CCA PSV              78 cm/sec

ICA/CCA ratio        0.9

Vertebral flow is antegrade.

External carotid artery is patent.



IMPRESSION:

RIGHT: ICA stenosis <50% by velocity criteria.

LEFT: ICA stenosis <50% by velocity criteria.



Consensus panel Doppler US criteria for diagnosis of ICA stenosis:

Stenosis (%)          ICA PSV (cm/sec)          ICA/CCA ratio

                                        -----------------------------------------------------------------------------

<50                            <125                         <2.0

                                        50-69                        125-230                      2.0-4.0

>70 but less than        >230                          >4.0

   near occlusion

Near occlusion          High, low, or                Variable

                                  undetectable



SL:  [id number]

                            2018                            [facility]       

             

 

                          Abdomen/Pelvis wo IV contrast CT                            Clinical Indication: Fall.



Comparison: None

Technique: 

Multi-detector CT imaging of the abdomen and pelvis is performed without IV 
contrast. Coronal and sagittal reconstructions were obtained.

GI CONTRAST: No oral contrast was administered.

CT imaging performed at this location utilizes radiation dose optimization 
techniques which include one or more of the following:

                                        -Automated exposure control

                                        -Adjustment of the mA and/or kV according to patient size

                                        -Use of iterative reconstruction technique

CT Radiation Dose DLP: 1006 mGy-cm

                                        --------------

FINDINGS:

CT ABDOMEN WITHOUT CONTRAST:

LUNG BASES: Small bilateral pleural effusions and overlying relaxation 
atelectasis.

ABDOMINAL SOLID ORGANS: 

There is diffuse hypoattenuation of the hepatic parenchyma. No evidence of a 
hepatic lesion on this noncontrast exam. No biliary ductal dilatation. There is 
a 2 cm gallstone in the gallbladder lumen. There are calcified granulomata in 
the spleen. No splenomegaly. There is diffuse fatty replacement of the pancreas.
 No pancreatic ductal dilatation. The kidneys and adrenal glands are 
unremarkable.

STOMACH AND BOWEL: 

Lack of oral contrast limits GI tract evaluation.  

The unopacified stomach is unremarkable. 

The unopacified loops of small bowel in the abdomen are unremarkable. 

The appendix is unremarkable . 

The unopacified contrast opacified loops of colon in the abdomen are 
unremarkable. 

PERITONEUM AND RETROPERITONEUM: 

There is no abdominal lymphadenopathy. There is no pneumoperitoneum or abdominal
 ascites. There are no retroperitoneal abnormalities.

VASCULAR STRUCTURES: 

The noncontrast abdominal aorta is unremarkable without aneurysm. 

OSSEOUS STRUCTURES: 

There are no significant osseous abnormalities seen.

                                        ----------------

CT PELVIS WITHOUT CONTRAST: 

BOWEL: Mild sigmoid diverticulosis without evidence of diverticulitis.

PERITONEAL AND EXTRAPERITONEAL REGIONS: 

Small amount of nonspecific low-density free fluid in the pelvis. No pelvic 
lymphadenopathy.. The inguinal regions are unremarkable.

BLADDER / : The bladder is unremarkable.    

OSSEOUS STRUCTURES: There is an age-indeterminate wedge deformity of the 
superior endplate of L2 with approximately 20% height loss. No evidence of 
fracture or listhesis.

                                        ----------------

IMPRESSION:

                                        1.  Age-indeterminate wedge deformity of the superior endplate of L2 with approximately

 20% height loss.

                                        2.  Diffusely increased hyperattenuation of the liver, which can be seen in the 

setting of amiodarone use or hemachromatosis.

                                        3.  Cholelithiasis.

                                        4.  Small low-density perihepatic ascites and nonspecific free pelvic fluid.

                            2018                            [facility]       

             

 

                          Chest 1view DX                            Clinical Indication: Chest pain after fall



Comparison: 2018

FINDINGS: 

The frontal chest radiograph shows normal lung volumes.

There are reticulonodular opacities within the lungs. There are no pleural 
effusions or pneumothorax.

The cardiomediastinal contours are normal for the age of the patient with aortic
 tortuosity. Sternotomy wires are intact.

There are degenerative changes in the spine and shoulders. There is a fracture 
involving the right humeral neck. 

IMPRESSION:

Reticulonodular opacities within the lungs are again noted and are stable.

SL:  [id number]

                            2018                            [facility]       

             

 

                          Pelvis AP DX                            Patient Name: DESIREE MCKEON

: 1940; Age: 78 years  y/o Male

MR: [id number]

Study: Pelvis AP DX 2018 4:45 PM CDT

Ordering Physician:    

Clinical Indication:  - fall;    

Comparison: None

FINDINGS: 

The AP pelvis radiograph shows no fractures or dislocations of the pelvis. The 
pelvic and obturator rings are intact. The pubic rami are intact. The symphysis 
pubis and sacroiliac joints are unremarkable. The visualized sacral foramina are
 unremarkable. The hip joint spaces show narrowing. Proximal femoral regions and
 acetabular regions are unremarkable. 

If there is further concern, recommend follow-up radiographs or bone scan for 
complete assessment.

IMPRESSION:

No acute osseous injury

SL:  [email or web address]

                            2018                            [facility]       

             

 

                          Spine cervical wo contrast CT                            Patient Name: DESIREE MCKEON

: 1940; Age: 78 years  y/o Male

MR: [id number]

Study: Spine cervical wo contrast CT 2018 4:45 PM CDT

Ordering Physician:    

Clinical Indication:  - fall;    

Comparison: None

Technique: Multi-detector CT imaging of the cervical spine is performed. Coronal
 and sagittal reconstructions were obtained.

CT Radiation Dose  mGy-cm

FINDINGS: 

ALIGNMENT AND GENERAL ASSESSMENT: C3 vertebral body shows 2 mm anterior 
displacement relative to C4. Remaining vertebral bodies show anatomic alignment.
 Osseous structures remain intact. The craniocervical junction is normal. The 
atlanto-dental alignment appears unremarkable. The posterior elements and 
spinous processes are unremarkable. The facet joint, spinolaminar and spinous 
process alignment are normal.  

DISK SPACES AND SOFT TISSUES: 

The prevertebral soft tissues are normal.  C2-C3 to C7-T1 disc space levels show
 no definite disc protrusions on CT. Uncovertebral and facet hypertrophy narrows
 bilateral C5-C6 and bilateral C6-C7 neural foramina. Intervertebral disc spaces
 show diffuse narrowing with small osteophytes.

MRI is the gold standard to assess for disk disease.

VISUALIZED LUNG APICES: Unremarkable. Left thyroid lobe 4 x 6 mm hypodense 
lesion does not contain calcifications.

CT myelogram or MRI of the cervical spine may be performed, if there is further 
concern.

IMPRESSION:

No acute osseous injury

C3-C4 anterolisthesis may be degenerative. Imaging findings do not exclude 
ligamentous injury





SL:  [email or web address]

                            2018                            [facility] wo contrast CT                            Clinical Indication:  - fall.    

Comparison: CT dated 2018

TECHNIQUE: CT images were obtained from the foramen magnum to the vertex without
 the use of intravenous contrast on a multidetector CT. CT imaging was performed
 with exposure control parameters to reduce radiation dose. Coronal and sagittal
 reconstructions were obtained.   

CT imaging performed at this location utilizes radiation dose optimization 
techniques which include one or more of the following:

                                        -Automated exposure control

                                        -Adjustment of the mA and/or kV according to patient size

                                        -Use of iterative reconstruction technique

CT Radiation Dose .26 mGy-cm

 



FINDINGS:  

BRAIN PARENCHYMA: There is generalized brain parenchymal atrophy related to the 
patient's age.  Moderate nonspecific periventricular white matter disease 
changes. No focal mass lesions on this noncontrast head CT.  No mass effect, 
midline shift or edema.  There are no intra-axial or extra-axial fluid 
collections, intraventricular or intraparenchymal hemorrhage. No low attenuation
 demarcating areas on this non-contrast CT to suggest subacute stroke. There is 
an old infarct in the right occipital lobe.

Old infarct is also noted in the right basal ganglia. Old lacunar infarct is 
noted in the left basal ganglia.

VENTRICLES: The lateral ventricles, third and fourth ventricles appear 
unremarkable. The basilar cisterns are normal. 

ORBITS, MASTOIDS AND PARANASAL SINUSES: The visualized orbits are unremarkable. 
There is mucosal thickening in the left maxillary sinus. The mastoid air cells 
are clear. 



SKULL: There are no osseous abnormalities.

If there is further concern for intracranial pathology or acute stroke, MRI of 
the brain may be performed for complete assessment.



IMPRESSION: 

No acute infarct, intracranial hemorrhage or mass effect.

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

SL:  [id number]

                            2018                            [facility] 1view DX                            SINGLE VIEW CHEST RADIOGRAPH

CLINICAL INDICATION: Chest pain and dyspnea.     

TECHNIQUE: A single frontal view radiograph of the chest was obtained.

COMPARISON: Chest x-ray 2018

FINDINGS: 

There is no acute osseous fracture or dislocation.  The soft tissues reveal no 
unintentional retained radiodense foreign body or subcutaneous emphysema.  There
 are surgical changes of sternotomy.

There is a stable metallic device in the region of the stomach.

There is stable stent material in a coronary artery. The heart size is normal. 
There is no mediastinal mass.

There are calcifications in the chest consistent with chronic granulomatous 
inflammatory disease.  There is a stable small left pleural effusion. There is 
no pneumothorax. There is mild atelectasis or infiltrate in the left lower lung.

IMPRESSION:

                                        1.  There is mild new atelectasis or infiltrate in the left lung base with a stable

 small left pleural effusion.

                                        2. There are stable multifocal pulmonary micronodule opacities suggesting prior 

granulomatous inflammatory disease.     



SL:  [id number]

                            2018                            [facility] 1view DX                            Patient Name: DESIREE MCKEON

: 1940; Age: 78 years  y/o Male

MR: [id number]



*  CHEST, portable, 1 view 

   

HISTORY:  - pneumonia. Dyspnea, diabetes, hypertension, atrial fibrillation, 
congestive heart failure, sleep apnea. Prior coronary artery bypass graft. 

COMPARISON: 08/15/2018. Studies of 2018, 2018, 2018, and 
2018 were reviewed. A chest computed tomography scan of 08/15/2018 was 
also reviewed.    

   

IMPRESSION:

                                        1. Improvement in bilateral diffuse pulmonary opacities which may represent improving

 pneumonia and/or edema.

                                        2. There is mild residual reticular nodular opacities bilaterally. This may represent

 residual pneumonia or edema. There appears to be underlying chronic 
interstitial changes.

                                        3. No new infiltrates. There may be a small left pleural effusion.

                                        4. Prior granulomatous disease. Multiple small calcified granulomas are noted within

 the lungs. There are calcified granulomas in the hilar regions.

                                        5. Mild cardiomegaly.

                                        6. Poststernotomy changes.

   

    

SL:  [id number]

                            2018                            [facility] wo contrast CT                            Clinical Indication:  - worsening 

findings of cxr, clinically feeling worse, unsure if it is fluid vs infection.; 

Comparison: None

TECHNIQUE: Sequential trans-axial images were obtained thru the chest and upper 
abdomen without intravenous contrast. Coronal and sagittal reconstructions were 
obtained.

CT imaging performed at this location utilizes radiation dose optimization 
techniques which include one or more of the following:

                                        -Automated exposure control

                                        -Adjustment of the mA and/or kV according to patient size

                                        -Use of iterative reconstruction technique

Dose:  WVW=180.22 mGy-cm

FINDINGS:

LOWER NECK AND SOFT TISSUES: No enlarged lymph node is seen.

MEDIASTINUM: Normal size of the heart is noted.  No pericardial effusion is 
identified. The thoracic aorta is normal in caliber. 

Multiple shotty mediastinal lymph nodes are seen measuring up to 11 mm short 
axis. Mildly enlarged calcified bilateral hilar lymph nodes are present.

PULMONARY PARENCHYMA: Patchy ill-defined nodular and groundglass densities are 
seen in both lungs with an upper lobe predominance bilaterally. Small left 
pleural effusion is seen.

UPPER ABDOMEN: Small calcified gallstones are seen. No gallbladder wall 
thickening or pericholecystic fluid is present.

MUSCULOSKELETAL: Old impacted right humeral neck fracture is partly visualized. 
Median sternotomy wires are present. No acute osseous abnormality is seen. No 
destructive lytic or blastic osseous lesion is noted.



IMPRESSION:

                                        1. Mildly enlarged shotty mediastinal and bilateral hilar lymph nodes. The hilar

 lymph nodes demonstrate small calcifications.

                                        2. Bilateral patchy ill-defined nodular and groundglass pulmonary densities with

 upper lobe predominance. Given the mediastinal and hilar lymph node findings, 
the pulmonary findings may represent sarcoidosis. Multifocal pneumonia and 
hypersensitivity pneumonitis are also considerations.



SL:  [id number]

                            08/15/2018                            [facility] 1view DX                            1 VIEW CXR. PORTABLE EXAM  10:49 AM

HISTORY: Congestion.

COMPARISON: 2018 chest x-ray.

Diffuse mixed interstitial and airspace process much worse today than 3 days 
ago. Cardiomediastinal silhouette normal. Bones intact. No pleural abnormality.

IMPRESSION:

Extensive diffuse interstitial and airspace process slightly worse over the 3 
day interval. Inflammatory, infectious etiologies possible also interstitial 
edema.

*******************END OF IMPRESSION*******************

SL: [id number]

                            08/15/2018                            [facility] 1view DX                            1 VIEW CXR. PORTABLE EXAM  4:09 PM

HISTORY: Cough. Hypoxia.

COMPARISON: 2018 chest x-ray.

Worsening diffuse bilaterally groundglass nodular and interstitial process since
  of this year. No pleural abnormality. Cardiomediastinal silhouette stable.
 Sternal closure devices intact. Bony thorax normal.

IMPRESSION:

Diffuse nodular interstitial process bilaterally however this may represent 
pulmonary edema infectious or inflammatory process is also in the differential..

*******************END OF IMPRESSION*******************

SL: [id number]

                            2018                            [facility] contrast CT                            CT HEAD WITHOUT CONTRAST:  

HISTORY:  Seizure.

PROCEDURE: Multiple axial images from the skull base to the skull vertex were 
obtained without contrast.  Coronal and sagittal reconstructed images were 
performed.  DLP: 

                                        81.8 mGy-cm

COMPARISON: 2018

FINDINGS: No acute hemorrhage, midline shift, extra-axial fluid collection, 
mass, or hydrocephalus is present. There is moderate atrophy. There are mild 
white matter hypodensities. Old right occipital infarct is present. No sulcal 
effacement to suggest acute infarct is present.  The visualized mastoid air 
cells are clear.  There is no air-fluid level in the visualized paranasal 
sinuses. Mild bilateral maxillary and sphenoid mucosal thickening is present. 
Distal internal carotid arterial calcifications are present.

IMPRESSION: 

                                        1. No acute intracranial abnormality.

                                        2. Moderate atrophy and mild chronic microvascular ischemic changes.

                                        3. Old right occipital infarct.

SL:  [place]

                            2018                            [facility] 1view DX                            Clinical Indication:  - chest pain, SOB.

 

Comparison: 2018.

Technique: Single frontal view of the chest.

Findings:

Moderate diffuse bilateral groundglass opacities suggestive of underlying 
pulmonary edema. No focal consolidation. No pleural effusions. Prior median 
sternotomy. The cardiac silhouette is likely enlarged. Surgical device projects 
over the left thorax. Chronic right humeral fracture.

IMPRESSION:

Slight interval worsening in suspected moderate bilateral pulmonary edema.





SL:  [place]

                            2018                            [facility] wo contrast CT                            Clinical Indication: Drowsiness - 



Comparison: CT dated 2018

TECHNIQUE: CT images were obtained from the foramen magnum to the vertex without
 the use of intravenous contrast on a multidetector CT. CT imaging was performed
 with exposure control parameters to reduce radiation dose. Coronal and sagittal
 reconstructions were obtained.   

CT radiation dose DLP: 1801.91 mGy-cm



FINDINGS:  

BRAIN PARENCHYMA: There is generalized brain parenchymal atrophy related to the 
patient's age.  Moderate nonspecific periventricular white matter disease 
changes. No focal mass lesions on this noncontrast head CT.  No mass effect, 
midline shift or edema.  There are no intra-axial or extra-axial fluid 
collections, intraventricular or intraparenchymal hemorrhage. No low attenuation
 demarcating areas on this non-contrast CT to suggest subacute stroke. Old 
infarcts are noted in the right occipital lobe, right external capsule and left 
basal ganglia.

VENTRICLES: The lateral ventricles, third and fourth ventricles appear 
unremarkable. The basilar cisterns are normal. 

ORBITS, MASTOIDS AND PARANASAL SINUSES: The visualized orbits are unremarkable. 
The visualized paranasal sinuses are unremarkable. The mastoid air cells are 
clear. 



SKULL: There are no osseous abnormalities.

If there is further concern for intracranial pathology or acute stroke, MRI of 
the brain may be performed for complete assessment.



IMPRESSION: 

No acute infarct, intracranial hemorrhage or mass effect.

No interval change.





SL:  POPEYE

                            2018                            [facility]       

             

 

                          Spine lumbar wo contrast CT                            Study: Spine lumbar wo contrast

 CT 2018 3:36 PM CDT

Clinical Indication:  - fall pain post trauma;    

Comparison: Lumbar x-rays 2016. CT lumbar spine 10/30/2015

TECHNIQUE: Helical noncontrast images are obtained of the lumbar spine. Axial, 
sagittal and coronal reconstructions are available.

IV contrast: None.

CT radiation dose:  IKR=560 mGy-cm

FINDINGS: 5 nonrib-bearing lumbar-type vertebral bodies. Stable mild anterior 
wedging of T12 likely developmental. Mild superior endplate central compression 
deformity of L2 is also stable. Remaining vertebral body height maintained. 
Endplate margins are intact. There is no paravertebral or paraspinal hematoma. 
No acute bony fracture. Multilevel degenerative disc disease.

IMPRESSION: No acute bony abnormalities of the lumbar spine.

SL: JENNIFFER

                            2018                            [facility]       

             

 

                          Brain wo contrast CT                            Clinical Indication:  - fall head 

 injury, left arm pain

Comparison: 2018

TECHNIQUE: CT images were obtained from the foramen magnum to the vertex without
 the use of intravenous contrast on a multidetector CT. CT imaging was performed
 with exposure control parameters to reduce radiation dose. Coronal and sagittal
 reconstructions were obtained.   

CT radiation dose DLP: 1022.74 mGy-cm



FINDINGS:  

BRAIN PARENCHYMA: There is generalized brain parenchymal atrophy related to the 
patient's age.  Moderate nonspecific periventricular white matter disease 
changes. No focal mass lesions on this noncontrast head CT.  No mass effect, 
midline shift or edema.  There are no intra-axial or extra-axial fluid 
collections, intraventricular or intraparenchymal hemorrhage. No low attenuation
 demarcating areas on this non-contrast CT to suggest subacute stroke. Old 
lacunar infarct is noted in the left basal ganglia. There is an old infarct in 
the right external capsule. Encephalomalacia is noted in the right occipital 
lobe consistent with old infarct.

VENTRICLES: The lateral ventricles, third and fourth ventricles appear 
unremarkable. The basilar cisterns are normal. 

ORBITS, MASTOIDS AND PARANASAL SINUSES: The visualized orbits are unremarkable. 
There is mucosal thickening in the left maxillary sinus. The mastoid air cells 
are clear. 



SKULL: There are no osseous abnormalities.

If there is further concern for intracranial pathology or acute stroke, MRI of 
the brain may be performed for complete assessment.



IMPRESSION: 

No acute intracranial hemorrhage or mass effect.

Moderate microvascular white matter disease.

Old infarcts in the right occipital lobe, left basal ganglia and right external 
capsule.



SL:  POPEYE

                            2018                            [facility] cervical wo contrast CT                            PROCEDURE: CT CERVICAL SPINE

 WITHOUT CONTRAST

Clinical Indication: Patient fell. Neck pain and left arm pain.

Comparison: 2016 CT cervical spine.

Technique: Multi-detector CT imaging of the cervical spine is performed. Coronal
 and sagittal reconstructions were obtained.

.05 mGy-cm

FINDINGS: 

ALIGNMENT AND GENERAL ASSESSMENT: There is straightening of the cervical spine. 
Disc space narrowing C4-5 through C6-7. Remaining disc spaces are well 
maintained. Vertebral body height normal. No spondylolisthesis. The 
craniocervical and atlantoaxial relationships are normal.

SOFT TISSUES: Survey of the intraspinal soft tissues is limited by this 
modality. The prevertebral and paraspinal soft tissues are normal.

IMPRESSION:

Spondylosis mid to lower cervical spine. The straightening of the C-spine may be
 related to muscle spasm. No fracture.

******END IMPRESSION******

[id number]

                            2018                            [facility]       

             

 

                          Humerus 2 views DX                            Study: Humerus 2 views DX 2018 

3:37 PM CDT

Clinical Indication:  - fall old fracture pain post trauma;    

Comparison:  Right shoulder x-ray of 2018

FINDINGS: 2 views right humerus. Chronic impacted humeral neck fracture is noted
 with interval increase callus formation compatible with interval healing. No 
new acute bony fracture is identified. Anatomic alignment is maintained.

IMPRESSION: No acute bony findings

SL: JENNIFFER

                            2018                            [facility] 1view DX                            Patient Name: DESIREE MCKEON

: 1940; Age: 77 years Male

MR: [id number]

Study: Chest 1view DX  Order Time: 2018 2:21 PM CDT

CLINICAL INDICATION:  - weakness, dizziness 

COMPARISON: Chest radiograph on 02/10/2018

FINDINGS: 

Lines:  None.

Lungs: Perihilar interstitial opacities. No significant effusion or 
pneumothorax.

Mediastinum: The cardiac silhouette is moderately enlarged. Midline trachea.

Bones and soft tissues: Postoperative changes of the thorax.



IMPRESSION:

Cardiomegaly with perihilar interstitial edema.



SL:  [id number]

                            2018                            [facility]       

             

 

                          Humerus 2 views DX                            Left Humerus

Clinical Indication: Check left midlung placement.

Comparison: None

FINDINGS: 

The AP and lateral views of the humerus show normal alignment without acute 
fracture or dislocation. There is a left arm midline intravenous catheter with 
the tip just medial to the proximal humeral diaphysis. The visualized shoulder 
and elbow are intact. 

The soft tissues are radiographically unremarkable.

If there is further concern, recommend follow-up radiographs or MRI for complete
 assessment. 

IMPRESSION:

Left arm midline intravenous catheter with the tip just medial to the proximal 
humeral diaphysis.

SL:  [email or web address]

                            02/10/2018                            [facility] 1view DX                            Clinical Indication: Weakness.

Comparison: Chest radiograph 2018.

FINDINGS: 

The frontal chest radiograph again shows decreased lung volumes. There is stable
 pulmonary vascular congestion. There is a retrocardiac and left lung base 
opacity. No definite pneumothorax is seen.

Again seen is a mildly enlarged cardiac silhouette. Evidence of prior cardiac 
surgery is seen. A electronic device projects over the left chest.

Midline sternotomy wires are noted. There are degenerative changes within the 
visualized spine.

IMPRESSION:

Pulmonary vascular congestion. Again seen mildly enlarged cardiac silhouette. 
Left lung base opacity and retrocardiac, which may represent atelectasis, 
consolidation and/or effusion.

SL:  [email or web address]

                            02/10/2018                            [facility] 1view DX                            Clinical Indication:  - Elevated WBCs, Decreasing

 hemoglobin.    

Comparison: 2018.

FINDINGS: 

Portable AP chest radiograph is performed.

LUNGS: Increased small lung volumes with decreased perihilar pulmonary vascular 
congestion. No pleural effusions. No pneumothorax. 

HEART AND MEDIASTINUM:  There is unchanged moderate cardiomegaly. The patient is
 status post prior median sternotomy and coronary artery bypass graft surgery 
with sternal wires. Implanted cardiac device is seen overlying the left heart.  
 There is a mildly tortuous thoracic aorta. The trachea is midline.

OSSEOUS STRUCTURES: Deformity of the right proximal humerus is seen, related to 
healing fracture.



IMPRESSION: 

                                        1. Increased small lung volumes with decreased perihilar pulmonary vascular congestion.

 Unchanged cardiomegaly.

                                        2. Healing right proximal humeral fracture.



SL:  [id number]

                            2018                            [facility]       

             

 

                          Brain wo contrast CT                            I have reviewed this examination and

 agree with the initial interpretation.

 History: Fall

DLP: 900.99

Exam:  CT Head 

Technique:

Serial axial unenhanced images of the brain are provided. 

Findings:  

Small old right occipital lobe infarct seen. Remaining brain parenchyma has 
diffuse cortical atrophy and chronic nonspecific periventricular and deep white 
matter parenchymal disease, likely due to chronic microvascular ischemic 
disease.  There is no acute intra-or extra-axial fluid collections, midline 
shift, mass effect or hydrocephalus.  Visualized paranasal sinuses show trace 
chronic pansinusitis.   Mastoid air cells are clear.

Impression:  

                                        1. No acute intracranial abnormality. 

                                        2. Atrophy and chronic changes.

                                        3. Trace chronic pansinusitis

                            2018                            [facility] 1view DX                            EXAM: Chest x-ray one view (frontal)

HISTORY: - fall with clavicle pain, CHF. 

COMPARISON:None available.

FINDINGS:

The patient's right hand partially obscures the right lower lung base.

Mediastinum: The heart is mildly enlarged, unchanged. A coronary stent is noted.
 Sternotomy wires are noted. A loop recorder is present over the last chest.

Lungs and pleura:     No focal consolidation, pleural effusion or pneumothorax. 
The pulmonary vascularity is normal.

   

Right proximal humerus fracture is better characterized on the concurrent 
shoulder series. No overt displaced right-sided rib fractures are appreciated.

IMPRESSION:

Stable mild cardiomegaly without evidence to suggest pulmonary edema.

Right proximal humerus fracture.

SL: [id number]

                            2018                            [facility]       

             

 

                          Shoulder series DX                            EXAM: AP and transscapular Y radiographs

 of the right shoulder, 2 images obtained INDICATION: Fall, pain

COMPARISON: None

FINDINGS: Right surgical neck fracture is difficult to characterize though 
appears to be minimally angulated. No dislocation identified. Acromioclavicular 
joint is fairly well-maintained. No other fracture identified. No soft tissue 
gas or unexpected radiopaque foreign body identified. 

IMPRESSION:

Nondisplaced and mildly angulated right humeral surgical neck fracture. 





SL:  ANN

                            2018                            [facility]       

             

 

                          Cardiac SPECT multi studies NM                            Lexiscan nuclear stress 

test was done according to the stress rest protocol. The patient was injected 
with 10 mCi of sestamibi at stress and the 30 mCi of sestamibi at rest. Multiple
 images were taken at both the stress and rest. 

Review of stress images revealed a mild to moderate inferolateral defect which 
is a partially reversible. Ejection fraction by SPECT 33%.

The electrocardiogram at baseline showed normal sinus rhythm and no significant 
changes were noted during stress. Next

In summary, this study is compatible with mild-to-moderate inferolateral scar 
and ischemia. LV systolic function is moderately depressed.

                            2017                            [facility]       

             

 

                          Chest 1view DX                            Study: Frontal chest x-ray compared to 2017.



History: Chest pain

Comments:

The trachea is midline. The cardiomediastinal silhouette is enlarged. Sternotomy
 wires in place

No pneumonia.

No pleural effusions or pneumothorax.

Impression:

No acute cardiopulmonary disease.

                            2017                            [facility]       

             

 

                          Chest 1view DX                            Study: Chest 1view DX 

Clinical Indication: Chest pain - chest pain

Comparison: Chest x-ray from 2017

FINDINGS: Median sternotomy wires are again noted. Cardiac silhouette is normal 
in size. Cardiac loop recorder projects over the heart. The lungs are without 
consolidation or congestion. No pleural effusion or pneumothorax is seen. 
Numerous calcified granulomas throughout the bilateral lungs are seen. The 
osseous structures are unremarkable.

 

IMPRESSION:

No acute cardiopulmonary disease.



SL:  [id number]

                            2017                            [facility]       

             

 

                          Brain wo contrast CT                            I have reviewed this examination and

 concur with the interpretation.

 Addendum: Please note that there is a stable chronic infarct of the right 
occipital lobe.

Also please note that there is stable hypodensity in the right basal ganglia 
which may represent sequela of remote hemorrhage or infarct.

 Study: Brain wo contrast CT 

   

Clinical Indication: Head trauma patient complaining of chest pain that started 
1 hour ago and he also ran out of his nitroglycerin.

Comparison: CT scan of the brain from 2017

TECHNIQUE: Multiple axial CT images of the brain were acquired without  the 
administration of intravenous contrast. Coronal and sagittal reconstructions 
were obtained.   

CT radiation dose DLP: 981.84 mGy-cm

FINDINGS:

Age-related involutional changes of the brain parenchyma are present. Mild 
chronic microvascular ischemic changes are seen. Intracranial arterial 
calcifications are present. No acute intracranial hemorrhage, mass effect, 
midline shift, or hydrocephalus is seen. There are no extra-axial fluid 
collections.  There is mild mucosal thickening of the right maxillary sinus.

IMPRESSION: 

No acute intracranial abnormality.



SL:  [id number]-PC

                            2017                            [facility]       

             

 

                          Chest 1view DX                            Chest, single view dated 2017.

HISTORY: Chest pain. Shortness of breath.

Comparison is made to a prior study dated 2017.

The patient is status post median sternotomy. The heart is enlarged. The 
cardiomediastinal shadow is stable. The lungs demonstrate central pulmonary 
vascular engorgement and evidence of prior granulomatous infection. No acute 
pulmonary infiltrates or acute pleural space abnormalities are identified.

IMPRESSION:

                                        1. Cardiomegaly with pulmonary vascular engorgement.

SL: 131

                            2017                            [facility]       

             

 

                          Liver w Liver vessels Doppler US                            EXAM: Liver with liver

 vessels Doppler ultrasound

HISTORY: Anemia, chronic abdominal pain

COMPARISON: None

TECHNIQUE: Grayscale static images of the liver and right upper quadrant with 
duplex Doppler of the hepatic vessels

FINDINGS:

The main, left and right portal veins are patent with normal direction of flow 
and satisfactory waveforms. 

The left, middle and right hepatic veins are patent with satisfactory waveforms.
 

The hepatic artery is patent with satisfactory waveform. 

The splenic vein appears patent. The IVC is visualized.

Cholelithiasis. Mild gallbladder wall thickening. The common duct measures 5 mm 
the aorta.

The pancreas is visualized.

IMPRESSION:

                                        1. Patent hepatic vessels.

                                        2. Cholelithiasis and mild gallbladder wall thickening, correlate for cholecystitis.











SL 13

                            2017                            [facility]       

             

 

                          Abdomen AP DX                            Patient Name: DESIREE MCKEON

: 1940; Age: 76 years Male

MR: [id number]

Study: Abdomen AP DX 2017 11:59 PM CST

CLINICAL INDICATION: Abdominal distension 

COMPARISON: None

FINDINGS:

Large amount of stool within the colon, particularly within the rectum. Rectal 
temperature probe noted. Nonspecific bowel gas pattern without evidence of 
dilatation, pneumatosis, or pneumoperitoneum. No significant osseous 
abnormalities.



IMPRESSION: 

Nonobstructive bowel gas pattern.

                            2017                            [facility]       

             

 

                          Chest 1view DX                            Patient Name: DESIREE MCKEON

: 1940; Age: 76 years Male

MR: [id number]

Study: Chest 1view DX  Order Time: 2017 3:00 AM CST

CLINICAL INDICATION: Abnormal chest sounds 

ADDITIONAL HISTORY: None

COMPARISON: Chest radiograph on 2017

FINDINGS: 

Lines:  Stable position of the right IJ catheter.

Lungs: The bilateral interstitial and airspace opacities are grossly unchanged. 
No effusion or pneumothorax.

Mediastinum: The cardiac silhouette is mildly enlarged. Midline trachea.

Bones and soft tissues: Postoperative changes of the thorax.



IMPRESSION:

No significant change since 2017.



SL:  [id number]

                            2017                            [facility]       

             

 

                          Carotid artery Doppler bilat US                            Carotid artery Doppler 

bilat US

CLINICAL HISTORY: Acute Cerebral Accident confusion. Memory loss.

COMPARISON: none

TECHNIQUE: Gray scale, color Doppler and spectral Doppler of the cervical 
carotid arteries was performed.  Static images are submitted. Any reported ICA 
stenoses indirectly reference the distal internal carotid diameter as the 
denominator for stenosis measurement utilizing Consensus Panel Criteria.  

Atherosclerotic calcification of the distal left common carotid artery and 
carotid bifurcation.

RIGHT:   Nonvisualization of the right carotid system due to internal jugular 
line and surrounding bandage and plastic clips.

    

LEFT:   ICA PSV 85.5 cm/sec.

             CCA PSV 77 cm/sec.

              

             ICA/CCA Ratio 1.0

   

Antegrade flow is visualized in both vertebral arteries. 

IMPRESSION: 

No sonographic evidence for a hemodynamically significant left carotid stenosis.

Nonvisualization of the right carotid system as above.





Consensus panel Doppler US criteria for diagnosis of ICA stenosis:

Stenosis (%)          ICA PSV (cm/sec)    ICA EDV(cm/sec)      ICA/CCA ratio

                                        --------------------------------------------------------------------------------------------------------------



<50 %                        <125                        <40                    
      <2.0

                                        50-69 %                    125-230                                        

2.0-4.0

>70% but less than      >230                       >100                        
>4.0                            

 near occlusion



SL: [id number]

                            2017                            [facility]       

             

 

                          Brain wo contrast CT                            EXAM: CT BRAIN WITHOUT CONTRAST

DATE: 2017 11:11 AM CST

INDICATION: Weakness; confusion

ADDITIONAL INFORMATION AND CT DLP: dlp: 1472.72mGy-cm.

COMPARISON: CT head of 2016.

TECHNIQUE: Routine axial CT images of the brain were obtained.    

IV contrast: None.

 

FINDINGS: Marked motion artifact degrades image quality.

Non-contrast images of the head demonstrate no edema, hemorrhage, mass lesion or
 other acute intracranial abnormality. 

Focal encephalomalacia is present within the right occipital lobe. Diffuse 
cerebral atrophy and mild chronic small vessel ischemic change. Grey-white 
matter distinction is preserved. The ventricles are normal. The basal cisterns 
and sulci are normal in size. 

Mild chronic inflammatory change of the paranasal sinus. Partial opacification 
of the mastoid air cells.

IMPRESSION:  

                                        1. Limited study as above. No definite acute infarct or intracranial hemorrhage 

detected. 

                                        2. Diffuse cerebral atrophy and mild chronic small vessel ischemic change.





If there is further concern for intracranial pathology or acute stroke, MRI of 
the brain may be performed for complete assessment.





SL: [id number]

                            2017                            [facility]       

             

 

                          Chest 2 views DX                            EXAM: Chest 2 views DX

DATE: 2017 7:49 AM CST

INDICATION: Shortness of Breath    

COMPARISON: 2017.

IMPRESSION: Stable moderately enlarged cardiac silhouette and prominent 
mediastinum. Postoperative median sternotomy. Coronary artery stent is present. 
Right IJ line is unchanged. Persistent extensive vascular congestion and 
pulmonary edema. Surgical clips are present within the right upper quadrant. 





SL: [id number]

                            2017                            [facility]       

             

 

                          Ext Upper Venous Doppler Unilat US                            Ext Upper Venous Doppler

 Unilat US

CLINICAL HISTORY: Pain, Limb. Right arm pain

COMPARISON: none

TECHNIQUE:Gray scale imaging, compression techniques and spectral analysis were 
utilized to evaluate right upper extremity from the internal jugular/right 
subclavian junction to the proximal brachial vein.

FINDINGS: The jugular, subclavian, axillary and brachial veins are well-
visualized and demonstrate normal signal and compressibility. 

The cephalic and basilic veins are also normally compressible. There is no 
evidence to suggest intraluminal thrombus. 

IMPRESSION:

Negative right upper extremity venous Doppler.



SL: [id number] 

                            2017                            [facility] 1view DX                            Clinical Indication:76 years Male with Shortness

 of Breath

Comparison: Chest x-ray 2017  

FINDINGS: 

Lines: Unchanged right IJ central venous catheter

The single frontal chest radiograph shows normal lung volumes.

Patchy opacities in both lungs, not significantly changed

Small left pleural effusion.

No pneumothorax.

Cardiac silhouette is enlarged, stable. Surgical changes of the mediastinum. 
Coronary artery stent.. Pulmonary vasculature is prominent. The trachea is 
midline. There are no acute osseous abnormalities noted. 

IMPRESSION:

No significant change since prior chest x-ray.

Unchanged cardiomegaly and pulmonary edema. Small left pleural effusion.

                            2017                            [facility] 1view DX                            EXAM: Chest 1view DX

DATE: 1/3/2017 6:19 PM CST

INDICATION: Fever    

COMPARISON: 05:27.

IMPRESSION: Right IJ line is unchanged with its tip in the upper SVC. No 
significant pneumothorax detected. Persistent extensive vascular congestion and 
pulmonary edema are unchanged. Stable moderately enlarged cardiac silhouette and
 prominent mediastinum. Postoperative median sternotomy.





SL: [email or web address]

                            2017                            [place] [address] DX                            Clinical Indication:76 years Male with Abnormal

 chest sounds

Comparison: Chest x-ray 2017  

FINDINGS: 

Lines: Endotracheal tube and enteric tube have been removed. Unchanged position 
of right IJ central venous catheter.

The single frontal chest radiograph shows normal lung volumes.

Increased interstitial opacities in the lungs

Small left pleural effusion.

No pneumothorax.

Cardiac silhouette is enlarged, stable. Coronary artery stents noted. Surgical 
changes of the mediastinum. Pulmonary vasculature is prominent. The trachea is 
midline. There are no acute osseous abnormalities noted. 

IMPRESSION:

                                        1. Increased interstitial edema. Stable enlargement of the cardiac silhouette and

 central venous congestion.

                                        2. Removal of endotracheal tube and NG/OG tube.

                            2017                            [facility] 1view DX                            Patient Name: DESIREE MCKEON

: 1940; Age: 76 years Male

MR: [id number]

Study: Chest 1view DX  Order Time: 2017 10:32 AM CST

CLINICAL INDICATION: Tube placement/removal/reposition 

ADDITIONAL HISTORY: None

COMPARISON: Chest radiograph on 2016

FINDINGS: 

Lines:  Endotracheal tube tip projects the level of the clavicles. Nasogastric 
tube tip projects off the field-of-view. Right IJ catheter tip projects over the
 SVC.

Lungs: Low lung volumes. Mild prominence of the perihilar interstitial opacities
 may reflect pulmonary edema.

Mediastinum: The cardiac silhouette is mild enlarged. Midline trachea.

Bones and soft tissues: Postoperative changes of the thorax.



IMPRESSION:

Interval placement of a nasogastric tube with tip projecting off the 
field-of-view.



SL:  [id number]

                            2017                            [facility]       

             

 

                          Chest 1view DX                            EXAM: Chest 1view DX

DATE: 2016 5:43 PM CST

INDICATION: Tube placement/removal/reposition    

COMPARISON: 2016 at 17:12.

IMPRESSION: Interval intubation with its tip in satisfactory position. Right IJ 
line is unchanged with its tip in upper SVC. Otherwise, stable chest.





SL: [id number]

                            2016                            [facility]       

             

 

                          Chest 1view DX                            EXAM: Chest 1view DX

DATE: 2016 5:00 PM CST

INDICATION: Central Line Placement    

COMPARISON: 2016.

IMPRESSION: New right IJ line is present with its tip in the upper SVC. No 
significant pneumothorax detected. Otherwise, stable chest.





SL: [email or web address]

                            2016                            [facility]       

             

 

                          Ankle 3 views DX                            Ankle 3 views DX

CLINICAL HISTORY: Pain and swelling

FINDINGS/IMPRESSION:

                                        3 views of the left ankle are submitted for review. 

There is no evidence for fracture or subluxation. 

There is soft tissue swelling noted medially and laterally. No radiopaque 
foreign body is visualized.

Joint spaces are well maintained without any significant degenerative change.

The visualized bones demonstrate normal radiodensity. 



SL:  [id number]

                            2016                            [facility]       

             

 

                          Chest 2 views DX                            Chest 2 views DX

CLINICAL HISTORY:Abnormal chest sounds

COMPARISON: 2016



FINDINGS/IMPRESSION: Limited AP portable study.

Support Lines/Devices: none

Lungs: Mild pulmonary underinflation. Lungs and pleural spaces are clear.

Cardiomediastinum: Mild stable cardiac silhouette enlargement. Poststernotomy 
changes.

Bone and Soft Tissues: No significant bony abnormality is evident.



SL: [id number]

                            2016                            [facility]       

             

 

                          Ext Lower Venous Doppler Unilat                             Patient Name: DESIREE MCKEON

: 1940; Age: 76 years  y/o Male

MR: [id number]

Study: Ext Lower Venous Doppler Unilat US 2016 6:56 PM CST

Ordering Physician: Wing Ferrara DO

Clinical Indication: Left lower extremity pain and swelling.

Comparison: None

FINDINGS: Compression duplex ultrasound of the left lower extremity was 
performed from the inguinal through the infrageniculate popliteal region.

The visualized superficial and deep veins are patent and compressible without 
evidence of intraluminal thrombus. Satisfactory spontaneous and augmented flow 
is demonstrated in the visualized segments. Mild subcutaneous edema.



IMPRESSION:

Mild subcutaneous edema without evidence of deep vein thrombosis.



SL: [email or web address]-PC

                            2016                            [facility]       

             

 

                          Brain wo contrast CT                            EXAM: CT BRAIN WITHOUT CONTRAST

DATE: 2016 6:18 PM CST

INDICATION: Altered level of consciousness. Fall in restaurant.

COMPARISON: 2016.

TECHNIQUE: CT images were obtained from the foramen magnum to the vertex without
 intravenous contrast on a multidetector CT. Coronal and sagittal 
reconstructions were also provided for review.   

CT radiation dose DLP: 981.84 mGy-cm

FINDINGS:

No acute intracranial hemorrhage, midline shift, or mass effect is identified. 
Focal areas of encephalomalacia are noted within the right occipital lobe, 
unchanged from the previous exam. The ventricles and sulci are prominent, 
compatible with mild diffuse parenchymal volume loss. There are scattered areas 
of hypoattenuation within the periventricular white matter, suggestive of 
chronic microangiopathic changes.

The orbits, paranasal sinuses, and mastoid air cells are unremarkable other than
 a small retention cysts within the left maxillary sinus. The visualized 
calvarium and skull base are within normal limits. There is atherosclerotic 
calcification of the carotid siphons.

IMPRESSION: 

No acute intracranial abnormality.



SL:  [id number]

                            2016                            [facility]       

             

 

                          Spine cervical wo contrast CT                            EXAM: CT CERVICAL SPINE WITHOUT

 CONTRAST

DATE: 2016 6:19 PM CST

INDICATION: Pain post trauma. Fall from standing.

COMPARISON: 2016

TECHNIQUE: Multi-detector CT imaging of the cervical spine was performed without
 intravenous contrast. Coronal and sagittal reconstructions were obtained.

CT Radiation Dose .92 mGy-cm

FINDINGS: 

There is reversal of the normal cervical lordosis. Minimal anterolisthesis at 
C3-C4 is visualized. The vertebral body heights are maintained, without evidence
 for acute fracture. Minimal wedging of the C5 vertebral body is chronic in 
nature. The craniocervical junction is within normal limits other than minimal 
degenerative changes at the atlantodental interval.

Multilevel degenerative changes mainly consisting of posterior disc osteophyte 
complexes and uncovertebral joint arthropathy are noted from C4 to C7 with 
varying degrees of mild to moderate neural foraminal stenosis. Findings are most
 pronounced at C6-C7 on the left where neural foraminal stenosis is moderate in 
degree.

A small hypodense nodule as well as calcification is noted within the left 
thyroid gland, unchanged from the previous exam. Vascular calcifications are 
visualized. Multiple calcified granulomas within the left upper lobe are 
identified. Mildly prominent upper mediastinal lymph nodes are not enlarged by 
size criteria.

IMPRESSION:

No acute fracture or malalignment of the cervical spine.



SL:  [id number]

                            2016                            [facility]       

             

 

                          Chest 1view DX                            Study: Chest 1view DX 2016 6:18 PM

 CST

Patient Name: DESIREE MCKEON MR: [id number]

: 1940; Age: 76 years  y/o Male

Ordering Physician: Wing Ferrara DO

Clinical Indication: Dizziness    

Comparison: 2016.

FINDINGS

LUNGS: The lungs are increasingly hypoinflated and under penetrated likely 
accounting for hazy retrocardiac and right parahilar opacity. Multiple scattered
 calcified granulomas are noted in both lungs.

HEART AND MEDIASTINUM: Mild cardiomegaly status post median sternotomy.    

LINES: None.

OSSEOUS STRUCTURES: No fracture, dislocation, or suspicious focal osseous 
lesion.    

OTHER: None.



IMPRESSION:

                                        1.  Mildly decreasing pulmonary inflation and image penetration as above described.

 Otherwise, no definite important interval change. A better penetrated PA film 
or lateral view may be helpful to visualize the retrocardiac portion of the left
 lung base.



SL: TPAINTER-PC

                            2016                            [facility]       

             

 

                          Brain wo contrast MRI                            Brain wo contrast MRI 

CLINICAL INDICATION: Confusion;    

COMPARISON: CT head 2016

TECHNIQUE: Multiplanar imaging of the brain was performed on a 1.5 Suzy magnet 
without IV contrast.

FINDINGS: 

SUPRATENTORIAL BRAIN : Small area of encephalomalacia is present in the right 
occipital lobe. The cortical parenchyma otherwise demonstrates normal signal 
with appropriate gray-white matter differentiation. No areas of restricted 
diffusion are visualized to suggest an acute infarct.

There is no space-occupying lesion, mass effect or midline shift. No extra-axial
 fluid collection or intraparenchymal hemorrhage. 

There are age-related involutional changes of the brain with compensatory 
dilation of the ventricles and subarachnoid spaces. Increased signal in the 
periventricular white matter is likely related to chronic ischemic change from 
small vessel disease.

BRAINSTEM AND CEREBELLUM: Cerebellar volume is within normal limits. No CP angle
 mass is visualized. IACs, brainstem and craniocervical junction are 
unremarkable.

VISUALIZED VESSELS: The expected intracranial flow voids are present.

SELLA, SKULL BASE AND ORBITS: The visualized orbits and optic chiasm are 
unremarkable. Partially empty sella is noted.

PARANASAL SINUSES AND MASTOIDS: The visualized paranasal sinuses are clear. The 
mastoid air cells are clear. 

IMPRESSION: 

Cerebral atrophy. Chronic white matter ischemic change is likely related to 
small vessel disease.

No significant acute abnormality is noted on the non-IV contrast MRI of the 
brain.

                            2016                            [facility]       

             

 

                          Abdomen AP DX                            Patient Name: DESIREE MCKEON

: 1940; Age: 76 years  y/o Male

MR: [id number]

Study: Abdomen AP DX dated 2016

Clinical Indication: Abdominal distension;    

Comparison: None.

Small calcification right mid pelvis may represent phlebolith but cannot exclude
 distal ureteral calculus in the appropriate clinical setting. 

Moderate amount of stool seen in the right colon. Nonpathologic bowel gas 
pattern without evidence of bowel obstruction.

No radiopaque foreign body identified to preclude patient from having MRI study.



SL: ADY

                            2016                            [facility]       

             

 

                          Pelvis AP DX                            Procedure: Pelvis one view

Indication: Pain, Trauma  , fall

Comparison: None available

Comments:

    No definite acute fracture or dislocation. Mild degenerative changes. 

If additional clinical concern, consider further imaging or follow-up imaging.  
   

Impression:

No definite acute radiographic abnormality.

SL: OMAR

                            2016                            [facility]       

             

 

                          Spine cervical wo contrast CT                            Procedure: CT cervical spine

 without contrast

Indication: Pain Post Trauma , fall

Comparison: None available

Technique: Noncontrast CT of the cervical spine.  Multiplanar reformats.

Comments/impression:

   

Photon starvation slightly limits evaluation of the more inferior spine. Motion 
limitations as well.

 No definite acute cervical spine fracture. Mild anterior wedging of the C5 
vertebral body is felt to more likely be chronic in nature, but can be 
correlated with focal tenderness.

Multilevel degenerative changes of the cervical spine

The left thyroid contains a calcification and a 6 mm hypodense nodule. Consider 
thyroid ultrasound as clinically warranted.

Cervical carotid atherosclerotic calcifications.



SL: OMAR

                            2016                            [facility]       

             

 

                          Brain Stroke wo contrast CT                            PROCEDURE: CT head without 

contrast

INDICATION:  Altered level of consciousness   , fall

COMPARISON: None. 

TECHNIQUE: Noncontrast head CT with multiplanar reformats.

COMMENTS: 

Motion limitations.

Brain:     No large volume hemorrhage. No definite mass. Ventriculomegaly 
commensurate with the degree of parenchymal volume loss. Intracranial 
atherosclerotic calcifications.  Mild chronic microangiopathic changes. These 
aforementioned chronic changes limit evaluation for acute ischemia, but no 
definite acute territorial ischemic changes are present.  

Soft tissues and orbits: Posterior scalp soft tissue thickening/contusion.

Calvarium and skull base: No definite markedly displaced fracture.

Paranasal sinuses and mastoids: Essentially clear.

: Non contributory.

IMPRESSION:

                                        1.  No definite intracranial hemorrhage. Motion limitations.

                                        2.  Posterior scalp soft tissue thickening/contusion

                                        3.  Chronic changes as above. MRI available as warranted.

Findings were discussed with Dr. Caitlin Chang DO via 
telephone on 2016 11:28 PM CDT .

SL: OMAR

                            2016                            [facility]       

             

 

                          Chest 1view DX                            : 1940; Age: 76 years  y/o Male

MR: [id number]

Study: Chest 1view DX 2016 10:15 PM CDT

Clinical Indication: Chest pain;    

Comparison: Chest x-ray 06/10/2016

FINDINGS: 

Bilateral lung calcified granulomas are again seen. No pleural effusion or 
pneumothorax is seen. Cardiomediastinal contours are unchanged. Coronary artery 
stent is identified. No acute skeletal abnormality seen. Midline sternotomy 
wires are again seen.

IMPRESSION:

No acute intrathoracic abnormality seen.

SL:  KINZA

                            2016                            [facility]       

             

 

                          Chest 1view DX                            Patient Name: DESIREE MCKEON

: 1940; Age: 75 years  y/o Male

MR: [id number]



*  CHEST, portable, 1 view 

   

HISTORY:     Chest pain,     ;   

COMPARISON: 2016. A study of 2015 and a chest computed tomography 
scan of 2015 were also reviewed.    

TECHNIQUE: A portable frontal radiograph of the chest was obtained.     



FINDINGS:

There are poststernotomy changes.    

There is mild cardiomegaly.

There is no overt failure.

A left coronary artery stent is noted.

   

The lungs are clear. There are bilateral small calcified granulomata within the 
lungs and hilar regions. There are no pleural effusions.

   

The regional skeleton is unremarkable.

   

IMPRESSION:

                                        1. No active disease.

                                        2. Poststernotomy changes.

 

                                        3. Mild cardiomegally.   

   

                                        4. Left coronary artery stent is noted.

                                        5. Prior granulomatous disease.

   

SL:  [email or web address]

                            06/10/2016                            [facility]       

             

 

                          Brain wo contrast CT                            Patient Name: DESIREE MCKEON

: 1940; Age: 75 years  y/o Male

MR: [id number]



*  CRANIAL CT without contrast

History:    Confusion;    

Comparison: 2016    



TECHNIQUE: CT images were obtained from the foramen magnum to the vertex without
 the use of intravenous contrast on a multidetector CT. Coronal and sagittal 
reconstructions were obtained.   

 



FINDINGS:

There is moderate atrophy. There is otherwise normal appearance of the 
ventricular system and extraventricular CSF spaces.

There are mild  symmetrical areas of decreased attenuation in the 
periventricular deep white matter consistent with microangiopathic white matter 
changes.    

There is no evidence of mass, midline shift, hemorrhage, extra-axial fluid 
collection, or acute infarction.    

Moderate vascular calcifications are noted involving the carotid siphons.

The calvarium is intact.

   

The visualized paranasal sinuses and the mastoids are     clear.

 

IMPRESSION: 

                                        1. No evidence of an acute intracranial process.

                                        2. Moderate atrophy.

                                        3. Mild microangiopathic white matter changes.    



SL:  [id number]

                            2016                            [facility]       

             

 

                          Chest 1view DX                            Patient Name: DESIREE MCKEON

: 1940; Age: 75 years  y/o Male

MR: [id number]



*  CHEST, portable, 1 view 

   

HISTORY:         Chest pain

COMPARISON: 2016, a study of 2015 was also reviewed.    

TECHNIQUE: A portable frontal radiograph of the chest was obtained.  The study 
is limited due to the patient's habitus and portable technique.    



FINDINGS:

There are poststernotomy changes.    

The left coronary artery stent is also noted.

There is mild cardiomegaly.

There is no overt failure.

There is minimal atelectasis or scarring in the lingula.

The lungs are clear.    

There are no pleural effusions.

   

The regional skeleton is unremarkable.

   

IMPRESSION:

                                        1. No active disease. Minimal scarring or atelectasis is noted in the lingula.

                                        2. Poststernotomy changes. A left coronary artery stent is also noted.

 

                                        3. Mild cardiomegally.   

   

   

SL:  [id number]

                            2016                             [facility]       

             

 

                          Spine lumbar series DX                             LUMBAR SPINE SERIES.

                                        (5 views)

 

History: Trauma - injury to the low back, status post fall.  Low Back Pain.  

 

A CT scan of the lumbar spine without contrast 10/30/2015 was reviewed.

 

 

Technique:  Frontal, lateral, and bilateral oblique views of the lumbar spine 
were obtained. A coned-down lateral view of the lumbosacral junction was also 
performed.

 

 

FINDINGS: There are 5 lumbar type vertebral bodies.  There is  normal alignment 
and lordosis of the lumbosacral spine. 

 

The vertebral bodies are normal in height. There are no compression deformities 
or destructive lesions.

 

The intervertebral disc spaces are well-maintained. There are very mild 
degenerative changes at L1-L2. There is minimal degenerative spurring at L2-L3. 
There is mild degenerative facet disease at L5-S1. There are no significant 
degenerative changes or other osteoarticular abnormalities involving the lumbar 
spine.

 

Atherosclerotic calcifications are noted involving the abdominal aorta.

 

 

CONCLUSION:

                                        1. There is no evidence of fracture, dislocation, or acute change. No compression

 deformities or destructive lesions.

                                        2. Very mild degenerative changes at L1-L2 and minimal degenerative change at L2-

L3. Mild degenerative facet disease is noted at L5-S1.

 

 

Coding:

Spine lumbar series 

Code: 37211

SL: 13

 

Hola Cochran M.D.

                            2016                            [facility]       

             

 

                          Ribs unilateral DX                            LEFT RIB SERIES WITH PA CHEST 5 VIEWS



 

INDICATION: Posttraumatic left rib pain

 

COMPARISON: Chest radiograph 2015

 

IMPRESSION:

 

                                        1. There is a nondisplaced fracture of the lateral sixth left rib.

 

                                        2. Numerous bilateral calcified pulmonary granulomas are again noted. No acute intrathoracic

 abnormalities are seen.

 

 

 

 

SL: 16

                            2016                            [facility]       

             

 

                          Spine thoracic 3 views DX                             THORACIC SPINE

                                        (2 views)

 

HISTORY: Trauma come injury to back, status post fall. Back pain in the thoracic
 region. 

 

TECHNIQUE: The thoracic spine was evaluated in frontal and lateral projections. 
The examination is limited due to the patient's habitus. Is also slight 
breathing motion.

 

 

FINDINGS:  There is minimal gentle thoracic scoliosis, convexity to the right, 
centered at T9-T10. There is normal alignment and kyphosis of the thoracic 
spine. The vertebral bodies are normal in height. There is no evidence of a 
compression deformity.

 

Minimal degenerative spurring is noted scattered throughout the thoracic spine. 
 There are no significant degenerative changes.

 

There are post sternotomy changes.

 

 

CONCLUSION: 

                                        1.  No significant abnormalities. There are no compression deformities or destructive

 lesions.

                                        2. Minimal scattered degenerative spurring.

                                        3. Minimal thoracic scoliosis.

 

 

SL: 13

 

Hola Cochran M.D.

                            2016                            [facility] contrast CT                            EXAM: CT HEAD WITHOUT CONTRAST.

 

DATE: 2016 04:49:53 PM

 

INDICATION: Scalp hematoma.

 

TECHNIQUE: Noncontrast axial imaging was obtained from the vertex to the skull 
base. Axial images were reconstructed using a bone algorithm. Coronal and 
sagittal reformats are also provided.

 

COMPARISON: CT head 10/30/2015.

 

FINDINGS: 

No acute intracranial hemorrhage or extraaxial collection is identified. Chronic
 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 There is diffuse 
cerebral volume loss with compensatory dilatation of ventricles and sulci. Mild 
chronic microangiopathic ischemic changes are present. No mass effect is seen. 
The gray-white matter interfaces are preserved.  

 

Included paranasal sinuses are clear. No mastoid effusion is identified.  No 
skull fracture is seen.

 

IMPRESSION: 

 

                                        1. No acute intracranial abnormality

                                        2. No significant interval change compared to prior examination

 

SL: 12

                            2016                            [facility] cervical  contrast CT                            CT CERVICAL SPINE WITHOUT

 CONTRAST: 

 

 TECHNIQUE: Helical images were done through the cervical spine without contrast
 using bone algorithm. Sagittal and coronal reformatted images were done.

 

FINDINGS: There is no fracture or dislocation. There is dorsal spondylosis at 
C4-C5 and C5-C6 without significant spinal stenosis.  A right posterolateral 
disc bulge at C3-C4 is noted.

 

IMPRESSION:

 

No traumatic CT abnormality in the cervical spine.

 

SL:13

                            10/30/2015                            [facility] lumbar  contrast CT                            CT LUMBAR SPINE WITHOUT CONTRAST:



 

TECHNIQUE: Thin section helical imaging through the lumbar spine was done 
without contrast, using bone algorithm. Sagittal and coronal reformatted images 
were also obtained.

 

FINDINGS: There is generalized osteoporosis. There is mild chronic superior 
endplate invagination of the L2 vertebral body. There is no acute fracture or 
dislocation. The disc spaces are normal in width. There is no cysts evidence of 
central or foraminal stenosis. There is mild bilateral facet arthropathy at 
L5-S1.

 

IMPRESSION:

 

No acute traumatic CT abnormalities in the lumbar spine.

 

SL:13

                            10/30/2015                            [facility]       

             

 

                          Brain  contrast CT                            CT BRAIN WITHOUT CONTRAST:

 

TECHNIQUE: Axial images were done without contrast.

 

FINDINGS: There is no evidence of intracranial hemorrhage. There is a small area
 of focal encephalomalacia in the right posterior occipital lobe. There is no  
significant parenchymal abnormality, infarct, mass, or shift. The ventricles and
 extra-axial spaces are  within normal limits. There is no evidence of 
fractures. There is no other significant osseous abnormality. 

 

IMPRESSION:

 

No traumatic CT abnormalities of the brain.

 

SL:13

                            10/30/2015                            [facility]       

             

 

                          Chest 1view DX                            Examination: Chest x-ray, single view

 

History: Dizziness

 

Comparison: 2015

 

Findings: The lungs are clear and without focal consolidation. The 
cardiomediastinal silhouette is within normal limits. No pleural effusion or 
pneumothorax is seen. The osseous structures are without focal abnormality. 

 

Changes of median sternotomy are again noted. Coronary artery stent is seen. 
Calcified granuloma in the right lower lobe is stable.

 

IMPRESSION: No acute cardiopulmonary disease. 

 

SL:  16

                            2015                            [facility]       

             

 

                          Chest 1view DX                            EXAMINATION: Chest 1view 

 

CLINICAL HISTORY:   Chest pain  

 

Since 2015, pulmonary inflation and image penetration of slightly 
increased. The hypoinflated lungs are clear of consolidation, pleural effusion, 
and pneumothorax. Mild right apical thickening. The heart size remains mildly 
enlarged status post median sternotomy.

 

 

 

 

SL:17

                            2015                            [facility]       

             

 

                          Brain wo contrast CT                            CT HEAD WITHOUT CONTRAST

 

CLINICAL INDICATION: Headache with Dizziness and Giddiness/ See Clinic 
Indication. 

 

Comparison: 2015.

 

TECHNIQUE: Multiple contiguous axial images of the brain were performed without 
IV contrast. 

 

FINDINGS:

 

No acute territorial infarction or intracranial hemorrhage. No extra-axial fluid
 collection.  Ventricles are symmetric and not displaced. Gray-white distinction
 is preserved. No mass, mass-effect, or midline shift. Mild cerebral atrophy and
 mild chronic small vessel ischemic change. Visualized paranasal sinuses are 
unremarkable. Osseous structures unremarkable.

 

IMPRESSION:

 

                                        1. No acute abnormality.

                                        2. Diffuse cerebral atrophy and mild chronic small vessel ischemic change.

 

 

 

 

 

SL: 14

                            2015                            [facility]       

             

 

                          Ext Lower Arterial Doppler bilat US                            HISTORY: Right toe 

ulcer, gangrene.

 

Bilateral lower extremity ultrasound up ultrasound exam.

 

There are normal triphasic waveforms of within common femoral, superficial 
femoral, popliteal and distal tibial arteries. Normal flow velocities are noted.
 No focus of hemodynamically significant stenosis is evident sonographically. 
Mild atherosclerotic irregularity noted within the SFA bilaterally.

 

IMPRESSION: No specific sonographic evidence of lower extremity arterial 
occlusion.

 

 

 

 

 

 

 

 

 

 

 

 

SL:13

                            2015                            [facility]       

             

 

                          Carotid artery Doppler bilat US                            Bilateral carotid duplex

 Doppler sonogram with spectral analysis:

 

Exam reason:  Other- See Note to Radiologist  See Clinic Indication 

 

The peak systolic flow velocity at the right common carotid artery is 91 cm/sec.
 The peak systolic flow velocity at the right internal carotid artery is 83 
cm/sec. The peak systolic flow velocity at the right external carotid artery is 
86 cm/sec. Right IC/CC ratio is 0.9.  Flow at the right vertebral artery is 
antegrade. Plaque formation is noted at the mid right common carotid artery is 
well as the right carotid bulb extending into the right internal carotid artery.

 

The peak systolic flow velocity at the left common carotid artery is 

                                        89 cm/sec. The peak systolic velocity at the left internal carotid artery is 91 

cm/sec. The peak systolic velocity at the left external carotid artery is 90 
cm/sec.Left IC/CC ratio is 1.0. The flow at the left vertebral artery is 
antegrade. Plaque formation is noted at the left carotid bifurcation extending 
into the left internal carotid artery.

 

IMPRESSION: Based on NASCET criteria, there is less than 50% diameter stenosis 
noted at the internal carotid arteries bilaterally.

 

 

SL:12

                            2015                            [facility]       

             

 

                          Chest  Pulmonary Embolism CTA                            EXAM: CT chest with contrast,

 PE protocol

 

DATE: Mar 30, 2015 03:00:00 AM 

 

INDICATION: chest pain

 

COMPARISON: Chest x-ray 2015.

 

TECHNIQUE: Helically acquired axial CT images of the chest were obtained with 
reconstructions in the coronal and sagittal planes. 3-D reconstructions of the 
pulmonary arteries are also done.

 

FINDINGS: 

Limited views the inferior neck soft tissues are normal.

 

No pulmonary embolism is seen. No aortic aneurysm or dissection is present. . 
The heart is mildly enlarged. Coronary artery stents and coronary bypass graft 
staples are seen. No pericardial effusion is seen. Partially calcified mildly 
prominent mediastinal and bilateral hilar lymph nodes are seen. No axillary or 
internal mammary lymphadenopathy is identified.

 

There is narrowing of bilateral central bronchi. The trachea is normal in 
caliber. . Bilateral lung interlobular septal thickening and groundglass 
opacities are seen. Bilateral lung calcified granulomas are present. . Small 
layering bilateral pleural effusions are seen.

 

Limited views of upper abdomen show small calcified gallstones. Small calcified 
splenic granulomas are seen. Fatty atrophy of the pancreas is seen. Mildly 
prominent periportal lymph nodes are nonspecific.

 

No osseous destructive lesions are identified.. Midline sternotomy wires are 
seen.

 

IMPRESSION: 

 

                                        1. . No pulmonary embolism identified.

                                        2. Cardiomegaly. Coronary artery bypass grafting.

                                        3. Pulmonary edema.

                                        4. Small layering bilateral pleural effusions.

                                        5. Sequela of granulomatous disease.

                                        6. Narrowing of bilateral central bronchi, likely secondary to mass effect by calcified

 hilar lymph nodes.

                                        7. Cholelithiasis.

 

SL: 14

                            2015                            [facility]       

             

 

                          Chest 1view DX                            HISTORY: Dizziness.

 

Chest one view portable.

 

Poor inspiration. 

Cardiomegaly, pulmonary congestion, suggesting CHF with pulmonary edema. 

There is no pleural effusion.

 

 

 

 

 

 

 

 

 

 

 

 

SL:13

                            2015                            [facility]       

             

 

                          Brain wo contrast CT                            CT BRAIN WITHOUT CONTRAST

 

INDICATION: Altered level of consciousness

 

COMPARISON: None

 

FINDINGS:

 

There are age-appropriate generalized involutional changes of the brain. There 
is no evidence of acute vascular insults, space occupying lesions, hemorrhage, 
hydrocephalus, midline shift, or extra-axial collections. The calvarium is 
intact.

 

IMPRESSION:

 

No acute intracranial abnormalities are visualized.

 

 

 

 

SL: 16

                            2015                            [facility]       

             

 

                          Cardiac SPECT multi studies NM                            PROCEDURE: Rest/Stress MYOCARDIAL

 PERFUSION SCAN with LEXISCAN

 

INDICATION: CAD; chest pain

 

PROTOCOL:  The patient underwent rest and post stress imaging in a one day 
protocol.  11 mCi of Tc-99m sestamibi was injected intravenously at rest, and 
tomographic (SPECT) images were obtained.

 

                                        30 mCi of Tc-99m sestamibi was injected 1 minute after an injection of 0.4mg/5ml

 of Lexiscan.  The patient's resting heart rate of 87 beats/minute increased to 
a peak rate of 101 beats/minute (68% of MPHR).  Blood pressure reached a maximum
 of 110/70 mm Hg.  The stress ECG was non-diagnostic due to usage of vasodilator
 protocol.  Gated SPECT images were obtained after stress injection.

 

FINDINGS:  Images obtained after stress injection of tracer show moderate 
perfusion defect involving the inferolateral wall.  Images obtained after rest 
injection of tracer show no evidence of reversibility.  Gated images obtained at
 rest after stress injection show mild global  hypokinesis.  The QGS LVEF is 
42%.

 

IMPRESSION:  Abnormal myocardial perfusion study.  There is a fixed 
inferolateral wall perfusion defect consistent with prior transmural myocardial 
infarction. No evidence of ischemia. Mild to moderate LV dysfunction with an EF 
of 42%. 

                            01/15/2015                            [facility]       

             



                                                                                
                                                                                
                                                                                
                                                                                
                                                                                
                                                                                
                                                                                
                                                                                
                                                                                
                                                                                
                                                                                
                                                                                
                                                                                
                                                                                
                                                                                
                                                                                
                                                                                
                                                                                
                                                                                
                    



Consultation Notes

                    





                                        No Data Provided for This Section                    



                                                            



Discharge Summaries

                    





                                        No Data Provided for This Section                    



                                                            



History and Physicals

                    





                                        No Data Provided for This Section                    



                                                                



Vital Signs

                     





                    Vital Sign                            Value                            Date         

                          Comments                            Source                    

 

                    Temperature Oral (F)                            98.2 F                            2019

                                                        [facility]                 

   

 

                    Heart Rate                            57                             2019     

                                                      [facility]                    

 

                    Respitory Rate                            18                             2019 

                                                       [facility]                  

  

 

                    Systolic (mm Hg)                            107                             2019

                                                        [facility]                 

   

 

                    Diastolic (mm Hg)                            59                             2019

                                                        [facility]                 

   

 

                    Heart Rate                            61                             2019     

                                                      [facility]                    

 

                    Temperature Oral (F)                            97.9 F                            2019

                                                        [facility]                 

   

 

                    Respitory Rate                            18                             2019 

                                                       [facility]                  

  

 

                    Systolic (mm Hg)                            105                             2019

                                                         [facility]                 

   

 

                    Diastolic (mm Hg)                            49                             2019

                                                        [facility]                 

   

 

                    Heart Rate                            60                             2019     

                                                      [facility]                    

 

                    Temperature Oral (F)                            98 F                            2019

                                                        [facility]                 

   

 

                    Systolic (mm Hg)                            108                             2019

                                                        [facility]                 

   

 

                    Diastolic (mm Hg)                            66                             2019

                                                        [facility]                 

   

 

                    Respitory Rate                            18                             2019 

                                                       [facility]                  

  

 

                    Weight                            88.636                             2019     

                                                      [facility]                    

 

                    BMI Calculated                            29.71                             2019

                                                        [facility]                 

   

 

                    Height                            172.72 cm                            2019   

                                                      [facility]                    



 

                    Respitory Rate                            18                             2018 

                                                        [facility]                  

  

 

                    Systolic (mm Hg)                            124                             2018

                                                         [facility]                 

   

 

                    Diastolic (mm Hg)                            52                             2018

                                                        [facility]                 

   

 

                    Systolic (mm Hg)                            114                             2018

                                                        [facility]                 

   

 

                    Diastolic (mm Hg)                            92                             2018

                                                        [facility]                 

   

 

                    Respitory Rate                            20                             2018 

                                                       [facility]                  

  

 

                    Respitory Rate                            15                             2018 

                                                       [facility]                  

  

 

                    Systolic (mm Hg)                            112                             2018

                                                        [facility]                 

   

 

                    Diastolic (mm Hg)                            60                             2018

                                                        [facility]                 

   

 

                    Heart Rate                            58                             2018     

                                                      [facility]                    

 

                    Temperature Oral (F)                            98 F                            2018

                                                        [facility]                 

   

 

                    Heart Rate                            70                             2018     

                                                       [facility]                    

 

                    Systolic (mm Hg)                            134                             2018

                                                         [facility]                 

   

 

                    Diastolic (mm Hg)                            66                             2018

                                                        [facility]                 

   

 

                    Respitory Rate                            15                             2018 

                                                       [facility]                  

  

 

                    Respitory Rate                            18                             2018 

                                                        [facility]                  

  

 

                    Systolic (mm Hg)                            129                             2018

                                                         [facility]                 

   

 

                    Diastolic (mm Hg)                            56                             2018

                                                        [facility]                 

   

 

                    Heart Rate                            46                             2018     

                                                      [facility]                    

 

                    Respitory Rate                            18                             2018 

                                                        [facility]                  

  

 

                    Systolic (mm Hg)                            128                             2018

                                                         [facility]                 

   

 

                    Diastolic (mm Hg)                            52                             2018

                                                        [facility]                 

   

 

                    Temperature Oral (F)                            97.4 F                            2018

                                                         [facility]                 

   

 

                    Temperature Oral (F)                            97.7 F                            10/23/2018

                                                         [facility]                 

   

 

                    Systolic (mm Hg)                            108                             10/23/2018

                                                         [facility]                 

   

 

                    Diastolic (mm Hg)                            50                             10/23/2018

                                                         [facility]                 

   

 

                    Respitory Rate                            17                             10/23/2018 

                                                        [facility]                  

  

 

                    Respitory Rate                            17                             10/23/2018 

                                                        [facility]                  

  

 

                    Temperature Oral (F)                            97.5 F                            10/23/2018

                                                         [facility]                 

   

 

                    Systolic (mm Hg)                            97                             10/23/2018

                                                         [facility]                 

   

 

                    Diastolic (mm Hg)                            46                             10/23/2018

                                                         [facility]                 

   

 

                    Systolic (mm Hg)                            93                             10/23/2018

                                                         [facility]                 

   

 

                    Diastolic (mm Hg)                            51                             10/23/2018

                                                         [facility]                 

   

 

                    Respitory Rate                            17                             10/23/2018 

                                                       [facility]                  

  

 

                    Temperature Oral (F)                            97.7 F                            10/23/2018

                                                         [facility]                 

   

 

                    Heart Rate                            51                             10/23/2018     

                                                      [facility]                    

 

                    BMI Calculated                            28.12                             10/23/2018

                                                        [facility]                 

   

 

                    Height                            175.26 cm                            10/23/2018   

                                                       [facility]                    



 

                    Weight                            86.364                             10/23/2018     

                                                       [facility]                    

 

                    Heart Rate                            80                             10/23/2018     

                                                       [facility]                    

 

                    Systolic (mm Hg)                            101                             10/18/2018

                                                         [facility]                 

   

 

                    Diastolic (mm Hg)                            58                             10/18/2018

                                                         [facility]                 

   

 

                    Heart Rate                            54                             10/18/2018     

                                                       [facility]                    

 

                    Respitory Rate                            14                             10/18/2018 

                                                       [facility]                  

  

 

                    Temperature Oral (F)                            98 F                            10/18/2018

                                                         [facility]                 

   

 

                    Respitory Rate                            12                             10/18/2018 

                                                        [facility]                  

  

 

                    Heart Rate                            57                             10/18/2018     

                                                       [facility]                    

 

                    Systolic (mm Hg)                            120                             10/18/2018

                                                         [facility]                 

   

 

                    Diastolic (mm Hg)                            68                             10/18/2018

                                                        [facility]                 

   

 

                    Temperature Oral (F)                            98 F                            10/18/2018

                                                         [facility]                 

   

 

                    Heart Rate                            64                             10/18/2018     

                                                       [facility]                    

 

                    Systolic (mm Hg)                            144                             10/18/2018

                                                         [facility]                 

   

 

                    Diastolic (mm Hg)                            80                             10/18/2018

                                                         [facility]                 

   

 

                    Temperature Oral (F)                            98 F                            10/18/2018

                                                         [facility]                 

   

 

                    Respitory Rate                            12                             10/18/2018 

                                                        [facility]                  

  

 

                    Height                            175.26 cm                            10/12/2018   

                                                       [facility]                    



 

                    BMI Calculated                            30.62                             10/12/2018

                                                         [facility]                 

   

 

                    Weight                            94.045                             10/12/2018     

                                                       [facility]                    

 

                    Temperature Oral (F)                            99.4 F                            10/11/2018

                                                         [facility]                 

   

 

                    Heart Rate                            62                             10/11/2018     

                                                       [facility]                    

 

                    Systolic (mm Hg)                            101                             10/11/2018

                                                         [facility]                 

   

 

                    Diastolic (mm Hg)                            57                             10/11/2018

                                                         [facility]                 

   

 

                    Temperature Oral (F)                            98.5 F                            10/11/2018

                                                         [facility]                 

   

 

                    Heart Rate                            59                             10/11/2018     

                                                       [facility]                    

 

                    Systolic (mm Hg)                            116                             10/11/2018

                                                         [facility]                 

   

 

                    Diastolic (mm Hg)                            65                             10/11/2018

                                                         [facility]                 

   

 

                    Systolic (mm Hg)                            123                             10/11/2018

                                                         [facility]                 

   

 

                    Diastolic (mm Hg)                            58                             10/11/2018

                                                         [facility]                 

   

 

                    Heart Rate                            60                             10/11/2018     

                                                      [facility]                    

 

                    Temperature Oral (F)                            98.2 F                            10/11/2018

                                                         [facility]                 

   

 

                    Respitory Rate                            16                             10/11/2018 

                                                        [facility]                  

  

 

                    Respitory Rate                            16                             10/11/2018 

                                                        [facility]                  

  

 

                    Respitory Rate                            18                             10/11/2018 

                                                        [facility]                  

  

 

                    Height                            175.26 cm                            2018   

                                                       [facility]                    



 

                    BMI Calculated                            26.79                             2018

                                                         [facility]                 

   

 

                    Weight                            82.273                             2018     

                                                       [facility]                    

 

                    BMI Calculated                            25.21                             2018

                                                         [facility]                 

   

 

                    Weight                            73                             2018         

                                                      [facility]                    

 

                    Height                            170.18 cm                            2018   

                                                      [facility]                    



 

                    Heart Rate                            63                             2018     

                                                       [facility]                    

 

                    Systolic (mm Hg)                            104                             2018

                                                         [facility]                 

   

 

                    Diastolic (mm Hg)                            54                             2018

                                                         [facility]                 

   

 

                    Respitory Rate                            18                             2018 

                                                        [facility]                  

  

 

                    Systolic (mm Hg)                            106                             2018

                                                         [facility]                 

   

 

                    Diastolic (mm Hg)                            63                             2018

                                                        [facility]                 

   

 

                    Heart Rate                            65                             2018     

                                                      [facility]                    

 

                    Temperature Oral (F)                            98.3 F                            2018

                                                         [facility]                 

   

 

                    Respitory Rate                            18                             2018 

                                                       [facility]                  

  

 

                    Temperature Oral (F)                            98.2 F                            2018

                                                         [facility]                 

   

 

                    Systolic (mm Hg)                            97                             2018

                                                         [facility]                 

   

 

                    Diastolic (mm Hg)                            55                             2018

                                                         [facility]                 

   

 

                    Heart Rate                            65                             2018     

                                                       [facility]                    

 

                    Respitory Rate                            18                             2018 

                                                        [facility]                  

  

 

                    Temperature Oral (F)                            98.4 F                            2018

                                                         [facility]                 

   

 

                    BMI Calculated                            26.79                             2018

                                                         [facility]                 

   

 

                    Height                            175.26 cm                            2018   

                                                       [facility]                    



 

                    Weight                            82.273                             2018     

                                                       [facility]                    

 

                    Weight                            82.273                             2018     

                                                       [facility]                    

 

                    BMI Calculated                            26.79                             2018

                                                         [facility]                 

   

 

                    Height                            175.26 cm                            2018   

                                                       [facility]                    



 

                    Temperature Oral (F)                            97.6 F                            2018

                                                         [facility]                 

   

 

                    Heart Rate                            78                             2018     

                                                       [facility]                    

 

                    Systolic (mm Hg)                            114                             2018

                                                        [facility]                 

   

 

                    Diastolic (mm Hg)                            72                             2018

                                                         [facility]                 

   

 

                    Respitory Rate                            20                             2018 

                                                        [facility]                  

  

 

                    Respitory Rate                            16                             2018 

                                                       [facility]                  

  

 

                    Systolic (mm Hg)                            121                             2018

                                                        [facility]                 

   

 

                    Diastolic (mm Hg)                            80                             2018

                                                        [facility]                 

   

 

                    Temperature Oral (F)                            97.8 F                            2018

                                                        [facility]                 

   

 

                    Heart Rate                            70                             2018     

                                                      [facility]                    

 

                    Respitory Rate                            18                             2018 

                                                       [facility]                  

  

 

                    Systolic (mm Hg)                            134                             2018

                                                        [facility]                 

   

 

                    Diastolic (mm Hg)                            71                             2018

                                                        [facility]                 

   

 

                    Temperature Oral (F)                            98 F                            2018

                                                        [facility]                 

   

 

                    Heart Rate                            80                             2018     

                                                      [facility]                    

 

                    Height                            175.26 cm                            2018   

                                                      [facility]                    



 

                    BMI Calculated                            28.05                             2018

                                                        [facility]                 

   

 

                    Weight                            86.165                             2018     

                                                      [facility]                    

 

                    Weight                            111.364                             2018    

                                                      [facility]                    

 

                    BMI Calculated                            37.33                             2018

                                                        [facility]                 

   

 

                    Height                            172.72 cm                            2018   

                                                      [facility]                    



 

                    Systolic (mm Hg)                            109                             2018

                                                        [facility]                 

   

 

                    Diastolic (mm Hg)                            57                             2018

                                                        [facility]                 

   

 

                    Respitory Rate                            18                             2018 

                                                       [facility]                  

  

 

                    Heart Rate                            74                             2018     

                                                      [facility]                    

 

                    Temperature Oral (F)                            98.4 F                            2018

                                                        [facility]                 

   

 

                    Systolic (mm Hg)                            121                             2018

                                                        [facility]                 

   

 

                    Diastolic (mm Hg)                            57                             2018

                                                        [facility]                 

   

 

                    Temperature Oral (F)                            97.7 F                            2018

                                                        [facility]                 

   

 

                    Heart Rate                            71                             2018     

                                                      [facility]                    

 

                    Respitory Rate                            18                             2018 

                                                       [facility]                  

  

 

                    Systolic (mm Hg)                            111                             2018

                                                        [facility]                 

   

 

                    Diastolic (mm Hg)                            57                             2018

                                                        [facility]                 

   

 

                    Temperature Oral (F)                            97.7 F                            2018

                                                        [facility]                 

   

 

                    Heart Rate                            82                             2018     

                                                      [facility]                    

 

                    Respitory Rate                            18                             2018 

                                                       [facility]                  

  

 

                    Height                            175.26 cm                            2018   

                                                       [facility]                    



 

                    Weight                            89.091                             2018     

                                                       [facility]                    

 

                    BMI Calculated                            29                             2018 

                                                       [facility]                  

  

 

                    BMI Calculated                            34.04                             2018

                                                        [facility]                 

   

 

                    Height                            175.26 cm                            2018   

                                                       [facility]                    



 

                    Weight                            104.545                             2018    

                                                      [facility]                    

 

                    Heart Rate                            67                             2018     

                                                      [facility]                    

 

                    Systolic (mm Hg)                            114                             2018

                                                        [facility]                 

   

 

                    Diastolic (mm Hg)                            71                             2018

                                                         [facility]                 

   

 

                    Respitory Rate                            18                             2018 

                                                       [facility]                  

  

 

                    Temperature Oral (F)                            97.6 F                            2018

                                                         [facility]                 

   

 

                    Systolic (mm Hg)                            109                             2018

                                                        [facility]                 

   

 

                    Diastolic (mm Hg)                            60                             2018

                                                        [facility]                 

   

 

                    Temperature Oral (F)                            97.7 F                            2018

                                                        [facility]                 

   

 

                    Respitory Rate                            18                             2018 

                                                       [facility]                  

  

 

                    Heart Rate                            72                             2018     

                                                      [facility]                    

 

                    Systolic (mm Hg)                            116                             2018

                                                        [facility]                 

   

 

                    Diastolic (mm Hg)                            63                             2018

                                                        [facility]                 

   

 

                    Respitory Rate                            18                             2018 

                                                       [facility]                  

  

 

                    Temperature Oral (F)                            97.7 F                            2018

                                                        [facility]                 

   

 

                    Heart Rate                            76                             2018     

                                                      [facility]                    

 

                    Height                            177.8 cm                            2018    

                                                      [facility]                    

 

                    BMI Calculated                            35.95                             2018

                                                        [facility]                 

   

 

                    Weight                            113.636                             2018    

                                                      [facility]                    

 

                    Respitory Rate                            16                             02/15/2018 

                                                       [facility]                  

  

 

                    Systolic (mm Hg)                            123                             02/15/2018

                                                        [facility]                 

   

 

                    Diastolic (mm Hg)                            73                             02/15/2018

                                                        [facility]                 

   

 

                    Respitory Rate                            18                             02/15/2018 

                                                       [facility]                  

  

 

                    Temperature Oral (F)                            98.1 F                            02/15/2018

                                                        [facility]                 

   

 

                    Heart Rate                            71                             02/15/2018     

                                                      [facility]                    

 

                    Heart Rate                            71                             02/15/2018     

                                                      [facility]                    

 

                    Temperature Oral (F)                            97.7 F                            02/15/2018

                                                        [facility]                 

   

 

                    Systolic (mm Hg)                            121                             02/15/2018

                                                        [facility]                 

   

 

                    Diastolic (mm Hg)                            70                             02/15/2018

                                                        [facility]                 

   

 

                    Respitory Rate                            16                             02/15/2018 

                                                       [facility]                  

  

 

                    Heart Rate                            80                             02/15/2018     

                                                      [facility]                    

 

                    Temperature Oral (F)                            97.8 F                            02/15/2018

                                                        [facility]                 

   

 

                    Systolic (mm Hg)                            120                             02/15/2018

                                                        [facility]                 

   

 

                    Diastolic (mm Hg)                            62                             02/15/2018

                                                        [facility]                 

   

 

                    Weight                            93.182                             02/10/2018     

                                                      [facility]                    

 

                    BMI Calculated                            30.34                             02/10/2018

                                                        [facility]                 

   

 

                    Height                            175.26 cm                            02/10/2018   

                                                       [facility]                    



 

                    Systolic (mm Hg)                            131                             2018

                                                        [facility]                 

   

 

                    Diastolic (mm Hg)                            64                             2018

                                                        [facility]                 

   

 

                    Respitory Rate                            18                             2018 

                                                       [facility]                  

  

 

                    Heart Rate                            72                             2018     

                                                      [facility]                    

 

                    Temperature Oral (F)                            98.5 F                            2018

                                                        [facility]                 

   

 

                    Systolic (mm Hg)                            130                             2018

                                                         [facility]                 

   

 

                    Diastolic (mm Hg)                            67                             2018

                                                         [facility]                 

   

 

                    Heart Rate                            75                             2018     

                                                       [facility]                    

 

                    Respitory Rate                            18                             2018 

                                                        [facility]                  

  

 

                    Temperature Oral (F)                            98.5 F                            2018

                                                         [facility]                 

   

 

                    Temperature Oral (F)                            97.7 F                            2018

                                                         [facility]                 

   

 

                    Heart Rate                            70                             2018     

                                                       [facility]                    

 

                    Systolic (mm Hg)                            124                             2018

                                                         [facility]                 

   

 

                    Diastolic (mm Hg)                            71                             2018

                                                         [facility]                 

   

 

                    Respitory Rate                            16                             2018 

                                                        [facility]                  

  

 

                    Weight                            101.903                             2018    

                                                       [facility]                    

 

                    BMI Calculated                            33.18                             2018

                                                         [facility]                 

   

 

                    Height                            175.26 cm                            2018   

                                                       [facility]                    



 

                    Height                            170.18 cm                            2018   

                                                       [facility]                    



 

                    Weight                            79.545                             2018     

                                                       [facility]                    

 

                    BMI Calculated                            27.47                             2018

                                                         [facility]                 

   

 

                    Systolic (mm Hg)                            127                             2017

                                                         [facility]                 

   

 

                    Diastolic (mm Hg)                            84                             2017

                                                         [facility]                 

   

 

                    Respitory Rate                            16                             2017 

                                                       [facility]                  

  

 

                    Heart Rate                            74                             2017     

                                                      [facility]                    

 

                    Temperature Oral (F)                            98.6 F                            2017

                                                         [facility]                 

   

 

                    Weight                            99.688                             2017     

                                                       [facility]                    

 

                    BMI Calculated                            32.45                             2017

                                                         [facility]                 

   

 

                    Height                            175.26 cm                            2017   

                                                       [facility]                    



 

                    Systolic (mm Hg)                            144                             2017

                                                         [facility]                 

   

 

                    Respitory Rate                            16                             2017 

                                                        [facility]                  

  

 

                    Diastolic (mm Hg)                            79                             2017

                                                        [facility]                 

   

 

                    Temperature Oral (F)                            97.8 F                            2017

                                                         [facility]                 

   

 

                    Heart Rate                            77                             2017     

                                                       [facility]                    

 

                    Systolic (mm Hg)                            152                             2017

                                                         [facility]                 

   

 

                    Diastolic (mm Hg)                            67                             2017

                                                         [facility]                 

   

 

                    Respitory Rate                            12                             2017 

                                                        [facility]                  

  

 

                    Heart Rate                            87                             2017     

                                                       [facility]                    

 

                    Temperature Oral (F)                            97.7 F                            2017

                                                         [facility]                 

   

 

                    BMI Calculated                            35.5                             2017

                                                         [facility]                 

   

 

                    Weight                            90.909                             2017     

                                                       [facility]                    

 

                    Height                            160.02 cm                            2017   

                                                       [facility]                    



 

                    Systolic (mm Hg)                            150                             2017

                                                         [facility]                 

   

 

                    Diastolic (mm Hg)                            60                             2017

                                                         [facility]                 

   

 

                    Respitory Rate                            18                             2017 

                                                        [facility]                  

  

 

                    Heart Rate                            70                             2017     

                                                      [facility]                    

 

                    Temperature Oral (F)                            98 F                            2017

                                                         [facility]                 

   

 

                    Weight                            100                             2017        

                                                       [facility]                    

 

                    BMI Calculated                            32.56                             2017

                                                         [facility]                 

   

 

                    Height                            175.26 cm                            2017   

                                                       [facility]                    



 

                    Respitory Rate                            18                             2017 

                                                        [facility]                  

  

 

                    Temperature Oral (F)                            98.1 F                            2017

                                                         [facility]                 

   

 

                    Systolic (mm Hg)                            122                             2017

                                                         [facility]                 

   

 

                    Diastolic (mm Hg)                            56                             2017

                                                        [facility]                 

   

 

                    Heart Rate                            69                             2017     

                                                      [facility]                    

 

                    Heart Rate                            59                             2017     

                                                      [facility]                    

 

                    Temperature Oral (F)                            97.8 F                            2017

                                                         [facility]                 

   

 

                    Systolic (mm Hg)                            111                             2017

                                                         [facility]                 

   

 

                    Diastolic (mm Hg)                            64                             2017

                                                         [facility]                 

   

 

                    Respitory Rate                            18                             2017 

                                                       [facility]                  

  

 

                    Temperature Oral (F)                            98.2 F                            2017

                                                        [facility]                 

   

 

                    Heart Rate                            65                             2017     

                                                      [facility]                    

 

                    Respitory Rate                            18                             2017 

                                                        [facility]                  

  

 

                    Systolic (mm Hg)                            101                             2017

                                                         [facility]                 

   

 

                    Diastolic (mm Hg)                            55                             2017

                                                         [facility]                 

   

 

                    Systolic (mm Hg)                            129                             2017

                                                         [facility]                 

   

 

                    Diastolic (mm Hg)                            56                             2017

                                                        [facility]                 

   

 

                    Temperature Oral (F)                            98.4 F                            2017

                                                        [facility]                 

   

 

                    Heart Rate                            66                             2017     

                                                       [facility]                    

 

                    Respitory Rate                            18                             2017 

                                                       [facility]                  

  

 

                    BMI Calculated                            27.52                             2017

                                                         [facility]                 

   

 

                    Height                            175.26 cm                            2017   

                                                       [facility]                    



 

                    Weight                            84.545                             2017     

                                                      [facility]                    

 

                    Heart Rate                            70                             2017     

                                                      [facility]                    

 

                    Respitory Rate                            18                             2017 

                                                        [facility]                  

  

 

                    Systolic (mm Hg)                            127                             2017

                                                         [facility]                 

   

 

                    Diastolic (mm Hg)                            81                             2017

                                                        [facility]                 

   

 

                    Temperature Oral (F)                            97.4 F                            2017

                                                         [facility]                 

   

 

                    Systolic (mm Hg)                            139                             2017

                                                         [facility]                 

   

 

                    Diastolic (mm Hg)                            77                             2017

                                                        [facility]                 

   

 

                    Temperature Oral (F)                            98.6 F                            2017

                                                         [facility]                 

   

 

                    Respitory Rate                            18                             2017 

                                                       [facility]                  

  

 

                    Heart Rate                            65                             2017     

                                                       [facility]                    

 

                    Respitory Rate                            16                             2017 

                                                       [facility]                  

  

 

                    Heart Rate                            67                             2017     

                                                       [facility]                    

 

                    Systolic (mm Hg)                            138                             2017

                                                         [facility]                 

   

 

                    Diastolic (mm Hg)                            72                             2017

                                                        [facility]                 

   

 

                    Temperature Oral (F)                            97.8 F                            2017

                                                         [facility]                 

   

 

                    Weight                            102.5                             2017      

                                                       [facility]                    

 

                    Height                            175.26 cm                            2017   

                                                       [facility]                    



 

                    Height                            175.26 cm                            2017   

                                                       [facility]                    



 

                    Height                            175.26 cm                            2017   

                                                       [facility]                    



 

                    BMI Calculated                            30.19                             2016

                                                         [facility]                 

   

 

                    Weight                            92.727                             2016     

                                                       [facility]                    

 

                    Weight                            92.727                             2016     

                                                       [facility]                    

 

                    BMI Calculated                            30.19                             2016

                                                        [facility]                 

   

 

                    Temperature Oral (F)                            98.3 F                            2016

                                                        [facility]                 

   

 

                    Heart Rate                            79                             2016     

                                                       [facility]                    

 

                    Respitory Rate                            17                             2016 

                                                        [facility]                  

  

 

                    Systolic (mm Hg)                            137                             2016

                                                         [facility]                 

   

 

                    Diastolic (mm Hg)                            49                             2016

                                                        [facility]                 

   

 

                    Weight                            91.364                             2016     

                                                      [facility]                    

 

                    BMI Calculated                            29.74                             2016

                                                        [facility]                 

   

 

                    Height                            175.26 cm                            2016   

                                                      [facility]                    



 

                    Temperature Oral (F)                            98.3 F                            2016

                                                        [facility]                 

   

 

                    Respitory Rate                            18                             2016 

                                                       [facility]                  

  

 

                    Heart Rate                            75                             2016     

                                                       [facility]                    

 

                    Systolic (mm Hg)                            133                             2016

                                                         [facility]                 

   

 

                    Diastolic (mm Hg)                            55                             2016

                                                        [facility]                 

   

 

                    Heart Rate                            72                             2016     

                                                      [facility]                    

 

                    Temperature Oral (F)                            98.1 F                            2016

                                                         [facility]                 

   

 

                    Respitory Rate                            16                             2016 

                                                        [facility]                  

  

 

                    Systolic (mm Hg)                            125                             2016

                                                         [facility]                 

   

 

                    Diastolic (mm Hg)                            78                             2016

                                                         [facility]                 

   

 

                    Respitory Rate                            18                             2016 

                                                        [facility]                  

  

 

                    Systolic (mm Hg)                            128                             2016

                                                         [facility]                 

   

 

                    Diastolic (mm Hg)                            73                             2016

                                                        [facility]                 

   

 

                    Heart Rate                            75                             2016     

                                                      [facility]                    

 

                    Temperature Oral (F)                            97.5 F                            2016

                                                         [facility]                 

   

 

                    Respitory Rate                            16                             2016 

                                                        [facility]                  

  

 

                    Systolic (mm Hg)                            111                             2016

                                                         [facility]                 

   

 

                    Diastolic (mm Hg)                            68                             2016

                                                        [facility]                 

   

 

                    Temperature Oral (F)                            97.6 F                            2016

                                                         [facility]                 

   

 

                    Heart Rate                            69                             2016     

                                                       [facility]                    

 

                    BMI Calculated                            31.08                             2016

                                                         [facility]                 

   

 

                    Weight                            95.455                             2016     

                                                       [facility]                    

 

                    Height                            175.26 cm                            2016   

                                                       [facility]                    



 

                    Weight                            90.909                             2016     

                                                       [facility]                    

 

                    BMI Calculated                            32.35                             2016

                                                         [facility]                 

   

 

                    Height                            167.64 cm                            2016   

                                                       [facility]                    



 

                    Systolic (mm Hg)                            146                             2016

                                                         [facility]                 

   

 

                    Diastolic (mm Hg)                            82                             2016

                                                        [facility]                 

   

 

                    Temperature Oral (F)                            97.6 F                            2016

                                                        [facility]                 

   

 

                    Heart Rate                            64                             2016     

                                                       [facility]                    

 

                    Respitory Rate                            18                             2016 

                                                        [facility]                  

  

 

                    Systolic (mm Hg)                            122                             2016

                                                         [facility]                 

   

 

                    Diastolic (mm Hg)                            94                             2016

                                                         [facility]                 

   

 

                    Respitory Rate                            17                             2016 

                                                       [facility]                  

  

 

                    Temperature Oral (F)                            97.5 F                            2016

                                                        [facility]                 

   

 

                    Heart Rate                            56                             2016     

                                                       [facility]                    

 

                    Systolic (mm Hg)                            102                             2016

                                                        [facility]                 

   

 

                    Diastolic (mm Hg)                            49                             2016

                                                        [facility]                 

   

 

                    Temperature Oral (F)                            97.4 F                            2016

                                                        [facility]                 

   

 

                    Respitory Rate                            17                             2016 

                                                       [facility]                  

  

 

                    Heart Rate                            61                             2016     

                                                       [facility]                    

 

                    BMI Calculated                            32.11                             2016

                                                         [facility]                 

   

 

                    Weight                            98.636                             2016     

                                                       [facility]                    

 

                    Height                            175.26 cm                            2016   

                                                       [facility]                    



 

                    Height                            175.26 cm                            06/10/2016   

                                                       [facility]                    



 

                    BMI Calculated                            32.11                             06/10/2016

                                                         [facility]                 

   

 

                    Weight                            98.636                             06/10/2016     

                                                       [facility]                    

 

                    Systolic (mm Hg)                            130                             2016

                                                         [facility]                 

   

 

                    Diastolic (mm Hg)                            79                             2016

                                                        [facility]                 

   

 

                    Respitory Rate                            20                             2016 

                                                       [facility]                  

  

 

                    Temperature Oral (F)                            98.0 F                            2016

                                                         [facility]                 

   

 

                    Systolic (mm Hg)                            136                             2016

                                                         [facility]                 

   

 

                    Diastolic (mm Hg)                            116                             2016

                                                        [facility]                 

   

 

                    Temperature Oral (F)                            98.4 F                            2016

                                                         [facility]                 

   

 

                    Respitory Rate                            17                             2016 

                                                        [facility]                  

  

 

                    Systolic (mm Hg)                            116                             2016

                                                         [facility]                 

   

 

                    Diastolic (mm Hg)                            70                             2016

                                                         [facility]                 

   

 

                    Respitory Rate                            16                             2016 

                                                        [facility]                  

  

 

                    Weight                            90.909                             2016     

                                                       [facility]                    

 

                    BMI Calculated                            30.47                             2016

                                                         [facility]                 

   

 

                    Height                            172.72 cm                            2016   

                                                      [facility]                    



 

                    Heart Rate                            72                             2016     

                                                      [facility]                    

 

                    Temperature Oral (F)                            97.4 F                            2016

                                                         [facility]                 

   

 

                    Systolic (mm Hg)                            116                             10/31/2015

                                                         [facility]                 

   

 

                    Diastolic (mm Hg)                            56                             10/31/2015

                                                         [facility]                 

   

 

                    Respitory Rate                            20                             10/31/2015 

                                                        [facility]                  

  

 

                    Temperature Oral (F)                            98.4 F                            10/31/2015

                                                         [facility]                 

   

 

                    Systolic (mm Hg)                            110                             10/31/2015

                                                         [facility]                 

   

 

                    Diastolic (mm Hg)                            50                             10/31/2015

                                                         [facility]                 

   

 

                    Respitory Rate                            16                             10/31/2015 

                                                       [facility]                  

  

 

                    Heart Rate                            80                             10/31/2015     

                                                       [facility]                    

 

                    Weight                            94.545                             10/31/2015     

                                                       [facility]                    

 

                    BMI Calculated                            31.69                             10/31/2015

                                                        [facility]                 

   

 

                    Heart Rate                            82                             10/31/2015     

                                                       [facility]                    

 

                    Systolic (mm Hg)                            105                             10/31/2015

                                                         [facility]                 

   

 

                    Diastolic (mm Hg)                            49                             10/31/2015

                                                         [facility]                 

   

 

                    Respitory Rate                            18                             10/31/2015 

                                                        [facility]                  

  

 

                    Height                            172.72 cm                            10/31/2015   

                                                      [facility]                    



 

                    Temperature Oral (F)                            98.5 F                            2015

                                                         [facility]                 

   

 

                    Systolic (mm Hg)                            103                             2015

                                                         [facility]                 

   

 

                    Diastolic (mm Hg)                            55                             2015

                                                         [facility]                 

   

 

                    Respitory Rate                            13                             2015 

                                                       [facility]                  

  

 

                    Heart Rate                            87                             2015     

                                                       [facility]                    

 

                    Systolic (mm Hg)                            122                             2015

                                                         [facility]                 

   

 

                    Diastolic (mm Hg)                            57                             2015

                                                         [facility]                 

   

 

                    Respitory Rate                            16                             2015 

                                                        [facility]                  

  

 

                    Respitory Rate                            18                             2015 

                                                        [facility]                  

  

 

                    Systolic (mm Hg)                            143                             2015

                                                         [facility]                 

   

 

                    Diastolic (mm Hg)                            80                             2015

                                                        [facility]                 

   

 

                    Temperature Oral (F)                            98.3 F                            2015

                                                        [facility]                 

   

 

                    Heart Rate                            93                             2015     

                                                       [facility]                    

 

                    BMI Calculated                            30.63                             2015

                                                         [facility]                 

   

 

                    Height                            175.26 cm                            2015   

                                                       [facility]                    



 

                    Weight                            94.091                             2015     

                                                      [facility]                    

 

                    Heart Rate                            82                             2015     

                                                       [facility]                    

 

                    Temperature Oral (F)                            98 F                            2015

                                                         [facility]                 

   

 

                    Systolic (mm Hg)                            110                             2015

                                                         [facility]                 

   

 

                    Diastolic (mm Hg)                            66                             2015

                                                         [facility]                 

   

 

                    Respitory Rate                            18                             2015 

                                                        [facility]                  

  

 

                    Temperature Oral (F)                            98 F                            2015

                                                        [facility]                 

   

 

                    Heart Rate                            81                             2015     

                                                       [facility]                    

 

                    Systolic (mm Hg)                            135                             2015

                                                         [facility]                 

   

 

                    Diastolic (mm Hg)                            78                             2015

                                                         [facility]                 

   

 

                    Respitory Rate                            18                             2015 

                                                       [facility]                  

  

 

                    Temperature Oral (F)                            98.0 F                            2015

                                                         [facility]                 

   

 

                    Respitory Rate                            18                             2015 

                                                       [facility]                  

  

 

                    Heart Rate                            82                             2015     

                                                       [facility]                    

 

                    Systolic (mm Hg)                            145                             2015

                                                         [facility]                 

   

 

                    Diastolic (mm Hg)                            84                             2015

                                                         [facility]                 

   

 

                    Height                            175.26 cm                            2015   

                                                       [facility]                    



 

                    BMI Calculated                            34.18                             2015

                                                         [facility]                 

   

 

                    Weight                            105                             2015        

                                                       [facility]                    

 

                    Weight                            103.182                             2015    

                                                       [facility]                    

 

                    BMI Calculated                            33.59                             2015

                                                        [facility]                 

   

 

                    Height                            175.26 cm                            2015   

                                                      [facility]                    



 

                    Heart Rate                            88                             2015     

                                                       [facility]                    

 

                    Respitory Rate                            18                             2015 

                                                        [facility]                  

  

 

                    Systolic (mm Hg)                            127                             2015

                                                         [facility]                 

   

 

                    Diastolic (mm Hg)                            77                             2015

                                                        [facility]                 

   

 

                    Temperature Oral (F)                            97.9 F                            2015

                                                        [facility]                 

   

 

                    Temperature Oral (F)                            98.8 F                            2015

                                                        [facility]                 

   

 

                    Heart Rate                            89                             2015     

                                                      [facility]                    

 

                    Respitory Rate                            20                             2015 

                                                        [facility]                  

  

 

                    Weight                            100                             2015        

                                                       [facility]                    

 

                    Systolic (mm Hg)                            135                             2015

                                                         [facility]                 

   

 

                    Diastolic (mm Hg)                            71                             2015

                                                        [facility]                 

   

 

                    Temperature Oral (F)                            97.8 F                            2015

                                                        [facility]                 

   

 

                    Heart Rate                            89                             2015     

                                                       [facility]                    

 

                    Systolic (mm Hg)                            137                             2015

                                                         [facility]                 

   

 

                    Diastolic (mm Hg)                            83                             2015

                                                        [facility]                 

   

 

                    Respitory Rate                            18                             2015 

                                                       [facility]                  

  

 

                    Heart Rate                            83                             2015     

                                                      [facility]                    

 

                    Temperature Oral (F)                            97.9 F                            2015

                                                         [facility]                 

   

 

                    Systolic (mm Hg)                            137                             2015

                                                         [facility]                 

   

 

                    Diastolic (mm Hg)                            75                             2015

                                                         [facility]                 

   

 

                    Respitory Rate                            18                             2015 

                                                        [facility]                  

  

 

                    Systolic (mm Hg)                            137                             2015

                                                         [facility]                 

   

 

                    Diastolic (mm Hg)                            85                             2015

                                                        [facility]                 

   

 

                    Temperature Oral (F)                            97.8 F                            2015

                                                         [facility]                 

   

 

                    Respitory Rate                            18                             2015 

                                                       [facility]                  

  

 

                    Heart Rate                            77                             2015     

                                                       [facility]                    

 

                    BMI Calculated                            34.41                             2015

                                                         [facility]                 

   

 

                    Weight                            105.7                             2015      

                                                       [facility]                    

 

                    Height                            175.26 cm                            2015   

                                                       [facility]                    



 

                    BMI Calculated                            34.04                             2015

                                                         [facility]                 

   

 

                    Weight                            104.545                             2015    

                                                       [facility]                    

 

                    Height                            175.26 cm                            2015   

                                                       [facility]                    



 

                    Systolic (mm Hg)                            112                             2015

                                                         [facility]                 

   

 

                    Heart Rate                            80                             2015     

                                                       [facility]                    

 

                    Respitory Rate                            16                             2015 

                                                        [facility]                  

  

 

                    Diastolic (mm Hg)                            66                             2015

                                                        [facility]                 

   

 

                    Temperature Oral (F)                            98.6 F                            2015

                                                         [facility]                 

   

 

                    Diastolic (mm Hg)                            67                             2015

                                                         [facility]                 

   

 

                    Temperature Oral (F)                            97.8 F                            2015

                                                         [facility]                 

   

 

                    Heart Rate                            87                             2015     

                                                       [facility]                    

 

                    Systolic (mm Hg)                            109                             2015

                                                         [facility]                 

   

 

                    Respitory Rate                            16                             2015 

                                                        [facility]                  

  

 

                    Respitory Rate                            19                             2015 

                                                        [facility]                  

  

 

                    Systolic (mm Hg)                            100                             2015

                                                         [facility]                 

   

 

                    Heart Rate                            82                             2015     

                                                      [facility]                    

 

                    Temperature Oral (F)                            98.3 F                            2015

                                                         [facility]                 

   

 

                    Diastolic (mm Hg)                            86                             2015

                                                        [facility]                 

   

 

                    Height                            175.26 cm                            01/15/2015   

                                                       [facility]                    



 

                    BMI Calculated                            33.59                             01/15/2015

                                                         [facility]                 

   

 

                    Weight                            103.182                             01/15/2015    

                                                       [facility]                    

 

                    Weight                            103.182                             01/15/2015    

                                                       [facility]                    

 

                    BMI Calculated                            33.59                             01/15/2015

                                                         [facility]                 

   

 

                    Height                            175.26 cm                            01/15/2015   

                                                      [facility]                    





                                                                                
                                                                                
                                                                                
                                                                                
                                                                                
                                                                                
                                                                                
                                                                                
                                                                                
                                                                                
                                                                                
                                                                                
                                                                                
                                                                                
                                                                                
                                                                                
                                                                                
                                                                                
                                                                                
                                                                                
                                                                                
                                                                                
                                                                                
                                                                                
                                                                                
                                                                                
                                                                                
                                                                                
                                                                                
                                                                                
                                                                                
                                                                                
                                                                                
                                                                                
                                                                                
                                                                                
                                                                                
                                                                                
                                                                                
                                                                                
                                                                                
                                                                                
                                                                                
                                                                                
                                                                                
                                                                                
                                                                                
                                                                                
                                                                                
                                                                                
                                                                                
                                                                                
                                                                                
                                                                                
                                                                                
                                                                                
                                                                                
                                                                                
                                                                                
                                                                                
                                                                                
                                                                                
                                                                                
                                                                                
                                                                                
                                                                                
                                                                                
                                                                                
                                                                                
                                                                                
                                                                                
                                                                                
                                                                                
                                                                                
                                                                                
                                                                                
                                                                                
                                                                                
                                                                                
                                                                                
                                                                                
                                                                                
                                                                                
                                                                                
                                                                                
                                                                                
                                                                                
                                                                                
                                                                                
                                                                                
                                                                                
                                                                                
                                                                                
                                                                                
                                        



Encounters

                    





                    Location                            Location Details                            Encounter

 Type                            Encounter Number                            Reason For

 Visit                            Attending Provider                            ADM Date

                            DC Date                            Status                

                                        Source                    

 

                          [facility]                                               

                    Inpatient                            [id number]                             

                           Fawad Frausto                             01/15/2015      

                          2015                                                     

                                        [facility]                                               

                    Inpatient                            [id number]                             

                           Adnan Delbert                             2015                                                        [facility]                                               

                          [facility]                            [id number]                 

                                                En Alberto                             2015                                               

                                        [facility]                                               

                    Inpatient                            [id number]                             

                           Adnan Delbert                             2015                                                        [facility]                                               

                          OBS Observation Patient                            [id number]             

                                                Adnan Delbert                             2015                                               

                                        [facility]                                               

                          [facility]                            [id number]                 

                                                Pamela Anderson                             2015                                               

                                        [facility]                                               

                          [facility]                            [id number]                 

                                                Sara Ko                             10/31/2015

                            10/31/2015                                               

                                        [facility]                                               

                    Outpatient                            [id number]                            

                            Adnan Delbert                             2016       

                     01/15/2016                                                        

[facility]                                               

                    Outpatient                            [id number]                            

                            Betty Hamid                             2016                                                        

[facility]                                               

                          [facility]                            [id number]                 

                                                Jeni Gleason                             2016                                               

                                        [facility]                                               

                          OBS Observation Patient                            [id number]             

                                                Adnan Delbert                             06/10/2016

                            2016                                               

                                        [facility]                                               

                          Observation                            [id number]                         

                                                Adnan Delbert                             2016                                                     

                                        [facility]                                               

                    Emergency                            [id number]                             

                           Wing Alem                             2016                                                        

[facility]                                               

                    Inpatient                            [id number]                             

                           Job Don                             2016                                                     

                                        [facility]                                               

                          Observation                            [id number]                         

                                                Job Don                             2017                                                   

                                        [facility]                                               

                    Emergency                            [id number]                             

                           Jaciel Miranda                             2017                                                   

                                        [facility]                                               

                          Observation                            [id number]                         

                                                Jettchristenyolanda Muller                             2017                                                

                                        [facility]                                               

                    Inpatient                            [id number]                             

                           Adnan Delbert                             2018                                                        [facility]                                               

                    Inpatient                            [id number]                             

                           Adnan Delbert                             02/10/2018        

                    02/15/2018                                                        [facility]                                               

                    Inpatient                            [id number]                             

                           Adnan Delbert                             2018                                                        [facility]                                               

                    Inpatient                            [id number]                             

                           Adnan Delbert                             2018                                                        [facility]                                               

                    Inpatient                            [id number]                             

                           Adnan Delbert                             2018                                                        [facility]                                               

                          Observation                            [id number]                         

                                                Adnan Delbert                             2018                                                     

                                        [facility]                                               

                    Inpatient                            [id number]                             

                           Adnan Delbert                             2018        

                    10/11/2018                                                        [facility]                                               

                    Inpatient                            [id number]                             

                           Adnan Delbert                             10/12/2018        

                    10/18/2018                                                        [facility]                                               

                    Emergency                            [id number]                             

                           Aubrey Morales                             10/23/2018   

                          10/23/2018                                                  

                                        [facility]                                               

                    Emergency                            [id number]                             

                           Pamela Anderson                             2018                                                        [facility]                                               

                    Emergency                            [id number]                             

                           Nisreen Lou                             2018                                                    

                                        [facility]                                               

                          Observation                            [id number]                         

                                                Russ Amador                             2019                                                   

                                        [facility]                    



                                                                                
                                                                                
                                                                                
                                                                                
                                                                                
        



Procedures

                    





                    Procedure                            Code                            Date           

                          Perfomer                            Comments                        

                                        Source                    

 

                          CABG x 3 - Coronary artery bypass grafts x 3                            [id number] 

                                                                                       

                                        [facility]                    

 

                          Cardiac catheterization, left heart                            [id number]           

                                                                                          

                                        [facility]                    

 

                                        CABG x 3 - Coronary artery bypass grafts x 3<sup>1</sup>                        

                    [id number]                                                                          

                          ablation stents                            [facility]                    



 

                          Cardiac ablation using fluoroscopy guidance                            [id number]  

                                                                                       

                                        [facility]                    

 

                          Stent placement<sup>2</sup>                            [id number]                  

                                                                            x27                  

                                        [facility]                    



                                                                                
                                                



Assessment and Plan

                    





                    Assessment and Plan                            Date                            Source

                    

 

                                        Extracted from:Title: Cardiology Consultation

Author: Marquez Brannon MD

Date: 19

 Impression and Plan

Chest pain, possible acute coronary syndrome

Acute on chronic combined systolic and diastolic CHF -cardiomegaly with 
pulmonary vascular congestion on chest x-ray, elevated BNP

CAD with a history of three-vessel CABG and multiple coronary stents

Recent non-ST elevation MI in 2019

Paroxysmal atrial fibrillation

Acute UTI

Hypertension

Diabetes mellitus type 2

Hyperlipidemia

Obstructive sleep apnea

Plan:

Admit for observation

Serial cardiac enzymes and EKGs to rule out acute coronary syndrome

Obtain records from [facility] where patient is undergone multiple 
cardiac cath and angioplasty procedures and most recently was transferred there 
in January of this year per his request to have the procedures done by his 
regular cardiologist Dr. Andrei Terrell

Repeat echocardiogram

Resume cardiac meds including aspirin, Brilinta, amiodarone, beta-blocker, IV 
Lasix and statins

Further recommendations per his response to treatment and clinical course

Thank you for this consultation.  I shall follow him with you.



Extracted from:Title: General Admission H&P *

Author: Russ Amador MD

Date: 19

 Impression and Plan

Chest Pain

SOB

Acute on chronic combined systolic and diastolic CHF Exacerbation

CAD with a history of three-vessel CABG and multiple coronary stents

Recent NSTEMI in 2019

Paroxysmal atrial fibrillation

Hypertension

Diabetes mellitus type 2

Hyperlipidemia

Obstructive sleep apnea

Acute UTI

BPH

Hypothyroidism

Plan:

                                        -Tele

                                        -O2 prn

                                        -Trend CE,

                                        -start pain meds prn

                                        -Consult Cardiology

                                        -Start IV Lasix 40 mg IV BID

                                        -Daily weight, I/Os.

                                        -Monitor Vitals

                                        -Start IV Rocephin

                                        -F/u , Urine Cx

                                        -Start SSI+FSBG,

                                        -Reviewed and Ordered Labs

                                        -Reviewed  Imaging

                                        -Resume cardiac meds including aspirin, Brilinta, amiodarone, beta-blocker,  and

 statins

                                        -Reconcile Home Meds Once available

DVT PPX: Lovenox

Patient seen by Russ Amador MD

Internal Medicine Hospitalist

                            2019                             [place]       

             

 

                                        Extracted from:Title: Clinical Document

Author: Jovita Ford MD

Date: 10/18/18



Progress Note

Gastroenterology and Hepatology



Assessment/Impression:

                                        1.  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  Also a

 combination of heart failure symptoms with passive congestion may be playing a 
role.  Possible drugs that could be causing this include amiodarone as well as 
atorvastatin.

                                        2.  Anemia with microcytosis likely secondary to acute blood loss most likely from

 epistaxis.  In addition his stool guaiac is positive slow GI blood loss cannot 
be discounted.  Iron panel normal but this was done after blood transfusion

                                        3.  Cholelithiasis with the questionable gallbladder wall thickening on imaging,

 no obvious clinical signs of acute cholecystitis.  HIDA scan showed cystic duct
 to be open but ejection fraction is 0 suggestive of chronic cholecystitis

Plan: 

                                        -Continue to hold atorvastatin, continue with the lower dose amiodarone given his

 LFTs are improving slowly

                                        -Given given that there is no obvious cystic duct obstruction as well as no clinical

 signs of acute cholecystitis there is no indication for surgery at this time

                                        -Given that he had prior GI evaluation I do not recommend repeat endoscopy at this

 time.  I am hoping that with stopping anticoagulation which has been done by 
the cardiology team his is hemoglobin will remain stable and will not need 
further interventions.

Plan discussed with the patient, nurse as well as attending physician.  Patient 
is cleared for discharge planning from GI standpoint.

SUBJECTIVE:

Patient seen and examined at bedside. Events of the day reviewed with nursing 
staff. No new complaints.  Liver function tests continue to improve.  He is 
tolerating diet with no problems.

Review of Systems: 

                                        (-)=Negative,(+)=Positive

                                        1) Const: (-) fever, (-) weight change

                                        2) Skin: (-) rash, (-) bleeding

                                        3) HEENT: (-) difficulty swallowing, (-) swelling

                                        4) Eyes: (-) vision changes, (-) bleeding

                                        5) Neuro: (-) weakness, (-) headaches

                                        6) Resp: (-) dyspnea on exertion, (-) cough

                                        7) Cardio: (-) chest pain, (-) orthopnea

                                        8) GI: (-) blood in stool, (-) reflux

                                        9) : (-) dysuria, (-) bloody discharge

                                        10) Endo: (-) heat intolerance, (-) cold intolerance



OBJECTIVE:

Vitals: See below

General: NAD

Psych: alert , oriented x 3

Neck:supple

CVS: s1s2 RRR

Resp: CTA Bilaterally

Abd : Soft, NT, ND , BS +

Ext : No edema

Skin: No rash or echymossis

CNS:  No gross motor/sensory defect

Radiology Studies: Reviewed.

Labs: Reviewed. 







Vitals    Tmp(F)    Tmp(C)    Ttype    BP    MAP    Pulse    RR    SpO2    FIO2 
   ETCO2

                                        10/18 15:22    98    36.67    oral    101/58    ---    54    14    100    ---   

 ---

                                        10/18 11:57    98    36.67    oral    120/68    ---    57    12    ---    ---   

 ---

                                        10/18 07:43    98    36.67    oral    144/80    ---    64    --    100    ---   

 ---

                                        10/18 06:59    ----    ----    ----    -----    ---    ---    12    100    ---  

  ---

                                        10/18 04:41    97.3    36.28    oral    129/78    ---    53    16    100    --- 

   ---

                                        24 Hr Tmax: 98F (36.67c) at 10/18 15:22        Vital Signs are the last 5 in the

 past 48 hours.

                                        24 Hr Tmin:  97.3F (36.28c) at 10/18 04:41        Weights are the last 5 in 60 days,

 plus initial.

Date    Wt(kg)    Wt(lb)    Ht(cm)    Ht(in)    Method    BMI

                                        10/18     81.14     178.50        Measured

                                        10/17     81.86     180.10        Measured

                                        10/15     84.77     186.50        Measured

                                        10/14     81.14     178.50        Measured

                                        10/13     88.32     194.31        Measured

                                        10/12 (initial)     94.05     206.90        Measured     30.6

                                        10/12    175.26     69.00    Stated

Most Recent Scores:

                                        10/18/18    Pain Intensity NRS (0-10)        0

                                        10/18/18    Shantelle Coma Score        14

                                        10/18/18    Johns Jiménez Fall Score        15

                                        10/18/18    Carlos Score    17

                                        (all previously charted lines have been discontinued)

                                        (no surgical procedures documented)

I&O    Record    In    Out    Bal

                                        10/18    24hr Tot      610        0      610

                                        10/17    24hr Tot      540        0      540

    24hr Labs

                                        10/18 1505

POC Performing Locatio        See Note  

Glucose POC        206      H

                                        10/18 1147

POC Performing Locatio        See Note  

Glucose POC        250      H

                                        10/18 0708

POC Performing Locatio        See Note  

Glucose POC        222      H

                                        10/18 0453

Total Protein        6.9  

Albumin Lvl        2.3      L

Bili Total    0.6  

Bili Direct    0.3  

Bili Indirect        0.3  

Alk Phos    168      H

AST    70      H

ALT    44  

Globulin    4.6      H

A/G Ratio    0.5      L

                                        10/17 2054

POC Performing Locatio        See Note  

Glucose POC        294      H

Scheduled Meds (19):

AMIODarone 100 mg PO Daily

    [eMAR Schedule: (10/18/18)  09:00]

    [Future Dose:  10/19/18 09:00]

LORazepam 1 mg PO Bedtime

    [Last Rescheduled Dt/Tm: 10/13/18 23:00:00 CDT]

    [eMAR Schedule: (10/18/18)  23:00]

    [Future Dose:  10/19/18 23:00]

LORazepam 0.5 mg PO Daily

    [Last Rescheduled Dt/Tm: 10/13/18 17:30:00 CDT]

    [eMAR Schedule: (10/18/18)  09:00]

    [Future Dose:  10/19/18 09:00]

aspirin (aspirin 81 mg tablet, enteric coated) 81 mg PO Daily

    [Last Rescheduled Dt/Tm: 10/13/18 13:19:00 CDT]

    [eMAR Schedule: (10/18/18)  09:00]

    [Future Dose:  10/19/18 09:00]

                                        (Suspended) atorvastatin 5 mg PO Bedtime

bumetanide (Bumex) 2 mg PO Daily

    [eMAR Schedule: (10/18/18)  09:00]

    [Future Dose:  10/19/18 09:00]

gabapentin (gabapentin 600 mg oral tablet) 600 mg PO TID

    [eMAR Schedule: (10/18/18)  09:00, 13:00, 17:00]

isosorbide mononitrate 30 mg PO QAM

    [eMAR Schedule: (10/18/18)  09:00]

    [Future Dose:  10/19/18 09:00]

levothyroxine (Synthroid) 50 microgram PO Q630AM

    [Last Rescheduled Dt/Tm: 10/14/18 6:30:00 CDT]

    [eMAR Schedule: (10/18/18)  06:30;   (10/19/18)  06:30]

lisinopril 2.5 mg PO Daily

    [eMAR Schedule: (10/18/18)  09:00]

    [Future Dose:  10/19/18 09:00]

magnesium oxide (Mag-Ox 400) 400 mg PO Daily

    [eMAR Schedule: (10/18/18)  09:00]

    [Future Dose:  10/19/18 09:00]

metoprolol (metoprolol tartrate) 25 mg PO Q12H

    [eMAR Schedule: (10/18/18)  09:00, 21:00]

niacin (niacin 1000 mg oral tablet, extended release) 2,000 mg PO Bedtime

    [eMAR Schedule: (10/18/18)  21:00]

    [Future Dose:  10/19/18 21:00]

pantoprazole 40 mg PO Daily

    [eMAR Schedule: (10/18/18)  09:00]

    [Future Dose:  10/19/18 09:00]

potassium chloride (potassium chloride 20 mEq oral tablet, extended release) 20 
mEq PO BID

    [eMAR Schedule: (10/18/18)  09:00, 17:00]

ranolazine (Ranexa 500 mg oral tablet, extended release) 500 mg PO Daily

    [eMAR Schedule: (10/18/18)  09:00]

    [Future Dose:  10/19/18 09:00]

sertraline 100 mg PO Daily

    [eMAR Schedule: (10/18/18)  09:00]

    [Future Dose:  10/19/18 09:00]

sodium chloride (Saline Flush 0.9%) 10 ml IVP Q12H

    [Last Rescheduled Dt/Tm: 10/12/18 21:00:00 CDT]

    [eMAR Schedule: (10/18/18)  09:00, 21:00]

tamsulosin 0.4 mg PO Daily

    [eMAR Schedule: (10/18/18)  09:00]

    [Future Dose:  10/19/18 09:00]

Unscheduled Meds: None

PRN Meds (26):

Dextrose 50% in Water IV (Dextrose 50% Syringe) 12.5 gm IVP PRN

Dextrose 50% in Water IV (Dextrose 50% Syringe) 25 gm IVP PRN

acetaminophen-hydrocodone (Norco 10/325 oral tablet) 1 tab PO Q6H

acetaminophen (Tylenol) 650 mg PO Q6H

calcium gluconate + Sodium Chloride 0.9%  mL 2 gm IVPB  ml/hr

calcium gluconate + Sodium Chloride 0.9%  mL 3 gm IVPB  ml/hr

famotidine 20 mg PO Q12H

glucagon 1 mg IM PRN

insulin lispro 1 unit SUB-Q TID-Before Meals

insulin lispro 2 unit SUB-Q TID-Before Meals

insulin lispro 3 unit SUB-Q TID-Before Meals

insulin lispro 4 unit SUB-Q TID-Before Meals

insulin lispro 5 unit SUB-Q TID-Before Meals

magnesium oxide 800 mg PO PRN

magnesium sulfate 1 gm IVPB  ml/hr

magnesium sulfate 2 gm IVPB PRN 25 ml/hr

potassium chloride 20 mEq PO PRN

potassium chloride 20 mEq [place] PRN

potassium chloride 10 mEq IVPB  ml/hr

potassium phosphate + Sodium Chloride 0.9%  mL 15 mmol IVPB PRN 63.75 
ml/hr

potassium phosphate + Sodium Chloride 0.9%  mL 30 mmol IVPB PRN 65 ml/hr

potassium phosphate-sodium phosphate (potassium phosphate-sodium phosphate 250 
mg-280 mg-160 mg oral powder for reconstitution) 2 pkt PO PRN

sodium chloride (Saline Flush 0.9%) 10 ml IVP PRN

sodium phosphate + Dextrose 5% in Water  mL 15 mmol IVPB PRN 63.75 ml/hr

sodium phosphate + Dextrose 5% in Water  mL 30 mmol IVPB PRN 65 ml/hr

trazodone (trazodone 50 mg oral tablet) 50 mg PO Bedtime

One Time Meds: None

Continuous Infusions: None

Stool Color:  Brown

Stool Description:  Large, Soft

Type of Bladder Control:  Voluntary

Type of Urinary Elimination:  Incontinent



Extracted from:Title: Clinical Document

Author: Jovita Ford MD

Date: 10/15/18

Initial Consult Note

Gastroenterology and Hepatology 



Referring MD: Dr Velazco, Dr Mandujano

Reason for Consultation:

Elevated liver function test

Assessment/Impression:

                                        1.  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  Also a

 combination of heart failure symptoms with passive congestion may be playing a 
role.  Possible other drugs that could be causing this include amiodarone as 
well as atorvastatin.

                                        2.  Anemia with microcytosis likely secondary to acute blood loss most likely from

 epistaxis.  In addition his stool guaiac is positive slow GI blood loss cannot 
be discounted.

Plan: 

                                        -Check acute hepatitis panel, recommend stopping atorvastatin.  I have discussed

 the case with his cardiologist Dr. Velazco who is agreed to drop the dose of 
amiodarone.

                                        -Trend liver function tests

                                        -Check anemia panel, if iron is low consider IV iron infusions

                                        -Given that he had prior GI evaluation I do not recommend repeat endoscopy at this

 time.  I am hoping that with stopping anticoagulation which has been done by 
the cardiology team he is hemoglobin will remain stable and will not need 
further interventions.

Thank you for asking us to participate in the care of this patient.

History Of Present Illness:

This is a 78-year-old male with past medical history of coronary artery disease 
with bypass graft, multiple PCI's in the past, chronic diastolic heart failure, 
paroxysmal atrial fibrillation and rate controlled status post prior ablations 
currently normal sinus rhythm, was on anticoagulation and admission, 
hypertension, diabetes with the chronic epistaxis who presented to the hospital 
with ongoing epistaxis and anemia.  Patient apparently has needed at least 2 
blood transfusions this month.  Cardiology has assessed the patient on Xarelto 
has been on hold.  He remains on aspirin 81 mg.  His hemoglobin today was 10.1 
after 2 units of blood transfusion.  In addition he was noted to have abnormal 
liver function test with admission AST of 58 alkaline phosphatase of 174 ALT of 
44 and they have increased to AST of 133 alkaline phosphorus of 195.  GI has 
been probably consulted due to elevated liver function test.  Of note he also is
on statin and I do not see any dose changes in the statin medication.  He denies
any alcohol use.

As per anemia appears to be chronic although there has been acute drop recently.
 He was previously evaluated by our service in 2017 for similar problems
underwent an upper endoscopy which was completely normal.  Colonoscopy was 
suboptimal poor prep but no active bleeding was noted.

Past Medical History:

Stented coronary artery

Acute MI

Hypercholesteremia

Poliomyelitides

Whooping cough

Histoplasmosis

Hypertension

Hx MRSA infection



Scheduled Meds (18):

                                        10/14/18 AMIODarone 200 mg PO Daily

                                        10/13/18 LORazepam 1 mg PO Bedtime

                                        10/13/18 LORazepam 0.5 mg PO Daily

                                        10/13/18 aspirin (aspirin 81 mg tablet, enteric coated) 81 mg PO Daily

                                        10/13/18 atorvastatin 5 mg PO Bedtime

                                        10/13/18 gabapentin (gabapentin 600 mg oral tablet) 600 mg PO TID

                                        10/14/18 isosorbide mononitrate 30 mg PO QAM

                                        10/14/18 levothyroxine (Synthroid) 50 microgram PO Q630AM

                                        10/14/18 lisinopril 2.5 mg PO Daily

                                        10/14/18 magnesium oxide (Mag-Ox 400) 400 mg PO Daily

                                        10/13/18 metoprolol (metoprolol tartrate) 25 mg PO Q12H

                                        10/13/18 niacin (niacin 1000 mg oral tablet, extended release) 2,000 mg PO Bedtime



                                        10/14/18 pantoprazole 40 mg PO Daily

                                        10/13/18 potassium chloride (potassium chloride 20 mEq oral tablet, extended release)

 20 mEq PO BID

                                        10/14/18 ranolazine (Ranexa 500 mg oral tablet, extended release) 500 mg PO Daily



                                        10/14/18 sertraline 100 mg PO Daily

                                        10/12/18 sodium chloride (Saline Flush 0.9%) 10 ml IVP Q12H

                                        10/14/18 tamsulosin 0.4 mg PO Daily

Continuous Infusions (1):

                                        10/14/18 Sodium Chloride 0.9%  mL (normal saline 0.9%  mL) 250 mL 30

 ml/hr



Allergies (4) Active        Reaction

Reglan    None documented

iodine    None documented

sulfa drugs        None documented

Latex    None documented

Social History:

Alcohol

Details: Never

Tobacco

Details: Use: Former smoker.  Tobacco smoke exposure: None.  Did the Patient 
Smoke Cigarettes Anytime During the Last 365 Days? No.  Cessation Counseling 
Provided? Yes.

Details: Use: Former smoker.  Type: Cigarettes.  Tobacco smoke exposure: None.  
Did the Patient Smoke Cigarettes Anytime During the Last 365 Days? No.  
Cessation Counseling Provided? No.

Details: Use: Former smoker.  Ready to change: No.  Household tobacco concerns: 
No.  Tobacco smoke exposure: None.  Other Tobacco Frequency quit 30 years ago.  
Did the Patient Smoke Cigarettes Anytime During the Last 365 Days? No.  
Cessation Counseling Provided? No.

Substance Abuse

Details: Use: None.

Family History:

Mother: Aneurysm

Brother: Cancer of prostate; Heart attack; Leukemia

Procedure History:

Cardiac catheterization, left heart

CABG x 3 - Coronary artery bypass grafts x 3

Cardiac ablation using fluoroscopy guidance

Stent placement

Review of Systems: 

NEGATIVE unless bold

General: weight loss, loss of appetite, fever, chills, excessive malaise, 
fatigue, generalized weakness

HEENT : recent change in vison, eye pain, diplopia, epistaxis, sinus pain, sore 
throat, throat pain, acute hearing loss, ear pain or discharge

RESPIRATORY: shortness of breath, hemoptysis, cough, wheezing, pleuritic pains

CVS: chest pain, palpitations, irregular herat beat, low extremity swelling, 
heart murmurs

GI: see HPI

: hematuria, dysuria, incontinence,urinary frequency, impaired urine flow. 
recurrent UTIs

MS: acute arthritis, back pain, joint swelling, gout

Neurological: acute altered mentation, headaches, recent seizures, falls, recent
loss of consciousness, gait problems, focal limb weakness, numbness, tingling , 
paraesthesia

Endocrine: polydypsia, polyuria, unusual hair loss

Immunological/ Hematological; acute bleeding, easy bleeding or bruising, lymph 
node swelling, recurrent infections

Psychiatric: hallucinations. psychosis, confusion, depression, suicidal ideation

Integument: rash, jaundice, generalized



Vitals and Temp:

Vitals    Tmp(F)    Pulse    BP    RR    SpO2    FIO2

                                        10/15 15:07    97.6    61    119/56    18    100    ---

                                        10/15 11:30    97.7    67    119/54    --    97    ---

                                        10/15 08:58    ----    ---    -----    --    99     2.0L/m

                                        10/15 08:22    97.5    63    129/71    --    ---    ---

                                        10/15 07:44    97.7    61    122/69    --    98    ---

                                        24 Hr Tmax: 97.7F (36.50c) at 10/15 11:30        Vital Signs are the last 5 in the

 past 48 hours.

Examination: 

Vitals: See above

General: NAD

Psych: alert ; ortiented x 3

Neck:supple

CVS: 3/6 systolic murmur.  Regularly regular

Resp: Decreased breath sounds at bases, no crackles.

Abd : Soft, NT, ND , BS +

Ext : No edema

Skin: No rash

CNS:  No gross motor/sensory defect

Labs:

Labs (Last four charted values)

WBC                     H 11.3    (OCT 14)    H 15.4    (OCT 13)    H 14.4    
(OCT 11)

Hgb                     L 10.1    (OCT 15)    L 7.2    (OCT 14)    L 7.6    (OCT
13)    L 7.9    (OCT 12)

Hct                     L 31.5    (OCT 15)    L 23.2    (OCT 14)    L 24.6    
(OCT 13)    L 26.4    (OCT 12)

Plt                     286    (OCT 14)    329    (OCT 13)    341    (OCT 11)

Na                      138    (OCT 15)    141    (OCT 14)    141    (OCT 13)   
137    (OCT 11)

K                       4.4    (OCT 15)    L 3.3    (OCT 14)    C 3.0    (OCT 
14)    L 3.4    (OCT 13)

CO2                     29    (OCT 15)    29    (OCT 14)    29    (OCT 13)    31
   (OCT 11)

Cl                      99    (OCT 15)    99    (OCT 14)    99    (OCT 13)    97
   (OCT 11)

Cr                      0.97    (OCT 15)    0.86    (OCT 14)    0.87    (OCT 13)
   0.84    (OCT 11)

BUN                     22    (OCT 15)    16    (OCT 14)    19    (OCT 13)    H 
24    (OCT 11)

Glucose Random              H 270    (OCT 15)    H 168    (OCT 14)    H 144    
(OCT 13)    H 185    (OCT 11)

Mg                      2.0    (OCT 13)

Phos                    3.1    (OCT 13)

Ca                      L 8.2    (OCT 15)    L 7.8    (OCT 14)    L 8.0    (OCT 
13)    L 8.0    (OCT 11)

PT                      H 28.2    (OCT 11)

INR                     H 2.60    (OCT 11)

PTT                     H 43.7    (OCT 11)

Troponin                <0.02    (OCT 12)    <0.02    (OCT 11)

Diagnostic Studies: 

Reviewed. 



Extracted from:Title: Clinical Document

Author: Dennys Aquino MD

Date: 10/12/18

full H&P dictated,

#[id number]

date/time: 10/12/2018  07:05

**critical care time 25-30 minutes**

                            10/18/2018                             Milli       

             

 

                                        Extracted from:Title: History and Physical

Author: Homa Bradford MD

Date: 18





                                        78 year old male with a history of CAD s/p CABGx3 and multiple stents, A-fib (on

 Xarelto) CHF on 2-3L home O2, HLD, HTN, polio with residual right lower 
extremity weaknessand DM who presented to the ED after a fall at home.

  

Multiple falls

                                        - Unclear if this represents syncope/presyncope vs imbalance vs mechanical falls



                                        -Patient is at risk as he is on Xarelto

                                        -Patient is wheelchair-boundhowever most of these falls have occurred while standing

 and unsupervised

                                        -We will get PT/OT evaluation

                                        -Recent echo without any valvular abnormalities

                                        - will get carotid Dopplers



CAD s/p CABG

                                        -Continue aspirin, statin, lisinopril



Hypertension

                                        -Continue home meds



Elevated transaminases

                                        -Possibly related to amiodarone

                                        -Increased hyperattenuation of the liver seen onimaging suggestive of amiodarone

 versus hemochromatosis



CHF

                                        -Continue homemeds

                                        -Currently stable



A. fib

                                        -Continue Xarelto - will need to discuss risks vs benefits given multiple falls

                                        - continue amoidarone

                                        - currently rate-controlled



Diabetes

                                        -Continue home insulin regimen plus sliding scale insulin



Hypothyroidism

                                        -Continue levothyroxine



Difficult home situation

                                        -Grandson is concerned aboutpoor supervision from the patient's daughterat home

                                        -Social work evaluation



  xarelto

  pending further evaluation



                            10/11/2018                            JESUS Morley       

             

 

                                        Extracted from:Title: Clinical Document

Author: Ashok Velazco MD

Date: 18

Progress Note

Cardiology

[facility]



Impression:

Chest pain

severe coronary artery disease status post bypass and multiple PCI's, LIMA to 
LAD as last remaining vessel

Chronic diastolic congestive heart failure

paroxysmal atrial fibrillation status post multiple ablations [id number] Vasc:4

hypertension

diabetes

hypothyroidism

Cardiologist:  at [facility]

Plan:

Continue aspirin and Xarelto

Continue isosorbide, sent over to his pharmacy

Would increase his potassium supplementation to twice a day

Agree with magnesium supplementation

Continue metoprolol at 25 mg p.o. twice a day

QTC improved with correction of his electrolyte abnormalities.  However still 
elevated at 500 ms.  I will decrease Ranexa to daily dosing

Discussed with nursing

Discussed with primary team

Discussed with patient in detail

Subjective:

Patient seen and examined.  No chest pain.  Has been chest pain-free all night 
and this morning.  No palpitations.  No bleeding.  No edema.  No fever.

Objective:

Telemetry 





Vitals    Tmp(F)    Pulse    BP    RR    SpO2    FIO2

                                         12:31    98.3    65    106/63    18    95    ---

                                         08:45    ----    ---    -----    18    96     3.0L/m

                                         07:46    98.2    65    97/55    18    96    ---

                                         02:54    98.4    69    134/71    17    99     2.0L/m

                                         23:45    98.3    69    132/73    18    95     2.0L/m



                                        24 Hr Tmax: 98.4F (36.89c) at  02:54        Vital Signs are the last 5 in the

 past 48 hours.

Gen: Alert, deconditioned

neck: No carotid bruits, No JVD

Lungs: Mildly decreased at the bases bilaterally

Heart: Regular, S1-S2

Abd: Soft, nt, nd, +bs

Ext: No edema

Neuro: No focal deficits

Skin: warm, moist



Labs (Last four charted values)

WBC                     H 12.7    (SEP 26)    10.4    (SEP 25)    10.4    (SEP 
24)

Hgb                     L 7.6    (SEP 26)    L 7.8    (SEP 25)    L 7.8    (SEP 
24)

Hct                     L 24.7    (SEP 26)    L 25.3    (SEP 25)    L 25.0    
(SEP 24)

Plt                     293    (SEP 26)    294    (SEP 25)    326    (SEP 24)

Na                      141    (SEP 26)    138    (SEP 25)    143    (SEP 25)   
139    (SEP 24)

K                       3.8    (SEP 26)    C 2.9    (SEP 25)    C 2.9    (SEP 
25)    C 3.0    (SEP 24)

CO2                     26    (SEP 26)    32    (SEP )    28    (SEP )    27
   (SEP 24)

Cl                      102    (SEP 26)    99    (SEP 25)    102    (SEP 25)    
102    (SEP 24)

Cr                      0.90    (SEP 26)    0.94    (SEP 25)    0.83    (SEP 25)
   0.90    (SEP 24)

BUN                     11    (SEP 26)    9    (SEP 25)    9    (SEP 25)    7   
(SEP 24)

Glucose Random          H 268    (SEP 26)    H 297    (SEP 25)    H 229    (SEP 
25)    H 231    (SEP 24)

Mg                      1.8    (SEP 26)    1.9    (SEP 25)    L 1.7    (SEP 24)

Phos                    L 1.8    (SEP 24)

Ca                      L 8.0    (SEP 26)    L 7.5    (SEP 25)    L 7.9    (SEP 
25)    L 7.5    (SEP 24)

PT                      H 26.1    (SEP 24)

INR                     H 2.36    (SEP 24)

PTT                     H 37.1    (SEP 24)

Troponin                0.03    (SEP 25)    0.04    (SEP 25)    0.05    (SEP 24)

Total CK                37    (SEP 24)

Scheduled Meds (8):

                                        18 aspirin (aspirin 81 mg tablet, enteric coated) 81 mg PO Daily

                                        18 azithromycin + Sodium Chloride 0.9%  mL 500 mg IVPB [id number] 166.67

 ml/hr

                                        18 cefTRIAXone + sterile water 10 mL (Rocephin + sterile water 10 mL) 1 gm

 IVP [id number] 120 ml/hr

                                        18 isosorbide mononitrate (isosorbide mononitrate extended release) 30 mg 

PO QAM

                                        18 lisinopril 2.5 mg PO Daily

                                        18 metoprolol (metoprolol tartrate) 25 mg PO Q12H

                                        18 ranolazine (Ranexa 500 mg oral tablet, extended release) 500 mg PO BID

                                        18 rivaroxaban (Xarelto) 20 mg PO QPM



Continuous Infusions: None

Addendum by Ashok Velazco MD on 2018 15:49

Needs optimal medical treatment for stable angina.  He understands to follow-up 
with his cardiologist in a couple weeks.



Extracted from:Title: Clinical Document

Author: Ashok Velazco MD

Date: 18

[facility]

Initial Cardiology Consultation Note

Reason for Consult: Chest pain

Chief Complaint: Chest pain

HPI:

 78-year-old male with history of severe coronary artery disease status post 
bypass multiple PCI's, LIMA to the LAD as last remaining vessel, chronic 
diastolic heart failure, paroxysmal atrial fibrillation status post multiple 
ablations on Xarelto, hypertension, diabetes, hypothyroidism, who comes to the 
hospital with complaints of chest pain.

The patient has had 3 sets of negative cardiac enzymes.  The patient's BNP is 
elevated at 729.  The patient's EKG shows sinus rhythm without any acute ST or T
wave changes suggestive of ischemia.  The EKG did look similar to prior EKGs in 
our system.  The patient had an echocardiogram performed in 2018.  
Echocardiogram showed a normal LVEF of 50-55% with restrictive diastolic 
function.  The IVC did appear dilated in size on that study.

The patient had a chest x-ray which showed a new infiltrate in the left lung 
base.

The patient at home takes niacin 2000 mg at bedtime, and metoprolol 12.5 mg p.o.
twice a day, Ranexa 500 mg p.o. twice a day, lisinopril 2.5 mg p.o. daily, 
amiodarone 200 mg p.o. daily, torsemide 20 mg p.o. daily, Lipitor 5 mg p.o. 
daily, Xarelto 20 mg p.o. nightly.

In regards to the patient's chest pain, he said the chest pain happened 
yesterday while he was sleeping.  He said he first had some trouble breathing.  
He then developed this left-sided chest pain.  The pain did radiate down his 
left arm.  It lasted for about 10 minutes or so before it went away.  He says 
that this is been happening to him before.  This is not unusual for him.  The 
pain has not worsened or increased in intensity recently.  The pain went away 
with nitroglycerin sublingual.  He has been chest pain-free here in the hospital
overnight and this morning.  He currently has no chest pain right now.

REVIEW OF SYSTEMS:

CONSTITUTIONAL: No fever. No chills. No dizziness. No weakness. 

EYES: No pain, erythema, or discharge. No blurring of vision. 

EAR, NOSE AND THROAT: No sore throat, URI symptoms. No epistaxis. No tinnitus.

CARDIOVASCULAR: + chest pain. No palpitations. No lower extremity edema.

RESPIRATORY: No shortness of breath, cough, pain with respiration, or pleuritic 
chest pain. No hemoptysis. No dyspnea. No paroxysmal nocturnal dyspnea.

GASTROINTESTINAL: Normal appetite. No nausea, vomiting, diarrhea. No pain. No 
bloating. No melena. 

GENITOURINARY: No frequency, urgency, nocturia. No hematuria or dysuria.

MUSCULOSKELETAL: No arthralgias or myalgias. 

INTEGUMENTARY: No swelling. No bruising. No contusions. No abrasions. No 
lymphangitis. 

NEUROLOGIC: No headache. No neck pain. No numbness or tingling of the 
extremities. No weakness. 

PSYCHIATRIC: No confusion. 

METABOLIC: No fatigue. No weakness. No history of thyroid, diabetes or adrenal 
problems. 

HEMATOLOGICAL: No bleeding. No petechiae. No bruising.

ALLERGY: No asthma. No urticaria.



PAST MEDICAL HISTORY:

Stented coronary artery

Acute MI

Hypercholesteremia

Poliomyelitides

Whooping cough

Histoplasmosis

Hypertension

Hx MRSA infection

PAST SURGICAL HISTORY:

Cardiac catheterization, left heart

CABG x 3 - Coronary artery bypass grafts x 3

Cardiac ablation using fluoroscopy guidance

Stent placement

FAMILY HISTORY:

Mother: Aneurysm

Brother: Cancer of prostate; Heart attack; Leukemia

SOCIAL HISTORY:

No tobacco, alcohol, or drug abuse

MEDICATIONS:

See inpatient medications below



Allergies: Reglan, iodine, sulfa drugs, Latex



Vitals    Tmp(F)    Pulse    BP    RR    SpO2    FIO2

                                         07:47    98.1    77    146/59    --    94     2.0L/m

                                         07:18    ----    ---    -----    16    98     2.0L/m

                                         03:40    97.8    76    131/74    18    98     2.0L/m

                                         00:13    97.6    73    131/74    16    98     2.0L/m

                                         21:50    ----    ---    -----    18    98     2.0L/m



                                        24 Hr Tmax: 98.1F (36.72c) at  07:47        Vital Signs are the last 5 in the

 past 48 hours.

Gen: Alert, deconditioned

neck: No carotid bruits, No JVD

Lungs: Mildly decreased at the bases bilaterally

Heart: Regular, S1-S2

Abd: Soft, nt, nd, +bs

Ext: No edema

Neuro: No focal deficits

Skin: warm, moist



Labs (Last four charted values)

WBC                     10.4    (SEP 25)    10.4    (SEP 24)

Hgb                     L 7.8    (SEP 25)    L 7.8    (SEP 24)

Hct                     L 25.3    (SEP 25)    L 25.0    (SEP 24)

Plt                     294    (SEP 25)    326    (SEP 24)

Na                      143    (SEP 25)    139    (SEP 24)

K                       C 2.9    (SEP 25)    C 3.0    (SEP 24)

CO2                     28    (SEP 25)    27    (SEP 24)

Cl                      102    (SEP 25)    102    (SEP 24)

Cr                      0.83    (SEP )    0.90    (SEP 24)

BUN                     9    (SEP 25)    7    (SEP 24)

Glucose Random          H 229    (SEP 25)    H 231    (SEP 24)

Mg                      L 1.7    (SEP 24)

Phos                    L 1.8    (SEP 24)

Ca                      L 7.9    (SEP 25)    L 7.5    (SEP 24)

PT                      H 26.1    (SEP 24)

INR                     H 2.36    (SEP 24)

PTT                     H 37.1    (SEP 24)

Troponin                0.03    (SEP 25)    0.04    (SEP 25)    0.05    (SEP 24)

Total CK                37    (SEP 24)

Impression:

Chest pain

severe coronary artery disease status post bypass and multiple PCI's, LIMA to 
LAD as last remaining vessel

Chronic diastolic congestive heart failure

paroxysmal atrial fibrillation status post multiple ablations [id number] Vasc:4

hypertension

diabetes

hypothyroidism

Cardiologist:  at [facility]

Plan:

We have been consulted for further workup of the patient's chest pain.  His 
cardiac enzymes are within laboratory limits and in fact slightly better 
compared to his prior admission.  His EKG does not show any acute changes 
suggestive of ischemia.  He has been chest pain-free while in the hospital.  Per
records, the patient's last cath in  showed a patent LIMA to the LAD with 
his CPAP and is being graft to the diagonal, his RCA, and circumflex all 
occluded.  Given his extensive history, the patient needs to be optimized 
medically.  Patient is not on a long-acting nitrate.  I will start the patient 
on isosorbide 30 mg.  In addition, I am not sure if the patient was taking 
aspirin while as an outpatient.  It is not on his home medication 
reconciliation.  He was taking it while he was here during his last admission.  
His metoprolol will be increased to 25 mg p.o. twice a day.  I have resumed his 
other medications.  His potassium needs to be supplemented.  I have given him an
additional dose of oral potassium.  His BMP should be checked later on this 
afternoon.  An EKG should also be checked later on to see if his QTC improved 
with potassium supplementation.

He does have an elevated BNP and I will give him a one-time dose of IV Lasix.

I discussed the plan in detail with the patient.  All his questions were 
answered.  He understands agrees the plan.

Continuous Infusions: None

Scheduled Meds (2):

                                        18 azithromycin + Sodium Chloride 0.9%  mL 500 mg IVPB [id number] 166.67

 ml/hr

                                        18 cefTRIAXone + sterile water 10 mL (Rocephin + sterile water 10 mL) 1 gm

 IVP [id number] 120 ml/hr

                            2018                            [facility]       

             

 

                                        Extracted from:Title: Clinical Document

Author: Katie Mandujano MD

Date: 18

Progress Note

[facility]

CC:

alda barr 

SUBJECTIVE:

pt seen/examined, sleepy this am.

OBJECTIVE:

Vital Signs (last 24 hrs)_____        Last Charted___________

Temp Oral            98 DegF  (AUG 20 11:)

Heart Rate Peripheral            80 bpm  (AUG 20 11:)

Resp Rate            18 BRMIN  (AUG 20 11:37)

SBP        134 mmHg  (AUG 20 11:)

DBP        71 mmHg  (AUG 20 11:)

SpO2        95 %  (AUG 20 11:)



Medications:

Scheduled Meds (23):AMIODarone, LORazepam, LORazepam (Ativan), albuterol-
ipratropium (albuterol-ipratropium 2.5-0.5 mg inhalation solution), aspirin 
(aspirin 81 mg tablet, enteric coated), atorvastatin, benzonatate, famotidine, 
gabapentin (gabapentin 600 mg oral tablet), guaiFENesin (Robitussin), insulin 
glargine, insulin glargine, levothyroxine (Synthroid), meropenem + Sodium 
Chloride 0.9%  mL, metoprolol (metoprolol tartrate), niacin (niacin 1000 
mg oral tablet, extended release), pantoprazole, ranolazine (Ranexa 500 mg oral 
tablet, extended release), rivaroxaban, sertraline, tamsulosin, torsemide, 
vancomycin + Dextrose 5% in Water  mL

Unscheduled Meds: None

PRN Meds (17):Dextrose 50% in Water IV (Dextrose 50% Syringe), Dextrose 50% in 
Water IV (Dextrose 50% Syringe), acetaminophen-hydrocodone (Norco 10/325 oral 
tablet), acetaminophen, diphenhydrAMINE (Benadryl), glucagon, insulin lispro, 
insulin lispro, insulin lispro, insulin lispro, insulin lispro, insulin lispro, 
insulin lispro, insulin lispro, insulin lispro, sodium chloride (Saline Flush 
0.9%), trazodone (trazodone 50 mg oral tablet)

One Time Meds: None

Continuous Infusions: None

Labs (Last four charted values)

WBC                     H 13.1    (AUG 19)    H 12.5    (AUG 18)    H 12.8    
(AUG 17)    H 12.3    (AUG 16)

Hgb                     L 7.6    (AUG 19)    L 7.2    (AUG 18)    L 7.1    (AUG 
17)    L 7.4    (AUG 16)

Hct                     L 24.3    (AUG 19)    L 23.9    (AUG 18)    L 24.0    
(AUG 17)    L 24.2    (AUG 16)

Plt                     381    (AUG 19)    337    (AUG 18)    319    (AUG 17)   
276    (AUG 16)

Na                      141    (AUG 20)    L 134    (AUG 19)    138    (AUG 18) 
  140    (AUG 17)

K                       4.1    (AUG 20)    4.4    (AUG 19)    L 3.4    (AUG 18) 
  L 3.1    (AUG 17)

CO2                     H 33    (AUG 20)    H 33    (AUG 19)    31    (AUG 18)  
 H 35    (AUG 17)

Cl                      99    (AUG 20)    97    (AUG 19)    98    (AUG 18)    97
   (AUG 17)

Cr                      1.04    (AUG 20)    0.81    (AUG 19)    0.79    (AUG 18)
   0.85    (AUG 17)

BUN                     16    (AUG 20)    15    (AUG 19)    18    (AUG 18)    14
   (AUG 17)

Glucose Random          C 41    (AUG 20)    H 110    (AUG 19)    H 102    (AUG 
18)    H 150    (AUG 17)

Mg                      2.2    (AUG 20)    2.4    (AUG 19)    1.8    (AUG 18)

Ca                      L 7.9    (AUG 20)    L 7.8    (AUG 19)    L 7.6    (AUG 
18)    L 7.5    (AUG 17)

PT                      H 28.7    (AUG 12)

INR                     H 2.66    (AUG 12)

PTT                     H 42.1    (AUG 12)

Troponin                0.22    (AUG 13)    0.29    (AUG 13)    0.34    (AUG 12)
   0.12    (AUG 12)

CK MB                   1.1    (AUG 12)    1.1    (AUG 12)

Total CK                35    (AUG 12)    31    (AUG 12)

EXAM:

HEENT: nc/at, eomi

Neck: no jvd, supple

Heart: s1s2, no murmurs, rubs, gallops

Chest: clear to auscultation, no wheezes, rales, rhonchi

Abd: NT, ND, soft, BS +

Ext: no edema, clubbing, cyanosis

Skin: no rash



IMPRESSION:

                                        1.  Pneumonia.

                                        2.  Pulmonary edema.

                                        3.  Anemia.

                                        4.  Diabetes mellitus type 2.

                                        5.  Atrial fibrillation.

                                        6.  Tremors.

                                        7.  Hypothyroidism.

                                        8.  Paroxysmal atrial fibrillation

                                        9.  Congestive heart failure, diastolic.

                                        10.  Coronary artery disease.



PLAN:

iv abx for now

looking at switching him back to po lasix.

look at sending him to snf.

continue with pt/ot

monitor blood glucose - will decrease insulin at night.



Plan of care discussed with patient and nursing.



Extracted from:Title: Pulmonary Consult Note

Author: Corbin Self MD

Date: 8/15/18



Pulmonary and Critical Care Consult Note

Corbin Self MD

Reason for consult:

Pneumonia

Worsening Pulmonary Opacities



HPI:

                                        79 y/o male with past medical history significant for CAD status post CABG and congestive

 heart failure who presented with increasing dyspnea and nonproductive cough 
that has been ongoing for the better part of the week.  No fevers no chills no 
sputum production.  He did began to feel weak.  In the ER was profoundly 
hypoxic. Was admitted for diuresis and started on antibiotics. Patient continued
to worsen during his hospital stay and a repeat chest xray showed consolidative 
opacities bilaterally now superimposed on previous interstitial opacities seen. 
Patient states he feels weaker than when arriving and remains dyspnic. Deneis 
new fevers or chills. No sputum production. No hemoptysis. No pleurisy. Smoked 
in distance past. No sick contacts that of which he is aware.



Review of systems:

                                        14 point review of systems is negative except as per hpi



Allergies

Allergies (4) Active    Reaction

Reglan    None documented

iodine    None documented

sulfa drugs    None documented

Latex    None documented



Procedure History

Cardiac catheterization, left heart

CABG x 3 - Coronary artery bypass grafts x 3

Cardiac ablation using fluoroscopy guidance

Stent placement



Past Medical History

Stented coronary artery

Acute MI

Hypercholesteremia

Poliomyelitides

Whooping cough

Histoplasmosis

Hypertension

Hx MRSA infection



Family History

Mother: Aneurysm

Brother: Cancer of prostate; Heart attack; Leukemia



Social history

Alcohol

Details: Past

Tobacco

Details: Use: Former smoker.  Tobacco smoke exposure: None.  Did the Patient 
Smoke Cigarettes Anytime During the Last 365 Days? No.  Cessation Counseling 
Provided? Yes.

Details: Use: Former smoker.  Tobacco smoke exposure: None.  Did the Patient 
Smoke Cigarettes Anytime During the Last 365 Days? No.  Cessation Counseling 
Provided? No.

Details: Use: Former smoker.  Tobacco smoke exposure: None.  Other Tobacco 
Frequency quit 30 years ago.  Did the Patient Smoke Cigarettes Anytime During 
the Last 365 Days? No.  Cessation Counseling Provided? No.

Substance Abuse

Details: Use: None.



Home Meds

No qualifying data available



                                        ---------------------------------------------------------------------------------------------------------------------------------------



Scheduled Meds (23):

                                        18 AMIODarone 200 mg PO Daily

                                        18 LORazepam 1 mg PO Bedtime

                                        18 LORazepam 0.5 mg PO Daily

                                        18 albuterol-ipratropium (albuterol-ipratropium 2.5-0.5 mg inhalation solution)

 3 ml NEB RQ4H

                                        18 aspirin (aspirin 81 mg tablet, enteric coated) 81 mg PO Daily

                                        18 atorvastatin 5 mg PO Bedtime

                                        18 cefepime + Sodium Chloride 0.9%  mL 1 gm IVPB [id number] 25 ml/hr

                                        18 famotidine 20 mg PO Q12H

                                        18 furosemide (Lasix) 40 mg IVP Daily

                                        18 gabapentin (gabapentin 600 mg oral tablet) 600 mg PO Q12H

                                        18 guaiFENesin (Robitussin) 100 mg PO Q4H

                                        18 insulin glargine 40 unit SUB-Q Bedtime 0 ml/hr

                                        18 insulin glargine 76 unit SUB-Q Daily 0 ml/hr

                                        18 levothyroxine (Synthroid) 50 microgram PO Q630AM

                                        18 lisinopril 2.5 mg PO Daily

                                        18 niacin (niacin 1000 mg oral tablet, extended release) 2,000 mg PO Bedtime



                                        18 pantoprazole 40 mg IVP Q12H

                                        18 potassium chloride 20 mEq PO Daily

                                        18 ranolazine (Ranexa 500 mg oral tablet, extended release) 500 mg PO BID

                                        18 rivaroxaban 20 mg PO QPM

                                        18 sertraline 100 mg PO Daily

                                        18 tamsulosin 0.4 mg PO Daily

                                        18 vancomycin + Dextrose 5% in Water  mL 1,000 mg IVPB [id number] 250 ml/hr





Labs (Last four charted values)

WBC                     H 12.6    (AUG 14)    H 12.8    (AUG 13)    H 16.2    
(AUG 12)

Hgb                     L 7.2    (AUG 14)    L 7.3    (AUG 13)    L 7.1    (AUG 
12)

Hct                     L 24.0    (AUG 14)    L 23.8    (AUG 13)    L 23.4    
(AUG 12)

Plt                     248    (AUG 14)    277    (AUG 13)    307    (AUG 12)

Na                      135    (AUG 14)    138    (AUG 13)    L 133    (AUG 12)

K                       3.8    (AUG 14)    4.1    (AUG 13)    4.6    (AUG 12)

CO2                     31    (AUG 14)    31    (AUG 13)    31    (AUG 12)

Cl                      98    (AUG 14)    100    (AUG 13)    95    (AUG 12)

Cr                      1.05    (AUG 14)    1.39    (AUG 13)    1.35    (AUG 12)

BUN                     21    (AUG 14)    H 29    (AUG 13)    H 27    (AUG 12)

Glucose Random          73    (AUG 14)    87    (AUG 13)    82    (AUG 12)

Ca                      L 8.0    (AUG 14)    L 8.0    (AUG 13)    L 8.2    (AUG 
12)

PT                      H 28.7    (AUG 12)

INR                     H 2.66    (AUG 12)

PTT                     H 42.1    (AUG 12)

Troponin                0.22    (AUG 13)    0.29    (AUG 13)    0.34    (AUG 12)
   0.12    (AUG 12)

CK MB                   1.1    (AUG 12)    1.1    (AUG 12)

Total CK                35    (AUG 12)    31    (AUG 12)

Objective:

I&O    Record    In    Out    Bal

                                        08/15    24hr Tot      594        0      594

                                            24hr Tot     1362      700      662



Lines, Tubes, and Drains:

                                        2018 15:21 Peripheral Lines: Antecubital Right 18 gauge Over the needle catheter





                                        08/15/2018 15:17

SpO2 percent    98

                                        08/15/2018 07:28

Oxygen Therapy Mode    Nasal cannula



Vital Signs (last 24 hrs)_____        Last Charted___________

Temp Oral        98.2 DegF  (AUG 15 15:)

Heart Rate Peripheral        78 bpm  (AUG 15 15:17)

Resp Rate            16 BRMIN  (AUG 15 15:17)

SBP        111 mmHg  (AUG 15 15:17)

DBP        63 mmHg  (AUG 15 15:17)

SpO2        98 %  (AUG 15 15:17)



Exam:

General: Mild distress, nasal cannula oxygen in place, eating

HEENT: no pallor, anicteric sclera

Cardiovascular: regular, no murmur

Respiratory: Rales bilaterally, no rhonchi

Abdomen: soft, non-tender, +BS

Extremities: no edema, no cyanosis

Neurologic: Awake, Nonfocal

Skin: no breakdown

Problems:

Pneumonia

Worsening pulmonary infiltrates

Prior granulomatous disease

Pleural effusion

Plan:

                                        -Patient with progressive upper lobe predominant consolidative and groundglass parenchymal

 process

                                        -Continue vancomycin and add meropenem, stop cefepime.  Pro-calcitonin was elevated.



                                        -Patient is hemodynamically stable and afebrile, somewhat confounding for the degree

 of pulmonary involvement of the parenchymal process if it were a bacterial 
pneumonia

                                        -Noninfectious etiologies are also possible, there is probable contribution of pulmonary

 edema, there also may be a noninfectious inflammatory process that is ongoing. 
Recheck pro BNP

                                        -Monitor respiratory status closely, saturating 85% on 4 L nasal cannula oxygen,

 benefit from high flow nasal cannula for work of breathing if further worsening
occurs.

                                        -We will plan for bronchoscopy tomorrow, patient n.p.o. at midnight

                                        -Pleural effusion appears to small to tap at this time

                                        -Sequela of prior granulomatous disease with some bronchial narrowing.



Corbin Self MD

Pulmonary and Critical Care

                            2018                            [facility]       

             

 

                                        Extracted from:Title: Clinical Document

Author: Juliann Garibay MD

Date: 18

Cardiology

[facility]



Impression:

Stable angina

severe coronary artery disease status post bypass and multiple PCI's, LIMA to 
LAD as last remaining vessel

acute on chronic diastolic congestive heart failure

paroxysmal atrial fibrillation status post multiple ablations [id number] Vasc:4

hypertension

diabetes

hypothyroidism

Cardiologist:  at [facility]

Plan:

Consult discharged yesterday due to frailty

Pending Snowden/rehab eval

Well-controlled double product

We will repeat EKG for QTC considering ongoing amiodarone and Ranexa

Continue Xarelto

Normal sinus rhythm on telemetry

Okay to SC home from CV standpoint

Follow-up with his outpatient cardiologist within 2 weeks of discharge



Subjective:

Patient seen and examined.

Telemetry reviewed: Normal sinus rhythm

No chest pain overnight

Objective



Vitals    Tmp(F)    Pulse    BP    RR    SpO2    FIO2

                                         07:43    97.9    66    123/66    18    100    ---

                                         04:00    98.4    64    108/67    18    96    ---

                                         00:00    98.7    62    102/64    18    100    ---

                                         20:00    98.0    67    107/63    18    98    ---

                                         15:22    98.5    66    107/53    18    93    ---



                                        24 Hr Tmax: 98.7F (37.06c) at  00:00        Vital Signs are the last 5 in the

 past 48 hours.

General:  Awake and Alert, NAD

HEENT: Neck Supple, mild JVD

CVS: Regular rate, Normal S1S2

LUNGS: mildly decreased bs

ABD: Soft, Non-tender, + BS

EXT: No edema, + pedal pulses

Skin: No ulcers

Neuro: Awake and Alert, Oriented x 3



Labs (Last four charted values)

WBC                     H 11.9    ()    9.5    ()    H 10.6    ()    H 12.2    ()

Hgb                     L 8.4    ()    L 8.0    ()    L 9.7    ()    L 8.5    ()

Hct                     L 26.7    (SPENCER 25)    L 25.1    (SPENCER 24)    L 30.3    
(SPENCER 22)    L 27.2    (SPENCER 22)

Plt                     224    (SPENCER 25)    213    (SPENCER 24)    229    (SPENCER 22)   
234    (SPENCER 22)

Na                      L 133    (SPENCER 25)    135    (SPENCER 24)    137    (SPENCER 22)

K                       H 5.3    (SPENCER 25)    L 3.4    (SPENCER 24)    L 3.4    (SPENCER 
22)

CO2                     29    (SPENCER 25)    29    (SPENCER 24)    28    (SPENCER 22)

Cl                      97    (SPENCER 25)    99    (SPENCER 24)    103    (SPENCER 22)

Cr                      1.03    (SPENCER 25)    0.96    (SPENCER 24)    0.85    (SPENCER 22)

BUN                     16    (SPENCER 25)    18    (SPENCER 24)    11    (SPENCER 22)

Glucose Random          H 323    ( 25)    H 288    ( 24)    H 255    ( 
22)

Ca                      L 8.3    (SPENCER 25)    L 7.8    (SPENCER 24)    L 8.0    ( 
22)

PT                      H 28.3    ()

INR                     H 2.61    ()

PTT                     H 41.7    ()

Troponin                <0.02    ()    <0.02    ()    <0.02    ()    <0.02    ()

CK MB                   1.1    ()

Total CK                31    ()    29    ()    33    ()    35
   ()    Scheduled Meds (20):

                                        18 AMIODarone 200 mg PO Daily

                                        18 aspirin (aspirin 81 mg tablet, chewable) 81 mg PO Daily

                                        18 atorvastatin 40 mg PO Bedtime

                                        18 gabapentin (gabapentin 600 mg oral tablet) 600 mg PO TID

                                        18 insulin glargine 40 unit SUB-Q Bedtime 0 ml/hr

                                        18 insulin glargine 70 unit SUB-Q Daily 0 ml/hr

                                        18 isosorbide mononitrate (Imdur) 60 mg PO QAM

                                        18 levothyroxine (Synthroid) 50 microgram PO Q630AM

                                        18 lidocaine topical (lidocaine topical 5% ointment) 1 appl TOP TID

                                        18 lisinopril 5 mg PO Daily

                                        18 magnesium oxide (Mag-Ox 400) 400 mg PO Daily

                                        18 niacin (niacin 1000 mg oral tablet, extended release) 2,000 mg PO Bedtime



                                        18 pantoprazole 40 mg PO Daily

                                        18 potassium chloride 20 mEq PO Daily

                                        18 ranolazine 500 mg PO BID

                                        18 rivaroxaban (Xarelto) 20 mg PO QPM

                                        18 sertraline 100 mg PO Daily

                                        18 sodium chloride (Saline Flush 0.9%) 10 ml IVP Q12H

                                        18 tamsulosin 0.4 mg PO Daily

                                        18 torsemide 20 mg PO Daily



Extracted from:Title: Clinical Document

Author: Juliann Garibay MD

Date: 18

Chief Complaint:

Chest pain

HPI:

The patient is a 77-year-old male that has extensive past cardiovascular history
with multiple PCI's and is followed by  at [facility] and 
presents this time with recurrent chest pain.

He mentioned he is chest pain is on and off lasting for several hours each time 
and unrelated to physical effort.

His EKG this time show normal sinus rhythm without ST or T-wave abnormalities 
and he had so far 3 sets of normal troponin.  His BNP is mildly elevated and he 
has signs of volume overload on physical examination on his chest x-ray.

From a cardiac standpoint he also has history of paroxysmal atrial fibrillation 
[id number] Vasc score of 4 and is chronically anticoagulated with Xarelto.  His INR
is elevated on presentation confirming adherence to medical therapy.

Review of Systems

General/Constitutional:

Fatigue no. Weakness no. Weight gain no. Headaches no.

Allergy/Immunology:

Colds no. Cough no.

HEENT/Neck:

Dizziness no. Change in vision no.

Respiratory:

Chest congestion no. Cough no. Pain with breathing no. Shortness of breath yes. 
Swelling of the legs no. Wheezing no.

Cardiovascular:

Chest pain yes. Claudication no. Dyspnea on exertion yes. Palpitations no.

Gastrointestinal:

Abdominal pain no. Change in bowel habits no. Constipation no. Diarrhea no. 
Nausea no.

Hematology:

Easy bleeding no. Easy bruising no.

Musculoskeletal:

Back pain y. Myalgias no

Past medical history:

Stented coronary artery

Acute MI

Hypercholesteremia

Poliomyelitides

Whooping cough

Histoplasmosis

Hypertension

Hx MRSA infection

Past Surgical History:

Cardiac catheterization, left heart

CABG x 3 - Coronary artery bypass grafts x 3

Cardiac ablation using fluoroscopy guidance

Stent placement

Family History:

Mother: Aneurysm

Brother: Cancer of prostate; Heart attack; Leukemia

Social History:

Alcohol

Details: Past

Tobacco

Details: Use: Former smoker.  Tobacco smoke exposure: None.  Did the Patient 
Smoke Cigarettes Anytime During the Last 365 Days? No.  Cessation Counseling 
Provided? Yes.

Details: Use: Former smoker.  Tobacco smoke exposure: None.  Did the Patient 
Smoke Cigarettes Anytime During the Last 365 Days? No.  Cessation Counseling 
Provided? No.

Details: Use: Former smoker.  Tobacco smoke exposure: None.  Other Tobacco 
Frequency quit 30 years ago.  Did the Patient Smoke Cigarettes Anytime During 
the Last 365 Days? No.  Cessation Counseling Provided? No.

Substance Abuse

Details: Use: None.

List of Medications:

Scheduled Meds (2):

                                        18 aspirin (aspirin 81 mg tablet, chewable) 81 mg PO Daily

                                        18 sodium chloride (Saline Flush 0.9%) 10 ml IVP Q12H

Continuous Infusions: None

Scheduled Meds (2):aspirin (aspirin 81 mg tablet, chewable), sodium chloride 
(Saline Flush 0.9%)

Unscheduled Meds: None

PRN Meds (13):Dextrose 50% in Water IV (Dextrose 50% Syringe), Dextrose 50% in 
Water IV (Dextrose 50% Syringe), glucagon, insulin lispro, insulin lispro, 
insulin lispro, insulin lispro, insulin lispro, insulin lispro, insulin lispro, 
insulin lispro, insulin lispro, sodium chloride (Saline Flush 0.9%)

One Time Meds (4):(Completed) albuterol-ipratropium (albuterol-ipratropium 2.5-
0.5 mg inhalation solution), (Completed) aspirin, (Completed) fentaNYL, 
(Completed) fentaNYL

Continuous Infusions: None

Labs (Last four charted values)

WBC                     H 10.6    ()    H 12.2    ()

Hgb                     L 9.7    ()    L 8.5    ()

Hct                     L 30.3    ()    L 27.2    ()

Plt                     229    ()    234    ()

Na                      137    ()

K                       L 3.4    ()

CO2                     28    ()

Cl                      103    ()

Cr                      0.85    ()

BUN                     11    ()

Glucose Random          H 255    ()

Ca                      L 8.0    ()

PT                      H 28.3    ()

INR                     H 2.61    ()

PTT                     H 41.7    ()

Troponin                <0.02    ()    <0.02    ()    <0.02    ()

CK MB                   1.1    ()

Total CK                29    ()    33    ()    35    ()    
Laboratory:

Physical Exam:

Vitals    Tmp(F)    Pulse    BP    RR    SpO2    FIO2

                                         08:00    97.8    74    132/73    18    99    ---

                                         05:00    ----    73    125/70    19    100     5.0L/m

                                         03:20    ----    78    121/56    19    97     5.0L/m

                                         01:17    ----    86    142/66    21    98    ---

                                         00:10    ----    ---    -----    18    98     5.0L/m



                                        24 Hr Tmax: 99.2F (37.33c) at  23:23        Vital Signs are the last 5 in the

 past 48 hours.

HEENT: Neck Supple

CVS: Regular rate, Normal S1S2 no murmur

LUNGS: Bibasilar Rales

ABD: Soft, Non-tender, + BS

EXT: No ankle edema, weakly palpable palpable distal pulses

Skin: No ulcers

Neuro: Grossly non-focal

Assessment and plan:

Patient is a 77-year-old male with a very complex past cardiovascular history at
present this time with lung congestion, shortness of breath and chest pain.

He has extensive CAD status post CABG and multiple PCI's with last stress test 
showing partially reversible moderate inferolateral defect back in 2017.  He follows with Dr. Terrell.

At the present we will focus on controlling his double product and volume 
overload to decrease his LVEDP since he has established CAD and will have chest 
pain in case of increased demand for oxygen from the myocardium.

We will keep him off Xarelto from now until we confirm that there is no need for
repeat angiogram.  We will put him on aspirin only for now.  Has 3 sets of 
troponin negative and EKG without acute ischemic signs.  We will follow closely.

List of diagnosis:

Chest pain

CAD status post multiple PCI and CABG

Chronic diastolic CHF

MPI in 2017 with partially reversible moderate inferolateral defect

Hypertension

Diabetes

Hyperlipidemia

Paroxysmal atrial fibrillation chads 2 vasc: 4 on Xarelto

?? SSS

                            2018                             [facility]       

             

 

                                        Extracted from:Title: Clinical Document

Author: Yoni Campos MD

Date: 18

Progress Note - Daily

[facility]             Completed:    , 12:56 by Yoni Campos MD

RM: 202 - 1P, SE C2B        DESIREE MCKEON        77y (: 1940)  M 
   FIN: [id number]

Attending: Katie Mandujano MD            Phone: [phone]    Service: 
Internal Medicine

Reason for Admission: CHANGE IN MENTAL STATUS

Working DRG: Signs and symptoms w/o [facility] [place]

Code status: None Specified=FULL CODE        Current diet: 

Isolation: Contact

Allergies: Reglan, iodine, sulfa drugs, Latex

SUBJECTIVE



He is alert and awake. He feels mentally at his baseline. He has had a rest 
tremor in right >left hand for about one month. he states he shuffles when he 
walks. He denies weaknes or numbness or pain. No headache or vision changes. He 
can only walk with a walker. He feels unsteady with walking for a few years.

OBJECTIVE





    24hr Labs

                                         1059

POC Performing Locatio        See Note  

Glucose POC    317      H

                                         0735

POC Performing Locatio        See Note  

Glucose POC    248      H

                                         0355

POC Performing Locatio        See Note  

Glucose POC    161      H

                                        04/15 2133

POC Performing Locatio        See Note  

Glucose POC    196      H

                                        04/15 1607

POC Performing Locatio        See Note  

Glucose POC    401      C

                                        04/15 1130

POC Performing Locatio        See Note  

Glucose POC    343      H

Schmitt still necessary (Yes/No):             Line still necessary (Yes/No): 

Vitals    Tmp(F)    Pulse    BP    RR    SpO2    FIO2

                                         11:00    97.7    72    109/60    18    97    ---

                                         07:31    97.7    76    116/63    18    98    ---

                                         03:58    97.5    64    115/70    18    93    ---

                                        04/15 23:59    97.7    62    113/66    18    96    ---

                                        04/15 19:58    97.7    65    108/61    18    96    ---



                                        24 Hr Tmax: 97.7F (36.50c) at  11:00        Vital Signs are the last 5 in the

 past 48 hours.

Date    Wt(kg)    Wt(lb)    Ht(cm)    Ht(in)    Method

                                         (initial)    113.64     250.00    177.80     70.00    Estimated

I&O    Record    In    Out    Bal

                                            24hr Tot       10        0       10

                                        04/15    24hr Tot      320      200      120

Medications (38) Active

Scheduled Meds (19):

                                        18 AMIODarone 200 mg PO Daily

                                        18 aspirin (aspirin 81 mg tablet, enteric coated) 81 mg PO Daily

                                        18 atorvastatin 5 mg PO Bedtime

                                        18 gabapentin (gabapentin 600 mg oral tablet) 600 mg PO Q8H

                                        18 insulin glargine (Lantus Solostar Pen 100 units/mL subcutaneous solution)

 17 unit SUB-Q Bedtime 0 ml/hr

                                        18 insulin glargine (Lantus 100 units/mL) 17 unit SUB-Q Daily 0 ml/hr

                                        18 isosorbide mononitrate (Imdur) 30 mg PO Q630AM

                                        18 levothyroxine (Synthroid) 50 microgram PO Q630AM

                                        18 lisinopril 2.5 mg PO Daily

                                        18 magnesium oxide (Mag-Ox 400) 400 mg PO Daily

                                        18 niacin (niacin 1000 mg oral tablet, extended release) 2,000 mg PO Bedtime



                                        18 pantoprazole 40 mg PO Before Breakfast

                                        18 potassium chloride 20 mEq PO BID

                                        18 ranolazine (Ranexa 500 mg oral tablet, extended release) 500 mg PO BID

                                        18 rivaroxaban (Xarelto) 20 mg PO QPM

                                        18 sertraline 100 mg PO Daily

                                        18 sodium chloride (Saline Flush 0.9%) 10 ml IVP Q12H

                                        18 tamsulosin 0.4 mg PO Daily

                                        18 torsemide 20 mg PO BID Diuretic

Unscheduled Meds: None

PRN Meds (18):

                                        04/15/18 Dextrose 50% in Water IV (Dextrose 50% Syringe) 12.5 gm IVP PRN

                                        04/15/18 Dextrose 50% in Water IV (Dextrose 50% Syringe) 25 gm IVP PRN

                                        18 LORazepam 0.5 mg PO Q12H

                                        18 acetaminophen (Tylenol) 650 mg PO Q6H

                                        04/15/18 bisacodyl (Dulcolax Laxative) 10 mg TX Daily

                                        18 famotidine 20 mg PO Q12H

                                        04/15/18 glucagon 1 mg IM PRN

                                        04/15/18 insulin lispro 2 unit SUB-Q TID-Before Meals

                                        04/15/18 insulin lispro 4 unit SUB-Q TID-Before Meals

                                        04/15/18 insulin lispro 6 unit SUB-Q TID-Before Meals

                                        04/15/18 insulin lispro 8 unit SUB-Q TID-Before Meals

                                        04/15/18 insulin lispro 10 unit SUB-Q TID-Before Meals

                                        04/15/18 insulin lispro 1 unit SUB-Q Bedtime

                                        04/15/18 insulin lispro 2 unit SUB-Q Bedtime

                                        04/15/18 insulin lispro 3 unit SUB-Q Bedtime

                                        04/15/18 insulin lispro 4 unit SUB-Q Bedtime

                                        18 sodium chloride (Saline Flush 0.9%) 10 mL IVP PRN

                                        18 sodium chloride (Saline Flush 0.9%) 10 ml IVP PRN

One Time Meds (1):

                                        04/15/18  (Completed) magnesium citrate (magnesium citrate 1.745 g/30 mL oral liquid)

 300 ml PO ONCE

Continuous Infusions: None

EXAM

GEN - NAD

HEENT - NCAT. MMM. Op clear

Ext - no c/c/e

Skin - no rash

Neuro:

Mental status: Alert and oriented x3. Language intact. Follows all commands

CN:PERRL, EOMI, no droop, facial sensation is normal

Motor: 4/5 throughout, rest tremor of the RUE, cogwheeling in the RUE, 
bradykinesia in the BUE

Sensory: intact to LTx4

Reflexes 1+ throughout, absent AJ

Cerebellar: intact ftn, hts

Gait - could not assess as he did not have his walker and did not want to fall

Assessment

                                        1. Metabolic-toxic encephalopathy due to pain med use - resolved

                                        2. Likely parkinsons disease

                                        3. Chronic pain

                                        4. CAD

                                        5. HTN

                                        6. DMT2 with neuropathy

                                        7. CHF

                                        8. Afib

Plan

Needs close outpt follow up in neurology clinic for further assessment of gait 
issues and possible parkinsons. Patient agreeable to the plan

He is on anticoagulation for afib - fall precautions discussed

Discussed case with Dr. Mandujano



Extracted from:Title: Clinical Document

Author: Dennys Aquino MD

Date: 18

full H&P dictated,

#[id number]

date/time: 2018  22:31

                            2018                            [facility]       

             

 

                                        Extracted from:Title: Clinical Document

Author: Katie Mandujano MD

Date: 18

Progress Note

[facility]

CC:

follow up on his weakness

SUBJECTIVE:

pt seen/examined, in good spirits, however weak

OBJECTIVE:

Vital Signs (last 24 hrs)_____        Last Charted___________

Temp Oral            97.6 DegF  ( 12:44)

Heart Rate Peripheral            76 bpm  (:44)

Resp Rate            18 BRMIN  (:44)

SBP        114 mmHg  ( 12:44)

DBP        68 mmHg  (:44)

SpO2        99 %  ( 12:44)



Medications:

Scheduled Meds (18):AMIODarone, aspirin (aspirin 81 mg tablet, enteric coated), 
atorvastatin, docusate, gabapentin (gabapentin 600 mg oral tablet), isosorbide 
mononitrate (Imdur), levothyroxine (Synthroid), lisinopril, magnesium oxide 
(Mag-Ox 400), metFORMIN (metFORMIN 1000 mg oral tablet), niacin (niacin 1000 mg 
oral tablet, extended release), pantoprazole, potassium chloride, ranolazine 
(Ranexa 500 mg oral tablet, extended release), rivaroxaban (Xarelto), 
sertraline, tamsulosin, torsemide

Unscheduled Meds: None

PRN Meds (18):Dextrose 50% in Water IV (Dextrose 50% Syringe), Dextrose 50% in 
Water IV (Dextrose 50% Syringe), LORazepam, acetaminophen-hydrocodone (Norco 
5/325 oral tablet), acetaminophen (Tylenol), famotidine, glucagon, insulin 
lispro, insulin lispro, insulin lispro, insulin lispro, insulin lispro, insulin 
lispro, insulin lispro, insulin lispro, insulin lispro, ondansetron, trazodone 
(trazodone 50 mg oral tablet)

One Time Meds: None

Continuous Infusions: None

Labs (Last four charted values)

WBC                     9.5    (FEB 12)    H 10.8    (FEB 11)    H 12.1    (FEB 
10)

Hgb                     L 9.9    (FEB 12)    L 10.6    (FEB 11)    L 9.5    (FEB
10)

Hct                     L 30.3    (FEB 12)    L 33.3    (FEB 11)    L 29.9    
(FEB 10)

Plt                     237    (FEB 12)    264    (FEB 11)    295    (FEB 10)

Na                      137    (FEB 12)    135    (FEB 11)    136    (FEB 10)

K                       4.0    (FEB 12)    3.8    (FEB 11)    3.9    (FEB 10)

CO2                     32    (FEB 12)    30    (FEB 11)    H 33    (FEB 10)

Cl                      98    (FEB 12)    96    (FEB 11)    96    (FEB 10)

Cr                      0.66    (FEB 12)    0.67    (FEB 11)    0.83    (FEB 10)

BUN                     12    (FEB 12)    14    (FEB 11)    22    (FEB 10)

Glucose Random          H 161    (B 12)    H 147    (FEB 11)    C 49    (FEB 
10)

Mg                      L 1.7    (FEB 10)

Phos                    2.8    (FEB 10)

Ca                      8.9    (FEB 12)    L 8.4    (FEB 11)    8.6    (FEB 10)

PT                      H 18.8    (FEB 10)

INR                     H 1.56    (FEB 10)

PTT                     H 41.2    (FEB 10)

Troponin                <0.02    (FEB 10)

CK MB                   <0.5    (FEB 10)

Total CK                18    (FEB 10)

EXAM:

HEENT: nc/at, eomi

Neck: no jvd, supple

Heart: s1s2, no murmurs, rubs, gallops

Chest: clear to auscultation, no wheezes, rales, rhonchi

Abd: NT, ND, soft, BS +

Ext: no edema, clubbing, cyanosis

Skin: no rash



IMPRESSION:

                                        1.  PICC line malfunction.

                                        2.  Hypoglycemia.

                                        3.  Diabetes.

                                        4.  Diastolic heart failure

                                        5.  Hypertension.

                                        6.  Coronary artery disease.

                                        7.  Atrial fibrillation

                                        8.  Prophylaxis.

                                        9.  History of ESBL infection in the urine

                                        10. Right Humeral neck fracture



PLAN:

picc line removed

completed course of iv abx

worked wit pt - they are recommending snf due to his weakness and need for 
assistance.

will ask sw to assist in placement.



Plan of care discussed with patient and nursing.

                            02/15/2018                            JESUS Morley       

             

 

                                        Extracted from:Title: Clinical Document

Author: Jovita Ford MD

Date: 18



Progress Note

Gastroenterology and Hepatology

Chronic GI blood loss anemia-iron deficiency

Chronic anticoagulation and antiplatelet therapy for history of coronary artery 
disease and atrial fibrillation

GERD

Coronary artery disease/atrial fibrillation/CHF, increased risk for sedation

Status post fall and right shoulder fracture

RECOMMENDATIONS AND PLAN:

Given no active bleeding and recent endoscopic evaluation no plans for repeat 
EGD and colonoscopy at this time.

Follow-up with his outpatient cardiologist as discussed yesterday with in 1-2 
weeks after discharge.

SUBJECTIVE:

Patient seen and examined.  No reports of hematemesis or rectal bleeding.  
Hemoglobin has been stable.  Hemoglobin today is 9.4.  No nausea vomiting fever.

Review of Systems: 

                                        (-)=Negative,(+)=Positive

                                        1) Const: (-) fever, (-) weight change

                                        2) Skin: (-) rash, (-) bleeding

                                        3) HEENT: (-) difficulty swallowing, (-) swelling

                                        4) Eyes: (-) vision changes, (-) bleeding

                                        5) Neuro: (+) weakness, (-) headaches

                                        6) Resp: (+) dyspnea on exertion, (-) cough

                                        7) Cardio: (-) chest pain, (-) orthopnea

                                        8) GI: (-) blood in stool, (-) reflux

                                        9) : (-) dysuria, (-) bloody discharge

                                        10) Endo: (-) heat intolerance, (-) cold intolerance



OBJECTIVE:

Vitals: See below

General: NAD

Psych: alert , oriented x 3

Neck:supple

CVS: s1s2 RRR

Resp: CTA Bilaterally

Abd : Soft, NT, ND , BS +

Ext : No edema

Skin: No rash or echymossis

CNS:  No gross motor/sensory defect

Radiology Studies: Reviewed.

Labs: Reviewed. 







Vitals    Tmp(F)    Tmp(C)    Ttype    BP    MAP    Pulse    RR    SpO2    FIO2 
  ETCO2

                                         11:33    98.5    36.94    oral    131/64    ---    72    18    96     4.0L/m

    ---

                                         08:42    ----    ----    ----    -----    ---    ---    --    98     4.0L/m

    ---

                                         08:03    98.5    36.94    oral    130/67    ---    75    18    96    ---  

  ---

                                         03:45    97.7    36.50    oral    124/71    ---    70    16    96    ---  

  ---

                                        01/15 23:50    97.8    36.56    oral    120/69    ---    72    16    91    ---  

  ---

                                        24 Hr Tmax: 98.5F (36.94c) at  11:33        Vital Signs are the last 5 in the

 past 48 hours.

                                        24 Hr Tmin:  97.7F (36.50c) at  03:45        Weights are the last 5 in 60 days,

 plus initial.

Date    Wt(kg)    Wt(lb)    Ht(cm)    Ht(in)    Method    BMI

                                            101.90     224.19    175.26     69.00    Measured     33.2

                                         (initial)     79.55     175.00        Estimated     27.5

                                            170.18     67.00    Stated

Most Recent Scores:

                                        18        Pain Intensity NRS (0-10)        6

                                        18        [place]s Jim[place]nez Fall Score        15

                                        18        [place] Coma Score        15

                                        18        Carlos Score    16

Lines, Tubes, and Drains:

                                        01/15/2018 00:00 Peripheral Lines: Forearm Left 20 gauge Over the needle catheter



                                        2018 12:30 Peripheral Lines: Forearm Left 22 gauge Over the needle catheter



                                        (no surgical procedures documented)

I&O    Record    In    Out    Bal

                                            24hr Tot      100        0      100

                                        01/15    24hr Tot     1003      750      253

    24hr Labs

                                         1132

POC Performing Locatio        See Note  

Glucose POC        271      H

                                         0801

POC Performing Locatio        See Note  

Glucose POC        170      H

                                         0630

Glucose Lvl        179      H

BUN    20  

Creatinine Lvl        0.85  

Sodium Lvl    137  

Potassium Lvl        3.2      L

Chloride Lvl        94      L

CO2    34      H

AGAP    12.2  

Calcium Lvl        8.0      L

eGFR    85  

WBC    12.4      H

RBC    3.73      L

Hgb    9.4      L

Hct    29.8      L

MCV    79.7      L

MCH    25.2      L

MCHC    31.6      L

RDW    18.3      H

Platelet    288  

MPV    8.4  

Segs    76.1      H

Monocytes    8.9  

Lymphocytes        11.5      L

Eosinophils        2.4  

Basophils    1.1      H

Segs-Bands #        9.4      H

Lymphocytes #        1.4  

Monocytes #        1.1      H

Eosinophils #        0.3  

Basophils #        0.1  

                                         0157

POC Performing Locatio        See Note  

Glucose POC        154      H

                                        01/15 2235

POC Performing Locatio        See Note  

Glucose POC        209      H

                                        01/15 2023

POC Performing Locatio        See Note  

Glucose POC        220      H

                                        01/15 1816

UA Color    Yellow  

UA Turbidity        Slight  

UA Spec Grav        1.008  

UA pH    6.0  

UA Protein    Negative  

UA Glucose        Negative  

UA Ketones        Negative  

UA Bili    Negative  

UA Blood    Negative  

UA Urobilinogen        4.0      H

UA Nitrite    Negative  

UA Leuk Est        Large  

UA RBC    3      H

UA WBC    99      H

UA Bacteria        Occasional  

UA Sq Epi    None Seen  

UA Hyal Cast        1  

                                        01/15 1636

POC Performing Locatio        See Note  

Glucose POC        249      H

Scheduled Meds (13):

AMIODarone 200 mg PO Daily

    [eMAR Schedule: (18)  09:00]

    [Future Dose:  18 09:00]

atorvastatin 5 mg PO Bedtime

    [Last Rescheduled Dt/Tm: 18 23:39:00 CST]

    [eMAR Schedule: (18)  21:00]

    [Future Dose:  18 21:00]

cefepime + Sodium Chloride 0.9%  mL 1 gm IV Q8H 25 ml/hr

    [Last Rescheduled Dt/Tm: 18 0:00:00 CST]

    [eMAR Schedule: (18)  00:00, 08:00, 16:00]

gabapentin (gabapentin 600 mg oral tablet) 600 mg PO Q8H

    [Last Rescheduled Dt/Tm: 18 0:00:00 CST]

    [eMAR Schedule: (18)  00:00, 08:00, 16:00]

insulin glargine (Lantus 100 units/mL) 85 unit SUB-Q Daily 0 ml/hr

    [eMAR Schedule: (18)  09:00]

    [Future Dose:  18 09:00]

insulin glargine (insulin glargine 100 units/mL subcutaneous solution) 20 unit 
SUB-Q Bedtime 0 ml/hr

    [Last Rescheduled Dt/Tm: 18 21:00:00 CST]

    [eMAR Schedule: (18)  21:00]

    [Future Dose:  18 21:00]

isosorbide mononitrate (Imdur) 30 mg PO QAM

    [Last Rescheduled Dt/Tm: 18 6:30:00 CST]

    [eMAR Schedule: (18)  06:30]

    [Future Dose:  18 06:30]

levothyroxine (Synthroid) 50 microgram PO Q630AM

    [Last Rescheduled Dt/Tm: 18 6:30:00 CST]

    [eMAR Schedule: (18)  06:30]

    [Future Dose:  18 06:30]

pantoprazole 40 mg PO BID-Before Meals

    [Last Rescheduled Dt/Tm: 18 7:30:00 CST]

    [eMAR Schedule: (18)  07:30, 16:30]

ranolazine (Ranexa 500 mg oral tablet, extended release) 500 mg PO BID

    [eMAR Schedule: (18)  09:00, 17:00]

sertraline 100 mg PO Daily

    [eMAR Schedule: (18)  09:00]

    [Future Dose:  18 09:00]

tamsulosin 0.4 mg PO Daily

    [eMAR Schedule: (18)  09:00]

    [Future Dose:  18 09:00]

torsemide 20 mg PO BID

    [eMAR Schedule: (18)  09:00, 17:00]

Unscheduled Meds: None

PRN Meds (17):

Dextrose 50% in Water IV (Dextrose 50% Syringe) 12.5 gm IVP PRN

Dextrose 50% in Water IV (Dextrose 50% Syringe) 25 gm IVP PRN

acetaminophen-hydrocodone (acetaminophen-hydrocodone 325 mg-5 mg oral tablet) 2 
tab PO Q4H

glucagon 1 mg IM PRN

hydrALAZINE 10 mg IVP Q4H

insulin lispro 2 unit SUB-Q TID-Before Meals

insulin lispro 4 unit SUB-Q TID-Before Meals

insulin lispro 6 unit SUB-Q TID-Before Meals

insulin lispro 8 unit SUB-Q TID-Before Meals

insulin lispro 10 unit SUB-Q TID-Before Meals

insulin lispro 1 unit SUB-Q Bedtime

insulin lispro 2 unit SUB-Q Bedtime

insulin lispro 3 unit SUB-Q Bedtime

insulin lispro 4 unit SUB-Q Bedtime

morphine Sulfate 6 mg PO Q3H

ondansetron 4 mg IVP Q6H

trazodone (trazodone 50 mg oral tablet) 50 mg PO Bedtime

One Time Meds (1):

                                        (Ordered) potassium chloride 40 mEq PO ONCE

Continuous Infusions (1):

Sodium Chloride 0.9%  mL (normal saline 0.9%  mL) 250 mL 20 ml/hr

Type of Bladder Control:  Voluntary

Type of Urinary Elimination:  Incontinent



Extracted from:Title: Clinical Document

Author: Juliann Garibay MD

Date: 18

Chief Complaint:

Preop CV risk assessment for EGD and colonoscopy

HPI:

Patient is a 77-year-old male that presents this time to the hospital after 
mechanical fall.  He developed anemia that triggers concerns of possible GI 
bleed and cardiology was consulted for preop risk assessment for EGD and 
colonoscopy.

From a cardiac standpoint he has history of paroxysmal atrial fibrillation and 
is taking Xarelto as outpatient.  He also has history of CAD status post 
multiple PCI and takes clopidogrel.

EKG shows normal sinus rhythm without ST or T-wave abnormalities.  His 
disoriented and provides minimal information for this interview.

Review of his records reveals a nuclear stress test performed in 2017 
that showed partially reversible, mild to moderate inferolateral defect.

The patient denies any recent chest pain.

Review of Systems

General/Constitutional:

Fatigue no. Weakness no. Weight gain no. Headaches no.

Allergy/Immunology:

Colds no. Cough no.

HEENT/Neck:

Dizziness no. Change in vision no.

Respiratory:

Chest congestion no. Cough no. Pain with breathing no. Shortness of breath no. 
Swelling of the legs no. Wheezing no.

Cardiovascular:

Chest pain no. Claudication no. Dyspnea on exertion no. Palpitations no.

Gastrointestinal:

Abdominal pain no. Change in bowel habits no. Constipation no. Diarrhea no. 
Nausea no.

Hematology:

Easy bleeding no. Easy bruising no.

Musculoskeletal:

Back pain y. Myalgias no

Past medical history:

Stented coronary artery

Acute MI

Hypercholesteremia

Poliomyelitides

Whooping cough

Histoplasmosis

Hypertension

Past Surgical History:

Cardiac catheterization, left heart

CABG x 3 - Coronary artery bypass grafts x 3

Cardiac ablation using fluoroscopy guidance

Stent placement

Family History:

Brother: Cancer of prostate; Heart attack; Leukemia

Social History:

Alcohol

Details: Past

Tobacco

Details: Use: Former smoker.  Tobacco smoke exposure: None.  Did the Patient 
Smoke Cigarettes Anytime During the Last 365 Days? No.  Cessation Counseling 
Provided? No.

Details: Use: Former smoker.  Tobacco smoke exposure: None.  Other Tobacco 
Frequency quit 30 years ago.  Did the Patient Smoke Cigarettes Anytime During 
the Last 365 Days? No.  Cessation Counseling Provided? No.

Substance Abuse

Details: Use: None.

List of Medications:

Scheduled Meds (15):

                                        18 AMIODarone 200 mg PO Daily

                                        18 atorvastatin 5 mg PO Bedtime

                                        18 cefepime + Sodium Chloride 0.9%  mL 1 gm IV Q8H 25 ml/hr

                                        18 clopidogrel 75 mg PO Daily

                                        18 gabapentin (gabapentin 600 mg oral tablet) 600 mg PO Q8H

                                        01/10/18 insulin glargine (Lantus Solostar Pen 100 units/mL subcutaneous solution)

 42 unit SUB-Q Bedtime 0 ml/hr

                                        18 insulin glargine (Lantus 100 units/mL) 85 unit SUB-Q Daily 0 ml/hr

                                        18 isosorbide mononitrate (Imdur) 30 mg PO QAM

                                        18 levothyroxine (Synthroid) 50 microgram PO Q630AM

                                        18 pantoprazole 40 mg PO BID-Before Meals

                                        18 ranolazine (Ranexa 500 mg oral tablet, extended release) 500 mg PO BID

                                        18 rivaroxaban (Xarelto) 20 mg PO QPM

                                        18 sertraline 100 mg PO Daily

                                        18 tamsulosin 0.4 mg PO Daily

                                        18 torsemide 20 mg PO BID

Continuous Infusions (1):

                                        18 Sodium Chloride 0.9%  mL (normal saline 0.9%  mL) 250 mL 20

 ml/hr

Scheduled Meds (15):AMIODarone, atorvastatin, cefepime + Sodium Chloride 0.9% IV
100 mL, clopidogrel, gabapentin (gabapentin 600 mg oral tablet), insulin 
glargine (Lantus Solostar Pen 100 units/mL subcutaneous solution), insulin 
glargine (Lantus 100 units/mL), isosorbide mononitrate (Imdur), levothyroxine 
(Synthroid), pantoprazole, ranolazine (Ranexa 500 mg oral tablet, extended 
release), rivaroxaban (Xarelto), sertraline, tamsulosin, torsemide

Unscheduled Meds: None

PRN Meds (17):Dextrose 50% in Water IV (Dextrose 50% Syringe), Dextrose 50% in 
Water IV (Dextrose 50% Syringe), acetaminophen-hydrocodone (acetaminophen-
hydrocodone 325 mg-5 mg oral tablet), glucagon, hydrALAZINE, insulin lispro, 
insulin lispro, insulin lispro, insulin lispro, insulin lispro, insulin lispro, 
insulin lispro, insulin lispro, insulin lispro, morphine Sulfate, ondansetron, 
trazodone (trazodone 50 mg oral tablet)

One Time Meds (2):(Completed) cefepime + sterile water 10 mL (Maxipime + sterile
water 10 mL), (Ordered) furosemide (Lasix)

Continuous Infusions (1):Sodium Chloride 0.9%  mL (normal saline 0.9% IV 
250 mL)

Labs (Last four charted values)

WBC                     H 13.6    ()    H 13.5    ()    H 12.0    
()    H 15.5    ()

Hgb                     L 7.7    ()    L 7.5    ()    L 7.9    ()    L 8.9    ()

Hct                     L 24.8    ()    L 23.9    ()    L 24.9    
()    L 27.7    ()

Plt                     277    ()    258    ()    264    ()   
254    ()

Na                      135    ()    135    ()    L 134    () 
  L 133    ()

K                       3.6    ()    L 3.2    ()    3.6    () 
  3.5    ()

CO2                     H 33    ()    H 34    ()    28    ()  
 29    ()

Cl                      L 93    ()    L 92    ()    99    ()  
 98    ()

Cr                      0.78    ()    0.82    ()    0.90    ()
   0.90    ()

BUN                     20    ()    18    ()    15    ()    16
   ()

Glucose Random          L 68    ()    H 186    ()    H 293    ()    H 293    ()

Mg                      1.9    ()

Ca                      L 8.0    ()    L 7.8    ()    L 8.0    ()    L 8.1    ()

PT                      H 19.9    ()

INR                     H 1.68    ()

PTT                     32.1    ()

Troponin                <0.02    ()    <0.02    ()    <0.02    ()

CK MB                   3.1    ()

Total CK                97    ()    Laboratory:

Physical Exam:

Vitals    Tmp(F)    Pulse    BP    RR    SpO2    FIO2

                                         16:46    97.8    74    122/72    --    99    ---

                                         15:28    97.6    69    120/68    18    100    ---

                                         11:50    97.9    71    123/67    18    93    ---

                                         08:05    98.1    64    113/60    --    98    ---

                                         07:34    ----    ---    -----    18    97     36%



                                        24 Hr Tmax: 98.1F (36.72c) at  08:05        Vital Signs are the last 5 in the

 past 48 hours.

HEENT: Neck Supple, No JVD, no carotid bruits

CVS: Regular rate, Normal S1S2 no murmur rubs or gallops

LUNGS: Clear to auscultation

ABD: Soft, Non-tender, + BS, no bruits or organomegaly

EXT: No ankle edema, palpable distal pulses

Skin: No ulcers

Neuro: Grossly non-focal

Assessment and plan:

                                        77-year-old male presenting with mechanical fall and right shoulder fracture.  During

 hospitalization had drop in hemoglobin with positive occult blood in the 
stools.  He has been on Xarelto and clopidogrel due to paroxysmal atrial 
fibrillation and PCI.  Review of records from [facility] showed that the last 
PCI was performed in 2016.  Present he is in normal sinus rhythm.  He also 
had episodes of junctional escape in the past but this was in the presence of 
amiodarone and both tach simultaneously.

I will recommend discontinuation of Xarelto and clopidogrel.  He should be 
allowed to proceed with planned EGD and colonoscopy at moderate to high but 
acceptable CV risk.  He had abnormal MPI with moderate partially reversible 
inferolateral defect a month ago.  We will follow him closely

List of diagnosis:

Preop EGD colonoscopy CV risk assessment

GI bleed

Acute anemia

Mechanical fall with right humeral fracture

CAD status post multiple PCI and CABG

Chronic diastolic CHF

MPI in 2017 with partially reversible moderate inferolateral defect

Hypertension

Diabetes

Hyperlipidemia

Paroxysmal atrial fibrillation chads 2 vasc: 4

??  SSS, junctional escape while on both tach and amiodarone simultaneously in 
the past

                            2018                             Milli       

             

 

                                        Extracted from:Title: Clinical Document

Author: Samuel Peterson MD

Date: 17

Cardiology Note

Samuel Peterson MD, PA

THIS IS A COMBINED H&P AND DISCHARGE SUMMARY



SUBJECTIVE:

CC: chest pain

History of Present Illness

                                        76 y/o M with PMHx of DM, HTN, A-fib, Acute MI, Stented Coronary Artery, and Sleep

 apnea presents to the ED c/o central chest pain onset 4 hours ago. Patient 
states pain radiates to his left arm, pain has consistently gotten worse 
throughout the night, and movement is an exacerbating factor. Patient was given 
3 SL NTG, NTG paste, 324 mg NSA en route per EMS. Patient states NTG helped 
relieve symptoms. Patient also c/o SOB but denies n/v/d, bloody or black stools 
or any other symptoms at this time. Patient is wheelchair bound (has leg 
weakness) at baseline. Patient states he is on bloodthinners after having a 
cardiac ablation.  Patient was last hospitalized in 10/2017 and his last echo 
was last year at [facility]. Patient wears BiPAP  most nights for 
his sleep apnea. Patient's daughter lives with him. No other concerns or 
statements are voiced at this moment.  The onset was 4  hours ago and gradual.  
The course/duration of symptoms is resolved.  Location: Central chest. Radiating
pain: left arm. The character of symptoms is pressure, not ripping and not 
tearing.  The degree at onset was minimal.  The degree at maximum was moderate. 
The degree at present is minimal.  The exacerbating factor is exertion.  The 
relieving factor is rest.  Risk factors consist of coronary artery disease, 
hypertension, diabetes mellitus, obesity, hyperlipidemia, not pulmonary embolism
and not deep vein thrombosis.  Prior episodes: angina, coronary artery disease 
and pulmonary embolism negative.  Associated symptoms: shortness of breath, 
diaphoresis, denies nausea, denies vomiting, denies anxiety, denies 
palpitations, denies diarrhea and denies blood in stool.



Review of Systems

Constitutional symptoms:  No fever, no chills.

Skin symptoms:  No rash,

Eye symptoms:  Vision unchanged.

ENMT symptoms:  No ear pain, no sore throat.

Respiratory symptoms:  Shortness of breath, No cough,

Cardiovascular symptoms:  Chest pain.

Gastrointestinal symptoms:  No abdominal pain, no nausea, no vomiting, no 
diarrhea, no rectal bleeding, no change in stool color.

Genitourinary symptoms:  No dysuria, no hematuria.

Musculoskeletal symptoms:  No back pain,

Neurologic symptoms:  No headache,

Health Status

Allergies: 

Allergic Reactions (All)

Severity Not Documented

Latex- No reactions were documented.

Sulfa drugs- No reactions were documented.

Canceled/Inactive Reactions (All)

Severity Not Documented

NKDA- Sulfur,miscellaneous routes,compounding and sulfur drug, nos.. 

Vitals and Temp:

Vitals    Tmp(F)    Pulse    BP    RR    SpO2    FIO2

                                         08:14    98.6    74    127/84    16    95    ---

                                         03:22    ----    ---    -----    --    99    ---

                                         03:13    97.8    77    144/79    16    99    ---

                                         02:30    ----    72    152/67    12    98    ---

                                         00:36    ----    78    123/55    16    97    ---

                                        24 Hr Tmax: 98.6F (37.00c) at  08:14        Vital Signs are the last 5 in the

 past 48 hours.

Scheduled Meds (2):

                                        17 aspirin (aspirin 81 mg tablet, chewable) 81 mg PO Daily

                                        17 sodium chloride (Saline Flush 0.9%) 10 ml IVP Q12H

Continuous Infusions: None



Labs 

Most Recent Results 

Previous Results 

WBC 

                                        10.3  (DEC 11)

H 12.2  (DEC 11)

                                        -- 

                                        -- 

Hgb 

L 10.8  (DEC 11)

L 10.6  (DEC 11)

                                        -- 

                                        -- 

Hct 

L 33.8  (DEC 11)

L 32.8  (DEC 11)

                                        -- 

                                        -- 

Plt 

                                        200  (DEC 11)

                                        195  (DEC 11)

                                        -- 

                                        -- 

Na 

                                        140  (DEC 11)

                                        136  (DEC 11)

                                        -- 

                                        -- 

K 

                                        4.1  (DEC 11)

                                        4.3  (DEC 11)

                                        -- 

                                        -- 

CO2 

                                        30  (DEC 11)

                                        27  (DEC 11)

                                        -- 

                                        -- 

Cl 

                                        102  (DEC 11)

                                        99  (DEC 11)

                                        -- 

                                        -- 

Cr 

                                        0.82  (DEC 11)

                                        1.07  (DEC 11)

                                        -- 

                                        -- 

BUN 

                                        14  (DEC 11)

                                        14  (DEC 11)

                                        -- 

                                        -- 

Glucose Random 

H 118  (DEC 11)

H 241  (DEC 11)

                                        -- 

                                        -- 

Ca 

L 8.1  (DEC 11)

L 8.2  (DEC 11)

                                        -- 

                                        -- 

PT 

H 15.6  (DEC 11)

H 16.8  (DEC 11)

                                        -- 

                                        -- 

INR 

H 1.23  (DEC 11)

H 1.36  (DEC 11)

                                        -- 

                                        -- 

PTT 

                                        30.4  (DEC 11)

                                        26.6  (DEC 11)

                                        -- 

                                        -- 

Troponin 

<0.02  (DEC 11)

<0.02  (DEC 11)

                                        -- 

                                        -- 

CK MB 

                                        2.9  (DEC 11)

                                        -- 

Total CK 

                                        64  (DEC 11)

                                        95  (DEC 11)

RTF_CENTER, -- 

RTF_CENTER, -- 

                                        -------------------------------------------------------------------------------------------------------------



EXAM:

Good BP control; NSR; afebrile

Head: normocephalic and atraumatic

Neck: no carotid bruit; no JVD

CV: Regular; -S3; no significant murmurs

Lungs: Clear; no wheezing; good air entry

Abd: soft and no organomegaly; + bowel sounds

Ext: no CCE; good pulses distally

Neuro: nonfocal; oriented *3

Psych: appropriate

                                        ----------------------------------------------------------------------------------------------------------------------------------------------------



ASSESSMENT:

Extensive h/o CAD s/p multiple PCI procedures (says 27 stents)

Chronic afib on OAC; followed by EP at [facility]

                                        ----------------------------------------------------------------------------------------------------------------------------------------------------



PLAN:

Will proceed with nuc stress and dc home if negative

No new meds

Cardiac diet

Activity as tolerated

F/U in 1 week

                                        ----------------------------------------------------------------------------------------------------------------------------------------------------



Addendum by Samuel Peterson MD on 2017 17:22



The nuclear stress test findings were discussed with the patient.  It was 
explained to him that the stress test was abnormal but that the area of ischemia
was not large.  Management options were discussed and we decided on medical 
management at this time.  The patient will be discharged at this time.

                            2017                             Milli       

             

 

                                        Extracted from:Title: Clinical Document

Author: Faustino Villagomez MD

Date: 17

Cardiology Consult Note

[facility]

Chief Complaint-

cp,

HPI-This is a 76-year-old male with multiple medical problems.  His extensive 
history of CAD and [facility].  He has had
multiple stents bypass surgery and occluded grafts.  Last cath was in  which
showed a patent LIMA to LAD and saphenous vein graft to diagonal his RCA and 
circumflex are occluded.  He also has paroxysmal atrial fibrillation and is not 
on anticoagulant and due to fall risk and high bleeding risk he has also chronic
diastolic CHF.  Patient had recent extensive hospitalization and was in rehab 
recently his usual caretakers his daughter at home but however his daughter is 
been in the hospital due to pneumonia the patient presented to the hospital with
generalized weakness some lower back pain some chest pain as well as left arm 
pain as well as some disorientation.  He also took a nitro for chest pain.  On 
admission his EKG was sinus tachycardia with no significant ST changes.  Given 
his mild disorientation he had a CT head that was negative.  His breathing 
status is fairly stable but he did have pulmonary congestion on chest x-ray.  
This morning he appears to be comfortable he is able to lie flat.  He has not 
had any further chest pain.  He is just debilitated and weak.





Review of Systems

General/Constitutional:

Fatigue y. Weakness y. Weight gain no. Headaches no.

Allergy/Immunology:

Colds no. Cough no.

HEENT/Neck:

Dizziness no. Change in vision no.

Respiratory:

Chest congestion no. Cough no. Pain with breathing no. Shortness of breath y. 
Swelling of the legs no. Wheezing no.

Cardiovascular:

Chest pain ny Claudication no. Dyspnea on exertion y. Palpitations no.

Gastrointestinal:

Abdominal pain no. Change in bowel habits no. Constipation no. Diarrhea no. 
Nausea no.

Hematology:

Easy bleeding .y Easy bruising no. y

Musculoskeletal:

Back pain y. Myalgias no

Past Medical History

CAD mulitple PCIs

hx CABG

Cath  LIMA to the LAD patent , SVG to diagonal  patent.  His RCA and 
circumflex are occluded

Diastolic CHF

Parox afib- hx of afib ablation high bleed risk

sick sinus syndrome

Hypercholesteremia

Poliomyelitides

Histoplasmosis

Past Surgical History

Cardiac catheterization, left heart

CABG x 3 - Coronary artery bypass grafts x 3

Cardiac ablation using fluoroscopy guidance

Past Family History

Brother: Cancer of prostate; Heart attack; Leukemia

Past Social History

prior smoker

No alcohol or drug use

No qualifying data available

Medications

Continuous Infusions: None

Scheduled Meds (15):

                                        17 AMIODarone 200 mg PO Daily

                                        17 atorvastatin 5 mg PO Bedtime

                                        17 clopidogrel 75 mg PO Daily

                                        17 diphenhydrAMINE (Benadryl) 25 mg PO Daily

                                        17 gabapentin (gabapentin 600 mg oral tablet) 600 mg PO TID

                                        17 insulin detemir (Levemir FlexPen) 42 unit SUB-Q Bedtime

                                        17 insulin lispro SUB-Q TID-Before Meals

                                        17 isosorbide mononitrate (Imdur) 30 mg PO QAM

                                        17 levothyroxine (Synthroid) 50 microgram PO Daily

                                        17 lidocaine topical (lidocaine topical 5% ointment) 1 appl TOP TID

                                        17 lisinopril 2.5 mg PO Daily

                                        17 pantoprazole 40 mg PO BID

                                        17 ranolazine (Ranexa 500 mg oral tablet, extended release) 500 mg PO BID

                                        17 rivaroxaban (Xarelto) 20 mg PO QPM

                                        17 sertraline 100 mg PO Daily

Allergies: sulfa drugs, Latex



Vitals    Tmp(F)    Pulse    BP    RR    SpO2    FIO2

                                         07:57    98.0    75    121/48    18    99     3.0L/m

                                         06:21    ----    84    136/47    16    99     3.0L/m

                                         03:12    ----    110    145/54    21    98    ---

                                        02/28 02:47    98.3    106    168/94    19    97    ---



                                        24 Hr Tmax: 98.3F (36.83c) at  02:47        Vital Signs are the last 5 in the

 past 48 hours.

Physical exam



General:  Awake and Alert, NAD

HEENT: Neck Supple, No JVD

CVS: Regular rate, Normal S1S2

LUNGS: mildly decreased breath sounds

ABD: Soft, Non-tender, + BS

EXT: No edema, 2+ pedal pulses

Skin: No ulcers

Neuro: Awake and Alert, Oriented x 3



Labs (Last four charted values)

WBC                     H 15.2    ()

Hgb                     L 10.8    ()

Hct                     L 33.2    ()

Plt                     267    ()

Na                      137    ()

K                       L 3.1    ()

CO2                     26    ()

Cl                      99    ()

Cr                      0.85    ()

BUN                     12    ()

Glucose Random          H 197    ()

Ca                      L 8.3    ()

PT                      H 15.1    ()

INR                     1.17    ()

PTT                     31.7    ()

Troponin                0.03    ()

CK MB                   0.6    ()

Total CK                30    ()

ekg sinus tachy 1st degree av block nonspecif st changes

Impression

                                        76-year-old male with extensive history of CAD multiple stents and bypass with occluded

 grafts except for LIMA to LAD and saphenous vein graft to diagonal, paroxysmal 
atrial for ablation with high bleeding risk, acute on chronic diastolic CHF who 
presents with generalized fatigue chest pain mild dyspnea and leukocytosis

Plan



Chest pain-does not appear to be ischemic.  There are no acute EKG changes.  He 
has an extensive history and we would usually only treat him conservatively.  
Will check serial cardiac enzymes if they are negative we would not plan for 
further ischemic workup given his extensive disease

Acute on chronic diastolic CHF he has mild volume overload we will give him 1 
dose of IV Lasix and monitor him overnight.

Paroxysmal atrial fibrillation-he is currently in sinus rhythm on amiodarone.  
He has been considered a high bleeding risk by our practice in the past but is 
seen by another cardiologist and is currently back on Xarelto currently there is
no evidence of bleeding so we will just continue his home medication.

Leukocytosis-unclear what the etiology of this is would defer to internal 
medicine and check repeat labs in the morning.

                            2017                             [facility]       

             

 

                                        Extracted from:Title: Clinical Document

Author: Juliann Garibay MD

Date: 17

Cardiology

[facility]



Impression:

Junctional rhythm possibly sick sinus syndrome in the setting of sepsis in 
addition to being on both amio and multaq.  Currently in sinus rhythm

Anemia

Bradycardia

Septic shock

severe coronary artery disease status post bypass and multiple PCI's, LIMA to 
LAD as last remaining vessel

chronic diastolic congestive heart failure

paroxysmal atrial fibrillation status post multiple ablations [id number] Vasc:4

LLE cellulitis

SANTIAGO

hypertension

diabetes

hypothyroidism

Abnormal EKG

Prolonged qtc

Plan:

Mild drop in Hb overnight

No active bleed since re-initiation of Xarelto

He understands he is at high risk of bleeding complications including GIB, ICH 
and eventually death

However, he is at high risk for thromboembolic complications as well

He was advised to follow up with Dr. Terrell, his cardiologist in 2 wks

Repeat CBC in 1 wk

Return to the hospital in case of any bleed, hypotension or syncope

Watchman implant should be considered by primary cardiologist

Subjective:

Patient seen and examined.

Telemetry reviewed:

Alert

No CP

No SOB

No N/V

Objective



Vitals    Tmp(F)    Pulse    BP    RR    SpO2    FIO2

                                         08:00    98.6    65    139/77    18    98    ---

                                         07:52    ----    ---    -----    16    98     36%

                                         05:18    97.8    67    138/72    18    99    ---

                                        01/10 21:07    ----    ---    -----    20    99     4.0L/m

                                        01/10 20:50    97.7    77    114/65    16    98    ---



                                        24 Hr Tmax: 98.6F (37.00c) at  08:00        Vital Signs are the last 5 in the

 past 48 hours.

Gen: alert

Neck: No carotid bruits, thick neck

Lungs: ctab

Heart: Regular, normal s1 s2

Abd: Soft, nt, nd, +bs

Ext: cellulitis L leg resolved, Right lower extremity with no edema.

Neuro: alert, awake

Skin: no erythema

Labs (Last four charted values)

WBC                     9.9    ()    H 12.8    ()    H 15.4    ()    H 18.7    ()

Hgb                     L 8.8    ()    L 9.2    ()    L 8.3    ()    L 8.4    ()

Hct                     L 27.1    ()    L 28.6    ()    L 25.5    
()    L 25.4    ()

Plt                     371    ()    301    ()    250    ()   
237    ()

Na                      137    ()    136    ()    L 134    () 
  137    ()

K                       C 2.9    ()    3.6    ()    3.8    () 
  L 3.4    ()

CO2                     31    ()    31    ()    31    ()    H 
33    ()

Cl                      98    ()    97    ()    96    ()    98
   ()

Cr                      0.59    ()    L 0.47    ()    0.53    ()    0.58    ()

BUN                     11    ()    11    ()    11    ()    13
   ()

Glucose Random          H 221    ()    H 173    ()    H 188    ()    H 186    ()

Mg                      2.0    ()    1.9    ()    2.0    ()   
2.3    ()

Phos                    L 2.1    ()    2.6    ()    L 2.2    ()    L 2.2    ()

Ca                      L 8.1    ()    L 7.7    ()    L 7.7    ()    L 7.8    ()

PT                      H 24.4    (DEC 28)

INR                     H 2.15    (DEC 28)

PTT                     35.4    ()    H 41.7    (DEC 28)

Troponin                0.14    ()    <0.02    (DEC 31)    <0.02    (DEC 
31)    <0.02    (DEC 30)

CK MB                   H 16.4    ()    1.5    (DEC 31)    3.5    (DEC 31)
   H 5.1    (DEC 30)

Total CK                147    ()    H 2180    ()    151    (DEC 31)
   H 242    (DEC 31)    Scheduled Meds (15):

                                        17 AMIODarone 200 mg PO Daily

                                        17  (Suspended) atorvastatin 5 mg PO Bedtime

                                        16 clopidogrel 75 mg PO Daily

                                        17 furosemide (Lasix) 40 mg IVP Q12H

                                        17 gabapentin (Neurontin) 600 mg PO Q8H

                                        17 insulin detemir 20 unit SUB-Q Q12H

                                        16 levothyroxine (Synthroid) 50 microgram PO Q630AM

                                        17 lidocaine topical (lidocaine topical 5% ointment) 1 appl TOP TID

                                        17 pantoprazole 40 mg PO BID

                                        17 piperacillin-tazobactam + sodium chloride 0.9% 100 ml  mL (Zosyn

 + sodium chloride 0.9% 100 ml  mL) 3.375 gm IVPB ABXQ8H 25 ml/hr

                                        17 polyethylene glycol 3350 (MiraLax) 17 gm PO BID

                                        17 potassium chloride (Klor-Con) 20 mEq PO TID

                                        01/10/17 rivaroxaban (Xarelto) 20 mg PO QPM

                                        17 sertraline 100 mg PO Daily

                                        17 sodium chloride (Saline Flush 0.9%) 10 ml IVP Q12H

Addendum by Juliann Garibay MD on 2017 11:08

I contacted Dr. Terrell and updated him on his patient's progress

                            2017                            [facility]       

             

 

                                        Extracted from:Title: Clinical Document

Author: Yoni Campos MD

Date: 16

Progress Note - Daily

[facility]             Completed:    Monday, AUG 08, 
2016, 15:00 by Yoni Campos MD

RM: 200 - 1P, SE C2B    DESIREE MCKEON    76y (: 1940)  M     FIN:
[id number]

Attending: Katie Mandujano MD            Phone: [phone]    Service: 
Internal Medicine

Reason for Admission: ACUTE ENCEPHALOPATHY

Working DRG: None Documented

Code status: None Specified=FULL CODE        Current diet: 

Isolation: None Documented

Allergies: NKDA

SUBJECTIVE

Daughter at bedside - patient was agitated overnight, not making sense, wanting 
to go home. He is calme today though still intermittently confused. The patient 
has chronic pain which has not recently changed. he was started on Nucynta a 
couple weeks ago. No headache or vision change. No dizziness,f ever, chest pain,
shortness of breath. No numbness or tingling reported. No incontinence. Daughter
states his glucose has been out of control lately. He has no history of short 
term memory loss prior to one week ago.



OBJECTIVE





    24hr Labs

                                         0738

Glucose POC    105      H

                                         0242

Glucose POC    201      H

                                         2030

Glucose POC    286      H

                                         1751

Glucose POC    224      H

Schmitt still necessary (Yes/No):             Line still necessary (Yes/No): 

Vitals    Tmp(F)    Pulse    BP    RR    SpO2    FIO2

                                         07:58    ----    ---    -----    16    98     21%

                                         02:47    97.7    66    138/75    16    98    ---

                                         23:43    98.2    77    141/74    16    98    ---

                                         20:25    ----    ---    -----    16    99    ---

                                         20:00    97.6    83    122/60    16    100    ---



                                        24 Hr Tmax: 98.2F (36.78c) at  23:43        Vital Signs are the last 5 in the

 past 48 hours.

Date    Wt(kg)    Wt(lb)    Ht(cm)    Ht(in)    Method

                                             95.45     210.00    175.26     69.00    Estimated

                                         (initial)     90.91     200.00    Estimated

                                            167.64     66.00    Stated

I&O    Record    In    Out    Bal

                                            24hr Tot       11        0       11

                                            24hr Tot        0        0        0

Medications (31) Active

Scheduled Meds (13):

                                        16 AMIODarone 200 mg PO Daily

                                        16 clopidogrel 75 mg PO Daily

                                        16 enoxaparin 40 mg SUB-Q [id number]

                                        16 insulin detemir (Levemir FlexPen) 42 unit SUB-Q Bedtime

                                        16 isosorbide mononitrate (Imdur) 30 mg PO QAM

                                        16 levothyroxine (Synthroid) 50 microgram PO Daily

                                        16 lisinopril 2.5 mg PO Daily

                                        16 niacin (niacin 1000 mg oral tablet, extended release) 2,000 mg PO Bedtime



                                        16 pantoprazole 40 mg PO BID

                                        16 potassium chloride 20 mEq PO BID

                                        16 ranolazine (Ranexa 500 mg oral tablet, extended release) 500 mg PO BID

                                        16 sodium chloride (Saline Flush 0.9%) 10 ml IVP Q12H

                                        16 torsemide 30 mg PO Daily

Unscheduled Meds: None

PRN Meds (15):

                                        16 Dextrose 50% in Water IV (Dextrose 50% Syringe) 12.5 gm IVP PRN

                                        16 Dextrose 50% in Water IV (Dextrose 50% Syringe) 25 gm IVP PRN

                                        16 LORazepam (Ativan) 0.5 mg PO BID

                                        16 acetaminophen-hydrocodone (Norco 10/325 oral tablet) 1 tab PO Q6H

                                        16 glucagon 1 mg IM PRN

                                        16 insulin aspart 1 unit SUB-Q TID-Before Meals

                                        16 insulin aspart 2 unit SUB-Q TID-Before Meals

                                        16 insulin aspart 3 unit SUB-Q TID-Before Meals

                                        16 insulin aspart 4 unit SUB-Q TID-Before Meals

                                        16 insulin aspart 5 unit SUB-Q TID-Before Meals

                                        16 insulin aspart 1 unit SUB-Q Bedtime

                                        16 insulin aspart 2 unit SUB-Q Bedtime

                                        16 insulin aspart 3 unit SUB-Q Bedtime

                                        16 insulin aspart 4 unit SUB-Q Bedtime

                                        16 sodium chloride (Saline Flush 0.9%) 10 ml IVP PRN

One Time Meds (3):

                                        16  (Completed) LORazepam (Ativan) 1 mg IV ONCE

                                        16  (Deleted) haloperidol (Haldol) 0.5 mg IM ONCE

                                        16  (Completed) haloperidol (Haldol) 0.5 mg IM ONCE

Continuous Infusions: None

EXAM

GEN - NAD

HEENT - NC/AT> MMM. Op clear

Ext - no c/c/e

Skin - no rash

Neuro:

Mental status: Alert and oriented x3. Language intact. Follows all commands. 
Some confused speech at times and rambling with poor attention

CN: PERRL, EOMI, no droop, facial sensation is normal

Motor: 5/5 throughout

Sensory: intact to LTx4. Impaired prioreception in the toes

Cerebellar: intact ftn, hts

Abnormal movements: none

Assessment

                                        1. Encephalopathy

                                        2. Unsteady gait - some proprioreceptive loss in toes with likely diabetic neuropathy



                                        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

                                        4. Afib

                                        5. HTN

                                        6. Hypothyroid

                                        7. HLD

                                        8. CAD

                                        9. CHF

Plan

MRI reviewed - no acute process

Check B12, ammonia, thyroid function

advised to stop nucynta as this was recently started

takes xarelto for afib as outpt - resume per primary

PT/OT

redirection and supportive care

If labs unremakrable and no improvement in symptoms, consider LP to rule out 
aseptic process

Will follow

                            2016                             [facility]       

             

 

                                        Extracted from:Title: Clinical Document

Author: Katie Mandujano MD

Date: 16

Progress Note

[facility]

CC:

follow up on his chest pain

SUBJECTIVE:

pt seen/examined. he is resting comfortably in bed, awake, no chest pain 
currently

OBJECTIVE:

Vital Signs (last 24 hrs)_____        Last Charted___________

Temp Oral            97.6 DegF  ( 08:10)

Heart Rate Peripheral        L 56bpm  (:10)

Resp Rate            16 BRMIN  (:10)

SBP        102 mmHg  (:10)

DBP        L 50mmHg  (:10)

SpO2        100 %  (:10)

Weight        98.636 kg  (SPENCER 10 23:40)

Height        175.26 cm  (SPENCER 10 23:40)

BMI        32.11  (SPENCER 10 23:40)



Medications:

Scheduled Meds (18):atorvastatin, clopidogrel, diphenhydrAMINE (Benadryl), 
dronedarone (Multaq), gabapentin (Neurontin), insulin glargine, insulin glargine
(Lantus), isosorbide mononitrate (Imdur), levothyroxine (Synthroid), metFORMIN 
(metFORMIN 1000 mg oral tablet), metoprolol (metoprolol tartrate), niacin 
(niacin 1000 mg oral tablet, extended release), nitroglycerin (nitroglycerin 2% 
ointment), pantoprazole, potassium chloride, sertraline, sodium chloride (Saline
Flush 0.9%), torsemide

Unscheduled Meds: None

PRN Meds (19):ALPRAZolam (Xanax 0.5 mg oral tablet), Dextrose 50% in Water IV 
(Dextrose 50% Syringe), Dextrose 50% in Water IV (Dextrose 50% Syringe), 
acetaminophen-hydrocodone (Norco 10/325 oral tablet), glucagon, insulin aspart, 
insulin aspart, insulin aspart, insulin aspart, insulin aspart, insulin aspart, 
insulin aspart, insulin aspart, insulin aspart, morphine Sulfate, morphine 
Sulfate, nitroglycerin (nitroglycerin SL Tab), ondansetron, sodium chloride 
(Saline Flush 0.9%)

One Time Meds (2):(Completed) morphine Sulfate, (not done) nitroglycerin

Continuous Infusions: None

Labs (Last four charted values)

WBC                     7.3    (SPENCER 10)

Hgb                     L 11.2    (SPENCER 10)

Hct                     L 36.7    (SPENCER 10)

Plt                     179    (SPENCER 10)

Na                      138    (SPENCER 10)

K                       3.6    (SPENCER 10)

CO2                     24    (SPENCER 10)

Cl                      102    (SPENCER 10)

Cr                      0.93    (SPENCER 10)

BUN                     13    (SPENCER 10)

Glucose Random          H 195    (SPENCER 10)

Ca                      L 8.2    (SPENCER 10)

PT                      H 15.9    (SPENCER 10)

INR                     H 1.24    (SPENCER 10)

PTT                     23.5    (SPENCER 10)

Troponin                0.07    ()    0.03    ()    <0.02    (SPENCER 
10)

CK MB                   H 4.6    ()    H 3.9    ()    3.5    (SPENCER 
10)

Total CK                115    ()    110    ()    117    (SPENCER 10)

EXAM:

HEENT: nc/at, eomi

Neck: no jvd, supple

Heart: s1s2, no murmurs, rubs, gallops

Chest: clear to auscultation, no wheezes, rales, rhonchi

Abd: NT, ND, soft, BS +

Ext: pedal edema, no clubbing, cyanosis

Skin: no rash



IMPRESSION:

Chest pain

CAD

D. HF

Anxiety

DM

PLAN:

resumed home meds

hold metformin - incase of cardiac procedure

follow up cards recs

reviewed labs - cardiac enzymes have been cycled, highest was 0.07



Plan of care discussed with patient and nursing.

                            2016                            [facility]       

             

 

                                        Extracted from:Title: Clinical Document

Author: Faustino Villagomez MD

Date: 6/22/15

Progress Note

Cardiology

[facility]



Impression:

acute on chronic diastolic CHF

Hypertension

Sleep apnea

CAD hx of multiple stents

Elevated troponin  likely secondary to severe underlying CAD and acute heart 
failure/anemia

Anemia s/p tranfusion



Plan:

We discussed the need for close f/u

he will need a cardiac cath to ensure no acute changes- he has had multiple 
stents in the past

by Dr Terrell in [facility]

pt would like to be discharged and have close outpt f/u and cath by Dr Terrell

He understands the risks of not completing workup during hospital stay

cont ASA/plavix

cont multaq for parox afib

no anticoagulation due to microcytic anemia

H/H stable

ok to DC w/ close cardiac f/u



Subjective:

Patient Seen and Examined.

He walked w/ therapy this AM w/ walker

no chest pain or sob

doesn't feel palpitations

wants to go home



Objective:

Telemetry  NSR, brief parox afib, nsvt



Vitals and Temp:

Vitals    Tmp(F)    Pulse    BP    RR    SpO2    FIO2

                                         07:20    98    81    135/78    18    98    ---

                                         04:00    98.0    82    145/84    18    96    ---

                                         00:00    97.8    81    147/83    16    ---    ---

                                         20:00    98.3    78    124/70    16    95    ---

                                         15:33    97.3    83    125/74    16    99    ---

                                        24 Hr Tmax: 98.3F (36.83c) at  20:00    Vital Signs are the last 5 in the past

 48 hours.



General:  Awake and Alert, NAD

HEENT: Neck Supple,\

CVS: Regular rate, Normal S1S2

LUNGS: CTA, No rales or wheezing

ABD: Soft, Non-tender, + BS

EXT: No edema, \

Skin: No ulcers

Neuro: Awake and Alert, Oriented x 3





Labs (Last four charted values)

WBC                     8.2    ()    9.5    ()    6.9    ()   
7.8    ()

Hgb                     L 9.7    ()    L 9.8    ()    L 9.6    (SPENCER 
19)    L 8.4    (SPENCER 18)

Hct                     L 31.2    (SPENCER 22)    L 30.6    (SPENCER 20)    L 31.4    
(SPENCER 19)    L 27.3    (SPENCER 18)

Plt                     226    (SPENCER 22)    220    (SPENCER 20)    209    (SPENCER 19)   
225    (SPENCER 18)

Na                      137    (SPENCER 22)    137    (SPENCER 21)    136    (SPENCER 20)   
139    (SPENCER 19)

K                       4.8    (SPENCER 22)    4.3    (SPENCER 21)    3.7    (SPENCER 20)   
3.6    (SPENCER 19)

CO2                     31    (SPENCER 22)    31    (SPENCER 21)    27    (SPENCER 20)    H 
33    (SPENCER 19)

Cl                      97    (SPENCER 22)    99    (SPENCER 21)    100    (SPENCER 20)    
101    (SPENCER 19)

Cr                      1.0    (SPENCER 22)    0.9    (SPENCER 21)    0.8    (SPENCER 20)   
1.0    (SPENCER 19)

BUN                     16    (SPENCER 22)    11    (SPENCER 21)    10    (SPENCER 20)    10
   (SPENCER 19)

Glucose Random          H 301    (SPENCER 22)    H 236    (SPENCER 21)    H 269    (SPENCER 
20)    H 263    (SPENCER 19)

Mg                      2.0    (SPENCER 22)    2.2    (SPENCER 21)    2.0    (SPENCER 20)   
2.2    (SPENCER 19)

Phos                    3.3    (SPENCER 19)

Ca                      8.6    (SPENCER 22)    8.7    (SPENCER 21)    L 8.3    (SPENCER 20) 
  L 7.8    (SPENCER 19)

PT                      H 15.6    (SPENCER 19)    H 16.8    (SPENCER 18)    H 15.7    
(SPENCER 18)

INR                     H 1.23    (SPENCER 19)    H 1.35    (SPENCER 18)    H 1.24    
(SPENCER 18)

PTT                     H 46.9    (SPENCER 20)    C >200    (SPENCER 20)    H 62.7    
(SPENCER 19)    H 79.5    (SPENCER 19)

Troponin                C 0.52    (SPENCER 19)    C 0.83    (SPENCER 18)    C 1.01    
(SPENCER 18)

CK MB                   H 6.5    (SPENCER 19)    H 8.0    (SPENCER 18)    H 9.3    (SPENCER 
18)

Total CK                173    (SPENCER 19)    173    (SPENCER 19)    H 200    (SPENCER 18) 
  H 227    (SPENCER 18)

Scheduled Meds (14):

                                        06/19/15 aspirin (aspirin 325 mg tablet) 325 mg PO Daily

                                        06/19/15 atorvastatin 5 mg PO Bedtime

                                        06/19/15 clopidogrel 75 mg PO Daily

                                        06/19/15 dronedarone (Multaq) 400 mg PO BID

                                        06/20/15 enoxaparin (Lovenox) 40 mg SUB-Q [id number]

                                        06/20/15 furosemide (Lasix) 40 mg IVP BID

                                        06/19/15 gabapentin (gabapentin 600 mg oral tablet) 600 mg PO TID

                                        06/20/15 insulin detemir (Levemir FlexPen) 50 unit SUB-Q Bedtime

                                        06/22/15 insulin detemir (Levemir FlexPen) 50 unit SUB-Q QAM

                                        06/19/15 isosorbide mononitrate (Imdur) 30 mg PO QAM

                                        06/19/15 levothyroxine (Synthroid) 50 microgram PO Q630AM

                                        06/19/15 niacin (niacin 250 mg oral capsule, extended release) 2,000 mg PO Bedtime



                                        06/19/15 pantoprazole 40 mg IVP Before Dinner

                                        06/20/15 potassium chloride (potassium chloride 20 mEq oral tablet, extended release)

 20 mEq PO Q12H

Continuous Infusions: None



Extracted from:Title: Clinical Document

Author: Trevor Montano MD

Date: 6/18/15

History and Physical

Attending: Samara Delgado MD    Phone: [phone]    Service: Emergency
Medicine Service

Code status: None Specified=FULL CODE

Reason for Admission: DIRECT ADM

Working DRG: None Documented

Isolation: None Documented

Consulting Physicians: (none on file)

CC: SOB

HPI: This is a 73 yo w/ below PMHx who p/w 2-3 day h/o SOB. SOB on exertion only
but resolves w/ rest. Denies chest pain but had discomfort, retrosternal and 
radiated to L arm, occurred on exertion, resolved w/ exertion. N/V multiple 
times (N/B). Watery stools, no melena/hematochezia. Sugars to 50s 1 day PTA, 
pale and diaphoretic.  Acting strange, according to daughter, pt was not 
listening to daughter. Mental status has since improved. Pt also c/o L foot 
pain, he is diabetic and has neuropathy. Denies fevers/chills, cough, abd pain, 
N/V, abd pain, bowel/urianry changes.

PMHx:

CAD

HTN

paroxysmal afib

CHF

HLD

hypothyroidism

T2DM

PSHx:

CABG x 3

multiple cardiac stents (x25?)

FHx:

reviewed and noncontributory

SHx:

Former smoker, quit in 1970s

Denies EtOH and illicit drugs

Meds:

 Medication List

   Active Medications

       Ordered

           dronedarone: 400 mg, 1 tab, PO, BID, 60 tab.

           heparin: 5,000 unit, 5 mL, IVP, PRN, PRN: Heparin Protocol.

           heparin: 2,500 unit, 2.5 mL, IVP, PRN, PRN: Heparin Protocol.

           heparin 25,000 unit [12 unit/kg/hr] + Premix Diluent Dextrose 5% 500

             mL: 20.09 ml/hr, IV, Stop: 07/18/15 18:03:00.

           insulin glargine: 72 unit, SUB-Q, Daily, 0 Refill(s).

           insulin glargine: 42 unit, SUB-Q, Bedtime, 10 ml, 0 Refill(s).

   Medications Inactivated in the Last 72 Hours

       heparin: 4,000 unit, 4 mL, IV, ONCE.

       heparin: 5,000 unit, 1 mL, PYXIS, ONCE.

       heparin: 25,000 unit, PYXIS, ONCE.

       Sodium Chloride 0.9% IV: 500 mL, PYXIS, ONCE.

       Sodium Chloride 0.9% IV: 500 mL, 500 ml/hr, IV, ONCE.



Allergies: NKDA



ROS: See HPI.

         All other systems reviewed by myself are negative unless noted above.



Physical Exam:

Vitals    Tmp(F)    Pulse    BP    RR    SpO2    FIO2

                                         20:08    98.0    91    109/63    16    100    ---

                                         18:30    ----    86    111/63    13    100    ---

                                         17:30    ----    86    108/57    14    100    ---

                                         16:43    ----    88    98/51    17    100    ---

                                         16:35    ----    88    74/36    11    100    ---



                                        24 Hr Tmax: 98.0F (36.67c) at  20:08    Vital Signs are the last 5 in the past

 48 hours.



General: NAD, nontoxic appearing

HEENT: NCAT, PERRL, MMM, no JVD

Cardiovascular: RRR, S1S2

Respiratory: CTAB

Abdomen: +BS, NT/ND

Extremities: no b/l LE edema

Skin: mild erythema and signficant peeling and dryiness of feet especially 
plantar areas, very superficial R heel ulcer

Neurologic: comprehension and speech intact, CN III-XII grossly intact

Musculoskeletal: symmetric strength in all extremities

Labs:

    24hr Labs

                                         1628

Glucose POC    201      H

                                         1621

Sodium Lvl    137  

Potassium Lvl    3.7  

Chloride Lvl    100  

CO2    27  

AGAP    13.7  

Glucose Lvl    181      H

Creatinine Lvl    1.3  

BUN    16  

B/C Ratio    12  

Total Protein    7.2  

Albumin Lvl    3.2      L

Globulin    4.0  

A/G Ratio    0.8  

Calcium Lvl    8.3      L

ALT    66      H

AST    76      H

Alk Phos    179      H

Bili Total    0.5  

eGFR    54  

Lactic Acid Lvl    1.8  

Total CK    227      H

CK MB    9.3      H

CK MB Index    4.1      H

BNP    284      H

Troponin-I    1.01      C

PT    15.7      H

INR    1.24      H

PTT    29.9  

WBC    7.8  

RBC    3.70      L

Hgb    8.4      L

Hct    27.3      L

MCV    73.8      L

MCH    22.7      L

MCHC    30.7      L

RDW    18.7      H

Platelet    225  

MPV    9.0  

Segs    58.2  

Monocytes    8.8  

Lymphocytes    28.3  

Eosinophils    3.1  

Basophils    1.6      H

Segs-Bands #    4.5  

Lymphocytes #    2.2  

Monocytes #    0.7  

Eosinophils #    0.2  

Basophils #    0.1  

Microcyte    1+  



Micro:

none

Imaging:

CT head: 1. No acute abnormality.

                                        2. Diffuse cerebral atrophy and mild chronic small vessel ischemic change.

CXR: pending

EKG: sinus tachy arrhythmia, no major ST abnormalities

Assessment and Plan: 73 yo p/w SOB and chest discomfort, found to have elevated 
troponin and given his h/o CAD was admitted for NSTEMI.

# NSTEMI: aspirin, plavix, heparin gtt, cardiology consulted, EKG okay, trend 
cardiac enzymes, NPO, ordered CXR

# hypotension: resolved w/ iv fluids

# afib: currently in sinus rhythm, c/w home regimen (currently normotensive so 
dronedarone should be okay)

# DM: SSI + home lantus dose

# chronic microcytic anemia: H/H a little lower than earlier this year, given 
his CAD hx and active NSTEMI will transfuse 1u to get to his prior values

# hypothyroidism/HLD/CHF: c/w home regimen except hold toresemide because of 
hypotension (pt does not look volume overloaded at all)

Prophylaxis: heparin gtt, PPI

Diet: NPO

Trevor Silvaurnist

                            2015                            [facility]       

             



                                                                                
                                                                                
                                                                                
                          



Plan of Care







                                        No Data Provided for This Section                    



                                                                



Social History

                    





                    Social History                            Date                            Source    

                

 

                                        Social History TypeResponse

Substance Abuse

Use: None.

Alcohol

Never

Smoking Status

Never smoker; Exposure to Tobacco Smoke None; Cigarette Smoking Last 365 Days 
No; Reg Smoking Cessation Counseling No

entered on: 2018                            [facility]       

             



                                                                    



Family History

                    





                                        No Data Provided for This Section                    



                                                            



Advance Directives

                    





                                        No Data Provided for This Section                    



                                                            



Functional Status

                    





                                        No Data Provided for This Section

## 2019-07-18 NOTE — XMS REPORT
Summary of Care: 16 - 16

                             Created on: 2108



DESIREE MCKEON

External Reference #: [id number]

: 1940

Sex: Male



Demographics







                          Address                   [address]

[place]  [address]-

 

                          Home Phone                [phone]

 

                          Preferred Language        English

 

                          Marital Status            

 

                          Latter day Affiliation     None

 

                          Race                      White/

 

                          Ethnic Group              Non-





Author







                          Author                    [facility]

 

                          Organization              [facility]

 

                          Address                   Unknown

 

                          Phone                     Unavailable







Encounter





[facility](FIN) [id number] Date(s): 16 - 16

[facility] [address] [place] [address]-     [phone]
[phone]

Discharge Disposition: Home

Attending Physician: Katie Mandujano MD

Referring Physician: Katie Mandujano MD





Vital Signs





No data available for this section



Problem List







    



              Condition     Effective Dates     Status       Health Status     Informant

 

    



                           Acute MI(Confirmed)1      Resolved  

 

    



                           Atrial                    Active  



                                         fibrillation(Confirm    



                                         ed)    

 

    



                           CAD (coronary artery      Active  



                                         disease)(Confirmed)    

 

    



                           Diabetes(Confirmed)       Resolved  

 

    



                           Diastolic heart           Active  



                                         failure(Confirmed)    

 

    



                           Histoplasmosis(Confi      Resolved  



                                         rmed)    

 

    



                           Hypercholesteremia(C      Resolved  



                                         onfirmed)    

 

    



                           Hypertension(Confirm      Resolved  



                                         ed)    

 

    



                           HTN                       Active  



                                         (hypertension)(Confi    



                                         rmed)    

 

    



                           Hypothyroidism(Confi      Active  



                                         rmed)    

 

    



                           Diabetes mellitus         Active  



                                         type 2, insulin    



                                         dependent(Confirmed)    

 

    



                           PAUL (obstructive          Active  



                                         sleep    



                                         apnea)(Confirmed)    

 

    



                           Poliomyelitides(Conf      Resolved  



                                         irmed)    

 

    



                           Sleep                     Resolved  



                                         apnea(Confirmed)    

 

    



                           Stented coronary          Resolved  



                                         artery(Confirmed)2    

 

    



                           Systolic heart            Active  



                                         failure(Confirmed)    

 

    



                           Whooping                  Resolved  



                                         cough(Confirmed)    







1x 3



225 cardiac stents



Allergies, Adverse Reactions, Alerts







   



                 Substance       Reaction        Severity        Status

 

   



                           NKDA                      Active







Medications





No data available for this section



Results





No data available for this section



Immunizations







  



                     Vaccine             Date                Refusal Reason

 

  



                           pneumococcal 23-valent vaccine     3/31/15 

 

  



                     pneumococcal 23-valent vaccine     3/30/15             Patient Refuses







Procedures







   



                 Procedure       Date            Related Diagnosis     Body Site

 

   



                                         CABG x 3 - Coronary artery bypass grafts x 3   

 

   



                                         Cardiac catheterization, left heart   







Social History







 



                           Social History Type       Response

 

 



                           Substance Abuse           Use: None.

 

 



                           Alcohol                   Never

 

 



                           Smoking Status            Former smoker; Exposure to Tobacco Smoke None; Cigarette Smoking

 Last 365



                                         Days No; Reg Smoking Cessation Counseling No







Assessment and Plan





No data available for this section

## 2019-07-18 NOTE — XMS REPORT
Summary of Care: 12/10/17 - 17

                             Created on: 2074



DESIREE MCKEON

External Reference #: [id number]

: 1940

Sex: Male



Demographics







                          [place]  [address]

 

                          Home Phone                [phone]

 

                          Preferred Language        English

 

                          Marital Status            

 

                          Spiritism Affiliation     None

 

                          Race                      White

 

                          Additional Race(s)        White/



 

                          Ethnic Group              Non-





Author







                          Author                    [facility]

 

                          Organization              [facility]

 

                          Address                   Unknown

 

                          Phone                     Unavailable







Encounter





CHLOÉ Pedersen(DAVID) [id number] Date(s): 12/10/17 - 17

[facility] [address] [place] [address]-     [phone]
[phone]

Discharge Disposition: Home or Self Care

Attending Physician: Carrie Muller MD

Admitting Physician: Carrie Muller MD





Vital Signs







                    1                   2                   3



                                         Most recent to   



                                         oldest [Reference   



                                         Range]:   

 

                                        175.26 cm 

                          (17 3:18 AM)        160.02 cm 

                          (17 12:03 AM)        



                                         Height   

 

                                        98.6 DegF 

                          (17 8:14 AM)        97.8 DegF 

                          (17 3:13 AM)        97.7 DegF 

                                        (17 12:03 AM)



                                         Temperature Oral   



                                         [96.4-99.1 DegF]   

 

                                        127/84 mmHg 

                          (17 8:14 AM)        152/67 mmHg 

*HI*

                          (17 2:30 AM)         



                                         Blood Pressure   



                                         [/60-90 mmHg]   

 

                                        144 mmHg 

*HI*

                    (17 3:13 AM)                         



                                         Systolic Blood   



                                         Pressure [   



                                         mmHg]   

 

                                        79 mmHg 

                    (17 3:13 AM)                         



                                         Diastolic Blood   



                                         Pressure [60-90   



                                         mmHg]   

 

                                        16 BRMIN 

                          (17 8:14 AM)        16 BRMIN 

                          (17 3:13 AM)        12 BRMIN 

*LOW*

                                        (17 2:30 AM)



                                         Respiratory Rate   



                                         [14-20 BRMIN]   

 

                                        74 bpm 

                          (17 8:14 AM)        77 bpm 

                          (17 3:13 AM)        87 bpm 

                                        (17 12:03 AM)



                                         Peripheral Pulse   



                                         Rate [ bpm]   

 

                                        99.688 kg 

                          (17 3:18 AM)        90.909 kg 

                          (17 12:03 AM)        



                                         Weight   

 

                                        32.45 m2 

                          (17 3:18 AM)        35.5 m2 

                          (17 12:03 AM)        



                                         Body Mass Index   







Problem List







    



              Condition     Effective Dates     Status       Health Status     Informant

 

    



                           Acute MI(Confirmed)1      Resolved  

 

    



                           Atrial                    Active  



                                         fibrillation(Confirm    



                                         ed)    

 

    



                           Atrial                    Active  



                                         fibrillation(Confirm    



                                         ed)    

 

    



                           CHF (congestive           Active  



                                         heart    



                                         failure)(Confirmed)    

 

    



                           CAD (coronary artery      Active  



                                         disease)(Confirmed)    

 

    



                           Diabetes(Confirmed)       Active  

 

    



                           Diastolic heart           Active  



                                         failure(Confirmed)    

 

    



                           Histoplasmosis(Confi      Resolved  



                                         rmed)    

 

    



                           Hypercholesteremia(C      Resolved  



                                         onfirmed)    

 

    



                           Hypertension(Confirm      Active  



                                         ed)    

 

    



                           HTN                       Active  



                                         (hypertension)(Confi    



                                         rmed)    

 

    



                           Hypothyroidism(Confi      Active  



                                         rmed)    

 

    



                           Diabetes mellitus         Active  



                                         type 2, insulin    



                                         dependent(Confirmed)    

 

    



                           PAUL (obstructive          Active  



                                         sleep    



                                         apnea)(Confirmed)    

 

    



                           Poliomyelitides(Conf      Resolved  



                                         irmed)    

 

    



                           Sleep                     Active  



                                         apnea(Confirmed)    

 

    



                           Stented coronary          Resolved  



                                         artery(Confirmed)2    

 

    



                           Systolic heart            Active  



                                         failure(Confirmed)    

 

    



                           Whooping                  Resolved  



                                         cough(Confirmed)    







1x 3



225 cardiac stents



Allergies, Adverse Reactions, Alerts







   



                 Substance       Reaction        Severity        Status

 

   



                           sulfa drugs               Active

 

   



                           Latex                     Active







Medications





AMIODarone

200 mg, 1 tab, Route: PO, Drug form: TAB, Daily, Dosing Weight 99.688, kg, Start
date: 17 9:00:00 CST, Duration: 30 day, Stop date: 01/10/18 9:00:00 CST

Notes: (Same as: Cordarone)

Start Date: 17

Stop Date: 17

Status: Canceled



aspirin 81 mg tablet, chewable

81 mg, 1 tab, Route: PO, Drug form: CHEWTAB, Daily, Dosing Weight 90.909, kg, St
art date: 17 2:45:00 CST, Duration: 30 day, Stop date: 18 9:00:00 CS
T

Notes: Take with food.

Start Date: 17

Stop Date: 17

Status: Discontinued



atorvastatin

5 mg, 0.5 tab, Route: PO, Drug form: TAB, Bedtime, Dosing Weight 99.688, kg, Sta
rt date: 17 21:00:00 CST, Duration: 30 day, Stop date: 18 21:00:00 C
ST

Notes: (Same As: Lipitor)

Start Date: 17

Stop Date: 17

Status: Discontinued



clopidogrel

75 mg, 1 tab, Route: PO, Drug form: TAB, Daily, Dosing Weight 99.688, kg, Start 
date: 17 9:00:00 CST, Duration: 30 day, Stop date: 01/10/18 9:00:00 CST

Notes: (Same As: Plavix)

Start Date: 17

Stop Date: 17

Status: Canceled



gabapentin 600 mg oral tablet

600 mg, 2 cap, Route: PO, Drug form: CAP, TID, Dosing Weight 99.688, kg, Start d
ate: 17 15:00:00 CST, Duration: 30 day, Stop date: 01/10/18 9:00:00 CST

Notes: (Same as: Neurontin)

Start Date: 17

Stop Date: 17

Status: Discontinued



Imdur

30 mg, 1 tab, Route: PO, Drug form: ERTAB, QAM, Dosing Weight 99.688, kg, Start 
date: 17 9:00:00 CST, Duration: 30 day, Stop date: 01/10/18 9:00:00 CST

Start Date: 17

Stop Date: 17

Status: Canceled



Lantus Solostar Pen 100 units/mL subcutaneous solution

42 unit, 0.42 mL, Route: SUB-Q, Drug form: SOLN, Bedtime, Dosing Weight 99.688, 
kg, Start date: 17 21:00:00 CST, Duration: 30 day, Stop date: 18 21:
00:00 CST

Notes: (Same as: Lantus)Do not hold insulin without contacting prescriberWASTE: 
F/P - [facility]; E - [facility]  "single patient use only"

Start Date: 17

Stop Date: 17

Status: Discontinued



lisinopril

2.5 mg, 0.5 tab, Route: PO, Drug form: TAB, Daily, Dosing Weight 99.688, kg, Sta
rt date: 17 9:00:00 CST, Duration: 30 day, Stop date: 01/10/18 9:00:00 CST

Notes: (Same as: Prinivil, Zestril)

Start Date: 17

Stop Date: 17

Status: Canceled



Mag-Ox 400

400 mg, 1 tab, Route: PO, Drug form: TAB, Daily, Dosing Weight 99.688, kg, Start
date: 17 9:00:00 CST, Duration: 30 day, Stop date: 01/10/18 9:00:00 CST

Notes: (Same as: Mag-Ox 400)Magnesium oxide 233xu=037ps elemental magnesiumDose=
____mg magnesium oxide (___mg elemental magnesium)

Start Date: 17

Stop Date: 17

Status: Canceled



Mag-Ox 400 oral tablet

400 mg=1 tab, PO, Daily, 0 Refill(s)

Start Date: 17

Status: Ordered



metFORMIN

500 mg, PO, BID, 0 Refill(s)

Start Date: 17

Status: Ordered



metFORMIN

500 mg, Route: PO, BID, Dosing Weight 99.688, kg, Start date: 17 17:00:00 
CST, Duration: 30 day, Stop date: 01/10/18 9:00:00 CST

Start Date: 17

Stop Date: 17

Status: Canceled



metFORMIN 1000 mg oral tablet

1,000 mg, 2 tab, Route: PO, Drug form: TAB, BID-Meals, Dosing Weight 99.688, kg,
Start date: 17 17:00:00 CST, Duration: 30 day, Stop date: 01/10/18 8:00:00
CST

Notes: (Same as: Glucophage)  Take with meal

Start Date: 17

Stop Date: 17

Status: Discontinued



morphine Sulfate

2 mg, 1 mL, Route: IVP, Drug form: INJ, Q15Min, Dosing Weight 90.909, kg, PRN Ch
est Pain, Start date: 17 2:39:00 CST, Duration: 2 doses or times, Stop hazel
e: Limited # of times

Notes: (Same as:MORPhine Sulfate)

Start Date: 17

Stop Date: 17

Status: Discontinued



niacin 500 mg oral capsule, extended release

2,000 mg, 4 tab, Route: PO, Drug form: ERTAB, Bedtime, Dosing Weight 99.688, kg,
Start date: 17 21:00:00 CST, Duration: 30 day, Stop date: 18 21:00:
00 CST

Notes: (Same as: Niaspan)"Do Not Crush"Non-Formulary Item  With food.

Start Date: 17

Stop Date: 17

Status: Discontinued



nitroglycerin SL Tab

0.4 mg, 1 tab, Route: SL, Drug form: TAB, Q5Min, Dosing Weight 90.909, kg, PRN C
hest Pain, Start date: 17 2:39:00 CST, Duration: 3 doses or times, Stop da
te: Limited # of times

Notes: (Same as:Nitroquick, Nitrostat)"Do Not Crush"  Sublingual tablet

Start Date: 17

Stop Date: 17

Status: Discontinued



ondansetron

4 mg, 1 tab, Route: PO, Drug form: TABDIS, Q8H, Dosing Weight 90.909, kg, PRN Na
usea & Vomiting, Start date: 17 2:39:00 CST, Duration: 30 day, Stop date: 
01/10/18 2:38:00 CST

Notes: (Same as: Zofran ODT)

Start Date: 17

Stop Date: 17

Status: Discontinued



ondansetron 4 mg oral tablet

4 mg=1 tab, PO, Q6H, 0 Refill(s)

Start Date: 17

Status: Ordered



pantoprazole

40 mg, 1 tab, Route: PO, Drug form: ECTAB, Before Dinner, Dosing Weight 99.688, 
kg, Start date: 17 16:30:00 CST, Duration: 30 day, Stop date: 18 16:
30:00 CST

Notes: Tablet should not be chewed or crushed.(Same as: Protonix)

Start Date: 17

Stop Date: 17

Status: Discontinued



pantoprazole 40 mg oral enteric coated tablet

40 mg=1 tab, PO, Daily, 0 Refill(s)

Start Date: 17

Status: Ordered



potassium chloride

20 mEq, 1 tab, Route: PO, Drug form: ERTAB, BID, Dosing Weight 99.688, kg, Start
date: 17 17:00:00 CST, Duration: 30 day, Stop date: 01/10/18 9:00:00 CST

Notes: (Same as: K-Dur 20)

Start Date: 17

Stop Date: 17

Status: Discontinued



pregabalin

600 mg, Route: PO, Drug form: CAP, BID, Dosing Weight 99.688, kg, Start date:  17:00:00 CST, Duration: 30 day, Stop date: 01/10/18 9:00:00 CST

Start Date: 17

Stop Date: 17

Status: Deleted



pregabalin 300 mg oral capsule

600 mg=2 cap, PO, BID, 0 Refill(s)

Start Date: 17

Status: Ordered



Ranexa 500 mg oral tablet, extended release

500 mg=1 tab, PO, BID, 0 Refill(s)

Start Date: 17

Status: Ordered



Saline Flush 0.9%

10 mL, Route: IVP, Drug Form: INJ, Dosing Weight 90.909, kg, PRN, PRN Line Flush
, Start date: 17 0:40:00 CST, Duration: 30 day, Stop date: 01/10/18 0:39:0
0 CST

Notes: (Same as: BD Posiflush)

Start Date: 17

Stop Date: 17

Status: Discontinued



Saline Flush 0.9%

10 ml, Route: IVP, Drug Form: INJ, Dosing Weight 90.909, kg, PRN, PRN Line Flush
, Start date: 17 2:39:00 CST, Duration: 30 day, Stop date: 01/10/18 2:38:0
0 CST

Notes: (Same as: BD Posiflush)

Start Date: 17

Stop Date: 17

Status: Discontinued



Saline Flush 0.9%

10 ml, Route: IVP, Drug Form: INJ, Dosing Weight 90.909, kg, Q12H, Start date: 17 9:00:00 CST, Duration: 30 day, Stop date: 18 21:00:00 CST

Notes: (Same as: BD Posiflush)

Start Date: 17

Stop Date: 17

Status: Discontinued



sertraline

100 mg, 1 tab, Route: PO, Drug form: TAB, Daily, Dosing Weight 99.688, kg, Start
date: 17 9:00:00 CST, Duration: 30 day, Stop date: 01/10/18 9:00:00 CST

Notes: (Same as: Zoloft)

Start Date: 17

Stop Date: 17

Status: Canceled



sertraline

100 mg, Route: PO, Drug form: TAB, Daily, Dosing Weight 99.688, kg, Start date: 
17 9:00:00 CST, Duration: 30 day, Stop date: 01/10/18 9:00:00 CST

Start Date: 17

Stop Date: 17

Status: Deleted



sertraline 100 mg oral tablet

100 mg=1 tab, PO, Daily, 0 Refill(s)

Start Date: 17

Status: Ordered



Synthroid

50 microgram, 1 tab, Route: PO, Drug form: TAB, Q630AM, Dosing Weight 99.688, kg
, Start date: 17 6:30:00 CST, Duration: 30 day, Stop date: 01/10/18 6:30:0
0 CST

Start Date: 17

Stop Date: 17

Status: Canceled



tamsulosin 0.4 mg oral capsule

0.4 mg=1 cap, PO, Daily, 0 Refill(s)

Start Date: 17

Status: Ordered



torsemide

20 mg, 1 tab, Route: PO, Drug form: TAB, BID, Dosing Weight 99.688, kg, Start da
te: 17 17:00:00 CST, Duration: 30 day, Stop date: 01/10/18 9:00:00 CST

Notes: (Same As: Demadex)

Start Date: 17

Stop Date: 17

Status: Discontinued



torsemide

30 mg, Route: PO, Drug form: TAB, Daily, Dosing Weight 99.688, kg, Start date: 17 9:00:00 CST, Duration: 30 day, Stop date: 01/10/18 9:00:00 CST

Start Date: 17

Stop Date: 17

Status: Canceled



torsemide 20 mg oral tablet

20 mg=1 tab, PO, BID, 0 Refill(s)

Start Date: 17

Status: Ordered



trazodone 50 mg oral tablet

50 mg=1 tab, PO, PRN, 0 Refill(s)

Start Date: 17

Status: Ordered



Xarelto

20 mg, Route: PO, Drug form: TAB, QPM, Dosing Weight 99.688, kg, Start date:  17:00:00 CST, Duration: 30 day, Stop date: 18 17:00:00 CST

Start Date: 17

Stop Date: 17

Status: Deleted



Xarelto

20 mg, 1 tab, Route: PO, Drug form: TAB, QPM, Dosing Weight 99.688, kg, Start da
te: 17 17:00:00 CST, Duration: 30 day, Stop date: 18 17:00:00 CST

Notes: (Same as: Xarelto)Administer with food

Start Date: 17

Stop Date: 17

Status: Discontinued



Xarelto 20 mg oral tablet

20 mg=1 tab, PO, QPM, # 30 tab, 3 Refill(s)

Start Date: 17

Status: Ordered



Results





ELECTROLYTES





  



                     Most recent to      1                   2



                                         oldest [Reference  



                                         Range]:  

 

  



                     Sodium Lvl [135-145     140 mEq/L           136 mEq/L



                     mEq/L]              (17 6:57 AM)     (17 1:25 AM)

 

  



                     Potassium Lvl       4.1 mEq/L           4.3 mEq/L



                     [3.5-5.1 mEq/L]     (17 6:57 AM)     (17 1:25 AM)

 

  



                     Chloride Lvl [     102 mEq/L           99 mEq/L



                     mEq/L]              (17 6:57 AM)     (17 1:25 AM)

 

  



                     CO2 [24-32 mEq/L]     30 mEq/L            27 mEq/L



                           (17 6:57 AM)        (17 1:25 AM)

 

  



                     AGAP [10.0-20.0     12.1 mEq/L          14.3 mEq/L



                     mEq/L]              (17 6:57 AM)     (17 1:25 AM)







CHEM PANEL





  



                     Most recent to      1                   2



                                         oldest [Reference  



                                         Range]:  

 

  



                     Creatinine Lvl      0.82 mg/dL          1.07 mg/dL



                     [0.50-1.40 mg/dL]     (17 6:57 AM)     (17 1:25 AM)

 

  



                     eGFR                86 mL/min/1.73m2 1     67 mL/min/1.73m2 2



                           *NA*                      *NA*



                           (17 6:57 AM)        (17 1:25 AM)

 

  



                     BUN [7-22 mg/dL]     14 mg/dL            14 mg/dL



                           (17 6:57 AM)        (17 1:25 AM)

 

  



                           B/C Ratio [6-25]          13 



                                         (17 1:25 AM) 

 

  



                     Glucose Lvl [70-99     118 mg/dL           241 mg/dL



                     mg/dL]              *HI*                *HI*



                           (17 6:57 AM)        (17 1:25 AM)

 

  



                     Total Protein       7.1 g/dL            7.5 g/dL



                     [6.4-8.4 g/dL]      (17 6:57 AM)     (17 1:25 AM)

 

  



                     Albumin Lvl [3.5-5.0     3.3 g/dL            3.2 g/dL



                     g/dL]               *LOW*               *LOW*



                           (17 6:57 AM)        (17 1:25 AM)

 

  



                     Globulin [2.7-4.2     3.8 g/dL            4.3 g/dL



                     g/dL]               (17 6:57 AM)     *HI*



                                         (17 1:25 AM)

 

  



                     A/G Ratio [0.7-1.6]     0.9                 0.7



                           (17 6:57 AM)        (17 1:25 AM)

 

  



                     Calcium Lvl         8.1 mg/dL           8.2 mg/dL



                     [8.5-10.5 mg/dL]     *LOW*               *LOW*



                           (17 6:57 AM)        (17 1:25 AM)

 

  



                     ALT [0-65 unit/L]     29 unit/L           34 unit/L



                           (17 6:57 AM)        (17 1:25 AM)

 

  



                     AST [0-37 unit/L]     32 unit/L           43 unit/L



                           (17 6:57 AM)        *HI*



                                         (17 1:25 AM)

 

  



                     Alk Phos [     90 unit/L           88 unit/L



                     unit/L]             (17 6:57 AM)     (17 1:25 AM)

 

  



                     Bili Total [0.2-1.3     0.2 mg/dL           0.2 mg/dL



                     mg/dL]              (17 6:57 AM)     (17 1:25 AM)

 

  



                           Bili Direct [0.0-0.3      0.1 mg/dL 



                           mg/dL]                    (17 6:57 AM) 

 

  



                           Bili Indirect             0.1 mg/dL 



                           [0.0-1.0 mg/dL]           (17 6:57 AM) 

 

  



                           Lipase Lvl [        100 unit/L 



                           unit/L]                   (17 1:25 AM) 







1Result Comment: The eGFR is calculated using the CKD-EPI formula. In most 
young, healthy individuals the eGFR will be >90 mL/min/1.73m2. The eGFR declines
with age. An eGFR of 60-89 may be normal in some populations, particularly the 
elderly, for whom the CKD-EPI formula has not been extensively validated. Use of
the eGFR is not recommended in the following populations:



Individuals with unstable creatinine concentrations, including pregnant patients
and those with serious co-morbid conditions.



Patients with extremes in muscle mass or diet. 



The data above are obtained from the National Kid[employer] Disease Education Program (
NKDEP) which additionally recommends that when the eGFR is used in patients with
extremes of body mass index for purposes of drug dosing, the eGFR should be mul
tiplied by the estimated BMI.



2Result Comment: The eGFR is calculated using the CKD-EPI formula. In most 
young, healthy individuals the eGFR will be >90 mL/min/1.73m2. The eGFR declines
with age. An eGFR of 60-89 may be normal in some populations, particularly the 
elderly, for whom the CKD-EPI formula has not been extensively validated. Use of
the eGFR is not recommended in the following populations:



Individuals with unstable creatinine concentrations, including pregnant patients
and those with serious co-morbid conditions.



Patients with extremes in muscle mass or diet. 



The data above are obtained from the National Kid[employer] Disease Education Program (
NKDEP) which additionally recommends that when the eGFR is used in patients with
extremes of body mass index for purposes of drug dosing, the eGFR should be mul
tiplied by the estimated BMI.



CARDIAC ENZYMES





  



                     Most recent to      1                   2



                                         oldest [Reference  



                                         Range]:  

 

  



                     Total CK [     64 unit/L           95 unit/L



                     unit/L]             (17 6:57 AM)     (17 1:25 AM)

 

  



                           CK MB [0.5-3.6            2.9 ng/mL 



                           ng/mL]                    (17 1:25 AM) 

 

  



                           CK MB Index               3.1 



                           [0.0-2.5]                 *HI* 



                                         (17 1:25 AM) 

 

  



                     Troponin-I          <0.02 ng/mL         <0.02 ng/mL



                     [0.00-0.40 ng/mL]     (17 6:57 AM)     (17 1:25 AM)

 

  



                           BNP [<=100 pg/mL]         95 pg/mL 



                                         (17 1:25 AM) 







LIPIDS





  



                     Most recent to      1                   2



                                         oldest [Reference  



                                         Range]:  

 

  



                           CHD Risk [4.00-7.30]      3.55 



                                         *LOW* 



                                         (17 6:57 AM) 

 

  



                           Chol [<=199 mg/dL]        149 mg/dL 



                                         (17 6:57 AM) 

 

  



                           Trig [<=149 mg/dL]        262 mg/dL 



                                         *HI* 



                                         (17 6:57 AM) 

 

  



                           HDL [>=61 mg/dL]          42 mg/dL 



                                         *LOW* 



                                         (17 6:57 AM) 

 

  



                           LDL (Calculated)          55 mg/dL 



                           [<=99 mg/dL]              (17 6:57 AM) 

 

  



                           VLDL                      52 



                                         *NA* 



                                         (17 6:57 AM) 







HEMATOLOGY





  



                     Most recent to      1                   2



                                         oldest [Reference  



                                         Range]:  

 

  



                     WBC [3.7-10.4 K/CMM]     10.3 K/CMM          12.2 K/CMM



                           (17 6:57 AM)        *HI*



                                         (17 1:25 AM)

 

  



                     RBC [4.70-6.10      4.23 M/CMM          4.12 M/CMM



                     M/CMM]              *LOW*               *LOW*



                           (17 6:57 AM)        (17 1:25 AM)

 

  



                     Hgb [14.0-18.0 g/dL]     10.8 g/dL           10.6 g/dL



                           *LOW*                     *LOW*



                           (17 6:57 AM)        (17 1:25 AM)

 

  



                     Hct [42.0-54.0 %]     33.8 %              32.8 %



                           *LOW*                     *LOW*



                           (17 6:57 AM)        (17 1:25 AM)

 

  



                     MCV [80.0-94.0 fL]     79.8 fL             79.6 fL



                           *LOW*                     *LOW*



                           (17 6:57 AM)        (17 1:25 AM)

 

  



                     MCH [27.0-31.0 pg]     25.6 pg             25.6 pg



                           *LOW*                     *LOW*



                           (17 6:57 AM)        (17 1:25 AM)

 

  



                     MCHC [32.0-36.0     32.0 g/dL           32.2 g/dL



                     g/dL]               (17 6:57 AM)     (17 1:25 AM)

 

  



                     RDW [11.5-14.5 %]     17.4 %              17.2 %



                           *HI*                      *HI*



                           (17 6:57 AM)        (17 1:25 AM)

 

  



                     Platelet [133-450     200 K/CMM           195 K/CMM



                     K/CMM]              (17 6:57 AM)     (17 1:25 AM)

 

  



                     MPV [7.4-10.4 fL]     8.9 fL              8.8 fL



                           (17 6:57 AM)        (17 1:25 AM)

 

  



                     Segs [45.0-75.0 %]     63.0 %              66.1 %



                           (17 6:57 AM)        (17 1:25 AM)

 

  



                     Lymphocytes         24.6 %              20.5 %



                     [20.0-40.0 %]       (17 6:57 AM)     (17 1:25 AM)

 

  



                     Monocytes [2.0-12.0     8.3 %               9.6 %



                     %]                  (17 6:57 AM)     (17 1:25 AM)

 

  



                     Eosinophils [0.0-4.0     2.9 %               2.6 %



                     %]                  (17 6:57 AM)     (17 1:25 AM)

 

  



                     Basophils [0.0-1.0     1.2 %               1.2 %



                     %]                  *HI*                *HI*



                           (17 6:57 AM)        (17 1:25 AM)

 

  



                     Segs-Bands #        6.5 K/CMM           8.0 K/CMM



                     [1.5-8.1 K/CMM]     (17 6:57 AM)     (17 1:25 AM)

 

  



                     Lymphocytes #       2.5 K/CMM           2.5 K/CMM



                     [1.0-5.5 K/CMM]     (17 6:57 AM)     (17 1:25 AM)

 

  



                     Monocytes # [0.0-0.8     0.9 K/CMM           1.2 K/CMM



                     K/CMM]              *HI*                *HI*



                           (17 6:57 AM)        (17 1:25 AM)

 

  



                     Eosinophils #       0.3 K/CMM           0.3 K/CMM



                     [0.0-0.5 K/CMM]     (17 6:57 AM)     (17 1:25 AM)

 

  



                     Basophils # [0.0-0.2     0.1 K/CMM           0.1 K/CMM



                     K/CMM]              (17 6:57 AM)     (17 1:25 AM)

 

  



                     PT [12.0-14.7       15.6 seconds        16.8 seconds



                     seconds]            *HI*                *HI*



                           (17 6:57 AM)        (17 1:25 AM)

 

  



                     INR [0.85-1.17]     1.23                1.36



                           *HI*                      *HI*



                           (17 6:57 AM)        (17 1:25 AM)

 

  



                     PTT [22.9-35.8      30.4 seconds        26.6 seconds



                     seconds]            (17 6:57 AM)     (17 1:25 AM)







Immunizations





Given and Recorded





   



                 Vaccine         Date            Status          Refusal Reason

 

   



                     diphtheria/pertussis, acel/tetanus adult     17              Given 

 

   



                     pneumococcal 23-valent vaccine     3/31/15             Given 









Not Given





   



                 Vaccine         Date            Status          Refusal Reason

 

   



                 pneumococcal 23-valent vaccine     3/30/15         Not Given       Patient Refuses







Procedures







   



                 Procedure       Date            Related Diagnosis     Body Site

 

   



                                         CABG x 3 - Coronary artery bypass grafts x 31   

 

   



                                         Cardiac ablation using fluoroscopy guidance   

 

   



                                         Cardiac catheterization, left heart   

 

   



                                         Stent placement2   







1ablation stents



2x27



Social History







 



                           Social History Type       Response

 

 



                           Substance Abuse           Use: None.

 

 



                           Alcohol                   Past

 

 



                           Smoking Status            Former smoker; Exposure to Tobacco Smoke None; Cigarette Smoking

 Last 365



                                         Days No; Reg Smoking Cessation Counseling No; Other Tobacco Frequency quit



                                         30 years ago;







Assessment and Plan

Extracted from:





  



                     Title: Clinical Document     Author: Samuel Peterson MD     Date: 17









                                        Cardiology Note

Samuel Peterson MD, PA



THIS IS A COMBINED H&P AND DISCHARGE SUMMARY







SUBJECTIVE:



CC: chest pain



History of Present Illness

                                        76 y/o M with PMHx of DM, HTN, A-fib, Acute MI, Stented Coronary Artery, and Sleep

 apnea presents to the ED c/o central chest pain onset 4 hours ago. Patient 
states pain radiates to his left arm, pain has consistently gotten worse 
throughout the night, and movement is an exacerbating factor. Patient was given 
3 SL NTG, NTG paste, 324 mg NSA en route per EMS. Patient states NTG helped 
relieve symptoms. Patient also c/o SOB but denies n/v/d, bloody or black stools 
or any other symptoms at this time. Patient is wheelchair bound (has leg 
weakness) at baseline. Patient states he is on bloodthinners after having a 
cardiac ablation.  Patient was last hospitalized in 10/2017 and his last echo 
was last year at [facility]. Patient wears BiPAP  most nights for 
his sleep apnea. Patient's daughter lives with him. No other concerns or 
statements are voiced at this moment.  The onset was 4  hours ago and gradual.  
The course/duration of symptoms is resolved.  Location: Central chest. Radiating
pain: left arm. The character of symptoms is pressure, not ripping and not 
tearing.  The degree at onset was minimal.  The degree at maximum was moderate. 
The degree at present is minimal.  The exacerbating factor is exertion.  The 
relieving factor is rest.  Risk factors consist of coronary artery disease, 
hypertension, diabetes mellitus, obesity, hyperlipidemia, not pulmonary embolism
and not deep vein thrombosis.  Prior episodes: angina, coronary artery disease 
and pulmonary embolism negative.  Associated symptoms: shortness of breath, 
diaphoresis, denies nausea, denies vomiting, denies anxiety, denies 
palpitations, denies diarrhea and denies blood in stool.





Review of Systems

Constitutional symptoms:  No fever, no chills.

Skin symptoms:  No rash,

Eye symptoms:  Vision unchanged.

ENMT symptoms:  No ear pain, no sore throat.

Respiratory symptoms:  Shortness of breath, No cough,

Cardiovascular symptoms:  Chest pain.

Gastrointestinal symptoms:  No abdominal pain, no nausea, no vomiting, no 
diarrhea, no rectal bleeding, no change in stool color.

Genitourinary symptoms:  No dysuria, no hematuria.

Musculoskeletal symptoms:  No back pain,

Neurologic symptoms:  No headache,



Health Status

Allergies: 

Allergic Reactions (All)

Severity Not Documented

Latex- No reactions were documented.

Sulfa drugs- No reactions were documented.

Canceled/Inactive Reactions (All)

Severity Not Documented

NKDA- Sulfur,miscellaneous routes,compounding and sulfur drug, nos.. 



Vitals and Temp:



VitalsTmp(F)[id number]

                                         [id number]---

                                         03:22--------------99---

                                         03:1397.038804/[id number]---

                                         02:30----[id number]---

                                         00:36----[id number]---



                                        24 Hr Tmax: 98.6F (37.00c) at  08:14Vital Signs are the last 5 in the past 

48 hours.



Scheduled Meds (2):

                                        17 aspirin (aspirin 81 mg tablet, chewable) 81 mg PO Daily

                                        17 sodium chloride (Saline Flush 0.9%) 10 ml IVP Q12H



Continuous Infusions: None





Labs 

Most Recent Results 

Previous Results 

WBC 

                                        10.3  (DEC 11)

H 12.2  (DEC 11)

                                        -- 

                                        -- 

Hgb 

L 10.8  (DEC 11)

L 10.6  (DEC 11)

                                        -- 

                                        -- 

Hct 

L 33.8  (DEC 11)

L 32.8  (DEC 11)

                                        -- 

                                        -- 

Plt 

                                        200  (DEC 11)

                                        195  (DEC 11)

                                        -- 

                                        -- 

Na 

                                        140  (DEC 11)

                                        136  (DEC 11)

                                        -- 

                                        -- 

K 

                                        4.1  (DEC 11)

                                        4.3  (DEC 11)

                                        -- 

                                        -- 

CO2 

                                        30  (DEC 11)

                                        27  (DEC 11)

                                        -- 

                                        -- 

Cl 

                                        102  (DEC 11)

                                        99  (DEC 11)

                                        -- 

                                        -- 

Cr 

                                        0.82  (DEC 11)

                                        1.07  (DEC 11)

                                        -- 

                                        -- 

BUN 

                                        14  (DEC 11)

                                        14  (DEC 11)

                                        -- 

                                        -- 

Glucose Random 

H 118  (DEC 11)

H 241  (DEC 11)

                                        -- 

                                        -- 

Ca 

L 8.1  (DEC 11)

L 8.2  (DEC 11)

                                        -- 

                                        -- 

PT 

H 15.6  (DEC 11)

H 16.8  (DEC 11)

                                        -- 

                                        -- 

INR 

H 1.23  (DEC 11)

H 1.36  (DEC 11)

                                        -- 

                                        -- 

PTT 

                                        30.4  (DEC 11)

                                        26.6  (DEC 11)

                                        -- 

                                        -- 

Troponin 

<0.02  (DEC 11)

<0.02  (DEC 11)

                                        -- 

                                        -- 

CK MB 

                                        2.9  (DEC 11)

                                        -- 

Total CK 

                                        64  (DEC 11)

                                        95  (DEC 11)

RTF_CENTER, -- 

RTF_CENTER, -- 

                                        -------------------------------------------------------------------------------------------------------------



EXAM:



Good BP control; NSR; afebrile

Head: normocephalic and atraumatic

Neck: no carotid bruit; no JVD

CV: Regular; -S3; no significant murmurs

Lungs: Clear; no wheezing; good air entry

Abd: soft and no organomegaly; + bowel sounds

Ext: no CCE; good pulses distally

Neuro: nonfocal; oriented *3

Psych: appropriate



                                        ----------------------------------------------------------------------------------------------------------------------------------------------------



ASSESSMENT:



Extensive h/o CAD s/p multiple PCI procedures (says 27 stents)

Chronic [place] on OAC; [place] [facility] [place]



PLAN:



Will proceed with nuc stress and dc home if negative



No new meds

Cardiac diet

Activity as tolerated

F/U in 1 week



                                        ----------------------------------------------------------------------------------------------------------------------------------------------------











 



                           Addendum                  The nuclear stress test findings were discussed with the patient.  It

 was explained to him



                           by Nicholas,                that the stress test was abnormal but that the area of ischemia was

 not large.  Management



                           Samuel                     options were discussed and we decided on medical management at this time.

  The patient



                           Radha PARKS                 will be discharged at this time.



                                         on 



                                         2017 



                                         17:22

## 2019-07-18 NOTE — XMS REPORT
Summary of Care: 1/14/15 - 1/16/15

                             Created on: 2054



MINORRILEYDESIREE

External Reference #: [id number]

: 1940

Sex: Male



Demographics







                          [address]                   [address] [place]  [address]

 

                          Preferred Language        English

 

                          Marital Status            

 

                          Holiness Affiliation     Unknown

 

                          Race                      White/

 

                          Ethnic Group              Non-





Author







                          Organization              Unknown

 

                          Address                   Unknown

 

                          Phone                     Unavailable







Encounter





HQ Slava(DAVID) [id number] Date(s): 1/14/15 - 1/16/15

[facility] [address] [address]

Discharge Disposition: Home

Physician Attending: Fawad Frausto MD

Physician Admitting: Fawad Frausto MD





Reason for Visit





CHEST PAIN, NSTEMI



Vital Signs







                    1                   2                   3



                                         Most recent to   



                                         oldest [Reference   



                                         Range]:   

 

                                        175.26 cm 

                          (1/15/15 3:13 AM)         175.26 cm 

                          (1/14/15 10:16 PM)         



                                         Height   

 

                                        85.455 kg 

                          (1/16/15 5:59 AM)         103.182 kg 

                          (1/15/15 4:24 AM)          



                                         Current Weight   

 

                                        98.6 DegF 

                          (1/16/15 12:00 PM)        97.8 DegF 

                          (1/16/15 8:00 AM)         98.3 DegF 

                                        (1/16/15 4:00 AM)



                                         Temperature Oral   



                                         [96.4-99.1 DegF]   

 

                                        112 mmHg 

                          (1/16/15 12:00 PM)        109 mmHg 

                          (1/16/15 8:00 AM)         100 mmHg 

                                        (1/16/15 4:00 AM)



                                         Systolic Blood   



                                         Pressure [   



                                         mmHg]   

 

                                        66 mmHg 

                          (1/16/15 12:00 PM)        67 mmHg 

                          (1/16/15 8:00 AM)         86 mmHg 

                                        (1/16/15 4:00 AM)



                                         Diastolic Blood   



                                         Pressure [60-90   



                                         mmHg]   

 

                                        16 BRMIN 

                          (1/16/15 12:00 PM)        16 BRMIN 

                          (1/16/15 8:00 AM)         19 BRMIN 

                                        (1/16/15 7:40 AM)



                                         Respiratory Rate   



                                         [14-20 BRMIN]   

 

                                        80 bpm 

                          (1/16/15 12:00 PM)        87 bpm 

                          (1/16/15 8:00 AM)         82 bpm 

                                        (1/16/15 4:00 AM)



                                         Peripheral Pulse   



                                         Rate [ bpm]   

 

                                        103.182 kg 

                          (1/15/15 3:13 AM)         103.182 kg 

                          (1/14/15 10:16 PM)         



                                         Weight   

 

                                        33.59 m2 

                          (1/15/15 3:13 AM)         33.59 m2 

                          (1/14/15 10:16 PM)         



                                         Body Mass Index   







Problem List







    



              Condition     Effective Dates     Status       Health Status     Informant

 

    



                           Acute MI(Confirmed)1      Resolved  

 

    



                           Diabetes(Confirmed)       Resolved  

 

    



                           Histoplasmosis(Confi      Resolved  



                                         rmed)    

 

    



                           Hypercholesteremia(C      Resolved  



                                         onfirmed)    

 

    



                           Hypertension(Confirm      Resolved  



                                         ed)    

 

    



                           Poliomyelitides(Conf      Resolved  



                                         irmed)    

 

    



                           Sleep                     Resolved  



                                         apnea(Confirmed)    

 

    



                           Stented coronary          Resolved  



                                         artery(Confirmed)2    

 

    



                           Whooping                  Resolved  



                                         cough(Confirmed)    







1x 3



225 cardiac stents



Allergies, Adverse Reactions, Alerts







   



                 Substance       Reaction        Severity        Status

 

   



                           NKDA                      Active







Medications





acetaminophen

650 mg, 2 tab, Route: PO, Drug form: TAB, Q4H, Dosing Weight 103.182, kg, PRN He
adache 1-5, Start date: 01/15/15 2:27:00, Duration: 30 day, Stop date: 02/14/15 
2:26:00

Notes: Do not exceed 4 gm/day.  (Same as: Tylenol)

Start Date: 1/15/15

Stop Date: 1/16/15

Status: Discontinued



acetaminophen-hydrocodone 325 mg-5 mg oral tablet

2 tab, Route: PO, Drug Form: TAB, Dosing Weight 103.182, kg, Q6H, PRN Pain Score
4-6, Start date: 01/15/15 8:36:00, Stop date: 02/14/15 8:35:00

Notes: (Same as: Norco 325/5)  Do not exceed 4gm/day of acetaminophen.

Start Date: 1/15/15

Stop Date: 1/16/15

Status: Discontinued



aspirin

324 mg, Route: PO, ONCE, Dosing Weight 103.182, kg, Priority: STAT, Start date: 
01/14/15 23:29:00, Stop date: 01/14/15 23:29:00

Start Date: 1/14/15

Stop Date: 1/15/15

Status: Completed



aspirin 81 mg tablet, enteric coated

81 mg, 1 tab, Route: PO, Drug form: ECTAB, Daily, Dosing Weight 103.182, kg, Sta
rt date: 01/15/15 9:30:00, Duration: 30 day, Stop date: 02/14/15 9:00:00

Notes: Do not crush or chew.(Same As: Ecotrin)

Start Date: 1/15/15

Stop Date: 1/16/15

Status: Discontinued



atropine

0.5 mg, 5 mL, Route: IVP, Drug form: INJ, PRN, PRN Bradycardia, Start date: 01/1
5/15 2:49:00, Duration: 30 day, Stop date: 02/14/15 2:48:00

Start Date: 1/15/15

Stop Date: 1/16/15

Status: Discontinued



Ceftin 500 mg oral tablet

500 mg, 1 tab, Route: PO, Drug form: TAB, [id number], Dosing Weight 103.182, kg, St
art date: 01/15/15 21:00:00, Duration: 30 day, Stop date: 02/14/15 9:00:00

Notes: With food. (Same As: Ceftin)

Start Date: 1/15/15

Stop Date: 1/16/15

Status: Discontinued



clopidogrel

75 mg, 1 tab, Route: PO, Drug form: TAB, Daily, Dosing Weight 103.182, kg, Start
date: 01/15/15 9:30:00, Duration: 30 day, Stop date: 02/14/15 9:00:00

Notes: (Same As: Plavix)

Start Date: 1/15/15

Stop Date: 1/16/15

Status: Discontinued



DuoNeb inhalation solution

3 mL, Route: INHALATION, Drug Form: SOLN, Dosing Weight 103.182, kg, PRN, PRN Re
spiratory Protocol, Start date: 01/15/15 8:50:00, Duration: 30 day, Stop date: 0
2/14/15 8:49:00

Notes: (Same as: Duoneb)

Start Date: 1/15/15

Stop Date: 1/16/15

Status: Discontinued



Flonase 0.05 mg/inh nasal spray

2 spray, Route: Each Affected Nostril, Drug Form: SPRY, Dosing Weight 103.182, k
g, Daily, Start date: 01/16/15 9:00:00, Duration: 30 day, Stop date: 02/14/15 9:
00:00

Notes: (Same as: Flonase)

Start Date: 1/16/15

Stop Date: 1/16/15

Status: Discontinued



Heparin - one time bolus for ACS

4,000 unit, 4 mL, Route: IV, Drug form: INJ, ONCE, Dosing Weight 103.182, kg, Pr
iority: STAT, Start date: 01/15/15 2:27:00, Stop date: 01/15/15 2:27:00

Start Date: 1/15/15

Stop Date: 1/15/15

Status: Completed



Heparin 30 unit/kg Bolus (Heparin Dosing Weight)

Route: IVP, PRN, 2,500 unit, 2.5 mL, Drug form: INJ, PRN, Heparin Protocol, Star
t date: 01/15/15 2:27:00 Stop date: 02/14/15 2:26:00, 30 day

Start Date: 1/15/15

Stop Date: 1/15/15

Status: Discontinued



Heparin 60 unit/kg Bolus (Heparin Dosing Weight)

Route: IVP, PRN, 5,000 unit, 5 mL, Drug form: INJ, PRN, Heparin Protocol, Start 
date: 01/15/15 2:27:00 Stop date: 02/14/15 2:26:00, 30 day

Start Date: 1/15/15

Stop Date: 1/15/15

Status: Discontinued



heparin additive 25,000 unit [12 unit/kg/hr] + Premix Diluent Dextrose 5% 500 mL

500 mL, Rate: 20.09 ml/hr, Infuse over: 24.9 hr, Route: IV, Dosing Weight 83.69 
kg, Total Volume: 500 mL, Start date: 01/15/15 2:27:00, Duration: 30 day, Stop d
ate: 02/14/15 2:26:00

Start Date: 1/15/15

Stop Date: 1/15/15

Status: Discontinued



isosorbide mononitrate

30 mg, 1 tab, Route: PO, Drug form: ERTAB, QAM, Dosing Weight 103.182, kg, Start
date: 01/15/15 9:00:00, Duration: 30 day, Stop date: 02/13/15 9:00:00

Notes: (Same as:Imdur)"Do Not Crush"  Take on empty stomach/ full glass of water
.  Do not crush

Start Date: 1/15/15

Stop Date: 1/16/15

Status: Discontinued



isosorbide mononitrate

0 Refill(s)

Start Date: 1/15/15

Status: Ordered



Lantus

70 unit, Route: SUB-Q, Daily, Dosing Weight 103.182, kg, Start date: 01/15/15 9:
00:00, Duration: 30 day, Stop date: 02/13/15 9:00:00

Start Date: 1/15/15

Stop Date: 1/15/15

Status: Deleted



Lantus

70 unit, SUB-Q, Daily, 0 Refill(s)

Start Date: 1/15/15

Status: Ordered



Lasix

40 mg, 4 mL, Route: IVP, Drug form: INJ, Daily, Dosing Weight 103.182, kg, Start
date: 01/16/15 9:00:00, Duration: 30 day, Stop date: 02/14/15 9:00:00

Notes: (Same as: Lasix)

Start Date: 1/16/15

Stop Date: 1/16/15

Status: Discontinued



Levemir FlexPen

70 unit, 0.7 mL, Route: SUB-Q, Drug form: INJ, Daily, Start date: 01/15/15 9:00:
00, Duration: 30 day, Stop date: 02/13/15 9:00:00

Notes: Same as LevemirDo not hold insulin without contacting prescriber  "single
patient use only"

Start Date: 1/15/15

Stop Date: 1/16/15

Status: Discontinued



lisinopril

2.5 mg, 0.5 tab, Route: PO, Drug form: TAB, Daily, Dosing Weight 103.182, kg, St
art date: 01/15/15 9:00:00, Duration: 30 day, Stop date: 02/13/15 9:00:00

Notes: (Same as: Prinivil, Zestril)

Start Date: 1/15/15

Stop Date: 1/16/15

Status: Discontinued



lisinopril 2.5 mg oral tablet

2.5 mg=1 tab, PO, Daily, # 30 tab, 0 Refill(s)

Start Date: 1/15/15

Status: Ordered



metFORMIN 1000 mg oral tablet

1,000 mg, 2 tab, Route: PO, Drug form: TAB, BID, Dosing Weight 103.182, kg, Star
t date: 01/15/15 9:00:00, Duration: 30 day, Stop date: 02/13/15 17:00:00

Notes: (Same as: Glucophage)  Take with meal

Start Date: 1/15/15

Stop Date: 1/16/15

Status: Discontinued



metFORMIN 1000 mg oral tablet

1,000 mg=1 tab, PO, BID, # 30 tab, 0 Refill(s)

Start Date: 1/15/15

Status: Ordered



morphine Sulfate

2 mg, 1 mL, Route: IVP, Drug form: INJ, Q15Min, Dosing Weight 103.182, kg, PRN C
hest Pain, Start date: 01/15/15 2:27:00, Duration: 2 doses or times, Stop date: 
Limited # of times

Notes: (Same as:MORPhine Sulfate)

Start Date: 1/15/15

Stop Date: 1/16/15

Status: Discontinued



morphine Sulfate

2 mg, Route: IVP, ONCE, Dosing Weight 103.182, kg, Priority: STAT, Start date: 0
1/14/15 23:29:00, Stop date: 01/14/15 23:29:00

Start Date: 1/14/15

Stop Date: 1/15/15

Status: Completed



Multaq

400 mg, 1 tab, Route: PO, Drug form: TAB, BID, Dosing Weight 103.182, kg, Start 
date: 01/15/15 9:00:00, Duration: 30 day, Stop date: 02/13/15 17:00:00

Notes: Same as: Multaq

Start Date: 1/15/15

Stop Date: 1/16/15

Status: Discontinued



Multaq 400 mg oral tablet

400 mg=1 tab, PO, BID, # 60 tab, 0 Refill(s)

Start Date: 1/15/15

Status: Ordered



niacin

0 Refill(s)

Start Date: 1/15/15

Stop Date: 1/16/15

Status: Discontinued



niacin

1,000 mg, Route: PO, Bedtime, Dosing Weight 103.182, kg, Start date: 01/15/15 21
:00:00, Duration: 30 day, Stop date: 02/13/15 21:00:00

Start Date: 1/15/15

Stop Date: 1/15/15

Status: Canceled



niacin

2,000 mg, 4 tab, Route: PO, Drug form: TAB, Bedtime, Dosing Weight 103.182, kg, 
Start date: 01/15/15 21:00:00, Duration: 30 day, Stop date: 02/13/15 21:00:00

Notes: With food.

Start Date: 1/15/15

Stop Date: 1/16/15

Status: Discontinued



nitroglycerin

0.4 mg, 1 tab, Route: SL, Drug form: TAB, Q5Min, Dosing Weight 103.182, kg, PRN 
Chest Pain, Start date: 01/14/15 23:29:00, Duration: 3 doses or times, Stop date
: Limited # of times

Notes: (Same as:Nitroquick, Nitrostat)"Do Not Crush"  Sublingual tablet

Start Date: 1/14/15

Stop Date: 1/15/15

Status: Discontinued



nitroglycerin SL Tab

0.4 mg, 1 tab, Route: SL, Drug form: TAB, Q5Min, Dosing Weight 103.182, kg, PRN 
Chest Pain, Start date: 01/15/15 2:27:00, Duration: 3 doses or times, Stop date:
Limited # of times

Notes: (Same as:Nitroquick, Nitrostat)"Do Not Crush"  Sublingual tablet

Start Date: 1/15/15

Stop Date: 1/16/15

Status: Discontinued



NovoLOG

10 unit, TID-Before Meals, pt has a sliding scale, 0 Refill(s)

Special Instructions: pt has a sliding scale

Start Date: 1/15/15

Status: Ordered



NovoLOG

10 unit, 0.1 mL, Route: SUB-Q, Drug form: SOLN, TID-Before Meals, Dosing Weight 
103.182, kg, Start date: 01/15/15 11:30:00, Duration: 30 day, Stop date: 
5 7:30:00

Notes: Roll in palms of hands gently;  Do not shake vigorously. (Same as: NovoLO
G)"single patient use only"  Stable for 28 days at room temperature.Expires in _
____ days from ______________Date

Start Date: 1/15/15

Stop Date: 1/16/15

Status: Discontinued



ondansetron

4 mg, 1 tab, Route: PO, Drug form: TAB, Q8H, Dosing Weight 103.182, kg, PRN Naus
ea & Vomiting, Start date: 01/15/15 2:27:00, Duration: 30 day, Stop date: 
02/14/15 2:26:00

Notes: (Same as: Zofran)

Start Date: 1/15/15

Stop Date: 1/16/15

Status: Discontinued



potassium chloride 20 mEq oral tablet, extended release

20 mEq, 1 tab, Route: PO, Drug form: ERTAB, BID, Dosing Weight 103.182, kg, Star
t date: 01/15/15 9:00:00, Duration: 30 day, Stop date: 02/13/15 17:00:00

Notes: (Same as: K-Dur 20)"Do Not Crush"  With food and full glass of water

Start Date: 1/15/15

Stop Date: 1/16/15

Status: Discontinued



potassium chloride 20 mEq oral tablet, extended release

20 mEq=1 tab, PO, BID, # 10 tab, 0 Refill(s)

Start Date: 1/15/15

Status: Ordered



Protonix

40 mg, 1 tab, Route: PO, Drug form: ECTAB, Daily, Dosing Weight 103.182, kg, Sta
rt date: 01/15/15 9:00:00, Duration: 30 day, Stop date: 02/13/15 9:00:00

Notes: Tablet should not be chewed or crushed.(Same as: Protonix)

Start Date: 1/15/15

Stop Date: 1/16/15

Status: Discontinued



Protonix 40 mg oral enteric coated tablet

40 mg=1 tab, PO, Daily, # 30 tab, 0 Refill(s)

Start Date: 1/15/15

Status: Ordered



Robitussin

400 mg, 20 mL, Route: PO, Drug Form: SYRP, Dosing Weight 103.182, kg, Q6H, PRN C
ough, Start date: 01/15/15 19:09:00, Duration: 30 day, Stop date: 02/14/15 19:08
:00

Notes: (Same as: Robitussin)

Start Date: 1/15/15

Stop Date: 1/16/15

Status: Discontinued



Saline Flush 0.9%

10 ml, Route: IVP, Drug Form: INJ, Dosing Weight 103.182, kg, Q12H, Start date: 
01/15/15 9:00:00, Duration: 30 day, Stop date: 02/13/15 21:00:00

Notes: (Same as: BD Posiflush)

Start Date: 1/15/15

Stop Date: 1/16/15

Status: Discontinued



Saline Flush 0.9%

10 ml, Route: IVP, Drug Form: INJ, Dosing Weight 103.182, kg, PRN, PRN Line Flus
h, Start date: 01/15/15 2:27:00, Duration: 30 day, Stop date: 02/14/15 2:26:00

Notes: (Same as: BD Posiflush)

Start Date: 1/15/15

Stop Date: 1/16/15

Status: Discontinued



Saline Flush 0.9%

10 mL, Route: IVP, Drug Form: INJ, Dosing Weight 103.182, kg, PRN, PRN Line Flus
h, Start date: 01/14/15 23:29:00, Duration: 30 day, Stop date: 02/13/15 23:28:00

Notes: (Same as: BD Posiflush)

Start Date: 1/14/15

Stop Date: 1/15/15

Status: Discontinued



Synthroid

50 microgram, 1 tab, Route: PO, Drug form: TAB, Q630AM, Dosing Weight 103.182, k
g, Start date: 01/15/15 9:00:00, Duration: 30 day, Stop date: 02/14/15 6:30:00

Notes: Take 1 hour before or 2 hours after meal; Enteral feeds may interefere wi
th the absorption of this medication.(Same as:Levothroid, Synthroid)

Start Date: 1/15/15

Stop Date: 1/16/15

Status: Discontinued



Synthroid 50 mcg (0.05 mg) oral tablet

50 microgram=1 tab, PO, Daily, # 30 tab, 0 Refill(s)

Start Date: 1/15/15

Status: Ordered



torsemide

10 mg, 1 tab, Route: PO, Drug form: TAB, Daily, Dosing Weight 103.182, kg, Start
date: 01/15/15 9:00:00, Duration: 30 day, Stop date: 02/13/15 9:00:00

Notes: (Same As: Demadex)

Start Date: 1/15/15

Stop Date: 1/15/15

Status: Discontinued



torsemide 10 mg oral tablet

10 mg=1 tab, PO, Daily, # 30 tab, 0 Refill(s)

Start Date: 1/15/15

Status: Ordered



Vicodin 5 mg-300 mg oral tablet

0 Refill(s)

Start Date: 1/15/15

Status: Ordered



Xanax 0.5 mg oral tablet

0.5 mg=1 tab, PO, BID, anxiety, stress, # 20 tab, 0 Refill(s)

Start Date: 1/15/15

Status: Ordered



Xanax 0.5 mg oral tablet

0.5 mg, 1 tab, Route: PO, Drug form: TAB, BID, Dosing Weight 103.182, kg, PRN as
needed for anxiety, Start date: 01/15/15 8:36:00, Stop date: 02/14/15 8:35:00

Notes: With food or milk(Same as: Xanax)

Start Date: 1/15/15

Stop Date: 1/16/15

Status: Discontinued



Zoloft

100 mg, 2 tab, Route: PO, Drug form: TAB, Daily, Dosing Weight 103.182, kg, Star
t date: 01/15/15 9:00:00, Duration: 30 day, Stop date: 02/13/15 9:00:00

Notes: (Same as:  Zoloft)

Start Date: 1/15/15

Stop Date: 1/16/15

Status: Discontinued



Zoloft 100 mg oral tablet

100 mg=1 tab, PO, Daily, # 30 tab, 0 Refill(s)

Start Date: 1/15/15

Status: Ordered



Results





ELECTROLYTES





                    1                   2                   3



                                         Most recent to   



                                         oldest [Reference   



                                         Range]:   

 

                                        137 mEq/L 

                          (1/16/15 5:10 AM)         134 mEq/L 

*LOW*

                          (1/15/15 12:04 AM)         



                                         Sodium Lvl [135-145   



                                         mEq/L]   

 

                                        3.9 mEq/L 

                          (1/16/15 5:10 AM)         3.4 mEq/L 

*LOW*

                          (1/15/15 12:04 AM)         



                                         Potassium Lvl   



                                         [3.5-5.1 mEq/L]   

 

                                        101 mEq/L 

                          (1/16/15 5:10 AM)         99 mEq/L 

                          (1/15/15 12:04 AM)         



                                         Chloride Lvl [   



                                         mEq/L]   

 

                                        26 mEq/L 

                          (1/16/15 5:10 AM)         26 mEq/L 

                          (1/15/15 12:04 AM)         



                                         CO2 [24-32 mEq/L]   

 

                                        13.9 mEq/L 

                          (1/16/15 5:10 AM)         12.4 mEq/L 

                          (1/15/15 12:04 AM)         



                                         AGAP [10.0-20.0   



                                         mEq/L]   







CHEM PANEL





                    1                   2                   3



                                         Most recent to   



                                         oldest [Reference   



                                         Range]:   

 

                                        0.8 mg/dL 

                          (1/16/15 5:10 AM)         1.0 mg/dL 

                          (1/15/15 12:04 AM)         



                                         Creatinine Lvl   



                                         [0.5-1.4 mg/dL]   

 

                                        89 mL/min/1.73m2 1

*NA*

                          (1/16/15 5:10 AM)         74 mL/min/1.73m2 2

*NA*

                          (1/15/15 12:04 AM)         



                                         eGFR   

 

                                        10 mg/dL 

                          (1/16/15 5:10 AM)         10 mg/dL 

                          (1/15/15 12:04 AM)         



                                         BUN [7-22 mg/dL]   

 

                                        10 

                    (1/15/15 12:04 AM)                          



                                         B/C Ratio [6-25]   

 

                                        127 mg/dL 3

*HI*

                          (1/16/15 5:10 AM)         233 mg/dL 4

*HI*

                          (1/15/15 12:04 AM)         



                                         Glucose Lvl [70-99   



                                         mg/dL]   

 

                                        7.3 g/dL 

                    (1/15/15 12:04 AM)                          



                                         Total Protein   



                                         [6.4-8.4 g/dL]   

 

                                        3.4 g/dL 

*LOW*

                    (1/15/15 12:04 AM)                          



                                         Albumin Lvl [3.5-5.0   



                                         g/dL]   

 

                                        3.9 g/dL 

                    (1/15/15 12:04 AM)                          



                                         Globulin [2.0-4.0   



                                         g/dL]   

 

                                        0.9 

                    (1/15/15 12:04 AM)                          



                                         A/G Ratio [0.7-1.6]   

 

                                        8.5 mg/dL 

                          (1/16/15 5:10 AM)         8.6 mg/dL 

                          (1/15/15 12:04 AM)         



                                         Calcium Lvl   



                                         [8.5-10.5 mg/dL]   

 

                                        38 unit/L 

                    (1/15/15 12:04 AM)                          



                                         ALT [0-65 unit/L]   

 

                                        39 unit/L 

*HI*

                    (1/15/15 12:04 AM)                          



                                         AST [0-37 unit/L]   

 

                                        118 unit/L 

                    (1/15/15 12:04 AM)                          



                                         Alk Phos [   



                                         unit/L]   

 

                                        0.7 mg/dL 

                    (1/15/15 12:04 AM)                          



                                         Bili Total [0.2-1.3   



                                         mg/dL]   







1Result Comment: The eGFR is calculated using the CKD-EPI formula. In most 
young, healthy individuals the eGFR will be >90 mL/min/1.73m2. The eGFR declines
with age. An eGFR of 60-89 may be normal in some populations, particularly the 
elderly, for whom the CKD-EPI formula has not been extensively validated. Use of
the eGFR is not recommended in the following populations:



Individuals with unstable creatinine concentrations, including pregnant patients
and those with serious co-morbid conditions.



Patients with extremes in muscle mass or diet. 



The data above are obtained from the National Kid[employer] Disease Education Program (
NKDEP) which additionally recommends that when the eGFR is used in patients with
extremes of body mass index for purposes of drug dosing, the eGFR should be mul
tiplied by the estimated BMI.



2Result Comment: The eGFR is calculated using the CKD-EPI formula. In most 
young, healthy individuals the eGFR will be >90 mL/min/1.73m2. The eGFR declines
with age. An eGFR of 60-89 may be normal in some populations, particularly the 
elderly, for whom the CKD-EPI formula has not been extensively validated. Use of
the eGFR is not recommended in the following populations:



Individuals with unstable creatinine concentrations, including pregnant patients
and those with serious co-morbid conditions.



Patients with extremes in muscle mass or diet. 



The data above are obtained from the [employer] Kid[employer] Education Program (
NKDEP) which additionally recommends that when the eGFR is used in patients with
extremes of body mass index for purposes of drug dosing, the eGFR should be mul
tiplied by the estimated BMI.



3Interpretive Data: Adult reference range values reflect the clinical guidelines

of the [employer].



4Interpretive Data: Adult reference range values reflect the clinical guidelines

of the [employer].



CARDIAC ENZYMES





                    1                   2                   3



                                         Most recent to   



                                         oldest [Reference   



                                         Range]:   

 

                                        89 unit/L 

                          (1/15/15 1:15 PM)         98 unit/L 

                          (1/15/15 5:07 AM)         121 unit/L 

                                        (1/15/15 12:04 AM) 



                                         Total CK [   



                                         unit/L]   

 

                                        2.1 ng/mL 

                          (1/15/15 1:15 PM)         1.9 ng/mL 

                          (1/15/15 5:07 AM)         2.3 ng/mL 

                                        (1/15/15 12:04 AM) 



                                         CK MB [0.5-3.6   



                                         ng/mL]   

 

                                        2.4 

                          (1/15/15 1:15 PM)         1.9 

                          (1/15/15 5:07 AM)         1.9 

                                        (1/15/15 12:04 AM) 



                                         CK MB Index   



                                         [0.0-2.5]   

 

                                        0.07 ng/mL 

                          (1/15/15 1:15 PM)         0.11 ng/mL 

                          (1/15/15 5:07 AM)         0.12 ng/mL 

                                        (1/15/15 12:04 AM) 



                                         Troponin-I   



                                         [0.00-0.40 ng/mL]   

 

                                        299 pg/mL 5

*HI*

                    (1/15/15 12:04 AM)                          



                                         BNP [<=100 pg/mL]   







5Interpretive Data: Elevated results are in line with increasing severity of

congestive heart failure. Minor elevations between 100 and 300

may be seen with Myocardial Ischemia, Sodium retaining drugs,

and compensated/treated heart failure.



LIPIDS





                    1                   2                   3



                                         Most recent to   



                                         oldest [Reference   



                                         Range]:   

 

                                        2.97 

*LOW*

                    (1/15/15 5:07 AM)                          



                                         CHD Risk [4.00-7.30]   

 

                                        92 mg/dL 

                    (1/15/15 5:07 AM)                          



                                         Chol [<=199 mg/dL]   

 

                                        135 mg/dL 

                    (1/15/15 5:07 AM)                          



                                         Trig [<=149 mg/dL]   

 

                                        31 mg/dL 

*LOW*

                    (1/15/15 5:07 AM)                          



                                         HDL [>=61 mg/dL]   

 

                                        34 mg/dL 

                    (1/15/15 5:07 AM)                          



                                         LDL (Calculated)   



                                         [<=99 mg/dL]   

 

                                        27 

*NA*

                    (1/15/15 5:07 AM)                          



                                         VLDL   







URINE AND STOOL





                    1                   2                   3



                                         Most recent to   



                                         oldest [Reference   



                                         Range]:   

 

                                        Clear 

                    (1/15/15 12:16 AM)                          



                                         UA Turbidity [Clear]   

 

                                        Yellow 

*NA*

                    (1/15/15 12:16 AM)                          



                                         UA Color [Yellow]   

 

                                        5.0 

                    (1/15/15 12:16 AM)                          



                                         UA pH [5.0-8.0]   

 

                                        1.012 

                    (1/15/15 12:16 AM)                          



                                         UA Spec Grav   



                                         [<=1.030]   

 

                                        Negative mg/dL 

*NA*

                    (1/15/15 12:16 AM)                          



                                         UA Glucose [Negative   



                                         mg/dL]   

 

                                        Negative 

                    (1/15/15 12:16 AM)                          



                                         UA Blood [Negative]   

 

                                        Negative mg/dL 

*NA*

                    (1/15/15 12:16 AM)                          



                                         UA Ketones [Negative   



                                         mg/dL]   

 

                                        Negative mg/dL 

                    (1/15/15 12:16 AM)                          



                                         UA Protein [Negative   



                                         mg/dL]   

 

                                        <=1.0 mg/dL 

*NA*

                    (1/15/15 12:16 AM)                          



                                         UA Urobilinogen   



                                         [0.1-1.0 mg/dL]   

 

                                        Negative 

*NA*

                    (1/15/15 12:16 AM)                          



                                         UA Bili [Negative]   

 

                                        Negative 

                    (1/15/15 12:16 AM)                          



                                         UA Leuk Est   



                                         [Negative]   

 

                                        Negative 

                    (1/15/15 12:16 AM)                          



                                         UA Nitrite   



                                         [Negative]   

 

                                        <1 /HPF 

                    (1/15/15 12:16 AM)                          



                                         UA WBC [0-5 /HPF]   

 

                                        2 /HPF 

                    (1/15/15 12:16 AM)                          



                                         UA RBC [0-2 /HPF]   

 

                                        Occasional /HPF 

*NA*

                    (1/15/15 12:16 AM)                          



                                         UA Bacteria [None   



                                         Seen /HPF]   

 

                                        None Seen 

*NA*

                    (1/15/15 12:16 AM)                          



                                         UA Sq Epi   







HEMATOLOGY





                    1                   2                   3



                                         Most recent to   



                                         oldest [Reference   



                                         Range]:   

 

                                        11.5 K/CMM 

*HI*

                    (1/15/15 12:04 AM)                          



                                         WBC [3.7-10.4 K/CMM]   

 

                                        3.93 M/CMM 

*LOW*

                    (1/15/15 12:04 AM)                          



                                         RBC [4.70-6.10   



                                         M/CMM]   

 

                                        9.8 g/dL 

*LOW*

                    (1/15/15 12:04 AM)                          



                                         Hgb [14.0-18.0 g/dL]   

 

                                        31.4 % 

*LOW*

                    (1/15/15 12:04 AM)                          



                                         Hct [42.0-54.0 %]   

 

                                        80.0 fL 

                    (1/15/15 12:04 AM)                          



                                         MCV [80.0-94.0 fL]   

 

                                        25.0 pg 

*LOW*

                    (1/15/15 12:04 AM)                          



                                         MCH [27.0-31.0 pg]   

 

                                        31.3 g/dL 

*LOW*

                    (1/15/15 12:04 AM)                          



                                         MCHC [32.0-36.0   



                                         g/dL]   

 

                                        16.2 % 

*HI*

                    (1/15/15 12:04 AM)                          



                                         RDW [11.5-14.5 %]   

 

                                        172 K/CMM 

                    (1/15/15 12:04 AM)                          



                                         Platelet [133-450   



                                         K/CMM]   

 

                                        9.3 fL 

                    (1/15/15 12:04 AM)                          



                                         MPV [7.4-10.4 fL]   

 

                                        66.7 % 

                    (1/15/15 12:04 AM)                          



                                         Segs [45.0-75.0 %]   

 

                                        22.3 % 

                    (1/15/15 12:04 AM)                          



                                         Lymphocytes   



                                         [20.0-40.0 %]   

 

                                        8.8 % 

                    (1/15/15 12:04 AM)                          



                                         Monocytes [2.0-12.0   



                                         %]   

 

                                        1.8 % 

                    (1/15/15 12:04 AM)                          



                                         Eosinophils [0.0-4.0   



                                         %]   

 

                                        0.4 % 

                    (1/15/15 12:04 AM)                          



                                         Basophils [0.0-1.0   



                                         %]   

 

                                        7.7 K/CMM 

                    (1/15/15 12:04 AM)                          



                                         Segs-Bands #   



                                         [1.5-8.1 K/CMM]   

 

                                        2.6 K/CMM 

                    (1/15/15 12:04 AM)                          



                                         Lymphocytes #   



                                         [1.0-5.5 K/CMM]   

 

                                        1.0 K/CMM 

*HI*

                    (1/15/15 12:04 AM)                          



                                         Monocytes # [0.0-0.8   



                                         K/CMM]   

 

                                        0.2 K/CMM 

                    (1/15/15 12:04 AM)                          



                                         Eosinophils #   



                                         [0.0-0.5 K/CMM]   

 

                                        0.1 K/CMM 

                    (1/15/15 12:04 AM)                          



                                         Basophils # [0.0-0.2   



                                         K/CMM]   

 

                                        14.7 seconds 

                    (1/15/15 5:07 AM)                          



                                         PT [12.0-14.7   



                                         seconds]   

 

                                        1.14 6

                    (1/15/15 5:07 AM)                          



                                         INR [0.85-1.17]   

 

                                        0.58 ug/mL FEU 7

*NA*

                    (1/15/15 12:04 AM)                          



                                         D-Dimer   

 

                                        28.6 seconds 8

                          (1/15/15 1:15 PM)         31.7 seconds 9

                          (1/15/15 5:07 AM)          



                                         PTT [22.9-35.8   



                                         seconds]   







6Interpretive Data: RECOMMENDED RANGES FOR PROTIME INR:

   2.0-3.0 for most medical and surgical thromboembolic states.

   2.5-3.5 for artificial heart valves and recurrent embolism.



INR SHOULD BE USED ONLY FOR PATIENTS ON STABLE ANTICOAGULANT THERAPY.



7Interpretive Data: In DIC, quantitative D-Dimer is generally greater than

0.66 ug/mL FEU.  Values of quantitative D-Dimer less than

0.40 ug/mL FEU have been reported to be associated with a low

probability of deep vein thrombosis/pulmonary embolism.

This test alone should not be used to rule out DVT/PE.



8Interpretive Data: Heparin Therapeutic Range:  57 - 92 Seconds



9Interpretive Data: Heparin Therapeutic Range:  57 - 92 Seconds



Medications Administered During Your Visit





No data available for this section



Immunizations





No data available for this section



Procedures







   



                 Procedure Type     Body Site       Date of Procedure     Related Diagnosis

 

   



                                         CABG x 3 - Coronary   



                                         artery bypass grafts x 3   

 

   



                                         Cardiac catheterization,   



                                         left heart   







Social History







 



                           Social History Type       Response

 

 



                           Substance Abuse           Use: None

 

 



                           Alcohol                   Use: Never

 

 



                           Smoking Status            Former smoker, Exposure to Tobacco Smoke None, Cigarette Smoking

 Last 365



                                         Days No, Reg Smoking Cessation Counseling No

## 2019-07-18 NOTE — XMS REPORT
Summary of Care: 18 - 18

                             Created on: 2113



DESIREE MCKEON

External Reference #: [id number]

: 1940

Sex: Male



Demographics







                          Address                   [address] [place]  [address]-

 

                          Home Phone                [phone]

 

                          Preferred Language        English

 

                          Marital Status            Unknown

 

                          Orthodoxy Affiliation     Holiness

 

                          Race                      Other

 

                                        Additional Race(s)  

 

                          Ethnic Group              Non-





Author







                          Author                    [facility]

 

                          Organization              [facility]

 

                          Address                   Unknown

 

                          Phone                     Unavailable







Encounter





HQ Slava(DAVID) [id number] Date(s): 18 - 18

[facility] [address] [place] [address]-     [phone]
[phone]

Encounter Diagnosis

Closed head injury (Discharge Diagnosis) - 18

Closed fracture nasal bone (Discharge Diagnosis) - 18

Fracture of nasal bones, initial encounter for closed fracture (Final) - 
18

Unspecified injury of head, initial encounter (Final) - 

Fall from chair, initial encounter (Final) - 

Type 2 diabetes mellitus without complications (Final) - 

Hypertensive heart disease with heart failure (Final) - 

Hypothyroidism, unspecified (Final) - 

Heart failure, unspecified (Final) - 

Paroxysmal atrial fibrillation (Final) - 

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
t side (Final) - 

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 
(Final) - 

Obstructive sleep apnea (adult) (pediatric) (Final) - 

Moderate protein-calorie malnutrition (Final) - 

Presence of aortocoronary bypass graft (Final) - 

Presence of coronary angioplasty implant and graft (Final) - 

Long term (current) use of insulin (Final) - 

Long term (current) use of aspirin (Final) - 

Other long term (current) drug therapy (Final) - 

Discharge Disposition: Intermediate Care

Attending Physician: Pamela Anderson DO





Vital Signs







    



              Most recent to     1            2            3            4



                                         oldest [Reference    



                                         Range]:    

 

    



                           Temperature Oral          97.4 DegF   



                           [96.4-99.1 DegF]          (18 9:46 PM)   

 

    



                 Blood Pressure     134/66 mmHg     129/56 mmHg     128/52 mmHg 



                 [/60-90 mmHg]     (18 1:00 AM)     (18 9:57 PM)     (18 9:46 

PM) 

 

    



              Respiratory Rate     15 BRMIN     18 BRMIN     18 BRMIN     18 BRMIN



              [14-20 BRMIN]     (18 1:00 AM)     (18 9:57 PM)     (18 9:46 PM)     

(18 9:46 PM)

 

    



                           Peripheral Pulse          46 bpm   



                           Rate [ bpm]         *LOW*   



                                         (18 9:46 PM)   







Problem List







    



              Condition     Effective Dates     Status       Health Status     Informant

 

    



                           Acute MI(Confirmed)1      Resolved  

 

    



                           Atrial                    Active  



                                         fibrillation(Confirm    



                                         ed)    

 

    



                           CHF (congestive           Active  



                                         heart    



                                         failure)(Confirmed)    

 

    



                           CAD (coronary artery      Active  



                                         disease)(Confirmed)    

 

    



                           Diabetes(Confirmed)       Active  

 

    



                           Diastolic heart           Active  



                                         failure(Confirmed)    

 

    



                           Histoplasmosis(Confi      Resolved  



                                         rmed)    

 

    



                           Hx MRSA                   Resolved  



                                         infection(Confirmed)    

 

    



                           Hypercholesteremia(C      Resolved  



                                         onfirmed)    

 

    



                           Hypertension(Confirm      Active  



                                         ed)    

 

    



                           Hypertension(Confirm      Resolved  



                                         ed)    

 

    



                           HTN                       Active  



                                         (hypertension)(Confi    



                                         rmed)    

 

    



                           Hypothyroidism(Confi      Active  



                                         rmed)    

 

    



                           Diabetes mellitus         Active  



                                         type 2, insulin    



                                         dependent(Confirmed)    

 

    



                     Moderate            Active              Chronic ; 



                                         protein-calorie    



                                         malnutrition(Confirm    



                                         ed)    

 

    



                           PAUL (obstructive          Active  



                                         sleep    



                                         apnea)(Confirmed)    

 

    



                           Poliomyelitides(Conf      Resolved  



                                         irmed)    

 

    



                           Sleep                     Active  



                                         apnea(Confirmed)    

 

    



                           Stented coronary          Resolved  



                                         artery(Confirmed)2    

 

    



                           Systolic heart            Active  



                                         failure(Confirmed)    

 

    



                           Whooping                  Resolved  



                                         cough(Confirmed)    







1x 3



225 cardiac stents



Allergies, Adverse Reactions, Alerts







   



                 Substance       Reaction        Severity        Status

 

   



                           sulfa drugs               Active

 

   



                           Reglan                    Active

 

   



                           iodine                    Active

 

   



                           Latex                     Active







Medications





Sodium Chloride 0.9% (Bolus) IV

1,000 mL, 1000 ml/hr, Infuse Over: 1 hr, Route: IV, 1,000, Drug form: INJ, ONCE,
Priority: STAT, Dosing Weight 86.364 kg, Start date: 18 22:27:00 CST, Stop
date: 18 22:27:00 CST

Start Date: 18

Stop Date: 18

Status: Completed



Results











  



                     Most recent to      1                   2



                                         oldest [Reference  



                                         Range]:  

 

  



                           Neutrophils #             6.7 K/CMM 



                           [1.5-8.1 K/CMM]           (18 9:56 PM) 

 

  



                           Lymphocytes #             2.7 K/CMM 



                           [1.0-5.5 K/CMM]           (18 9:56 PM) 

 

  



                           Monocytes # [0.0-0.8      1.0 K/CMM 



                           K/CMM]                    *HI* 



                                         (18 9:56 PM) 

 

  



                           Eosinophils #             1.5 K/CMM 



                           [0.0-0.5 K/CMM]           *HI* 



                                         (18 9:56 PM) 

 

  



                           Basophils # [0.0-0.2      0.1 K/CMM 



                           K/CMM]                    (18 9:56 PM) 

 

  



                           eGFR                      36 mL/min/1.73m2 1 



                                         *NA* 



                                         (18 9:56 PM) 

 

  



                           AGAP [10.0-20.0           14.1 mEq/L 



                           mEq/L]                    (18 9:56 PM) 

 

  



                           Basophils [0.0-1.0        1.2 % 



                           %]                        *HI* 



                                         (18 9:56 PM) 

 

  



                           BUN [7-22 mg/dL]          30 mg/dL 



                                         *HI* 



                                         (18 9:56 PM) 

 

  



                           Calcium Lvl               8.4 mg/dL 



                           [8.5-10.5 mg/dL]          *LOW* 



                                         (18 9:56 PM) 

 

  



                           Chloride Lvl [      105 mEq/L 



                           mEq/L]                    (18 9:56 PM) 

 

  



                           CO2 [24-32 mEq/L]         23 mEq/L 



                                         *LOW* 



                                         (18 9:56 PM) 

 

  



                           Creatinine Lvl            1.76 mg/dL 



                           [0.50-1.40 mg/dL]         *HI* 



                                         (18 9:56 PM) 

 

  



                           Eosinophils [0.0-4.0      12.3 % 



                           %]                        *HI* 



                                         (18 9:56 PM) 

 

  



                           Glucose Lvl [70-99        192 mg/dL 



                           mg/dL]                    *HI* 



                                         (18 9:56 PM) 

 

  



                           Hct [42.0-54.0 %]         35.0 % 



                                         *LOW* 



                                         (18 9:56 PM) 

 

  



                           Hgb [14.0-18.0 g/dL]      11.1 g/dL 



                                         *LOW* 



                                         (18 9:56 PM) 

 

  



                           INR [0.85-1.17]           1.03 



                                         (18 9:56 PM) 

 

  



                     Potassium Lvl       5.0 mEq/L           6.1 mEq/L



                     [3.5-5.1 mEq/L]     (18 10:30 PM)     *HI*



                                         (18 9:56 PM)

 

  



                           Lymphocytes               22.6 % 



                           [20.0-40.0 %]             (18 9:56 PM) 

 

  



                           MCH [27.0-31.0 pg]        25.6 pg 



                                         *LOW* 



                                         (18 9:56 PM) 

 

  



                           MCHC [32.0-36.0           31.7 g/dL 



                           g/dL]                     *LOW* 



                                         (18 9:56 PM) 

 

  



                           MCV [80.0-94.0 fL]        80.9 fL 



                                         (18 9:56 PM) 

 

  



                           Monocytes [2.0-12.0       8.6 % 



                           %]                        (18 9:56 PM) 

 

  



                           MPV [7.4-10.4 fL]         8.8 fL 



                                         (18 9:56 PM) 

 

  



                           Sodium Lvl [135-145       136 mEq/L 



                           mEq/L]                    (18 9:56 PM) 

 

  



                           Platelet [133-450         224 K/CMM 



                           K/CMM]                    (18 9:56 PM) 

 

  



                           Segs [45.0-75.0 %]        55.3 % 



                                         (18 9:56 PM) 

 

  



                           PT [12.0-14.7             13.5 seconds 



                           seconds]                  (18 9:56 PM) 

 

  



                           PTT [22.9-35.8            26.1 seconds 



                           seconds]                  (18 9:56 PM) 

 

  



                           RBC [4.70-6.10            4.33 M/CMM 



                           M/CMM]                    *LOW* 



                                         (18 9:56 PM) 

 

  



                           RDW [11.5-14.5 %]         21.6 % 



                                         *HI* 



                                         (18 9:56 PM) 

 

  



                           WBC [3.7-10.4 K/CMM]      12.2 K/CMM 



                                         *HI* 



                                         (18 9:56 PM) 







1Result Comment: The eGFR is calculated using the CKD-EPI formula. In most 
young, healthy individuals the eGFR will be >90 mL/min/1.73m2. The eGFR declines
with age. An eGFR of 60-89 may be normal in some populations, particularly the 
elderly, for whom the CKD-EPI formula has not been extensively validated. Use of
the eGFR is not recommended in the following populations:



Individuals with unstable creatinine concentrations, including pregnant patients
and those with serious co-morbid conditions.



Patients with extremes in muscle mass or diet. 



The data above are obtained from the National Kid[employer] Education Program (
NKDEP) which additionally recommends that when the eGFR is used in patients with
extremes of body mass index for purposes of drug dosing, the eGFR should be mul
tiplied by the estimated BMI.



Immunizations





Given and Recorded





   



                 Vaccine         Date            Status          Refusal Reason

 

   



                     influenza virus vaccine, inactivated     18             Given 

 

   



                     influenza virus vaccine, inactivated     18              Given 

 

   



                     pneumococcal 13-valent vaccine     18              Given 

 

   



                     diphtheria/pertussis, acel/tetanus adult     16              Given 

 

   



                     pneumococcal 23-valent vaccine     3/31/15             Given 









Not Given





   



                 Vaccine         Date            Status          Refusal Reason

 

   



                 pneumococcal 23-valent vaccine1     3/30/15         Not Given       Patient Refuses







1Result Note: already recieved vaccination previous to admission



Procedures







    



              Procedure     Date         Related Diagnosis     Body Site     Status

 

    



                           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

 

    



                           Cardiac ablation using fluoroscopy guidance        Completed

 

    



                           Cardiac catheterization, left heart        Completed

 

    



                           Stent placement2          Completed







1ablation stents



2x27



Social History







 



                           Social History Type       Response

 

 



                           Substance Abuse           Use: None.

 

 



                           Alcohol                   Never

 

 



                           Smoking Status            Never smoker; Exposure to Tobacco Smoke None; Cigarette Smoking

 Last 365



                                         Days No; Reg Smoking Cessation Counseling No



                                         entered on: 19







Assessment and Plan





No data available for this section

## 2019-07-18 NOTE — XMS REPORT
Summary of Care: 17 - 17

                             Created on: 2058



DESIREE MCKEON

External Reference #: [id number]

: 1940

Sex: Male



Demographics







                          Address                   [address]

[place]  [address]

 

                          Home Phone                [phone]

 

                          Preferred Language        English

 

                          Marital Status            

 

                          [employer] Affiliation     None

 

                          Race                      White/

 

                          Ethnic Group              Non-





Author







                          Author                    [facility]

 

                          Organization              [facility]

 

                          Address                   Unknown

 

                          Phone                     Unavailable







Encounter





HQ Slava(DAVID) [id number] Date(s): 17 - 17

[facility] [address] [place] [address]-     [phone]
[phone]

Discharge Disposition: Left Against Medical Advise

Attending Physician: Job Ochoa MD

Admitting Physician: Job Ochoa MD





Vital Signs







                    1                   2                   3



                                         Most recent to   



                                         oldest [Reference   



                                         Range]:   

 

                                        175.26 cm 

                    (17 2:47 AM)                         



                                         Height   

 

                                        97.8 DegF 

                          (17 7:44 PM)         98.2 DegF 

                          (17 2:53 PM)         98.4 DegF 

                                        (17 10:56 AM)



                                         Temperature Oral   



                                         [96.4-99.1 DegF]   

 

                                        111/64 mmHg 

                          (17 7:44 PM)         101/55 mmHg 

                          (17 2:53 PM)         129/56 mmHg 

                                        (17 10:56 AM)



                                         Blood Pressure   



                                         [/60-90 mmHg]   

 

                                        18 BRMIN 

                          (17 7:44 PM)         18 BRMIN 

                          (17 2:53 PM)         18 BRMIN 

                                        (17 10:56 AM)



                                         Respiratory Rate   



                                         [14-20 BRMIN]   

 

                                        59 bpm 

*LOW*

                          (17 7:44 PM)         65 bpm 

                          (17 2:53 PM)         66 bpm 

                                        (17 10:56 AM)



                                         Peripheral Pulse   



                                         Rate [ bpm]   

 

                                        84.545 kg 

                    (17 2:47 AM)                         



                                         Weight   

 

                                        27.52 m2 

                    (17 2:47 AM)                         



                                         Body Mass Index   







Problem List







    



              Condition     Effective Dates     Status       Health Status     Informant

 

    



                           Acute MI(Confirmed)1      Resolved  

 

    



                           Atrial                    Active  



                                         fibrillation(Confirm    



                                         ed)    

 

    



                           Atrial                    Active  



                                         fibrillation(Confirm    



                                         ed)    

 

    



                           CAD (coronary artery      Active  



                                         disease)(Confirmed)    

 

    



                           Diabetes(Confirmed)       Active  

 

    



                           Diastolic heart           Active  



                                         failure(Confirmed)    

 

    



                           Histoplasmosis(Confi      Resolved  



                                         rmed)    

 

    



                           Hypercholesteremia(C      Resolved  



                                         onfirmed)    

 

    



                           Hypertension(Confirm      Active  



                                         ed)    

 

    



                           HTN                       Active  



                                         (hypertension)(Confi    



                                         rmed)    

 

    



                           Hypothyroidism(Confi      Active  



                                         rmed)    

 

    



                           Diabetes mellitus         Active  



                                         type 2, insulin    



                                         dependent(Confirmed)    

 

    



                           PAUL (obstructive          Active  



                                         sleep    



                                         apnea)(Confirmed)    

 

    



                           Poliomyelitides(Conf      Resolved  



                                         irmed)    

 

    



                           Sleep                     Active  



                                         apnea(Confirmed)    

 

    



                           Stented coronary          Resolved  



                                         artery(Confirmed)2    

 

    



                           Systolic heart            Active  



                                         failure(Confirmed)    

 

    



                           Whooping                  Resolved  



                                         cough(Confirmed)    







1x 3



225 cardiac stents



Allergies, Adverse Reactions, Alerts







   



                 Substance       Reaction        Severity        Status

 

   



                           Latex                     Active

 

   



                           sulfa drugs               Active







Medications





AMIODarone

200 mg, 1 tab, Route: PO, Drug form: TAB, Daily, Dosing Weight 84.545, kg, Start
date: 17 9:00:00 CST, Duration: 30 day, Stop date: 17 9:00:00 CDT

Notes: (Same as: Cordarone)

Start Date: 17

Stop Date: 3/1/17

Status: Discontinued



atorvastatin

5 mg, 0.5 tab, Route: PO, Drug form: TAB, Bedtime, Dosing Weight 84.545, kg, Sta
rt date: 17 21:00:00 CST, Duration: 30 day, Stop date: 17 21:00:00 C
DT

Notes: (Same As: Lipitor)

Start Date: 17

Stop Date: 3/1/17

Status: Discontinued



Benadryl

25 mg, 1 tab, Route: PO, Drug form: TAB, Daily, Dosing Weight 84.545, kg, Start 
date: 17 9:00:00 CST, Duration: 30 day, Stop date: 17 9:00:00 CDT

Start Date: 17

Stop Date: 3/1/17

Status: Discontinued



clopidogrel

75 mg, 1 tab, Route: PO, Drug form: TAB, Daily, Dosing Weight 84.545, kg, Start 
date: 17 9:00:00 CST, Duration: 30 day, Stop date: 17 9:00:00 CDT

Notes: (Same As: Plavix)

Start Date: 17

Stop Date: 3/1/17

Status: Discontinued



Dextrose 50% Syringe

25 gm, 50 mL, Route: IVP, Drug Form: INJ, Dosing Weight 84.545, kg, PRN, PRN Blo
od Glucose Results, Start date: 17 11:13:00 CST, Duration: 30 day, Stop da
te: 17 12:12:00 CDT

Start Date: 17

Stop Date: 3/1/17

Status: Discontinued



Dextrose 50% Syringe

12.5 gm, 25 mL, Route: IVP, Drug Form: INJ, Dosing Weight 84.545, kg, PRN, PRN B
lood Glucose Results, Start date: 17 11:13:00 CST, Duration: 30 day, Stop 
date: 17 12:12:00 CDT

Start Date: 17

Stop Date: 3/1/17

Status: Discontinued



gabapentin 600 mg oral tablet

600 mg, 2 cap, Route: PO, Drug form: CAP, TID, Dosing Weight 84.545, kg, Start d
ate: 17 9:00:00 CST, Duration: 30 day, Stop date: 17 17:00:00 CDT

Notes: (Same as: Neurontin)

Start Date: 17

Stop Date: 3/1/17

Status: Discontinued



glucagon

1 mg, Route: IM, Drug form: PDR/INJ, PRN, Dosing Weight 84.545, kg, PRN Blood Gl
ucose Results, Start date: 17 11:13:00 CST, Duration: 30 day, Stop date: 0
3/30/17 12:12:00 CDT

Start Date: 17

Stop Date: 3/1/17

Status: Discontinued



Imdur

30 mg, 1 tab, Route: PO, Drug form: ERTAB, QAM, Dosing Weight 84.545, kg, Start 
date: 17 9:00:00 CST, Duration: 30 day, Stop date: 17 9:00:00 CDT

Notes: (Same as:Imdur)"Do Not Crush"  Take on empty stomach/ full glass of water
.  Do not crush

Start Date: 17

Stop Date: 3/1/17

Status: Discontinued



insulin aspart

10 unit, 0.1 mL, Route: SUB-Q, Drug form: SOLN, TID-Before Meals, Dosing Weight 
84.545, kg, PRN Blood Glucose Results, Start date: 17 11:13:00 CST, Durati
on: 30 day, Stop date: 17 11:12:00 CDT

Notes: Roll in palms of hands gently;  Do not shake vigorously. (Same as: Vernon MINAYA)"single patient use only"WASTE: F/P - Black; E - Municipal Trash Bin  Stable f
or 28 days at room temperature.Expires in _____ days from ______________Date

Start Date: 17

Stop Date: 3/1/17

Status: Discontinued



insulin aspart

6 unit, 0.06 mL, Route: SUB-Q, Drug form: SOLN, TID-Before Meals, Dosing Weight 
84.545, kg, PRN Blood Glucose Results, Start date: 17 11:13:00 CST, Durati
on: 30 day, Stop date: 17 11:12:00 CDT

Notes: Roll in palms of hands gently;  Do not shake vigorously. (Same as: Vernon MINAYA)"single patient use only"WASTE: F/P - Black; E - Municipal Trash Bin  Stable f
or 28 days at room temperature.Expires in _____ days from ______________Date

Start Date: 17

Stop Date: 3/1/17

Status: Discontinued



insulin aspart

4 unit, 0.04 mL, Route: SUB-Q, Drug form: SOLN, TID-Before Meals, Dosing Weight 
84.545, kg, PRN Blood Glucose Results, Start date: 17 11:13:00 CST, Durati
on: 30 day, Stop date: 17 11:12:00 CDT

Notes: Roll in palms of hands gently;  Do not shake vigorously. (Same as: Vernon MINAYA)"single patient use only"WASTE: F/P - Black; E - Municipal Trash Bin  Stable f
or 28 days at room temperature.Expires in _____ days from ______________Date

Start Date: 17

Stop Date: 3/1/17

Status: Discontinued



insulin aspart

8 unit, 0.08 mL, Route: SUB-Q, Drug form: SOLN, TID-Before Meals, Dosing Weight 
84.545, kg, PRN Blood Glucose Results, Start date: 17 11:13:00 CST, Durati
on: 30 day, Stop date: 17 11:12:00 CDT

Notes: Roll in palms of hands gently;  Do not shake vigorously. (Same as: Vernon MINAYA)"single patient use only"WASTE: F/P - Black; E - Municipal Trash Bin  Stable f
or 28 days at room temperature.Expires in _____ days from ______________Date

Start Date: 17

Stop Date: 3/1/17

Status: Discontinued



insulin aspart

2 unit, 0.02 mL, Route: SUB-Q, Drug form: SOLN, TID-Before Meals, Dosing Weight 
84.545, kg, PRN Blood Glucose Results, Start date: 17 11:13:00 CST, Durati
on: 30 day, Stop date: 17 11:12:00 CDT

Notes: Roll in palms of hands gently;  Do not shake vigorously. (Same as: Vernon MINAYA)"single patient use only"WASTE: F/P - Black; E - Municipal Trash Bin  Stable f
or 28 days at room temperature.Expires in _____ days from ______________Date

Start Date: 17

Stop Date: 3/1/17

Status: Discontinued



insulin aspart

4 unit, 0.04 mL, Route: SUB-Q, Drug form: SOLN, Bedtime, Dosing Weight 84.545, k
g, PRN Blood Glucose Results, Start date: 17 11:13:00 CST, Duration: 30 da
y, Stop date: 17 11:12:00 CDT

Notes: Roll in palms of hands gently;  Do not shake vigorously. (Same as: Vernon MINAYA)"single patient use only"WASTE: F/P - Black; E - Municipal Trash Bin  Stable f
or 28 days at room temperature.Expires in _____ days from ______________Date

Start Date: 17

Stop Date: 3/1/17

Status: Discontinued



insulin aspart

1 unit, 0.01 mL, Route: SUB-Q, Drug form: SOLN, Bedtime, Dosing Weight 84.545, k
g, PRN Blood Glucose Results, Start date: 17 11:13:00 CST, Duration: 30 da
y, Stop date: 17 11:12:00 CDT

Notes: Roll in palms of hands gently;  Do not shake vigorously. (Same as: Vernon MINAYA)"single patient use only"WASTE: F/P - Black; E - Municipal Trash Bin  Stable f
or 28 days at room temperature.Expires in _____ days from ______________Date

Start Date: 17

Stop Date: 3/1/17

Status: Discontinued



insulin aspart

3 unit, 0.03 mL, Route: SUB-Q, Drug form: SOLN, Bedtime, Dosing Weight 84.545, k
g, PRN Blood Glucose Results, Start date: 17 11:13:00 CST, Duration: 30 da
y, Stop date: 17 11:12:00 CDT

Notes: Roll in palms of hands gently;  Do not shake vigorously. (Same as: Vernon MINAYA)"single patient use only"WASTE: F/P - Black; E - Municipal Trash Bin  Stable f
or 28 days at room temperature.Expires in _____ days from ______________Date

Start Date: 17

Stop Date: 3/1/17

Status: Discontinued



insulin aspart

2 unit, 0.02 mL, Route: SUB-Q, Drug form: SOLN, Bedtime, Dosing Weight 84.545, k
g, PRN Blood Glucose Results, Start date: 17 11:13:00 CST, Duration: 30 da
y, Stop date: 17 11:12:00 CDT

Notes: Roll in palms of hands gently;  Do not shake vigorously. (Same as: NovoTORO
G)"single patient use only"WASTE: F/P - Black; E - Municipal Trash Bin  Stable f
or 28 days at room temperature.Expires in _____ days from ______________Date

Start Date: 17

Stop Date: 3/1/17

Status: Discontinued



insulin lispro

Route: SUB-Q, Drug form: SOLN, TID-Before Meals, Dosing Weight 84.545, kg, Start
date: 17 11:30:00 CST, Duration: 30 day, Stop date: 17 7:30:00 CDT

Start Date: 17

Stop Date: 17

Status: Deleted



Lantus Solostar Pen 100 units/mL subcutaneous solution

42 unit, Route: SUB-Q, Drug form: SOLN, Bedtime, Dosing Weight 84.545, kg, Start
date: 17 21:00:00 CST, Duration: 30 day, Stop date: 17 21:00:00 CDT

Start Date: 17

Stop Date: 17

Status: Deleted



Lasix

40 mg, 4 mL, Route: IVP, Drug form: INJ, Daily, Dosing Weight 84.545, kg, Priori
ty: NOW, Start date: 17 9:29:00 CST, Duration: 30 day, Stop date: 17
9:00:00 CDT

Notes: (Same as: Lasix)  *** MEDICATION WASTE ***Product Size:  40 mgProduct Was
sonali:  ___ mg

Start Date: 17

Stop Date: 3/1/17

Status: Discontinued



Levemir FlexPen

42 unit, 0.42 mL, Route: SUB-Q, Drug form: INJ, Bedtime, Start date: 17 21
:00:00 CST, Duration: 30 day, Stop date: 17 21:00:00 CDT

Notes: Same as LevemirDo not hold insulin without contacting prescriberWASTE: F/
P - Black; E - Municipal Trash Bin  "single patient use only"

Start Date: 17

Stop Date: 3/1/17

Status: Discontinued



lidocaine topical 5% ointment

1 appl, Route: TOP, TID, Drug form: OINT, Start date: 17 9:00:00 CST, Dura
tion: 30 day, Stop date: 17 17:00:00 CDT

Notes: (Same as: Xylocaine)

Start Date: 17

Stop Date: 3/1/17

Status: Discontinued



lisinopril

2.5 mg, 0.5 tab, Route: PO, Drug form: TAB, Daily, Dosing Weight 84.545, kg, Sta
rt date: 17 9:00:00 CST, Duration: 30 day, Stop date: 17 9:00:00 CDT

Notes: (Same as: Prinivil, Zestril)

Start Date: 17

Stop Date: 3/1/17

Status: Discontinued



Norco 10/325 oral tablet

1 tab, Route: PO, Drug Form: TAB, Dosing Weight 84.545, kg, QID, PRN Pain Score 
4-6, Start date: 17 8:13:00 CST, Duration: 30 day, Stop date: 17 8:1
2:00 CDT

Notes: Do not exceed 4gm/day of acetaminophen.  (Same as: Norco 325/10)

Start Date: 17

Stop Date: 3/1/17

Status: Discontinued



pantoprazole

40 mg, 1 tab, Route: PO, Drug form: ECTAB, BID, Dosing Weight 84.545, kg, Start 
date: 17 9:00:00 CST, Duration: 30 day, Stop date: 17 17:00:00 CDT

Notes: Tablet should not be chewed or crushed.(Same as: Protonix)

Start Date: 17

Stop Date: 3/1/17

Status: Discontinued



potassium chloride 20 mEq oral tablet, extended release

40 mEq, 2 tab, Route: PO, Drug form: ERTAB, Daily, Dosing Weight 84.545, kg, Christina
ority: NOW, Start date: 17 9:29:00 CST, Duration: 30 day, Stop date:  9:00:00 CDT

Notes: (Same as: K-Dur 20)"Do Not Crush"  With food and full glass of water

Start Date: 17

Stop Date: 3/1/17

Status: Discontinued



Ranexa 500 mg oral tablet, extended release

500 mg, 1 tab, Route: PO, Drug form: TAB, BID, Dosing Weight 84.545, kg, Start d
ate: 17 9:00:00 CST, Duration: 30 day, Stop date: 17 17:00:00 CDT

Notes: Same as Ranexa"Do Not Crush"

Start Date: 17

Stop Date: 3/1/17

Status: Discontinued



sertraline

100 mg, 1 tab, Route: PO, Drug form: TAB, Daily, Dosing Weight 84.545, kg, Start
date: 17 9:00:00 CST, Duration: 30 day, Stop date: 17 9:00:00 CDT

Notes: (Same as: Zoloft)

Start Date: 17

Stop Date: 3/1/17

Status: Discontinued



Synthroid

50 microgram, 1 tab, Route: PO, Drug form: TAB, Daily, Dosing Weight 84.545, kg,
Start date: 17 9:00:00 CST, Duration: 30 day, Stop date: 17 9:00:00 
CDT

Notes: Take 1 hour before or 2 hours after meal; Enteral feeds may interefere wi
th the absorption of this medication.(Same as:Levothroid, Synthroid)

Start Date: 17

Stop Date: 3/1/17

Status: Discontinued



Xarelto

20 mg, 1 tab, Route: PO, Drug form: TAB, QPM, Dosing Weight 84.545, kg, Start da
te: 17 17:00:00 CST, Duration: 30 day, Stop date: 17 17:00:00 CDT

Notes: (Same as: Xarelto)Administer with food

Start Date: 17

Stop Date: 3/1/17

Status: Discontinued



Results





ELECTROLYTES





                    1                   2                   3



                                         Most recent to   



                                         oldest [Reference   



                                         Range]:   

 

                                        137 mEq/L 

                    (17 3:17 AM)                          



                                         Sodium Lvl [135-145   



                                         mEq/L]   

 

                                        3.1 mEq/L 

*LOW*

                    (17 3:17 AM)                          



                                         Potassium Lvl   



                                         [3.5-5.1 mEq/L]   

 

                                        99 mEq/L 

                    (17 3:17 AM)                          



                                         Chloride Lvl [   



                                         mEq/L]   

 

                                        26 mEq/L 

                    (17 3:17 AM)                          



                                         CO2 [24-32 mEq/L]   

 

                                        15.1 mEq/L 

                    (17 3:17 AM)                          



                                         AGAP [10.0-20.0   



                                         mEq/L]   







CHEM PANEL





                    1                   2                   3



                                         Most recent to   



                                         oldest [Reference   



                                         Range]:   

 

                                        0.85 mg/dL 

                    (17 3:17 AM)                          



                                         Creatinine Lvl   



                                         [0.50-1.40 mg/dL]   

 

                                        85 mL/min/1.73m2 1

*NA*

                    (17 3:17 AM)                          



                                         eGFR   

 

                                        12 mg/dL 

                    (17 3:17 AM)                          



                                         BUN [7-22 mg/dL]   

 

                                        14 

                    (17 3:17 AM)                          



                                         B/C Ratio [6-25]   

 

                                        197 mg/dL 

*HI*

                    (17 3:17 AM)                          



                                         Glucose Lvl [70-99   



                                         mg/dL]   

 

                                        7.2 g/dL 

                    (17 3:17 AM)                          



                                         Total Protein   



                                         [6.4-8.4 g/dL]   

 

                                        3.2 g/dL 

*LOW*

                    (17 3:17 AM)                          



                                         Albumin Lvl [3.5-5.0   



                                         g/dL]   

 

                                        4.0 g/dL 

                    (17 3:17 AM)                          



                                         Globulin [2.7-4.2   



                                         g/dL]   

 

                                        0.8 

                    (17 3:17 AM)                          



                                         A/G Ratio [0.7-1.6]   

 

                                        8.3 mg/dL 

*LOW*

                    (17 3:17 AM)                          



                                         Calcium Lvl   



                                         [8.5-10.5 mg/dL]   

 

                                        24 unit/L 

                    (17 3:17 AM)                          



                                         ALT [0-65 unit/L]   

 

                                        33 unit/L 

                    (17 3:17 AM)                          



                                         AST [0-37 unit/L]   

 

                                        101 unit/L 

                    (17 3:17 AM)                          



                                         Alk Phos [   



                                         unit/L]   

 

                                        0.6 mg/dL 

                    (17 3:17 AM)                          



                                         Bili Total [0.2-1.3   



                                         mg/dL]   

 

                                        74 unit/L 

                    (17 3:17 AM)                          



                                         Lipase Lvl [   



                                         unit/L]   







1Result Comment: The eGFR is calculated using the CKD-EPI formula. In most 
young, healthy individuals the eGFR will be >90 mL/min/1.73m2. The eGFR declines
with age. An eGFR of 60-89 may be normal in some populations, particularly the 
elderly, for whom the CKD-EPI formula has not been extensively validated. Use of
the eGFR is not recommended in the following populations:



Individuals with unstable creatinine concentrations, including pregnant patients
and those with serious co-morbid conditions.



Patients with extremes in muscle mass or diet. 



The data above are obtained from the National Kid[employer] Disease Education Program (
NKDEP) which additionally recommends that when the eGFR is used in patients with
extremes of body mass index for purposes of drug dosing, the eGFR should be mul
tiplied by the estimated BMI.



CARDIAC ENZYMES





                    1                   2                   3



                                         Most recent to   



                                         oldest [Reference   



                                         Range]:   

 

                                        55 unit/L 

                          (17 2:18 PM)         31 unit/L 

                          (17 10:03 AM)        30 unit/L 

                                        (17 3:17 AM) 



                                         Total CK [   



                                         unit/L]   

 

                                        0.6 ng/mL 

                    (17 3:17 AM)                          



                                         CK MB [0.5-3.6   



                                         ng/mL]   

 

                                        2.0 

                    (17 3:17 AM)                          



                                         CK MB Index   



                                         [0.0-2.5]   

 

                                        0.05 ng/mL 

                          (17 2:18 PM)         0.07 ng/mL 

                          (17 10:03 AM)        0.03 ng/mL 

                                        (17 3:17 AM) 



                                         Troponin-I   



                                         [0.00-0.40 ng/mL]   

 

                                        886 pg/mL 

*HI*

                    (17 10:00 AM)                          



                                         BNP [<=100 pg/mL]   







URINE AND STOOL





                    1                   2                   3



                                         Most recent to   



                                         oldest [Reference   



                                         Range]:   

 

                                        Clear 

                    (17 4:23 AM)                          



                                         UA Turbidity [Clear]   

 

                                        Ltyellow 

*NA*

                    (17 4:23 AM)                          



                                         UA Color   

 

                                        7.0 

                    (17 4:23 AM)                          



                                         UA pH [5.0-8.0]   

 

                                        1.014 

                    (17 4:23 AM)                          



                                         UA Spec Grav   



                                         [<=1.030]   

 

                                        50 mg/dL 

*ABN*

                    (17 4:23 AM)                          



                                         UA Glucose [Negative   



                                         mg/dL]   

 

                                        Negative 

                    (17 4:23 AM)                          



                                         UA Blood [Negative]   

 

                                        Negative mg/dL 

*NA*

                    (17 4:23 AM)                          



                                         UA Ketones [Negative   



                                         mg/dL]   

 

                                        Negative mg/dL 

                    (17 4:23 AM)                          



                                         UA Protein [Negative   



                                         mg/dL]   

 

                                        2.0 mg/dL 

*HI*

                    (17 4:23 AM)                          



                                         UA Urobilinogen   



                                         [0.1-1.0 mg/dL]   

 

                                        Negative 

*NA*

                    (17 4:23 AM)                          



                                         UA Bili [Negative]   

 

                                        Negative 

                    (17 4:23 AM)                          



                                         UA Leuk Est   



                                         [Negative]   

 

                                        Negative 

                    (17 4:23 AM)                          



                                         UA Nitrite   



                                         [Negative]   

 

                                        <1 /HPF 

                    (17 4:23 AM)                          



                                         UA WBC [0-5 /HPF]   

 

                                        2 /HPF 

                    (17 4:23 AM)                          



                                         UA RBC [0-2 /HPF]   

 

                                        None Seen 

*NA*

                    (17 4:23 AM)                          



                                         UA Sq Epi   

 

                                        Few /LPF 

*NA*

                    (17 4:23 AM)                          



                                         UA Mucus [None Seen   



                                         /LPF]   







HEMATOLOGY





                    1                   2                   3



                                         Most recent to   



                                         oldest [Reference   



                                         Range]:   

 

                                        15.2 K/CMM 

*HI*

                    (17 3:17 AM)                          



                                         WBC [3.7-10.4 K/CMM]   

 

                                        3.91 M/CMM 

*LOW*

                    (17 3:17 AM)                          



                                         RBC [4.70-6.10   



                                         M/CMM]   

 

                                        10.8 g/dL 

*LOW*

                    (17 3:17 AM)                          



                                         Hgb [14.0-18.0 g/dL]   

 

                                        33.2 % 

*LOW*

                    (17 3:17 AM)                          



                                         Hct [42.0-54.0 %]   

 

                                        85.0 fL 

                    (17 3:17 AM)                          



                                         MCV [80.0-94.0 fL]   

 

                                        27.6 pg 

                    (17 3:17 AM)                          



                                         MCH [27.0-31.0 pg]   

 

                                        32.4 g/dL 

                    (17 3:17 AM)                          



                                         MCHC [32.0-36.0   



                                         g/dL]   

 

                                        15.8 % 

*HI*

                    (17 3:17 AM)                          



                                         RDW [11.5-14.5 %]   

 

                                        267 K/CMM 

                    (17 3:17 AM)                          



                                         Platelet [133-450   



                                         K/CMM]   

 

                                        8.6 fL 

                    (17 3:17 AM)                          



                                         MPV [7.4-10.4 fL]   

 

                                        81.9 % 

*HI*

                    (17 3:17 AM)                          



                                         Segs [45.0-75.0 %]   

 

                                        8.4 % 

*LOW*

                    (17 3:17 AM)                          



                                         Lymphocytes   



                                         [20.0-40.0 %]   

 

                                        8.2 % 

                    (17 3:17 AM)                          



                                         Monocytes [2.0-12.0   



                                         %]   

 

                                        0.4 % 

                    (17 3:17 AM)                          



                                         Eosinophils [0.0-4.0   



                                         %]   

 

                                        1.1 % 

*HI*

                    (17 3:17 AM)                          



                                         Basophils [0.0-1.0   



                                         %]   

 

                                        12.5 K/CMM 

*HI*

                    (17 3:17 AM)                          



                                         Segs-Bands #   



                                         [1.5-8.1 K/CMM]   

 

                                        1.3 K/CMM 

                    (17 3:17 AM)                          



                                         Lymphocytes #   



                                         [1.0-5.5 K/CMM]   

 

                                        1.2 K/CMM 

*HI*

                    (17 3:17 AM)                          



                                         Monocytes # [0.0-0.8   



                                         K/CMM]   

 

                                        0.1 K/CMM 

                    (17 3:17 AM)                          



                                         Eosinophils #   



                                         [0.0-0.5 K/CMM]   

 

                                        0.2 K/CMM 

                    (17 3:17 AM)                          



                                         Basophils # [0.0-0.2   



                                         K/CMM]   

 

                                        15.1 seconds 

*HI*

                    (17 3:17 AM)                          



                                         PT [12.0-14.7   



                                         seconds]   

 

                                        1.17 

                    (17 3:17 AM)                          



                                         INR [0.85-1.17]   

 

                                        31.7 seconds 

                    (17 3:17 AM)                          



                                         PTT [22.9-35.8   



                                         seconds]   







Immunizations





Given and Recorded





   



                 Vaccine         Date            Status          Refusal Reason

 

   



                     diphtheria/pertussis, acel/tetanus adult     17              Given 

 

   



                     pneumococcal 23-valent vaccine     3/31/15             Given 









Not Given





   



                 Vaccine         Date            Status          Refusal Reason

 

   



                 pneumococcal 23-valent vaccine     3/30/15         Not Given       Patient Refuses







Procedures







   



                 Procedure       Date            Related Diagnosis     Body Site

 

   



                                         CABG x 3 - Coronary artery bypass grafts x 31   

 

   



                                         Cardiac ablation using fluoroscopy guidance   

 

   



                                         Cardiac catheterization, left heart   







1ablation stents



Social History







 



                           Social History Type       Response

 

 



                           Substance Abuse           Use: None.

 

 



                           Alcohol                   Past

 

 



                           Smoking Status            Reg Smoking Cessation Counseling No; Former smoker; Exposure to

 Tobacco



                                         Smoke None; Other Tobacco Frequency quit 30 years ago; Cigarette Smoking



                                         Last 365 Days No







Assessment and Plan

Extracted from:





  



                     Title: Clinical Document     Author: Faustino Villagomez MD     Date: 17









                                        Cardiology Consult Note

[facility]



Chief Complaint-

cp,



HPI-This is a 76-year-old male with multiple medical problems.  His extensive 
history of CAD and is followed by Dr. Roman in the [facility].  He has had
multiple stents bypass surgery and occluded grafts.  Last cath was in  which
showed a patent LIMA to LAD and saphenous vein graft to diagonal his RCA and 
circumflex are occluded.  He also has paroxysmal atrial fibrillation and is not 
on anticoagulant and due to fall risk and high bleeding risk he has also chronic
diastolic CHF.  Patient had recent extensive hospitalization and was in rehab 
recently his usual caretakers his daughter at home but however his daughter is 
been in the hospital due to pneumonia the patient presented to the hospital with
generalized weakness some lower back pain some chest pain as well as left arm 
pain as well as some disorientation.  He also took a nitro for chest pain.  On 
admission his EKG was sinus tachycardia with no significant ST changes.  Given 
his mild disorientation he had a CT head that was negative.  His breathing 
status is fairly stable but he did have pulmonary congestion on chest x-ray.  
This morning he appears to be comfortable he is able to lie flat.  He has not 
had any further chest pain.  He is just debilitated and weak.









Review of Systems

General/Constitutional:

Fatigue y. Weakness y. Weight gain no. Headaches no.

Allergy/Immunology:

Colds no. Cough no.

HEENT/Neck:

Dizziness no. Change in vision no.

Respiratory:

Chest congestion no. Cough no. Pain with breathing no. Shortness of breath y. 
Swelling of the legs no. Wheezing no.

Cardiovascular:

Chest pain ny Claudication no. Dyspnea on exertion y. Palpitations no.

Gastrointestinal:

Abdominal pain no. Change in bowel habits no. Constipation no. Diarrhea no. 
Nausea no.

Hematology:

Easy bleeding .y Easy bruising no. y

Musculoskeletal:

Back pain y. Myalgias no

Past Medical History



CAD mulitple PCIs

hx CABG

Cath  LIMA to the LAD patent , SVG to diagonal  patent.  His RCA and 
circumflex are occluded

Diastolic CHF

Parox afib- hx of afib ablation high bleed risk

sick sinus syndrome

Hypercholesteremia

Poliomyelitides

Histoplasmosis



Past Surgical History

Cardiac catheterization, left heart

CABG x 3 - Coronary artery bypass grafts x 3

Cardiac ablation using fluoroscopy guidance



Past Family History



Brother: Cancer of prostate; Heart attack; Leukemia

Past Social History

prior smoker

No alcohol or drug use

No qualifying data available

Medications

Continuous Infusions: None



Scheduled Meds (15):

                                        17 AMIODarone 200 mg PO Daily

                                        17 atorvastatin 5 mg PO Bedtime

                                        17 clopidogrel 75 mg PO Daily

                                        17 diphenhydrAMINE (Benadryl) 25 mg PO Daily

                                        17 gabapentin (gabapentin 600 mg oral tablet) 600 mg PO TID

                                        17 insulin detemir (Levemir FlexPen) 42 unit SUB-Q Bedtime

                                        17 insulin lispro SUB-Q TID-Before Meals

                                        17 isosorbide mononitrate (Imdur) 30 mg PO QAM

                                        17 levothyroxine (Synthroid) 50 microgram PO Daily

                                        17 lidocaine topical (lidocaine topical 5% ointment) 1 appl TOP TID

                                        17 lisinopril 2.5 mg PO Daily

                                        17 pantoprazole 40 mg PO BID

                                        17 ranolazine (Ranexa 500 mg oral tablet, extended release) 500 mg PO BID

                                        17 rivaroxaban (Xarelto) 20 mg PO QPM

                                        17 sertraline 100 mg PO Daily



Allergies: sulfa drugs, Latex





VitalsTmp(F)[id number]

                                         07:[id number] 3.0L/m

                                         06:21----22140/[id number] 3.0L/m

                                         03:12----[id number]---

                                         02:[id number]---





                                        24 Hr Tmax: 98.3F (36.83c) at  02:47Vital Signs are the last 5 in the past 

48 hours.



Physical exam





General:  Awake and Alert, NAD

HEENT: Neck Supple, No JVD

CVS: Regular rate, Normal S1S2

LUNGS: mildly decreased breath sounds

ABD: Soft, Non-tender, + BS

EXT: No edema, 2+ pedal pulses

Skin: No ulcers

Neuro: Awake and Alert, Oriented x 3







Labs (Last four charted values)

WBC                 H 15.2()

Hgb                 L 10.8()

Hct                 L 33.2()

Plt                 267()

Na                  137()

K                   L 3.1()

CO2                 26()

Cl                  99()

Cr                  0.85()

BUN                 12()

Glucose Random      H 197()

Ca                  L 8.3()

PT                  H 15.1()

INR                 1.17()

PTT                 31.7()

Troponin            0.03()

CK MB               0.6()

Total CK            30()



ekg sinus tachy 1st degree av block nonspecif st changes



Impression

                                        76-year-old male with extensive history of CAD multiple stents and bypass with occluded

 grafts except for LIMA to LAD and saphenous vein graft to diagonal, paroxysmal 
atrial for ablation with high bleeding risk, acute on chronic diastolic CHF who 
presents with generalized fatigue chest pain mild dyspnea and leukocytosis



Plan





Chest pain-does not appear to be ischemic.  There are no acute EKG changes.  He 
has an extensive history and we would usually only treat him conservatively.  
Will check serial cardiac enzymes if they are negative we would not plan for 
further ischemic workup given his extensive disease



Acute on chronic diastolic CHF he has mild volume overload we will give him 1 
dose of IV Lasix and monitor him overnight.



Paroxysmal atrial fibrillation-he is currently in sinus rhythm on amiodarone.  
He has been considered a high bleeding risk by our practice in the past but is 
seen by another cardiologist and is currently back on Xarelto currently there is
no evidence of bleeding so we will just continue his home medication.



Leukocytosis-unclear what the etiology of this is would defer to internal 
medicine and check repeat labs in the morning.

## 2019-07-18 NOTE — XMS REPORT
Summary of Care: 16 - 16

                             Created on: 2095



DESIREE MCKEON

External Reference #: [id number]

: 1940

Sex: Male



Demographics







                          Address                   [address]

[place]  [address]

 

                          Home Phone                [phone]

 

                          Preferred Language        English

 

                          Marital Status            

 

                          Hindu Affiliation     None

 

                          Race                      White/

 

                          Ethnic Group              Non-





Author







                          Author                    [facility]

 

                          Organization              [facility]

 

                          Address                   Unknown

 

                          Phone                     Unavailable







Encounter





HQ Slava(DAVID) [id number] Date(s): 16 - 16

[facility] [address] [place] [address]-     [phone]
[phone]

Discharge Diagnosis: Fall

Discharge Diagnosis: Cellulitis

Discharge Disposition: Home or Self Care

Attending Physician: Wing Ferrara DO





Vital Signs







  



                     Most recent to      1                   2



                                         oldest [Reference  



                                         Range]:  

 

  



                           Height                    175.26 cm 



                                         (16 5:25 PM) 

 

  



                     Temperature Oral     98.3 DegF           98.3 DegF



                     [96.4-99.1 DegF]     (16 9:05 PM)     (16 5:25 PM)

 

  



                     Blood Pressure      137/49 mmHg         133/55 mmHg



                     [/60-90 mmHg]     (16 9:05 PM)     (16 5:25 PM)

 

  



                     Respiratory Rate     17 BRMIN            18 BRMIN



                     [14-20 BRMIN]       (16 9:05 PM)     (16 5:25 PM)

 

  



                     Peripheral Pulse     79 bpm              75 bpm



                     Rate [ bpm]     (16 9:05 PM)     (16 5:25 PM)

 

  



                           Weight                    91.364 kg 



                                         (16 5:25 PM) 

 

  



                           Body Mass Index           29.74 m2 



                                         (16 5:25 PM) 







Problem List







    



              Condition     Effective Dates     Status       Health Status     Informant

 

    



                           Acute MI(Confirmed)1      Resolved  

 

    



                           Atrial                    Active  



                                         fibrillation(Confirm    



                                         ed)    

 

    



                           Atrial                    Resolved  



                                         fibrillation(Confirm    



                                         ed)    

 

    



                           CAD (coronary artery      Active  



                                         disease)(Confirmed)    

 

    



                           Diabetes(Confirmed)       Resolved  

 

    



                           Diastolic heart           Active  



                                         failure(Confirmed)    

 

    



                           Histoplasmosis(Confi      Resolved  



                                         rmed)    

 

    



                           Hypercholesteremia(C      Resolved  



                                         onfirmed)    

 

    



                           Hypertension(Confirm      Resolved  



                                         ed)    

 

    



                           HTN                       Active  



                                         (hypertension)(Confi    



                                         rmed)    

 

    



                           Hypothyroidism(Confi      Active  



                                         rmed)    

 

    



                           Diabetes mellitus         Active  



                                         type 2, insulin    



                                         dependent(Confirmed)    

 

    



                           PAUL (obstructive          Active  



                                         sleep    



                                         apnea)(Confirmed)    

 

    



                           Poliomyelitides(Conf      Resolved  



                                         irmed)    

 

    



                           Sleep                     Resolved  



                                         apnea(Confirmed)    

 

    



                           Stented coronary          Resolved  



                                         artery(Confirmed)2    

 

    



                           Systolic heart            Active  



                                         failure(Confirmed)    

 

    



                           Whooping                  Resolved  



                                         cough(Confirmed)    







1x 3



225 cardiac stents



Allergies, Adverse Reactions, Alerts







   



                 Substance       Reaction        Severity        Status

 

   



                           NKDA                      Active







Medications





Bactrim DS  800 mg- 160 mg oral tablet

1 tab, PO, BID, X 14 day, # 28 tab, 0 Refill(s)

Start Date: 16

Stop Date: 17

Status: Ordered



Saline Flush 0.9%

10 mL, Route: IVP, Drug Form: INJ, Dosing Weight 91.364, kg, PRN, PRN Line Flush
, Start date: 16 18:18:00 CST, Duration: 30 day, Stop date: 17 18:17
:00 CST

Notes: (Same as: BD Posiflush)

Start Date: 16

Stop Date: 16

Status: Discontinued



Results





ELECTROLYTES





 



                           Most recent to            1



                                         oldest [Reference 



                                         Range]: 

 

 



                           Sodium Lvl [135-145       134 mEq/L



                           mEq/L]                    *LOW*



                                         (16 7:28 PM)

 

 



                           Potassium Lvl             3.5 mEq/L



                           [3.5-5.1 mEq/L]           (16 7:28 PM)

 

 



                           Chloride Lvl [      94 mEq/L



                           mEq/L]                    *LOW*



                                         (16 7:28 PM)

 

 



                           CO2 [24-32 mEq/L]         33 mEq/L



                                         *HI*



                                         (16 7:28 PM)

 

 



                           AGAP [10.0-20.0           10.5 mEq/L



                           mEq/L]                    (16 7:28 PM)







CHEM PANEL





 



                           Most recent to            1



                                         oldest [Reference 



                                         Range]: 

 

 



                           Creatinine Lvl            1.10 mg/dL



                           [0.50-1.40 mg/dL]         (16 7:28 PM)

 

 



                           eGFR                      65 mL/min/1.73m2 1



                                         *NA*



                                         (16 7:28 PM)

 

 



                           BUN [7-22 mg/dL]          17 mg/dL



                                         (16 7:28 PM)

 

 



                           B/C Ratio [6-25]          15



                                         (16 7:28 PM)

 

 



                           Glucose Lvl [70-99        136 mg/dL



                           mg/dL]                    *HI*



                                         (16 7:28 PM)

 

 



                           Total Protein             6.9 g/dL



                           [6.4-8.4 g/dL]            (16 7:28 PM)

 

 



                           Albumin Lvl [3.5-5.0      3.4 g/dL



                           g/dL]                     *LOW*



                                         (16 7: PM)

 

 



                           Globulin [2.7-4.2         3.5 g/dL



                           g/dL]                     (16 7:28 PM)

 

 



                           A/G Ratio [0.7-1.6]       1.0



                                         (16 7: PM)

 

 



                           Calcium Lvl               8.0 mg/dL



                           [8.5-10.5 mg/dL]          *LOW*



                                         (16 7:28 PM)

 

 



                           ALT [0-65 unit/L]         25 unit/L



                                         (16 7: PM)

 

 



                           AST [0-37 unit/L]         24 unit/L



                                         (16 7:28 PM)

 

 



                           Alk Phos [          120 unit/L



                           unit/L]                   (16 7:28 PM)

 

 



                           Bili Total [0.2-1.3       0.6 mg/dL



                           mg/dL]                    (16 7:28 PM)

 

 



                           Ammonia [<=45.0           17.0 uMol/L



                           uMol/L]                   (16 7:28 PM)







1Result Comment: The eGFR is calculated using the CKD-EPI formula. In most 
young, healthy individuals the eGFR will be >90 mL/min/1.73m2. The eGFR declines
with age. An eGFR of 60-89 may be normal in some populations, particularly the 
elderly, for whom the CKD-EPI formula has not been extensively validated. Use of
the eGFR is not recommended in the following populations:



Individuals with unstable creatinine concentrations, including pregnant patients
and those with serious co-morbid conditions.



Patients with extremes in muscle mass or diet. 



The data above are obtained from the National Kidney Disease Education Program (
NKDEP) which additionally recommends that when the eGFR is used in patients with
extremes of body mass index for purposes of drug dosing, the eGFR should be mul
tiplied by the estimated BMI.



CARDIAC ENZYMES





 



                           Most recent to            1



                                         oldest [Reference 



                                         Range]: 

 

 



                           Troponin-I                <0.02 ng/mL



                           [0.00-0.40 ng/mL]         (16 7:28 PM)

 

 



                           BNP [<=100 pg/mL]         91 pg/mL



                                         (16 7:28 PM)







URINE AND STOOL





 



                           Most recent to            1



                                         oldest [Reference 



                                         Range]: 

 

 



                           UA Turbidity [Clear]      Clear



                                         (16 7:28 PM)

 

 



                           UA Color                  Ltyellow



                                         *NA*



                                         (16 7:28 PM)

 

 



                           UA pH [5.0-8.0]           5.0



                                         (16 7:28 PM)

 

 



                           UA Spec Grav              1.006



                           [<=1.030]                 (16 7:28 PM)

 

 



                           UA Glucose [Negative      Negative mg/dL



                           mg/dL]                    *NA*



                                         (16 7:28 PM)

 

 



                           UA Blood [Negative]       Negative



                                         (16 7:28 PM)

 

 



                           UA Ketones [Negative      Negative mg/dL



                           mg/dL]                    *NA*



                                         (16 7:28 PM)

 

 



                           UA Protein [Negative      Negative mg/dL



                           mg/dL]                    (16 7:28 PM)

 

 



                           UA Urobilinogen           <=1.0 mg/dL



                           [0.1-1.0 mg/dL]           *NA*



                                         (16 7:28 PM)

 

 



                           UA Bili [Negative]        Negative



                                         *NA*



                                         (16 7:28 PM)

 

 



                           UA Leuk Est               Negative



                           [Negative]                (16 7:28 PM)

 

 



                           UA Nitrite                Negative



                           [Negative]                (16 7:28 PM)

 

 



                           UA WBC [0-5 /HPF]         <1 /HPF



                                         (16 7:28 PM)

 

 



                           UA RBC [0-2 /HPF]         3 /HPF



                                         *HI*



                                         (16 7:28 PM)

 

 



                           UA Bacteria [None         Occasional /HPF



                           Seen /HPF]                *NA*



                                         (16 7:28 PM)

 

 



                           UA Sq Epi                 None Seen



                                         *NA*



                                         (16 7:28 PM)

 

 



                           UA Hyal Cast [0-2         1 /LPF



                           /LPF]                     (16 7:28 PM)







HEMATOLOGY





 



                           Most recent to            1



                                         oldest [Reference 



                                         Range]: 

 

 



                           WBC [3.7-10.4 K/CMM]      13.5 K/CMM



                                         *HI*



                                         (16 7:28 PM)

 

 



                           RBC [4.70-6.10            4.08 M/CMM



                           M/CMM]                    *LOW*



                                         (16 7:28 PM)

 

 



                           Hgb [14.0-18.0 g/dL]      10.9 g/dL



                                         *LOW*



                                         (16 7:28 PM)

 

 



                           Hct [42.0-54.0 %]         33.1 %



                                         *LOW*



                                         (16 7:28 PM)

 

 



                           MCV [80.0-94.0 fL]        81.2 fL



                                         (16 7:28 PM)

 

 



                           MCH [27.0-31.0 pg]        26.8 pg



                                         *LOW*



                                         (16 7:28 PM)

 

 



                           MCHC [32.0-36.0           33.1 g/dL



                           g/dL]                     (16 7:28 PM)

 

 



                           RDW [11.5-14.5 %]         17.0 %



                                         *HI*



                                         (16 7:28 PM)

 

 



                           Platelet [133-450         239 K/CMM



                           K/CMM]                    (16 7:28 PM)

 

 



                           MPV [7.4-10.4 fL]         8.2 fL



                                         (16 7:28 PM)

 

 



                           Segs [45.0-75.0 %]        72.6 %



                                         (16 7:28 PM)

 

 



                           Lymphocytes               15.9 %



                           [20.0-40.0 %]             *LOW*



                                         (16 7:28 PM)

 

 



                           Monocytes [2.0-12.0       8.9 %



                           %]                        (16 7:28 PM)

 

 



                           Eosinophils [0.0-4.0      1.9 %



                           %]                        (16 7:28 PM)

 

 



                           Basophils [0.0-1.0        0.7 %



                           %]                        (16 7:28 PM)

 

 



                           Segs-Bands #              9.8 K/CMM



                           [1.5-8.1 K/CMM]           *HI*



                                         (16 7:28 PM)

 

 



                           Lymphocytes #             2.1 K/CMM



                           [1.0-5.5 K/CMM]           (16 7:28 PM)

 

 



                           Monocytes # [0.0-0.8      1.2 K/CMM



                           K/CMM]                    *HI*



                                         (16 7:28 PM)

 

 



                           Eosinophils #             0.3 K/CMM



                           [0.0-0.5 K/CMM]           (16 7:28 PM)

 

 



                           Basophils # [0.0-0.2      0.1 K/CMM



                           K/CMM]                    (16 7:28 PM)







Immunizations





Given and Recorded





   



                 Vaccine         Date            Status          Refusal Reason

 

   



                     diphtheria/pertussis, acel/tetanus adult     16              Given 

 

   



                     pneumococcal 23-valent vaccine     3/31/15             Given 









Not Given





   



                 Vaccine         Date            Status          Refusal Reason

 

   



                 pneumococcal 23-valent vaccine     3/30/15         Not Given       Patient Refuses







Procedures







   



                 Procedure       Date            Related Diagnosis     Body Site

 

   



                                         CABG x 3 - Coronary artery bypass grafts x 31   

 

   



                                         Cardiac catheterization, left heart   







1ablation stents



Social History







 



                           Social History Type       Response

 

 



                           Substance Abuse           Use: None.

 

 



                           Alcohol                   Never

 

 



                           Smoking Status            Former smoker; Exposure to Tobacco Smoke None; Other Tobacco Frequency

 quit



                                         30 years ago; Cigarette Smoking Last 365 Days No; Reg Smoking Cessation



                                         Counseling No







Assessment and Plan





No data available for this section

## 2019-07-18 NOTE — XMS REPORT
Patient Summary Document

                             Created on: 2019



MIKEDESIREE

External Reference #: [id number]

: 1940

Sex: Male



Demographics







                          Address                   [address]

[place]  [address]

 

                          Home Phone                [phone]

 

                          Preferred Language        Unknown

 

                          Marital Status            Unknown

 

                          Mu-ism Affiliation     Unknown

 

                          Race                      Unknown

 

                                        Additional Race(s)  

 

                          Ethnic Group              Unknown





Author







                          Author                    [facility]

 

                          Address                   Unknown

 

                          Phone                     Unavailable







Support







                Name            Relationship    Address         Phone

 

                    RENETTA MCKEON                 UNWILDER

[place], [place]  [id number] [phone]

 

                    RENETTA MCKEON                 [place]WILDER

[facility] [place]  [id number] [phone]







Care Team Providers







                    Care Team Member Name    Role                Phone

 

                    MARYJANE HUITRON    Unavailable         Unavailable

 

                    NAIDA WATERS       Unavailable         Unavailable

 

                    HERNANDO HAGER    Unavailable         Unavailable

 

                    DESIREE MONROE    Unavailable         Unavailable

 

                    FRANCIS HENDERSON      Unavailable         Unavailable

 

                    HANK BARRAZA    Unavailable         Unavailable







Payers







             Payer Name    Policy Type    Policy Number    Effective Date    Expiration Date







Problems

This patient has no known problems.



Allergies, Adverse Reactions, Alerts







          Allergy Name    Allergy Type    Status    Severity    Reaction(s)    Onset Date    Inactive 

Date                      Treating Clinician        Comments

 

        Sulfa (Sulfonamide Antibiotics)    DA      Active    U               2018 00:00:00                     

 

        iodine    DA      Active    U               2018 00:00:00                     

 

        metoclopramide    DA      Active    U               2018 00:00:00                     

 

        latex    DA      Active    U               2018 00:00:00                     







Medications

This patient has no known medications.



Encounters







             Start Date/Time    End Date/Time    Encounter Type    Admission Type    Attending Clinicians

                    Care Facility       Care Department     Encounter ID

 

        2019 10:37:00    2019 10:37:00    Outpatient    E               [facility]    MED     [id number]

 

        2019 15:39:00    2019 15:39:00    Emergency    E               [facility]    SE    [id number]







Results







           Test Description    Test Time    Test Comments    Text Results    Atomic Results    Result

 Comments

 

                POCT-GLUCOSE METER    2019 12:27:00                      

 

   

 

                POC-GLUCOSE METER (BEAKER) (test code=1538)    234 mg/dL                 TESTED AT [address] [address]





POCT-GLUCOSE [id number] 08:08:00* 





                Test Item       Value           Reference Range    Comments

 

                POC-GLUCOSE METER (BEAKER) (test code=1538)    118 mg/dL                 TESTED AT [facility] 

[address] [facility] [place] [address]





[id number] 04:44:00* 





                Test Item       Value           Reference Range    Comments

 

                MAGNESIUM (BEAKER) (test code=627)    1.9 mg/dL       1.6-2.6          





BASIC METABOLIC [id number] 04:44:00* 





                Test Item       Value           Reference Range    Comments

 

                SODIUM (BEAKER) (test code=381)    137 meq/L       136-145          

 

                POTASSIUM (BEAKER) (test code=379)    3.6 meq/L       3.5-5.1          

 

                CHLORIDE (BEAKER) (test code=382)    101 meq/L                  

 

                CO2 (BEAKER) (test code=355)    26 meq/L        22-29            

 

                BLOOD UREA NITROGEN (BEAKER) (test code=354)    29 mg/dL        7-21             

 

                CREATININE (BEAKER) (test code=358)    1.38 mg/dL      0.57-1.25        

 

                GLUCOSE RANDOM (BEAKER) (test code=652)    91 mg/dL                   

 

                CALCIUM (BEAKER) (test code=697)    8.6 mg/dL       8.4-10.2         

 

                EGFR (BEAKER) (test code=1092)    50 mL/min/1.73 sq m                    ESTIMATED GFR IS NOT AS 

ACCURATE AS CREATININE CLEARANCE IN PREDICTING GLOMERULAR FILTRATION RATE. 
ESTIMATED GFR IS NOT APPLICABLE FOR DIALYSIS PATIENTS.





CBC W/PLT COUNT & AUTO [id number] 04:09:00* 





                Test Item       Value           Reference Range    Comments

 

                WHITE BLOOD CELL COUNT (BEAKER) (test code=775)    9.9 K/ L        3.5-10.5         

 

                RED BLOOD CELL COUNT (BEAKER) (test code=761)    3.06 M/ L       4.63-6.08        

 

                HEMOGLOBIN (BEAKER) (test code=410)    8.0 GM/DL       13.7-17.5        

 

                HEMATOCRIT (BEAKER) (test code=411)    27.1 %          40.1-51.0        

 

                MEAN CORPUSCULAR VOLUME (BEAKER) (test code=753)    88.6 fL         79.0-92.2        

 

                MEAN CORPUSCULAR HEMOGLOBIN (BEAKER) (test code=751)    26.1 pg         25.7-32.2        

 

                    MEAN CORPUSCULAR HEMOGLOBIN CONC (BEAKER) (test code=752)    29.5 GM/DL          32.3-36.5

                                         

 

                RED CELL DISTRIBUTION WIDTH (BEAKER) (test code=412)    18.6 %          11.6-14.4        

 

                PLATELET COUNT (BEAKER) (test code=756)    207 K/CU MM     150-450          

 

                MEAN PLATELET VOLUME (BEAKER) (test code=754)    11.4 fL         9.4-12.4         

 

                NUCLEATED RED BLOOD CELLS (BEAKER) (test code=413)    0 /100 WBC      0-0              

 

                NEUTROPHILS RELATIVE PERCENT (BEAKER) (test code=429)    75 %                             

 

                LYMPHOCYTES RELATIVE PERCENT (BEAKER) (test code=430)    14 %                             

 

                MONOCYTES RELATIVE PERCENT (BEAKER) (test code=431)    8 %                              

 

                EOSINOPHILS RELATIVE PERCENT (BEAKER) (test code=432)    2 %                              

 

                BASOPHILS RELATIVE PERCENT (BEAKER) (test code=437)    1 %                              

 

                NEUTROPHILS ABSOLUTE COUNT (BEAKER) (test code=670)    7.42 K/ L       1.78-5.38        

 

                LYMPHOCYTES ABSOLUTE COUNT (BEAKER) (test code=414)    1.39 K/ L       1.32-3.57        

 

                MONOCYTES ABSOLUTE COUNT (BEAKER) (test code=415)    0.78 K/ L       0.30-0.82        

 

                EOSINOPHILS ABSOLUTE COUNT (BEAKER) (test code=416)    0.21 K/ L       0.04-0.54        

 

                BASOPHILS ABSOLUTE COUNT (BEAKER) (test code=417)    0.07 K/ L       0.01-0.08        

 

                IMMATURE GRANULOCYTES-RELATIVE PERCENT (BEAKER) (test code=2801)    1 %             0-1              





POCT-GLUCOSE [id number] 22:03:00* 





                Test Item       Value           Reference Range    Comments

 

                POC-GLUCOSE METER (BEAKER) (test code=1538)    122 mg/dL                 TESTED AT [place] [id number] [facility] [address]





POCT-GLUCOSE [id number] 21:43:00* 





                Test Item       Value           Reference Range    Comments

 

                POC-GLUCOSE METER (BEAKER) (test code=1538)    129 mg/dL                 TESTED AT [place] [id number] [facility] [address]





RAPID DRUG SCREEN, [id number] 20:03:00* 





                Test Item       Value           Reference Range    Comments

 

                BARBITURATE URINE (BEAKER) (test code=725)    Negative        Negative         

 

                BENZODIAZEPINE SCREEN URINE (BEAKER) (test code=726)    Negative        Negative         

 

                COCAINE (METAB.) SCREEN (BEAKER) (test code=1164)    Negative        Negative         

 

                METHADONE SCREEN (BEAKER) (test code=1436)    Negative        Negative         

 

                OPIATE SCREEN URINE (BEAKER) (test code=734)    Negative        Negative         

 

                CANNABINOID SCREEN URINE (BEAKER) (test code=727)    Negative        Negative         

 

                AMPH/METHAMPH SCREEN (BEAKER) (test code=1438)    Negative        Negative         

 

                PHENCYCLIDINE SCREEN URINE (BEAKER) (test code=608)    Negative        Negative         





DRUG                CUTOFF CONC.Cocaine               300 ng/mL  Cannabinoid    
       50 ng/mLBenzodiazepine        200 ng/mLBarbiturate           200 ng/mLPh
encyclidine          25 ng/mLOpiate                300 ng/mLMethadone           
 300 ng/mLAmphetamine/         1000 ng/mL  MethamphetamineThis assay provides an
unconfirmed qualitative test result for the clinical management of patients in 
emergency situations. Chain of custody not maintained. Some over-the-counter me
dications, as well as adulterants, may cause inaccurate results. Clinical correl
ation should be applied. A more comprehensive drug screen or confirmation of a d
etected drug may be performed upon request.POCT-GLUCOSE [id number] 17:20:00
  * 





                Test Item       Value           Reference Range    Comments

 

                POC-GLUCOSE METER (BEAKER) (test code=1538)    214 mg/dL                 TESTED AT [place] [id number] [facility] [address]





POCT-GLUCOSE [id number] 14:07:00* 





                Test Item       Value           Reference Range    Comments

 

                POC-GLUCOSE METER (BEAKER) (test code=1538)    243 mg/dL                 TESTED AT [place] [id number] [facility] [address]





POCT-GLUCOSE [id number] 08:09:00* 





                Test Item       Value           Reference Range    Comments

 

                POC-GLUCOSE METER (BEAKER) (test code=1538)    79 mg/dL                  TESTED AT [place] [id number]

 [facility] [address]





[id number] 05:42:00* 





                Test Item       Value           Reference Range    Comments

 

                MAGNESIUM (BEAKER) (test code=627)    1.9 mg/dL       1.6-2.6          





BASIC METABOLIC [id number] 05:42:00* 





                Test Item       Value           Reference Range    Comments

 

                SODIUM (BEAKER) (test code=381)    137 meq/L       136-145          

 

                POTASSIUM (BEAKER) (test code=379)    3.7 meq/L       3.5-5.1          

 

                CHLORIDE (BEAKER) (test code=382)    103 meq/L                  

 

                CO2 (BEAKER) (test code=355)    25 meq/L        22-29            

 

                BLOOD UREA NITROGEN (BEAKER) (test code=354)    28 mg/dL        7-21             

 

                CREATININE (BEAKER) (test code=358)    1.19 mg/dL      0.57-1.25        

 

                GLUCOSE RANDOM (BEAKER) (test code=652)    81 mg/dL                   

 

                CALCIUM (BEAKER) (test code=697)    8.5 mg/dL       8.4-10.2         

 

                EGFR (BEAKER) (test code=1092)    59 mL/min/1.73 sq m                    ESTIMATED GFR IS NOT AS 

ACCURATE AS CREATININE CLEARANCE IN PREDICTING GLOMERULAR FILTRATION RATE. 
ESTIMATED GFR IS NOT APPLICABLE FOR DIALYSIS PATIENTS.





CBC W/PLT COUNT & AUTO [id number] 05:19:00* 





                Test Item       Value           Reference Range    Comments

 

                WHITE BLOOD CELL COUNT (BEAKER) (test code=775)    8.6 K/ L        3.5-10.5         

 

                RED BLOOD CELL COUNT (BEAKER) (test code=761)    3.13 M/ L       4.63-6.08        

 

                HEMOGLOBIN (BEAKER) (test code=410)    8.2 GM/DL       13.7-17.5        

 

                HEMATOCRIT (BEAKER) (test code=411)    27.6 %          40.1-51.0        

 

                MEAN CORPUSCULAR VOLUME (BEAKER) (test code=753)    88.2 fL         79.0-92.2        

 

                MEAN CORPUSCULAR HEMOGLOBIN (BEAKER) (test code=751)    26.2 pg         25.7-32.2        

 

                    MEAN CORPUSCULAR HEMOGLOBIN CONC (BEAKER) (test code=752)    29.7 GM/DL          32.3-36.5

                                         

 

                RED CELL DISTRIBUTION WIDTH (BEAKER) (test code=412)    18.6 %          11.6-14.4        

 

                PLATELET COUNT (BEAKER) (test code=756)    213 K/CU MM     150-450          

 

                MEAN PLATELET VOLUME (BEAKER) (test code=754)    11.3 fL         9.4-12.4         

 

                NUCLEATED RED BLOOD CELLS (BEAKER) (test code=413)    0 /100 WBC      0-0              

 

                NEUTROPHILS RELATIVE PERCENT (BEAKER) (test code=429)    73 %                             

 

                LYMPHOCYTES RELATIVE PERCENT (BEAKER) (test code=430)    16 %                             

 

                MONOCYTES RELATIVE PERCENT (BEAKER) (test code=431)    8 %                              

 

                EOSINOPHILS RELATIVE PERCENT (BEAKER) (test code=432)    2 %                              

 

                BASOPHILS RELATIVE PERCENT (BEAKER) (test code=437)    1 %                              

 

                NEUTROPHILS ABSOLUTE COUNT (BEAKER) (test code=670)    6.24 K/ L       1.78-5.38        

 

                LYMPHOCYTES ABSOLUTE COUNT (BEAKER) (test code=414)    1.35 K/ L       1.32-3.57        

 

                MONOCYTES ABSOLUTE COUNT (BEAKER) (test code=415)    0.72 K/ L       0.30-0.82        

 

                EOSINOPHILS ABSOLUTE COUNT (BEAKER) (test code=416)    0.18 K/ L       0.04-0.54        

 

                BASOPHILS ABSOLUTE COUNT (BEAKER) (test code=417)    0.06 K/ L       0.01-0.08        

 

                IMMATURE GRANULOCYTES-RELATIVE PERCENT (BEAKER) (test code=2801)    1 %             0-1              





POCT-GLUCOSE METER2019-07-10 23:18:00* 





                Test Item       Value           Reference Range    Comments

 

                POC-GLUCOSE METER (BEAKER) (test code=1538)    144 mg/dL                 TESTED AT [place] [id number]





HEMOGLOBIN A1C2019-07-10 19:55:00* 





                Test Item       Value           Reference Range    Comments

 

                HEMOGLOBIN A1C (BEAKER) (test code=368)    7.4 %           4.3-6.1          





POCT-GLUCOSE METER2019-07-10 19:42:00* 





                Test Item       Value           Reference Range    Comments

 

                POC-GLUCOSE METER (BEAKER) (test code=1538)    86 mg/dL                  TESTED AT [address] [address]





POCT-GLUCOSE METER2019-07-10 19:12:00* 





                Test Item       Value           Reference Range    Comments

 

                POC-GLUCOSE METER (BEAKER) (test code=1538)    41 mg/dL                  TESTED AT [address] [address]





URINALYSIS W/ MICROSCOPIC2019-07-10 16:28:00* 





                Test Item       Value           Reference Range    Comments

 

                COLOR (BEAKER) (test code=470)    Colorless                        

 

                CLARITY (BEAKER) (test code=469)    Clear                            

 

                SPECIFIC GRAVITY UA (BEAKER) (test code=468)    1.004           1.001-1.035      

 

                PH UA (BEAKER) (test code=467)    5.0             5.0-8.0          

 

                PROTEIN UA (BEAKER) (test code=464)    Negative        Negative         

 

                GLUCOSE UA (BEAKER) (test code=365)    Negative        Negative         

 

                KETONES UA (BEAKER) (test code=371)    Negative        Negative         

 

                BILIRUBIN UA (BEAKER) (test code=462)    Negative        Negative         

 

                BLOOD UA (BEAKER) (test code=461)    Negative        Negative         

 

                NITRITE UA (BEAKER) (test code=465)    Negative        Negative         

 

                LEUKOCYTE ESTERASE UA (BEAKER) (test code=466)    Negative        Negative         

 

                UROBILINOGEN UA (BEAKER) (test code=463)    0.2 mg/dL       0.2-1.0          

 

                RBC UA (BEAKER) (test code=519)    0 /HPF                           

 

                WBC UA (BEAKER) (test code=520)    2 /HPF                           

 

                BACTERIA (BEAKER) (test jpcy=104)    Rare                             

 

                SQUAMOUS EPITHELIAL (BEAKER) (test code=516)    < /HPF                           

 

                SOURCE(BEAKER) (test code=2795)    Urine, Clean Catch                     





RAD, CHEST, 1 VIEW, NON DEPT2019-07-10 16:02:00Reason for exam:->SHORTNESS OF 
BREATHShould this be performed at the bedside?->YesFINAL REPORT PATIENT ID:   
[id number] Portable chest 7/10/2019 Since 2019, there is been slight 
worsening of the hazy pulmonary opacities consistent with mild pulmonary edema. 
The heart is borderline enlarged. There is no evidence of pneumothorax or 
significant pleural effusion. Implantable loop recorder is again projected over 
the left heart. Further, there is deformity of the head of the right humerus 
consistent with an incompletely healed fracture. CONCLUSION: Findings of mild 
pulmonary edema. Signed: Cesia Cannon MDReport Verified Date/Time:  07/10/2019 
16:02:27 Reading Location: [facility] Radiology Reading Room      Electronically 
signed by: CESIA CANNON M.D. on 07/10/2019 04:02 PM B-TYPE NATRIURETIC FACTOR
(BNP)2019-07-10 15:41:00* 





                Test Item       Value           Reference Range    Comments

 

                B-TYPE NATRIURETIC PEPTIDE (BEAKER) (test code=700)    1251 pg/mL      0-100            





TROPONIN -07-10 15:41:00* 





                Test Item       Value           Reference Range    Comments

 

                TROPONIN I (BEAKER) (test code=397)    < ng/mL         0.00-0.03        





Troponin I (TnI) levels must be interpreted in the context of the presenting sym
ptoms and the clinical findings. Elevated TnI levels indicate myocardial damage,
but are not specific for ischemic heart disease. Elevated TnI levels are seen in
patients with other cardiac conditions (including myocarditis and congestive h
eart failure), and slight TnI elevations occur in patients with other conditions
, including sepsis, renal failure, acidosis, acute neurological disease, and per
sistent tachyarrhythmia.BASIC METABOLIC PANEL2019-07-10 15:32:00* 





                Test Item       Value           Reference Range    Comments

 

                SODIUM (BEAKER) (test code=381)    139 meq/L       136-145          

 

                POTASSIUM (BEAKER) (test code=379)    4.0 meq/L       3.5-5.1          

 

                CHLORIDE (BEAKER) (test code=382)    104 meq/L                  

 

                CO2 (BEAKER) (test code=355)    26 meq/L        22-29            

 

                BLOOD UREA NITROGEN (BEAKER) (test code=354)    27 mg/dL        7-21             

 

                CREATININE (BEAKER) (test code=358)    1.28 mg/dL      0.57-1.25        

 

                GLUCOSE RANDOM (BEAKER) (test code=652)    96 mg/dL                   

 

                CALCIUM (BEAKER) (test code=697)    8.9 mg/dL       8.4-10.2         

 

                EGFR (BEAKER) (test code=1092)    54 mL/min/1.73 sq m                    ESTIMATED GFR IS NOT AS 

ACCURATE AS CREATININE CLEARANCE IN PREDICTING GLOMERULAR FILTRATION RATE. 
ESTIMATED GFR IS NOT APPLICABLE FOR DIALYSIS PATIENTS.





POCT-LACTIC ACID, VENOUS2019-07-10 15:28:00* 





                Test Item       Value           Reference Range    Comments

 

                POC-LACTIC ACID, VENOUS (BEAKER) (test code=2805)    1.0 mmol/L      0.9-1.7         TESTED AT

 [facility] [address] [facility] [address]





CBC W/PLT COUNT & AUTO DIFFERENTIAL2019-07-10 15:11:00* 





                Test Item       Value           Reference Range    Comments

 

                WHITE BLOOD CELL COUNT (BEAKER) (test code=775)    9.8 K/ L        3.5-10.5         

 

                RED BLOOD CELL COUNT (BEAKER) (test code=761)    3.21 M/ L       4.63-6.08        

 

                HEMOGLOBIN (BEAKER) (test code=410)    8.6 GM/DL       13.7-17.5        

 

                HEMATOCRIT (BEAKER) (test code=411)    28.6 %          40.1-51.0        

 

                MEAN CORPUSCULAR VOLUME (BEAKER) (test code=753)    89.1 fL         79.0-92.2        

 

                MEAN CORPUSCULAR HEMOGLOBIN (BEAKER) (test code=751)    26.8 pg         25.7-32.2        

 

                    MEAN CORPUSCULAR HEMOGLOBIN CONC (BEAKER) (test code=752)    30.1 GM/DL          32.3-36.5

                                         

 

                RED CELL DISTRIBUTION WIDTH (BEAKER) (test code=412)    18.4 %          11.6-14.4        

 

                PLATELET COUNT (BEAKER) (test code=756)    223 K/CU MM     150-450          

 

                MEAN PLATELET VOLUME (BEAKER) (test code=754)    10.6 fL         9.4-12.4         

 

                NUCLEATED RED BLOOD CELLS (BEAKER) (test code=413)    0 /100 WBC      0-0              

 

                NEUTROPHILS RELATIVE PERCENT (BEAKER) (test code=429)    70 %                             

 

                LYMPHOCYTES RELATIVE PERCENT (BEAKER) (test code=430)    17 %                             

 

                MONOCYTES RELATIVE PERCENT (BEAKER) (test code=431)    9 %                              

 

                EOSINOPHILS RELATIVE PERCENT (BEAKER) (test code=432)    3 %                              

 

                BASOPHILS RELATIVE PERCENT (BEAKER) (test code=437)    1 %                              

 

                NEUTROPHILS ABSOLUTE COUNT (BEAKER) (test code=670)    6.90 K/ L       1.78-5.38        

 

                LYMPHOCYTES ABSOLUTE COUNT (BEAKER) (test code=414)    1.67 K/ L       1.32-3.57        

 

                MONOCYTES ABSOLUTE COUNT (BEAKER) (test code=415)    0.86 K/ L       0.30-0.82        

 

                EOSINOPHILS ABSOLUTE COUNT (BEAKER) (test code=416)    0.25 K/ L       0.04-0.54        

 

                BASOPHILS ABSOLUTE COUNT (BEAKER) (test code=417)    0.07 K/ L       0.01-0.08        

 

                IMMATURE GRANULOCYTES-RELATIVE PERCENT (BEAKER) (test code=2801)    1 %             0-1              





CATHETER TIP [id number] 11:22:00* 





                Test Item       Value           Reference Range    Comments

 

                CULTURE (BEAKER) (test code=1095)    No growth                        





POCT-GLUCOSE [id number] 14:39:00* 





                Test Item       Value           Reference Range    Comments

 

                POC-GLUCOSE METER (BEAKER) (test code=1538)    258 mg/dL                 TESTED AT [facility] 

[address] [facility] [address]





[id number] 12:03:00* 





                Test Item       Value           Reference Range    Comments

 

                MAGNESIUM (BEAKER) (test code=627)    1.8 mg/dL       1.6-2.6          





BASIC METABOLIC [id number] 12:03:00* 





                Test Item       Value           Reference Range    Comments

 

                SODIUM (BEAKER) (test code=381)    135 meq/L       136-145          

 

                POTASSIUM (BEAKER) (test code=379)    3.8 meq/L       3.5-5.1          

 

                CHLORIDE (BEAKER) (test code=382)    98 meq/L                   

 

                CO2 (BEAKER) (test code=355)    24 meq/L        22-29            

 

                BLOOD UREA NITROGEN (BEAKER) (test code=354)    30 mg/dL        7-21             

 

                CREATININE (BEAKER) (test code=358)    1.18 mg/dL      0.57-1.25        

 

                GLUCOSE RANDOM (BEAKER) (test code=652)    315 mg/dL                  

 

                CALCIUM (BEAKER) (test code=697)    9.4 mg/dL       8.4-10.2         

 

                EGFR (BEAKER) (test code=1092)    60 mL/min/1.73 sq m                    ESTIMATED GFR IS NOT AS 

ACCURATE AS CREATININE CLEARANCE IN PREDICTING GLOMERULAR FILTRATION RATE. 
ESTIMATED GFR IS NOT APPLICABLE FOR DIALYSIS PATIENTS.





HEMOGLOBIN AND [id number] 11:47:00* 





                Test Item       Value           Reference Range    Comments

 

                HEMOGLOBIN (BEAKER) (test code=410)    11.9 GM/DL      13.7-17.5        

 

                HEMATOCRIT (BEAKER) (test code=411)    38.7 %          40.1-51.0        





POCT-GLUCOSE [id number] 09:50:00* 





                Test Item       Value           Reference Range    Comments

 

                POC-GLUCOSE METER (BEAKER) (test code=1538)    230 mg/dL                 TESTED AT [facility] 

[address] [facility] [address]





POCT-GLUCOSE [id number] 23:57:00* 





                Test Item       Value           Reference Range    Comments

 

                POC-GLUCOSE METER (BEAKER) (test code=1538)    197 mg/dL                 TESTED AT [facility] 

[address] [facility] [address]





POCT-GLUCOSE [id number] 17:34:00* 





                Test Item       Value           Reference Range    Comments

 

                POC-GLUCOSE METER (BEAKER) (test code=1538)    198 mg/dL                 TESTED AT [facility] 

[address] [facility] [address]





URINALYSIS W/ REFLEX URINE [id number] 13:51:00* 





                Test Item       Value           Reference Range    Comments

 

                COLOR (BEAKER) (test code=470)    Yellow                           

 

                CLARITY (BEAKER) (test code=469)    Clear                            

 

                SPECIFIC GRAVITY UA (BEAKER) (test code=468)    1.010           1.001-1.035      

 

                PH UA (BEAKER) (test code=467)    5.5             5.0-8.0          

 

                PROTEIN UA (BEAKER) (test code=464)    Negative        Negative         

 

                GLUCOSE UA (BEAKER) (test code=365)    Negative        Negative         

 

                KETONES UA (BEAKER) (test code=371)    Negative        Negative         

 

                BILIRUBIN UA (BEAKER) (test code=462)    Negative        Negative         

 

                BLOOD UA (BEAKER) (test code=461)    Small           Negative         

 

                NITRITE UA (BEAKER) (test code=465)    Negative        Negative         

 

                LEUKOCYTE ESTERASE UA (BEAKER) (test code=466)    Small           Negative         

 

                UROBILINOGEN UA (BEAKER) (test code=463)    0.2 mg/dL       0.2-1.0          

 

                RBC UA (BEAKER) (test code=519)    1 /HPF                           

 

                WBC UA (BEAKER) (test code=520)    3 /HPF                           

 

                MUCUS (BEAKER) (test code=1574)    Rare                             

 

                SQUAMOUS EPITHELIAL (BEAKER) (test code=516)    1 /HPF                           

 

                HYALINE CASTS (BEAKER) (test code=514)    12 /LPF                          

 

                SOURCE(BEAKER) (test code=2795)                                     





POCT-GLUCOSE [id number] 12:02:00* 





                Test Item       Value           Reference Range    Comments

 

                POC-GLUCOSE METER (BEAKER) (test code=1538)    171 mg/dL                 TESTED AT [facility] 

[address] [facility] [address]





POCT-GLUCOSE [id number] 08:14:00* 





                Test Item       Value           Reference Range    Comments

 

                POC-GLUCOSE METER (BEAKER) (test code=1538)    156 mg/dL                 TESTED AT [facility] 

[address] [address]





[id number] 05:34:00* 





                Test Item       Value           Reference Range    Comments

 

                MAGNESIUM (BEAKER) (test code=627)    1.8 mg/dL       1.6-2.6          





BASIC METABOLIC [id number] 05:34:00* 





                Test Item       Value           Reference Range    Comments

 

                SODIUM (BEAKER) (test code=381)    140 meq/L       136-145          

 

                POTASSIUM (BEAKER) (test code=379)    3.3 meq/L       3.5-5.1          

 

                CHLORIDE (BEAKER) (test code=382)    99 meq/L                   

 

                CO2 (BEAKER) (test code=355)    30 meq/L        22-29            

 

                BLOOD UREA NITROGEN (BEAKER) (test code=354)    23 mg/dL        7-21             

 

                CREATININE (BEAKER) (test code=358)    0.92 mg/dL      0.57-1.25        

 

                GLUCOSE RANDOM (BEAKER) (test code=652)    119 mg/dL                  

 

                CALCIUM (BEAKER) (test code=697)    8.9 mg/dL       8.4-10.2         

 

                EGFR (BEAKER) (test code=1092)    80 mL/min/1.73 sq m                    ESTIMATED GFR IS NOT AS 

ACCURATE AS CREATININE CLEARANCE IN PREDICTING GLOMERULAR FILTRATION RATE. 
ESTIMATED GFR IS NOT APPLICABLE FOR DIALYSIS PATIENTS.





CBC W/PLT COUNT & AUTO [id number] 05:08:00* 





                Test Item       Value           Reference Range    Comments

 

                WHITE BLOOD CELL COUNT (BEAKER) (test code=775)    13.6 K/ L       3.5-10.5         

 

                RED BLOOD CELL COUNT (BEAKER) (test code=761)    4.15 M/ L       4.63-6.08        

 

                HEMOGLOBIN (BEAKER) (test code=410)    11.1 GM/DL      13.7-17.5        

 

                HEMATOCRIT (BEAKER) (test code=411)    36.4 %          40.1-51.0        

 

                MEAN CORPUSCULAR VOLUME (BEAKER) (test code=753)    87.7 fL         79.0-92.2        

 

                MEAN CORPUSCULAR HEMOGLOBIN (BEAKER) (test code=751)    26.7 pg         25.7-32.2        

 

                    MEAN CORPUSCULAR HEMOGLOBIN CONC (BEAKER) (test code=752)    30.5 GM/DL          32.3-36.5

                                         

 

                RED CELL DISTRIBUTION WIDTH (BEAKER) (test code=412)    17.3 %          11.6-14.4        

 

                PLATELET COUNT (BEAKER) (test code=756)    220 K/CU MM     150-450          

 

                MEAN PLATELET VOLUME (BEAKER) (test code=754)    9.7 fL          9.4-12.4         

 

                NUCLEATED RED BLOOD CELLS (BEAKER) (test code=413)    0 /100 WBC      0-0              

 

                NEUTROPHILS RELATIVE PERCENT (BEAKER) (test code=429)    58 %                             

 

                LYMPHOCYTES RELATIVE PERCENT (BEAKER) (test code=430)    26 %                             

 

                MONOCYTES RELATIVE PERCENT (BEAKER) (test code=431)    10 %                             

 

                EOSINOPHILS RELATIVE PERCENT (BEAKER) (test code=432)    4 %                              

 

                BASOPHILS RELATIVE PERCENT (BEAKER) (test code=437)    1 %                              

 

                NEUTROPHILS ABSOLUTE COUNT (BEAKER) (test code=670)    7.92 K/ L       1.78-5.38        

 

                LYMPHOCYTES ABSOLUTE COUNT (BEAKER) (test code=414)    3.51 K/ L       1.32-3.57        

 

                MONOCYTES ABSOLUTE COUNT (BEAKER) (test code=415)    1.33 K/ L       0.30-0.82        

 

                EOSINOPHILS ABSOLUTE COUNT (BEAKER) (test code=416)    0.55 K/ L       0.04-0.54        

 

                BASOPHILS ABSOLUTE COUNT (BEAKER) (test code=417)    0.11 K/ L       0.01-0.08        

 

                IMMATURE GRANULOCYTES-RELATIVE PERCENT (BEAKER) (test code=2801)    1 %             0-1              





[id number] 21:22:00* 





                Test Item       Value           Reference Range    Comments

 

                FERRITIN (BEAKER) (test code=361)    255 ng/mL                  





POCT-GLUCOSE [id number] 17:59:00* 





                Test Item       Value           Reference Range    Comments

 

                POC-GLUCOSE METER (BEAKER) (test code=1538)    188 mg/dL                 TESTED AT [facility] 

[address] [facility] [address]





PROTEIN ELECTROPHORESIS, [id number] 14:38:00* 





                Test Item       Value           Reference Range    Comments

 

                ALBUMIN FRACTION (BEAKER) (test code=405)    3.3 g/dL        3.5-5.5          

 

                ALPHA 1 FRACTION (BEAKER) (test code=389)    0.3 g/dL        0.2-0.4          

 

                ALPHA 2 FRACTION (BEAKER) (test code=390)    1.0 g/dL        0.5-0.9          

 

                BETA FRACTION (BEAKER) (test code=392)    1.1 g/dL        0.6-1.1          

 

                GAMMA GLOBULIN FRACTION (BEAKER) (test code=391)    1.8 g/dL        0.7-1.7          

 

                          INTERPRETATION-119 (BEAKER) (test code=2615)    Pattern suggestive of acute and chronic

 inflammatory process. No monoclonal bands detected.                               

 

                          [facility]-PATHOLOGIST-119 (BEAKER) (test lfpo=5003)    Zuri Chaparro MD (electronic

 signature)                                          

 

                PROTEIN TOTAL SERUM, SPEP (BEAKER) (test code=2660)    7.4 gm/dL       6.0-8.3          





[id number] 12:48:00* 





                Test Item       Value           Reference Range    Comments

 

                MAGNESIUM (BEAKER) (test code=627)    1.6 mg/dL       1.6-2.6          





BASIC METABOLIC [id number] 12:48:00* 





                Test Item       Value           Reference Range    Comments

 

                SODIUM (BEAKER) (test code=381)    137 meq/L       136-145          

 

                POTASSIUM (BEAKER) (test code=379)    3.7 meq/L       3.5-5.1          

 

                CHLORIDE (BEAKER) (test code=382)    99 meq/L                   

 

                CO2 (BEAKER) (test code=355)    28 meq/L        22-29            

 

                BLOOD UREA NITROGEN (BEAKER) (test code=354)    23 mg/dL        7-21             

 

                CREATININE (BEAKER) (test code=358)    1.09 mg/dL      0.57-1.25        

 

                GLUCOSE RANDOM (BEAKER) (test code=652)    244 mg/dL                  

 

                CALCIUM (BEAKER) (test code=697)    8.9 mg/dL       8.4-10.2         

 

                EGFR (BEAKER) (test code=1092)    66 mL/min/1.73 sq m                    ESTIMATED GFR IS NOT AS 

ACCURATE AS CREATININE CLEARANCE IN PREDICTING GLOMERULAR FILTRATION RATE. 
ESTIMATED GFR IS NOT APPLICABLE FOR DIALYSIS PATIENTS.





POCT-GLUCOSE [id number] 12:37:00* 





                Test Item       Value           Reference Range    Comments

 

                POC-GLUCOSE METER (BEAKER) (test code=1538)    282 mg/dL                 TESTED AT [facility] 

[address] [facility] [address]





HEMOGLOBIN AND [id number] 12:32:00* 





                Test Item       Value           Reference Range    Comments

 

                HEMOGLOBIN (BEAKER) (test code=410)    10.8 GM/DL      13.7-17.5        

 

                HEMATOCRIT (BEAKER) (test code=411)    35.2 %          40.1-51.0        





VITAMIN [id number] 08:41:00* 





                Test Item       Value           Reference Range    Comments

 

                VITAMIN B12 (BEAKER) (test code=774)    721 pg/mL       213-816          





POCT-GLUCOSE [id number] 08:28:00* 





                Test Item       Value           Reference Range    Comments

 

                POC-GLUCOSE METER (BEAKER) (test code=1538)    121 mg/dL                 TESTED AT [address] [address]





LACTATE DEHYDROGENASE (LDH)2019 06:59:00* 





                Test Item       Value           Reference Range    Comments

 

                LACTATE DEHYDROGENASE (BEAKER) (test code=635)    201 U/L         125-220          





RETICULOCYTE [id number] 06:39:00* 





                Test Item       Value           Reference Range    Comments

 

                RETICULOCYTE COUNT PCT (BEAKER) (test code=575)    1.9 %           0.5-1.8          





POCT-GLUCOSE METER2019-01-15 21:17:00* 





                Test Item       Value           Reference Range    Comments

 

                POC-GLUCOSE METER (BEAKER) (test code=1538)    323 mg/dL                 Notified RN MD/TESTED

 AT [address] [address]





FERRITIN2019-01-15 06:31:00* 





                Test Item       Value           Reference Range    Comments

 

                FERRITIN (BEAKER) (test code=361)    266 ng/mL       5-275            





VITAMIN B12 AND FOLATE2019-01-15 06:31:00* 





                Test Item       Value           Reference Range    Comments

 

                VITAMIN B12 (BEAKER) (test code=774)    705 pg/mL       213-816          

 

                FOLATE (BEAKER) (test code=362)    5.9 ng/mL       >=7.0            





MAGNESIUM2019-01-15 06:22:00* 





                Test Item       Value           Reference Range    Comments

 

                MAGNESIUM (BEAKER) (test code=627)    1.7 mg/dL       1.6-2.6          





BASIC METABOLIC PANEL2019-01-15 06:22:00* 





                Test Item       Value           Reference Range    Comments

 

                SODIUM (BEAKER) (test code=381)    137 meq/L       136-145          

 

                POTASSIUM (BEAKER) (test code=379)    3.6 meq/L       3.5-5.1          

 

                CHLORIDE (BEAKER) (test code=382)    103 meq/L                  

 

                CO2 (BEAKER) (test code=355)    23 meq/L        22-29            

 

                BLOOD UREA NITROGEN (BEAKER) (test code=354)    14 mg/dL        7-21             

 

                CREATININE (BEAKER) (test code=358)    1.02 mg/dL      0.57-1.25        

 

                GLUCOSE RANDOM (BEAKER) (test code=652)    340 mg/dL                  

 

                CALCIUM (BEAKER) (test code=697)    9.1 mg/dL       8.4-10.2         

 

                EGFR (BEAKER) (test code=1092)    71 mL/min/1.73 sq m                    ESTIMATED GFR IS NOT AS 

ACCURATE AS CREATININE CLEARANCE IN PREDICTING GLOMERULAR FILTRATION RATE. 
ESTIMATED GFR IS NOT APPLICABLE FOR DIALYSIS PATIENTS.





IRON, TIBC, % SAT. (WITHOUT FERRITIN)2019-01-15 05:56:00* 





                Test Item       Value           Reference Range    Comments

 

                IRON (BEAKER) (test code=547)    67.0 ug/dL      40.0-160.0       

 

                TOTAL IRON BINDING CAPACITY (BEAKER) (test code=769)    271 ug/dL       250-450          

 

                IRON % SATURATION (2) (BEAKER) (test code=2590)    25 %            20-55            





CBC W/PLT COUNT & AUTO DIFFERENTIAL2019-01-15 04:33:00* 





                Test Item       Value           Reference Range    Comments

 

                WHITE BLOOD CELL COUNT (BEAKER) (test code=775)    10.7 K/ L       3.5-10.5         

 

                RED BLOOD CELL COUNT (BEAKER) (test code=761)    3.91 M/ L       4.63-6.08        

 

                HEMOGLOBIN (BEAKER) (test code=410)    10.6 GM/DL      13.7-17.5        

 

                HEMATOCRIT (BEAKER) (test code=411)    33.9 %          40.1-51.0        

 

                MEAN CORPUSCULAR VOLUME (BEAKER) (test code=753)    86.7 fL         79.0-92.2        

 

                MEAN CORPUSCULAR HEMOGLOBIN (BEAKER) (test code=751)    27.1 pg         25.7-32.2        

 

                    MEAN CORPUSCULAR HEMOGLOBIN CONC (BEAKER) (test code=752)    31.3 GM/DL          32.3-36.5

                                         

 

                RED CELL DISTRIBUTION WIDTH (BEAKER) (test code=412)    17.2 %          11.6-14.4        

 

                PLATELET COUNT (BEAKER) (test code=756)    212 K/CU MM     150-450          

 

                MEAN PLATELET VOLUME (BEAKER) (test code=754)    9.7 fL          9.4-12.4         

 

                NUCLEATED RED BLOOD CELLS (BEAKER) (test code=413)    0 /100 WBC      0-0              

 

                NEUTROPHILS RELATIVE PERCENT (BEAKER) (test code=429)    90 %                             

 

                LYMPHOCYTES RELATIVE PERCENT (BEAKER) (test code=430)    8 %                              

 

                MONOCYTES RELATIVE PERCENT (BEAKER) (test code=431)    2 %                              

 

                EOSINOPHILS RELATIVE PERCENT (BEAKER) (test code=432)    0 %                              

 

                BASOPHILS RELATIVE PERCENT (BEAKER) (test code=437)    0 %                              

 

                NEUTROPHILS ABSOLUTE COUNT (BEAKER) (test code=670)    9.60 K/ L       1.78-5.38        

 

                LYMPHOCYTES ABSOLUTE COUNT (BEAKER) (test code=414)    0.83 K/ L       1.32-3.57        

 

                MONOCYTES ABSOLUTE COUNT (BEAKER) (test code=415)    0.19 K/ L       0.30-0.82        

 

                EOSINOPHILS ABSOLUTE COUNT (BEAKER) (test code=416)    0.01 K/ L       0.04-0.54        

 

                BASOPHILS ABSOLUTE COUNT (BEAKER) (test code=417)    0.01 K/ L       0.01-0.08        

 

                IMMATURE GRANULOCYTES-RELATIVE PERCENT (BEAKER) (test code=2801)    0 %             0-1              





RAD, CHEST, 1 VIEW, NON [id number] 23:25:00Reason for exam:->shortnness of 
breathShould this be performed at the bedside?->YesFINAL REPORT PATIENT ID:   
[id number] RAD, CHEST, 1 VIEW, NON DEPT INDICATION: shortnness of breath 
COMPARISON: Prior day's exam FINDINGS: Portable frontal view of the chest.   
IMPRESSION:  Support Lines: Right IJ catheter terminates overlying the SVC. 
Lungs and pleura: Increased bilateral interstitial opacities, concerning for 
worsening edema versus worsening infection. Numerous subcentimeter pulmonary 
nodules are unchanged from prior CT May 27, 2018. No pneumothorax.  Heart and 
mediastinum: Stable contours. .Additional findings: None. Signed: ERIKA Ramirez MDReport Verified Date/Time:  2019 23:25:26 Reading Location: [facility] 
C013V Neuro Reading Room  Electronically signed by: JONA RAMIREZ MD
on 2019 11:25 PM POCT-GLUCOSE [id number] 23:20:00* 





                Test Item       Value           Reference Range    Comments

 

                POC-GLUCOSE METER (BEAKER) (test code=1538)    347 mg/dL                 TESTED AT [address] [address]





[id number] 19:11:00* 





                Test Item       Value           Reference Range    Comments

 

                ACTIVATED CLOTTING TIME (BEAKER) (test code=441)    246 sec                         TESTED AT [address] [address]





[id number] 19:11:00* 





                Test Item       Value           Reference Range    Comments

 

                ACTIVATED CLOTTING TIME (BEAKER) (test code=441)    263 sec                         TESTED AT [address] [address]





[id number] 15:39:00* 





                Test Item       Value           Reference Range    Comments

 

                PARTIAL THROMBOPLASTIN TIME (BEAKER) (test code=760)    63.9 seconds    22.5-36.0        







POCT-GLUCOSE [id number] 14:34:00* 





                Test Item       Value           Reference Range    Comments

 

                POC-GLUCOSE METER (BEAKER) (test code=1538)    223 mg/dL                 TESTED AT [address] [address]





POCT-GLUCOSE [id number] 07:48:00* 





                Test Item       Value           Reference Range    Comments

 

                POC-GLUCOSE METER (BEAKER) (test code=1538)    168 mg/dL                 TESTED AT [place] [id number]





[id number] 04:02:00* 





                Test Item       Value           Reference Range    Comments

 

                MAGNESIUM (BEAKER) (test code=627)    1.5 mg/dL       1.6-2.6          





BASIC METABOLIC [id number] 04:02:00* 





                Test Item       Value           Reference Range    Comments

 

                SODIUM (BEAKER) (test code=381)    137 meq/L       136-145          

 

                POTASSIUM (BEAKER) (test code=379)    3.7 meq/L       3.5-5.1          

 

                CHLORIDE (BEAKER) (test code=382)    104 meq/L                  

 

                CO2 (BEAKER) (test code=355)    26 meq/L        22-29            

 

                BLOOD UREA NITROGEN (BEAKER) (test code=354)    16 mg/dL        7-21             

 

                CREATININE (BEAKER) (test code=358)    0.82 mg/dL      0.57-1.25        

 

                GLUCOSE RANDOM (BEAKER) (test code=652)    117 mg/dL                  

 

                CALCIUM (BEAKER) (test code=697)    8.9 mg/dL       8.4-10.2         

 

                EGFR (BEAKER) (test code=1092)    91 mL/min/1.73 sq m                    ESTIMATED GFR IS NOT AS 

ACCURATE AS CREATININE CLEARANCE IN PREDICTING GLOMERULAR FILTRATION RATE. 
ESTIMATED GFR IS NOT APPLICABLE FOR DIALYSIS PATIENTS.





[id number] 03:58:00* 





                Test Item       Value           Reference Range    Comments

 

                PARTIAL THROMBOPLASTIN TIME (BEAKER) (test code=760)    55.5 seconds    22.5-36.0        







CBC W/PLT COUNT & AUTO [id number] 03:43:00* 





                Test Item       Value           Reference Range    Comments

 

                WHITE BLOOD CELL COUNT (BEAKER) (test code=775)    8.8 K/ L        3.5-10.5         

 

                RED BLOOD CELL COUNT (BEAKER) (test code=761)    3.52 M/ L       4.63-6.08        

 

                HEMOGLOBIN (BEAKER) (test code=410)    9.5 GM/DL       13.7-17.5        

 

                HEMATOCRIT (BEAKER) (test code=411)    31.0 %          40.1-51.0        

 

                MEAN CORPUSCULAR VOLUME (BEAKER) (test code=753)    88.1 fL         79.0-92.2        

 

                MEAN CORPUSCULAR HEMOGLOBIN (BEAKER) (test code=751)    27.0 pg         25.7-32.2        

 

                    MEAN CORPUSCULAR HEMOGLOBIN CONC (BEAKER) (test code=752)    30.6 GM/DL          32.3-36.5

                                         

 

                RED CELL DISTRIBUTION WIDTH (BEAKER) (test code=412)    17.4 %          11.6-14.4        

 

                PLATELET COUNT (BEAKER) (test code=756)    203 K/CU MM     150-450          

 

                MEAN PLATELET VOLUME (BEAKER) (test code=754)    9.7 fL          9.4-12.4         

 

                NUCLEATED RED BLOOD CELLS (BEAKER) (test code=413)    0 /100 WBC      0-0              

 

                NEUTROPHILS RELATIVE PERCENT (BEAKER) (test code=429)    49 %                             

 

                LYMPHOCYTES RELATIVE PERCENT (BEAKER) (test code=430)    31 %                             

 

                MONOCYTES RELATIVE PERCENT (BEAKER) (test code=431)    10 %                             

 

                EOSINOPHILS RELATIVE PERCENT (BEAKER) (test code=432)    8 %                              

 

                BASOPHILS RELATIVE PERCENT (BEAKER) (test code=437)    1 %                              

 

                NEUTROPHILS ABSOLUTE COUNT (BEAKER) (test code=670)    4.32 K/ L       1.78-5.38        

 

                LYMPHOCYTES ABSOLUTE COUNT (BEAKER) (test code=414)    2.77 K/ L       1.32-3.57        

 

                MONOCYTES ABSOLUTE COUNT (BEAKER) (test code=415)    0.89 K/ L       0.30-0.82        

 

                EOSINOPHILS ABSOLUTE COUNT (BEAKER) (test code=416)    0.73 K/ L       0.04-0.54        

 

                BASOPHILS ABSOLUTE COUNT (BEAKER) (test code=417)    0.09 K/ L       0.01-0.08        

 

                IMMATURE GRANULOCYTES-RELATIVE PERCENT (BEAKER) (test code=2801)    1 %             0-1              





POCT-GLUCOSE [id number] 22:10:00* 





                Test Item       Value           Reference Range    Comments

 

                POC-GLUCOSE METER (BEAKER) (test code=1538)    248 mg/dL                 TESTED AT [place] [address]





[id number] 20:39:00* 





                Test Item       Value           Reference Range    Comments

 

                PARTIAL THROMBOPLASTIN TIME (BEAKER) (test code=760)    44.5 seconds    22.5-36.0        







POCT-GLUCOSE [id number] 17:47:00* 





                Test Item       Value           Reference Range    Comments

 

                POC-GLUCOSE METER (BEAKER) (test code=1538)    221 mg/dL                 TESTED AT [place] [address]





[id number] 12:27:00* 





                Test Item       Value           Reference Range    Comments

 

                PARTIAL THROMBOPLASTIN TIME (BEAKER) (test code=760)    102.2 seconds    22.5-36.0       

 





POCT-GLUCOSE [id number] 12:15:00* 





                Test Item       Value           Reference Range    Comments

 

                POC-GLUCOSE METER (BEAKER) (test code=1538)    276 mg/dL                 TESTED AT [place] [id number]





POCT-GLUCOSE [id number] 09:13:00* 





                Test Item       Value           Reference Range    Comments

 

                POC-GLUCOSE METER (BEAKER) (test code=1538)    220 mg/dL                 TESTED AT [facility] 

[address] [facility] [address]





TROPONIN -19-05 08:08:00* 





                Test Item       Value           Reference Range    Comments

 

                TROPONIN I (BEAKER) (test code=397)    1.72 ng/mL      0.00-0.03        





Troponin I (TnI) levels must be interpreted in the context of the presenting sym
ptoms and the clinical findings. Elevated TnI levels indicate myocardial damage,
but are not specific for ischemic heart disease. Elevated TnI levels are seen in
patients with other cardiac conditions (including myocarditis and congestive h
eart failure), and slight TnI elevations occur in patients with other conditions
, including sepsis, renal failure, acidosis, acute neurological disease, and per
sistent tachyarrhythmia.RAD, CHEST, 1 VIEW, NON [id number] 06:08:00Reason 
for exam:->chest painShould this be performed at the bedside?->YesFINAL REPORT 
PATIENT ID:   [id number] INDICATION: chest pain COMPARISON: 2018 
TECHNIQUE: Single frontal view of the chest. FINDINGS: Lungs and pleura: Left 
retrocardiac atelectasis. No megan airspace edema. No effusion.Heart and med
iastinum: Normal heart size. Unremarkable mediastinal contours.Osseous structure
s: No acute abnormality.Other: Right IJ catheter with tip at the distal SVC.  IM
PRESSION: Left retrocardiac atelectasis. Superimposed infection may be excluded 
clinically.  Signed: Jona Ramirez MDReport Verified Date/Time:  2019 06
:08:55 Reading Location: [facility] C013V Neuro Reading Room  Electronically signed 
by: JONA RAMIREZ MD on 2019 06:08 AM [id number] 
05:59:00* 





                Test Item       Value           Reference Range    Comments

 

                MAGNESIUM (BEAKER) (test code=627)    1.4 mg/dL       1.6-2.6          





BASIC METABOLIC [id number] 05:59:00* 





                Test Item       Value           Reference Range    Comments

 

                SODIUM (BEAKER) (test code=381)    136 meq/L       136-145          

 

                POTASSIUM (BEAKER) (test code=379)    3.9 meq/L       3.5-5.1          

 

                CHLORIDE (BEAKER) (test code=382)    103 meq/L                  

 

                CO2 (BEAKER) (test code=355)    24 meq/L        22-29            

 

                BLOOD UREA NITROGEN (BEAKER) (test code=354)    19 mg/dL        7-21             

 

                CREATININE (BEAKER) (test code=358)    1.06 mg/dL      0.57-1.25        

 

                GLUCOSE RANDOM (BEAKER) (test code=652)    234 mg/dL                  

 

                CALCIUM (BEAKER) (test code=697)    9.0 mg/dL       8.4-10.2         

 

                EGFR (BEAKER) (test code=1092)    68 mL/min/1.73 sq m                    ESTIMATED GFR IS NOT AS 

ACCURATE AS CREATININE CLEARANCE IN PREDICTING GLOMERULAR FILTRATION RATE. 
ESTIMATED GFR IS NOT APPLICABLE FOR DIALYSIS PATIENTS.





[id number] 05:53:00* 





                Test Item       Value           Reference Range    Comments

 

                PROTIME (BEAKER) (test code=759)    15.1 seconds    11.7-14.7        

 

                INR (BEAKER) (test code=370)    1.2             <=5.9            

 

                PARTIAL THROMBOPLASTIN TIME (BEAKER) (test code=760)    48.2 seconds    22.5-36.0        







RECOMMENDED COUMADIN/WARFARIN INR THERAPY RANGESSTANDARD DOSE: 2.0 - 3.0   Inclu
[place]: PROPHYLAXIS for venous thrombosis, systemic embolization; TREATMENT for luis
ous thrombosis and/or pulmonary embolus.HIGH RISK: Target INR is 2.5-3.5 for pat
ients with mechanical heart valves.CBC W/PLT COUNT & AUTO [id number]
05:38:00* 





                Test Item       Value           Reference Range    Comments

 

                WHITE BLOOD CELL COUNT (BEAKER) (test code=775)    11.8 K/ L       3.5-10.5         

 

                RED BLOOD CELL COUNT (BEAKER) (test code=761)    3.71 M/ L       4.63-6.08        

 

                HEMOGLOBIN (BEAKER) (test code=410)    10.0 GM/DL      13.7-17.5        

 

                HEMATOCRIT (BEAKER) (test code=411)    32.2 %          40.1-51.0        

 

                MEAN CORPUSCULAR VOLUME (BEAKER) (test code=753)    86.8 fL         79.0-92.2        

 

                MEAN CORPUSCULAR HEMOGLOBIN (BEAKER) (test code=751)    27.0 pg         25.7-32.2        

 

                    MEAN CORPUSCULAR HEMOGLOBIN CONC (BEAKER) (test code=752)    31.1 GM/DL          32.3-36.5

                                         

 

                RED CELL DISTRIBUTION WIDTH (BEAKER) (test code=412)    17.2 %          11.6-14.4        

 

                PLATELET COUNT (BEAKER) (test code=756)    234 K/CU MM     150-450          

 

                MEAN PLATELET VOLUME (BEAKER) (test code=754)    9.8 fL          9.4-12.4         

 

                NUCLEATED RED BLOOD CELLS (BEAKER) (test code=413)    0 /100 WBC      0-0              

 

                NEUTROPHILS RELATIVE PERCENT (BEAKER) (test code=429)    65 %                             

 

                LYMPHOCYTES RELATIVE PERCENT (BEAKER) (test code=430)    20 %                             

 

                MONOCYTES RELATIVE PERCENT (BEAKER) (test code=431)    10 %                             

 

                EOSINOPHILS RELATIVE PERCENT (BEAKER) (test code=432)    4 %                              

 

                BASOPHILS RELATIVE PERCENT (BEAKER) (test code=437)    1 %                              

 

                NEUTROPHILS ABSOLUTE COUNT (BEAKER) (test code=670)    7.65 K/ L       1.78-5.38        

 

                LYMPHOCYTES ABSOLUTE COUNT (BEAKER) (test code=414)    2.33 K/ L       1.32-3.57        

 

                MONOCYTES ABSOLUTE COUNT (BEAKER) (test code=415)    1.16 K/ L       0.30-0.82        

 

                EOSINOPHILS ABSOLUTE COUNT (BEAKER) (test code=416)    0.50 K/ L       0.04-0.54        

 

                BASOPHILS ABSOLUTE COUNT (BEAKER) (test code=417)    0.08 K/ L       0.01-0.08        

 

                IMMATURE GRANULOCYTES-RELATIVE PERCENT (BEAKER) (test code=2801)    0 %             0-1              





POCT-GLUCOSE [id number] 12:05:00* 





                Test Item       Value           Reference Range    Comments

 

                POC-GLUCOSE METER (BEAKER) (test code=1538)    243 mg/dL                 TESTED AT [place] [id number] [facility] [address]





POCT-GLUCOSE [id number] 07:55:00* 





                Test Item       Value           Reference Range    Comments

 

                POC-GLUCOSE METER (BEAKER) (test code=1538)    146 mg/dL                 TESTED AT [place] [id number] [facility] [address]





[id number] 05:23:00* 





                Test Item       Value           Reference Range    Comments

 

                MAGNESIUM (BEAKER) (test code=627)    1.9 mg/dL       1.6-2.6          





BASIC METABOLIC [id number] 05:23:00* 





                Test Item       Value           Reference Range    Comments

 

                SODIUM (BEAKER) (test code=381)    131 meq/L       136-145          

 

                POTASSIUM (BEAKER) (test code=379)    3.5 meq/L       3.5-5.1          

 

                CHLORIDE (BEAKER) (test code=382)    94 meq/L                   

 

                CO2 (BEAKER) (test code=355)    25 meq/L        22-29            

 

                BLOOD UREA NITROGEN (BEAKER) (test code=354)    24 mg/dL        7-21             

 

                CREATININE (BEAKER) (test code=358)    1.15 mg/dL      0.57-1.25        

 

                GLUCOSE RANDOM (BEAKER) (test code=652)    144 mg/dL                  

 

                CALCIUM (BEAKER) (test code=697)    8.8 mg/dL       8.4-10.2         

 

                EGFR (BEAKER) (test code=1092)    62 mL/min/1.73 sq m                    ESTIMATED GFR IS NOT AS 

ACCURATE AS CREATININE CLEARANCE IN PREDICTING GLOMERULAR FILTRATION RATE. 
ESTIMATED GFR IS NOT APPLICABLE FOR DIALYSIS PATIENTS.





CBC W/PLT COUNT & AUTO [id number] 05:14:00* 





                Test Item       Value           Reference Range    Comments

 

                WHITE BLOOD CELL COUNT (BEAKER) (test code=775)    12.7 K/ L       3.5-10.5         

 

                RED BLOOD CELL COUNT (BEAKER) (test code=761)    3.35 M/ L       4.63-6.08        

 

                HEMOGLOBIN (BEAKER) (test code=410)    8.0 GM/DL       13.7-17.5        

 

                HEMATOCRIT (BEAKER) (test code=411)    28.4 %          40.1-51.0        

 

                MEAN CORPUSCULAR VOLUME (BEAKER) (test code=753)    84.8 fL         79.0-92.2        

 

                MEAN CORPUSCULAR HEMOGLOBIN (BEAKER) (test code=751)    23.9 pg         25.7-32.2        

 

                    MEAN CORPUSCULAR HEMOGLOBIN CONC (BEAKER) (test code=752)    28.2 GM/DL          32.3-36.5

                                         

 

                RED CELL DISTRIBUTION WIDTH (BEAKER) (test code=412)    19.9 %          11.6-14.4        

 

                PLATELET COUNT (BEAKER) (test code=756)    311 K/CU MM     150-450          

 

                MEAN PLATELET VOLUME (BEAKER) (test code=754)    10.3 fL         9.4-12.4         

 

                NUCLEATED RED BLOOD CELLS (BEAKER) (test code=413)    0 /100 WBC      0-0              

 

                NEUTROPHILS RELATIVE PERCENT (BEAKER) (test code=429)    75 %                             

 

                LYMPHOCYTES RELATIVE PERCENT (BEAKER) (test code=430)    11 %                             

 

                MONOCYTES RELATIVE PERCENT (BEAKER) (test code=431)    9 %                              

 

                EOSINOPHILS RELATIVE PERCENT (BEAKER) (test code=432)    4 %                              

 

                BASOPHILS RELATIVE PERCENT (BEAKER) (test code=437)    1 %                              

 

                NEUTROPHILS ABSOLUTE COUNT (BEAKER) (test code=670)    9.49 K/ L       1.78-5.38        

 

                LYMPHOCYTES ABSOLUTE COUNT (BEAKER) (test code=414)    1.41 K/ L       1.32-3.57        

 

                MONOCYTES ABSOLUTE COUNT (BEAKER) (test code=415)    1.13 K/ L       0.30-0.82        

 

                EOSINOPHILS ABSOLUTE COUNT (BEAKER) (test code=416)    0.50 K/ L       0.04-0.54        

 

                BASOPHILS ABSOLUTE COUNT (BEAKER) (test code=417)    0.08 K/ L       0.01-0.08        

 

                IMMATURE GRANULOCYTES-RELATIVE PERCENT (BEAKER) (test code=2801)    1 %             0-1              





POCT-GLUCOSE [id number] 21:58:00* 





                Test Item       Value           Reference Range    Comments

 

                POC-GLUCOSE METER (BEAKER) (test code=1538)    177 mg/dL                 TESTED AT [place] [id number] [facility] [address]





POCT-GLUCOSE [id number] 16:59:00* 





                Test Item       Value           Reference Range    Comments

 

                POC-GLUCOSE METER (BEAKER) (test code=1538)    147 mg/dL                 TESTED AT [address] [address]





POCT-GLUCOSE [id number] 11:42:00* 





                Test Item       Value           Reference Range    Comments

 

                POC-GLUCOSE METER (BEAKER) (test code=1538)    262 mg/dL                 TESTED AT [address] [address]





POCT-GLUCOSE [id number] 07:41:00* 





                Test Item       Value           Reference Range    Comments

 

                POC-GLUCOSE METER (BEAKER) (test code=1538)    247 mg/dL                 TESTED AT [facility] 

[address] [facility] [address]





[id number] 07:33:00* 





                Test Item       Value           Reference Range    Comments

 

                MAGNESIUM (BEAKER) (test code=627)    2.1 mg/dL       1.6-2.6          





BASIC METABOLIC [id number] 07:33:00* 





                Test Item       Value           Reference Range    Comments

 

                SODIUM (BEAKER) (test code=381)    130 meq/L       136-145          

 

                POTASSIUM (BEAKER) (test code=379)    3.8 meq/L       3.5-5.1          

 

                CHLORIDE (BEAKER) (test code=382)    95 meq/L                   

 

                CO2 (BEAKER) (test code=355)    28 meq/L        22-29            

 

                BLOOD UREA NITROGEN (BEAKER) (test code=354)    24 mg/dL        7-21             

 

                CREATININE (BEAKER) (test code=358)    1.05 mg/dL      0.57-1.25        

 

                GLUCOSE RANDOM (BEAKER) (test code=652)    230 mg/dL                  

 

                CALCIUM (BEAKER) (test code=697)    8.6 mg/dL       8.4-10.2         

 

                EGFR (BEAKER) (test code=1092)    68 mL/min/1.73 sq m                    ESTIMATED GFR IS NOT AS 

ACCURATE AS CREATININE CLEARANCE IN PREDICTING GLOMERULAR FILTRATION RATE. 
ESTIMATED GFR IS NOT APPLICABLE FOR DIALYSIS PATIENTS.





POCT-GLUCOSE [id number] 21:33:00* 





                Test Item       Value           Reference Range    Comments

 

                POC-GLUCOSE METER (BEAKER) (test code=1538)    241 mg/dL                 TESTED AT [address] [address]





URINALYSIS WITH MICROSCOPIC IF [id number] 19:44:00* 





                Test Item       Value           Reference Range    Comments

 

                COLOR (BEAKER) (test code=470)    Yellow                           

 

                CLARITY (BEAKER) (test code=469)    Clear                            

 

                SPECIFIC GRAVITY UA (BEAKER) (test code=468)    1.009           1.001-1.035      

 

                PH UA (BEAKER) (test code=467)    6.0             5.0-8.0          

 

                PROTEIN UA (BEAKER) (test code=464)    Negative        Negative         

 

                GLUCOSE UA (BEAKER) (test code=365)    Negative        Negative         

 

                KETONES UA (BEAKER) (test code=371)    Negative        Negative         

 

                BILIRUBIN UA (BEAKER) (test code=462)    Negative        Negative         

 

                BLOOD UA (BEAKER) (test code=461)    Negative        Negative         

 

                NITRITE UA (BEAKER) (test code=465)    Negative        Negative         

 

                LEUKOCYTE ESTERASE UA (BEAKER) (test code=466)    Negative        Negative         

 

                UROBILINOGEN UA (BEAKER) (test code=463)    0.2 mg/dL       0.2-1.0          

 

                SOURCE(BEAKER) (test code=2795)                                     





POCT-GLUCOSE [id number] 17:22:00* 





                Test Item       Value           Reference Range    Comments

 

                POC-GLUCOSE METER (BEAKER) (test code=1538)    202 mg/dL                 TESTED AT [address] [address]





POCT-GLUCOSE [id number] 12:10:00* 





                Test Item       Value           Reference Range    Comments

 

                POC-GLUCOSE METER (BEAKER) (test code=1538)    155 mg/dL                 TESTED AT [address] [address]





POCT-GLUCOSE [id number] 09:41:00* 





                Test Item       Value           Reference Range    Comments

 

                POC-GLUCOSE METER (BEAKER) (test code=1538)    269 mg/dL                 TESTED AT [address] [address]





[id number] 06:08:00* 





                Test Item       Value           Reference Range    Comments

 

                MAGNESIUM (BEAKER) (test code=627)    1.8 mg/dL       1.6-2.6          





BASIC METABOLIC [id number] 06:08:00* 





                Test Item       Value           Reference Range    Comments

 

                SODIUM (BEAKER) (test code=381)    134 meq/L       136-145          

 

                POTASSIUM (BEAKER) (test code=379)    3.7 meq/L       3.5-5.1          

 

                CHLORIDE (BEAKER) (test code=382)    97 meq/L                   

 

                CO2 (BEAKER) (test code=355)    28 meq/L        22-29            

 

                BLOOD UREA NITROGEN (BEAKER) (test code=354)    20 mg/dL        7-21             

 

                CREATININE (BEAKER) (test code=358)    1.13 mg/dL      0.57-1.25        

 

                GLUCOSE RANDOM (BEAKER) (test code=652)    152 mg/dL                  

 

                CALCIUM (BEAKER) (test code=697)    8.7 mg/dL       8.4-10.2         

 

                EGFR (BEAKER) (test code=1092)    63 mL/min/1.73 sq m                    ESTIMATED GFR IS NOT AS 

ACCURATE AS CREATININE CLEARANCE IN PREDICTING GLOMERULAR FILTRATION RATE. 
ESTIMATED GFR IS NOT APPLICABLE FOR DIALYSIS PATIENTS.





POCT-GLUCOSE [id number] 04:01:00* 





                Test Item       Value           Reference Range    Comments

 

                POC-GLUCOSE METER (BEAKER) (test code=1538)    155 mg/dL                 TESTED AT [address] [address]





POCT-GLUCOSE [id number] 21:43:00* 





                Test Item       Value           Reference Range    Comments

 

                POC-GLUCOSE METER (BEAKER) (test code=1538)    235 mg/dL                 TESTED AT [address] [address]





POCT-GLUCOSE [id number] 16:41:00* 





                Test Item       Value           Reference Range    Comments

 

                POC-GLUCOSE METER (BEAKER) (test code=1538)    210 mg/dL                 TESTED AT [address] [address]





POCT-GLUCOSE [id number] 12:50:00* 





                Test Item       Value           Reference Range    Comments

 

                POC-GLUCOSE METER (BEAKER) (test code=1538)    252 mg/dL                 TESTED AT [address] [address]





POCT-GLUCOSE [id number] 08:05:00* 





                Test Item       Value           Reference Range    Comments

 

                POC-GLUCOSE METER (BEAKER) (test code=1538)    319 mg/dL                 TESTED AT [address] [address]





[id number] 07:44:00* 





                Test Item       Value           Reference Range    Comments

 

                FERRITIN (BEAKER) (test code=361)    80 ng/mL        5-275            





VITAMIN B12 AND [id number] 07:44:00* 





                Test Item       Value           Reference Range    Comments

 

                VITAMIN B12 (BEAKER) (test code=774)    487 pg/mL       213-816          

 

                FOLATE (BEAKER) (test code=362)    7.3 ng/mL       >=7.0            





IRON, TIBC, % SAT. (WITHOUT FERRITIN)2018 07:12:00* 





                Test Item       Value           Reference Range    Comments

 

                IRON (BEAKER) (test code=547)    16 ug/dL                   

 

                TOTAL IRON BINDING CAPACITY (BEAKER) (test code=769)    304 ug/dL       250-450          

 

                IRON % SATURATION (2) (BEAKER) (test code=4063)    5 %             20-55            





[id number] 07:06:00* 





                Test Item       Value           Reference Range    Comments

 

                MAGNESIUM (BEAKER) (test code=627)    2.0 mg/dL       1.6-2.6          





BASIC METABOLIC [id number] 07:06:00* 





                Test Item       Value           Reference Range    Comments

 

                SODIUM (BEAKER) (test code=381)    131 meq/L       136-145          

 

                POTASSIUM (BEAKER) (test code=379)    4.3 meq/L       3.5-5.1          

 

                CHLORIDE (BEAKER) (test code=382)    95 meq/L                   

 

                CO2 (BEAKER) (test code=355)    26 meq/L        22-29            

 

                BLOOD UREA NITROGEN (BEAKER) (test code=354)    22 mg/dL        7-21             

 

                CREATININE (BEAKER) (test code=358)    1.34 mg/dL      0.57-1.25        

 

                GLUCOSE RANDOM (BEAKER) (test code=652)    268 mg/dL                  

 

                CALCIUM (BEAKER) (test code=697)    8.3 mg/dL       8.4-10.2         

 

                EGFR (BEAKER) (test code=1092)    52 mL/min/1.73 sq m                    ESTIMATED GFR IS NOT AS 

ACCURATE AS CREATININE CLEARANCE IN PREDICTING GLOMERULAR FILTRATION RATE. 
ESTIMATED GFR IS NOT APPLICABLE FOR DIALYSIS PATIENTS.





STREP PNEUMONIAE [id number] 22:31:00* 





                Test Item       Value           Reference Range    Comments

 

                          STREP PNEUMONIAE ANTIGEN (BEAKER) (test code=1615)    Presumptive negative for pneumococcal

 pneumonia - see comment                Presumptive negative for pneumococcal pneumonia - see commen

                                         





Presumptive negative for pneumococcal pneumonia, suggesting no current or recent
pneumococcal infection. Infection due to S. pneumoniae cannot be ruled out since
the antigen present in the sample may be below the detection limit of the test.
LEGIONELLA ANTIGEN, [id number] 22:31:00* 





                Test Item       Value           Reference Range    Comments

 

                          L. PNEUMOPHILA SEROGP 1 UR AG (BEAKER) (test code=1156)    Negative - see comment 

                                                    Negative for L. pneumophila serogroup 1 antigen, suggesting no recent or current

 infection with this serogroup. Legionellosis cannot be ruled out since other 
serogroups and species may cause disease.





BASIC METABOLIC [id number] 21:42:00* 





                Test Item       Value           Reference Range    Comments

 

                SODIUM (BEAKER) (test code=381)    129 meq/L       136-145          

 

                POTASSIUM (BEAKER) (test code=379)    4.1 meq/L       3.5-5.1          

 

                CHLORIDE (BEAKER) (test code=382)    94 meq/L                   

 

                CO2 (BEAKER) (test code=355)    26 meq/L        22-29            

 

                BLOOD UREA NITROGEN (BEAKER) (test code=354)    22 mg/dL        7-21             

 

                CREATININE (BEAKER) (test code=358)    1.42 mg/dL      0.57-1.25        

 

                GLUCOSE RANDOM (BEAKER) (test code=652)    164 mg/dL                  

 

                CALCIUM (BEAKER) (test code=697)    8.5 mg/dL       8.4-10.2         

 

                EGFR (BEAKER) (test code=1092)    48 mL/min/1.73 sq m                    ESTIMATED GFR IS NOT AS 

ACCURATE AS CREATININE CLEARANCE IN PREDICTING GLOMERULAR FILTRATION RATE. 
ESTIMATED GFR IS NOT APPLICABLE FOR DIALYSIS PATIENTS.





POCT-GLUCOSE [id number] 21:10:00* 





                Test Item       Value           Reference Range    Comments

 

                POC-GLUCOSE METER (BEAKER) (test code=1538)    224 mg/dL                 TESTED AT [facility] 

[address] [facility] [address]





POCT-GLUCOSE [id number] 17:24:00* 





                Test Item       Value           Reference Range    Comments

 

                POC-GLUCOSE METER (BEAKER) (test code=1538)    120 mg/dL                 TESTED AT [place] [id number] [facility] [address]





URINALYSIS WITH MICROSCOPIC IF [id number] 16:27:00* 





                Test Item       Value           Reference Range    Comments

 

                COLOR (BEAKER) (test code=470)    Yellow                           

 

                CLARITY (BEAKER) (test code=469)    Clear                            

 

                SPECIFIC GRAVITY UA (BEAKER) (test code=468)    1.006           1.001-1.035      

 

                PH UA (BEAKER) (test code=467)    5.0             5.0-8.0          

 

                PROTEIN UA (BEAKER) (test code=464)    Negative        Negative         

 

                GLUCOSE UA (BEAKER) (test code=365)    Negative        Negative         

 

                KETONES UA (BEAKER) (test code=371)    Negative        Negative         

 

                BILIRUBIN UA (BEAKER) (test code=462)    Negative        Negative         

 

                BLOOD UA (BEAKER) (test code=461)    Negative        Negative         

 

                NITRITE UA (BEAKER) (test code=465)    Negative        Negative         

 

                LEUKOCYTE ESTERASE UA (BEAKER) (test code=466)    Negative        Negative         

 

                UROBILINOGEN UA (BEAKER) (test code=463)    0.2 mg/dL       0.2-1.0          

 

                SOURCE(BEAKER) (test code=2795)                                     





POCT-GLUCOSE [id number] 12:54:00* 





                Test Item       Value           Reference Range    Comments

 

                POC-GLUCOSE METER (BEAKER) (test code=1538)    161 mg/dL                 TESTED AT [address] [address]





POCT-GLUCOSE [id number] 08:53:00* 





                Test Item       Value           Reference Range    Comments

 

                POC-GLUCOSE METER (BEAKER) (test code=1538)    89 mg/dL                  TESTED AT [address] [address]





POCT-GLUCOSE [id number] 08:35:00* 





                Test Item       Value           Reference Range    Comments

 

                POC-GLUCOSE METER (BEAKER) (test code=1538)    62 mg/dL                  Notified RN MD/TESTED

 AT [address] [address]





POCT-GLUCOSE [id number] 08:35:00* 





                Test Item       Value           Reference Range    Comments

 

                POC-GLUCOSE METER (BEAKER) (test code=1538)    51 mg/dL                  Notified RN MD/TESTED

 AT [address] [address]





CBC W/PLT COUNT & AUTO [id number] 07:06:00* 





                Test Item       Value           Reference Range    Comments

 

                WHITE BLOOD CELL COUNT (BEAKER) (test code=775)    13.5 K/ L       3.5-10.5         

 

                RED BLOOD CELL COUNT (BEAKER) (test code=761)    3.01 M/ L       4.63-6.08        

 

                HEMOGLOBIN (BEAKER) (test code=410)    7.2 GM/DL       13.7-17.5        

 

                HEMATOCRIT (BEAKER) (test code=411)    26.0 %          40.1-51.0        

 

                MEAN CORPUSCULAR VOLUME (BEAKER) (test code=753)    86.4 fL         79.0-92.2        

 

                MEAN CORPUSCULAR HEMOGLOBIN (BEAKER) (test code=751)    23.9 pg         25.7-32.2        

 

                    MEAN CORPUSCULAR HEMOGLOBIN CONC (BEAKER) (test code=752)    27.7 GM/DL          32.3-36.5

                                         

 

                RED CELL DISTRIBUTION WIDTH (BEAKER) (test code=412)    20.9 %          11.6-14.4        

 

                PLATELET COUNT (BEAKER) (test code=756)    353 K/CU MM     150-450          

 

                MEAN PLATELET VOLUME (BEAKER) (test code=754)    10.3 fL         9.4-12.4         

 

                NUCLEATED RED BLOOD CELLS (BEAKER) (test code=413)    0 /100 WBC      0-0              

 

                NEUTROPHILS RELATIVE PERCENT (BEAKER) (test code=429)    79 %                             

 

                LYMPHOCYTES RELATIVE PERCENT (BEAKER) (test code=430)    8 %                              

 

                MONOCYTES RELATIVE PERCENT (BEAKER) (test code=431)    9 %                              

 

                EOSINOPHILS RELATIVE PERCENT (BEAKER) (test code=432)    3 %                              

 

                BASOPHILS RELATIVE PERCENT (BEAKER) (test code=437)    1 %                              

 

                NEUTROPHILS ABSOLUTE COUNT (BEAKER) (test code=670)    10.56 K/ L      1.78-5.38        

 

                LYMPHOCYTES ABSOLUTE COUNT (BEAKER) (test code=414)    1.08 K/ L       1.32-3.57        

 

                MONOCYTES ABSOLUTE COUNT (BEAKER) (test code=415)    1.23 K/ L       0.30-0.82        

 

                EOSINOPHILS ABSOLUTE COUNT (BEAKER) (test code=416)    0.39 K/ L       0.04-0.54        

 

                BASOPHILS ABSOLUTE COUNT (BEAKER) (test code=417)    0.09 K/ L       0.01-0.08        

 

                IMMATURE GRANULOCYTES-RELATIVE PERCENT (BEAKER) (test code=2801)    1 %             0-1              





POCT-GLUCOSE [id number] 02:05:00* 





                Test Item       Value           Reference Range    Comments

 

                POC-GLUCOSE METER (BEAKER) (test code=1538)    71 mg/dL                  TESTED AT [facility] [address]

 [facility] [address]





RAD, CHEST, 1 VIEW, NON [id number] 01:32:00Reason for exam:->
HYPOGLYCEMIAReason for exam:->GENERALIZED WEAKNESS, NOT ASSOCIATED WITH 
EXTREMITIESFINAL REPORT PATIENT ID:   [id number] RAD, CHEST, 1 VIEW, NON DEPT 
INDICATION: HYPOGLYCEMIAGENERALIZED WEAKNESS, NOT ASSOCIATED WITH EXTREMITIES 
COMPARISON: Multiple prior chest x-rays dating back to  TECHNIQUE: Single 
frontal view of the chest. IMPRESSION:Stable cardiomegaly.Stable loop recorder 
projecting over the left lower hemithorax.Stable chronic vascular congestion.No 
acute osseous abnormality. Signed: Greg Williamson MDReport Verified Date/Time: 
2018 01:32:47 Reading Location: [address] Transitional Reading Room    
 Electronically signed by: GREG WILLIAMSON MD on 2018 01:32 AM TROPONIN I
2018 01:11:00* 





                Test Item       Value           Reference Range    Comments

 

                TROPONIN I (BEAKER) (test code=397)    0.02 ng/mL      0.00-0.03        





Troponin I (TnI) levels must be interpreted in the context of the presenting sym
ptoms and the clinical findings. Elevated TnI levels indicate myocardial damage,
but are not specific for ischemic heart disease. Elevated TnI levels are seen in
patients with other cardiac conditions (including myocarditis and congestive h
eart failure), and slight TnI elevations occur in patients with other conditions
, including sepsis, renal failure, acidosis, acute neurological disease, and per
sistent tachyarrhythmia.B-TYPE NATRIURETIC FACTOR (BNP)2018 01:08:00* 





                Test Item       Value           Reference Range    Comments

 

                B-TYPE NATRIURETIC PEPTIDE (BEAKER) (test code=700)    992 pg/mL       0-100            





[id number] 01:05:00* 





                Test Item       Value           Reference Range    Comments

 

                MAGNESIUM (BEAKER) (test code=627)    2.1 mg/dL       1.6-2.6          





COMPREHENSIVE METABOLIC [id number] 01:05:00* 





                Test Item       Value           Reference Range    Comments

 

                TOTAL PROTEIN (BEAKER) (test code=770)    7.4 gm/dL       6.0-8.3          

 

                ALBUMIN (BEAKER) (test code=1145)    3.7 g/dL        3.5-5.0          

 

                ALKALINE PHOSPHATASE (BEAKER) (test code=346)    111 U/L                    

 

                BILIRUBIN TOTAL (BEAKER) (test code=377)    0.7 mg/dL       0.2-1.2          

 

                SODIUM (BEAKER) (test code=381)    134 meq/L       136-145          

 

                POTASSIUM (BEAKER) (test code=379)    4.3 meq/L       3.5-5.1          

 

                CHLORIDE (BEAKER) (test code=382)    102 meq/L                  

 

                CO2 (BEAKER) (test code=355)    24 meq/L        22-29            

 

                BLOOD UREA NITROGEN (BEAKER) (test code=354)    21 mg/dL        7-21             

 

                CREATININE (BEAKER) (test code=358)    1.29 mg/dL      0.57-1.25        

 

                GLUCOSE RANDOM (BEAKER) (test code=652)    125 mg/dL                  

 

                CALCIUM (BEAKER) (test code=697)    8.5 mg/dL       8.4-10.2         

 

                AST (SGOT) (BEAKER) (test code=353)    34 U/L          5-34             

 

                ALT (SGPT) (BEAKER) (test code=347)    18 U/L          6-55             

 

                EGFR (BEAKER) (test code=1092)    54 mL/min/1.73 sq m                    ESTIMATED GFR IS NOT AS 

ACCURATE AS CREATININE CLEARANCE IN PREDICTING GLOMERULAR FILTRATION RATE. 
ESTIMATED GFR IS NOT APPLICABLE FOR DIALYSIS PATIENTS.





[id number] 01:00:00* 





                Test Item       Value           Reference Range    Comments

 

                PROTIME (BEAKER) (test code=759)    32.6 seconds    11.7-14.7        

 

                INR (BEAKER) (test code=370)    3.2             <=5.9            

 

                PARTIAL THROMBOPLASTIN TIME (BEAKER) (test code=760)    48.3 seconds    22.5-36.0        







RECOMMENDED COUMADIN/WARFARIN INR THERAPY RANGESSTANDARD DOSE: 2.0 - 3.0   Inclu
[place]: PROPHYLAXIS for venous thrombosis, systemic embolization; TREATMENT for luis
ous thrombosis and/or pulmonary embolus.HIGH RISK: Target INR is 2.5-3.5 for pat
ients with mechanical heart valves.CBC W/PLT COUNT & AUTO [id number]
00:49:00* 





                Test Item       Value           Reference Range    Comments

 

                WHITE BLOOD CELL COUNT (BEAKER) (test code=775)    12.8 K/ L       3.5-10.5         

 

                RED BLOOD CELL COUNT (BEAKER) (test code=761)    3.04 M/ L       4.63-6.08        

 

                HEMOGLOBIN (BEAKER) (test code=410)    7.5 GM/DL       13.7-17.5        

 

                HEMATOCRIT (BEAKER) (test code=411)    26.8 %          40.1-51.0        

 

                MEAN CORPUSCULAR VOLUME (BEAKER) (test code=753)    88.2 fL         79.0-92.2        

 

                MEAN CORPUSCULAR HEMOGLOBIN (BEAKER) (test code=751)    24.7 pg         25.7-32.2        

 

                    MEAN CORPUSCULAR HEMOGLOBIN CONC (BEAKER) (test code=752)    28.0 GM/DL          32.3-36.5

                                         

 

                RED CELL DISTRIBUTION WIDTH (BEAKER) (test code=412)    21.0 %          11.6-14.4        

 

                PLATELET COUNT (BEAKER) (test code=756)    401 K/CU MM     150-450          

 

                MEAN PLATELET VOLUME (BEAKER) (test code=754)    10.2 fL         9.4-12.4         

 

                NUCLEATED RED BLOOD CELLS (BEAKER) (test code=413)    0 /100 WBC      0-0              

 

                NEUTROPHILS RELATIVE PERCENT (BEAKER) (test code=429)    74 %                             

 

                LYMPHOCYTES RELATIVE PERCENT (BEAKER) (test code=430)    10 %                             

 

                MONOCYTES RELATIVE PERCENT (BEAKER) (test code=431)    10 %                             

 

                EOSINOPHILS RELATIVE PERCENT (BEAKER) (test code=432)    4 %                              

 

                BASOPHILS RELATIVE PERCENT (BEAKER) (test code=437)    1 %                              

 

                NEUTROPHILS ABSOLUTE COUNT (BEAKER) (test code=670)    9.54 K/ L       1.78-5.38        

 

                LYMPHOCYTES ABSOLUTE COUNT (BEAKER) (test code=414)    1.25 K/ L       1.32-3.57        

 

                MONOCYTES ABSOLUTE COUNT (BEAKER) (test code=415)    1.31 K/ L       0.30-0.82        

 

                EOSINOPHILS ABSOLUTE COUNT (BEAKER) (test code=416)    0.51 K/ L       0.04-0.54        

 

                BASOPHILS ABSOLUTE COUNT (BEAKER) (test code=417)    0.12 K/ L       0.01-0.08        

 

                IMMATURE GRANULOCYTES-RELATIVE PERCENT (BEAKER) (test code=2801)    1 %             0-1              





POCT-GLUCOSE [id number] 00:22:00* 





                Test Item       Value           Reference Range    Comments

 

                POC-GLUCOSE METER (BEAKER) (test code=1538)    108 mg/dL                 TESTED AT [facility] 

[address] [facility] [address]





POCT-GLUCOSE [id number] 12:53:00* 





                Test Item       Value           Reference Range    Comments

 

                POC-GLUCOSE METER (BEAKER) (test code=1538)    117 mg/dL                 TESTED AT [facility] 

[address] [facility] [address]





POCT-GLUCOSE [id number] 08:48:00* 





                Test Item       Value           Reference Range    Comments

 

                POC-GLUCOSE METER (BEAKER) (test code=1538)    107 mg/dL                 TESTED AT [facility] 

[address] [facility] [address]





[id number] 05:48:00* 





                Test Item       Value           Reference Range    Comments

 

                MAGNESIUM (BEAKER) (test code=627)    1.9 mg/dL       1.6-2.6          





BASIC METABOLIC [id number] 05:48:00* 





                Test Item       Value           Reference Range    Comments

 

                SODIUM (BEAKER) (test code=381)    137 meq/L       136-145          

 

                POTASSIUM (BEAKER) (test code=379)    3.4 meq/L       3.5-5.1          

 

                CHLORIDE (BEAKER) (test code=382)    100 meq/L                  

 

                CO2 (BEAKER) (test code=355)    28 meq/L        22-29            

 

                BLOOD UREA NITROGEN (BEAKER) (test code=354)    14 mg/dL        7-21             

 

                CREATININE (BEAKER) (test code=358)    0.87 mg/dL      0.57-1.25        

 

                GLUCOSE RANDOM (BEAKER) (test code=652)    132 mg/dL                  

 

                CALCIUM (BEAKER) (test code=697)    8.6 mg/dL       8.4-10.2         

 

                EGFR (BEAKER) (test code=1092)    85 mL/min/1.73 sq m                    ESTIMATED GFR IS NOT AS 

ACCURATE AS CREATININE CLEARANCE IN PREDICTING GLOMERULAR FILTRATION RATE. 
ESTIMATED GFR IS NOT APPLICABLE FOR DIALYSIS PATIENTS.





CBC W/PLT COUNT & AUTO [id number] 05:36:00* 





                Test Item       Value           Reference Range    Comments

 

                WHITE BLOOD CELL COUNT (BEAKER) (test code=775)    11.0 K/ L       3.5-10.5         

 

                RED BLOOD CELL COUNT (BEAKER) (test code=761)    3.51 M/ L       4.63-6.08        

 

                HEMOGLOBIN (BEAKER) (test code=410)    8.8 GM/DL       13.7-17.5        

 

                HEMATOCRIT (BEAKER) (test code=411)    29.4 %          40.1-51.0        

 

                MEAN CORPUSCULAR VOLUME (BEAKER) (test code=753)    83.8 fL         79.0-92.2        

 

                MEAN CORPUSCULAR HEMOGLOBIN (BEAKER) (test code=751)    25.1 pg         25.7-32.2        

 

                    MEAN CORPUSCULAR HEMOGLOBIN CONC (BEAKER) (test code=752)    29.9 GM/DL          32.3-36.5

                                         

 

                RED CELL DISTRIBUTION WIDTH (BEAKER) (test code=412)    16.0 %          11.6-14.4        

 

                PLATELET COUNT (BEAKER) (test code=756)    289 K/CU MM     150-450          

 

                MEAN PLATELET VOLUME (BEAKER) (test code=754)    11.1 fL         9.4-12.4         

 

                NUCLEATED RED BLOOD CELLS (BEAKER) (test code=413)    0 /100 WBC      0-0              

 

                NEUTROPHILS RELATIVE PERCENT (BEAKER) (test code=429)    70 %                             

 

                LYMPHOCYTES RELATIVE PERCENT (BEAKER) (test code=430)    17 %                             

 

                MONOCYTES RELATIVE PERCENT (BEAKER) (test code=431)    9 %                              

 

                EOSINOPHILS RELATIVE PERCENT (BEAKER) (test code=432)    3 %                              

 

                BASOPHILS RELATIVE PERCENT (BEAKER) (test code=437)    1 %                              

 

                NEUTROPHILS ABSOLUTE COUNT (BEAKER) (test code=670)    7.68 K/ L       1.78-5.38        

 

                LYMPHOCYTES ABSOLUTE COUNT (BEAKER) (test code=414)    1.87 K/ L       1.32-3.57        

 

                MONOCYTES ABSOLUTE COUNT (BEAKER) (test code=415)    1.01 K/ L       0.30-0.82        

 

                EOSINOPHILS ABSOLUTE COUNT (BEAKER) (test code=416)    0.36 K/ L       0.04-0.54        

 

                BASOPHILS ABSOLUTE COUNT (BEAKER) (test code=417)    0.06 K/ L       0.01-0.08        

 

                IMMATURE GRANULOCYTES-RELATIVE PERCENT (BEAKER) (test code=2801)    0 %             0-1              





POCT-GLUCOSE [id number] 21:19:00* 





                Test Item       Value           Reference Range    Comments

 

                POC-GLUCOSE METER (BEAKER) (test code=1538)    294 mg/dL                 TESTED AT [address] [address]





POCT-GLUCOSE [id number] 18:12:00* 





                Test Item       Value           Reference Range    Comments

 

                POC-GLUCOSE METER (BEAKER) (test code=1538)    158 mg/dL                 TESTED AT [address] [address]





POCT-GLUCOSE [id number] 12:15:00* 





                Test Item       Value           Reference Range    Comments

 

                POC-GLUCOSE METER (BEAKER) (test code=1538)    161 mg/dL                 TESTED AT [place] [id number] [facility] [address]





HEMOGLOBIN [id number] 08:53:00* 





                Test Item       Value           Reference Range    Comments

 

                HEMOGLOBIN A1C (BEAKER) (test code=368)    8.8 %           4.3-6.1          





POCT-GLUCOSE [id number] 07:41:00* 





                Test Item       Value           Reference Range    Comments

 

                POC-GLUCOSE METER (BEAKER) (test code=1538)    145 mg/dL                 TESTED AT [place] [id number]





TROPONIN -13-89 05:09:00* 





                Test Item       Value           Reference Range    Comments

 

                TROPONIN I (BEAKER) (test code=397)    < ng/mL         0.00-0.03        





Troponin I (TnI) levels must be interpreted in the context of the presenting sym
ptoms and the clinical findings. Elevated TnI levels indicate myocardial damage,
but are not specific for ischemic heart disease. Elevated TnI levels are seen in
patients with other cardiac conditions (including myocarditis and congestive h
eart failure), and slight TnI elevations occur in patients with other conditions
, including sepsis, renal failure, acidosis, acute neurological disease, and per
sistent tachyarrhythmia.[id number] 04:59:00* 





                Test Item       Value           Reference Range    Comments

 

                MAGNESIUM (BEAKER) (test code=627)    1.9 mg/dL       1.6-2.6          





BASIC METABOLIC [id number] 04:59:00* 





                Test Item       Value           Reference Range    Comments

 

                SODIUM (BEAKER) (test code=381)    137 meq/L       136-145          

 

                POTASSIUM (BEAKER) (test code=379)    3.4 meq/L       3.5-5.1          

 

                CHLORIDE (BEAKER) (test code=382)    100 meq/L                  

 

                CO2 (BEAKER) (test code=355)    26 meq/L        22-29            

 

                BLOOD UREA NITROGEN (BEAKER) (test code=354)    12 mg/dL        7-21             

 

                CREATININE (BEAKER) (test code=358)    0.81 mg/dL      0.57-1.25        

 

                GLUCOSE RANDOM (BEAKER) (test code=652)    181 mg/dL                  

 

                CALCIUM (BEAKER) (test code=697)    9.1 mg/dL       8.4-10.2         

 

                EGFR (BEAKER) (test code=1092)    93 mL/min/1.73 sq m                    ESTIMATED GFR IS NOT AS 

ACCURATE AS CREATININE CLEARANCE IN PREDICTING GLOMERULAR FILTRATION RATE. 
ESTIMATED GFR IS NOT APPLICABLE FOR DIALYSIS PATIENTS.





CBC W/PLT COUNT & AUTO [id number] 04:49:00* 





                Test Item       Value           Reference Range    Comments

 

                WHITE BLOOD CELL COUNT (BEAKER) (test code=775)    14.2 K/ L       3.5-10.5         

 

                RED BLOOD CELL COUNT (BEAKER) (test code=761)    3.59 M/ L       4.63-6.08        

 

                HEMOGLOBIN (BEAKER) (test code=410)    8.8 GM/DL       13.7-17.5        

 

                HEMATOCRIT (BEAKER) (test code=411)    28.9 %          40.1-51.0        

 

                MEAN CORPUSCULAR VOLUME (BEAKER) (test code=753)    80.5 fL         79.0-92.2        

 

                MEAN CORPUSCULAR HEMOGLOBIN (BEAKER) (test code=751)    24.5 pg         25.7-32.2        

 

                    MEAN CORPUSCULAR HEMOGLOBIN CONC (BEAKER) (test code=752)    30.4 GM/DL          32.3-36.5

                                         

 

                RED CELL DISTRIBUTION WIDTH (BEAKER) (test code=412)    15.8 %          11.6-14.4        

 

                PLATELET COUNT (BEAKER) (test code=756)    285 K/CU MM     150-450          

 

                MEAN PLATELET VOLUME (BEAKER) (test code=754)    11.0 fL         9.4-12.4         

 

                NUCLEATED RED BLOOD CELLS (BEAKER) (test code=413)    0 /100 WBC      0-0              

 

                NEUTROPHILS RELATIVE PERCENT (BEAKER) (test code=429)    77 %                             

 

                LYMPHOCYTES RELATIVE PERCENT (BEAKER) (test code=430)    14 %                             

 

                MONOCYTES RELATIVE PERCENT (BEAKER) (test code=431)    8 %                              

 

                EOSINOPHILS RELATIVE PERCENT (BEAKER) (test code=432)    1 %                              

 

                BASOPHILS RELATIVE PERCENT (BEAKER) (test code=437)    1 %                              

 

                NEUTROPHILS ABSOLUTE COUNT (BEAKER) (test code=670)    10.86 K/ L      1.78-5.38        

 

                LYMPHOCYTES ABSOLUTE COUNT (BEAKER) (test code=414)    1.95 K/ L       1.32-3.57        

 

                MONOCYTES ABSOLUTE COUNT (BEAKER) (test code=415)    1.09 K/ L       0.30-0.82        

 

                EOSINOPHILS ABSOLUTE COUNT (BEAKER) (test code=416)    0.12 K/ L       0.04-0.54        

 

                BASOPHILS ABSOLUTE COUNT (BEAKER) (test code=417)    0.09 K/ L       0.01-0.08        

 

                IMMATURE GRANULOCYTES-RELATIVE PERCENT (BEAKER) (test code=2801)    0 %             0-1              





TROPONIN -66-07 22:42:00* 





                Test Item       Value           Reference Range    Comments

 

                TROPONIN I (BEAKER) (test code=397)    < ng/mL         0.00-0.03        





Troponin I (TnI) levels must be interpreted in the context of the presenting sym
ptoms and the clinical findings. Elevated TnI levels indicate myocardial damage,
but are not specific for ischemic heart disease. Elevated TnI levels are seen in
patients with other cardiac conditions (including myocarditis and congestive h
eart failure), and slight TnI elevations occur in patients with other conditions
, including sepsis, renal failure, acidosis, acute neurological disease, and per
sistent tachyarrhythmia.[id number] 22:34:00* 





                Test Item       Value           Reference Range    Comments

 

                MAGNESIUM (BEAKER) (test code=627)    1.8 mg/dL       1.6-2.6         Specimen slightly hemolyzed







BASIC METABOLIC [id number] 22:34:00* 





                Test Item       Value           Reference Range    Comments

 

                SODIUM (BEAKER) (test code=381)    133 meq/L       136-145          

 

                POTASSIUM (BEAKER) (test code=379)    3.5 meq/L       3.5-5.1         Specimen slightly hemolyzed



 

                CHLORIDE (BEAKER) (test code=382)    96 meq/L                   

 

                CO2 (BEAKER) (test code=355)    24 meq/L        22-29            

 

                BLOOD UREA NITROGEN (BEAKER) (test code=354)    14 mg/dL        7-21             

 

                CREATININE (BEAKER) (test code=358)    0.89 mg/dL      0.57-1.25       Specimen slightly hemolyzed



 

                GLUCOSE RANDOM (BEAKER) (test code=652)    352 mg/dL                  

 

                CALCIUM (BEAKER) (test code=697)    8.5 mg/dL       8.4-10.2         

 

                EGFR (ESTELA) (test code=1092)    83 mL/min/1.73 sq m                    ESTIMATED GFR IS NOT AS 

ACCURATE AS CREATININE CLEARANCE IN PREDICTING GLOMERULAR FILTRATION RATE. 
ESTIMATED GFR IS NOT APPLICABLE FOR DIALYSIS PATIENTS.





POCT-GLUCOSE [id number] 21:47:00* 





                Test Item       Value           Reference Range    Comments

 

                POC-GLUCOSE METER (ESTELA) (test code=1538)    469 mg/dL                 Notified RN MD/TESTED

 AT [facility] [address] [facility] [address]





CT, CHEST WITH IV CONTRAST- PE TEST [id number] 17:57:00Reason for exam:->
CHEST PAINWhat is the patient's sedation requirement?->No SedationFINAL REPORT 
PATIENT ID:   [id number] DOSE REDUCTION: The examination was performed according 
to departmental dose-optimization program which includes automated exposure 
control, adjustment of the mA and/or kV according to patient size and/or use of 
iterative reconstruction technique. TECHNIQUE:  Postcontrast axial images of the
chest were obtained from above the arch level to the lower chest at maximum 
enhancement of pulmonary artery.  Coronal reconstruction was performed.   C
OMPARISON: 7/10/2015, CT of the chest DISCUSSION:   There are no filling defects
in the pulmonary arteries. The great vessels and aorta are unremarkable. Heart 
is enlarged. Vascular calcifications are seen. Again identified are calcified an
d noncalcified mediastinal and hilar lymph nodes, stable. In the upper abdomen l
iver contour is slightly nodular. Spleen is prominent in size. Gallstones are no
sonali. There is colonic diverticulosis. A right humeral fracture is only partially
depicted. The trachea and mainstem bronchi are patent. There are trace pleural 
effusions, slightly greater on the left. Bilateral lower lobe consolidation, lik
ely represents atelectasis. There are bilateral groundglass opacities with mosai
c attenuation. Some areas of air trapping are seen. Overall findings are similar
to previous and likely represent chronic changes. Mild areas of interstitial th
ickening are seen, however, and therefore there may be mild edema. In addition t
here are numerous bilateral groundglass and nodular opacities throughout. Nodula
r densities measure up to about 1.2 cm. These predominate in the upper lobes and
perihilar region, somewhat more so on the right. These appear new or increased 
from the previous studies. IMPRESSION:  1. No evidence of pulmonary embolism. 2.
Multiple calcified and noncalcified mediastinal and hilar lymph nodes, similar 
to previous likely reflecting chronic granulomatous exposure. 3. Bilateral groun
dglass opacities and interstitial thickening. Some of these were present previou
sly and are likely chronic. However others areas appear increased and superimpos
ed edema is possible. 4. Multiple bilateral groundglass and nodular densities, t
he nodules measure up to 1.2 cm. These are new from before, may reflect chronic 
or ongoing sequela of granulomatous exposure, but may be superimposed acute mult
ifocal pneumonitis. In the appropriate clinical setting, metastatic nodules kike
ot be excluded. Correlation with history recommended. Signed: Sebas Johnson MDRepo
rt Verified Date/Time:  2018 17:57:33 Reading Location: [facility] C013T [facility] Reading Room      Electronically signed by: SEBAS JOHNSON M.D. on 2018 05:57 PM BASIC METABOLIC [id number] 16:56:00* 





                Test Item       Value           Reference Range    Comments

 

                SODIUM (BEAKER) (test code=381)    134 meq/L       136-145          

 

                POTASSIUM (BEAKER) (test code=379)    3.5 meq/L       3.5-5.1          

 

                CHLORIDE (BEAKER) (test code=382)    97 meq/L                   

 

                CO2 (BEAKER) (test code=355)    28 meq/L        22-29            

 

                BLOOD UREA NITROGEN (BEAKER) (test code=354)    16 mg/dL        7-21             

 

                CREATININE (BEAKER) (test code=358)    0.93 mg/dL      0.57-1.25        

 

                GLUCOSE RANDOM (BEAKER) (test code=652)    411 mg/dL                  

 

                CALCIUM (BEAKER) (test code=697)    8.9 mg/dL       8.4-10.2         

 

                EGFR (BEAKER) (test code=1092)    79 mL/min/1.73 sq m                    ESTIMATED GFR IS NOT AS 

ACCURATE AS CREATININE CLEARANCE IN PREDICTING GLOMERULAR FILTRATION RATE. 
ESTIMATED GFR IS NOT APPLICABLE FOR DIALYSIS PATIENTS.





[id number] 16:32:00* 





                Test Item       Value           Reference Range    Comments

 

                PARTIAL THROMBOPLASTIN TIME (BEAKER) (test code=760)    36.4 seconds    22.5-36.0        







PROTHROMBIN TIME/[id number] 16:31:00* 





                Test Item       Value           Reference Range    Comments

 

                PROTIME (BEAKER) (test code=759)    19.4 seconds    11.7-14.7        

 

                INR (BEAKER) (test code=370)    1.6             <=5.9            





RECOMMENDED COUMADIN/WARFARIN INR THERAPY RANGESSTANDARD DOSE: 2.0 - 3.0   Inclu
[place]: PROPHYLAXIS for venous thrombosis, systemic embolization; TREATMENT for luis
ous thrombosis and/or pulmonary embolus.HIGH RISK: Target INR is 2.5-3.5 for pat
ients with mechanical heart valves.B-TYPE NATRIURETIC FACTOR (BNP)2018 
16:20:00* 





                Test Item       Value           Reference Range    Comments

 

                B-TYPE NATRIURETIC PEPTIDE (BEAKER) (test code=700)    331 pg/mL       0-100            





CREATINE KINASE (CK), TOTAL AND [id number] 16:20:00* 





                Test Item       Value           Reference Range    Comments

 

                CREATINE KINASE TOTAL (BEAKER) (test code=380)    29 U/L                     

 

                CREATINE KINASE-MB (BEAKER) (test code=750)    1.2 ng/mL       0.0-6.6          

 

                CREATINE KINASE-MB INDEX (BEAKER) (test code=395)    4.1 %                            





CK-MB Reference Range:<6.7      Normal6.7-10.0  Borderline>10.0     Abnormal
TROPONIN -[id number] 16:20:00* 





                Test Item       Value           Reference Range    Comments

 

                TROPONIN I (BEAKER) (test code=397)    < ng/mL         0.00-0.03        





Troponin I (TnI) levels must be interpreted in the context of the presenting sym
ptoms and the clinical findings. Elevated TnI levels indicate myocardial damage,
but are not specific for ischemic heart disease. Elevated TnI levels are seen in
patients with other cardiac conditions (including myocarditis and congestive h
eart failure), and slight TnI elevations occur in patients with other conditions
, including sepsis, renal failure, acidosis, acute neurological disease, and per
sistent tachyarrhythmia.HEPATIC FUNCTION [id number] 16:14:00* 





                Test Item       Value           Reference Range    Comments

 

                TOTAL PROTEIN (BEAKER) (test code=770)    7.1 gm/dL       6.0-8.3          

 

                ALBUMIN (BEAKER) (test code=1145)    3.7 g/dL        3.5-5.0          

 

                BILIRUBIN TOTAL (BEAKER) (test code=377)    0.8 mg/dL       0.2-1.2          

 

                BILIRUBIN DIRECT (BEAKER) (test code=706)    0.4 mg/dL       0.1-0.5          

 

                ALKALINE PHOSPHATASE (BEAKER) (test code=346)    111 U/L                    

 

                AST (SGOT) (BEAKER) (test code=353)    21 U/L          5-34             

 

                ALT (SGPT) (BEAKER) (test code=347)    15 U/L          6-55             





RAD, SHOULDER, COMPLETE (MIN 2 VIEWS), [id number] 16:14:00Reason for exam:-
>CHEST PAINFINAL REPORT PATIENT ID:   [id number] Right shoulder, three images 
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 
ossification. No dislocation. Signed: Penny Segundo Verified 
Date/Time:  2018 16:14:11 Reading Location: [address] Ortho Consult 
Reading Room  Electronically signed by: PENNY SEGUNDO M.D. on 
2018 04:14 PM RAD, CHEST, 1 VIEW, NON [id number] 16:13:00Reason for 
exam:->CHEST PAINShould this be performed at the bedside?->YesFINAL REPORT 
PATIENT ID:   [id number] AP chest HISTORY: Chest pain COMPARISON: 10/1/2017 
IMPRESSION:Status post median sternotomy. Cardiomegaly. Mild interstitial edema.
No effusion or pneumothorax. Refer to concurrent right shoulder radiographs for 
additional detail. Signed: Penny Segundo Verified Date/Time:  
2018 16:13:07 Reading Location: [facility] C013X Ortho Consult Reading Room  
Electronically signed by: PENNY SEGUNDO M.D. on 2018 04:13 PM 
[id number] 16:01:00* 





                Test Item       Value           Reference Range    Comments

 

                D-DIMER QUANTITATIVE (BEAKER) (test code=671)    0.32 MG/L FEU    <0.50            





Intended Use: The D-Dimer Assay can be used to aid in the diagnosis of Deep Vein
Thrombosis (DVT) and Pulmonary Embolism Disease (PED).In patients with low pre-
test probability, various studies concerning STA Liatest D-dimer test have repor
sonali that with a cutoff value of 0.50 MG/L FEU, the Negative Predictive Value (NP
V) regarding the exclusion of thrombosis is within % range.CBC W/PLT COUNT
& AUTO [id number] 15:55:00* 





                Test Item       Value           Reference Range    Comments

 

                WHITE BLOOD CELL COUNT (BEAKER) (test code=775)    11.8 K/ L       3.5-10.5         

 

                RED BLOOD CELL COUNT (BEAKER) (test code=761)    3.36 M/ L       4.63-6.08        

 

                HEMOGLOBIN (BEAKER) (test code=410)    8.4 GM/DL       13.7-17.5        

 

                HEMATOCRIT (BEAKER) (test code=411)    27.3 %          40.1-51.0        

 

                MEAN CORPUSCULAR VOLUME (BEAKER) (test code=753)    81.3 fL         79.0-92.2        

 

                MEAN CORPUSCULAR HEMOGLOBIN (BEAKER) (test code=751)    25.0 pg         25.7-32.2        

 

                    MEAN CORPUSCULAR HEMOGLOBIN CONC (BEAKER) (test code=752)    30.8 GM/DL          32.3-36.5

                                         

 

                RED CELL DISTRIBUTION WIDTH (BEAKER) (test code=412)    15.9 %          11.6-14.4        

 

                PLATELET COUNT (BEAKER) (test code=756)    270 K/CU MM     150-450          

 

                MEAN PLATELET VOLUME (BEAKER) (test code=754)    11.1 fL         9.4-12.4         

 

                NUCLEATED RED BLOOD CELLS (BEAKER) (test code=413)    0 /100 WBC      0-0              

 

                NEUTROPHILS RELATIVE PERCENT (BEAKER) (test code=429)    77 %                             

 

                LYMPHOCYTES RELATIVE PERCENT (BEAKER) (test code=430)    13 %                             

 

                MONOCYTES RELATIVE PERCENT (BEAKER) (test code=431)    8 %                              

 

                EOSINOPHILS RELATIVE PERCENT (BEAKER) (test code=432)    1 %                              

 

                BASOPHILS RELATIVE PERCENT (BEAKER) (test code=437)    0 %                              

 

                NEUTROPHILS ABSOLUTE COUNT (BEAKER) (test code=670)    9.02 K/ L       1.78-5.38        

 

                LYMPHOCYTES ABSOLUTE COUNT (BEAKER) (test code=414)    1.57 K/ L       1.32-3.57        

 

                MONOCYTES ABSOLUTE COUNT (BEAKER) (test code=415)    0.95 K/ L       0.30-0.82        

 

                EOSINOPHILS ABSOLUTE COUNT (BEAKER) (test code=416)    0.14 K/ L       0.04-0.54        

 

                BASOPHILS ABSOLUTE COUNT (BEAKER) (test code=417)    0.05 K/ L       0.01-0.08        

 

                IMMATURE GRANULOCYTES-RELATIVE PERCENT (BEAKER) (test code=2801)    0 %             0-1              





POCT-GLUCOSE METER2017-10-03 17:13:00* 





                Test Item       Value           Reference Range    Comments

 

                POC-GLUCOSE METER (BEAKER) (test code=1538)    279 mg/dL                 TESTED AT [place] [id number] [facility] [address]





POCT-GLUCOSE METER2017-10-03 12:31:00* 





                Test Item       Value           Reference Range    Comments

 

                POC-GLUCOSE METER (BEAKER) (test code=1538)    186 mg/dL                 TESTED AT [address] [address]





POCT-GLUCOSE METER2017-10-03 08:11:00* 





                Test Item       Value           Reference Range    Comments

 

                POC-GLUCOSE METER (BEAKER) (test code=1538)    103 mg/dL                 TESTED AT [place] [id number] [facility] [address]





FERRITIN2017-10-03 06:59:00* 





                Test Item       Value           Reference Range    Comments

 

                FERRITIN (BEAKER) (test code=361)    98 ng/mL        5-275            





VITAMIN B12 AND FOLATE2017-10-03 06:59:00* 





                Test Item       Value           Reference Range    Comments

 

                VITAMIN B12 (BEAKER) (test code=774)    546 pg/mL       213-816          

 

                FOLATE (BEAKER) (test code=362)    12.4 ng/mL      >=7.0            





MAGNESIUM2017-10-03 06:29:00* 





                Test Item       Value           Reference Range    Comments

 

                MAGNESIUM (BEAKER) (test code=627)    2.0 mg/dL       1.6-2.6          





BASIC METABOLIC PANEL2017-10-03 06:29:00* 





                Test Item       Value           Reference Range    Comments

 

                SODIUM (BEAKER) (test code=381)    140 meq/L       136-145          

 

                POTASSIUM (BEAKER) (test code=379)    3.5 meq/L       3.5-5.1          

 

                CHLORIDE (BEAKER) (test code=382)    99 meq/L                   

 

                CO2 (BEAKER) (test code=355)    32 meq/L        22-29            

 

                BLOOD UREA NITROGEN (BEAKER) (test code=354)    16 mg/dL        7-21             

 

                CREATININE (BEAKER) (test code=358)    0.89 mg/dL      0.57-1.25        

 

                GLUCOSE RANDOM (BEAKER) (test code=652)    130 mg/dL                  

 

                CALCIUM (BEAKER) (test code=697)    8.6 mg/dL       8.4-10.2         

 

                EGFR (BEAKER) (test code=1092)    83 mL/min/1.73 sq m                    ESTIMATED GFR IS NOT AS 

ACCURATE AS CREATININE CLEARANCE IN PREDICTING GLOMERULAR FILTRATION RATE. 
ESTIMATED GFR IS NOT APPLICABLE FOR DIALYSIS PATIENTS.





IRON, TIBC, % SAT. (WITHOUT FERRITIN)2017-10-03 06:27:00* 





                Test Item       Value           Reference Range    Comments

 

                IRON (BEAKER) (test code=547)    52 ug/dL                   

 

                TOTAL IRON BINDING CAPACITY (BEAKER) (test code=769)    376 ug/dL       250-450          

 

                IRON % SATURATION (2) (BEAKER) (test code=2590)    14 %            20-55            





CBC W/PLT COUNT & AUTO DIFFERENTIAL2017-10-03 06:03:00* 





                Test Item       Value           Reference Range    Comments

 

                WHITE BLOOD CELL COUNT (BEAKER) (test code=775)    10.7 K/ L       3.5-10.5         

 

                RED BLOOD CELL COUNT (BEAKER) (test code=761)    3.62 M/ L       4.63-6.08        

 

                HEMOGLOBIN (BEAKER) (test code=410)    10.1 GM/DL      13.7-17.5        

 

                HEMATOCRIT (BEAKER) (test code=411)    33.4 %          40.1-51.0        

 

                MEAN CORPUSCULAR VOLUME (BEAKER) (test code=753)    92.3 fL         79.0-92.2        

 

                MEAN CORPUSCULAR HEMOGLOBIN (BEAKER) (test code=751)    27.9 pg         25.7-32.2        

 

                    MEAN CORPUSCULAR HEMOGLOBIN CONC (BEAKER) (test code=752)    30.2 GM/DL          32.3-36.5

                                         

 

                RED CELL DISTRIBUTION WIDTH (BEAKER) (test code=412)    15.7 %          11.6-14.4        

 

                PLATELET COUNT (BEAKER) (test code=756)    266 K/CU MM     150-450          

 

                MEAN PLATELET VOLUME (BEAKER) (test code=754)    11.3 fL         9.4-12.4         

 

                NUCLEATED RED BLOOD CELLS (BEAKER) (test code=413)    0 /100 WBC      0-0              

 

                NEUTROPHILS RELATIVE PERCENT (BEAKER) (test code=429)    67 %                             

 

                LYMPHOCYTES RELATIVE PERCENT (BEAKER) (test code=430)    18 %                             

 

                MONOCYTES RELATIVE PERCENT (BEAKER) (test code=431)    10 %                             

 

                EOSINOPHILS RELATIVE PERCENT (BEAKER) (test code=432)    3 %                              

 

                BASOPHILS RELATIVE PERCENT (BEAKER) (test code=437)    1 %                              

 

                NEUTROPHILS ABSOLUTE COUNT (BEAKER) (test code=670)    7.14 K/ L       1.78-5.38        

 

                LYMPHOCYTES ABSOLUTE COUNT (BEAKER) (test code=414)    1.94 K/ L       1.32-3.57        

 

                MONOCYTES ABSOLUTE COUNT (BEAKER) (test code=415)    1.05 K/ L       0.30-0.82        

 

                EOSINOPHILS ABSOLUTE COUNT (BEAKER) (test code=416)    0.36 K/ L       0.04-0.54        

 

                BASOPHILS ABSOLUTE COUNT (BEAKER) (test code=417)    0.12 K/ L       0.01-0.08        

 

                IMMATURE GRANULOCYTES-RELATIVE PERCENT (BEAKER) (test code=2801)    1 %             0-1              





POCT-GLUCOSE METER2017-10-02 23:04:00* 





                Test Item       Value           Reference Range    Comments

 

                POC-GLUCOSE METER (BEAKER) (test code=1538)    267 mg/dL                 TESTED AT [address] [address]





POCT-GLUCOSE METER2017-10-02 17:32:00* 





                Test Item       Value           Reference Range    Comments

 

                POC-GLUCOSE METER (BEAKER) (test code=1538)    418 mg/dL                 Notified RN MD/TESTED

 AT [address] [address]





POCT-GLUCOSE METER2017-10-02 15:21:00* 





                Test Item       Value           Reference Range    Comments

 

                POC-GLUCOSE METER (BEAKER) (test code=1538)    417 mg/dL                 Notified RN MD/TESTED

 AT [address] [address]





HEMOGLOBIN A1C2017-10-02 08:57:00* 





                Test Item       Value           Reference Range    Comments

 

                HEMOGLOBIN A1C (BEAKER) (test code=368)    8.7 %           4.3-6.1          





POCT-GLUCOSE METER2017-10-02 07:57:00* 





                Test Item       Value           Reference Range    Comments

 

                POC-GLUCOSE METER (BEAKER) (test code=1538)    84 mg/dL                  TESTED AT [facility] [address]

 [facility] [address]





MAGNESIUM2017-10-02 07:25:00* 





                Test Item       Value           Reference Range    Comments

 

                MAGNESIUM (BEAKER) (test code=627)    1.8 mg/dL       1.6-2.6          





BASIC METABOLIC PANEL2017-10-02 07:25:00* 





                Test Item       Value           Reference Range    Comments

 

                SODIUM (BEAKER) (test code=381)    141 meq/L       136-145          

 

                POTASSIUM (BEAKER) (test code=379)    3.5 meq/L       3.5-5.1          

 

                CHLORIDE (BEAKER) (test code=382)    100 meq/L                  

 

                CO2 (BEAKER) (test code=355)    32 meq/L        22-29            

 

                BLOOD UREA NITROGEN (BEAKER) (test code=354)    22 mg/dL        7-21             

 

                CREATININE (BEAKER) (test code=358)    1.06 mg/dL      0.57-1.25        

 

                GLUCOSE RANDOM (BEAKER) (test code=652)    111 mg/dL                  

 

                CALCIUM (BEAKER) (test code=697)    8.5 mg/dL       8.4-10.2         

 

                EGFR (BEAKER) (test code=1092)    68 mL/min/1.73 sq m                    ESTIMATED GFR IS NOT AS 

ACCURATE AS CREATININE CLEARANCE IN PREDICTING GLOMERULAR FILTRATION RATE. 
ESTIMATED GFR IS NOT APPLICABLE FOR DIALYSIS PATIENTS.





CBC W/PLT COUNT & AUTO DIFFERENTIAL2017-10-02 06:42:00* 





                Test Item       Value           Reference Range    Comments

 

                WHITE BLOOD CELL COUNT (BEAKER) (test code=775)    9.2 K/ L        3.5-10.5         

 

                RED BLOOD CELL COUNT (BEAKER) (test code=761)    3.34 M/ L       4.63-6.08        

 

                HEMOGLOBIN (BEAKER) (test code=410)    8.9 GM/DL       13.7-17.5        

 

                HEMATOCRIT (BEAKER) (test code=411)    31.0 %          40.1-51.0        

 

                MEAN CORPUSCULAR VOLUME (BEAKER) (test code=753)    92.8 fL         79.0-92.2        

 

                MEAN CORPUSCULAR HEMOGLOBIN (BEAKER) (test code=751)    26.6 pg         25.7-32.2        

 

                    MEAN CORPUSCULAR HEMOGLOBIN CONC (BEAKER) (test code=752)    28.7 GM/DL          32.3-36.5

                                         

 

                RED CELL DISTRIBUTION WIDTH (BEAKER) (test code=412)    16.0 %          11.6-14.4        

 

                PLATELET COUNT (BEAKER) (test code=756)    224 K/CU MM     150-450          

 

                MEAN PLATELET VOLUME (BEAKER) (test code=754)    11.2 fL         9.4-12.4         

 

                NUCLEATED RED BLOOD CELLS (BEAKER) (test code=413)    0 /100 WBC      0-0              

 

                NEUTROPHILS RELATIVE PERCENT (BEAKER) (test code=429)    68 %                             

 

                LYMPHOCYTES RELATIVE PERCENT (BEAKER) (test code=430)    18 %                             

 

                MONOCYTES RELATIVE PERCENT (BEAKER) (test code=431)    10 %                             

 

                EOSINOPHILS RELATIVE PERCENT (BEAKER) (test code=432)    3 %                              

 

                BASOPHILS RELATIVE PERCENT (BEAKER) (test code=437)    1 %                              

 

                NEUTROPHILS ABSOLUTE COUNT (BEAKER) (test code=670)    6.25 K/ L       1.78-5.38        

 

                LYMPHOCYTES ABSOLUTE COUNT (BEAKER) (test code=414)    1.70 K/ L       1.32-3.57        

 

                MONOCYTES ABSOLUTE COUNT (BEAKER) (test code=415)    0.91 K/ L       0.30-0.82        

 

                EOSINOPHILS ABSOLUTE COUNT (BEAKER) (test code=416)    0.26 K/ L       0.04-0.54        

 

                BASOPHILS ABSOLUTE COUNT (BEAKER) (test code=417)    0.06 K/ L       0.01-0.08        

 

                IMMATURE GRANULOCYTES-RELATIVE PERCENT (BEAKER) (test code=2801)    1 %             0-1              





POCT-GLUCOSE METER2017-10-01 23:26:00* 





                Test Item       Value           Reference Range    Comments

 

                POC-GLUCOSE METER (BEAKER) (test code=1538)    358 mg/dL                 Notified RN MD/TESTED

 AT [facility] [address] [facility] [address]





RAD, LEG, [id number] 22:38:00Reason for exam:->fell at home, and pain of 
unknown originShould this be performed at the bedside?->NoFINAL REPORT PATIENT 
ID:   [id number]  RAD, LEG, TIBIA \T\ FIBULA, 2 VIEWS, LEFT   CLINICAL INDICATION:
 fell at home, and pain of unknown origin  COMPARISON: None FINDINGS: Frontal 
and lateral views of the left tibia and fibula. There is no fracture or 
malalignment. Surrounding soft tissues are intact. IMPRESSION: No traumatic 
abnormality of the left tibia-fibula. Signed: JR Booker Robert MDReport 
Verified Date/Time:  10/01/2017 22:38:32 Reading Location: [address] Reading Room    Electronically signed by: ALEXANDRIA BOOKER on
10/01/2017 10:38 PM POCT-GLUCOSE METER2017-10-01 17:28:00* 





                Test Item       Value           Reference Range    Comments

 

                POC-GLUCOSE METER (BEAKER) (test code=1538)    295 mg/dL                 TESTED AT [address] [address]





POCT-GLUCOSE METER2017-10-01 12:57:00* 





                Test Item       Value           Reference Range    Comments

 

                POC-GLUCOSE METER (BEAKER) (test code=1538)    140 mg/dL                 TESTED AT [address] [address]





POCT-GLUCOSE METER2017-10-01 09:06:00* 





                Test Item       Value           Reference Range    Comments

 

                POC-GLUCOSE METER (BEAKER) (test code=1538)    172 mg/dL                 TESTED AT [address] [address]





CREATINE KINASE (CK), TOTAL AND MB2017-10-01 02:23:00* 





                Test Item       Value           Reference Range    Comments

 

                CREATINE KINASE TOTAL (BEAKER) (test code=380)    96 U/L                     

 

                CREATINE KINASE-MB (BEAKER) (test code=750)    2.9 ng/mL       0.0-6.6          

 

                CREATINE KINASE-MB INDEX (BEAKER) (test code=395)    3.0 %                            





CK-MB Reference Range:<6.7      Normal6.7-10.0  Borderline>10.0     Abnormal
TROPONIN -10-01 02:23:00* 





                Test Item       Value           Reference Range    Comments

 

                TROPONIN I (BEAKER) (test code=397)    < ng/mL         0.00-0.03        





Troponin I (TnI) levels must be interpreted in the context of the presenting sym
ptoms and the clinical findings. Elevated TnI levels indicate myocardial damage,
but are not specific for ischemic heart disease. Elevated TnI levels are seen in
patients with other cardiac conditions (including myocarditis and congestive h
eart failure), and slight TnI elevations occur in patients with other conditions
, including sepsis, renal failure, acidosis, acute neurological disease, and per
sistent tachyarrhythmia.MAGNESIUM2017-10-01 02:17:00* 





                Test Item       Value           Reference Range    Comments

 

                MAGNESIUM (BEAKER) (test code=627)    1.8 mg/dL       1.6-2.6         Specimen slightly hemolyzed







PHOSPHORUS2017-10-01 02:17:00* 





                Test Item       Value           Reference Range    Comments

 

                PHOSPHORUS (BEAKER) (test code=604)    3.4 mg/dL       2.3-4.7         Specimen slightly hemolyzed







BASIC METABOLIC PANEL2017-10-01 02:17:00* 





                Test Item       Value           Reference Range    Comments

 

                SODIUM (BEAKER) (test code=381)    134 meq/L       136-145          

 

                POTASSIUM (BEAKER) (test code=379)    4.4 meq/L       3.5-5.1         Specimen slightly hemolyzed



 

                CHLORIDE (BEAKER) (test code=382)    101 meq/L                  

 

                CO2 (BEAKER) (test code=355)    23 meq/L        22-29            

 

                BLOOD UREA NITROGEN (BEAKER) (test code=354)    24 mg/dL        7-21             

 

                CREATININE (BEAKER) (test code=358)    1.41 mg/dL      0.57-1.25       Specimen slightly hemolyzed



 

                GLUCOSE RANDOM (BEAKER) (test code=652)    371 mg/dL                  

 

                CALCIUM (BEAKER) (test code=697)    8.5 mg/dL       8.4-10.2         

 

                EGFR (BEAKER) (test code=1092)    49 mL/min/1.73 sq m                    ESTIMATED GFR IS NOT AS 

ACCURATE AS CREATININE CLEARANCE IN PREDICTING GLOMERULAR FILTRATION RATE. 
ESTIMATED GFR IS NOT APPLICABLE FOR DIALYSIS PATIENTS.





B-TYPE NATRIURETIC FACTOR (BNP)2017-10-01 02:13:00* 





                Test Item       Value           Reference Range    Comments

 

                B-TYPE NATRIURETIC PEPTIDE (BEAKER) (test code=700)    268 pg/mL       0-100            





URINALYSIS W/ REFLEX URINE CULTURE2017-10-01 02:01:00* 





                Test Item       Value           Reference Range    Comments

 

                COLOR (BEAKER) (test code=470)    Yellow                           

 

                CLARITY (BEAKER) (test code=469)    Clear                            

 

                SPECIFIC GRAVITY UA (BEAKER) (test code=468)    1.010           1.001-1.035      

 

                PH UA (BEAKER) (test code=467)    6.0             5.0-8.0          

 

                PROTEIN UA (BEAKER) (test code=464)    Negative        Negative         

 

                GLUCOSE UA (BEAKER) (test code=365)    >1000 mg/dL     Negative         

 

                KETONES UA (BEAKER) (test code=371)    Negative        Negative         

 

                BILIRUBIN UA (BEAKER) (test code=462)    Negative        Negative         

 

                BLOOD UA (BEAKER) (test code=461)    Negative        Negative         

 

                NITRITE UA (BEAKER) (test code=465)    Negative        Negative         

 

                LEUKOCYTE ESTERASE UA (BEAKER) (test code=466)    Negative        Negative         

 

                UROBILINOGEN UA (BEAKER) (test code=463)    0.2 mg/dL       0.2-1.0          

 

                RBC UA (BEAKER) (test code=519)    0 /HPF                           

 

                WBC UA (BEAKER) (test code=520)    < /HPF                           

 

                SQUAMOUS EPITHELIAL (BEAKER) (test code=516)    < /HPF                           

 

                SOURCE(BEAKER) (test code=2795)                                     





RAD, CHEST, 1 VIEW, NON DEPT2017-10-01 01:44:00Reason for exam:->SHORTNESS OF 
BREATHShould this be performed at the bedside?->YesFINAL REPORT PATIENT ID:   
[id number] INDICATION: SHORTNESS OF BREATH COMPARISON: 2017 TECHNIQUE: 
Single frontal view of the chest. FINDINGS: Lungs and pleura: Marked 
interstitial congestion. No focal airspace disease. No effusion.Heart and 
mediastinum: Enlarged cardiac silhouette. Stable mediastinal surgical c
hanges.Osseous structures: No acute abnormality.Other: Leadless cardiac recordin
g device overlies the left hemithorax.  IMPRESSION: Appearance suggests cardioge
amrik edema. Signed: JR Booker Robert MDReport Verified Date/Time:  10/01/
2017 01:44:39 Reading Location: [facility] C013Y CT Body Reading Room    [facility] signed by: ALEXANDRIA BOOKER on 10/01/2017 01:44 AM RAD, SHOULDER, 
COMPLETE (MIN 2 VIEWS), RIGHT2017-10-01 01:43:00Reason for exam:->SHORTNESS OF 
BREATHReason for exam:->fall, painFINAL REPORT PATIENT ID:   [id number]  RAD, 
SHOULDER, COMPLETE (MIN 2 VIEWS), RIGHT COMPARISON: None INDICATION:  SHORTNESS 
OF BREATHfall, pain  FINDINGS:  AP views in internal and external rotation and 
an axillary "Y" view of the rightshoulder. No fracture or malalignment is 
identified. Degenerative narrowing noted in the glenohumeral and 
acromioclavicular joints. Surrounding soft tissue are unremarkable. No abnormal 
calcification is present.  IMPRESSION:   No acute osseous abnormality of the 
right shoulder.  Signed: JR Booker Robert MDReport Verified Date/Time:  
10/01/2017 01:43:39 Reading Location: [facility] B1 C013Y CT Body Reading Room    
Electronically signed by: ALEXANDRIA BOOKER on 10/01/2017 01:43 AM CBC 
W/PLT COUNT & AUTO DIFFERENTIAL2017-10-01 01:38:00* 





                Test Item       Value           Reference Range    Comments

 

                WHITE BLOOD CELL COUNT (BEAKER) (test code=775)    11.5 K/ L       3.5-10.5         

 

                RED BLOOD CELL COUNT (BEAKER) (test code=761)    3.29 M/ L       4.63-6.08        

 

                HEMOGLOBIN (BEAKER) (test code=410)    9.2 GM/DL       13.7-17.5        

 

                HEMATOCRIT (BEAKER) (test code=411)    30.0 %          40.1-51.0        

 

                MEAN CORPUSCULAR VOLUME (BEAKER) (test code=753)    91.2 fL         79.0-92.2        

 

                MEAN CORPUSCULAR HEMOGLOBIN (BEAKER) (test code=751)    28.0 pg         25.7-32.2        

 

                    MEAN CORPUSCULAR HEMOGLOBIN CONC (BEAKER) (test code=752)    30.7 GM/DL          32.3-36.5

                                         

 

                RED CELL DISTRIBUTION WIDTH (BEAKER) (test code=412)    15.8 %          11.6-14.4        

 

                PLATELET COUNT (BEAKER) (test code=756)    209 K/CU MM     150-450          

 

                MEAN PLATELET VOLUME (BEAKER) (test code=754)    11.0 fL         9.4-12.4         

 

                NUCLEATED RED BLOOD CELLS (BEAKER) (test code=413)    0 /100 WBC      0-0              

 

                NEUTROPHILS RELATIVE PERCENT (BEAKER) (test code=429)    76 %                             

 

                LYMPHOCYTES RELATIVE PERCENT (BEAKER) (test code=430)    13 %                             

 

                MONOCYTES RELATIVE PERCENT (BEAKER) (test code=431)    8 %                              

 

                EOSINOPHILS RELATIVE PERCENT (BEAKER) (test code=432)    2 %                              

 

                BASOPHILS RELATIVE PERCENT (BEAKER) (test code=437)    0 %                              

 

                NEUTROPHILS ABSOLUTE COUNT (BEAKER) (test code=670)    8.73 K/ L       1.78-5.38        

 

                LYMPHOCYTES ABSOLUTE COUNT (BEAKER) (test code=414)    1.54 K/ L       1.32-3.57        

 

                MONOCYTES ABSOLUTE COUNT (BEAKER) (test code=415)    0.94 K/ L       0.30-0.82        

 

                EOSINOPHILS ABSOLUTE COUNT (BEAKER) (test code=416)    0.17 K/ L       0.04-0.54        

 

                BASOPHILS ABSOLUTE COUNT (BEAKER) (test code=417)    0.05 K/ L       0.01-0.08        

 

                IMMATURE GRANULOCYTES-RELATIVE PERCENT (BEAKER) (test code=2801)    1 %             0-1              





POCT-GLUCOSE [id number] 17:12:00* 





                Test Item       Value           Reference Range    Comments

 

                POC-GLUCOSE METER (BEAKER) (test code=1538)    142 mg/dL                 TESTED AT [address] [address]





POCT-GLUCOSE [id number] 16:00:00* 





                Test Item       Value           Reference Range    Comments

 

                POC-GLUCOSE METER (BEAKER) (test code=1538)    149 mg/dL                 TESTED AT [address] [address]





[id number] 05:17:00* 





                Test Item       Value           Reference Range    Comments

 

                MAGNESIUM (BEAKER) (test code=627)    1.9 mg/dL       1.6-2.6          





BASIC METABOLIC [id number] 05:17:00* 





                Test Item       Value           Reference Range    Comments

 

                SODIUM (BEAKER) (test code=381)    137 meq/L       136-145          

 

                POTASSIUM (BEAKER) (test code=379)    3.4 meq/L       3.5-5.1          

 

                CHLORIDE (BEAKER) (test code=382)    98 meq/L                   

 

                CO2 (BEAKER) (test code=355)    30 meq/L        22-29            

 

                BLOOD UREA NITROGEN (BEAKER) (test code=354)    20 mg/dL        7-21             

 

                CREATININE (BEAKER) (test code=358)    0.97 mg/dL      0.57-1.25        

 

                GLUCOSE RANDOM (BEAKER) (test code=652)    188 mg/dL                  

 

                CALCIUM (BEAKER) (test code=697)    8.4 mg/dL       8.4-10.2         

 

                EGFR (BEAKER) (test code=1092)    75 mL/min/1.73 sq m                    ESTIMATED GFR IS NOT AS 

ACCURATE AS CREATININE CLEARANCE IN PREDICTING GLOMERULAR FILTRATION RATE. 
ESTIMATED GFR IS NOT APPLICABLE FOR DIALYSIS PATIENTS.





B-TYPE NATRIURETIC FACTOR (BNP)2017 04:57:00* 





                Test Item       Value           Reference Range    Comments

 

                B-TYPE NATRIURETIC PEPTIDE (BEAKER) (test code=700)    242 pg/mL       0-100            





CBC W/PLT COUNT & AUTO [id number] 04:48:00* 





                Test Item       Value           Reference Range    Comments

 

                WHITE BLOOD CELL COUNT (BEAKER) (test code=775)    10.4 K/ L       4.0-10.0         

 

                RED BLOOD CELL COUNT (BEAKER) (test code=761)    3.44 M/ L       4.20-5.80        

 

                HEMOGLOBIN (BEAKER) (test code=410)    10.0 GM/DL      13.0-16.8        

 

                HEMATOCRIT (BEAKER) (test code=411)    31.8 %          40.0-50.0        

 

                MEAN CORPUSCULAR VOLUME (BEAKER) (test code=753)    92.4 fL         82.0-98.0        

 

                MEAN CORPUSCULAR HEMOGLOBIN (BEAKER) (test code=751)    29.2 pg         27.0-33.0        

 

                    MEAN CORPUSCULAR HEMOGLOBIN CONC (BEAKER) (test code=752)    31.6 GM/DL          32.0-36.0

                                         

 

                RED CELL DISTRIBUTION WIDTH (BEAKER) (test code=412)    15.6 %          10.3-14.2        

 

                PLATELET COUNT (BEAKER) (test code=756)    262 K/CU MM     150-430          

 

                MEAN PLATELET VOLUME (BEAKER) (test code=754)    7.6 fL          6.5-10.5         

 

                NUCLEATED RED BLOOD CELLS (BEAKER) (test code=413)    0 /100 WBC      0-0              

 

                NEUTROPHILS RELATIVE PERCENT (BEAKER) (test code=429)    67 %                             

 

                LYMPHOCYTES RELATIVE PERCENT (BEAKER) (test code=430)    19 %                             

 

                MONOCYTES RELATIVE PERCENT (BEAKER) (test code=431)    9 %                              

 

                EOSINOPHILS RELATIVE PERCENT (BEAKER) (test code=432)    4 %                              

 

                BASOPHILS RELATIVE PERCENT (BEAKER) (test code=437)    1 %                              

 

                NEUTROPHILS ABSOLUTE COUNT (BEAKER) (test code=670)    7.00 K/ L       1.80-8.00        

 

                LYMPHOCYTES ABSOLUTE COUNT (BEAKER) (test code=414)    1.99 K/ L       1.48-4.50        

 

                MONOCYTES ABSOLUTE COUNT (BEAKER) (test code=415)    0.90 K/ L       0.00-1.30        

 

                EOSINOPHILS ABSOLUTE COUNT (BEAKER) (test code=416)    0.44 K/ L       0.00-0.50        

 

                BASOPHILS ABSOLUTE COUNT (BEAKER) (test code=417)    0.09 K/ L       0.00-0.20        





0.00POCT-GLUCOSE [id number] 21:43:00* 





                Test Item       Value           Reference Range    Comments

 

                POC-GLUCOSE METER (BEAKER) (test code=1538)    274 mg/dL                 TESTED AT [address] [address]





POCT-GLUCOSE [id number] 18:08:00* 





                Test Item       Value           Reference Range    Comments

 

                POC-GLUCOSE METER (BEAKER) (test code=1538)    203 mg/dL                 TESTED AT [address] [address]





POCT-GLUCOSE [id number] 12:41:00* 





                Test Item       Value           Reference Range    Comments

 

                POC-GLUCOSE METER (BEAKER) (test code=1538)    196 mg/dL                 TESTED AT [address] [address]





URINE [id number] 10:39:00* 





                Test Item       Value           Reference Range    Comments

 

                CULTURE (BEAKER) (test code=1095)    No growth                        





POCT-GLUCOSE [id number] 08:26:00* 





                Test Item       Value           Reference Range    Comments

 

                POC-GLUCOSE METER (BEAKER) (test code=1538)    149 mg/dL                 TESTED AT [address] [address]





[id number] 05:24:00* 





                Test Item       Value           Reference Range    Comments

 

                MAGNESIUM (BEAKER) (test code=627)    1.9 mg/dL       1.6-2.6          





BASIC METABOLIC [id number] 05:24:00* 





                Test Item       Value           Reference Range    Comments

 

                SODIUM (BEAKER) (test code=381)    139 meq/L       136-145          

 

                POTASSIUM (BEAKER) (test code=379)    3.6 meq/L       3.5-5.1          

 

                CHLORIDE (BEAKER) (test code=382)    100 meq/L                  

 

                CO2 (BEAKER) (test code=355)    30 meq/L        22-29            

 

                BLOOD UREA NITROGEN (BEAKER) (test code=354)    17 mg/dL        7-21             

 

                CREATININE (BEAKER) (test code=358)    0.86 mg/dL      0.57-1.25        

 

                GLUCOSE RANDOM (BEAKER) (test code=652)    136 mg/dL                  

 

                CALCIUM (BEAKER) (test code=697)    8.5 mg/dL       8.4-10.2         

 

                EGFR (BEAKER) (test code=1092)    87 mL/min/1.73 sq m                    ESTIMATED GFR IS NOT AS 

ACCURATE AS CREATININE CLEARANCE IN PREDICTING GLOMERULAR FILTRATION RATE. 
ESTIMATED GFR IS NOT APPLICABLE FOR DIALYSIS PATIENTS.





CBC W/PLT COUNT & AUTO [id number] 05:21:00* 





                Test Item       Value           Reference Range    Comments

 

                WHITE BLOOD CELL COUNT (BEAKER) (test code=775)    12.7 K/ L       4.0-10.0         

 

                RED BLOOD CELL COUNT (BEAKER) (test code=761)    3.25 M/ L       4.20-5.80        

 

                HEMOGLOBIN (BEAKER) (test code=410)    9.5 GM/DL       13.0-16.8        

 

                HEMATOCRIT (BEAKER) (test code=411)    29.5 %          40.0-50.0        

 

                MEAN CORPUSCULAR VOLUME (BEAKER) (test code=753)    90.9 fL         82.0-98.0        

 

                MEAN CORPUSCULAR HEMOGLOBIN (BEAKER) (test code=751)    29.3 pg         27.0-33.0        

 

                    MEAN CORPUSCULAR HEMOGLOBIN CONC (BEAKER) (test code=752)    32.2 GM/DL          32.0-36.0

                                         

 

                RED CELL DISTRIBUTION WIDTH (BEAKER) (test code=412)    16.4 %          10.3-14.2        

 

                PLATELET COUNT (BEAKER) (test code=756)    246 K/CU MM     150-430          

 

                MEAN PLATELET VOLUME (BEAKER) (test code=754)    7.8 fL          6.5-10.5         

 

                NUCLEATED RED BLOOD CELLS (BEAKER) (test code=413)    0 /100 WBC      0-0              

 

                NEUTROPHILS RELATIVE PERCENT (BEAKER) (test code=429)    66 %                             

 

                LYMPHOCYTES RELATIVE PERCENT (BEAKER) (test code=430)    21 %                             

 

                MONOCYTES RELATIVE PERCENT (BEAKER) (test code=431)    9 %                              

 

                EOSINOPHILS RELATIVE PERCENT (BEAKER) (test code=432)    4 %                              

 

                BASOPHILS RELATIVE PERCENT (BEAKER) (test code=437)    1 %                              

 

                NEUTROPHILS ABSOLUTE COUNT (BEAKER) (test code=670)    8.34 K/ L       1.80-8.00        

 

                LYMPHOCYTES ABSOLUTE COUNT (BEAKER) (test code=414)    2.64 K/ L       1.48-4.50        

 

                MONOCYTES ABSOLUTE COUNT (BEAKER) (test code=415)    1.09 K/ L       0.00-1.30        

 

                EOSINOPHILS ABSOLUTE COUNT (BEAKER) (test code=416)    0.50 K/ L       0.00-0.50        

 

                BASOPHILS ABSOLUTE COUNT (BEAKER) (test code=417)    0.09 K/ L       0.00-0.20        





0.00POCT-GLUCOSE [id number] 21:30:00* 





                Test Item       Value           Reference Range    Comments

 

                POC-GLUCOSE METER (BEAKER) (test code=1538)    108 mg/dL                 TESTED AT [address] [address]





POCT-GLUCOSE [id number] 17:12:00* 





                Test Item       Value           Reference Range    Comments

 

                POC-GLUCOSE METER (BEAKER) (test code=1538)    142 mg/dL                 TESTED AT [address] [address]





POCT-GLUCOSE [id number] 12:14:00* 





                Test Item       Value           Reference Range    Comments

 

                POC-GLUCOSE METER (BEAKER) (test code=1538)    146 mg/dL                 TESTED AT [address] [address]





POCT-GLUCOSE [id number] 08:38:00* 





                Test Item       Value           Reference Range    Comments

 

                POC-GLUCOSE METER (BEAKER) (test code=1538)    91 mg/dL                  TESTED AT [address] [address]





CBC W/PLT COUNT & AUTO [id number] 06:41:00* 





                Test Item       Value           Reference Range    Comments

 

                WHITE BLOOD CELL COUNT (BEAKER) (test code=775)    11.2 K/ L       4.0-10.0         

 

                RED BLOOD CELL COUNT (BEAKER) (test code=761)    3.28 M/ L       4.20-5.80        

 

                HEMOGLOBIN (BEAKER) (test code=410)    9.4 GM/DL       13.0-16.8        

 

                HEMATOCRIT (BEAKER) (test code=411)    30.7 %          40.0-50.0        

 

                MEAN CORPUSCULAR VOLUME (BEAKER) (test code=753)    93.5 fL         82.0-98.0        

 

                MEAN CORPUSCULAR HEMOGLOBIN (BEAKER) (test code=751)    28.6 pg         27.0-33.0        

 

                    MEAN CORPUSCULAR HEMOGLOBIN CONC (BEAKER) (test code=752)    30.6 GM/DL          32.0-36.0

                                         

 

                RED CELL DISTRIBUTION WIDTH (BEAKER) (test code=412)    16.2 %          10.3-14.2        

 

                PLATELET COUNT (BEAKER) (test code=756)    243 K/CU MM     150-430          

 

                MEAN PLATELET VOLUME (BEAKER) (test code=754)    8.5 fL          6.5-10.5         

 

                NUCLEATED RED BLOOD CELLS (BEAKER) (test code=413)    0 /100 WBC      0-0              

 

                NEUTROPHILS RELATIVE PERCENT (BEAKER) (test code=429)    68 %                             

 

                LYMPHOCYTES RELATIVE PERCENT (BEAKER) (test code=430)    19 %                             

 

                MONOCYTES RELATIVE PERCENT (BEAKER) (test code=431)    9 %                              

 

                EOSINOPHILS RELATIVE PERCENT (BEAKER) (test code=432)    4 %                              

 

                BASOPHILS RELATIVE PERCENT (BEAKER) (test code=437)    1 %                              

 

                NEUTROPHILS ABSOLUTE COUNT (BEAKER) (test code=670)    7.56 K/ L       1.80-8.00        

 

                LYMPHOCYTES ABSOLUTE COUNT (BEAKER) (test code=414)    2.15 K/ L       1.48-4.50        

 

                MONOCYTES ABSOLUTE COUNT (BEAKER) (test code=415)    0.97 K/ L       0.00-1.30        

 

                EOSINOPHILS ABSOLUTE COUNT (BEAKER) (test code=416)    0.40 K/ L       0.00-0.50        

 

                BASOPHILS ABSOLUTE COUNT (BEAKER) (test code=417)    0.07 K/ L       0.00-0.20        





[id number] 05:35:00* 





                Test Item       Value           Reference Range    Comments

 

                MAGNESIUM (BEAKER) (test code=627)    1.8 mg/dL       1.6-2.6          





BASIC METABOLIC [id number] 05:35:00* 





                Test Item       Value           Reference Range    Comments

 

                SODIUM (BEAKER) (test code=381)    136 meq/L       136-145          

 

                POTASSIUM (BEAKER) (test code=379)    3.9 meq/L       3.5-5.1          

 

                CHLORIDE (BEAKER) (test code=382)    100 meq/L                  

 

                CO2 (BEAKER) (test code=355)    26 meq/L        22-29            

 

                BLOOD UREA NITROGEN (BEAKER) (test code=354)    20 mg/dL        7-21             

 

                CREATININE (BEAKER) (test code=358)    1.07 mg/dL      0.57-1.25        

 

                GLUCOSE RANDOM (BEAKER) (test code=652)    174 mg/dL                  

 

                CALCIUM (BEAKER) (test code=697)    8.4 mg/dL       8.4-10.2         

 

                EGFR (BEAKER) (test code=1092)    67 mL/min/1.73 sq m                    ESTIMATED GFR IS NOT AS 

ACCURATE AS CREATININE CLEARANCE IN PREDICTING GLOMERULAR FILTRATION RATE. 
ESTIMATED GFR IS NOT APPLICABLE FOR DIALYSIS PATIENTS.





POCT-GLUCOSE [id number] 20:50:00* 





                Test Item       Value           Reference Range    Comments

 

                POC-GLUCOSE METER (BEAKER) (test code=1538)    248 mg/dL                 TESTED AT [address] [address]





POCT-GLUCOSE [id number] 19:59:00* 





                Test Item       Value           Reference Range    Comments

 

                POC-GLUCOSE METER (BEAKER) (test code=1538)    98 mg/dL                  TESTED AT [address] [address]





POCT-GLUCOSE [id number] 12:12:00* 





                Test Item       Value           Reference Range    Comments

 

                POC-GLUCOSE METER (BEAKER) (test code=1538)    199 mg/dL                 TESTED AT [address] [address]





POCT-GLUCOSE [id number] 07:41:00* 





                Test Item       Value           Reference Range    Comments

 

                POC-GLUCOSE METER (BEAKER) (test code=1538)    152 mg/dL                 TESTED AT [address] [address]





BASIC METABOLIC [id number] 06:16:00* 





                Test Item       Value           Reference Range    Comments

 

                SODIUM (BEAKER) (test code=381)    135 meq/L       136-145          

 

                POTASSIUM (BEAKER) (test code=379)    4.1 meq/L       3.5-5.1         Specimen slightly hemolyzed



 

                CHLORIDE (BEAKER) (test code=382)    103 meq/L                  

 

                CO2 (BEAKER) (test code=355)    23 meq/L        22-29            

 

                BLOOD UREA NITROGEN (BEAKER) (test code=354)    24 mg/dL        7-21             

 

                CREATININE (BEAKER) (test code=358)    1.02 mg/dL      0.57-1.25       Specimen slightly hemolyzed



 

                GLUCOSE RANDOM (BEAKER) (test code=652)    218 mg/dL                  

 

                CALCIUM (BEAKER) (test code=697)    7.8 mg/dL       8.4-10.2         

 

                EGFR (BEAKER) (test code=1092)    71 mL/min/1.73 sq m                    ESTIMATED GFR IS NOT AS 

ACCURATE AS CREATININE CLEARANCE IN PREDICTING GLOMERULAR FILTRATION RATE. 
ESTIMATED GFR IS NOT APPLICABLE FOR DIALYSIS PATIENTS.





[id number] 05:56:00* 





                Test Item       Value           Reference Range    Comments

 

                MAGNESIUM (BEAKER) (test code=627)    1.8 mg/dL       1.6-2.6         Specimen slightly hemolyzed







CBC W/PLT COUNT & AUTO [id number] 05:06:00* 





                Test Item       Value           Reference Range    Comments

 

                WHITE BLOOD CELL COUNT (BEAKER) (test code=775)    12.5 K/ L       4.0-10.0         

 

                RED BLOOD CELL COUNT (BEAKER) (test code=761)    3.22 M/ L       4.20-5.80        

 

                HEMOGLOBIN (BEAKER) (test code=410)    9.3 GM/DL       13.0-16.8        

 

                HEMATOCRIT (BEAKER) (test code=411)    29.6 %          40.0-50.0        

 

                MEAN CORPUSCULAR VOLUME (BEAKER) (test code=753)    91.7 fL         82.0-98.0        

 

                MEAN CORPUSCULAR HEMOGLOBIN (BEAKER) (test code=751)    28.8 pg         27.0-33.0        

 

                    MEAN CORPUSCULAR HEMOGLOBIN CONC (BEAKER) (test code=752)    31.3 GM/DL          32.0-36.0

                                         

 

                RED CELL DISTRIBUTION WIDTH (BEAKER) (test code=412)    17.0 %          10.3-14.2        

 

                PLATELET COUNT (BEAKER) (test code=756)    228 K/CU MM     150-430          

 

                MEAN PLATELET VOLUME (BEAKER) (test code=754)    8.2 fL          6.5-10.5         

 

                NUCLEATED RED BLOOD CELLS (BEAKER) (test code=413)    0 /100 WBC      0-0              

 

                NEUTROPHILS RELATIVE PERCENT (BEAKER) (test code=429)    77 %                             

 

                LYMPHOCYTES RELATIVE PERCENT (BEAKER) (test code=430)    12 %                             

 

                MONOCYTES RELATIVE PERCENT (BEAKER) (test code=431)    9 %                              

 

                EOSINOPHILS RELATIVE PERCENT (BEAKER) (test code=432)    2 %                              

 

                BASOPHILS RELATIVE PERCENT (BEAKER) (test code=437)    1 %                              

 

                NEUTROPHILS ABSOLUTE COUNT (BEAKER) (test code=670)    9.63 K/ L       1.80-8.00        

 

                LYMPHOCYTES ABSOLUTE COUNT (BEAKER) (test code=414)    1.44 K/ L       1.48-4.50        

 

                MONOCYTES ABSOLUTE COUNT (BEAKER) (test code=415)    1.11 K/ L       0.00-1.30        

 

                EOSINOPHILS ABSOLUTE COUNT (BEAKER) (test code=416)    0.25 K/ L       0.00-0.50        

 

                BASOPHILS ABSOLUTE COUNT (BEAKER) (test code=417)    0.06 K/ L       0.00-0.20        





0.00URINALYSIS W/ [id number] 22:24:00* 





                Test Item       Value           Reference Range    Comments

 

                COLOR (BEAKER) (test code=470)    Light Yellow                     

 

                CLARITY (BEAKER) (test code=469)    Clear                            

 

                SPECIFIC GRAVITY UA (BEAKER) (test code=468)    1.005           1.001-1.035      

 

                PH UA (BEAKER) (test code=467)    5.0             5.0-8.0          

 

                PROTEIN UA (BEAKER) (test code=464)    Negative        Negative         

 

                GLUCOSE UA (BEAKER) (test code=365)    150 mg/dL       Negative         

 

                KETONES UA (BEAKER) (test code=371)    Negative        Negative         

 

                BILIRUBIN UA (BEAKER) (test code=462)    Negative        Negative         

 

                BLOOD UA (BEAKER) (test code=461)    Moderate        Negative         

 

                NITRITE UA (BEAKER) (test code=465)    Negative        Negative         

 

                LEUKOCYTE ESTERASE UA (BEAKER) (test code=466)    Small           Negative         

 

                UROBILINOGEN UA (BEAKER) (test code=463)    0.2 mg/dL       0.2-1.0          

 

                RBC UA (BEAKER) (test code=519)    37 /HPF                          

 

                WBC UA (BEAKER) (test code=520)    4 /HPF                           

 

                BACTERIA (BEAKER) (test cfjf=604)    Rare                             

 

                SOURCE(BEAKER) (test code=2795)                                     





POCT-GLUCOSE [id number] 21:19:00* 





                Test Item       Value           Reference Range    Comments

 

                POC-GLUCOSE METER (BEAKER) (test code=1538)    311 mg/dL                 Notified RN MD/TESTED

 AT [facility] [address] [facility] [address]





POCT-GLUCOSE [id number] 19:04:00* 





                Test Item       Value           Reference Range    Comments

 

                POC-GLUCOSE METER (BEAKER) (test code=1538)    177 mg/dL                 TESTED AT [place] [id number] [facility] [address]





CREATINE KINASE (CK), TOTAL AND [id number] 17:58:00* 





                Test Item       Value           Reference Range    Comments

 

                CREATINE KINASE TOTAL (BEAKER) (test code=380)    37 U/L                     

 

                CREATINE KINASE-MB (BEAKER) (test code=750)    2.1 ng/mL       0.0-6.6          

 

                CREATINE KINASE-MB INDEX (BEAKER) (test code=395)    5.7 %                            





Effective 2014: CK-MB Reference Range ChangeNew: 0.0-6.6    Previous: 0.0-
4.9CK-MB Reference Range:<6.7      Normal6.7-10.0  Borderline>10.0     Abnormal
TROPONIN -76-44 17:58:00* 





                Test Item       Value           Reference Range    Comments

 

                TROPONIN I (BEAKER) (test code=397)    < ng/mL         0.00-0.03        





Effective 2014: Reference Range ChangeNew: 0.00-0.03   Previous 0.00-0.15T
roponin I (TnI) levels must be interpreted in the context of the presenting symp
toms and the clinical findings. Elevated TnI levels indicate myocardial damage, 
but are not specific for ischemic heart disease. Elevated TnI levels are seen in
patients with other cardiac conditions (including myocarditis and congestive he
art failure), and slight TnI elevations occur in patients with other conditions,
including sepsis, renal failure, acidosis, acute neurological disease, and pers
istent tachyarrhythmia.BASIC METABOLIC [id number] 14:59:00* 





                Test Item       Value           Reference Range    Comments

 

                SODIUM (BEAKER) (test code=381)    138 meq/L       136-145          

 

                POTASSIUM (BEAKER) (test code=379)    3.8 meq/L       3.5-5.1          

 

                CHLORIDE (BEAKER) (test code=382)    106 meq/L                  

 

                CO2 (BEAKER) (test code=355)    22 meq/L        22-29            

 

                BLOOD UREA NITROGEN (BEAKER) (test code=354)    30 mg/dL        7-21             

 

                CREATININE (BEAKER) (test code=358)    1.18 mg/dL      0.57-1.25        

 

                GLUCOSE RANDOM (BEAKER) (test code=652)    130 mg/dL                  

 

                CALCIUM (BEAKER) (test code=697)    8.0 mg/dL       8.4-10.2         

 

                EGFR (BEAKER) (test code=1092)    60 mL/min/1.73 sq m                    ESTIMATED GFR IS NOT AS 

ACCURATE AS CREATININE CLEARANCE IN PREDICTING GLOMERULAR FILTRATION RATE. 
ESTIMATED GFR IS NOT APPLICABLE FOR DIALYSIS PATIENTS.





POCT-GLUCOSE [id number] 12:48:00* 





                Test Item       Value           Reference Range    Comments

 

                POC-GLUCOSE METER (BEAKER) (test code=1538)    149 mg/dL                 TESTED AT [facility] 

[address] [facility] [address]





HEMOGLOBIN [id number] 12:32:00* 





                Test Item       Value           Reference Range    Comments

 

                HEMOGLOBIN A1C (BEAKER) (test code=368)    8.0 %           4.3-6.1          





TROPONIN -82-85 11:38:00* 





                Test Item       Value           Reference Range    Comments

 

                TROPONIN I (BEAKER) (test code=397)    0.01 ng/mL      0.00-0.03        





Effective 2014: Reference Range ChangeNew: 0.00-0.03   Previous 0.00-0.15T
roponin I (TnI) levels must be interpreted in the context of the presenting symp
toms and the clinical findings. Elevated TnI levels indicate myocardial damage, 
but are not specific for ischemic heart disease. Elevated TnI levels are seen in
patients with other cardiac conditions (including myocarditis and congestive he
art failure), and slight TnI elevations occur in patients with other conditions,
including sepsis, renal failure, acidosis, acute neurological disease, and pers
istent tachyarrhythmia.COMPREHENSIVE METABOLIC [id number] 11:38:00* 





                Test Item       Value           Reference Range    Comments

 

                TOTAL PROTEIN (BEAKER) (test code=770)    7.4 gm/dL       6.0-8.3          

 

                ALBUMIN (BEAKER) (test code=1145)    3.8 g/dL        3.5-5.0          

 

                ALKALINE PHOSPHATASE (BEAKER) (test code=346)    110 U/L                    

 

                BILIRUBIN TOTAL (BEAKER) (test code=377)    0.9 mg/dL       0.2-1.2          

 

                SODIUM (BEAKER) (test code=381)    137 meq/L       136-145          

 

                POTASSIUM (BEAKER) (test code=379)    4.0 meq/L       3.5-5.1          

 

                CHLORIDE (BEAKER) (test code=382)    104 meq/L                  

 

                CO2 (BEAKER) (test code=355)    25 meq/L        22-29            

 

                BLOOD UREA NITROGEN (BEAKER) (test code=354)    32 mg/dL        7-21             

 

                CREATININE (BEAKER) (test code=358)    1.43 mg/dL      0.57-1.25        

 

                GLUCOSE RANDOM (BEAKER) (test code=652)    118 mg/dL                  

 

                CALCIUM (BEAKER) (test code=697)    8.6 mg/dL       8.4-10.2         

 

                AST (SGOT) (BEAKER) (test code=353)    42 U/L          5-34             

 

                ALT (SGPT) (BEAKER) (test code=347)    33 U/L          6-55             

 

                EGFR (BEAKER) (test code=1092)    48 mL/min/1.73 sq m                    ESTIMATED GFR IS NOT AS 

ACCURATE AS CREATININE CLEARANCE IN PREDICTING GLOMERULAR FILTRATION RATE. 
ESTIMATED GFR IS NOT APPLICABLE FOR DIALYSIS PATIENTS.





CREATINE KINASE (CK), TOTAL AND [id number] 11:38:00* 





                Test Item       Value           Reference Range    Comments

 

                CREATINE KINASE TOTAL (BEAKER) (test code=380)    45 U/L                     

 

                CREATINE KINASE-MB (BEAKER) (test code=750)    2.4 ng/mL       0.0-6.6          

 

                CREATINE KINASE-MB INDEX (BEAKER) (test code=395)    5.3 %                            





Effective 2014: CK-MB Reference Range ChangeNew: 0.0-6.6    Previous: 0.0-
4.9CK-MB Reference Range:<6.7      Normal6.7-10.0  Borderline>10.0     Abnormal
URINALYSIS W/ REFLEX URINE [id number] 11:15:00* 





                Test Item       Value           Reference Range    Comments

 

                COLOR (BEAKER) (test code=470)    Yellow                           

 

                CLARITY (BEAKER) (test code=469)    Clear                            

 

                SPECIFIC GRAVITY UA (BEAKER) (test code=468)    1.007           1.001-1.035      

 

                PH UA (BEAKER) (test code=467)    5.0             5.0-8.0          

 

                PROTEIN UA (BEAKER) (test code=464)    Negative        Negative         

 

                GLUCOSE UA (BEAKER) (test code=365)    Negative        Negative         

 

                KETONES UA (BEAKER) (test code=371)    Negative        Negative         

 

                BILIRUBIN UA (BEAKER) (test code=462)    Negative        Negative         

 

                BLOOD UA (BEAKER) (test code=461)    Negative        Negative         

 

                NITRITE UA (BEAKER) (test code=465)    Negative        Negative         

 

                LEUKOCYTE ESTERASE UA (BEAKER) (test code=466)    Negative        Negative         

 

                UROBILINOGEN UA (BEAKER) (test code=463)    0.2 mg/dL       0.2-1.0          

 

                RBC UA (BEAKER) (test code=519)    1 /HPF                           

 

                WBC UA (BEAKER) (test code=520)    1 /HPF                           

 

                BACTERIA (BEAKER) (test lvwg=176)    Rare                             

 

                HYALINE CASTS (BEAKER) (test code=514)    38 /LPF                          

 

                CASTS (BEAKER) (test code=1579)    2 /LPF                           

 

                SOURCE(BEAKER) (test code=2795)                                     





POCT-GLUCOSE [id number] 10:02:00* 





                Test Item       Value           Reference Range    Comments

 

                POC-GLUCOSE METER (BEAKER) (test code=1538)    121 mg/dL                 TESTED AT [facility] 

[address] [facility] [address]





CBC W/PLT COUNT & AUTO [id number] 06:38:00* 





                Test Item       Value           Reference Range    Comments

 

                WHITE BLOOD CELL COUNT (BEAKER) (test code=775)    13.2 K/ L       4.0-10.0         

 

                RED BLOOD CELL COUNT (BEAKER) (test code=761)    2.77 M/ L       4.20-5.80        

 

                HEMOGLOBIN (BEAKER) (test code=410)    8.2 GM/DL       13.0-16.8        

 

                HEMATOCRIT (BEAKER) (test code=411)    26.0 %          40.0-50.0        

 

                MEAN CORPUSCULAR VOLUME (BEAKER) (test code=753)    93.6 fL         82.0-98.0        

 

                MEAN CORPUSCULAR HEMOGLOBIN (BEAKER) (test code=751)    29.5 pg         27.0-33.0        

 

                    MEAN CORPUSCULAR HEMOGLOBIN CONC (BEAKER) (test code=752)    31.5 GM/DL          32.0-36.0

                                         

 

                RED CELL DISTRIBUTION WIDTH (BEAKER) (test code=412)    16.2 %          10.3-14.2        

 

                PLATELET COUNT (BEAKER) (test code=756)    159 K/CU MM     150-430          

 

                MEAN PLATELET VOLUME (BEAKER) (test code=754)    9.1 fL          6.5-10.5         

 

                NUCLEATED RED BLOOD CELLS (BEAKER) (test code=413)    0 /100 WBC      0-0              

 

                NEUTROPHILS RELATIVE PERCENT (BEAKER) (test code=429)    70 %                             

 

                LYMPHOCYTES RELATIVE PERCENT (BEAKER) (test code=430)    21 %                             

 

                MONOCYTES RELATIVE PERCENT (BEAKER) (test code=431)    8 %                              

 

                EOSINOPHILS RELATIVE PERCENT (BEAKER) (test code=432)    1 %                              

 

                BASOPHILS RELATIVE PERCENT (BEAKER) (test code=437)    1 %                              

 

                NEUTROPHILS ABSOLUTE COUNT (BEAKER) (test code=670)    9.24 K/ L       1.80-8.00        

 

                LYMPHOCYTES ABSOLUTE COUNT (BEAKER) (test code=414)    2.77 K/ L       1.48-4.50        

 

                MONOCYTES ABSOLUTE COUNT (BEAKER) (test code=415)    1.01 K/ L       0.00-1.30        

 

                EOSINOPHILS ABSOLUTE COUNT (BEAKER) (test code=416)    0.10 K/ L       0.00-0.50        

 

                BASOPHILS ABSOLUTE COUNT (BEAKER) (test code=417)    0.07 K/ L       0.00-0.20        





[id number] 06:28:00* 





                Test Item       Value           Reference Range    Comments

 

                MAGNESIUM (BEAKER) (test code=627)    2.8 mg/dL       1.6-2.6         Specimen markedly hemolyzed







[id number] 06:28:00* 





                Test Item       Value           Reference Range    Comments

 

                PHOSPHORUS (BEAKER) (test code=604)    4.6 mg/dL       2.3-4.7         Specimen markedly hemolyzed







CREATINE KINASE (CK), TOTAL AND [id number] 06:28:00* 





                Test Item       Value           Reference Range    Comments

 

                CREATINE KINASE TOTAL (BEAKER) (test code=380)    47 U/L                     

 

                CREATINE KINASE-MB (BEAKER) (test code=750)    2.3 ng/mL       0.0-6.6          

 

                CREATINE KINASE-MB INDEX (BEAKER) (test code=395)    4.9 %                            





Effective 2014: CK-MB Reference Range ChangeNew: 0.0-6.6    Previous: 0.0-
4.9CK-MB Reference Range:<6.7      Normal6.7-10.0  Borderline>10.0     Abnormal
TROPONIN -33-99 06:25:00* 





                Test Item       Value           Reference Range    Comments

 

                TROPONIN I (BEAKER) (test code=397)    < ng/mL         0.00-0.03        





Effective 2014: Reference Range ChangeNew: 0.00-0.03   Previous 0.00-0.15T
roponin I (TnI) levels must be interpreted in the context of the presenting symp
toms and the clinical findings. Elevated TnI levels indicate myocardial damage, 
but are not specific for ischemic heart disease. Elevated TnI levels are seen in
patients with other cardiac conditions (including myocarditis and congestive he
art failure), and slight TnI elevations occur in patients with other conditions,
including sepsis, renal failure, acidosis, acute neurological disease, and pers
istent tachyarrhythmia.CBC W/PLT COUNT & AUTO [id number] 06:21:00* 





                Test Item       Value           Reference Range    Comments

 

                WHITE BLOOD CELL COUNT (BEAKER) (test code=775)    17.5 K/ L       4.0-10.0         

 

                RED BLOOD CELL COUNT (BEAKER) (test code=761)    3.51 M/ L       4.20-5.80        

 

                HEMOGLOBIN (BEAKER) (test code=410)    10.2 GM/DL      13.0-16.8        

 

                HEMATOCRIT (BEAKER) (test code=411)    32.1 %          40.0-50.0        

 

                MEAN CORPUSCULAR VOLUME (BEAKER) (test code=753)    91.5 fL         82.0-98.0        

 

                MEAN CORPUSCULAR HEMOGLOBIN (BEAKER) (test code=751)    29.0 pg         27.0-33.0        

 

                    MEAN CORPUSCULAR HEMOGLOBIN CONC (BEAKER) (test code=752)    31.6 GM/DL          32.0-36.0

                                         

 

                RED CELL DISTRIBUTION WIDTH (BEAKER) (test code=412)    17.2 %          10.3-14.2        

 

                PLATELET COUNT (BEAKER) (test code=756)    260 K/CU MM     150-430          

 

                MEAN PLATELET VOLUME (BEAKER) (test code=754)    9.0 fL          6.5-10.5         

 

                NUCLEATED RED BLOOD CELLS (BEAKER) (test code=413)    0 /100 WBC      0-0              

 

                NEUTROPHILS RELATIVE PERCENT (BEAKER) (test code=429)    70 %                             

 

                LYMPHOCYTES RELATIVE PERCENT (BEAKER) (test code=430)    21 %                             

 

                MONOCYTES RELATIVE PERCENT (BEAKER) (test code=431)    8 %                              

 

                EOSINOPHILS RELATIVE PERCENT (BEAKER) (test code=432)    1 %                              

 

                BASOPHILS RELATIVE PERCENT (BEAKER) (test code=437)    0 %                              

 

                NEUTROPHILS ABSOLUTE COUNT (BEAKER) (test code=670)    12.30 K/ L      1.80-8.00        

 

                LYMPHOCYTES ABSOLUTE COUNT (BEAKER) (test code=414)    3.61 K/ L       1.48-4.50        

 

                MONOCYTES ABSOLUTE COUNT (BEAKER) (test code=415)    1.33 K/ L       0.00-1.30        

 

                EOSINOPHILS ABSOLUTE COUNT (BEAKER) (test code=416)    0.19 K/ L       0.00-0.50        

 

                BASOPHILS ABSOLUTE COUNT (BEAKER) (test code=417)    0.07 K/ L       0.00-0.20        





0.00TSH/FREE T4 IF [id number] 05:55:00* 





                Test Item       Value           Reference Range    Comments

 

                THYROID STIMULATING HORMONE (BEAKER) (test code=772)    4.28 uIU/mL     0.35-4.94        





[id number] 05:25:00* 





                Test Item       Value           Reference Range    Comments

 

                PARTIAL THROMBOPLASTIN TIME (BEAKER) (test code=760)    32.2 seconds    22.5-36.0        







PROTHROMBIN TIME/[id number] 05:24:00* 





                Test Item       Value           Reference Range    Comments

 

                PROTIME (BEAKER) (test code=759)    23.5 seconds    11.7-14.7        

 

                INR (BEAKER) (test code=370)    2.1             <=5.9            





RECOMMENDED COUMADIN/WARFARIN INR THERAPY RANGESSTANDARD DOSE: 2.0 - 3.0   Inclu
[place]: PROPHYLAXIS for venous thrombosis, systemic embolization; TREATMENT for luis
ous thrombosis and/or pulmonary embolus.HIGH RISK: Target INR is 2.5-3.5 for pat
ients with mechanical heart valves.B-TYPE NATRIURETIC FACTOR (BNP)2017 
05:18:00* 





                Test Item       Value           Reference Range    Comments

 

                B-TYPE NATRIURETIC PEPTIDE (BEAKER) (test code=700)    512 pg/mL       0-100            





POCT-GLUCOSE METER2017-04-15 12:12:00* 





                Test Item       Value           Reference Range    Comments

 

                POC-GLUCOSE METER (BEAKER) (test code=1538)    300 mg/dL                 TESTED AT [address] [address]





POCT-GLUCOSE METER2017-04-15 08:18:00* 





                Test Item       Value           Reference Range    Comments

 

                POC-GLUCOSE METER (BEAKER) (test code=1538)    235 mg/dL                 TESTED AT [address] [address]





CBC (HEMOGRAM ONLY)2017-04-15 07:19:00* 





                Test Item       Value           Reference Range    Comments

 

                WHITE BLOOD CELL COUNT (BEAKER) (test code=775)    10.4 K/ L       4.0-10.0         

 

                RED BLOOD CELL COUNT (BEAKER) (test code=761)    3.65 M/ L       4.20-5.80        

 

                HEMOGLOBIN (BEAKER) (test code=410)    10.8 GM/DL      13.0-16.8        

 

                HEMATOCRIT (BEAKER) (test code=411)    33.6 %          40.0-50.0        

 

                MEAN CORPUSCULAR VOLUME (BEAKER) (test code=753)    92.1 fL         82.0-98.0        

 

                MEAN CORPUSCULAR HEMOGLOBIN (BEAKER) (test code=751)    29.6 pg         27.0-33.0        

 

                    MEAN CORPUSCULAR HEMOGLOBIN CONC (BEAKER) (test code=752)    32.1 GM/DL          32.0-36.0

                                         

 

                RED CELL DISTRIBUTION WIDTH (BEAKER) (test code=412)    16.0 %          10.3-14.2        

 

                PLATELET COUNT (BEAKER) (test code=756)    279 K/CU MM     150-430          

 

                MEAN PLATELET VOLUME (BEAKER) (test code=754)    8.1 fL          6.5-10.5         

 

                NUCLEATED RED BLOOD CELLS (BEAKER) (test code=413)    0 /100 WBC      0-0              





0.00MAGNESIUM2017-04-15 06:59:00* 





                Test Item       Value           Reference Range    Comments

 

                MAGNESIUM (BEAKER) (test code=627)    1.9 mg/dL       1.6-2.6          





BASIC METABOLIC PANEL2017-04-15 06:59:00* 





                Test Item       Value           Reference Range    Comments

 

                SODIUM (BEAKER) (test code=381)    138 meq/L       136-145          

 

                POTASSIUM (BEAKER) (test code=379)    4.1 meq/L       3.5-5.1          

 

                CHLORIDE (BEAKER) (test code=382)    99 meq/L                   

 

                CO2 (BEAKER) (test code=355)    28 meq/L        22-29            

 

                BLOOD UREA NITROGEN (BEAKER) (test code=354)    21 mg/dL        7-21             

 

                CREATININE (BEAKER) (test code=358)    1.14 mg/dL      0.57-1.25        

 

                GLUCOSE RANDOM (BEAKER) (test code=652)    236 mg/dL                  

 

                CALCIUM (BEAKER) (test code=697)    8.8 mg/dL       8.4-10.2         

 

                EGFR (BEAKER) (test code=1092)    63 mL/min/1.73 sq m                    ESTIMATED GFR IS NOT AS 

ACCURATE AS CREATININE CLEARANCE IN PREDICTING GLOMERULAR FILTRATION RATE. 
ESTIMATED GFR IS NOT APPLICABLE FOR DIALYSIS PATIENTS.





POCT-GLUCOSE [id number] 21:08:00* 





                Test Item       Value           Reference Range    Comments

 

                POC-GLUCOSE METER (BEAKER) (test code=1538)    356 mg/dL                 Notified RN MD/TESTED

 AT [address] [address]





POCT-GLUCOSE [id number] 16:12:00* 





                Test Item       Value           Reference Range    Comments

 

                POC-GLUCOSE METER (BEAKER) (test code=1538)    259 mg/dL                 TESTED AT [facility] 

[address] [facility] [address]





POCT-GLUCOSE [id number] 16:07:00* 





                Test Item       Value           Reference Range    Comments

 

                POC-GLUCOSE METER (BEAKER) (test code=1538)    408 mg/dL                 TESTED AT [facility] 

[address] [facility] [address]





POCT-GLUCOSE [id number] 11:49:00* 





                Test Item       Value           Reference Range    Comments

 

                POC-GLUCOSE METER (BEAKER) (test code=1538)    90 mg/dL                  TESTED AT [facility] [address]

 [facility] [address]





TROPONIN -08-06 09:29:00* 





                Test Item       Value           Reference Range    Comments

 

                TROPONIN I (BEAKER) (test code=397)    0.01 ng/mL      0.00-0.03        





Effective 2014: Reference Range ChangeNew: 0.00-0.03   Previous 0.00-0.15T
roponin I (TnI) levels must be interpreted in the context of the presenting symp
toms and the clinical findings. Elevated TnI levels indicate myocardial damage, 
but are not specific for ischemic heart disease. Elevated TnI levels are seen in
patients with other cardiac conditions (including myocarditis and congestive he
art failure), and slight TnI elevations occur in patients with other conditions,
including sepsis, renal failure, acidosis, acute neurological disease, and pers
istent tachyarrhythmia.POCT-GLUCOSE [id number] 07:09:00* 





                Test Item       Value           Reference Range    Comments

 

                POC-GLUCOSE METER (BEAKER) (test code=1538)    92 mg/dL                  TESTED AT [place] [id number]

 [facility] [address]





TROPONIN -71-47 02:52:00* 





                Test Item       Value           Reference Range    Comments

 

                TROPONIN I (BEAKER) (test code=397)    0.01 ng/mL      0.00-0.03        





Effective 2014: Reference Range ChangeNew: 0.00-0.03   Previous 0.00-0.15T
roponin I (TnI) levels must be interpreted in the context of the presenting symp
toms and the clinical findings. Elevated TnI levels indicate myocardial damage, 
but are not specific for ischemic heart disease. Elevated TnI levels are seen in
patients with other cardiac conditions (including myocarditis and congestive he
art failure), and slight TnI elevations occur in patients with other conditions,
including sepsis, renal failure, acidosis, acute neurological disease, and pers
istent tachyarrhythmia.[id number] 02:44:00* 





                Test Item       Value           Reference Range    Comments

 

                MAGNESIUM (BEAKER) (test code=627)    1.7 mg/dL       1.6-2.6          





BASIC METABOLIC [id number] 02:44:00* 





                Test Item       Value           Reference Range    Comments

 

                SODIUM (BEAKER) (test code=381)    139 meq/L       136-145          

 

                POTASSIUM (BEAKER) (test code=379)    3.7 meq/L       3.5-5.1          

 

                CHLORIDE (BEAKER) (test code=382)    104 meq/L                  

 

                CO2 (BEAKER) (test code=355)    24 meq/L        22-29            

 

                BLOOD UREA NITROGEN (BEAKER) (test code=354)    17 mg/dL        7-21             

 

                CREATININE (BEAKER) (test code=358)    0.89 mg/dL      0.57-1.25        

 

                GLUCOSE RANDOM (BEAKER) (test code=652)    177 mg/dL                  

 

                CALCIUM (BEAKER) (test code=697)    9.4 mg/dL       8.4-10.2         

 

                EGFR (BEAKER) (test code=1092)    83 mL/min/1.73 sq m                    ESTIMATED GFR IS NOT AS 

ACCURATE AS CREATININE CLEARANCE IN PREDICTING GLOMERULAR FILTRATION RATE. 
ESTIMATED GFR IS NOT APPLICABLE FOR DIALYSIS PATIENTS.





CBC (HEMOGRAM ONLY)2017 02:37:00* 





                Test Item       Value           Reference Range    Comments

 

                WHITE BLOOD CELL COUNT (BEAKER) (test code=775)    10.9 K/ L       4.0-10.0         

 

                RED BLOOD CELL COUNT (BEAKER) (test code=761)    3.53 M/ L       4.20-5.80        

 

                HEMOGLOBIN (BEAKER) (test code=410)    10.3 GM/DL      13.0-16.8        

 

                HEMATOCRIT (BEAKER) (test code=411)    32.1 %          40.0-50.0        

 

                MEAN CORPUSCULAR VOLUME (BEAKER) (test code=753)    90.9 fL         82.0-98.0        

 

                MEAN CORPUSCULAR HEMOGLOBIN (BEAKER) (test code=751)    29.1 pg         27.0-33.0        

 

                    MEAN CORPUSCULAR HEMOGLOBIN CONC (BEAKER) (test code=752)    32.0 GM/DL          32.0-36.0

                                         

 

                RED CELL DISTRIBUTION WIDTH (BEAKER) (test code=412)    16.1 %          10.3-14.2        

 

                PLATELET COUNT (BEAKER) (test code=756)    231 K/CU MM     150-430          

 

                MEAN PLATELET VOLUME (BEAKER) (test code=754)    7.4 fL          6.5-10.5         

 

                NUCLEATED RED BLOOD CELLS (BEAKER) (test code=413)    0 /100 WBC      0-0              





0.00POCT-GLUCOSE [id number] 22:28:00* 





                Test Item       Value           Reference Range    Comments

 

                POC-GLUCOSE METER (BEAKER) (test code=1538)    327 mg/dL                 Notified RN MD/TESTED

 AT [facility] [address] [facility] [address]





CREATINE KINASE (CK), TOTAL AND [id number] 18:57:00* 





                Test Item       Value           Reference Range    Comments

 

                CREATINE KINASE TOTAL (BEAKER) (test code=380)    56 U/L                     

 

                CREATINE KINASE-MB (BEAKER) (test code=750)    2.2 ng/mL       0.0-6.6          

 

                CREATINE KINASE-MB INDEX (BEAKER) (test code=395)    3.9 %                            





Effective 2014: CK-MB Reference Range ChangeNew: 0.0-6.6    Previous: 0.0-
4.9CK-MB Reference Range:<6.7      Normal6.7-10.0  Borderline>10.0     Abnormal
TROPONIN -57-92 18:57:00* 





                Test Item       Value           Reference Range    Comments

 

                TROPONIN I (BEAKER) (test code=397)    < ng/mL         0.00-0.03        





Effective 2014: Reference Range ChangeNew: 0.00-0.03   Previous 0.00-0.15T
roponin I (TnI) levels must be interpreted in the context of the presenting symp
toms and the clinical findings. Elevated TnI levels indicate myocardial damage, 
but are not specific for ischemic heart disease. Elevated TnI levels are seen in
patients with other cardiac conditions (including myocarditis and congestive he
art failure), and slight TnI elevations occur in patients with other conditions,
including sepsis, renal failure, acidosis, acute neurological disease, and pers
istent tachyarrhythmia.B-TYPE NATRIURETIC FACTOR (BNP)2017 18:56:00* 





                Test Item       Value           Reference Range    Comments

 

                B-TYPE NATRIURETIC PEPTIDE (BEAKER) (test code=700)    182 pg/mL       0-100            





[id number] 18:49:00* 





                Test Item       Value           Reference Range    Comments

 

                MAGNESIUM (BEAKER) (test code=627)    1.8 mg/dL       1.6-2.6          





BASIC METABOLIC [id number] 18:49:00* 





                Test Item       Value           Reference Range    Comments

 

                SODIUM (BEAKER) (test code=381)    138 meq/L       136-145          

 

                POTASSIUM (BEAKER) (test code=379)    4.0 meq/L       3.5-5.1          

 

                CHLORIDE (BEAKER) (test code=382)    103 meq/L                  

 

                CO2 (BEAKER) (test code=355)    25 meq/L        22-29            

 

                BLOOD UREA NITROGEN (BEAKER) (test code=354)    18 mg/dL        7-21             

 

                CREATININE (BEAKER) (test code=358)    0.92 mg/dL      0.57-1.25        

 

                GLUCOSE RANDOM (BEAKER) (test code=652)    176 mg/dL                  

 

                CALCIUM (BEAKER) (test code=697)    9.2 mg/dL       8.4-10.2         

 

                EGFR (BEAKER) (test code=1092)    80 mL/min/1.73 sq m                    ESTIMATED GFR IS NOT AS 

ACCURATE AS CREATININE CLEARANCE IN PREDICTING GLOMERULAR FILTRATION RATE. 
ESTIMATED GFR IS NOT APPLICABLE FOR DIALYSIS PATIENTS.





CBC W/PLT COUNT & AUTO [id number] 18:45:00* 





                Test Item       Value           Reference Range    Comments

 

                WHITE BLOOD CELL COUNT (BEAKER) (test code=775)    11.1 K/ L       4.0-10.0         

 

                RED BLOOD CELL COUNT (BEAKER) (test code=761)    3.45 M/ L       4.20-5.80        

 

                HEMOGLOBIN (BEAKER) (test code=410)    10.0 GM/DL      13.0-16.8        

 

                HEMATOCRIT (BEAKER) (test code=411)    31.4 %          40.0-50.0        

 

                MEAN CORPUSCULAR VOLUME (BEAKER) (test code=753)    91.2 fL         82.0-98.0        

 

                MEAN CORPUSCULAR HEMOGLOBIN (BEAKER) (test code=751)    29.2 pg         27.0-33.0        

 

                    MEAN CORPUSCULAR HEMOGLOBIN CONC (BEAKER) (test code=752)    32.0 GM/DL          32.0-36.0

                                         

 

                RED CELL DISTRIBUTION WIDTH (BEAKER) (test code=412)    16.2 %          10.3-14.2        

 

                PLATELET COUNT (BEAKER) (test code=756)    240 K/CU MM     150-430          

 

                MEAN PLATELET VOLUME (BEAKER) (test code=754)    7.7 fL          6.5-10.5         

 

                NUCLEATED RED BLOOD CELLS (BEAKER) (test code=413)    0 /100 WBC      0-0              

 

                NEUTROPHILS RELATIVE PERCENT (BEAKER) (test code=429)    69 %                             

 

                LYMPHOCYTES RELATIVE PERCENT (BEAKER) (test code=430)    20 %                             

 

                MONOCYTES RELATIVE PERCENT (BEAKER) (test code=431)    7 %                              

 

                EOSINOPHILS RELATIVE PERCENT (BEAKER) (test code=432)    4 %                              

 

                BASOPHILS RELATIVE PERCENT (BEAKER) (test code=437)    1 %                              

 

                NEUTROPHILS ABSOLUTE COUNT (BEAKER) (test code=670)    7.67 K/ L       1.80-8.00        

 

                LYMPHOCYTES ABSOLUTE COUNT (BEAKER) (test code=414)    2.19 K/ L       1.48-4.50        

 

                MONOCYTES ABSOLUTE COUNT (BEAKER) (test code=415)    0.80 K/ L       0.00-1.30        

 

                EOSINOPHILS ABSOLUTE COUNT (BEAKER) (test code=416)    0.41 K/ L       0.00-0.50        

 

                BASOPHILS ABSOLUTE COUNT (BEAKER) (test code=417)    0.06 K/ L       0.00-0.20        





0.00PT/[id number] 18:42:00* 





                Test Item       Value           Reference Range    Comments

 

                PROTIME (BEAKER) (test code=759)    21.5 seconds    11.7-14.7        

 

                INR (BEAKER) (test code=370)    1.9             <=5.9            

 

                PARTIAL THROMBOPLASTIN TIME (BEAKER) (test code=760)    33.7 seconds    22.5-36.0        







RECOMMENDED COUMADIN/WARFARIN INR THERAPY RANGESSTANDARD DOSE: 2.0 - 3.0   Inclu
[place]: PROPHYLAXIS for venous thrombosis, systemic embolization; TREATMENT for luis
ous thrombosis and/or pulmonary embolus.HIGH RISK: Target INR is 2.5-3.5 for pat
ients with mechanical heart valves.

## 2019-07-18 NOTE — XMS REPORT
Summary of Care: 6/18/15 - 6/18/15

                             Created on: 2088



DESIREE MCKEON

External Reference #: [id number]

: 1940

Sex: Male



Demographics







                          Address                   [address]

[place]  [address]

 

                          Home Phone                [phone]

 

                          Preferred Language        English

 

                          Marital Status            

 

                          Zoroastrianism Affiliation     None

 

                          Race                      White/

 

                          Ethnic Group              Non-





Author







                          Organization              Unknown

 

                          Address                   Unknown

 

                          Phone                     Unavailable







Encounter





HQ Torie_scott(DAVID) [id number] Date(s): 6/18/15 - 6/18/15

[facility] [address] [place] [address]-     [phone]
[phone]

Physician Attending: Katie Mandujano MD

Physician Admitting: Katie Mandujano MD

Physician_Referring: Katie Mandujano MD





Vital Signs





No data available for this section



Problem List







    



              Condition     Effective Dates     Status       Health Status     Informant

 

    



                           Acute MI(Confirmed)1      Resolved  

 

    



                           Atrial                    Active  



                                         fibrillation(Confirm    



                                         ed)    

 

    



                           CAD (coronary artery      Active  



                                         disease)(Confirmed)    

 

    



                           Diabetes(Confirmed)       Resolved  

 

    



                           Diastolic heart           Active  



                                         failure(Confirmed)    

 

    



                           Histoplasmosis(Confi      Resolved  



                                         rmed)    

 

    



                           Hypercholesteremia(C      Resolved  



                                         onfirmed)    

 

    



                           Hypertension(Confirm      Resolved  



                                         ed)    

 

    



                           HTN                       Active  



                                         (hypertension)(Confi    



                                         rmed)    

 

    



                           Hypothyroidism(Confi      Active  



                                         rmed)    

 

    



                           Diabetes mellitus         Active  



                                         type 2, insulin    



                                         dependent(Confirmed)    

 

    



                           PAUL (obstructive          Active  



                                         sleep    



                                         apnea)(Confirmed)    

 

    



                           Poliomyelitides(Conf      Resolved  



                                         irmed)    

 

    



                           Sleep                     Resolved  



                                         apnea(Confirmed)    

 

    



                           Stented coronary          Resolved  



                                         artery(Confirmed)2    

 

    



                           Systolic heart            Active  



                                         failure(Confirmed)    

 

    



                           Whooping                  Resolved  



                                         cough(Confirmed)    







1x 3



225 cardiac stents



Allergies, Adverse Reactions, Alerts







   



                 Substance       Reaction        Severity        Status

 

   



                           NKDA                      Active







Medications





No data available for this section



Results





No data available for this section



Immunizations







  



                     Vaccine             Date                Refusal Reason

 

  



                           pneumococcal 23-valent vaccine     3/31/15 

 

  



                     pneumococcal 23-valent vaccine     3/30/15             Patient Refuses







Procedures







   



                 Procedure       Date            Related Diagnosis     Body Site

 

   



                                         CABG x 3 - Coronary artery bypass grafts x 3   

 

   



                                         Cardiac catheterization, left heart   







Social History







 



                           Social History Type       Response

 

 



                           Substance Abuse           Use: None.

 

 



                           Alcohol                   Never

 

 



                           Smoking Status            Former smoker; Exposure to Tobacco Smoke None; Cigarette Smoking

 Last 365



                                         Days No; Reg Smoking Cessation Counseling No







Assessment and Plan





No data available for this section

## 2019-07-18 NOTE — XMS REPORT
Summary of Care: 10/30/15 - 10/31/15

                             Created on: 2020



DESIREE MCKEON

External Reference #: [id number]

: 1940

Sex: Male



Demographics







                          Address                   [address]

[place]  [address]

 

                          Home Phone                [phone]

 

                          Preferred Language        English

 

                          Marital Status            

 

                          Jewish Affiliation     None

 

                          Race                      White/

 

                          Ethnic Group              Non-





Author







                          Author                    [facility]

 

                          Organization              [facility]

 

                          Address                   Unknown

 

                          Phone                     Unavailable







Encounter





CHLOÉ Pedersen(DAVID) [id number] Date(s): 10/30/15 - 10/31/15

[facility] [address] [place] [address]-     [phone]
[phone]

Discharge Diagnosis: Accidental fall

Discharge Diagnosis: Disease related peripheral neuropathy

Discharge Disposition: Home

Attending Physician: Sara Ko MD





Vital Signs







                    1                   2                   3



                                         Most recent to   



                                         oldest [Reference   



                                         Range]:   

 

                                        172.72 cm 

                    (10/30/15 8:26 PM)                         



                                         Height   

 

                                        98.4 DegF 

                    (10/31/15 12:00 AM)                         



                                         Temperature Oral   



                                         [96.4-99.1 DegF]   

 

                                        116/56 mmHg 

                          (10/31/15 12:13 AM)       110/50 mmHg 

                          (10/31/15 12:00 AM)       105/49 mmHg 

                                        (10/30/15 8:26 PM)



                                         Blood Pressure   



                                         [/60-90 mmHg]   

 

                                        20 BRMIN 

                          (10/31/15 12:13 AM)       16 BRMIN 

                          (10/31/15 12:00 AM)       18 BRMIN 

                                        (10/30/15 8:26 PM)



                                         Respiratory Rate   



                                         [14-20 BRMIN]   

 

                                        80 bpm 

                          (10/31/15 12:00 AM)       82 bpm 

                          (10/30/15 8:26 PM)         



                                         Peripheral Pulse   



                                         Rate [ bpm]   

 

                                        94.545 kg 

                    (10/30/15 8:26 PM)                         



                                         Weight   

 

                                        31.69 m2 

                    (10/30/15 8:26 PM)                         



                                         Body Mass Index   







Problem List







    



              Condition     Effective Dates     Status       Health Status     Informant

 

    



                           Acute MI(Confirmed)1      Resolved  

 

    



                           Atrial                    Active  



                                         fibrillation(Confirm    



                                         ed)    

 

    



                           CAD (coronary artery      Active  



                                         disease)(Confirmed)    

 

    



                           Diabetes(Confirmed)       Resolved  

 

    



                           Diastolic heart           Active  



                                         failure(Confirmed)    

 

    



                           Histoplasmosis(Confi      Resolved  



                                         rmed)    

 

    



                           Hypercholesteremia(C      Resolved  



                                         onfirmed)    

 

    



                           Hypertension(Confirm      Resolved  



                                         ed)    

 

    



                           HTN                       Active  



                                         (hypertension)(Confi    



                                         rmed)    

 

    



                           Hypothyroidism(Confi      Active  



                                         rmed)    

 

    



                           Diabetes mellitus         Active  



                                         type 2, insulin    



                                         dependent(Confirmed)    

 

    



                           PAUL (obstructive          Active  



                                         sleep    



                                         apnea)(Confirmed)    

 

    



                           Poliomyelitides(Conf      Resolved  



                                         irmed)    

 

    



                           Sleep                     Resolved  



                                         apnea(Confirmed)    

 

    



                           Stented coronary          Resolved  



                                         artery(Confirmed)2    

 

    



                           Systolic heart            Active  



                                         failure(Confirmed)    

 

    



                           Whooping                  Resolved  



                                         cough(Confirmed)    







1x 3



225 cardiac stents



Allergies, Adverse Reactions, Alerts







   



                 Substance       Reaction        Severity        Status

 

   



                           NKDA                      Active







Medications





No data available for this section



Results





No data available for this section



Immunizations







  



                     Vaccine             Date                Refusal Reason

 

  



                           pneumococcal 23-valent vaccine     3/31/15 

 

  



                     pneumococcal 23-valent vaccine     3/30/15             Patient Refuses







Procedures







   



                 Procedure       Date            Related Diagnosis     Body Site

 

   



                                         CABG x 3 - Coronary artery bypass grafts x 3   

 

   



                                         Cardiac catheterization, left heart   







Social History







 



                           Social History Type       Response

 

 



                           Substance Abuse           Use: None.

 

 



                           Alcohol                   Never

 

 



                           Smoking Status            Former smoker; Exposure to Tobacco Smoke None; Cigarette Smoking

 Last 365



                                         Days No; Reg Smoking Cessation Counseling No







Assessment and Plan





No data available for this section

## 2019-07-18 NOTE — XMS REPORT
Summary of Care: 18 - 10/11/18

                             Created on: 2082



DESIREE MCKEON

External Reference #: [id number]

: 1940

Sex: Male



Demographics







                          Address                   *[address] [place]  [address]-

 

                          Home Phone                [phone]

 

                          Preferred Language        Unknown

 

                          Marital Status            Unknown

 

                          Religion Affiliation     [employer]

 

                          Race                      Other

 

                                        Additional Race(s)  

 

                          Ethnic Group              Non-





Author







                          Author                    [facility]

 

                          Organization              [facility]

 

                          Address                   Unknown

 

                          Phone                     Unavailable







Encounter





CHLOÉ Pedersen(DAVID) [id number] Date(s): 18 - 10/11/18

[facility] [address] [place] [address]-     [phone]
[phone]

Encounter Diagnosis

Pneumonia, unspecified organism (Final) - 10/18/18

Encephalopathy, unspecified (Final) - 

Chronic combined systolic (congestive) and diastolic (congestive) heart failure 
(Final) - 

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 
(Final) - 

Presence of aortocoronary bypass graft (Final) - 

Presence of coronary angioplasty implant and graft (Final) - 

Long term (current) use of anticoagulants (Final) - 

Dependence on supplemental oxygen (Final) - 

Hypertensive heart disease with heart failure (Final) - 

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 

Hypothyroidism, unspecified (Final) - 

Anemia in other chronic diseases classified elsewhere (Final) - 

Type 2 diabetes mellitus with hyperglycemia (Final) - 

Pure hypercholesterolemia, unspecified (Final) - 

Hyperlipidemia, unspecified (Final) - 

Obstructive sleep apnea (adult) (pediatric) (Final) - 

Paroxysmal atrial fibrillation (Final) - 

Calculus of gallbladder without cholecystitis without obstruction (Final) - 

Repeated falls (Final) - 

Long term (current) use of insulin (Final) - 

Personal history of transient ischemic attack (TIA), and cerebral infarction wit
hout residual deficits (Final) - 

Personal history of nicotine dependence (Final) - 

Unspecified fall, initial encounter (Final) - 

Discharge Disposition: Skilled Nursing Facility

Attending Physician: Katie Mandujano MD

Admitting Physician: Katie Mandujano MD





Vital Signs







                    1                   2                   3



                                         Most recent to   



                                         oldest [Reference   



                                         Range]:   

 

                                        175.26 cm 

                          (18 3:12 AM)         170.18 cm 

                          (18 3:50 PM)          



                                         Height   

 

                                        99.4 DegF 

*HI*

                          (10/11/18 4:31 PM)        98.5 DegF 

                          (10/11/18 12:10 PM)       98.2 DegF 

                                        (10/11/18 8:12 AM)



                                         Temperature Oral   



                                         [96.4-99.1 DegF]   

 

                                        101/57 mmHg 

                          (10/11/18 4:31 PM)        116/65 mmHg 

                          (10/11/18 12:10 PM)       123/58 mmHg 

                                        (10/11/18 8:12 AM)



                                         Blood Pressure   



                                         [/60-90 mmHg]   

 

                                        16 BRMIN 

                          (10/11/18 4:05 AM)        16 BRMIN 

                          (10/11/18 12:15 AM)       18 BRMIN 

                                        (10/10/18 8:15 PM)



                                         Respiratory Rate   



                                         [14-20 BRMIN]   

 

                                        62 bpm 

                          (10/11/18 4:31 PM)        59 bpm 

*LOW*

                          (10/11/18 12:10 PM)       60 bpm 

                                        (10/11/18 8:12 AM)



                                         Peripheral Pulse   



                                         Rate [ bpm]   

 

                                        82.273 kg 

                          (18 3:12 AM)         73 kg 

                          (18 3:50 PM)          



                                         Weight   

 

                                        26.79 m2 

                          (18 3:12 AM)         25.21 m2 

                          (18 3:50 PM)          



                                         Body Mass Index   







Problem List







    



              Condition     Effective Dates     Status       Health Status     Informant

 

    



                           Acute MI(Confirmed)1      Resolved  

 

    



                           Atrial                    Active  



                                         fibrillation(Confirm    



                                         ed)    

 

    



                           CHF (congestive           Active  



                                         heart    



                                         failure)(Confirmed)    

 

    



                           CAD (coronary artery      Active  



                                         disease)(Confirmed)    

 

    



                           Diabetes(Confirmed)       Active  

 

    



                           Diastolic heart           Active  



                                         failure(Confirmed)    

 

    



                           Histoplasmosis(Confi      Resolved  



                                         rmed)    

 

    



                           Hx MRSA                   Resolved  



                                         infection(Confirmed)    

 

    



                           Hypercholesteremia(C      Resolved  



                                         onfirmed)    

 

    



                           Hypertension(Confirm      Active  



                                         ed)    

 

    



                           Hypertension(Confirm      Resolved  



                                         ed)    

 

    



                           HTN                       Active  



                                         (hypertension)(Confi    



                                         rmed)    

 

    



                           Hypothyroidism(Confi      Active  



                                         rmed)    

 

    



                           Diabetes mellitus         Active  



                                         type 2, insulin    



                                         dependent(Confirmed)    

 

    



                     Moderate            Active              Chronic ; 



                                         protein-calorie    



                                         malnutrition(Confirm    



                                         ed)    

 

    



                           PAUL (obstructive          Active  



                                         sleep    



                                         apnea)(Confirmed)    

 

    



                           Poliomyelitides(Conf      Resolved  



                                         irmed)    

 

    



                           Sleep                     Active  



                                         apnea(Confirmed)    

 

    



                           Stented coronary          Resolved  



                                         artery(Confirmed)2    

 

    



                           Systolic heart            Active  



                                         failure(Confirmed)    

 

    



                           Whooping                  Resolved  



                                         cough(Confirmed)    







1x 3



225 cardiac stents



Allergies, Adverse Reactions, Alerts







   



                 Substance       Reaction        Severity        Status

 

   



                           sulfa drugs               Active

 

   



                           Reglan                    Active

 

   



                           iodine                    Active

 

   



                           Latex                     Active







Medications





acetaminophen

650 mg, 2 tab, Route: PO, Drug form: TAB, Q4H, Dosing Weight 73, kg, PRN Pain 1-
3/Temp > 100.4 F, Start date: 18 22:37:00 CDT, Duration: 30 day, Stop 
date: 10/27/18 22:36:00 CDT

Notes: Do not exceed 4 gm/day.  (Same as: Tylenol)

Start Date: 18

Stop Date: 10/11/18

Status: Discontinued



acetaminophen-hydrocodone 325 mg-5 mg oral tablet

1 tab, Route: PO, Drug Form: TAB, Dosing Weight 73, kg, Q4H, PRN Pain Score 4-6,
Start date: 18 22:37:00 CDT, Duration: 30 day, Stop date: 10/27/18 22:36:
00 CDT

Notes: (Same as: Norco 325/5)  Do not exceed 4gm/day of acetaminophen.

Start Date: 18

Stop Date: 10/11/18

Status: Discontinued



AMIODarone

200 mg, 1 tab, Route: PO, Drug form: TAB, Daily, Dosing Weight 73, kg, Start hazel
e: 18 9:00:00 CDT, Duration: 30 day, Stop date: 10/27/18 9:00:00 CDT

Notes: (Same as: Cordarone)

Start Date: 18

Stop Date: 10/11/18

Status: Discontinued



aspirin 81 mg tablet, enteric coated

81 mg, 1 tab, Route: PO, Drug form: ECTAB, Daily, Dosing Weight 73, kg, Start da
te: 18 9:00:00 CDT, Duration: 30 day, Stop date: 10/27/18 9:00:00 CDT

Notes: Do not crush or chew.(Same As: Ecotrin)

Start Date: 18

Stop Date: 10/11/18

Status: Discontinued



atorvastatin

5 mg, 0.5 tab, Route: PO, Drug form: TAB, Bedtime, Dosing Weight 73, kg, Start d
ate: 18 21:00:00 CDT, Duration: 30 day, Stop date: 10/27/18 21:00:00 CDT

Notes: (Same As: Lipitor)

Start Date: 18

Stop Date: 10/11/18

Status: Discontinued



Dextrose 50% Syringe

12.5 gm, 25 mL, Route: IVP, Drug Form: INJ, Dosing Weight 73, kg, PRN, PRN Blood
Glucose Results, Start date: 18 22:46:00 CDT, Duration: 30 day, Stop date:
10/27/18 22:45:00 CDT

Start Date: 18

Stop Date: 10/11/18

Status: Discontinued



Dextrose 50% Syringe

25 gm, 50 mL, Route: IVP, Drug Form: INJ, Dosing Weight 73, kg, PRN, PRN Blood G
lucose Results, Start date: 18 22:46:00 CDT, Duration: 30 day, Stop date: 
10/27/18 22:45:00 CDT

Start Date: 18

Stop Date: 10/11/18

Status: Discontinued



docusate

100 mg, 1 cap, Route: PO, Drug form: CAP, BID, Dosing Weight 73, kg, Start date:
18 9:00:00 CDT, Duration: 30 day, Stop date: 10/27/18 17:00:00 CDT

Notes: (Same as: Colace) (Do Not Crush)

Start Date: 18

Stop Date: 10/11/18

Status: Discontinued



famotidine

20 mg, 1 tab, Route: PO, Drug form: TAB, Q12H, Dosing Weight 73, kg, PRN Heartbu
rn, Start date: 18 22:39:00 CDT, Duration: 30 day, Stop date: 10/27/18 22:
38:00 CDT

Notes: (Same as: Pepcid)

Start Date: 18

Stop Date: 10/11/18

Status: Discontinued



gabapentin 600 mg oral tablet

600 mg, 2 cap, Route: PO, Drug form: CAP, Q12H, Dosing Weight 73, kg, Start date
: 18 9:00:00 CDT, Duration: 30 day, Stop date: 10/27/18 21:00:00 CDT

Notes: (Same as: Neurontin)

Start Date: 18

Stop Date: 10/11/18

Status: Discontinued



glucagon

1 mg, Route: IM, Drug form: PDR/INJ, PRN, Dosing Weight 73, kg, PRN Blood Glucos
e Results, Start date: 18 22:46:00 CDT, Duration: 30 day, Stop date: 10/27
/18 22:45:00 CDT

Start Date: 18

Stop Date: 10/11/18

Status: Discontinued



insulin lispro

1 unit, 0.01 mL, Route: SUB-Q, Drug form: SOLN, TID-Before Meals, Dosing Weight 
73, kg, PRN Blood Glucose Results, Start date: 18 22:46:00 CDT, Duration: 
30 day, Stop date: 10/27/18 22:45:00 CDT

Notes: (Same as: Humalog ) Roll in palms of hands gently;  Do not shake `vigorou
sly. "Single Patient Use Only "  WASTE: F/P - Black; E - Municipal Trash Bin  St
able for 28 days at room temperature.Expires in _____ days from ______________Da
te

Start Date: 18

Stop Date: 10/11/18

Status: Discontinued



insulin lispro

2 unit, 0.02 mL, Route: SUB-Q, Drug form: SOLN, TID-Before Meals, Dosing Weight 
73, kg, PRN Blood Glucose Results, Start date: 18 22:46:00 CDT, Duration: 
30 day, Stop date: 10/27/18 22:45:00 CDT

Notes: (Same as: Humalog ) Roll in palms of hands gently;  Do not shake `vigorou
sly. "Single Patient Use Only "  WASTE: F/P - Black; E - Municipal Trash Bin  St
able for 28 days at room temperature.Expires in _____ days from ______________Da
te

Start Date: 18

Stop Date: 10/11/18

Status: Discontinued



insulin lispro

5 unit, 0.05 mL, Route: SUB-Q, Drug form: SOLN, TID-Before Meals, Dosing Weight 
73, kg, PRN Blood Glucose Results, Start date: 18 22:46:00 CDT, Duration: 
30 day, Stop date: 10/27/18 22:45:00 CDT

Notes: (Same as: Humalog ) Roll in palms of hands gently;  Do not shake `vigorou
sly. "Single Patient Use Only "  WASTE: F/P - Black; E - Municipal Trash Bin  St
able for 28 days at room temperature.Expires in _____ days from ______________Da
te

Start Date: 18

Stop Date: 10/11/18

Status: Discontinued



insulin lispro

4 unit, 0.04 mL, Route: SUB-Q, Drug form: SOLN, TID-Before Meals, Dosing Weight 
73, kg, PRN Blood Glucose Results, Start date: 18 22:46:00 CDT, Duration: 
30 day, Stop date: 10/27/18 22:45:00 CDT

Notes: (Same as: Humalog ) Roll in palms of hands gently;  Do not shake `vigorou
sly. "Single Patient Use Only "  WASTE: F/P - Black; E - Municipal Trash Bin  St
able for 28 days at room temperature.Expires in _____ days from ______________Da
te

Start Date: 18

Stop Date: 10/11/18

Status: Discontinued



insulin lispro

3 unit, 0.03 mL, Route: SUB-Q, Drug form: SOLN, TID-Before Meals, Dosing Weight 
73, kg, PRN Blood Glucose Results, Start date: 18 22:46:00 CDT, Duration: 
30 day, Stop date: 10/27/18 22:45:00 CDT

Notes: (Same as: Humalog ) Roll in palms of hands gently;  Do not shake `vigorou
sly. "Single Patient Use Only "  WASTE: F/P - Black; E - Municipal Trash Bin  St
able for 28 days at room temperature.Expires in _____ days from ______________Da
te

Start Date: 18

Stop Date: 10/11/18

Status: Discontinued



insulin lispro

1 unit, 0.01 mL, Route: SUB-Q, Drug form: SOLN, Bedtime, Dosing Weight 73, kg, P
RN Blood Glucose Results, Start date: 18 22:46:00 CDT, Duration: 30 day, S
top date: 10/27/18 22:45:00 CDT

Notes: (Same as: Humalog ) Roll in palms of hands gently;  Do not shake `vigorou
sly. "Single Patient Use Only "  WASTE: F/P - Black; E - Municipal Trash Bin  St
able for 28 days at room temperature.Expires in _____ days from ______________Da
te

Start Date: 18

Stop Date: 10/11/18

Status: Discontinued



insulin lispro

2 unit, 0.02 mL, Route: SUB-Q, Drug form: SOLN, Bedtime, Dosing Weight 73, kg, P
RN Blood Glucose Results, Start date: 18 22:46:00 CDT, Duration: 30 day, S
top date: 10/27/18 22:45:00 CDT

Notes: (Same as: Humalog ) Roll in palms of hands gently;  Do not shake `vigorou
sly. "Single Patient Use Only "  WASTE: F/P - Black; E - Municipal Trash Bin  St
able for 28 days at room temperature.Expires in _____ days from ______________Da
te

Start Date: 18

Stop Date: 10/11/18

Status: Discontinued



insulin lispro

3 unit, 0.03 mL, Route: SUB-Q, Drug form: SOLN, Bedtime, Dosing Weight 73, kg, P
RN Blood Glucose Results, Start date: 18 22:46:00 CDT, Duration: 30 day, S
top date: 10/27/18 22:45:00 CDT

Notes: (Same as: Humalog ) Roll in palms of hands gently;  Do not shake `vigorou
sly. "Single Patient Use Only "  WASTE: F/P - Black; E - Municipal Trash Bin  St
able for 28 days at room temperature.Expires in _____ days from ______________Da
te

Start Date: 18

Stop Date: 10/11/18

Status: Discontinued



insulin lispro

4 unit, 0.04 mL, Route: SUB-Q, Drug form: SOLN, Bedtime, Dosing Weight 73, kg, P
RN Blood Glucose Results, Start date: 18 22:46:00 CDT, Duration: 30 day, S
top date: 10/27/18 22:45:00 CDT

Notes: (Same as: Humalog ) Roll in palms of hands gently;  Do not shake `vigorou
sly. "Single Patient Use Only "  WASTE: F/P - Black; E - Municipal Trash Bin  St
able for 28 days at room temperature.Expires in _____ days from ______________Da
te

Start Date: 18

Stop Date: 10/11/18

Status: Discontinued



insulin lispro

4 unit, Route: SUB-Q, ONCE, Dosing Weight 82.273, kg, Priority: NOW, Start date:
10/04/18 20:54:00 CDT, Stop date: 10/04/18 20:54:00 CDT

Start Date: 10/4/18

Stop Date: 10/4/18

Status: Completed



isosorbide mononitrate

30 mg, 1 tab, Route: PO, Drug form: ERTAB, Before Breakfast, Dosing Weight 73, k
g, Start date: 18 7:30:00 CDT, Duration: 30 day, Stop date: 10/27/18 7:30:
00 CDT

Notes: (Same as:Imdur)"Do Not Crush"  Take on empty stomach/ full glass of water
.  Do not crush

Start Date: 18

Stop Date: 10/11/18

Status: Discontinued



Lantus 100 units/mL

10 unit, 0.1 mL, Route: SUB-Q, Drug form: SOLN, Bedtime, Dosing Weight 73, kg, S
tart date: 18 21:00:00 CDT, Duration: 30 day, Stop date: 10/27/18 21:00:00
CDT

Notes: (Same as: Lantus)Do not hold insulin without contacting prescriberWASTE: 
F/P - Black; E - Municipal Trash Bin  "single patient use only"

Start Date: 18

Stop Date: 10/11/18

Status: Discontinued



Lantus 100 units/mL

30 unit, 0.3 mL, Route: SUB-Q, Drug form: SOLN, Daily, Dosing Weight 73, kg, Sta
rt date: 18 9:00:00 CDT, Duration: 30 day, Stop date: 10/27/18 9:00:00 CDT

Notes: (Same as: Lantus)Do not hold insulin without contacting prescriberWASTE: 
F/P - Black; E - Municipal Trash Bin  "single patient use only"

Start Date: 18

Stop Date: 10/11/18

Status: Discontinued



Levaquin

750 mg, 3 tab, Route: PO, Drug form: TAB, [id number], Dosing Weight 82.273, kg, Sta
rt date: 10/08/18 21:00:00 CDT, Duration: 10 day, Stop date: 10/16/18 23:00:00 C
DT, ABX Indication: Pneumonia

Notes: Do not give w/antacids, dairy pdt & minerals Take 1 hr before or 2 hr 
after dairy pdt (Same as:Levaquin)

Start Date: 10/8/18

Stop Date: 10/11/18

Status: Discontinued



lisinopril

2.5 mg, 0.5 tab, Route: PO, Drug form: TAB, Daily, Dosing Weight 73, kg, Start d
ate: 18 9:00:00 CDT, Duration: 30 day, Stop date: 10/27/18 9:00:00 CDT

Notes: (Same as: Prinivil, Zestril)

Start Date: 18

Stop Date: 10/11/18

Status: Discontinued



metoprolol tartrate

25 mg, 1 tab, Route: PO, Drug form: TAB, Q12H, Dosing Weight 73, kg, Start date:
18 9:00:00 CDT, Duration: 30 day, Stop date: 10/27/18 21:00:00 CDT

Notes: (Same as: Lopressor)

Start Date: 18

Stop Date: 10/11/18

Status: Discontinued



niacin 500 mg oral tablet, extended release

2,000 mg, 4 tab, Route: PO, Drug form: ERTAB, Bedtime, Dosing Weight 73, kg, Sta
rt date: 18 21:00:00 CDT, Duration: 30 day, Stop date: 10/27/18 21:00:00 C
DT

Notes: (Same as: Niaspan)"Do Not Crush"Non-Formulary Item  With food.

Start Date: 18

Stop Date: 10/11/18

Status: Discontinued



ondansetron

4 mg, 2 mL, Route: IVP, Drug form: INJ, Q6H, Dosing Weight 73, kg, PRN Nausea & 
Vomiting, Start date: 18 22:37:00 CDT, Duration: 30 day, Stop date: 10/27
/18 22:36:00 CDT

Notes: (Same as: Zofran)  *** MEDICATION WASTE ***Product Size:  4 mgProduct Was
sonali:  ___ mg

Start Date: 18

Stop Date: 10/11/18

Status: Discontinued



pantoprazole

40 mg, 1 tab, Route: PO, Drug form: ECTAB, Daily, Dosing Weight 73, kg, Start da
te: 18 9:00:00 CDT, Duration: 30 day, Stop date: 10/27/18 9:00:00 CDT

Notes: Tablet should not be chewed or crushed.(Same as: Protonix)

Start Date: 18

Stop Date: 10/11/18

Status: Discontinued



Ranexa 500 mg oral tablet, extended release

500 mg, 1 tab, Route: PO, Drug form: TAB, Daily, Dosing Weight 73, kg, Start hazel
e: 18 9:00:00 CDT, Duration: 30 day, Stop date: 10/27/18 9:00:00 CDT

Notes: Same as Ranexa"Do Not Crush"

Start Date: 18

Stop Date: 10/11/18

Status: Discontinued



sertraline

100 mg, 1 tab, Route: PO, Drug form: TAB, Daily, Dosing Weight 73, kg, Start hazel
e: 18 9:00:00 CDT, Duration: 30 day, Stop date: 10/27/18 9:00:00 CDT

Notes: (Same as: Zoloft)

Start Date: 18

Stop Date: 10/11/18

Status: Discontinued



Sodium Chloride 0.9% (Bolus) IV

1,000 mL, 1000 ml/hr, Infuse Over: 1 hr, Route: IV, 1,000, Drug form: INJ, ONCE,
Priority: STAT, Dosing Weight 73 kg, Start date: 18 21:38:00 CDT, Stop da
te: 18 21:38:00 CDT

Start Date: 18

Stop Date: 18

Status: Completed



Synthroid

50 microgram, 1 tab, Route: PO, Drug form: TAB, Q630AM, Dosing Weight 73, kg, St
art date: 18 6:30:00 CDT, Duration: 30 day, Stop date: 10/27/18 6:30:00 CD
T

Notes: Take 1 hour before or 2 hours after meal; Enteral feeds may interefere wi
th the absorption of this medication.(Same as:Levothroid, Synthroid)

Start Date: 18

Stop Date: 10/11/18

Status: Discontinued



tamsulosin

0.4 mg, 1 cap, Route: PO, Drug form: CAP, Daily, Dosing Weight 73, kg, Start hazel
e: 18 9:00:00 CDT, Duration: 30 day, Stop date: 10/27/18 9:00:00 CDT

Notes: (Same As: Flomax)  "Do Not Crush"

Start Date: 18

Stop Date: 10/11/18

Status: Discontinued



torsemide

20 mg, 1 tab, Route: PO, Drug form: TAB, Daily, Dosing Weight 73, kg, Start date
: 18 9:00:00 CDT, Duration: 30 day, Stop date: 10/27/18 9:00:00 CDT

Notes: (Same As: Demadex)

Start Date: 18

Stop Date: 10/11/18

Status: Discontinued



trazodone 50 mg oral tablet

50 mg, 1 tab, Route: PO, Drug form: TAB, Bedtime, Dosing Weight 73, kg, PRN Inso
mnia, Start date: 18 22:39:00 CDT, Duration: 30 day, Stop date: 10/27/18 2
2:38:00 CDT

Notes: (Same As: Desyrel)

Start Date: 18

Stop Date: 10/11/18

Status: Discontinued



Xanax 0.25 mg oral tablet

0.25 mg, 1 tab, Route: PO, Drug form: TAB, Q8H, Dosing Weight 82.273, kg, PRN Amparo
xiety, Start date: 10/05/18 13:18:00 CDT, Duration: 30 day, Stop date: 18 
13:17:00 CST

Notes: With food or milk(Same as: Xanax)

Start Date: 10/5/18

Stop Date: 10/11/18

Status: Discontinued



Xarelto

20 mg, 1 tab, Route: PO, Drug form: TAB, QPM, Dosing Weight 73, kg, Start date: 
18 17:00:00 CDT, Duration: 30 day, Stop date: 10/27/18 17:00:00 CDT

Notes: (Same as: Xarelto)Administer with food

Start Date: 18

Stop Date: 10/11/18

Status: Discontinued



Results











                    1                   2                   3



                                         Most recent to   



                                         oldest [Reference   



                                         Range]:   

 

                                        10.0 K/CMM 

*HI*

                          (10/11/18 5:17 AM)        10.8 K/CMM 

*HI*

                          (10/10/18 4:03 AM)        13.7 K/CMM 

*HI*

                                        (10/9/18 4:00 AM) 



                                         Neutrophils #   



                                         [1.5-8.1 K/CMM]   

 

                                        1.5 K/CMM 

                          (10/11/18 5:17 AM)        1.7 K/CMM 

                          (10/10/18 4:03 AM)        1.9 K/CMM 

                                        (10/9/18 4:00 AM) 



                                         Lymphocytes #   



                                         [1.0-5.5 K/CMM]   

 

                                        0.8 K/CMM 

                          (10/11/18 5:17 AM)        1.1 K/CMM 

*HI*

                          (10/10/18 4:03 AM)        1.1 K/CMM 

*HI*

                                        (10/9/18 4:00 AM) 



                                         Monocytes # [0.0-0.8   



                                         K/CMM]   

 

                                        0.4 K/CMM 

                          (10/11/18 5:17 AM)        0.2 K/CMM 

                          (10/10/18 4:03 AM)        0.1 K/CMM 

                                        (10/9/18 4:00 AM) 



                                         Eosinophils #   



                                         [0.0-0.5 K/CMM]   

 

                                        0.1 K/CMM 

                          (10/11/18 5:17 AM)        0.1 K/CMM 

                          (10/10/18 4:03 AM)        0.1 K/CMM 

                                        (10/9/18 4:00 AM) 



                                         Basophils # [0.0-0.2   



                                         K/CMM]   

 

                                        87 mL/min/1.73m2 1

*NA*

                          (10/11/18 5:17 AM)        62 mL/min/1.73m2 2

*NA*

                          (10/9/18 4:00 AM)         62 mL/min/1.73m2 3

*NA*

                                        (10/8/18 9:44 AM) 



                                         eGFR   

 

                                        Product available 4

                    (10/7/18 7:17 PM)                          



                                         RBC product   

 

                                        A NEG 

*Unknown*

                    (10/7/18 7:56 PM)                          



                                         ABO/Rh   

 

                                        0.6 

*LOW*

                    (18 5:01 PM)                          



                                         A/G Ratio [0.7-1.6]   

 

                                        Negative 

                    (10/7/18 7:56 PM)                          



                                         Antibody Scrn   

 

                                        3.0 g/dL 

*LOW*

                    (18 5:01 PM)                          



                                         Albumin Lvl [3.5-5.0   



                                         g/dL]   

 

                                        155 unit/L 

*HI*

                    (18 5:01 PM)                          



                                         Alk Phos [   



                                         unit/L]   

 

                                        111 unit/L 

*HI*

                    (18 5:01 PM)                          



                                         ALT [0-65 unit/L]   

 

                                        33.0 uMol/L 

                    (10/8/18 1:41 PM)                          



                                         Ammonia [<=45.0   



                                         uMol/L]   

 

                                        14.4 mEq/L 

                          (10/11/18 5:17 AM)        16.1 mEq/L 

                          (10/9/18 4:00 AM)         14.2 mEq/L 

                                        (10/8/18 9:44 AM) 



                                         AGAP [10.0-20.0   



                                         mEq/L]   

 

                                        258 unit/L 

*HI*

                    (18 5:01 PM)                          



                                         AST [0-37 unit/L]   

 

                                        15 

                    (18 5:01 PM)                          



                                         B/C Ratio [6-25]   

 

                                        0.6 % 

                          (10/11/18 5:17 AM)        0.5 % 

                          (10/10/18 4:03 AM)        0.5 % 

                                        (10/9/18 4:00 AM) 



                                         Basophils [0.0-1.0   



                                         %]   

 

                                        23 mg/dL 

*HI*

                          (10/11/18 5:17 AM)        33 mg/dL 

*HI*

                          (10/9/18 4:00 AM)         30 mg/dL 

*HI*

                                        (10/8/18 9:44 AM) 



                                         BUN [7-22 mg/dL]   

 

                                        7.9 mg/dL 

*LOW*

                          (10/11/18 5:17 AM)        7.8 mg/dL 

*LOW*

                          (10/9/18 4:00 AM)         8.0 mg/dL 

*LOW*

                                        (10/8/18 9:44 AM) 



                                         Calcium Lvl   



                                         [8.5-10.5 mg/dL]   

 

                                        27 unit/L 

                    (18 5:01 PM)                          



                                         Total CK [   



                                         unit/L]   

 

                                        100 mEq/L 

                          (10/11/18 5:17 AM)        98 mEq/L 

                          (10/9/18 4:00 AM)         96 mEq/L 

                                        (10/8/18 9:44 AM) 



                                         Chloride Lvl [   



                                         mEq/L]   

 

                                        29 mEq/L 

                          (10/11/18 5:17 AM)        25 mEq/L 

                          (10/9/18 4:00 AM)         28 mEq/L 

                                        (10/8/18 9:44 AM) 



                                         CO2 [24-32 mEq/L]   

 

                                        0.78 mg/dL 

                          (10/11/18 5:17 AM)        1.14 mg/dL 

                          (10/9/18 4:00 AM)         1.13 mg/dL 

                                        (10/8/18 9:44 AM) 



                                         Creatinine Lvl   



                                         [0.50-1.40 mg/dL]   

 

                                        3.2 % 

                          (10/11/18 5:17 AM)        1.3 % 

                          (10/10/18 4:03 AM)        0.8 % 

                                        (10/9/18 4:00 AM) 



                                         Eosinophils [0.0-4.0   



                                         %]   

 

                                        5.3 g/dL 

*HI*

                    (18 5:01 PM)                          



                                         Globulin [2.7-4.2   



                                         g/dL]   

 

                                        170 mg/dL 

*HI*

                          (10/11/18 5:17 AM)        214 mg/dL 

*HI*

                          (10/9/18 4:00 AM)         176 mg/dL 

*HI*

                                        (10/8/18 9:44 AM) 



                                         Glucose Lvl [70-99   



                                         mg/dL]   

 

                                        26.6 % 

*LOW*

                          (10/11/18 5:17 AM)        27.8 % 

*LOW*

                          (10/10/18 4:03 AM)        28.3 % 

*LOW*

                                        (10/9/18 4:00 AM) 



                                         Hct [42.0-54.0 %]   

 

                                        8.3 g/dL 

*LOW*

                          (10/11/18 5:17 AM)        8.7 g/dL 

*LOW*

                          (10/10/18 4:03 AM)        8.8 g/dL 

*LOW*

                                        (10/9/18 4:00 AM) 



                                         Hgb [14.0-18.0 g/dL]   

 

                                        2.41 

*HI*

                    (18 5:01 PM)                          



                                         INR [0.85-1.17]   

 

                                        3.4 mEq/L 

*LOW*

                          (10/11/18 5:17 AM)        4.1 mEq/L 

                          (10/9/18 4:00 AM)         4.2 mEq/L 

                                        (10/8/18 9:44 AM) 



                                         Potassium Lvl   



                                         [3.5-5.1 mEq/L]   

 

                                        11.8 % 

*LOW*

                          (10/11/18 5:17 AM)        12.5 % 

*LOW*

                          (10/10/18 4:03 AM)        11.4 % 

*LOW*

                                        (10/9/18 4:00 AM) 



                                         Lymphocytes   



                                         [20.0-40.0 %]   

 

                                        23.8 pg 

*LOW*

                          (10/11/18 5:17 AM)        23.8 pg 

*LOW*

                          (10/10/18 4:03 AM)        23.6 pg 

*LOW*

                                        (10/9/18 4:00 AM) 



                                         MCH [27.0-31.0 pg]   

 

                                        31.1 g/dL 

*LOW*

                          (10/11/18 5:17 AM)        31.2 g/dL 

*LOW*

                          (10/10/18 4:03 AM)        31.0 g/dL 

*LOW*

                                        (10/9/18 4:00 AM) 



                                         MCHC [32.0-36.0   



                                         g/dL]   

 

                                        76.6 fL 

*LOW*

                          (10/11/18 5:17 AM)        76.4 fL 

*LOW*

                          (10/10/18 4:03 AM)        75.9 fL 

*LOW*

                                        (10/9/18 4:00 AM) 



                                         MCV [80.0-94.0 fL]   

 

                                        2.0 mg/dL 

                          (18 7:18 AM)         2.1 mg/dL 

                          (18 5:01 PM)          



                                         Magnesium Lvl   



                                         [1.8-2.4 mg/dL]   

 

                                        1+ 

*ABN*

                          (10/11/18 5:17 AM)        1+ 

*ABN*

                          (10/10/18 4:03 AM)        1+ 

*ABN*

                                        (10/9/18 4:00 AM) 



                                         Microcyte [None   



                                         Seen]   

 

                                        6.5 % 

                          (10/11/18 5:17 AM)        7.7 % 

                          (10/10/18 4:03 AM)        6.3 % 

                                        (10/9/18 4:00 AM) 



                                         Monocytes [2.0-12.0   



                                         %]   

 

                                        8.7 fL 

                          (10/11/18 5:17 AM)        9.0 fL 

                          (10/10/18 4:03 AM)        9.0 fL 

                                        (10/9/18 4:00 AM) 



                                         MPV [7.4-10.4 fL]   

 

                                        140 mEq/L 

                          (10/11/18 5:17 AM)        135 mEq/L 

                          (10/9/18 4:00 AM)         134 mEq/L 

*LOW*

                                        (10/8/18 9:44 AM) 



                                         Sodium Lvl [135-145   



                                         mEq/L]   

 

                                        2.9 mg/dL 

                          (18 7:18 AM)         2.9 mg/dL 

                          (18 5:01 PM)          



                                         Phosphorus [2.5-4.5   



                                         mg/dL]   

 

                                        319 K/CMM 

                          (10/11/18 5:17 AM)        343 K/CMM 

                          (10/10/18 4:03 AM)        359 K/CMM 

                                        (10/9/18 4:00 AM) 



                                         Platelet [133-450   



                                         K/CMM]   

 

                                        77.9 % 

*HI*

                          (10/11/18 5:17 AM)        78.0 % 

*HI*

                          (10/10/18 4:03 AM)        81.0 % 

*HI*

                                        (10/9/18 4:00 AM) 



                                         Segs [45.0-75.0 %]   

 

                                        8.3 g/dL 

                    (18 5:01 PM)                          



                                         Total Protein   



                                         [6.4-8.4 g/dL]   

 

                                        26.5 seconds 

*HI*

                    (18 5:01 PM)                          



                                         PT [12.0-14.7   



                                         seconds]   

 

                                        40.1 seconds 

*HI*

                    (18 5:01 PM)                          



                                         PTT [22.9-35.8   



                                         seconds]   

 

                                        3.47 M/CMM 

*LOW*

                          (10/11/18 5:17 AM)        3.64 M/CMM 

*LOW*

                          (10/10/18 4:03 AM)        3.73 M/CMM 

*LOW*

                                        (10/9/18 4:00 AM) 



                                         RBC [4.70-6.10   



                                         M/CMM]   

 

                                        22.3 % 

*HI*

                          (10/11/18 5:17 AM)        21.8 % 

*HI*

                          (10/10/18 4:03 AM)        21.5 % 

*HI*

                                        (10/9/18 4:00 AM) 



                                         RDW [11.5-14.5 %]   

 

                                        0.4 mg/dL 

                    (18 5:01 PM)                          



                                         Bili Total [0.2-1.3   



                                         mg/dL]   

 

                                        <0.02 ng/mL 

                          (10/5/18 6:54 PM)         <0.02 ng/mL 

                          (10/5/18 2:17 PM)         <0.02 ng/mL 

                                        (18 5:01 PM) 



                                         Troponin-I   



                                         [0.00-0.40 ng/mL]   

 

                                        Negative 

*NA*

                    (10/8/18 4:00 PM)                          



                                         UA Bili [Negative]   

 

                                        Negative 

                    (10/8/18 4:00 PM)                          



                                         UA Blood [Negative]   

 

                                        Yellow 

*NA*

                    (10/8/18 4:00 PM)                          



                                         UA Color [Yellow]   

 

                                        Negative mg/dL 

*NA*

                    (10/8/18 4:00 PM)                          



                                         UA Glucose [Negative   



                                         mg/dL]   

 

                                        3 /LPF 

*HI*

                    (10/8/18 4:00 PM)                          



                                         UA Hyal Cast [0-2   



                                         /LPF]   

 

                                        Negative mg/dL 

*NA*

                    (10/8/18 4:00 PM)                          



                                         UA Ketones [Negative   



                                         mg/dL]   

 

                                        Negative 

                    (10/8/18 4:00 PM)                          



                                         UA Leuk Est   



                                         [Negative]   

 

                                        Negative 

                    (10/8/18 4:00 PM)                          



                                         UA Nitrite   



                                         [Negative]   

 

                                        5.0 

                    (10/8/18 4:00 PM)                          



                                         UA pH [5.0-8.0]   

 

                                        Negative mg/dL 

                    (10/8/18 4:00 PM)                          



                                         UA Protein [Negative   



                                         mg/dL]   

 

                                        1 /HPF 

                    (10/8/18 4:00 PM)                          



                                         UA RBC [0-2 /HPF]   

 

                                        1.013 

                    (10/8/18 4:00 PM)                          



                                         UA Spec Grav   



                                         [<=1.030]   

 

                                        None Seen 

*NA*

                    (10/8/18 4:00 PM)                          



                                         UA Sq Epi   

 

                                        Clear 

                    (10/8/18 4:00 PM)                          



                                         UA Turbidity [Clear]   

 

                                        <=1.0 mg/dL 

*NA*

                    (10/8/18 4:00 PM)                          



                                         UA Urobilinogen   



                                         [0.1-1.0 mg/dL]   

 

                                        912 pg/mL 

                    (18 7:18 AM)                          



                                         Vitamin B12 Lvl   



                                         [254-1320 pg/mL]   

 

                                        12.8 K/CMM 

*HI*

                          (10/11/18 5:17 AM)        13.8 K/CMM 

*HI*

                          (10/10/18 4:03 AM)        16.9 K/CMM 

*HI*

                                        (10/9/18 4:00 AM) 



                                         WBC [3.7-10.4 K/CMM]   







1Result Comment: The eGFR is calculated using the CKD-EPI formula. In most 
young, healthy individuals the eGFR will be >90 mL/min/1.73m2. The eGFR declines
with age. An eGFR of 60-89 may be normal in some populations, particularly the 
elderly, for whom the CKD-EPI formula has not been extensively validated. Use of
the eGFR is not recommended in the following populations:



Individuals with unstable creatinine concentrations, including pregnant patients
and those with serious co-morbid conditions.



Patients with extremes in muscle mass or diet. 



The data above are obtained from the National Kid[employer] Disease Education Program (
NKDEP) which additionally recommends that when the eGFR is used in patients with
extremes of body mass index for purposes of drug dosing, the eGFR should be mul
tiplied by the estimated BMI.



2Result Comment: The eGFR is calculated using the CKD-EPI formula. In most 
young, healthy individuals the eGFR will be >90 mL/min/1.73m2. The eGFR declines
with age. An eGFR of 60-89 may be normal in some populations, particularly the 
elderly, for whom the CKD-EPI formula has not been extensively validated. Use of
the eGFR is not recommended in the following populations:



Individuals with unstable creatinine concentrations, including pregnant patients
and those with serious co-morbid conditions.



Patients with extremes in muscle mass or diet. 



The data above are obtained from the National Kid[employer] Disease Education Program (
NKDEP) which additionally recommends that when the eGFR is used in patients with
extremes of body mass index for purposes of drug dosing, the eGFR should be mul
tiplied by the estimated BMI.



3Result Comment: The eGFR is calculated using the CKD-EPI formula. In most 
young, healthy individuals the eGFR will be >90 mL/min/1.73m2. The eGFR declines
with age. An eGFR of 60-89 may be normal in some populations, particularly the 
elderly, for whom the CKD-EPI formula has not been extensively validated. Use of
the eGFR is not recommended in the following populations:



Individuals with unstable creatinine concentrations, including pregnant patients
and those with serious co-morbid conditions.



Patients with extremes in muscle mass or diet. 



The data above are obtained from the National Kid[employer] Disease Education Program (
NKDEP) which additionally recommends that when the eGFR is used in patients with
extremes of body mass index for purposes of drug dosing, the eGFR should be mul
tiplied by the estimated BMI.



4Result Comment: 10/07/2018 21:00  [id number]

spoke to derrick nagel on 10/07/2018 21:00 by Serg.



Immunizations





Given and Recorded





   



                 Vaccine         Date            Status          Refusal Reason

 

   



                     influenza virus vaccine, inactivated     18             Given 

 

   



                     influenza virus vaccine, inactivated     18              Given 

 

   



                     pneumococcal 13-valent vaccine     18              Given 

 

   



                     diphtheria/pertussis, acel/tetanus adult     16              Given 

 

   



                     pneumococcal 23-valent vaccine     3/31/15             Given 









Not Given





   



                 Vaccine         Date            Status          Refusal Reason

 

   



                 pneumococcal 23-valent vaccine1     3/30/15         Not Given       Patient Refuses







1Result Note: already recieved vaccination previous to admission



Procedures







    



              Procedure     Date         Related Diagnosis     Body Site     Status

 

    



                           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

 

    



                           Cardiac ablation using fluoroscopy guidance        Completed

 

    



                           Cardiac catheterization, left heart        Completed

 

    



                           Stent placement2          Completed







1ablation stents



2x27



Social History







 



                           Social History Type       Response

 

 



                           Substance Abuse           Use: None.

 

 



                           Alcohol                   Never

 

 



                           Smoking Status            Former smoker; Ready to change: No; Concerns about tobacco use

 in



                                         household: No; Exposure to Tobacco Smoke None; Cigarette Smoking Last 365



                                         Days No; Reg Smoking Cessation Counseling No; Other Tobacco Frequency quit



                                         30 years ago;



                                         entered on: 19







Assessment and Plan

Extracted from:





  



                     Title: History and Physical     Author: Homa Bradford MD     Date: 18

















                                        78 year old male with a history of CAD s/p CABGx3 and multiple stents, A-fib (on

 Xarelto) CHF on 2-3L home O2, HLD, HTN, polio with residual right lower 
extremity weaknessand DM who presented to the ED after a fall at home.

  



Multiple falls



                                        - Unclear if this represents syncope/presyncope vs imbalance vs mechanical falls





                                        -Patient is at risk as he is on Xarelto



                                        -Patient is wheelchair-boundhowever most of these falls have occurred while standing

 and unsupervised



                                        -We will get PT/OT evaluation



                                        -Recent echo without any valvular abnormalities



                                        - will get carotid Dopplers







CAD s/p CABG



                                        -Continue aspirin, statin, lisinopril







Hypertension



                                        -Continue home meds







Elevated transaminases



                                        -Possibly related to amiodarone



                                        -Increased hyperattenuation of the liver seen onimaging suggestive of amiodarone

 versus hemochromatosis







CHF



                                        -Continue homemeds



                                        -Currently stable







A. fib



                                        -Continue Xarelto - will need to discuss risks vs benefits given multiple falls



                                        - continue amoidarone



                                        - currently rate-controlled







Diabetes



                                        -Continue home insulin regimen plus sliding scale insulin







Hypothyroidism



                                        -Continue levothyroxine





Difficult home situation



                                        -Grandson is concerned aboutpoor supervision from the patient's daughterat home





                                        -Social work evaluation







  xarelto



  pending further evaluation

## 2019-07-18 NOTE — XMS REPORT
Summary of Care: 2/10/18 - 2/15/18

                             Created on: 2070



DESIREE MCKEON

External Reference #: [id number]

: 1940

Sex: Male



Demographics







                          Address                   [address]

[place]  [address]

 

                          Home Phone                [phone]

 

                          Preferred Language        English

 

                          Marital Status            

 

                          Congregational Affiliation     None

 

                          Race                      Other

 

                                        Additional Race(s)  

 

                          Ethnic Group              Non-





Author







                          Author                    [facility]

 

                          Organization              [facility]

 

                          Address                   Unknown

 

                          Phone                     Unavailable







Encounter





HQ Slava(DAVID) [id number] Date(s): 2/10/18 - 2/15/18

[facility] [address] [place] [address]-     [phone]
[phone]

Encounter Diagnosis

Hemorrhage due to vascular prosthetic devices, implants and grafts, initial enco
unter (Final) - 18

Chronic diastolic (congestive) heart failure (Final) - 

Type 2 diabetes mellitus with hypoglycemia without coma (Final) - 

Unspecified atrial fibrillation (Final) - 

Urinary tract infection, site not specified (Final) - 

Hypertensive heart disease with heart failure (Final) - 

Unspecified fall, initial encounter (Final) - 

Presence of aortocoronary bypass graft (Final) - 

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 
(Final) - 

Other displaced fracture of upper end of right humerus, subsequent encounter for
fracture with routine healing (Final) - 

Discharge Disposition: Skilled Nursing Facility

Attending Physician: Katie Mandujano MD

Admitting Physician: Katie Mandujano MD





Vital Signs







                    1                   2                   3



                                         Most recent to   



                                         oldest [Reference   



                                         Range]:   

 

                                        175.26 cm 

                    (2/10/18 3:01 AM)                         



                                         Height   

 

                                        98.1 DegF 

                          (2/15/18 7:31 AM)         97.7 DegF 

                          (2/15/18 4:05 AM)         97.8 DegF 

                                        (18 11:53 PM)



                                         Temperature Oral   



                                         [96.4-99.1 DegF]   

 

                                        123/73 mmHg 

                          (2/15/18 7:31 AM)         121/70 mmHg 

                          (2/15/18 4:05 AM)         120/62 mmHg 

                                        (18 11:53 PM)



                                         Blood Pressure   



                                         [/60-90 mmHg]   

 

                                        16 BRMIN 

                          (2/15/18 8:01 AM)         18 BRMIN 

                          (2/15/18 7:31 AM)         16 BRMIN 

                                        (2/15/18 4:05 AM)



                                         Respiratory Rate   



                                         [14-20 BRMIN]   

 

                                        71 bpm 

                          (2/15/18 7:31 AM)         71 bpm 

                          (2/15/18 4:05 AM)         80 bpm 

                                        (18 11:53 PM)



                                         Peripheral Pulse   



                                         Rate [ bpm]   

 

                                        93.182 kg 

                    (2/10/18 3:01 AM)                         



                                         Weight   

 

                                        30.34 m2 

                    (2/10/18 3:01 AM)                         



                                         Body Mass Index   







Problem List







    



              Condition     Effective Dates     Status       Health Status     Informant

 

    



                           Acute MI(Confirmed)1      Resolved  

 

    



                           Atrial                    Active  



                                         fibrillation(Confirm    



                                         ed)    

 

    



                           Atrial                    Active  



                                         fibrillation(Confirm    



                                         ed)    

 

    



                           CHF (congestive           Active  



                                         heart    



                                         failure)(Confirmed)    

 

    



                           CAD (coronary artery      Active  



                                         disease)(Confirmed)    

 

    



                           Diabetes(Confirmed)       Active  

 

    



                           Diastolic heart           Active  



                                         failure(Confirmed)    

 

    



                           Histoplasmosis(Confi      Resolved  



                                         rmed)    

 

    



                           Hx MRSA                   Resolved  



                                         infection(Confirmed)    

 

    



                           Hypercholesteremia(C      Resolved  



                                         onfirmed)    

 

    



                           Hypertension(Confirm      Active  



                                         ed)    

 

    



                           Hypertension(Confirm      Resolved  



                                         ed)    

 

    



                           HTN                       Active  



                                         (hypertension)(Confi    



                                         rmed)    

 

    



                           Hypothyroidism(Confi      Active  



                                         rmed)    

 

    



                           Diabetes mellitus         Active  



                                         type 2, insulin    



                                         dependent(Confirmed)    

 

    



                           PAUL (obstructive          Active  



                                         sleep    



                                         apnea)(Confirmed)    

 

    



                           Poliomyelitides(Conf      Resolved  



                                         irmed)    

 

    



                           Sleep                     Active  



                                         apnea(Confirmed)    

 

    



                           Stented coronary          Resolved  



                                         artery(Confirmed)2    

 

    



                           Systolic heart            Active  



                                         failure(Confirmed)    

 

    



                           Whooping                  Resolved  



                                         cough(Confirmed)    







1x 3



225 cardiac stents



Allergies, Adverse Reactions, Alerts







   



                 Substance       Reaction        Severity        Status

 

   



                           sulfa drugs               Active

 

   



                           Reglan                    Active

 

   



                           iodine                    Active

 

   



                           Latex                     Active







Medications





AMIODarone

200 mg, 1 tab, Route: PO, Drug form: TAB, Daily, Dosing Weight 93.182, kg, Start
date: 18 9:00:00 CST, Duration: 30 day, Stop date: 18 9:00:00 CDT

Notes: (Same as: Cordarone)

Start Date: 18

Stop Date: 2/15/18

Status: Discontinued



aspirin 81 mg tablet, enteric coated

81 mg, 1 tab, Route: PO, Drug form: ECTAB, Daily, Dosing Weight 93.182, kg, Star
t date: 18 9:00:00 CST, Duration: 30 day, Stop date: 18 9:00:00 CDT

Notes: Do not crush or chew.(Same As: Ecotrin)

Start Date: 18

Stop Date: 2/15/18

Status: Discontinued



atorvastatin

5 mg, 0.5 tab, Route: PO, Drug form: TAB, Bedtime, Dosing Weight 93.182, kg, Sta
rt date: 18 21:00:00 CST, Duration: 30 day, Stop date: 18 21:00:00 C
DT

Notes: (Same As: Lipitor)

Start Date: 18

Stop Date: 2/15/18

Status: Discontinued



D10W 1,000 mL

1,000 mL, Rate: 100 ml/hr, Infuse over: 10 hr, Route: IV, Dosing Weight 93.182 k
g, Total Volume: 1,000, Start date: 02/10/18 7:59:00 CST, Duration: 30 day, Stop
date: 18 7:58:00 CDT, 2.15, m2

Start Date: 2/10/18

Stop Date: 18

Status: Discontinued



D5W 1/2NS 1,000 mL

1,000 mL, Rate: 75 ml/hr, Infuse over: 13.3 hr, Route: IV, Dosing Weight 93.182 
kg, Total Volume: 1,000, Start date: 02/10/18 12:03:00 CST, Duration: 30 day, St
op date: 18 12:02:00 CDT, 2.15, m2

Start Date: 2/10/18

Stop Date: 18

Status: Discontinued



Dextrose 50% in Water (bolus) IV

25 gm, Route: IVP, Dosing Weight 93.182, kg, ONCE, Start date: 02/10/18 4:20:00 
CST, Stop date: 02/10/18 4:20:00 CST

Start Date: 2/10/18

Stop Date: 2/10/18

Status: Completed



Dextrose 50% Syringe

12.5 gm, 25 mL, Route: IVP, Drug Form: INJ, Dosing Weight 93.182, kg, PRN, PRN B
lood Glucose Results, Start date: 02/10/18 21:10:00 CST, Duration: 30 day, Stop 
date: 18 22:09:00 CDT

Start Date: 2/10/18

Stop Date: 2/15/18

Status: Discontinued



Dextrose 50% Syringe

25 gm, 50 mL, Route: IVP, Drug Form: INJ, Dosing Weight 93.182, kg, PRN, PRN Blo
od Glucose Results, Start date: 02/10/18 21:10:00 CST, Duration: 30 day, Stop da
te: 18 22:09:00 CDT

Start Date: 2/10/18

Stop Date: 2/15/18

Status: Discontinued



docusate

100 mg, 1 cap, Route: PO, Drug form: CAP, BID, Dosing Weight 93.182, kg, Start d
ate: 02/10/18 17:00:00 CST, Duration: 30 day, Stop date: 18 9:00:00 CDT

Notes: (Same as: Colace) (Do Not Crush)

Start Date: 2/10/18

Stop Date: 2/15/18

Status: Discontinued



famotidine

20 mg=1 tab, PO, Q12H, PRN Heartburn, # 60 tab, 0 Refill(s)

Start Date: 2/10/18

Stop Date: 3/12/18

Status: Ordered



famotidine

20 mg, 1 tab, Route: PO, Drug form: TAB, Q12H, Dosing Weight 93.182, kg, PRN Hea
rtburn, Start date: 18 9:27:00 CST, Duration: 30 day, Stop date: 18 
9:26:00 CDT

Notes: (Same as: Pepcid)

Start Date: 18

Stop Date: 2/15/18

Status: Discontinued



gabapentin 600 mg oral tablet

600 mg, 2 cap, Route: PO, Drug form: CAP, TID, Dosing Weight 93.182, kg, Start d
ate: 18 13:00:00 CST, Duration: 30 day, Stop date: 18 9:00:00 CDT

Notes: (Same as: Neurontin)

Start Date: 18

Stop Date: 2/15/18

Status: Discontinued



glucagon

1 mg, Route: IM, Drug form: PDR/INJ, PRN, Dosing Weight 93.182, kg, PRN Blood Gl
ucose Results, Start date: 02/10/18 21:10:00 CST, Duration: 30 day, Stop date: 0
3/12/18 22:09:00 CDT

Start Date: 2/10/18

Stop Date: 2/15/18

Status: Discontinued



Imdur

30 mg, 1 tab, Route: PO, Drug form: ERTAB, QAM, Dosing Weight 93.182, kg, Start 
date: 18 9:00:00 CST, Duration: 30 day, Stop date: 18 9:00:00 CDT

Notes: (Same as:Imdur)"Do Not Crush"  Take on empty stomach/ full glass of water
.  Do not crush

Start Date: 18

Stop Date: 2/15/18

Status: Discontinued



Imodium A-D 2 mg oral tablet

2 mg=1 tab, PO, Q8H, PRN Loose Stools, # 60 tab, 0 Refill(s)

Start Date: 2/10/18

Stop Date: 18

Status: Ordered



insulin lispro

1 unit, 0.01 mL, Route: SUB-Q, Drug form: SOLN, TID-Before Meals, Dosing Weight 
93.182, kg, PRN Blood Glucose Results, Start date: 02/10/18 21:10:00 CST, Durati
on: 30 day, Stop date: 18 21:09:00 CDT

Notes: Roll in palms of hands gently;  Do not shake `vigorously. (Same as: Malia duque )"Single Patient Use Only "WASTE: F/P - Black; E - Municipal Trash Bin  Stabl
e for 28 days at room temperature.Expires in _____ days from ______________Date

Start Date: 2/10/18

Stop Date: 18

Status: Discontinued



insulin lispro

2 unit, 0.02 mL, Route: SUB-Q, Drug form: SOLN, TID-Before Meals, Dosing Weight 
93.182, kg, PRN Blood Glucose Results, Start date: 02/10/18 21:10:00 CST, Durati
on: 30 day, Stop date: 18 21:09:00 CDT

Notes: Roll in palms of hands gently;  Do not shake `vigorously. (Same as: Malia duque )"Single Patient Use Only "WASTE: F/P - Black; E - Municipal Trash Bin  Stabl
e for 28 days at room temperature.Expires in _____ days from ______________Date

Start Date: 2/10/18

Stop Date: 18

Status: Discontinued



insulin lispro

3 unit, 0.03 mL, Route: SUB-Q, Drug form: SOLN, TID-Before Meals, Dosing Weight 
93.182, kg, PRN Blood Glucose Results, Start date: 02/10/18 21:10:00 CST, Durati
on: 30 day, Stop date: 18 21:09:00 CDT

Notes: Roll in palms of hands gently;  Do not shake `vigorously. (Same as: Malia duque )"Single Patient Use Only "WASTE: F/P - Black; E - Municipal Trash Bin  Stabl
e for 28 days at room temperature.Expires in _____ days from ______________Date

Start Date: 2/10/18

Stop Date: 18

Status: Discontinued



insulin lispro

5 unit, 0.05 mL, Route: SUB-Q, Drug form: SOLN, TID-Before Meals, Dosing Weight 
93.182, kg, PRN Blood Glucose Results, Start date: 02/10/18 21:10:00 CST, Durati
on: 30 day, Stop date: 18 21:09:00 CDT

Notes: Roll in palms of hands gently;  Do not shake `vigorously. (Same as: Malia duque )"Single Patient Use Only "WASTE: F/P - Black; E - Municipal Trash Bin  Stabl
e for 28 days at room temperature.Expires in _____ days from ______________Date

Start Date: 2/10/18

Stop Date: 18

Status: Discontinued



insulin lispro

4 unit, 0.04 mL, Route: SUB-Q, Drug form: SOLN, TID-Before Meals, Dosing Weight 
93.182, kg, PRN Blood Glucose Results, Start date: 02/10/18 21:10:00 CST, Durati
on: 30 day, Stop date: 18 21:09:00 CDT

Notes: Roll in palms of hands gently;  Do not shake `vigorously. (Same as: Humal
 )"Single Patient Use Only "WASTE: F/P - Black; E - Municipal Trash Bin  Stabl
e for 28 days at room temperature.Expires in _____ days from ______________Date

Start Date: 2/10/18

Stop Date: 18

Status: Discontinued



insulin lispro

2 unit, 0.02 mL, Route: SUB-Q, Drug form: SOLN, Bedtime, Dosing Weight 93.182, k
g, PRN Blood Glucose Results, Start date: 02/10/18 21:10:00 CST, Duration: 30 da
y, Stop date: 18 21:09:00 CDT

Notes: Roll in palms of hands gently;  Do not shake `vigorously. (Same as: Humal
og )"Single Patient Use Only "WASTE: F/P - Black; E - Municipal Trash Bin  Stabl
e for 28 days at room temperature.Expires in _____ days from ______________Date

Start Date: 2/10/18

Stop Date: 2/15/18

Status: Discontinued



insulin lispro

4 unit, 0.04 mL, Route: SUB-Q, Drug form: SOLN, Bedtime, Dosing Weight 93.182, k
g, PRN Blood Glucose Results, Start date: 02/10/18 21:10:00 CST, Duration: 30 da
y, Stop date: 18 21:09:00 CDT

Notes: Roll in palms of hands gently;  Do not shake `vigorously. ([facility]: [facility]
 )"Single Patient Use Only "WASTE: F/P - Black; E - Municipal Trash Bin  Stabl
e for 28 days at room temperature.Expires in _____ days from ______________Date

Start Date: 2/10/18

Stop Date: 2/15/18

Status: Discontinued



insulin lispro

3 unit, 0.03 mL, Route: SUB-Q, Drug form: SOLN, Bedtime, Dosing Weight 93.182, k
g, PRN Blood Glucose Results, Start date: 02/10/18 21:10:00 CST, Duration: 30 da
y, Stop date: 18 21:09:00 CDT

Notes: Roll in palms of hands gently;  Do not shake `vigorously. (Same as: Malia duque )"Single Patient Use Only "WASTE: F/P - Black; E - Municipal Trash Bin  Stabl
e for 28 days at room temperature.Expires in _____ days from ______________Date

Start Date: 2/10/18

Stop Date: 2/15/18

Status: Discontinued



insulin lispro

1 unit, 0.01 mL, Route: SUB-Q, Drug form: SOLN, Bedtime, Dosing Weight 93.182, k
g, PRN Blood Glucose Results, Start date: 02/10/18 21:10:00 CST, Duration: 30 da
y, Stop date: 18 21:09:00 CDT

Notes: Roll in palms of hands gently;  Do not shake `vigorously. (Same as: Malia duque )"Single Patient Use Only "WASTE: F/P - Black; E - Municipal Trash Bin  Stabl
e for 28 days at room temperature.Expires in _____ days from ______________Date

Start Date: 2/10/18

Stop Date: 2/15/18

Status: Discontinued



Lantus 100 units/mL

40 unit, 0.4 mL, Route: SUB-Q, Drug form: SOLN, Daily, Dosing Weight 93.182, kg,
Priority: NOW, Start date: 18 18:42:00 CST, Duration: 30 day, Stop date: 
18 9:00:00 CDT

Notes: (Same as: Lantus)Do not hold insulin without contacting prescriberWASTE: 
F/P - Black; E - Municipal Trash Bin  "single patient use only"

Start Date: 18

Stop Date: 2/15/18

Status: Discontinued



lisinopril

2.5 mg, 0.5 tab, Route: PO, Drug form: TAB, Daily, Dosing Weight 93.182, kg, Sta
rt date: 18 9:00:00 CST, Duration: 30 day, Stop date: 18 9:00:00 CDT

Notes: (Same as: Prinivil, Zestril)

Start Date: 18

Stop Date: 2/15/18

Status: Discontinued



LORazepam

0.5 mg, 1 tab, Route: PO, Drug form: TAB, Q12H, Dosing Weight 93.182, kg, PRN An
xiety, Start date: 18 9:27:00 CST, Duration: 30 day, Stop date: 18 9
:26:00 CDT

Notes: (Same as: Ativan)

Start Date: 18

Stop Date: 2/15/18

Status: Discontinued



LORazepam 0.5 mg oral tablet

0.5 mg=1 tab, PO, Q12H, PRN as needed for anxiety, 0 Refill(s)

Start Date: 2/10/18

Status: Ordered



Mag-Ox 400

400 mg, 1 tab, Route: PO, Drug form: TAB, Daily, Dosing Weight 93.182, kg, Start
date: 18 9:00:00 CST, Duration: 30 day, Stop date: 18 9:00:00 CDT

Notes: (Same as: Mag-Ox 400)Magnesium oxide 797it=568zt elemental magnesiumDose=
____mg magnesium oxide (___mg elemental magnesium)

Start Date: 18

Stop Date: 2/15/18

Status: Discontinued



meropenem + Sodium Chloride 0.9%  mL

500 mg, Route: IV, ABXQ6H, Dosing Weight 93.182, kg, Start date: 02/10/18 17:00:
00 CST, Duration: 30 day, Stop date: 18 11:00:00 CDT, ABX Indication: Urin
chase Tract Infection

Notes: Same as Merrem  *** MEDICATION WASTE ***Product Size:  500 mgProduct Wast
ed:  ___ mg

Start Date: 2/10/18

Stop Date: 18

Status: Discontinued



meropenem + sterile water 10 mL

500 mg, Route: IV, ONCE, Start date: 02/10/18 10:26:00 CST, Stop date: 02/10/18 
10:26:00 CST, ABX Indication: Urinary Tract Infection

Notes: Same as Merrem  *** MEDICATION WASTE ***Product Size:  500 mgProduct Wast
ed:  ___ mg

Start Date: 2/10/18

Stop Date: 2/10/18

Status: Completed



metFORMIN 1000 mg oral tablet

1,000 mg, 2 tab, Route: PO, Drug form: TAB, BID-Meals, Dosing Weight 93.182, kg,
Start date: 18 17:00:00 CST, Duration: 30 day, Stop date: 18 8:00:00
CDT

Notes: (Same as: Glucophage)  Take with meal

Start Date: 18

Stop Date: 2/15/18

Status: Discontinued



niacin 1000 mg oral tablet, extended release

2,000 mg, 8 cap, Route: PO, Drug form: ERCAP, Bedtime, Dosing Weight 93.182, kg,
Start date: 18 21:00:00 CST, Duration: 30 day, Stop date: 18 21:00:
00 CDT

Notes: With food.

Start Date: 18

Stop Date: 2/15/18

Status: Discontinued



Norco 5/325 oral tablet

1 tab, PO, Q4H, PRN Pain Score 6-10, 0 Refill(s)

Start Date: 2/10/18

Status: Ordered



Norco 5/325 oral tablet

1 tab, Route: PO, Drug Form: TAB, Dosing Weight 93.182, kg, Q4H, PRN Pain Score 
6-10, Start date: 18 9:27:00 CST, Duration: 30 day, Stop date: 18 9:
26:00 CDT

Notes: (Same as: Norco 325/5)  Do not exceed 4gm/day of acetaminophen.

Start Date: 18

Stop Date: 2/15/18

Status: Discontinued



ondansetron

4 mg, 2 mL, Route: IVP, Drug form: INJ, Q6H, Dosing Weight 93.182, kg, PRN Nause
a & Vomiting, Start date: 02/10/18 9:48:00 CST, Duration: 30 day, Stop date: 
18 9:47:00 CDT

Notes: (Same as: Zofran)  *** MEDICATION WASTE ***Product Size:  4 mgProduct Was
sonali:  ___ mg

Start Date: 2/10/18

Stop Date: 2/15/18

Status: Discontinued



pantoprazole

40 mg, 1 tab, Route: PO, Drug form: ECTAB, Daily, Dosing Weight 93.182, kg, Star
t date: 18 9:00:00 CST, Duration: 30 day, Stop date: 18 9:00:00 CDT

Notes: Tablet should not be chewed or crushed.(Same as: Protonix)

Start Date: 18

Stop Date: 2/15/18

Status: Discontinued



potassium chloride

20 mEq, 1 tab, Route: PO, Drug form: ERTAB, BID, Dosing Weight 93.182, kg, Start
date: 18 17:00:00 CST, Duration: 30 day, Stop date: 18 9:00:00 CDT

Notes: (Same as: K-Dur 20)"Do Not Crush"  With food and full glass of water

Start Date: 18

Stop Date: 2/15/18

Status: Discontinued



Ranexa 500 mg oral tablet, extended release

500 mg, 1 tab, Route: PO, Drug form: TAB, BID, Dosing Weight 93.182, kg, Start d
ate: 18 17:00:00 CST, Duration: 30 day, Stop date: 18 9:00:00 CDT

Notes: Same as Ranexa"Do Not Crush"

Start Date: 18

Stop Date: 2/15/18

Status: Discontinued



sertraline

100 mg, 1 tab, Route: PO, Drug form: TAB, Daily, Dosing Weight 93.182, kg, Start
date: 18 9:00:00 CST, Duration: 30 day, Stop date: 18 9:00:00 CDT

Notes: (Same as: Zoloft)

Start Date: 18

Stop Date: 2/15/18

Status: Discontinued



Synthroid

50 microgram, 1 tab, Route: PO, Drug form: TAB, Q6AM, Dosing Weight 93.182, kg, 
Start date: 18 6:00:00 CST, Duration: 30 day, Stop date: 18 6:00:00 
CDT

Notes: Take 1 hour before or 2 hours after meal; Enteral feeds may interefere wi
th the absorption of this medication.(Same as:Levothroid, Synthroid)

Start Date: 18

Stop Date: 2/15/18

Status: Discontinued



tamsulosin

0.4 mg, 1 cap, Route: PO, Drug form: CAP, Daily, Dosing Weight 93.182, kg, Start
date: 18 9:00:00 CST, Duration: 30 day, Stop date: 18 9:00:00 CDT

Notes: (Same As: Flomax)  "Do Not Crush"

Start Date: 18

Stop Date: 2/15/18

Status: Discontinued



torsemide

20 mg, 1 tab, Route: PO, Drug form: TAB, BID, Dosing Weight 93.182, kg, Start da
te: 18 17:00:00 CST, Duration: 30 day, Stop date: 18 9:00:00 CDT

Notes: (Same As: Demadex)

Start Date: 18

Stop Date: 2/15/18

Status: Discontinued



trazodone 50 mg oral tablet

50 mg, 1 tab, Route: PO, Drug form: TAB, PRN, Dosing Weight 93.182, kg, PRN Inso
mnia, Start date: 18 9:27:00 CST, Duration: 30 day, Stop date: 18 10
:26:00 CDT

Notes: (Same As: Desyrel)

Start Date: 18

Stop Date: 2/15/18

Status: Discontinued



Tylenol

650 mg, 2 tab, Route: PO, Drug form: TAB, Q6H, Dosing Weight 93.182, kg, PRN Patrick
n Score 1-5, Start date: 18 9:27:00 CST, Duration: 30 day, Stop date: 03/1
3/18 9:26:00 CDT

Notes: Do not exceed 4 gm/day.  (Same as: Tylenol)

Start Date: 18

Stop Date: 2/15/18

Status: Discontinued



Tylenol 325 mg oral tablet

650 mg=2 tab, PO, Q6H, PRN Pain Score 1-5, 0 Refill(s)

Start Date: 2/10/18

Status: Ordered



Xarelto

20 mg, 1 tab, Route: PO, Drug form: TAB, QPM, Dosing Weight 93.182, kg, Start da
te: 18 17:00:00 CST, Duration: 30 day, Stop date: 18 17:00:00 CDT

Notes: (Same as: Xarelto)Administer with food

Start Date: 18

Stop Date: 2/15/18

Status: Discontinued



Xarelto 20 mg oral tablet

20 mg=1 tab, PO, QPM, # 30 tab, 3 Refill(s)

Start Date: 2/10/18

Status: Ordered



Results





ELECTROLYTES





                    1                   2                   3



                                         Most recent to   



                                         oldest [Reference   



                                         Range]:   

 

                                        137 mEq/L 

                          (18 5:33 AM)         135 mEq/L 

                          (18 9:30 AM)         136 mEq/L 

                                        (2/10/18 4:17 AM) 



                                         Sodium Lvl [135-145   



                                         mEq/L]   

 

                                        4.0 mEq/L 

                          (18 5:33 AM)         3.8 mEq/L 

                          (18 9:30 AM)         3.9 mEq/L 

                                        (2/10/18 4:17 AM) 



                                         Potassium Lvl   



                                         [3.5-5.1 mEq/L]   

 

                                        98 mEq/L 

                          (18 5:33 AM)         96 mEq/L 

                          (18 9:30 AM)         96 mEq/L 

                                        (2/10/18 4:17 AM) 



                                         Chloride Lvl [   



                                         mEq/L]   

 

                                        32 mEq/L 

                          (18 5:33 AM)         30 mEq/L 

                          (18 9:30 AM)         33 mEq/L 

*HI*

                                        (2/10/18 4:17 AM) 



                                         CO2 [24-32 mEq/L]   

 

                                        11.0 mEq/L 

                          (18 5:33 AM)         12.8 mEq/L 

                          (18 9:30 AM)         10.9 mEq/L 

                                        (2/10/18 4:17 AM) 



                                         AGAP [10.0-20.0   



                                         mEq/L]   







CHEM PANEL





                    1                   2                   3



                                         Most recent to   



                                         oldest [Reference   



                                         Range]:   

 

                                        0.66 mg/dL 

                          (18 5:33 AM)         0.67 mg/dL 

                          (18 9:30 AM)         0.83 mg/dL 

                                        (2/10/18 4:17 AM) 



                                         Creatinine Lvl   



                                         [0.50-1.40 mg/dL]   

 

                                        94 mL/min/1.73m2 1

*NA*

                          (18 5:33 AM)         93 mL/min/1.73m2 2

*NA*

                          (18 9:30 AM)         85 mL/min/1.73m2 3

*NA*

                                        (2/10/18 4:17 AM) 



                                         eGFR   

 

                                        12 mg/dL 

                          (18 5:33 AM)         14 mg/dL 

                          (18 9:30 AM)         22 mg/dL 

                                        (2/10/18 4:17 AM) 



                                         BUN [7-22 mg/dL]   

 

                                        27 

*HI*

                    (2/10/18 4:17 AM)                          



                                         B/C Ratio [6-25]   

 

                                        417 mg/dL 4

*CRIT*

                          (18 4:08 PM)         161 mg/dL 

*HI*

                          (18 5:33 AM)         147 mg/dL 

*HI*

                                        (18 9:30 AM) 



                                         Glucose Lvl [70-99   



                                         mg/dL]   

 

                                        7.6 g/dL 

                    (2/10/18 4:17 AM)                          



                                         Total Protein   



                                         [6.4-8.4 g/dL]   

 

                                        3.0 g/dL 

*LOW*

                    (2/10/18 4:17 AM)                          



                                         Albumin Lvl [3.5-5.0   



                                         g/dL]   

 

                                        4.6 g/dL 

*HI*

                    (2/10/18 4:17 AM)                          



                                         Globulin [2.7-4.2   



                                         g/dL]   

 

                                        0.7 

                    (2/10/18 4:17 AM)                          



                                         A/G Ratio [0.7-1.6]   

 

                                        8.9 mg/dL 

                          (18 5:33 AM)         8.4 mg/dL 

*LOW*

                          (18 9:30 AM)         8.6 mg/dL 

                                        (2/10/18 4:17 AM) 



                                         Calcium Lvl   



                                         [8.5-10.5 mg/dL]   

 

                                        2.8 mg/dL 

                    (2/10/18 4:17 AM)                          



                                         Phosphorus [2.5-4.5   



                                         mg/dL]   

 

                                        1.7 mg/dL 

*LOW*

                    (2/10/18 4:17 AM)                          



                                         Magnesium Lvl   



                                         [1.8-2.4 mg/dL]   

 

                                        17 unit/L 

                    (2/10/18 4:17 AM)                          



                                         ALT [0-65 unit/L]   

 

                                        24 unit/L 

                    (2/10/18 4:17 AM)                          



                                         AST [0-37 unit/L]   

 

                                        116 unit/L 

                    (2/10/18 4:17 AM)                          



                                         Alk Phos [   



                                         unit/L]   

 

                                        0.4 mg/dL 

                    (2/10/18 4:17 AM)                          



                                         Bili Total [0.2-1.3   



                                         mg/dL]   

 

                                        1.5 mMol/L 

                    (2/10/18 7:53 AM)                          



                                         Lactic Acid Lvl   



                                         [0.5-2.2 mMol/L]   







1Result Comment: The eGFR is calculated using the CKD-EPI formula. In most 
young, healthy individuals the eGFR will be >90 mL/min/1.73m2. The eGFR declines
with age. An eGFR of 60-89 may be normal in some populations, particularly the 
elderly, for whom the CKD-EPI formula has not been extensively validated. Use of
the eGFR is not recommended in the following populations:



Individuals with unstable creatinine concentrations, including pregnant patients
and those with serious co-morbid conditions.



Patients with extremes in muscle mass or diet. 



The data above are obtained from the National Kid[employer] Disease Education Program (
NKDEP) which additionally recommends that when the eGFR is used in patients with
extremes of body mass index for purposes of drug dosing, the eGFR should be mul
tiplied by the estimated BMI.



2Result Comment: The eGFR is calculated using the CKD-EPI formula. In most 
young, healthy individuals the eGFR will be >90 mL/min/1.73m2. The eGFR declines
with age. An eGFR of 60-89 may be normal in some populations, particularly the 
elderly, for whom the CKD-EPI formula has not been extensively validated. Use of
the eGFR is not recommended in the following populations:



Individuals with unstable creatinine concentrations, including pregnant patients
and those with serious co-morbid conditions.



Patients with extremes in muscle mass or diet. 



The data above are obtained from the National Kid[employer] Disease Education Program (
NKDEP) which additionally recommends that when the eGFR is used in patients with
extremes of body mass index for purposes of drug dosing, the eGFR should be mul
tiplied by the estimated BMI.



3Result Comment: The eGFR is calculated using the CKD-EPI formula. In most 
young, healthy individuals the eGFR will be >90 mL/min/1.73m2. The eGFR declines
with age. An eGFR of 60-89 may be normal in some populations, particularly the 
elderly, for whom the CKD-EPI formula has not been extensively validated. Use of
the eGFR is not recommended in the following populations:



Individuals with unstable creatinine concentrations, including pregnant patients
and those with serious co-morbid conditions.



Patients with extremes in muscle mass or diet. 



The data above are obtained from the National Kid[employer] Disease Education Program (
NKDEP) which additionally recommends that when the eGFR is used in patients with
extremes of body mass index for purposes of drug dosing, the eGFR should be mul
tiplied by the estimated BMI.



4Result Comment: Critical Result(s) called to WILSON Storey at 2018 18:14 by 
KYLAH.  Read back OK.



CARDIAC ENZYMES





                    1                   2                   3



                                         Most recent to   



                                         oldest [Reference   



                                         Range]:   

 

                                        18 unit/L 

                    (2/10/18 4:17 AM)                          



                                         Total CK [   



                                         unit/L]   

 

                                        <0.5 ng/mL 

                    (2/10/18 4:17 AM)                          



                                         CK MB [0.5-3.6   



                                         ng/mL]   

 

                                        <2.8 

*HI*

                    (2/10/18 4:17 AM)                          



                                         CK MB Index   



                                         [0.0-2.5]   

 

                                        <0.02 ng/mL 

                    (2/10/18 4:17 AM)                          



                                         Troponin-I   



                                         [0.00-0.40 ng/mL]   

 

                                        94 pg/mL 

                    (2/10/18 4:17 AM)                          



                                         BNP [<=100 pg/mL]   







URINE AND STOOL





                    1                   2                   3



                                         Most recent to   



                                         oldest [Reference   



                                         Range]:   

 

                                        Clear 

                    (2/10/18 8:31 AM)                          



                                         UA Turbidity [Clear]   

 

                                        Yellow 

*NA*

                    (2/10/18 8:31 AM)                          



                                         UA Color [Yellow]   

 

                                        5.0 

                    (2/10/18 8:31 AM)                          



                                         UA pH [5.0-8.0]   

 

                                        1.012 

                    (2/10/18 8:31 AM)                          



                                         UA Spec Grav   



                                         [<=1.030]   

 

                                        Negative mg/dL 

*NA*

                    (2/10/18 8:31 AM)                          



                                         UA Glucose [Negative   



                                         mg/dL]   

 

                                        Negative 

                    (2/10/18 8:31 AM)                          



                                         UA Blood [Negative]   

 

                                        Negative mg/dL 

*NA*

                    (2/10/18 8:31 AM)                          



                                         UA Ketones [Negative   



                                         mg/dL]   

 

                                        Negative mg/dL 

                    (2/10/18 8:31 AM)                          



                                         UA Protein [Negative   



                                         mg/dL]   

 

                                        <=1.0 mg/dL 

*NA*

                    (2/10/18 8:31 AM)                          



                                         UA Urobilinogen   



                                         [0.1-1.0 mg/dL]   

 

                                        Negative 

*NA*

                    (2/10/18 8:31 AM)                          



                                         UA Bili [Negative]   

 

                                        Small 

*ABN*

                    (2/10/18 8:31 AM)                          



                                         UA Leuk Est   



                                         [Negative]   

 

                                        Negative 

                    (2/10/18 8:31 AM)                          



                                         UA Nitrite   



                                         [Negative]   

 

                                        4 /HPF 

                    (2/10/18 8:31 AM)                          



                                         UA WBC [0-5 /HPF]   

 

                                        <1 /HPF 

                    (2/10/18 8:31 AM)                          



                                         UA RBC [0-2 /HPF]   

 

                                        Occasional /LPF 

*NA*

                    (2/10/18 8:31 AM)                          



                                         UA Sq Epi [Few /LPF]   

 

                                        19 /LPF 

*HI*

                    (2/10/18 8:31 AM)                          



                                         UA Hyal Cast [0-2   



                                         /LPF]   







HEMATOLOGY





                    1                   2                   3



                                         Most recent to   



                                         oldest [Reference   



                                         Range]:   

 

                                        9.5 K/CMM 

                          (18 5:33 AM)         10.8 K/CMM 

*HI*

                          (18 9:30 AM)         12.1 K/CMM 

*HI*

                                        (2/10/18 4:17 AM) 



                                         WBC [3.7-10.4 K/CMM]   

 

                                        3.79 M/CMM 

*LOW*

                          (18 5:33 AM)         4.13 M/CMM 

*LOW*

                          (18 9:30 AM)         3.78 M/CMM 

*LOW*

                                        (2/10/18 4:17 AM) 



                                         RBC [4.70-6.10   



                                         M/CMM]   

 

                                        9.9 g/dL 

*LOW*

                          (18 5:33 AM)         10.6 g/dL 

*LOW*

                          (18 9:30 AM)         9.5 g/dL 

*LOW*

                                        (2/10/18 4:17 AM) 



                                         Hgb [14.0-18.0 g/dL]   

 

                                        30.3 % 

*LOW*

                          (18 5:33 AM)         33.3 % 

*LOW*

                          (18 9:30 AM)         29.9 % 

*LOW*

                                        (2/10/18 4:17 AM) 



                                         Hct [42.0-54.0 %]   

 

                                        80.0 fL 

                          (18 5:33 AM)         80.6 fL 

                          (18 9:30 AM)         79.2 fL 

*LOW*

                                        (2/10/18 4:17 AM) 



                                         MCV [80.0-94.0 fL]   

 

                                        26.2 pg 

*LOW*

                          (18 5:33 AM)         25.8 pg 

*LOW*

                          (18 9:30 AM)         25.0 pg 

*LOW*

                                        (2/10/18 4:17 AM) 



                                         MCH [27.0-31.0 pg]   

 

                                        32.7 g/dL 

                          (18 5:33 AM)         32.0 g/dL 

                          (18 9:30 AM)         31.6 g/dL 

*LOW*

                                        (2/10/18 4:17 AM) 



                                         MCHC [32.0-36.0   



                                         g/dL]   

 

                                        18.4 % 

*HI*

                          (18:33 AM)         18.1 % 

*HI*

                          (18 9:30 AM)         18.2 % 

*HI*

                                        (2/10/18 4:17 AM) 



                                         RDW [11.5-14.5 %]   

 

                                        8.3 fL 

                          (18:33 AM)         8.3 fL 

                          (18 9:30 AM)         7.8 fL 

                                        (2/10/18 4:17 AM) 



                                         MPV [7.4-10.4 fL]   

 

                                        237 K/CMM 

                          (18:33 AM)         264 K/CMM 

                          (18 9:30 AM)         295 K/CMM 

                                        (2/10/18 4:17 AM) 



                                         Platelet [133-450   



                                         K/CMM]   

 

                                        59.2 % 

                          (18 5:33 AM)         66.2 % 

                          (18 9:30 AM)         55.6 % 

                                        (2/10/18 4:17 AM) 



                                         Segs [45.0-75.0 %]   

 

                                        23.5 % 

                          (18 5:33 AM)         19.0 % 

*LOW*

                          (18 9:30 AM)         26.2 % 

                                        (2/10/18 4:17 AM) 



                                         Lymphocytes   



                                         [20.0-40.0 %]   

 

                                        11.9 % 

                          (18 5:33 AM)         9.7 % 

                          (18 9:30 AM)         12.6 % 

*HI*

                                        (2/10/18 4:17 AM) 



                                         Monocytes [2.0-12.0   



                                         %]   

 

                                        4.3 % 

*HI*

                          (18 5:33 AM)         3.9 % 

                          (18 9:30 AM)         4.4 % 

*HI*

                                        (2/10/18 4:17 AM) 



                                         Eosinophils [0.0-4.0   



                                         %]   

 

                                        1.1 % 

*HI*

                          (18 5:33 AM)         1.2 % 

*HI*

                          (18 9:30 AM)         1.2 % 

*HI*

                                        (2/10/18 4:17 AM) 



                                         Basophils [0.0-1.0   



                                         %]   

 

                                        5.6 K/CMM 

                          (18 5:33 AM)         7.2 K/CMM 

                          (18 9:30 AM)         6.7 K/CMM 

                                        (2/10/18 4:17 AM) 



                                         Segs-Bands #   



                                         [1.5-8.1 K/CMM]   

 

                                        2.2 K/CMM 

                          (18 5:33 AM)         2.1 K/CMM 

                          (18 9:30 AM)         3.2 K/CMM 

                                        (2/10/18 4:17 AM) 



                                         Lymphocytes #   



                                         [1.0-5.5 K/CMM]   

 

                                        1.1 K/CMM 

*HI*

                          (18 5:33 AM)         1.0 K/CMM 

*HI*

                          (18 9:30 AM)         1.5 K/CMM 

*HI*

                                        (2/10/18 4:17 AM) 



                                         Monocytes # [0.0-0.8   



                                         K/CMM]   

 

                                        0.4 K/CMM 

                          (18 5:33 AM)         0.4 K/CMM 

                          (18 9:30 AM)         0.5 K/CMM 

                                        (2/10/18 4:17 AM) 



                                         Eosinophils #   



                                         [0.0-0.5 K/CMM]   

 

                                        0.1 K/CMM 

                          (18 5:33 AM)         0.1 K/CMM 

                          (18 9:30 AM)         0.1 K/CMM 

                                        (2/10/18 4:17 AM) 



                                         Basophils # [0.0-0.2   



                                         K/CMM]   

 

                                        18.8 seconds 

*HI*

                    (2/10/18 4:17 AM)                          



                                         PT [12.0-14.7   



                                         seconds]   

 

                                        1.56 

*HI*

                    (2/10/18 4:17 AM)                          



                                         INR [0.85-1.17]   

 

                                        41.2 seconds 

*HI*

                    (2/10/18 4:17 AM)                          



                                         PTT [22.9-35.8   



                                         seconds]   







Immunizations





Given and Recorded





   



                 Vaccine         Date            Status          Refusal Reason

 

   



                     influenza virus vaccine, inactivated     18              Given 

 

   



                     pneumococcal 13-valent vaccine     18              Given 

 

   



                     diphtheria/pertussis, acel/tetanus adult     16              Given 

 

   



                     pneumococcal 23-valent vaccine     3/31/15             Given 









Not Given





   



                 Vaccine         Date            Status          Refusal Reason

 

   



                 pneumococcal 23-valent vaccine     3/30/15         Not Given       Patient Refuses







Procedures







    



              Procedure     Date         Related Diagnosis     Body Site     Status

 

    



                           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

 

    



                           Cardiac ablation using fluoroscopy guidance        Completed

 

    



                           Cardiac catheterization, left heart        Completed

 

    



                           Stent placement2          Completed







1ablation stents



2x27



Social History







 



                           Social History Type       Response

 

 



                           Substance Abuse           Use: None.

 

 



                           Alcohol                   Past

 

 



                           Smoking Status            Former smoker; Exposure to Tobacco Smoke None; Cigarette Smoking

 Last 365



                                         Days No; Reg Smoking Cessation Counseling No; Other Tobacco Frequency quit



                                         30 years ago;



                                         entered on: 18







Assessment and Plan

Extracted from:





  



                     Title: Clinical Document     Author: Katie Mandujano MD     Date: 18









                                        Progress Note

[facility]

CC:

follow up on his weakness



SUBJECTIVE:

pt seen/examined, in good spirits, however weak



OBJECTIVE:

Vital Signs (last 24 hrs)_____Last Charted___________

Temp Oral97.6 DegF  ( 12:44)

Heart Rate [id number] bpm  ( 12:44)

Resp Rate    18 BRMIN  ( 12:44)

PTL880 mmHg  ( 12:44)

DBP68 mmHg  ( 12:44)

WzE014 %  ( 12:44)







Medications:

Scheduled Meds (18):AMIODarone, aspirin (aspirin 81 mg tablet, enteric coated), 
atorvastatin, docusate, gabapentin (gabapentin 600 mg oral tablet), isosorbide 
mononitrate (Imdur), levothyroxine (Synthroid), lisinopril, magnesium oxide 
(Mag-Ox 400), metFORMIN (metFORMIN 1000 mg oral tablet), niacin (niacin 1000 mg 
oral tablet, extended release), pantoprazole, potassium chloride, ranolazine 
(Ranexa 500 mg oral tablet, extended release), rivaroxaban (Xarelto), 
sertraline, tamsulosin, torsemide

Unscheduled Meds: None

PRN Meds (18):Dextrose 50% in Water IV (Dextrose 50% Syringe), Dextrose 50% in 
Water IV (Dextrose 50% Syringe), LORazepam, acetaminophen-hydrocodone (Norco 
5/325 oral tablet), acetaminophen (Tylenol), famotidine, glucagon, insulin 
lispro, insulin lispro, insulin lispro, insulin lispro, insulin lispro, insulin 
lispro, insulin lispro, insulin lispro, insulin lispro, ondansetron, trazodone 
(trazodone 50 mg oral tablet)

One Time Meds: None

Continuous Infusions: None



Labs (Last four charted values)

WBC                 9.5()H 10.8()H 12.1(FEB 10)

Hgb                 L 9.9()L 10.6(B 11)L 9.5(FEB 10)

Hct                 L 30.3()L 33.3(FEB 11)L 29.9(B 10)

Plt                 237(B 12)264(FEB 11)295(FEB 10)

Na                  137(FEB 12)135(FEB 11)136(FEB 10)

K                   4.0(FEB 12)3.8(B 11)3.9(FEB 10)

CO2                 32(FEB 12)30(B 11)H 33(FEB 10)

Cl                  98(B 12)96(FEB 11)96(FEB 10)

Cr                  0.66(B 12)0.67(B 11)0.83(FEB 10)

BUN                 12(B 12)14(FEB 11)22(FEB 10)

Glucose Random      H 161(FEB 12)H 147()C 49(FEB 10)

Mg                  L 1.7(FEB 10)

Phos                2.8(FEB 10)

Ca                  8.9()L 8.4()8.6(FEB 10)

PT                  H 18.8(FEB 10)

INR                 H 1.56(FEB 10)

PTT                 H 41.2(FEB 10)

Troponin            <0.02(FEB 10)

CK MB               <0.5(FEB 10)

Total CK            18(FEB 10)



EXAM:

HEENT: nc/at, eomi

Neck: no jvd, supple

Heart: s1s2, no murmurs, rubs, gallops

Chest: clear to auscultation, no wheezes, rales, rhonchi

Abd: NT, ND, soft, BS +

Ext: no edema, clubbing, cyanosis

Skin: no rash







IMPRESSION:

                                        1.  PICC line malfunction.

                                        2.  Hypoglycemia.

                                        3.  Diabetes.

                                        4.  Diastolic heart failure

                                        5.  Hypertension.

                                        6.  Coronary artery disease.

                                        7.  Atrial fibrillation

                                        8.  Prophylaxis.

                                        9.  History of ESBL infection in the urine

                                        10. Right Humeral neck fracture





PLAN:

picc line removed

completed course of iv abx

worked wit pt - they are recommending snf due to his weakness and need for 
assistance.

will ask sw to assist in placement.





Plan of care discussed with patient and nursing.

## 2019-07-18 NOTE — XMS REPORT
Summary of Care: 3/29/15 - 3/31/15

                             Created on: 2035



DESIREE MCKEON

External Reference #: [id number]

: 1940

Sex: Male



Demographics







                          Address                   [address] [place]  [address]

 

                          Home Phone                [phone]

 

                          Preferred Language        English

 

                          Marital Status            

 

                          Mormonism Affiliation     Unknown

 

                          Race                      White/

 

                          Ethnic Group              Non-





Author







                          Organization              Unknown

 

                          Address                   Unknown

 

                          Phone                     Unavailable







Encounter





CHLOÉ Pedersen(DAVID) [id number] Date(s): 3/29/15 - 3/31/15

[facility] [address] [place] [address]-     [phone]
[phone]

Final:  

Discharge Disposition: Home

Physician Attending: Katie Mandujano MD

Physician Admitting: Katie Mandujano MD





Vital Signs







                    1                   2                   3



                                         Most recent to   



                                         oldest [Reference   



                                         Range]:   

 

                                        175.26 cm 

                          (3/30/15 12:42 AM)        175.26 cm 

                          (3/29/15 3:46 PM)          



                                         Height   

 

                                        104.545 kg 

                          (3/31/15 6:36 AM)         99.119 kg 

                          (3/30/15 6:31 PM)          



                                         Current Weight   

 

                                        97.8 DegF 

                          (3/31/15 4:00 PM)         97.9 DegF 

                          (3/31/15 12:00 PM)        97.8 DegF 

                                        (3/31/15 8:00 AM)



                                         Temperature Oral   



                                         [96.4-99.1 DegF]   

 

                                        137/83 mmHg 

                          (3/31/15 4:00 PM)         137/75 mmHg 

                          (3/31/15 12:00 PM)        137/85 mmHg 

                                        (3/31/15 8:00 AM)



                                         Blood Pressure   



                                         [/60-90 mmHg]   

 

                                        18 BRMIN 

                          (3/31/15 4:00 PM)         18 BRMIN 

                          (3/31/15 12:00 PM)        18 BRMIN 

                                        (3/31/15 8:00 AM)



                                         Respiratory Rate   



                                         [14-20 BRMIN]   

 

                                        89 bpm 

                          (3/31/15 4:00 PM)         83 bpm 

                          (3/31/15 12:00 PM)        77 bpm 

                                        (3/31/15 8:00 AM)



                                         Peripheral Pulse   



                                         Rate [ bpm]   

 

                                        105.7 kg 

                          (3/30/15 12:42 AM)        104.545 kg 

                          (3/29/15 3:46 PM)          



                                         Weight   

 

                                        34.41 m2 

                          (3/30/15 12:42 AM)        34.04 m2 

                          (3/29/15 3:46 PM)          



                                         Body Mass Index   







Problem List







    



              Condition     Effective Dates     Status       Health Status     Informant

 

    



                           Acute MI(Confirmed)1      Resolved  

 

    



                           Diabetes(Confirmed)       Resolved  

 

    



                           Histoplasmosis(Confi      Resolved  



                                         rmed)    

 

    



                           Hypercholesteremia(C      Resolved  



                                         onfirmed)    

 

    



                           Hypertension(Confirm      Resolved  



                                         ed)    

 

    



                           Poliomyelitides(Conf      Resolved  



                                         irmed)    

 

    



                           Sleep                     Resolved  



                                         apnea(Confirmed)    

 

    



                           Stented coronary          Resolved  



                                         artery(Confirmed)2    

 

    



                           Whooping                  Resolved  



                                         cough(Confirmed)    







1x 3



225 cardiac stents



Allergies, Adverse Reactions, Alerts







   



                 Substance       Reaction        Severity        Status

 

   



                           NKDA                      Active







Medications





**Pt's own med-Niacin 1000mg ER tab 2000mg PO Bedtime

**Pt's own med-Niacin 1000mg ER tab 2000mg PO Bedtime, 2,000 mg, Drug form: MISC
, Route: PO, Bedtime, 03/29/15 22:28:00, Duration: 30 day, Stop date: 04/28/15 2
1:00:00

Start Date: 3/29/15

Stop Date: 3/31/15

Status: Discontinued



acetaminophen-hydrocodone 325 mg-5 mg oral tablet

1 tab, Route: PO, Drug Form: TAB, Dosing Weight 105.7, kg, Q4H, PRN Pain Score 1
-3, Start date: 03/30/15 21:48:00, Duration: 30 day, Stop date: 04/29/15 21:47:0
0

Notes: (Same as: Norco 325/5)  Do not exceed 4gm/day of acetaminophen.

Start Date: 3/30/15

Stop Date: 3/31/15

Status: Discontinued



acyclovir + Sodium Chloride 0.9%  mL

840 mg, Route: IVPB, Q8H, Dosing Weight 104.545, kg, Priority: STAT, Start date:
03/29/15 20:59:00, Duration: 30 day, Stop date: 04/28/15 22:00:00, HSV encephal
itis or immunocompromised patients

Special Instructions: HSV encephalitis or immunocompromised patients

Notes: (Same as: Zovirax)

Start Date: 3/29/15

Stop Date: 3/30/15

Status: Discontinued



aspirin 300 mg rectal suppository

300 mg, 1 supp, Route: AL, Drug form: SUPP, Daily, Dosing Weight 105.7, kg, Star
t date: 03/30/15 1:38:00, Duration: 30 day, Stop date: 04/28/15 9:00:00

Notes: Refrigerate.

Start Date: 3/30/15

Stop Date: 3/30/15

Status: Discontinued



atorvastatin

10 mg, 1 tab, Route: PO, Drug form: TAB, Bedtime, Dosing Weight 104.545, kg, Sta
rt date: 03/29/15 21:00:00, Duration: 30 day, Stop date: 04/27/15 21:00:00

Notes: (Same As: Lipitor)

Start Date: 3/29/15

Stop Date: 3/31/15

Status: Discontinued



atorvastatin 10 mg oral tablet

10 mg=1 tab, PO, Bedtime, # 30 tab, 0 Refill(s)

Start Date: 3/31/15

Stop Date: 4/30/15

Status: Ordered



atorvastatin 10 mg oral tablet

10 mg=1 tab, PO, Bedtime, # 30 tab, 0 Refill(s)

Start Date: 3/29/15

Stop Date: 3/31/15

Status: Discontinued



atropine

0.5 mg, 5 mL, Route: IVP, Drug form: INJ, ONCE, Dosing Weight 105.7, kg, PRN Bra
dycardia, Start date: 03/30/15 18:32:00

Start Date: 3/30/15

Stop Date: 3/31/15

Status: Discontinued



clopidogrel 75 mg oral tablet

75 mg=1 tab, PO, Daily, # 30 tab, 0 Refill(s)

Start Date: 3/31/15

Stop Date: 4/30/15

Status: Ordered



Dextrose 50% Syringe

12.5 gm, 25 mL, Route: IVP, Drug Form: INJ, Dosing Weight 105.7, kg, PRN, PRN Bl
ood Glucose Results, Start date: 03/30/15 1:29:00, Duration: 30 day, Stop date: 
04/29/15 1:28:00

Start Date: 3/30/15

Stop Date: 3/31/15

Status: Discontinued



Dextrose 50% Syringe

25 gm, 50 mL, Route: IVP, Drug Form: INJ, Dosing Weight 105.7, kg, PRN, PRN Bloo
d Glucose Results, Start date: 03/30/15 1:29:00, Duration: 30 day, Stop date: 04
/29/15 1:28:00

Start Date: 3/30/15

Stop Date: 3/31/15

Status: Discontinued



docusate

100 mg, 1 cap, Route: PO, Drug form: CAP, BID, Dosing Weight 105.7, kg, PRN Cons
tipation, Start date: 03/30/15 8:21:00, Duration: 30 day, Stop date: 04/29/15 8:
20:00

Notes: (Same as: Colace) (Do Not Crush)

Start Date: 3/30/15

Stop Date: 3/31/15

Status: Discontinued



doxycycline

100 mg, 1 tab, Route: PO, Drug form: TAB, [id number], Dosing Weight 105.7, kg, Star
t date: 03/30/15 18:00:00, Duration: 30 day, Stop date: 04/29/15 6:00:00

Notes: NO MILK/ANTACIDS/IRON  Take 1 hour before or 2 hours after dairy products

Start Date: 3/30/15

Stop Date: 3/31/15

Status: Discontinued



doxycycline monohydrate 100 mg oral tablet

100 mg=1 tab, PO, [id number], # 10 tab, 0 Refill(s)

Start Date: 3/31/15

Stop Date: 4/5/15

Status: Ordered



dronedarone 400 mg oral tablet

400 mg=1 tab, PO, BID, # 60 tab, 0 Refill(s)

Start Date: 3/31/15

Stop Date: 4/30/15

Status: Ordered



enoxaparin

40 mg, 0.4 mL, Route: SUB-Q, Drug form: INJ, [id number], Dosing Weight 105.7, kg, 
Start date: 03/30/15 9:00:00, Duration: 30 day, Stop date: 04/28/15 9:00:00

Notes: (Same as: Lovenox)

Start Date: 3/30/15

Stop Date: 3/31/15

Status: Discontinued



gabapentin

600 mg, 2 cap, Route: PO, Drug form: CAP, BID, Dosing Weight 105.7, kg, Priority
: NOW, Start date: 03/31/15 9:55:00, Duration: 30 day, Stop date: 04/30/15 9:00:
00

Notes: (Same as: Neurontin)

Start Date: 3/31/15

Stop Date: 3/31/15

Status: Discontinued



gabapentin 600 mg oral tablet

600 mg=1 tab, PO, BID, # 60 tab, 0 Refill(s)

Start Date: 3/31/15

Stop Date: 4/30/15

Status: Ordered



gabapentin 600 mg oral tablet

600 mg, PO, BID, tab, 0 Refill(s)

Start Date: 3/31/15

Stop Date: 3/31/15

Status: Discontinued



glucagon

1 mg, Route: IM, Drug form: PDR/INJ, PRN, Dosing Weight 105.7, kg, PRN Blood Glu
cose Results, Start date: 03/30/15 1:29:00, Duration: 30 day, Stop date: 
5 1:28:00

Start Date: 3/30/15

Stop Date: 3/31/15

Status: Discontinued



Imdur

30 mg, 1 tab, Route: PO, Drug form: ERTAB, QAM, Dosing Weight 105.7, kg, Start d
ate: 03/30/15 9:00:00, Duration: 30 day, Stop date: 04/28/15 9:00:00

Notes: (Same as:Imdur)"Do Not Crush"  Take on empty stomach/ full glass of water
.  Do not crush

Start Date: 3/30/15

Stop Date: 3/31/15

Status: Discontinued



Imdur 30 mg oral tablet, extended release

30 mg=1 tab, PO, QAM, # 30 tab, 0 Refill(s)

Start Date: 3/29/15

Stop Date: 3/31/15

Status: Discontinued



insulin aspart

3 unit, 0.03 mL, Route: SUB-Q, Drug form: SOLN, TID-Before Meals, Dosing Weight 
105.7, kg, PRN Blood Glucose Results, Start date: 03/30/15 1:29:00, Duration: 30
day, Stop date: 04/29/15 1:28:00

Notes: Roll in palms of hands gently;  Do not shake vigorously. (Same as: NovoLO
G)"single patient use only"  Stable for 28 days at room temperature.Expires in _
____ days from ______________Date

Start Date: 3/30/15

Stop Date: 3/31/15

Status: Discontinued



insulin aspart

1 unit, 0.01 mL, Route: SUB-Q, Drug form: SOLN, TID-Before Meals, Dosing Weight 
105.7, kg, PRN Blood Glucose Results, Start date: 03/30/15 1:29:00, Duration: 30
day, Stop date: 04/29/15 1:28:00

Notes: Roll in palms of hands gently;  Do not shake vigorously. (Same as: NovoLO
G)"single patient use only"  Stable for 28 days at room temperature.Expires in _
____ days from ______________Date

Start Date: 3/30/15

Stop Date: 3/31/15

Status: Discontinued



insulin aspart

2 unit, 0.02 mL, Route: SUB-Q, Drug form: SOLN, TID-Before Meals, Dosing Weight 
105.7, kg, PRN Blood Glucose Results, Start date: 03/30/15 1:29:00, Duration: 30
day, Stop date: 04/29/15 1:28:00

Notes: Roll in palms of hands gently;  Do not shake vigorously. (Same as: NovoLO
G)"single patient use only"  Stable for 28 days at room temperature.Expires in _
____ days from ______________Date

Start Date: 3/30/15

Stop Date: 3/31/15

Status: Discontinued



insulin aspart

4 unit, 0.04 mL, Route: SUB-Q, Drug form: SOLN, TID-Before Meals, Dosing Weight 
105.7, kg, PRN Blood Glucose Results, Start date: 03/30/15 1:29:00, Duration: 30
day, Stop date: 04/29/15 1:28:00

Notes: Roll in palms of hands gently;  Do not shake vigorously. (Same as: NovoLO
G)"single patient use only"  Stable for 28 days at room temperature.Expires in _
____ days from ______________Date

Start Date: 3/30/15

Stop Date: 3/31/15

Status: Discontinued



insulin aspart

5 unit, 0.05 mL, Route: SUB-Q, Drug form: SOLN, TID-Before Meals, Dosing Weight 
105.7, kg, PRN Blood Glucose Results, Start date: 03/30/15 1:29:00, Duration: 30
day, Stop date: 04/29/15 1:28:00

Notes: Roll in palms of hands gently;  Do not shake vigorously. (Same as: NovoTORO MINAYA)"single patient use only"  Stable for 28 days at room temperature.Expires in _
____ days from ______________Date

Start Date: 3/30/15

Stop Date: 3/31/15

Status: Discontinued



insulin glargine

42 unit, Route: SUB-Q, Drug form: SOLN, Bedtime, Dosing Weight 104.545, kg, Star
t date: 03/29/15 21:00:00, Duration: 30 day, Stop date: 04/27/15 21:00:00

Start Date: 3/29/15

Stop Date: 3/29/15

Status: Deleted



isosorbide mononitrate 30 mg oral tablet, extended release

30 mg=1 tab, PO, QAM, # 30 tab, 0 Refill(s)

Start Date: 3/31/15

Stop Date: 4/30/15

Status: Ordered



Keflex 500 mg oral capsule

500 mg=1 cap, PO, TID, # 21 cap, 0 Refill(s)

Start Date: 3/29/15

Stop Date: 3/29/15

Status: Deleted



Lantus Solostar Pen 100 units/mL subcutaneous solution

42 unit, SUB-Q, Bedtime, # 10 ml, 0 Refill(s)

Start Date: 3/29/15

Status: Ordered



Levemir FlexPen

42 unit, 0.42 mL, Route: SUB-Q, Drug form: INJ, Bedtime, Start date: 03/29/15 21
:00:00, Duration: 30 day, Stop date: 04/27/15 21:00:00

Notes: Same as LevemirDo not hold insulin without contacting prescriber  "single
patient use only"

Start Date: 3/29/15

Stop Date: 3/31/15

Status: Discontinued



levothyroxine 50 mcg (0.05 mg) oral tablet

50 microgram=1 tab, PO, Q630AM, # 30 tab, 0 Refill(s)

Start Date: 3/31/15

Stop Date: 4/30/15

Status: Ordered



metFORMIN 1000 mg oral tablet

1,000 mg=1 tab, PO, BID, # 60 tab, 0 Refill(s)

Start Date: 3/31/15

Stop Date: 4/30/15

Status: Ordered



Multaq

400 mg, 1 tab, Route: PO, Drug form: TAB, BID, Dosing Weight 105.7, kg, Start da
te: 03/30/15 9:00:00, Duration: 30 day, Stop date: 04/28/15 21:00:00

Notes: Same as: Multaq

Start Date: 3/30/15

Stop Date: 3/31/15

Status: Discontinued



naloxone

1 mg, 2.5 mL, Route: IVP, Drug form: INJ, ONCE, Dosing Weight 104.545, kg, Prior
ity: STAT, Start date: 03/29/15 18:35:00, Stop date: 03/29/15 18:35:00

Notes: Same as Narcan

Start Date: 3/29/15

Stop Date: 3/29/15

Status: Completed



niacin 1000 mg oral tablet, extended release

2,000 mg, 2 tab, Route: PO, Drug form: ERTAB, Bedtime, Dosing Weight 104.545, kg
, Start date: 03/29/15 21:00:00, Duration: 30 day, Stop date: 04/27/15 21:00:00

Start Date: 3/29/15

Stop Date: 3/29/15

Status: Deleted



niacin 1000 mg oral tablet, extended release

2,000 mg=2 tab, PO, Bedtime, # 30 tab, 0 Refill(s)

Start Date: 3/29/15

Stop Date: 3/31/15

Status: Discontinued



niacin 1000 mg oral tablet, extended release

2,000 mg=2 tab, PO, Bedtime, # 60 tab, 0 Refill(s)

Start Date: 3/31/15

Stop Date: 4/30/15

Status: Ordered



nitroglycerin 0.4 mg sublingual tablet

0.4 mg, 1 tab, Route: SL, Drug form: TAB, Q5Min, Dosing Weight 105.7, kg, PRN Ch
est Pain, Start date: 03/30/15 18:32:00, Duration: 30 day, Stop date: 04/29/15 1
8:31:00

Notes: (Same as:Nitroquick, Nitrostat)"Do Not Crush"  Sublingual tablet

Start Date: 3/30/15

Stop Date: 3/31/15

Status: Discontinued



normal saline 0.9% IV 1,000 mL

1,000 mL, Rate: 999 ml/hr, Infuse over: 1 hr, Route: IV, Dosing Weight 104.545 k
g, Total Volume: 1,000, Start date: 03/29/15 19:16:00, Duration: 30 day, Stop da
te: 04/28/15 19:15:00

Start Date: 3/29/15

Stop Date: 3/30/15

Status: Discontinued



ondansetron

4 mg, Route: IV, Drug form: INJ, Q8H, Dosing Weight 105.7, kg, PRN Nausea, Start
date: 03/30/15 8:21:00, Duration: 30 day, Stop date: 04/29/15 8:20:00

Start Date: 3/30/15

Stop Date: 3/30/15

Status: Discontinued



pantoprazole

40 mg, Route: IVP, Drug form: INJ, Daily, Dosing Weight 105.7, kg, Start date: 0
3/30/15 9:00:00, Duration: 30 day, Stop date: 04/28/15 9:00:00

Start Date: 3/30/15

Stop Date: 3/30/15

Status: Deleted



pantoprazole 40 mg oral enteric coated tablet

40 mg=1 tab, PO, Daily, # 30 tab, 0 Refill(s)

Start Date: 3/31/15

Stop Date: 4/30/15

Status: Ordered



Plavix

75 mg, 1 tab, Route: PO, Drug form: TAB, Daily, Dosing Weight 104.545, kg, Start
date: 03/30/15 9:00:00, Duration: 30 day, Stop date: 04/28/15 9:00:00

Notes: (Same As: Plavix)

Start Date: 3/30/15

Stop Date: 3/31/15

Status: Discontinued



Plavix 75 mg oral tablet

75 mg=1 tab, PO, Daily, # 30 tab, 0 Refill(s)

Start Date: 3/29/15

Stop Date: 3/31/15

Status: Discontinued



pneumococcal 23-valent vaccine

0.5 ml, Route: IM, Drug Form: INJ, Daily, Start date: 03/30/15 9:00:00, Duration
: 1 doses or times, Stop date: 03/30/15 9:00:00

Notes: (Same as: Pneumovax 23)  Refrigerate

Start Date: 3/30/15

Stop Date: 3/30/15

Status: Completed



potassium chloride 20 mEq oral tablet, extended release

20 mEq=1 tab, PO, BID, # 60 tab, 0 Refill(s)

Start Date: 3/31/15

Stop Date: 4/30/15

Status: Ordered



Protonix

40 mg, 1 tab, Route: PO, Drug form: ECTAB, Daily, Dosing Weight 104.545, kg, Sta
rt date: 03/30/15 9:00:00, Duration: 30 day, Stop date: 04/28/15 9:00:00

Notes: Tablet should not be chewed or crushed.(Same as: Protonix)

Start Date: 3/30/15

Stop Date: 3/31/15

Status: Discontinued



Saline Flush 0.9%

10 mL, Route: IVP, Drug Form: INJ, Dosing Weight 104.545, kg, PRN, PRN Line Flus
h, Start date: 03/29/15 16:45:00, Duration: 30 day, Stop date: 04/28/15 16:44:00

Notes: (Same as: BD Posiflush)

Start Date: 3/29/15

Stop Date: 3/30/15

Status: Discontinued



Saline Flush 0.9%

10 ml, Route: IVP, Drug Form: INJ, Dosing Weight 105.7, kg, PRN, PRN Line Flush,
Start date: 03/30/15 1:30:00, Duration: 30 day, Stop date: 04/29/15 1:29:00

Notes: (Same as: BD Posiflush)

Start Date: 3/30/15

Stop Date: 3/31/15

Status: Discontinued



Saline Flush 0.9%

10 ml, Route: IVP, Drug Form: INJ, Dosing Weight 105.7, kg, Q12H, Start date: 03
/30/15 9:00:00, Duration: 30 day, Stop date: 04/28/15 21:00:00

Notes: (Same as: BD Posiflush)

Start Date: 3/30/15

Stop Date: 3/31/15

Status: Discontinued



sertraline 100 mg oral tablet

100 mg=1 tab, PO, Daily, # 30 tab, 0 Refill(s)

Start Date: 3/31/15

Stop Date: 4/30/15

Status: Ordered



Sodium Chloride 0.9% (Bolus) IV

1,000 mL, 1,000 ml/hr, Infuse Over: 1 hr, Route: IV, 1,000, Drug form: INJ, ONCE
, Priority: STAT, Dosing Weight 104.545 kg, Start date: 03/29/15 16:45:00, Durat
ion: 1 doses or times, Stop date: 03/29/15 16:45:00

Start Date: 3/29/15

Stop Date: 3/29/15

Status: Completed



Sodium Chloride 0.9% (Bolus) IV

1,000 mL, 1000 ml/hr, Infuse Over: 1 hr, Route: IV, 1,000, Drug form: INJ, ONCE,
Priority: STAT, Dosing Weight 104.545 kg, Start date: 03/29/15 19:02:00, Durati
on: 1 doses or times, Stop date: 03/29/15 19:02:00

Start Date: 3/29/15

Stop Date: 3/29/15

Status: Completed



Synthroid

50 microgram, 1 tab, Route: PO, Drug form: TAB, Q630AM, Dosing Weight 104.545, k
g, Start date: 03/30/15 6:30:00, Duration: 30 day, Stop date: 04/28/15 6:30:00

Notes: Take 1 hour before or 2 hours after meal; Enteral feeds may interefere wi
th the absorption of this medication.(Same as:Levothroid, Synthroid)

Start Date: 3/30/15

Stop Date: 3/31/15

Status: Discontinued



temazepam

15 mg, 1 cap, Route: PO, Drug form: CAP, Bedtime, Dosing Weight 105.7, kg, PRN S
leep, Start date: 03/31/15 16:26:00, Duration: 30 day, Stop date: 04/30/15 16:25
:00

Notes: (Same As: Restoril)

Start Date: 3/31/15

Stop Date: 3/31/15

Status: Discontinued



temazepam 7.5 mg oral capsule

7.5 mg=1 cap, PO, Bedtime, PRN Sleep, # 5 cap, 0 Refill(s)

Start Date: 3/31/15

Status: Ordered



torsemide

20 mg, 1 tab, Route: PO, Drug form: TAB, Daily, Dosing Weight 105.7, kg, Start d
ate: 03/31/15 9:45:00, Duration: 30 day, Stop date: 04/30/15 9:00:00

Notes: (Same As: Demadex)

Start Date: 3/31/15

Stop Date: 3/31/15

Status: Discontinued



torsemide 20 mg oral tablet

20 mg=1 tab, PO, Daily, # 30 tab, 0 Refill(s)

Start Date: 3/31/15

Stop Date: 4/30/15

Status: Ordered



torsemide 20 mg oral tablet

20 mg=1 tab, PO, BID, # 30 tab, 0 Refill(s)

Start Date: 3/29/15

Stop Date: 3/31/15

Status: Discontinued



vancomycin

2 gm, 500 mL, Route: IVPB, Drug form: SOLN, ONCE, Dosing Weight 104.545, kg, Christina
ority: STAT, Start date: 03/29/15 18:17:00, Stop date: 03/29/15 18:17:00

Notes: Same as: Vancocin  Infusion rate< 1000 mg: infuse over 1 [id number] - 1500 
mg: infuse over 1.5 [id number] - 2000 mg: infuse over 2 hours> 2001 mg: infuse 
over 2.5 hours

Start Date: 3/29/15

Stop Date: 3/29/15

Status: Completed



vancomycin

1 gm, 200 mL, Route: IVPB, Drug form: INJ, [id number], Dosing Weight 104.545, kg, P
riority: STAT, Start date: 03/30/15 1:30:00, Duration: 30 day, Stop date: 04/28/
15 11:00:00

Start Date: 3/30/15

Stop Date: 3/30/15

Status: Discontinued



Zoloft

100 mg, 1 tab, Route: PO, Drug form: TAB, Daily, Dosing Weight 104.545, kg, Star
t date: 03/30/15 9:00:00, Duration: 30 day, Stop date: 04/28/15 9:00:00

Notes: (Same as: Zoloft)

Start Date: 3/30/15

Stop Date: 3/31/15

Status: Discontinued



Zosyn

3.375 gm, 100 mL, Route: IVPB, Drug form: PDR/INJ, [id number], Dosing Weight 104.545
, kg, Priority: STAT, Start date: 03/29/15 19:16:00, Duration: 30 day, Stop date
: 04/28/15 11:16:00

Notes: (Same as: Zosyn)Infuse over 4 hours.  Activate and reconstitute before us
e.  Dosing based on Piperacillin component

Start Date: 3/29/15

Stop Date: 3/30/15

Status: Discontinued



Zosyn

3.375 gm, 100 mL, Route: IVPB, Drug form: PDR/INJ, ONCE, Dosing Weight 104.545, 
kg, Priority: STAT, Start date: 03/29/15 18:17:00, Stop date: 03/29/15 18:17:00

Notes: (Same as: Zosyn)Infuse over 4 hours.  Activate and reconstitute before us
e.  Dosing based on Piperacillin component

Start Date: 3/29/15

Stop Date: 3/29/15

Status: Completed



Results





ELECTROLYTES





                    1                   2                   3



                                         Most recent to   



                                         oldest [Reference   



                                         Range]:   

 

                                        137 mEq/L 

                          (3/31/15 4:40 AM)         142 mEq/L 

                          (3/30/15 4:54 AM)         140 mEq/L 

                                        (3/29/15 5:14 PM) 



                                         Sodium Lvl [135-145   



                                         mEq/L]   

 

                                        3.8 mEq/L 

                          (3/31/15 4:40 AM)         3.6 mEq/L 

                          (3/30/15 4:54 AM)         3.5 mEq/L 

                                        (3/29/15 5:14 PM) 



                                         Potassium Lvl   



                                         [3.5-5.1 mEq/L]   

 

                                        104 mEq/L 

                          (3/31/15 4:40 AM)         108 mEq/L 

                          (3/30/15 4:54 AM)         104 mEq/L 

                                        (3/29/15 5:14 PM) 



                                         Chloride Lvl [   



                                         mEq/L]   

 

                                        28 mEq/L 

                          (3/31/15 4:40 AM)         25 mEq/L 

                          (3/30/15 4:54 AM)         29 mEq/L 

                                        (3/29/15 5:14 PM) 



                                         CO2 [24-32 mEq/L]   

 

                                        8.8 mEq/L 

*LOW*

                          (3/31/15 4:40 AM)         12.6 mEq/L 

                          (3/30/15 4:54 AM)         10.5 mEq/L 

                                        (3/29/15 5:14 PM) 



                                         AGAP [10.0-20.0   



                                         mEq/L]   







CHEM PANEL





                    1                   2                   3



                                         Most recent to   



                                         oldest [Reference   



                                         Range]:   

 

                                        1.0 mg/dL 

                          (3/31/15 4:40 AM)         0.9 mg/dL 

                          (3/30/15 4:54 AM)         1.1 mg/dL 

                                        (3/29/15 5:14 PM) 



                                         Creatinine Lvl   



                                         [0.5-1.4 mg/dL]   

 

                                        74 mL/min/1.73m2 1

*NA*

                          (3/31/15 4:40 AM)         84 mL/min/1.73m2 2

*NA*

                          (3/30/15 4:54 AM)         66 mL/min/1.73m2 3

*NA*

                                        (3/29/15 5:14 PM) 



                                         eGFR   

 

                                        12 mg/dL 

                          (3/31/15 4:40 AM)         14 mg/dL 

                          (3/30/15 4:54 AM)         14 mg/dL 

                                        (3/29/15 5:14 PM) 



                                         BUN [7-22 mg/dL]   

 

                                        16 

                          (3/30/15 4:54 AM)         13 

                          (3/29/15 5:14 PM)          



                                         B/C Ratio [6-25]   

 

                                        235 mg/dL 4

*HI*

                          (3/31/15 4:40 AM)         61 mg/dL 5

*LOW*

                          (3/30/15 4:54 AM)         105 mg/dL 6

*HI*

                                        (3/29/15 5:14 PM) 



                                         Glucose Lvl [70-99   



                                         mg/dL]   

 

                                        6.3 g/dL 

*LOW*

                          (3/30/15 4:54 AM)         6.5 g/dL 

                          (3/29/15 5:14 PM)          



                                         Total Protein   



                                         [6.4-8.4 g/dL]   

 

                                        2.9 g/dL 

*LOW*

                          (3/30/15 4:54 AM)         2.8 g/dL 

*LOW*

                          (3/29/15 5:14 PM)          



                                         Albumin Lvl [3.5-5.0   



                                         g/dL]   

 

                                        3.4 g/dL 

                          (3/30/15 4:54 AM)         3.7 g/dL 

                          (3/29/15 5:14 PM)          



                                         Globulin [2.0-4.0   



                                         g/dL]   

 

                                        0.9 

                          (3/30/15 4:54 AM)         0.8 

                          (3/29/15 5:14 PM)          



                                         A/G Ratio [0.7-1.6]   

 

                                        8.2 mg/dL 

*LOW*

                          (3/31/15 4:40 AM)         7.8 mg/dL 

*LOW*

                          (3/30/15 4:54 AM)         7.9 mg/dL 

*LOW*

                                        (3/29/15 5:14 PM) 



                                         Calcium Lvl   



                                         [8.5-10.5 mg/dL]   

 

                                        44 unit/L 

                          (3/30/15 4:54 AM)         45 unit/L 

                          (3/30/15 4:54 AM)         46 unit/L 

                                        (3/29/15 5:14 PM) 



                                         ALT [0-65 unit/L]   

 

                                        41 unit/L 

*HI*

                          (3/30/15 4:54 AM)         46 unit/L 

*HI*

                          (3/30/15 4:54 AM)         48 unit/L 

*HI*

                                        (3/29/15 5:14 PM) 



                                         AST [0-37 unit/L]   

 

                                        121 unit/L 

                          (3/30/15 4:54 AM)         124 unit/L 

                          (3/30/15 4:54 AM)         123 unit/L 

                                        (3/29/15 5:14 PM) 



                                         Alk Phos [   



                                         unit/L]   

 

                                        0.4 mg/dL 

                          (3/30/15 4:54 AM)         0.6 mg/dL 

                          (3/30/15 4:54 AM)         0.3 mg/dL 

                                        (3/29/15 5:14 PM) 



                                         Bili Total [0.2-1.3   



                                         mg/dL]   

 

                                        0.1 mg/dL 

                    (3/30/15 4:54 AM)                          



                                         Bili Direct [0.0-0.3   



                                         mg/dL]   

 

                                        36.0 uMol/L 

                    (3/29/15 5:14 PM)                          



                                         Ammonia [<=45.0   



                                         uMol/L]   

 

                                        1.7 mMol/L 

                    (3/29/15 6:46 PM)                          



                                         Lactic Acid Lvl   



                                         [0.5-2.2 mMol/L]   







1Result Comment: The eGFR is calculated using the CKD-EPI formula. In most 
young, healthy individuals the eGFR will be >90 mL/min/1.73m2. The eGFR declines
with age. An eGFR of 60-89 may be normal in some populations, particularly the 
elderly, for whom the CKD-EPI formula has not been extensively validated. Use of
the eGFR is not recommended in the following populations:



Individuals with unstable creatinine concentrations, including pregnant patients
and those with serious co-morbid conditions.



Patients with extremes in muscle mass or diet. 



The data above are obtained from the National Kid[employer] Education Program (
NK[employer]) which additionally recommends that when the eGFR is used in patients with
extremes of body mass index for purposes of drug dosing, the eGFR should be mul
tiplied by the estimated BMI.



2Result Comment: The eGFR is calculated using the CKD-EPI formula. In most 
young, healthy individuals the eGFR will be >90 mL/min/1.73m2. The eGFR declines
with age. An eGFR of 60-89 may be normal in some populations, particularly the 
elderly, for whom the CKD-EPI formula has not been extensively validated. Use of
the eGFR is not recommended in the following populations:



Individuals with unstable creatinine concentrations, including pregnant patients
and those with serious co-morbid conditions.



Patients with extremes in muscle mass or diet. 



The data above are obtained from the National Kid[employer] Education Program (
[employer]) which additionally recommends that when the eGFR is used in patients with
extremes of body mass index for purposes of drug dosing, the eGFR should be mul
tiplied by the estimated BMI.



3Result Comment: The eGFR is calculated using the CKD-EPI formula. In most 
young, healthy individuals the eGFR will be >90 mL/min/1.73m2. The eGFR declines
with age. An eGFR of 60-89 may be normal in some populations, particularly the 
elderly, for whom the CKD-EPI formula has not been extensively validated. Use of
the eGFR is not recommended in the following populations:



Individuals with unstable creatinine concentrations, including pregnant patients
and those with serious co-morbid conditions.



Patients with extremes in muscle mass or diet. 



The data above are obtained from the National Kid[employer] Education Program (
[employer]) which additionally recommends that when the eGFR is used in patients with
extremes of body mass index for purposes of drug dosing, the eGFR should be mul
tiplied by the estimated BMI.



4Interpretive Data: Adult reference range values reflect the clinical guidelines

of the [employer].



5Interpretive Data: Adult reference range values reflect the clinical guidelines

of the [employer].



6Interpretive Data: Adult reference range values reflect the clinical guidelines

of the [employer].



CARDIAC ENZYMES





                    1                   2                   3



                                         Most recent to   



                                         oldest [Reference   



                                         Range]:   

 

                                        76 unit/L 

                    (3/29/15 5:14 PM)                          



                                         Total CK [   



                                         unit/L]   

 

                                        2.6 ng/mL 

                    (3/29/15 5:14 PM)                          



                                         CK MB [0.5-3.6   



                                         ng/mL]   

 

                                        3.4 

*HI*

                    (3/29/15 5:14 PM)                          



                                         CK MB Index   



                                         [0.0-2.5]   

 

                                        <0.02 ng/mL 

                          (3/30/15 1:02 PM)         <0.02 ng/mL 

                          (3/29/15 5:14 PM)          



                                         Troponin-I   



                                         [0.00-0.40 ng/mL]   

 

                                        266 pg/mL 7

*HI*

                    (3/29/15 5:14 PM)                          



                                         BNP [<=100 pg/mL]   







7Interpretive Data: Elevated results are in line with increasing severity of

congestive heart failure. Minor elevations between 100 and 300

may be seen with Myocardial Ischemia, Sodium retaining drugs,

and compensated/treated heart failure.



LIPIDS





                    1                   2                   3



                                         Most recent to   



                                         oldest [Reference   



                                         Range]:   

 

                                        2.48 

*LOW*

                    (3/30/15 4:54 AM)                          



                                         CHD Risk [4.00-7.30]   

 

                                        67 mg/dL 

                    (3/30/15 4:54 AM)                          



                                         Chol [<=199 mg/dL]   

 

                                        99 mg/dL 

                    (3/30/15 4:54 AM)                          



                                         Trig [<=149 mg/dL]   

 

                                        27 mg/dL 

*LOW*

                    (3/30/15 4:54 AM)                          



                                         HDL [>=61 mg/dL]   

 

                                        20 mg/dL 

                    (3/30/15 4:54 AM)                          



                                         LDL (Calculated)   



                                         [<=99 mg/dL]   

 

                                        20 

*NA*

                    (3/30/15 4:54 AM)                          



                                         VLDL   







THYROID PANEL





                    1                   2                   3



                                         Most recent to   



                                         oldest [Reference   



                                         Range]:   

 

                                        1.490 uIU/mL 

                    (3/29/15 5:14 PM)                          



                                         TSH [0.360-3.740   



                                         uIU/mL]   







DRUG SCREEN





                    1                   2                   3



                                         Most recent to   



                                         oldest [Reference   



                                         Range]:   

 

                                        Negative 

*NA*

                    (3/29/15 5:56 PM)                          



                                         U Amph Scr   



                                         [Negative]   

 

                                        Negative 

*NA*

                    (3/29/15 5:56 PM)                          



                                         U Gracie Scr   



                                         [Negative]   

 

                                        Positive 

*ABN*

                    (3/29/15 5:56 PM)                          



                                         U Benzodia Scr   



                                         [Negative]   

 

                                        Negative 

*NA*

                    (3/29/15 5:56 PM)                          



                                         U Cocaine Scr   



                                         [Negative]   

 

                                        Positive 

*ABN*

                    (3/29/15 5:56 PM)                          



                                         U Opiate Scr   



                                         [Negative]   

 

                                        Negative 

*NA*

                    (3/29/15 5:56 PM)                          



                                         U Phencyc Scr   



                                         [Negative]   

 

                                        Negative 

*NA*

                    (3/29/15 5:56 PM)                          



                                         U Cannab Scr   



                                         [Negative]   

 

                                        See Note 8

                    (3/29/15 5:56 PM)                          



                                         UDS Note   







8Interpretive Data: Drugs reported as positive have not been confirmed by a 
second

method and should be used for medical purposes only. To order

confirmation, contact laboratory.



**note: Below are cut-off concentrations for all urine drugs of

abuse performed in the laboratory. Some drugs listed in the table

may not be included in this panel.



Description               Cut-off concentration

--------------------------------------------------

Amphetamine                  1000 ng/mL

Barbiturates                  200 ng/mL

Benzodiazepines               300 ng/mL

Cocaine metabolites           300 ng/mL

Opiates                       300 ng/mL

Phencyclidine                  25 ng/mL

Propoxyphene                  300 ng/mL

Marijuana metabolites          50 ng/mL

Methadone                     300 ng/mL

Urine alcohol                  20 mg/dL



TOXICOLOGY





                    1                   2                   3



                                         Most recent to   



                                         oldest [Reference   



                                         Range]:   

 

                                        <2 

                    (3/29/15 5:14 PM)                          



                                         Acetaminoph Lvl   



                                         [10-20]   

 

                                        <.003 % 9

*NA*

                    (3/29/15 5:14 PM)                          



                                         Etoh (%)   

 

                                        <3 mg/dL 10

*NA*

                    (3/29/15 5:14 PM)                          



                                         Ethanol Lvl   







9Interpretive Data: Ethanol testing results should be used for medical purposes 
only.

Negative Range: <0.003%

Toxic Range:     >0.25%



10Interpretive Data: Negative Range:   <3 mg/dL

Toxic Range:    >250 mg/dL



URINE AND STOOL





                    1                   2                   3



                                         Most recent to   



                                         oldest [Reference   



                                         Range]:   

 

                                        Clear 

                    (3/29/15 5:56 PM)                          



                                         UA Turbidity [Clear]   

 

                                        Ltyellow 

*NA*

                    (3/29/15 5:56 PM)                          



                                         UA Color   

 

                                        5.0 

                    (3/29/15 5:56 PM)                          



                                         UA pH [5.0-8.0]   

 

                                        1.010 

                    (3/29/15 5:56 PM)                          



                                         UA Spec Grav   



                                         [<=1.030]   

 

                                        Negative mg/dL 

*NA*

                    (3/29/15 5:56 PM)                          



                                         UA Glucose [Negative   



                                         mg/dL]   

 

                                        Negative 

                    (3/29/15 5:56 PM)                          



                                         UA Blood [Negative]   

 

                                        Negative mg/dL 

*NA*

                    (3/29/15 5:56 PM)                          



                                         UA Ketones [Negative   



                                         mg/dL]   

 

                                        Negative mg/dL 

                    (3/29/15 5:56 PM)                          



                                         UA Protein [Negative   



                                         mg/dL]   

 

                                        <=1.0 mg/dL 

*NA*

                    (3/29/15 5:56 PM)                          



                                         UA Urobilinogen   



                                         [0.1-1.0 mg/dL]   

 

                                        Negative 

*NA*

                    (3/29/15 5:56 PM)                          



                                         UA Bili [Negative]   

 

                                        Negative 

                    (3/29/15 5:56 PM)                          



                                         UA Leuk Est   



                                         [Negative]   

 

                                        Negative 

                    (3/29/15 5:56 PM)                          



                                         UA Nitrite   



                                         [Negative]   

 

                                        Occasional /HPF 

*NA*

                    (3/29/15 5:56 PM)                          



                                         UA Bacteria [None   



                                         Seen /HPF]   

 

                                        None Seen 

*NA*

                    (3/29/15 5:56 PM)                          



                                         UA Sq Epi   

 

                                        2 /LPF 

                    (3/29/15 5:56 PM)                          



                                         UA Hyal Cast [0-2   



                                         /LPF]   

 

                                        Few /LPF 

*NA*

                    (3/29/15 5:56 PM)                          



                                         UA Mucus [None Seen   



                                         /LPF]   







HEMATOLOGY





                    1                   2                   3



                                         Most recent to   



                                         oldest [Reference   



                                         Range]:   

 

                                        9.5 K/CMM 

                    (3/29/15 5:14 PM)                          



                                         WBC [3.7-10.4 K/CMM]   

 

                                        3.81 M/CMM 

*LOW*

                    (3/29/15 5:14 PM)                          



                                         RBC [4.70-6.10   



                                         M/CMM]   

 

                                        9.5 g/dL 

*LOW*

                    (3/29/15 5:14 PM)                          



                                         Hgb [14.0-18.0 g/dL]   

 

                                        30.0 % 

*LOW*

                    (3/29/15 5:14 PM)                          



                                         Hct [42.0-54.0 %]   

 

                                        78.8 fL 

*LOW*

                    (3/29/15 5:14 PM)                          



                                         MCV [80.0-94.0 fL]   

 

                                        25.0 pg 

*LOW*

                    (3/29/15 5:14 PM)                          



                                         MCH [27.0-31.0 pg]   

 

                                        31.7 g/dL 

*LOW*

                    (3/29/15 5:14 PM)                          



                                         MCHC [32.0-36.0   



                                         g/dL]   

 

                                        18.7 % 

*HI*

                    (3/29/15 5:14 PM)                          



                                         RDW [11.5-14.5 %]   

 

                                        219 K/CMM 

                    (3/29/15 5:14 PM)                          



                                         Platelet [133-450   



                                         K/CMM]   

 

                                        8.7 fL 

                    (3/29/15 5:14 PM)                          



                                         MPV [7.4-10.4 fL]   

 

                                        64.7 % 

                    (3/29/15 5:14 PM)                          



                                         Segs [45.0-75.0 %]   

 

                                        23.4 % 

                    (3/29/15 5:14 PM)                          



                                         Lymphocytes   



                                         [20.0-40.0 %]   

 

                                        8.4 % 

                    (3/29/15 5:14 PM)                          



                                         Monocytes [2.0-12.0   



                                         %]   

 

                                        2.2 % 

                    (3/29/15 5:14 PM)                          



                                         Eosinophils [0.0-4.0   



                                         %]   

 

                                        1.3 % 

*HI*

                    (3/29/15 5:14 PM)                          



                                         Basophils [0.0-1.0   



                                         %]   

 

                                        6.1 K/CMM 

                    (3/29/15 5:14 PM)                          



                                         Segs-Bands #   



                                         [1.5-8.1 K/CMM]   

 

                                        2.2 K/CMM 

                    (3/29/15 5:14 PM)                          



                                         Lymphocytes #   



                                         [1.0-5.5 K/CMM]   

 

                                        0.8 K/CMM 

                    (3/29/15 5:14 PM)                          



                                         Monocytes # [0.0-0.8   



                                         K/CMM]   

 

                                        0.2 K/CMM 

                    (3/29/15 5:14 PM)                          



                                         Eosinophils #   



                                         [0.0-0.5 K/CMM]   

 

                                        0.1 K/CMM 

                    (3/29/15 5:14 PM)                          



                                         Basophils # [0.0-0.2   



                                         K/CMM]   

 

                                        1+ 

*ABN*

                    (3/29/15 5:14 PM)                          



                                         Microcyte [None   



                                         Seen]   

 

                                        18.5 seconds 

*HI*

                    (3/29/15 5:14 PM)                          



                                         PT [12.0-14.7   



                                         seconds]   

 

                                        1.51 11

*HI*

                    (3/29/15 5:14 PM)                          



                                         INR [0.85-1.17]   

 

                                        28.1 seconds 12

                    (3/29/15 5:14 PM)                          



                                         PTT [22.9-35.8   



                                         seconds]   







11Interpretive Data: RECOMMENDED RANGES FOR PROTIME INR:

   2.0-3.0 for most medical and surgical thromboembolic states.

   2.5-3.5 for artificial heart valves and recurrent embolism.



INR SHOULD BE USED ONLY FOR PATIENTS ON STABLE ANTICOAGULANT THERAPY.



12Interpretive Data: Heparin Therapeutic Range:  57 - 92 Seconds



BACTERIAL - SEROLOGY





                    1                   2                   3



                                         Most recent to   



                                         oldest [Reference   



                                         Range]:   

 

                                        Negative 13

                    (3/29/15 9:32 PM)                          



                                         MRSA by PCR   







13Interpretive Data: Interpretive Data: The Roche LightCycler MRSA assay is a 
qualitative test for the direct detection of nasal colonization with 
methicillin-resistant Staphylococcus aureus (MRSA) to aid in the prevention and 
control of MRSA infections in healthcare settings. A positive result does not 
indicate an infection or require treatment. A negative result does not exclude 
colonization or infection.



The polymerase chain reaction (PCR) assay detects a proprietary sequence indicat
gabby of the integration of the SCCmec cassette into the Staphylococcus aureus chr
omosome, indicating the presence of MRSA DNA.  The assay utilizes FDA cleared IV
D reagents. Performance characteristics have been verified by the [facility] within the [facility]. The [facility]
aboratory is authorized under the Clinical Laboratory Improvement Amendment of 1
988 (CLIA-88) to perform high complexity testing.



Immunizations







  



                     Vaccine             Date                Refusal Reason

 

  



                           pneumococcal 23-valent vaccine     3/31/15 

 

  



                     pneumococcal 23-valent vaccine     3/30/15             Patient Refuses







Procedures







   



                 Procedure       Date            Related Diagnosis     Body Site

 

   



                                         CABG x 3 - Coronary artery bypass grafts x 3   

 

   



                                         Cardiac catheterization, left heart   







Social History







 



                           Social History Type       Response

 

 



                           Substance Abuse           Use: None.

 

 



                           Alcohol                   Never

 

 



                           Smoking Status            Former smoker; Exposure to Tobacco Smoke None; Cigarette Smoking

 Last 365



                                         Days No; Reg Smoking Cessation Counseling No







Assessment and Plan

Extracted from:





  



                     Title: Clinical Document     Author: Faustino Villagomez MD     Date: 3/31/15









                                        Progress Note

Cardiology

[facility]





ASSESSMENT & PROBLEMS



Episode of encephelopathy

CAD multiple stents

Parox afib- high bleeding risk/ fall risk

CHF chronic diastolic w/ Pulmonary Htn

toe ulcer

DM





PLAN & TREATMENT



not clear what was the cause of the brief unresponsiveness

likely not related to arrythmias or ischemia



would restart torsemide at lower dose to prevent volume overload



can check arterial dopplers for PAD eval given foot ulcer



cont ASA/plavix







SUBJECTIVE





he feels comfortable, no sob

worried about lower extremity edema developing





OBJECTIVE



Telemetry NSR, rare PVC





Vitals and Temp:



VitalsTmp(F)[id number]

                                         08:0097.158599/354125---

                                         06:39------------70683 3.0L/m

                                         06:38--------------100 3.0L/m

                                         04:0097.600662/7517------

                                         00:0098.492321/8318------



                                        24 Hr Tmax: 98.1F (36.72c) at  00:00Vital Signs are the last 5 in the past 

48 hours.







General:  Appears stated age,

HEENT- neck supple, thick neck

CV S1 S2 regular, no murmur

LUNGS- Normal respiratory effort, mildy decreased at bases

ABD- Soft nontender, nondistended + bowel sounds

EXT mild edema  f foot necrotic toe at tip

Neuro- Alert oriented x 3 , non focal





Labs (Last four charted values)

WBC                 9.5(MAR 29)

Hgb                 L 9.5(MAR 29)

Hct                 L 30.0(MAR 29)

Plt                 219(MAR 29)

Na                  137(MAR 31)142(MAR 30)140(MAR 29)

K                   3.8(MAR 31)3.6(MAR 30)3.5(MAR 29)

CO2                 28(MAR 31)25(MAR 30)29(MAR 29)

Cl                  104(MAR 31)108(MAR 30)104(MAR 29)

Cr                  1.0(MAR 31)0.9(MAR 30)1.1(MAR 29)

BUN                 12(MAR 31)14(MAR 30)14(MAR 29)

Glucose Random      H 235(MAR 31)L 61(MAR 30)H 105(MAR 29)

Ca                  L 8.2(MAR 31)L 7.8(MAR 30)L 7.9(MAR 29)

PT                  H 18.5(MAR 29)

INR                 H 1.51(MAR 29)

PTT                 28.1(MAR 29)

Troponin            <0.02(MAR 30)<0.02(MAR 29)

CK MB               2.6(MAR 29)

Total CK            76(MAR 29)



Scheduled Meds (12):

                                        03/29/15 atorvastatin 10 mg PO Bedtime

                                        03/30/15 clopidogrel (Plavix) 75 mg PO Daily

                                        03/30/15 doxycycline 100 mg PO [id number]

                                        03/30/15 dronedarone (Multaq) 400 mg PO BID

                                        03/30/15 enoxaparin 40 mg SUB-Q [id number]

                                        03/29/15 insulin detemir (Levemir FlexPen) 42 unit SUB-Q Bedtime

                                        03/30/15 isosorbide mononitrate (Imdur) 30 mg PO QAM

                                        03/30/15 levothyroxine (Synthroid) 50 microgram PO [id number]

                                        03/29/15 non-formulary (**Pt's own med-Niacin 1000mg ER tab 2000mg PO Bedtime) 2,000

 mg PO Bedtime

                                        03/30/15 pantoprazole (Protonix) 40 mg PO Daily

                                        03/30/15 sertraline (Zoloft) 100 mg PO Daily

                                        03/30/15 sodium chloride (Saline Flush 0.9%) 10 ml IVP Q12H



Continuous Infusions: None





Extracted from:





  



                     Title: Clinical Document     Author: Trevor Montano MD     Date: 3/29/15









                                        History and Physical

Attending: Katie Mandujano MDPhone: [phone]: Emergency 
Medicine Service

Code status: None Specified=FULL CODE

Reason for Admission: MENTAL STATUS CHANGE

Working DRG: None Documented

Isolation: None Documented

Consulting Physicians: 

Bahman Mijares MDOffice: [phone]: [id number]: Cardiology

Winston Owens MDOffice: [phone]: [id number]: Neurology

Miracle Menendez MDOffice: [phone]: [id number]: 
Pulmonary, Medicine



CC: AMS



HPI: This is a 75 yo w/ below PMHx who p/w increased somnloence. Pt was awake 
and alert earlier in the morning. Daughter came back in afternoon, and found pt 
to be more lethargic and have slurred speech. Pt never had LOC. He does not 
remember much but did mention that he had lightheadedness/dizziness. Denies 
blurry vision, vertigo, tinnitus, numbness/tingling, focal weakness. He also has
no SOB, CP, bowel/urinary changes, fevers/chills, cough. Pt is sure that he did 
not take any extra doses of his meds or other ingestions.



PMHx:

CAD

HTN

paroxysmal afib

diastolic CHF

HLD

DM



PSHx:

CABG x 3

Cardiac stent



FHx:

reviewed and noncontributory



SHx:

Former smoker, quit in 1970s

Denies EtOH and illicit drugs



Meds:

 Medication List

   Active Medications

       Ordered

           atorvastatin: 10 mg, 1 tab, PO, Bedtime, 30 tab, 0 Refill(s).

           clopidogrel: 75 mg, 1 tab, PO, Daily, 30 tab, 0 Refill(s).

           dronedarone: 400 mg, 1 tab, PO, BID, 60 tab.

           insulin aspart: 10 unit, TID-Before Meals, pt has a sliding scale.

           insulin glargine: 72 unit, SUB-Q, Daily, 0 Refill(s).

           insulin glargine: 42 unit, SUB-Q, Bedtime, 10 ml, 0 Refill(s).

           isosorbide mononitrate: 30 mg, 1 tab, PO, QAM, 30 tab, 0 Refill(s).

           levothyroxine: 50 microgram, 1 tab, PO, Daily, 30 tab.

           lisinopril: 2.5 mg, 1 tab, PO, Daily, 30 tab.

           metFORMIN: 1,000 mg, 1 tab, PO, BID, 30 tab.

           niacin: 2,000 mg, 2 tab, PO, Bedtime, 30 tab, 0 Refill(s).

           pantoprazole: 40 mg, 1 tab, PO, Daily, 30 tab.

           piperacillin-tazobactam: 3.375 gm, 100 mL, 25 ml/hr, IVPB, ABXQ8H.

           potassium chloride: 20 mEq, 1 tab, PO, BID, 10 tab, 0 Refill(s).

           sertraline: 100 mg, 1 tab, PO, Daily, 30 tab.

           sodium chloride: 10 mL, IVP, PRN, PRN: Line Flush.

           Sodium Chloride 0.9% IV 1,000 mL: 999 ml/hr, IV, Stop: 04/28/15

             19:15:00.

           torsemide: 20 mg, 1 tab, PO, BID, 30 tab, 0 Refill(s).

           vancomycin: 2 gm, 500 mL, 250 ml/hr, IVPB, ONCE.

   Medications Inactivated in the Last 72 Hours

       acetaminophen-hydrocodone: 0 Refill(s).

       ALPRAZolam: 0.5 mg, 1 tab, PO, BID, 20 tab, PRN: anxiety, stress.

       cephalexin: 500 mg, 1 cap, PO, TID, for 7 day, 21 cap, 0 Refill(s).

       isosorbide mononitrate: 0 Refill(s).

       naloxone: 1 mg, 2.5 mL, IVP, ONCE.

       naloxone: 0.4 mg, 1 mL, PYXIS, ONCE.

       piperacillin-tazobactam: 3.375 gm, 100 mL, 25 ml/hr, IVPB, ONCE.

       piperacillin-tazobactam: 3.375 gm, PYXIS, ONCE.

       Sodium Chloride 0.9% IV: 1,000 mL, PYXIS, ONCE.

       Sodium Chloride 0.9% IV: 1,000 mL, 1,000 ml/hr, IV, ONCE.

       Sodium Chloride 0.9% IV: 1,000 mL, 1000 ml/hr, IV, ONCE.

       Sodium Chloride 0.9% IV: 100 mL, PYXIS, ONCE.

       torsemide: 10 mg, 1 tab, PO, Daily, 30 tab.





Allergies: NKDA





ROS: See HPI.

         All other systems reviewed by myself are negative unless noted above.





Physical Exam:

VitalsTmp(F)[id number]

                                         19:30----6695/[id number] 4.0L/m

                                         18:17----30765/[id number]---

                                         17:30----6293/4740047---

                                         16:45-------81/39--------

                                         15:46----84244/927904 4.0L/m





                                        24 Hr Tmax: No Data AvailableVital Signs are the last 5 in the past 48 hours.





General: NAD, nontoxic appearing

HEENT: NCAT, PERRL, MMM, no nuchal rigidity

Cardiovascular: RRR, S1S2

Respiratory: bibaslar crackles

Abdomen: +BS, NT/ND

Extremities: no b/l LE edema

Skin: no rashes

Neurologic: somnolent but arousable, oriented x 4, comprehension and speech 
intact, CN III-XII intact, sensations intact and symmetric to light touch

Musculoskeletal: 5/5 strength in all extremities



Labs:

                                        24hr Labs

                                         1846

Lactic Acid Lvl1.7  

                                         1756

U Amph ScrNegative  

U Gracie ScrNegative  

U Benzodia ScrPositive  

U Cannab ScrNegative  

U Cocaine ScrNegative  

U Opiate ScrPositive  

U Phencyc ScrNegative  

UDS NoteSee Note  

UA ColorLtyellow  

UA TurbidityClear  

UA Spec Grav1.010  

UA pH5.0  

UA ProteinNegative  

UA GlucoseNegative  

UA KetonesNegative  

UA BiliNegative  

UA BloodNegative  

UA Urobilinogen<=1.0  

UA NitriteNegative  

UA Leuk EstNegative  

UA BacteriaOccasional  

UA MucusFew  

UA Sq EpiNone Seen  

UA Hyal Cast2  

                                         1714

Temp Ven37.0  

pH Ven7.38  

pCO2 Ven49  

pO2 Ven75  H

HCO3 Ven29  H

BE Ven3  H

O2 Sat Ven94.6  H

Sodium Hon548  

Potassium Lvl3.5  

Chloride Mkg751  

  

AGAP10.5  

Glucose Gve138  H

Creatinine Lvl1.1  

BUN14  

B/C Ratio13  

Total Protein6.5  

Albumin Lvl2.8  L

Globulin3.7  

A/G Ratio0.8  

Calcium Lvl7.9  L

ALT46  

AST48  H

Alk Gcua170  

Bili Total0.3  

eGFR66  

Dhorsek07.0  

Ethanol Lvl<3  

Etoh (%)<.003  

Acetaminoph Lvl<2  

Total CK76  

Troponin-I<0.02  

CK MB2.6  

CK MB Index3.4  H

FHF707  H

TSH1.490  

WBC9.5  

RBC3.81  L

Hgb9.5  L

Hct30.0  L

MCV78.8  L

MCH25.0  L

MCHC31.7  L

RDW18.7  H

Hmjgwzrg950  

MPV8.7  

Segs64.7  

Monocytes8.4  

Ylpgujieqva63.4  

Eosinophils2.2  

Basophils1.3  H

Segs-Bands #6.1  

Lymphocytes #2.2  

Monocytes #0.8  

Eosinophils #0.2  

Basophils #0.1  

Microcyte1+  

PT18.5  H

INR1.51  H

PTT28.1  





Micro:

blood cxs sent



Imaging:

CT head: No acute intracranial abnormalities are visualized.



CXR: Poor inspiration. Cardiomegaly, pulmonary congestion, suggesting CHF with 
pulmonary edema.

There is no pleural effusion.



EKG: NSR, QT prolonged, no major ST abnormalities



Assessment and Plan: 75 yo p/w AMS of unknown etiology. Possible causes include 
polypharmacy, CVA, seizures (post-ictal?), metabolic/toxic encephalopathy, 
infection, hypercapnea. No h/o liver disease to suggest hepatic encephalopathy. 
Mental status did not improve after narcan (tox screen showed opiates, pt takes 
vicodin and xanax, benzos also positive). Pt has been hypotensive, although he 
has evidence of pulmonary edema, he received fluids, BPs improved and 
respiratory status remains stable.



# AMS: will start vanc, zosyn, and acyclovir, MRI head, mental status seems to 
be improving slowly but still very somnolent, neurology consulted



# hypotension: gentle iv fluids while watching respiratory status, he still 
remains on the low side; unknown why he is hypotensive, awaiting CT chest to 
further investigate pneumonia, dissection, and other etiologies (since CXR was 
poor quality)



# diastolic CHF: hold diuretics due to hypotension despite elevated BNP and 
pulmonary edema



# microcytic/normocytic anemia: similar to prior recent values



# DM: SSI



# CAD: c/w aspirin and plavix, cardiology was consulted



# afib: hold dronedarone because of hypotension, pt is on tele and currently in 
NSR



# HTN: hold all anti-HTN meds



Prophylaxis: SCDs

Diet: NPO



Trevor Montano

Nocturnist

## 2019-07-18 NOTE — XMS REPORT
Summary of Care: 10/11/18 - 10/18/18

                             Created on: 07/15/2036



MINORRILEYDESIREE

External Reference #: [id number]

: 1940

Sex: Male



Demographics







                          Address                   *[address] [place]  [address]-

 

                          Home Phone                [phone]

 

                          Preferred Language        Unknown

 

                          Marital Status            Unknown

 

                          Restorationism Affiliation     [employer]

 

                          Race                      Other

 

                                        Additional Race(s)  

 

                          Ethnic Group              Non-





Author







                          Author                    [facility]

 

                          Organization              [facility]

 

                          Address                   Unknown

 

                          Phone                     Unavailable







Encounter





CHLOÉ Pedersen(DAVID) [id number] Date(s): 10/11/18 - 10/18/18

[facility] [address] [place] [address]-     [phone]
[phone]

Encounter Diagnosis

Epistaxis (Final) - 

Hypertensive heart disease with heart failure (Final) - 10/26/18

Pneumonia, unspecified organism (Final) - 

Acute and chronic respiratory failure with hypoxia (Final) - 

Metabolic encephalopathy (Final) - 

Acute posthemorrhagic anemia (Final) - 

Moderate protein-calorie malnutrition (Final) - 

Acute on chronic diastolic (congestive) heart failure (Final) - 

Epistaxis (Final) - 

Type 2 diabetes mellitus with hypoglycemia without coma (Final) - 

Long term (current) use of insulin (Final) - 

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 
(Final) - 

Presence of aortocoronary bypass graft (Final) - 

Presence of coronary angioplasty implant and graft (Final) - 

Paroxysmal atrial fibrillation (Final) - 

Long term (current) use of anticoagulants (Final) - 

Long term (current) use of aspirin (Final) - 

Dependence on supplemental oxygen (Final) - 

Obstructive sleep apnea (adult) (pediatric) (Final) - 

Pure hypercholesterolemia, unspecified (Final) - 

Personal history of nicotine dependence (Final) - 

Hypothyroidism, unspecified (Final) - 

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 

Other restrictive cardiomyopathy (Final) - 

Adverse effect of methadone, initial encounter (Final) - 

Other specified abnormal findings of blood chemistry (Final) - 

Long QT syndrome (Final) - 

Calculus of gallbladder without cholecystitis without obstruction (Final) - 

Pulmonary hypertension, unspecified (Final) - 

Nonrheumatic mitral (valve) insufficiency (Final) - 

Discharge Disposition: Skilled Nursing Facility

Attending Physician: Katie Mandujano MD

Admitting Physician: Katie Mandujano MD





Vital Signs







                    1                   2                   3



                                         Most recent to   



                                         oldest [Reference   



                                         Range]:   

 

                                        175.26 cm 

                    (10/12/18 10:36 AM)                         



                                         Height   

 

                                        81.136 kg 

                          (10/18/18 4:22 AM)        81.864 kg 

                          (10/17/18 5:00 AM)        84.773 kg 

                                        (10/15/18 4:09 AM)



                                         Current Weight   

 

                                        98 DegF 

                          (10/18/18 3:22 PM)        98 DegF 

                          (10/18/18 11:57 AM)       98 DegF 

                                        (10/18/18 7:43 AM)



                                         Temperature Oral   



                                         [96.4-99.1 DegF]   

 

                                        101/58 mmHg 

                          (10/18/18 3:22 PM)        120/68 mmHg 

                          (10/18/18 11:57 AM)       144/80 mmHg 

*HI*

                                        (10/18/18 7:43 AM)



                                         Blood Pressure   



                                         [/60-90 mmHg]   

 

                                        14 BRMIN 

                          (10/18/18 3:22 PM)        12 BRMIN 

*LOW*

                          (10/18/18 11:57 AM)       12 BRMIN 

*LOW*

                                        (10/18/18 6:59 AM)



                                         Respiratory Rate   



                                         [14-20 BRMIN]   

 

                                        54 bpm 

*LOW*

                          (10/18/18 3:22 PM)        57 bpm 

*LOW*

                          (10/18/18 11:57 AM)       64 bpm 

                                        (10/18/18 7:43 AM)



                                         Peripheral Pulse   



                                         Rate [ bpm]   

 

                                        94.045 kg 

                    (10/12/18 10:36 AM)                         



                                         Weight   

 

                                        30.62 m2 

                    (10/12/18 10:36 AM)                         



                                         Body Mass Index   







Problem List







    



              Condition     Effective Dates     Status       Health Status     Informant

 

    



                           Acute MI(Confirmed)1      Resolved  

 

    



                           Atrial                    Active  



                                         fibrillation(Confirm    



                                         ed)    

 

    



                           CHF (congestive           Active  



                                         heart    



                                         failure)(Confirmed)    

 

    



                           CAD (coronary artery      Active  



                                         disease)(Confirmed)    

 

    



                           Diabetes(Confirmed)       Active  

 

    



                           Diastolic heart           Active  



                                         failure(Confirmed)    

 

    



                           Histoplasmosis(Confi      Resolved  



                                         rmed)    

 

    



                           Hx MRSA                   Resolved  



                                         infection(Confirmed)    

 

    



                           Hypercholesteremia(C      Resolved  



                                         onfirmed)    

 

    



                           Hypertension(Confirm      Active  



                                         ed)    

 

    



                           Hypertension(Confirm      Resolved  



                                         ed)    

 

    



                           HTN                       Active  



                                         (hypertension)(Confi    



                                         rmed)    

 

    



                           Hypothyroidism(Confi      Active  



                                         rmed)    

 

    



                           Diabetes mellitus         Active  



                                         type 2, insulin    



                                         dependent(Confirmed)    

 

    



                     Moderate            Active              Chronic ; 



                                         protein-calorie    



                                         malnutrition(Confirm    



                                         ed)    

 

    



                           PAUL (obstructive          Active  



                                         sleep    



                                         apnea)(Confirmed)    

 

    



                           Poliomyelitides(Conf      Resolved  



                                         irmed)    

 

    



                           Sleep                     Active  



                                         apnea(Confirmed)    

 

    



                           Stented coronary          Resolved  



                                         artery(Confirmed)2    

 

    



                           Systolic heart            Active  



                                         failure(Confirmed)    

 

    



                           Whooping                  Resolved  



                                         cough(Confirmed)    







1x 3



225 cardiac stents



Allergies, Adverse Reactions, Alerts







   



                 Substance       Reaction        Severity        Status

 

   



                           sulfa drugs               Active

 

   



                           Reglan                    Active

 

   



                           iodine                    Active

 

   



                           Latex                     Active







Medications





AMIODarone

200 mg, 1 tab, Route: PO, Drug form: TAB, Daily, Dosing Weight 94.045, kg, Start
date: 10/14/18 9:00:00 CDT, Duration: 30 day, Stop date: 18 9:00:00 CST

Notes: (Same as: Cordarone)

Start Date: 10/14/18

Stop Date: 10/15/18

Status: Discontinued



AMIODarone

100 mg, 0.5 tab, Route: PO, Drug form: TAB, Daily, Dosing Weight 94.045, kg, Sta
rt date: 10/16/18 9:00:00 CDT, Duration: 30 day, Stop date: 18 9:00:00 CST

Notes: (Same as: Cordarone)

Start Date: 10/16/18

Stop Date: 10/18/18

Status: Discontinued



AMIODarone 200 mg oral tablet

200 mg=1 tab, PO, Daily, 0 Refill(s)

Start Date: 10/18/18

Stop Date: 19

Status: Discontinued



aspirin

324 mg, Route: CHEW, Drug form: CHEWTAB, ONCE, Dosing Weight 82.273, kg, Priorit
y: STAT, Start date: 10/12/18 3:18:00 CDT, Stop date: 10/12/18 3:18:00 CDT

Start Date: 10/12/18

Stop Date: 10/12/18

Status: Completed



aspirin 81 mg tablet, enteric coated

81 mg, 1 tab, Route: PO, Drug form: ECTAB, Daily, Dosing Weight 94.045, kg, Star
t date: 10/13/18 13:19:00 CDT, Duration: 30 day, Stop date: 18 9:00:00 CST

Notes: Do not crush or chew.(Same As: Ecotrin)

Start Date: 10/13/18

Stop Date: 10/18/18

Status: Discontinued



atorvastatin

5 mg, 0.5 tab, Route: PO, Drug form: TAB, Bedtime, Dosing Weight 94.045, kg, Sta
rt date: 10/13/18 21:00:00 CDT, Duration: 30 day, Stop date: 18 21:00:00 C
ST

Notes: (Same As: Lipitor)

Start Date: 10/13/18

Stop Date: 10/18/18

Status: Discontinued



bumetanide 1 mg oral tablet

2 mg=2 tab, PO, Daily, 0 Refill(s)

Start Date: 10/18/18

Stop Date: 19

Status: Completed



Bumex

2 mg, 8 mL, Route: IVP, Drug form: INJ, ONCE, Dosing Weight 94.045, kg, Start da
te: 10/16/18 15:07:00 CDT, Stop date: 10/16/18 15:07:00 CDT

Notes: (Same As: Bumex)

Start Date: 10/16/18

Stop Date: 10/16/18

Status: Completed



Bumex

2 mg, 2 tab, Route: PO, Drug form: TAB, Daily, Dosing Weight 94.045, kg, Start d
ate: 10/17/18 9:00:00 CDT, Duration: 30 day, Stop date: 11/15/18 9:00:00 CST

Notes: (Same As: Bumex)

Start Date: 10/17/18

Stop Date: 10/18/18

Status: Discontinued



calcium gluconate

2 gm, Route: IVPB, PRN, Dosing Weight 94.045, kg, PRN Abnormal Lab Result, For N
ON-ICU Patients Only., Start date: 10/14/18 13:26:00 CDT, Duration: 30 day, Stop
date: 18 12:25:00 CST

Start Date: 10/14/18

Stop Date: 10/14/18

Status: Deleted



calcium gluconate

3 gm, Route: IVPB, PRN, Dosing Weight 94.045, kg, PRN Abnormal Lab Result, For N
ON-ICU Patients Only., Start date: 10/14/18 13:26:00 CDT, Duration: 30 day, Stop
date: 18 12:25:00 CST

Start Date: 10/14/18

Stop Date: 10/14/18

Status: Deleted



calcium gluconate + Sodium Chloride 0.9%  mL

2 gm, 20 mL, Route: IVPB, PRN, Dosing Weight 82.273, kg, PRN Abnormal Lab Result
, For NON-ICU Patients Only., Start date: 10/12/18 7:26:00 CDT, Duration: 30 day
, Stop date: 18 6:25:00 CST

Notes: WASTE: F/P - Sink; E - Municipal Trash Bin

Start Date: 10/12/18

Stop Date: 10/18/18

Status: Discontinued



calcium gluconate + Sodium Chloride 0.9%  mL

3 gm, 30 mL, Route: IVPB, PRN, Dosing Weight 82.273, kg, PRN Abnormal Lab Result
, For NON-ICU Patients Only., Start date: 10/12/18 7:26:00 CDT, Duration: 30 day
, Stop date: 18 6:25:00 CST

Notes: WASTE: F/P - Sink; E - Municipal Trash Bin

Start Date: 10/12/18

Stop Date: 10/18/18

Status: Discontinued



Dextrose 50% Syringe

12.5 gm, 25 mL, Route: IVP, Drug Form: INJ, Dosing Weight 82.273, kg, PRN, PRN B
lood Glucose Results, Start date: 10/12/18 7:34:00 CDT, Duration: 30 day, Stop d
ate: 18 6:33:00 CST

Start Date: 10/12/18

Stop Date: 10/18/18

Status: Discontinued



Dextrose 50% Syringe

25 gm, 50 mL, Route: IVP, Drug Form: INJ, Dosing Weight 82.273, kg, PRN, PRN Blo
od Glucose Results, Start date: 10/12/18 7:34:00 CDT, Duration: 30 day, Stop hazel
e: 18 6:33:00 CST

Start Date: 10/12/18

Stop Date: 10/18/18

Status: Discontinued



famotidine

20 mg, 1 tab, Route: PO, Drug form: TAB, Q12H, Dosing Weight 94.045, kg, PRN Hea
rtburn, Start date: 10/13/18 13:01:00 CDT, Duration: 30 day, Stop date: 18
13:00:00 CST

Notes: (Same as: Pepcid)

Start Date: 10/13/18

Stop Date: 10/18/18

Status: Discontinued



gabapentin 600 mg oral tablet

600 mg, 2 cap, Route: PO, Drug form: CAP, TID, Dosing Weight 94.045, kg, Start d
ate: 10/13/18 17:00:00 CDT, Duration: 30 day, Stop date: 18 13:00:00 CST

Notes: (Same as: Neurontin)

Start Date: 10/13/18

Stop Date: 10/18/18

Status: Discontinued



glucagon

1 mg, Route: IM, Drug form: PDR/INJ, PRN, Dosing Weight 82.273, kg, PRN Blood Gl
ucose Results, Start date: 10/12/18 7:34:00 CDT, Duration: 30 day, Stop date:  6:33:00 CST

Start Date: 10/12/18

Stop Date: 10/18/18

Status: Discontinued



insulin lispro

1 unit, 0.01 mL, Route: SUB-Q, Drug form: SOLN, TID-Before Meals, Dosing Weight 
82.273, kg, PRN Blood Glucose Results, Start date: 10/12/18 7:34:00 CDT, Duratio
n: 30 day, Stop date: 18 7:33:00 CST

Notes: (Same as: Humalog ) Roll in palms of hands gently;  Do not shake `vigorou
sly. "Single Patient Use Only "  WASTE: F/P - Black; E - Municipal Trash Bin  St
able for 28 days at room temperature.Expires in _____ days from ______________Da
te

Start Date: 10/12/18

Stop Date: 10/18/18

Status: Discontinued



insulin lispro

2 unit, 0.02 mL, Route: SUB-Q, Drug form: SOLN, TID-Before Meals, Dosing Weight 
82.273, kg, PRN Blood Glucose Results, Start date: 10/12/18 7:34:00 CDT, Duratio
n: 30 day, Stop date: 18 7:33:00 CST

Notes: (Same as: Humalog ) Roll in palms of hands gently;  Do not shake `vigorou
sly. "Single Patient Use Only "  WASTE: F/P - Black; E - Municipal Trash Bin  St
able for 28 days at room temperature.Expires in _____ days from ______________Da
te

Start Date: 10/12/18

Stop Date: 10/18/18

Status: Discontinued



insulin lispro

4 unit, 0.04 mL, Route: SUB-Q, Drug form: SOLN, TID-Before Meals, Dosing Weight 
82.273, kg, PRN Blood Glucose Results, Start date: 10/12/18 7:34:00 CDT, Duratio
n: 30 day, Stop date: 18 7:33:00 CST

Notes: (Same as: Humalog ) Roll in palms of hands gently;  Do not shake `vigorou
sly. "Single Patient Use Only "  WASTE: F/P - Black; E - Municipal Trash Bin  St
able for 28 days at room temperature.Expires in _____ days from ______________Da
te

Start Date: 10/12/18

Stop Date: 10/18/18

Status: Discontinued



insulin lispro

5 unit, 0.05 mL, Route: SUB-Q, Drug form: SOLN, TID-Before Meals, Dosing Weight 
82.273, kg, PRN Blood Glucose Results, Start date: 10/12/18 7:34:00 CDT, Duratio
n: 30 day, Stop date: 18 7:33:00 CST

Notes: (Same as: Humalog ) Roll in palms of hands gently;  Do not shake `vigorou
sly. "Single Patient Use Only "  WASTE: F/P - Black; E - Municipal Trash Bin  St
able for 28 days at room temperature.Expires in _____ days from ______________Da
te

Start Date: 10/12/18

Stop Date: 10/18/18

Status: Discontinued



insulin lispro

3 unit, 0.03 mL, Route: SUB-Q, Drug form: SOLN, TID-Before Meals, Dosing Weight 
82.273, kg, PRN Blood Glucose Results, Start date: 10/12/18 7:34:00 CDT, Duratio
n: 30 day, Stop date: 18 7:33:00 CST

Notes: (Same as: Humalog ) Roll in palms of hands gently;  Do not shake `daríou
sly. "Single Patient Use Only "  WASTE: F/P - Black; E - Municipal Trash Bin  St
able for 28 days at room temperature.Expires in _____ days from ______________Da
te

Start Date: 10/12/18

Stop Date: 10/18/18

Status: Discontinued



isosorbide mononitrate

30 mg, 1 tab, Route: PO, Drug form: ERTAB, QAM, Dosing Weight 94.045, kg, Start 
date: 10/14/18 9:00:00 CDT, Duration: 30 day, Stop date: 18 9:00:00 CST

Notes: (Same as:Imdur)"Do Not Crush"  Take on empty stomach/ full glass of water
.  Do not crush

Start Date: 10/14/18

Stop Date: 10/18/18

Status: Discontinued



Lasix

40 mg, Route: IVP, Drug form: INJ, ONCE, Dosing Weight 82.273, kg, Priority: STA
T, Start date: 10/12/18 3:18:00 CDT, Stop date: 10/12/18 3:18:00 CDT

Start Date: 10/12/18

Stop Date: 10/12/18

Status: Completed



Lasix

40 mg, 4 mL, Route: IV, Drug form: INJ, Q8H, Dosing Weight 82.273, kg, Start hazel
e: 10/12/18 8:00:00 CDT, Duration: 30 day, Stop date: 18 0:00:00 CST

Notes: (Same as: Lasix)  *** MEDICATION WASTE ***Product Size:  40 mgProduct Was
sonali:  ___ mg

Start Date: 10/12/18

Stop Date: 10/14/18

Status: Discontinued



Lasix

10 mg, 1 mL, Route: IVP, Drug form: INJ, ONCALL, Dosing Weight 94.045, kg, Start
date: 10/14/18 17:00:00 CDT, Duration: 30 day, Stop date: 18 15:59:00 CST

Notes: (Same as: Lasix)  *** MEDICATION WASTE ***Product Size:  40 mgProduct Was
sonali:  ___ mg

Start Date: 10/14/18

Stop Date: 10/15/18

Status: Completed



lisinopril

2.5 mg, 0.5 tab, Route: PO, Drug form: TAB, Daily, Dosing Weight 94.045, kg, Sta
rt date: 10/14/18 9:00:00 CDT, Duration: 30 day, Stop date: 18 9:00:00 CST

Notes: (Same as: Prinivil, Zestril)

Start Date: 10/14/18

Stop Date: 10/18/18

Status: Discontinued



LORazepam

0.5 mg, 1 tab, Route: PO, Drug form: TAB, Daily, Dosing Weight 94.045, kg, Start
date: 10/13/18 17:30:00 CDT, Duration: 30 day, Stop date: 18 9:00:00 CST

Notes: (Same as: Ativan)

Start Date: 10/13/18

Stop Date: 10/18/18

Status: Discontinued



LORazepam

1 mg, 1 tab, Route: PO, Drug form: TAB, Bedtime, Dosing Weight 94.045, kg, Start
date: 10/13/18 23:00:00 CDT, Duration: 30 day, Stop date: 18 23:00:00 CST

Notes: (Same as: Ativan)

Start Date: 10/13/18

Stop Date: 10/18/18

Status: Discontinued



Mag-Ox 400

400 mg, 1 tab, Route: PO, Drug form: TAB, Daily, Dosing Weight 94.045, kg, Start
date: 10/14/18 9:00:00 CDT, Duration: 30 day, Stop date: 18 9:00:00 CST

Notes: (Same as: Mag-Ox 400)Magnesium oxide [id number] elemental magnesiumDose=
____mg magnesium oxide (___mg elemental magnesium)

Start Date: 10/14/18

Stop Date: 10/18/18

Status: Discontinued



magnesium oxide

800 mg, Route: PO, PRN, Dosing Weight 94.045, kg, PRN Abnormal Lab Result, For N
ON-ICU Patients Only., Start date: 10/14/18 13:26:00 CDT, Duration: 30 day, Stop
date: 18 12:25:00 CST

Start Date: 10/14/18

Stop Date: 10/14/18

Status: Deleted



magnesium oxide

800 mg, 2 tab, Route: PO, Drug form: TAB, PRN, Dosing Weight 82.273, kg, PRN Abn
ormal Lab Result, For NON-ICU Patients Only., Start date: 10/12/18 7:26:00 CDT, 
Duration: 30 day, Stop date: 18 6:25:00 CST

Notes: (Same as: Mag-Ox 400)Magnesium oxide [id number] elemental magnesiumDose=
____mg magnesium oxide (___mg elemental magnesium)

Start Date: 10/12/18

Stop Date: 10/18/18

Status: Discontinued



magnesium sulfate

2 gm, Route: IVPB, PRN, Dosing Weight 94.045, kg, PRN Abnormal Lab Result, For N
ON-ICU Patients Only., Start date: 10/14/18 13:26:00 CDT, Duration: 30 day, Stop
date: 18 12:25:00 CST

Start Date: 10/14/18

Stop Date: 10/14/18

Status: Deleted



magnesium sulfate

1 gm, Route: IVPB, PRN, Dosing Weight 94.045, kg, PRN Abnormal Lab Result, For N
ON-ICU Patients Only., Start date: 10/14/18 13:26:00 CDT, Duration: 30 day, Stop
date: 18 12:25:00 CST

Start Date: 10/14/18

Stop Date: 10/14/18

Status: Deleted



magnesium sulfate

2 gm, 50 mL, Route: IVPB, Drug form: INJ, PRN, Dosing Weight 82.273, kg, PRN Abn
ormal Lab Result, For NON-ICU Patients Only., Start date: 10/12/18 7:26:00 CDT, 
Duration: 30 day, Stop date: 18 6:25:00 CST

Notes: WASTE: F/P - Sink; E - Municipal Trash Bin

Start Date: 10/12/18

Stop Date: 10/18/18

Status: Discontinued



magnesium sulfate

1 gm, 100 mL, Route: IVPB, Drug form: INJ, PRN, Dosing Weight 82.273, kg, PRN Ab
normal Lab Result, For NON-ICU Patients Only., Start date: 10/12/18 7:26:00 CDT,
Duration: 30 day, Stop date: 18 6:25:00 CST

Notes: WASTE: F/P - Sink; E - Municipal Trash Bin

Start Date: 10/12/18

Stop Date: 10/18/18

Status: Discontinued



metoprolol tartrate

25 mg, 1 tab, Route: PO, Drug form: TAB, Q12H, Dosing Weight 94.045, kg, Start d
ate: 10/13/18 21:00:00 CDT, Duration: 30 day, Stop date: 18 9:00:00 CST

Notes: (Same as: Lopressor)

Start Date: 10/13/18

Stop Date: 10/18/18

Status: Discontinued



niacin 1000 mg oral tablet, extended release

2,000 mg, 4 tab, Route: PO, Drug form: ERTAB, Bedtime, Dosing Weight 94.045, kg,
Start date: 10/13/18 21:00:00 CDT, Duration: 30 day, Stop date: 18 21:00:
00 CST

Notes: (Same as: Niaspan)"Do Not Crush"Non-Formulary Item  With food.

Start Date: 10/13/18

Stop Date: 10/18/18

Status: Discontinued



Norco 10/325 oral tablet

1 tab, Route: PO, Drug Form: TAB, Dosing Weight 94.045, kg, Q6H, PRN Pain Score 
4-6, Start date: 10/13/18 2:15:00 CDT, Duration: 30 day, Stop date: 18 2:1
4:00 CST

Notes: Do not exceed 4gm/day of acetaminophen.  (Same as: Norco 325/10)

Start Date: 10/13/18

Stop Date: 10/18/18

Status: Discontinued



normal saline 0.9%  mL

250 mL, Rate: 30 ml/hr, Infuse over: 8.3 hr, Route: IV, Dosing Weight 94.045 kg,
Total Volume: 250, Start date: 10/14/18 21:08:00 CDT, Duration: 1 day, Stop hazel
e: 10/15/18 21:19:00 CDT, 2.16, m2

Start Date: 10/14/18

Stop Date: 10/15/18

Status: Completed



Nurse please UPDATE patient's HEIGHT-WEIGHT-ALLERGY in [facility]

Nurse please UPDATE patient's HEIGHT-WEIGHT-ALLERGY in [facility], 1, Drug form: MISC
, Route: MISC, Q30Min, 10/12/18 8:00:00 CDT, Duration: 5 doses or times, Stop da
te: 10/12/18 10:00:00 CDT

Start Date: 10/12/18

Stop Date: 10/12/18

Status: Deleted



Nurse please UPDATE patient's HEIGHT-WEIGHT-ALLERGY in [facility]

Nurse please UPDATE patient's HEIGHT-WEIGHT-ALLERGY in [facility], 1, Drug form: MISC
, Route: MISC, Q5Min, 10/12/18 10:20:00 CDT, Duration: 1 day, Stop date: 10/13/1
8 10:15:00 CDT

Start Date: 10/12/18

Stop Date: 10/12/18

Status: Deleted



pantoprazole

40 mg, 1 tab, Route: PO, Drug form: ECTAB, Daily, Dosing Weight 94.045, kg, Star
t date: 10/14/18 9:00:00 CDT, Duration: 30 day, Stop date: 18 9:00:00 CST

Notes: Tablet should not be chewed or crushed.(Same as: Protonix)

Start Date: 10/14/18

Stop Date: 10/18/18

Status: Discontinued



potassium chloride

10 mEq, Route: IVPB, PRN, Dosing Weight 94.045, kg, PRN Abnormal Lab Result, For
NON-ICU Patients Only, Start date: 10/14/18 13:26:00 CDT, Duration: 30 day, Stop
date: 18 12:25:00 CST

Start Date: 10/14/18

Stop Date: 10/14/18

Status: Deleted



potassium chloride

20 mEq, Route: NJ, Drug form: SOLN, PRN, Dosing Weight 94.045, kg, PRN Abnormal 
Lab Result, For NON-ICU Patients Only, Start date: 10/14/18 13:26:00 CDT, Durati
on: 30 day, Stop date: 18 12:25:00 CST

Start Date: 10/14/18

Stop Date: 10/14/18

Status: Deleted



potassium chloride

20 mEq, Route: PO, Drug form: ERTAB, PRN, Dosing Weight 94.045, kg, PRN Abnormal
Lab Result, For NON-ICU Patients Only, Start date: 10/14/18 13:26:00 CDT, Durat
ion: 30 day, Stop date: 18 12:25:00 CST

Start Date: 10/14/18

Stop Date: 10/14/18

Status: Deleted



potassium chloride

40 mEq, 2 tab, Route: PO, Drug form: ERTAB, ONCE, Dosing Weight 94.045, kg, Star
t date: 10/14/18 14:22:00 CDT, Stop date: 10/14/18 14:22:00 CDT

Notes: (Same as: K-Dur )"Do Not Crush"For patients unable to swallow tablet, d
issolve in one half glass of water. Allow about 2 minutes for the tablets to dis
integrate. Stir before giving to prepare slurry and administer.Please exclude Pa
tients with feeding tube less than 14 Citizen of the [place] (Dobhoff, J-tube etc) and pediat
cornel and  patients.  With food and full glass of water

Start Date: 10/14/18

Stop Date: 10/14/18

Status: Completed



potassium chloride

20 mEq, 1 tab, Route: PO, Drug form: ERTAB, PRN, Dosing Weight 82.273, kg, PRN A
bnormal Lab Result, For NON-ICU Patients Only, Start date: 10/12/18 7:26:00 CDT,
Duration: 30 day, Stop date: 18 6:25:00 CST

Notes: (Same as: K-Dur )"Do Not Crush"For patients unable to swallow tablet, d
issolve in one half glass of water. Allow about 2 minutes for the tablets to dis
integrate. Stir before giving to prepare slurry and administer.Please exclude Pa
tients with feeding tube less than 14 Citizen of the [place] (Dobhoff, J-tube etc) and pediat
cornel and  patients.  With food and full glass of water

Start Date: 10/12/18

Stop Date: 10/18/18

Status: Discontinued



potassium chloride

20 mEq, 15 mL, Route: NJ, Drug form: LIQ, PRN, Dosing Weight 82.273, kg, PRN Abn
ormal Lab Result, For NON-ICU Patients Only, Start date: 10/12/18 7:26:00 CDT, D
uration: 30 day, Stop date: 18 6:25:00 CST

Notes: (Same as: Potassium Chloride)

Start Date: 10/12/18

Stop Date: 10/18/18

Status: Discontinued



potassium chloride

10 mEq, 100 mL, Route: IVPB, Drug form: INJ, PRN, Dosing Weight 82.273, kg, PRN 
Abnormal Lab Result, For NON-ICU Patients Only, Start date: 10/12/18 7:26:00 CDT
, Duration: 30 day, Stop date: 18 6:25:00 CST

Notes: Infuse at a rate of 10 mEq/hr.(Same as: KCL)

Start Date: 10/12/18

Stop Date: 10/18/18

Status: Discontinued



potassium chloride 20 mEq oral tablet, extended release

20 mEq, 1 tab, Route: PO, Drug form: ERTAB, BID, Dosing Weight 94.045, kg, Start
date: 10/13/18 17:00:00 CDT, Duration: 30 day, Stop date: 18 9:00:00 CST

Notes: (Same as: K-Dur 20)"Do Not Crush"For patients unable to swallow tablet, d
issolve in one half glass of water. Allow about 2 minutes for the tablets to dis
integrate. Stir before giving to prepare slurry and administer.Please exclude Pa
tients with feeding tube less than 14 Citizen of the [place] (Dobhoff, J-tube etc) and pediat
cornel and  patients.  With food and full glass of water

Start Date: 10/13/18

Stop Date: 10/18/18

Status: Discontinued



potassium phosphate

15 mmol, Route: IVPB, PRN, Dosing Weight 94.045, kg, PRN Abnormal Lab Result, Fo
r NON-ICU Patients Only., Start date: 10/14/18 13:26:00 CDT, Duration: 30 day, S
top date: 18 12:25:00 CST

Start Date: 10/14/18

Stop Date: 10/14/18

Status: Deleted



potassium phosphate

30 mmol, Route: IVPB, PRN, Dosing Weight 94.045, kg, PRN Abnormal Lab Result, Fo
r NON-ICU Patients Only., Start date: 10/14/18 13:26:00 CDT, Duration: 30 day, S
top date: 18 12:25:00 CST

Start Date: 10/14/18

Stop Date: 10/14/18

Status: Deleted



potassium phosphate + Sodium Chloride 0.9%  mL

15 mmol, 5 mL, Route: IVPB, PRN, Dosing Weight 82.273, kg, PRN Abnormal Lab Resu
lt, For NON-ICU Patients Only., Start date: 10/12/18 7:26:00 CDT, Duration: 30 d
ay, Stop date: 18 6:25:00 CST

Notes: (Same as: K Phosphate.)Do not infuse phosphorous concurrently in the same
line as TPN or IVF that contains calcium. For double lumen central lines, phosp
horous may be infused in a separate lumen from TPN.  1 mMol phoshate has 1.47 mE
q potassium  Infuse over 4 hours

Start Date: 10/12/18

Stop Date: 10/18/18

Status: Discontinued



potassium phosphate + Sodium Chloride 0.9%  mL

30 mmol, 10 mL, Route: IVPB, PRN, Dosing Weight 82.273, kg, PRN Abnormal Lab Res
ult, For NON-ICU Patients Only., Start date: 10/12/18 7:26:00 CDT, Duration: 30 
day, Stop date: 18 6:25:00 CST

Notes: (Same as: K Phosphate.)Do not infuse phosphorous concurrently in the same
line as TPN or IVF that contains calcium. For double lumen central lines, phosp
horous may be infused in a separate lumen from TPN.  1 mMol phoshate has 1.47 mE
q potassium  Infuse over 4 hours

Start Date: 10/12/18

Stop Date: 10/18/18

Status: Discontinued



potassium phosphate-sodium phosphate 250 mg-280 mg-160 mg oral powder for recons
titution

2 pkt, Route: PO, Dosing Weight 94.045, kg, PRN, PRN Abnormal Lab Result, For NO
N-ICU Patients Only, Start date: 10/14/18 13:26:00 CDT, Duration: 30 day, Stop d
ate: 18 12:25:00 CST

Start Date: 10/14/18

Stop Date: 10/14/18

Status: Deleted



potassium phosphate-sodium phosphate 250 mg-280 mg-160 mg oral powder for recons
titution

2 pkt, Route: PO, Drug Form: PDR/REC, Dosing Weight 82.273, kg, PRN, PRN Abnorma
l Lab Result, For NON-ICU Patients Only, Start date: 10/12/18 7:26:00 CDT, Durat
ion: 30 day, Stop date: 18 6:25:00 CST

Notes: (Same as: Phos-NaK)  Each 1.5 gm pkt has 250mg phosphorous. Mix w/2.5oz w
ater and stir.

Start Date: 10/12/18

Stop Date: 10/18/18

Status: Discontinued



Ranexa 500 mg oral tablet, extended release

500 mg, 1 tab, Route: PO, Drug form: TAB, Daily, Dosing Weight 94.045, kg, Start
date: 10/14/18 9:00:00 CDT, Duration: 30 day, Stop date: 18 9:00:00 CST

Notes: Same as Ranexa"Do Not Crush"

Start Date: 10/14/18

Stop Date: 10/18/18

Status: Discontinued



Saline Flush 0.9%

10 ml, Route: IVP, Drug Form: INJ, Dosing Weight 82.273, kg, Q12H, Start date: 1
 9:00:00 CDT, Duration: 30 day, Stop date: 11/10/18 21:00:00 CST

Notes: (Same as: BD Posiflush)

Start Date: 10/12/18

Stop Date: 10/18/18

Status: Discontinued



Saline Flush 0.9%

10 ml, Route: IVP, Drug Form: INJ, Dosing Weight 82.273, kg, PRN, PRN Line Flush
, Start date: 10/12/18 7:26:00 CDT, Duration: 30 day, Stop date: 18 6:25:0
0 CST

Notes: (Same as: BD Posiflush)

Start Date: 10/12/18

Stop Date: 10/18/18

Status: Discontinued



sertraline

100 mg, 1 tab, Route: PO, Drug form: TAB, Daily, Dosing Weight 94.045, kg, Start
date: 10/14/18 9:00:00 CDT, Duration: 30 day, Stop date: 18 9:00:00 CST

Notes: (Same as: Zoloft)

Start Date: 10/14/18

Stop Date: 10/18/18

Status: Discontinued



sodium phosphate

30 mmol, Route: IVPB, PRN, Dosing Weight 94.045, kg, PRN Abnormal Lab Result, Fo
r NON-ICU Patients Only., Start date: 10/14/18 13:26:00 CDT, Duration: 30 day, S
top date: 18 12:25:00 CST

Start Date: 10/14/18

Stop Date: 10/14/18

Status: Deleted



sodium phosphate

15 mmol, Route: IVPB, PRN, Dosing Weight 94.045, kg, PRN Abnormal Lab Result, Fo
r NON-ICU Patients Only., Start date: 10/14/18 13:26:00 CDT, Duration: 30 day, S
top date: 18 12:25:00 CST

Start Date: 10/14/18

Stop Date: 10/14/18

Status: Deleted



sodium phosphate + Dextrose 5% in Water  mL

30 mmol, 10 mL, Route: IVPB, PRN, Dosing Weight 82.273, kg, PRN Abnormal Lab Res
ult, For NON-ICU Patients Only., Start date: 10/12/18 7:26:00 CDT, Duration: 30 
day, Stop date: 18 6:25:00 CST

Notes: Infuse over 4 hour. Do not infuse phosphorous concurrently in the same li
ne as TPN or IVF that contains calcium. For double lumen central lines, phosphor
ous may be infused in a separate lumen from TPN.

Start Date: 10/12/18

Stop Date: 10/18/18

Status: Discontinued



sodium phosphate + Dextrose 5% in Water  mL

15 mmol, 5 mL, Route: IVPB, PRN, Dosing Weight 82.273, kg, PRN Abnormal Lab Resu
lt, For NON-ICU Patients Only., Start date: 10/12/18 7:26:00 CDT, Duration: 30 d
ay, Stop date: 18 6:25:00 CST

Notes: Infuse over 4 hour. Do not infuse phosphorous concurrently in the same li
ne as TPN or IVF that contains calcium. For double lumen central lines, phosphor
ous may be infused in a separate lumen from TPN.

Start Date: 10/12/18

Stop Date: 10/18/18

Status: Discontinued



Synthroid

50 microgram, 1 tab, Route: PO, Drug form: TAB, Q630AM, Dosing Weight 94.045, kg
, Start date: 10/14/18 6:30:00 CDT, Duration: 30 day, Stop date: 18 6:30:0
0 CST

Notes: Take 1 hour before or 2 hours after meal; Enteral feeds may interefere wi
th the absorption of this medication.(Same as:Levothroid, Synthroid)

Start Date: 10/14/18

Stop Date: 10/18/18

Status: Discontinued



tamsulosin

0.4 mg, 1 cap, Route: PO, Drug form: CAP, Daily, Dosing Weight 94.045, kg, Start
date: 10/14/18 9:00:00 CDT, Duration: 30 day, Stop date: 18 9:00:00 CST

Notes: (Same As: Flomax)  "Do Not Crush"

Start Date: 10/14/18

Stop Date: 10/18/18

Status: Discontinued



trazodone 50 mg oral tablet

50 mg, 1 tab, Route: PO, Drug form: TAB, Bedtime, Dosing Weight 94.045, kg, PRN 
Insomnia, Start date: 10/13/18 13:01:00 CDT, Duration: 30 day, Stop date:  13:00:00 CST

Notes: (Same As: Desyrel)

Start Date: 10/13/18

Stop Date: 10/18/18

Status: Discontinued



Tylenol

650 mg, 2 tab, Route: PO, Drug form: TAB, Q6H, Dosing Weight 94.045, kg, PRN Patrick
n Score 1-5, Start date: 10/13/18 13:01:00 CDT, Duration: 30 day, Stop date:  13:00:00 CST

Notes: Do not exceed 4 gm/day.  (Same as: Tylenol)

Start Date: 10/13/18

Stop Date: 10/18/18

Status: Discontinued



Zosyn + Sodium Chloride 0.9%  mL

3.375 gm, Route: IVPB, ABXQ8H, Dosing Weight 82.273, kg, CrCl >=20 ml/min infuse
over 4 hours, Start date: 10/12/18 7:00:00 CDT, Duration: 7 day, Stop date: 
10/18/18 23:00:00 CDT, ABX Indication: Pneumonia

Notes: (Same as: Zosyn)Dosing based on Piperacillin component  *** MEDICATION WA
SARY ***Product Size:  3375 mgProduct Wasted:  ___ mg

Start Date: 10/12/18

Stop Date: 10/12/18

Status: Discontinued



Zosyn + Sodium Chloride 0.9%  mL

3.375 gm, Route: IVPB, ABXQ8H, Dosing Weight 82.273, kg, CrCl >=20 ml/min infuse
over 4 hours, Start date: 10/12/18 19:00:00 CDT, Duration: 7 day, Stop date: 
10/19/18 11:00:00 CDT, ABX Indication: Pneumonia

Notes: (Same as: Zosyn)Dosing based on Piperacillin component  *** MEDICATION WA
SARY ***Product Size:  3375 mgProduct Wasted:  ___ mg

Start Date: 10/12/18

Stop Date: 10/14/18

Status: Discontinued



Results











                    1                   2                   3



                                         Most recent to   



                                         oldest [Reference   



                                         Range]:   

 

                                        0.10 ng/mL 

                    (10/12/18 11:58 AM)                          



                                         Procalcitonin Lvl   



                                         [0.00-0.10 ng/mL]   

 

                                        7.3 K/CMM 

                          (10/16/18 5:00 AM)        12.2 K/CMM 

*HI*

                          (10/13/18 5:27 AM)        12.1 K/CMM 

*HI*

                                        (10/11/18 11:59 PM) 



                                         Neutrophils #   



                                         [1.5-8.1 K/CMM]   

 

                                        1.7 K/CMM 

                          (10/16/18 5:00 AM)        1.9 K/CMM 

                          (10/13/18 5:27 AM)        1.1 K/CMM 

                                        (10/11/18 11:59 PM) 



                                         Lymphocytes #   



                                         [1.0-5.5 K/CMM]   

 

                                        0.8 K/CMM 

                          (10/16/18 5:00 AM)        1.2 K/CMM 

*HI*

                          (10/13/18 5:27 AM)        1.0 K/CMM 

*HI*

                                        (10/11/18 11:59 PM) 



                                         Monocytes # [0.0-0.8   



                                         K/CMM]   

 

                                        0.4 K/CMM 

                          (10/16/18 5:00 AM)        0.1 K/CMM 

                          (10/11/18 11:59 PM)        



                                         Eosinophils #   



                                         [0.0-0.5 K/CMM]   

 

                                        0.1 K/CMM 

                          (10/16/18 5:00 AM)        0.1 K/CMM 

                          (10/13/18 5:27 AM)         



                                         Basophils # [0.0-0.2   



                                         K/CMM]   

 

                                        993 pg/mL 

*HI*

                    (10/12/18 2:40 AM)                          



                                         BNP [<=100 pg/mL]   

 

                                        0.3 mg/dL 

                          (10/18/18 4:53 AM)        0.3 mg/dL 

                          (10/17/18 12:35 PM)        



                                         Bili Indirect   



                                         [0.0-1.0 mg/dL]   

 

                                        88 mL/min/1.73m2 1

*NA*

                          (10/16/18 5:00 AM)        75 mL/min/1.73m2 2

*NA*

                          (10/15/18 8:35 AM)        83 mL/min/1.73m2 3

*NA*

                                        (10/14/18 8:26 AM) 



                                         eGFR   

 

                                        Product available 4

                    (10/14/18 4:25 PM)                          



                                         RBC product   

 

                                        A NEG 

*Unknown*

                    (10/14/18 6:17 PM)                          



                                         ABO/Rh   

 

                                        19 % 

                    (10/15/18 7:10 PM)                          



                                         % Satur Fe [12-57 %]   

 

                                        0.5 

*LOW*

                          (10/18/18 4:53 AM)        0.5 

*LOW*

                          (10/17/18 12:35 PM)       0.5 

*LOW*

                                        (10/16/18 5:00 AM) 



                                         A/G Ratio [0.7-1.6]   

 

                                        Negative 

                    (10/14/18 6:17 PM)                          



                                         Antibody Scrn   

 

                                        2.3 g/dL 

*LOW*

                          (10/18/18 4:53 AM)        2.4 g/dL 

*LOW*

                          (10/17/18 12:35 PM)       2.4 g/dL 

*LOW*

                                        (10/16/18 5:00 AM) 



                                         Albumin Lvl [3.5-5.0   



                                         g/dL]   

 

                                        168 unit/L 

*HI*

                          (10/18/18 4:53 AM)        178 unit/L 

*HI*

                          (10/17/18 12:35 PM)       204 unit/L 

*HI*

                                        (10/16/18 5:00 AM) 



                                         Alk Phos [   



                                         unit/L]   

 

                                        44 unit/L 

                          (10/18/18 4:53 AM)        46 unit/L 

                          (10/17/18 12:35 PM)       58 unit/L 

                                        (10/16/18 5:00 AM) 



                                         ALT [0-65 unit/L]   

 

                                        14.2 mEq/L 

                          (10/16/18 5:00 AM)        14.4 mEq/L 

                          (10/15/18 8:35 AM)        16.0 mEq/L 

                                        (10/14/18 8:26 AM) 



                                         AGAP [10.0-20.0   



                                         mEq/L]   

 

                                        70 unit/L 

*HI*

                          (10/18/18 4:53 AM)        79 unit/L 

*HI*

                          (10/17/18 12:35 PM)       137 unit/L 

*HI*

                                        (10/16/18 5:00 AM) 



                                         AST [0-37 unit/L]   

 

                                        24 

                          (10/16/18 5:00 AM)        23 

                          (10/15/18 8:35 AM)        19 

                                        (10/14/18 8:26 AM) 



                                         B/C Ratio [6-25]   

 

                                        1.1 % 

*HI*

                          (10/16/18 5:00 AM)        0.5 % 

                          (10/13/18 5:27 AM)        0.2 % 

                                        (10/11/18 11:59 PM) 



                                         Basophils [0.0-1.0   



                                         %]   

 

                                        18 mg/dL 

                          (10/16/18 5:00 AM)        22 mg/dL 

                          (10/15/18 8:35 AM)        16 mg/dL 

                                        (10/14/18 8:26 AM) 



                                         BUN [7-22 mg/dL]   

 

                                        8.0 mg/dL 

*LOW*

                          (10/16/18 5:00 AM)        8.2 mg/dL 

*LOW*

                          (10/15/18 8:35 AM)        7.8 mg/dL 

*LOW*

                                        (10/14/18 8:26 AM) 



                                         Calcium Lvl   



                                         [8.5-10.5 mg/dL]   

 

                                        100 mEq/L 

                          (10/16/18 5:00 AM)        99 mEq/L 

                          (10/15/18 8:35 AM)        99 mEq/L 

                                        (10/14/18 8:26 AM) 



                                         Chloride Lvl [   



                                         mEq/L]   

 

                                        28 mEq/L 

                          (10/16/18 5:00 AM)        29 mEq/L 

                          (10/15/18 8:35 AM)        29 mEq/L 

                                        (10/14/18 8:26 AM) 



                                         CO2 [24-32 mEq/L]   

 

                                        0.76 mg/dL 

                          (10/16/18 5:00 AM)        0.97 mg/dL 

                          (10/15/18 8:35 AM)        0.86 mg/dL 

                                        (10/14/18 8:26 AM) 



                                         Creatinine Lvl   



                                         [0.50-1.40 mg/dL]   

 

                                        0.3 mg/dL 

                          (10/18/18 4:53 AM)        0.3 mg/dL 

                          (10/17/18 12:35 PM)        



                                         Bili Direct [0.0-0.3   



                                         mg/dL]   

 

                                        3.7 % 

                          (10/16/18 5:00 AM)        0.3 % 

                          (10/13/18 5:27 AM)        0.9 % 

                                        (10/11/18 11:59 PM) 



                                         Eosinophils [0.0-4.0   



                                         %]   

 

                                        179 ng/mL 

                    (10/15/18 7:10 PM)                          



                                         Ferritin Lvl [   



                                         ng/mL]   

 

                                        4.6 g/dL 

*HI*

                          (10/18/18 4:53 AM)        4.8 g/dL 

*HI*

                          (10/17/18 12:35 PM)       4.6 g/dL 

*HI*

                                        (10/16/18 5:00 AM) 



                                         Globulin [2.7-4.2   



                                         g/dL]   

 

                                        293 mg/dL 

*HI*

                          (10/16/18 5:00 AM)        270 mg/dL 

*HI*

                          (10/15/18 8:35 AM)        168 mg/dL 

*HI*

                                        (10/14/18 8:26 AM) 



                                         Glucose Lvl [70-99   



                                         mg/dL]   

 

                                        Negative 

*NA*

                    (10/15/18 8:35 AM)                          



                                         Hep A IgM [Negative]   

 

                                        Negative 

*NA*

                    (10/15/18 8:35 AM)                          



                                         Hep B Core IgM   



                                         [Negative]   

 

                                        Negative 

*NA*

                    (10/15/18 8:35 AM)                          



                                         Hep Bs Ag [Negative]   

 

                                        32.5 % 

*LOW*

                          (10/16/18 5:00 AM)        31.5 % 

*LOW*

                          (10/15/18 8:35 AM)        23.2 % 

*LOW*

                                        (10/14/18 3:13 PM) 



                                         Hct [42.0-54.0 %]   

 

                                        Negative 

*NA*

                    (10/15/18 8:35 AM)                          



                                         Hep C Ab   

 

                                        10.3 g/dL 

*LOW*

                          (10/16/18 5:00 AM)        10.1 g/dL 

*LOW*

                          (10/15/18 8:35 AM)        7.2 g/dL 

*LOW*

                                        (10/14/18 3:13 PM) 



                                         Hgb [14.0-18.0 g/dL]   

 

                                        2.60 

*HI*

                    (10/11/18 11:59 PM)                          



                                         INR [0.85-1.17]   

 

                                        50 ug/dl 

                    (10/15/18 7:10 PM)                          



                                         Iron [ ug/dl]   

 

                                        4.2 mEq/L 

                          (10/16/18 5:00 AM)        4.4 mEq/L 

                          (10/15/18 8:35 AM)        3.3 mEq/L 

*LOW*

                                        (10/14/18 6:17 PM) 



                                         Potassium Lvl   



                                         [3.5-5.1 mEq/L]   

 

                                        16.7 % 

*LOW*

                          (10/16/18 5:00 AM)        12.3 % 

*LOW*

                          (10/13/18 5:27 AM)        7.9 % 

*LOW*

                                        (10/11/18 11:59 PM) 



                                         Lymphocytes   



                                         [20.0-40.0 %]   

 

                                        25.2 pg 

*LOW*

                          (10/16/18 5:00 AM)        23.4 pg 

*LOW*

                          (10/14/18 3:13 PM)        23.2 pg 

*LOW*

                                        (10/13/18 5:27 AM) 



                                         MCH [27.0-31.0 pg]   

 

                                        31.7 g/dL 

*LOW*

                          (10/16/18 5:00 AM)        31.0 g/dL 

*LOW*

                          (10/14/18 3:13 PM)        30.8 g/dL 

*LOW*

                                        (10/13/18 5:27 AM) 



                                         MCHC [32.0-36.0   



                                         g/dL]   

 

                                        79.3 fL 

*LOW*

                          (10/16/18 5:00 AM)        75.7 fL 

*LOW*

                          (10/14/18 3:13 PM)        75.5 fL 

*LOW*

                                        (10/13/18 5:27 AM) 



                                         MCV [80.0-94.0 fL]   

 

                                        2.0 mg/dL 

                    (10/13/18 5:27 AM)                          



                                         Magnesium Lvl   



                                         [1.8-2.4 mg/dL]   

 

                                        1+ 

*ABN*

                          (10/13/18 5:27 AM)        1+ 

*ABN*

                          (10/11/18 11:59 PM)        



                                         Microcyte [None   



                                         Seen]   

 

                                        7.3 % 

                          (10/16/18 5:00 AM)        7.8 % 

                          (10/13/18 5:27 AM)        6.9 % 

                                        (10/11/18 11:59 PM) 



                                         Monocytes [2.0-12.0   



                                         %]   

 

                                        8.3 fL 

                          (10/16/18 5:00 AM)        8.1 fL 

                          (10/14/18 3:13 PM)        8.3 fL 

                                        (10/13/18 5:27 AM) 



                                         MPV [7.4-10.4 fL]   

 

                                        138 mEq/L 

                          (10/16/18 5:00 AM)        138 mEq/L 

                          (10/15/18 8:35 AM)        141 mEq/L 

                                        (10/14/18 8:26 AM) 



                                         Sodium Lvl [135-145   



                                         mEq/L]   

 

                                        3.1 mg/dL 

                    (10/13/18 5:27 AM)                          



                                         Phosphorus [2.5-4.5   



                                         mg/dL]   

 

                                        230 K/CMM 

                          (10/16/18 5:00 AM)        286 K/CMM 

                          (10/14/18 3:13 PM)        329 K/CMM 

                                        (10/13/18 5:27 AM) 



                                         Platelet [133-450   



                                         K/CMM]   

 

                                        71.2 % 

                          (10/16/18 5:00 AM)        79.1 % 

*HI*

                          (10/13/18 5:27 AM)        84.1 % 

*HI*

                                        (10/11/18 11:59 PM) 



                                         Segs [45.0-75.0 %]   

 

                                        6.9 g/dL 

                          (10/18/18 4:53 AM)        7.2 g/dL 

                          (10/17/18 12:35 PM)       7.0 g/dL 

                                        (10/16/18 5:00 AM) 



                                         Total Protein   



                                         [6.4-8.4 g/dL]   

 

                                        28.2 seconds 

*HI*

                    (10/11/18 11:59 PM)                          



                                         PT [12.0-14.7   



                                         seconds]   

 

                                        43.7 seconds 

*HI*

                    (10/11/18 11:59 PM)                          



                                         PTT [22.9-35.8   



                                         seconds]   

 

                                        4.10 M/CMM 

*LOW*

                          (10/16/18 5:00 AM)        3.06 M/CMM 

*LOW*

                          (10/14/18 3:13 PM)        3.26 M/CMM 

*LOW*

                                        (10/13/18 5:27 AM) 



                                         RBC [4.70-6.10   



                                         M/CMM]   

 

                                        21.4 % 

*HI*

                          (10/16/18 5:00 AM)        22.0 % 

*HI*

                          (10/14/18 3:13 PM)        22.5 % 

*HI*

                                        (10/13/18 5:27 AM) 



                                         RDW [11.5-14.5 %]   

 

                                        0.6 mg/dL 

                          (10/18/18 4:53 AM)        0.6 mg/dL 

                          (10/17/18 12:35 PM)       0.9 mg/dL 

                                        (10/16/18 5:00 AM) 



                                         Bili Total [0.2-1.3   



                                         mg/dL]   

 

                                        261 ug/dl 

                    (10/15/18 7:10 PM)                          



                                         TIBC [228-428 ug/dl]   

 

                                        <0.02 ng/mL 

                          (10/12/18 11:58 AM)       <0.02 ng/mL 

                          (10/11/18 11:59 PM)        



                                         Troponin-I   



                                         [0.00-0.40 ng/mL]   

 

                                        211 ug/dl 

                    (10/15/18 7:10 PM)                          



                                         UIBC [110-370 ug/dl]   

 

                                        10.3 K/CMM 

                          (10/16/18 5:00 AM)        11.3 K/CMM 

*HI*

                          (10/14/18 3:13 PM)        15.4 K/CMM 

*HI*

                                        (10/13/18 5:27 AM) 



                                         WBC [3.7-10.4 K/CMM]   







1Result Comment: The eGFR is calculated using the CKD-EPI formula. In most 
young, healthy individuals the eGFR will be >90 mL/min/1.73m2. The eGFR declines
with age. An eGFR of 60-89 may be normal in some populations, particularly the 
elderly, for whom the CKD-EPI formula has not been extensively validated. Use of
the eGFR is not recommended in the following populations:



Individuals with unstable creatinine concentrations, including pregnant patients
and those with serious co-morbid conditions.



Patients with extremes in muscle mass or diet. 



The data above are obtained from the National Kidney Disease Education Program (
NKDEP) which additionally recommends that when the eGFR is used in patients with
extremes of body mass index for purposes of drug dosing, the eGFR should be mul
tiplied by the estimated BMI.



2Result Comment: The eGFR is calculated using the CKD-EPI formula. In most 
young, healthy individuals the eGFR will be >90 mL/min/1.73m2. The eGFR declines
with age. An eGFR of 60-89 may be normal in some populations, particularly the 
elderly, for whom the CKD-EPI formula has not been extensively validated. Use of
the eGFR is not recommended in the following populations:



Individuals with unstable creatinine concentrations, including pregnant patients
and those with serious co-morbid conditions.



Patients with extremes in muscle mass or diet. 



The data above are obtained from the National Kidney Disease Education Program (
NKDEP) which additionally recommends that when the eGFR is used in patients with
extremes of body mass index for purposes of drug dosing, the eGFR should be mul
tiplied by the estimated BMI.



3Result Comment: The eGFR is calculated using the CKD-EPI formula. In most 
young, healthy individuals the eGFR will be >90 mL/min/1.73m2. The eGFR declines
with age. An eGFR of 60-89 may be normal in some populations, particularly the 
elderly, for whom the CKD-EPI formula has not been extensively validated. Use of
the eGFR is not recommended in the following populations:



Individuals with unstable creatinine concentrations, including pregnant patients
and those with serious co-morbid conditions.



Patients with extremes in muscle mass or diet. 



The data above are obtained from the National Kidney Disease Education Program (
NKDEP) which additionally recommends that when the eGFR is used in patients with
extremes of body mass index for purposes of drug dosing, the eGFR should be mul
tiplied by the estimated BMI.



4Result Comment: 10/14/2018 20:08  [id number]

spoke to Keke Smith on 10/14/2018 20:08 by Serg.



Immunizations





Given and Recorded





   



                 Vaccine         Date            Status          Refusal Reason

 

   



                     influenza virus vaccine, inactivated     18             Given 

 

   



                     influenza virus vaccine, inactivated     18              Given 

 

   



                     pneumococcal 13-valent vaccine     18              Given 

 

   



                     diphtheria/pertussis, acel/tetanus adult     16              Given 

 

   



                     pneumococcal 23-valent vaccine     3/31/15             Given 









Not Given





   



                 Vaccine         Date            Status          Refusal Reason

 

   



                 pneumococcal 23-valent vaccine1     3/30/15         Not Given       Patient Refuses







1Result Note: already recieved vaccination previous to admission



Procedures







    



              Procedure     Date         Related Diagnosis     Body Site     Status

 

    



                           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

 

    



                           Cardiac ablation using fluoroscopy guidance        Completed

 

    



                           Cardiac catheterization, left heart        Completed

 

    



                           Stent placement2          Completed







1ablation stents



2x27



Social History







 



                           Social History Type       Response

 

 



                           Substance Abuse           Use: None.

 

 



                           Alcohol                   Never

 

 



                           Smoking Status            Former smoker; Ready to change: No; Concerns about tobacco use

 in



                                         household: No; Exposure to Tobacco Smoke None; Cigarette Smoking Last 365



                                         Days No; Reg Smoking Cessation Counseling No; Other Tobacco Frequency quit



                                         30 years ago;



                                         entered on: 19







Assessment and Plan

Extracted from:





  



                     Title: Clinical Document     Author: Jovita Ford MD     Date: 10/18/18











Progress Note

Gastroenterology and Hepatology





Assessment/Impression:

                                        1.  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  Also a

 combination of heart failure symptoms with passive congestion may be playing a 
role.  Possible drugs that could be causing this include amiodarone as well as 
atorvastatin.

                                        2.  Anemia with microcytosis likely secondary to acute blood loss most likely from

 epistaxis.  In addition his stool guaiac is positive slow GI blood loss cannot 
be discounted.  Iron panel normal but this was done after blood transfusion

                                        3.  Cholelithiasis with the questionable gallbladder wall thickening on imaging,

 no obvious clinical signs of acute cholecystitis.  HIDA scan showed cystic duct
to be open but ejection fraction is 0 suggestive of chronic cholecystitis



Plan: 

                                        -Continue to hold atorvastatin, continue with the lower dose amiodarone given his

 LFTs are improving slowly

                                        -Given given that there is no obvious cystic duct obstruction as well as no clinical

 signs of acute cholecystitis there is no indication for surgery at this time

                                        -Given that he had prior GI evaluation I do not recommend repeat endoscopy at this

 time.  I am hoping that with stopping anticoagulation which has been done by 
the cardiology team his is hemoglobin will remain stable and will not need 
further interventions.



Plan discussed with the patient, nurse as well as attending physician.  Patient 
is cleared for discharge planning from GI standpoint.



SUBJECTIVE:

Patient seen and examined at bedside. Events of the day reviewed with nursing 
staff. No new complaints.  Liver function tests continue to improve.  He is 
tolerating diet with no problems.



Review of Systems: 

                                        (-)=Negative,(+)=Positive

                                        1) Const: (-) fever, (-) weight change

                                        2) Skin: (-) rash, (-) bleeding

                                        3) HEENT: (-) difficulty swallowing, (-) swelling

                                        4) Eyes: (-) vision changes, (-) bleeding

                                        5) Neuro: (-) weakness, (-) headaches

                                        6) Resp: (-) dyspnea on exertion, (-) cough

                                        7) Cardio: (-) chest pain, (-) orthopnea

                                        8) GI: (-) blood in stool, (-) reflux

                                        9) : (-) dysuria, (-) bloody discharge

                                        10) Endo: (-) heat intolerance, (-) cold intolerance





OBJECTIVE:



Vitals: See below

General: NAD

Psych: alert , oriented x 3

Neck:supple

CVS: s1s2 RRR

Resp: CTA Bilaterally

Abd : Soft, NT, ND , BS +

Ext : No edema

Skin: No rash or echymossis

CNS:  No gross motor/sensory defect



Radiology Studies: Reviewed.



Labs: Reviewed. 













VitalsTmp(F)Tmp(C)[id number]

                                        10/18 15:925482.70hjak512/58---[id number]------

                                        10/18 11:116219.87kmmt966/68---5712---------

                                        10/18 07:167567.68fddz663/80---64--100------

                                        10/18 06:59-----------------------05052------

                                        10/18 04:4197.336.27ldss400/78---[id number]------



                                        24 Hr Tmax: 98F (36.67c) at 10/18 15:22Vital Signs are the last 5 in the past 48

 hours.

                                        24 Hr Tmin:  97.3F (36.28c) at 10/18 04:41Weights are the last 5 in 60 days, plus

 initial.



DateWt(kg)Wt(lb)Ht(cm)Ht(in)MethodBMI

                                        10/18 81.14 178.50Measured

                                        10/17 81.86 180.10Measured

                                        10/15 84.77 186.50Measured

                                        10/14 81.14 178.50Measured

                                        10/13 88.32 194.31Measured

                                        10/12 (initial) 94.05 206.90Measured 30.6

                                        10/49849.26 69.00Stated



Most Recent Scores:



                                        10/18/18Pain Intensity NRS (0-10)0

                                        10/18/18Glasgow Coma Score14

                                        10/18/18Johns Jiménez Fall Score15

                                        10/18/18Braden Score17



                                        (all previously charted lines have been discontinued)



                                        (no surgical procedures documented)



I&ORecordInOutBal

                                        10/1824hr Tot  610    0  610

                                        10/1724hr Tot  540    0  540



                                        24hr Labs

                                        10/18 1505

POC Performing LocatioSee Note  

Glucose EEA245  H

                                        10/18 1147

POC Performing LocatioSee Note  

Glucose RTX717  H

                                        10/18 0708

POC Performing LocatioSee Note  

Glucose XTX098  H

                                        10/18 0453

Total Protein6.9  

Albumin Lvl2.3  L

Bili Total0.6  

Bili Direct0.3  

Bili Indirect0.3  

Alk Szby063  H

AST70  H

ALT44  

Globulin4.6  H

A/G Ratio0.5  L

                                        10/17 2054

POC Performing LocatioSee Note  

Glucose TLB639  H



Scheduled Meds (19):

AMIODarone 100 mg PO Daily

[eMAR Schedule: (10/18/18)  09:00]

[Future Dose:  10/19/18 09:00]

LORazepam 1 mg PO Bedtime

[Last Rescheduled Dt/Tm: 10/13/18 23:00:00 CDT]

[eMAR Schedule: (10/18/18)  23:00]

[Future Dose:  10/19/18 23:00]

LORazepam 0.5 mg PO Daily

[Last Rescheduled Dt/Tm: 10/13/18 17:30:00 CDT]

[eMAR Schedule: (10/18/18)  09:00]

[Future Dose:  10/19/18 09:00]

aspirin (aspirin 81 mg tablet, enteric coated) 81 mg PO Daily

[Last Rescheduled Dt/Tm: 10/13/18 13:19:00 CDT]

[eMAR Schedule: (10/18/18)  09:00]

[Future Dose:  10/19/18 09:00]

                                        (Suspended) atorvastatin 5 mg PO Bedtime

bumetanide (Bumex) 2 mg PO Daily

[eMAR Schedule: (10/18/18)  09:00]

[Future Dose:  10/19/18 09:00]

gabapentin (gabapentin 600 mg oral tablet) 600 mg PO TID

[eMAR Schedule: (10/18/18)  09:00, 13:00, 17:00]

isosorbide mononitrate 30 mg PO QAM

[eMAR Schedule: (10/18/18)  09:00]

[Future Dose:  10/19/18 09:00]

levothyroxine (Synthroid) 50 microgram PO Q630AM

[Last Rescheduled Dt/Tm: 10/14/18 6:30:00 CDT]

[eMAR Schedule: (10/18/18)  06:30;   (10/19/18)  06:30]

lisinopril 2.5 mg PO Daily

[eMAR Schedule: (10/18/18)  09:00]

[Future Dose:  10/19/18 09:00]

magnesium oxide (Mag-Ox 400) 400 mg PO Daily

[eMAR Schedule: (10/18/18)  09:00]

[Future Dose:  10/19/18 09:00]

metoprolol (metoprolol tartrate) 25 mg PO Q12H

[eMAR Schedule: (10/18/18)  09:00, 21:00]

niacin (niacin 1000 mg oral tablet, extended release) 2,000 mg PO Bedtime

[eMAR Schedule: (10/18/18)  21:00]

[Future Dose:  10/19/18 21:00]

pantoprazole 40 mg PO Daily

[eMAR Schedule: (10/18/18)  09:00]

[Future Dose:  10/19/18 09:00]

potassium chloride (potassium chloride 20 mEq oral tablet, extended release) 20 
mEq PO BID

[eMAR Schedule: (10/18/18)  09:00, 17:00]

ranolazine (Ranexa 500 mg oral tablet, extended release) 500 mg PO Daily

[eMAR Schedule: (10/18/18)  09:00]

[Future Dose:  10/19/18 09:00]

sertraline 100 mg PO Daily

[eMAR Schedule: (10/18/18)  09:00]

[Future Dose:  10/19/18 09:00]

sodium chloride (Saline Flush 0.9%) 10 ml IVP Q12H

[Last Rescheduled Dt/Tm: 10/12/18 21:00:00 CDT]

[eMAR Schedule: (10/18/18)  09:00, 21:00]

tamsulosin 0.4 mg PO Daily

[eMAR Schedule: (10/18/18)  09:00]

[Future Dose:  10/19/18 09:00]

Unscheduled Meds: None

PRN Meds (26):

Dextrose 50% in Water IV (Dextrose 50% Syringe) 12.5 gm IVP PRN

Dextrose 50% in Water IV (Dextrose 50% Syringe) 25 gm IVP PRN

acetaminophen-hydrocodone (Norco 10/325 oral tablet) 1 tab PO Q6H

acetaminophen (Tylenol) 650 mg PO Q6H

calcium gluconate + Sodium Chloride 0.9%  mL 2 gm IVPB  ml/hr

calcium gluconate + Sodium Chloride 0.9%  mL 3 gm IVPB  ml/hr

famotidine 20 mg PO Q12H

glucagon 1 mg IM PRN

insulin lispro 1 unit SUB-Q TID-Before Meals

insulin lispro 2 unit SUB-Q TID-Before Meals

insulin lispro 3 unit SUB-Q TID-Before Meals

insulin lispro 4 unit SUB-Q TID-Before Meals

insulin lispro 5 unit SUB-Q TID-Before Meals

magnesium oxide 800 mg PO PRN

magnesium sulfate 1 gm IVPB  ml/hr

magnesium sulfate 2 gm IVPB PRN 25 ml/hr

potassium chloride 20 mEq PO PRN

potassium chloride 20 mEq [place] PRN

potassium chloride 10 mEq IVPB  ml/hr

potassium phosphate + Sodium Chloride 0.9%  mL 15 mmol IVPB PRN 63.75 
ml/hr

potassium phosphate + Sodium Chloride 0.9%  mL 30 mmol IVPB PRN 65 ml/hr

potassium phosphate-sodium phosphate (potassium phosphate-sodium phosphate 250 
mg-280 mg-160 mg oral powder for reconstitution) 2 pkt PO PRN

sodium chloride (Saline Flush 0.9%) 10 ml IVP PRN

sodium phosphate + Dextrose 5% in Water  mL 15 mmol IVPB PRN 63.75 ml/hr

sodium phosphate + Dextrose 5% in Water  mL 30 mmol IVPB PRN 65 ml/hr

trazodone (trazodone 50 mg oral tablet) 50 mg PO Bedtime

One Time Meds: None

Continuous Infusions: None



Stool Color:  Brown

Stool Description:  Large, Soft

Type of Bladder Control:  Voluntary

Type of Urinary Elimination:  Incontinent





Extracted from:





  



                     Title: Clinical Document     Author: Jovita Ford MD     Date: 10/15/18









                                        Initial Consult Note

Gastroenterology and Hepatology 





Referring MD: Dr Delbert Forbes



Reason for Consultation:

Elevated liver function test



Assessment/Impression:

                                        1.  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  Also a

 combination of heart failure symptoms with passive congestion may be playing a 
role.  Possible other drugs that could be causing this include amiodarone as 
well as atorvastatin.

                                        2.  Anemia with microcytosis likely secondary to acute blood loss most likely from

 epistaxis.  In addition his stool guaiac is positive slow GI blood loss cannot 
be discounted.

Plan: 

                                        -Check acute hepatitis panel, recommend stopping atorvastatin.  I have discussed

 the case with his cardiologist Dr. Velazco who is agreed to drop the dose of 
amiodarone.

                                        -Trend liver function tests

                                        -Check anemia panel, if iron is low consider IV iron infusions

                                        -Given that he had prior GI evaluation I do not recommend repeat endoscopy at this

 time.  I am hoping that with stopping anticoagulation which has been done by 
the cardiology team he is hemoglobin will remain stable and will not need 
further interventions.



Thank you for asking us to participate in the care of this patient.

History Of Present Illness:

This is a 78-year-old male with past medical history of coronary artery disease 
with bypass graft, multiple PCI's in the past, chronic diastolic heart failure, 
paroxysmal atrial fibrillation and rate controlled status post prior ablations 
currently normal sinus rhythm, was on anticoagulation and admission, 
hypertension, diabetes with the chronic epistaxis who presented to the hospital 
with ongoing epistaxis and anemia.  Patient apparently has needed at least 2 
blood transfusions this month.  Cardiology has assessed the patient on Xarelto 
has been on hold.  He remains on aspirin 81 mg.  His hemoglobin today was 10.1 
after 2 units of blood transfusion.  In addition he was noted to have abnormal 
liver function test with admission AST of 58 alkaline phosphatase of 174 ALT of 
44 and they have increased to AST of 133 alkaline phosphorus of 195.  GI has 
been probably consulted due to elevated liver function test.  Of note he also is
on statin and I do not see any dose changes in the statin medication.  He denies
any alcohol use.



As per anemia appears to be chronic although there has been acute drop recently.
 He was previously evaluated by our service in 2017 for similar problems
underwent an upper endoscopy which was completely normal.  Colonoscopy was 
suboptimal poor prep but no active bleeding was noted.



Past Medical History:

Stented coronary artery

Acute MI

Hypercholesteremia

Poliomyelitides

Whooping cough

Histoplasmosis

Hypertension

Hx MRSA infection





Scheduled Meds (18):

                                        10/14/18 AMIODarone 200 mg PO Daily

                                        10/13/18 LORazepam 1 mg PO Bedtime

                                        10/13/18 LORazepam 0.5 mg PO Daily

                                        10/13/18 aspirin (aspirin 81 mg tablet, enteric coated) 81 mg PO Daily

                                        10/13/18 atorvastatin 5 mg PO Bedtime

                                        10/13/18 gabapentin (gabapentin 600 mg oral tablet) 600 mg PO TID

                                        10/14/18 isosorbide mononitrate 30 mg PO QAM

                                        10/14/18 levothyroxine (Synthroid) 50 microgram PO Q630AM

                                        10/14/18 lisinopril 2.5 mg PO Daily

                                        10/14/18 magnesium oxide (Mag-Ox 400) 400 mg PO Daily

                                        10/13/18 metoprolol (metoprolol tartrate) 25 mg PO Q12H

                                        10/13/18 niacin (niacin 1000 mg oral tablet, extended release) 2,000 mg PO Bedtime



                                        10/14/18 pantoprazole 40 mg PO Daily

                                        10/13/18 potassium chloride (potassium chloride 20 mEq oral tablet, extended release)

 20 mEq PO BID

                                        10/14/18 ranolazine (Ranexa 500 mg oral tablet, extended release) 500 mg PO Daily



                                        10/14/18 sertraline 100 mg PO Daily

                                        10/12/18 sodium chloride (Saline Flush 0.9%) 10 ml IVP Q12H

                                        10/14/18 tamsulosin 0.4 mg PO Daily



Continuous Infusions (1):

                                        10/14/18 Sodium Chloride 0.9%  mL (normal saline 0.9%  mL) 250 mL 30

 ml/hr





Allergies (4) ActiveReaction

ReglanNone documented

iodineNone documented

sulfa drugsNone documented

LatexNone documented



Social History:



Alcohol

Details: Never

Tobacco

Details: Use: Former smoker.  Tobacco smoke exposure: None.  Did the Patient 
Smoke Cigarettes Anytime During the Last 365 Days? No.  Cessation Counseling 
Provided? Yes.

Details: Use: Former smoker.  Type: Cigarettes.  Tobacco smoke exposure: None.  
Did the Patient Smoke Cigarettes Anytime During the Last 365 Days? No.  
Cessation Counseling Provided? No.

Details: Use: Former smoker.  Ready to change: No.  Household tobacco concerns: 
No.  Tobacco smoke exposure: None.  Other Tobacco Frequency quit 30 years ago.  
Did the Patient Smoke Cigarettes Anytime During the Last 365 Days? No.  
Cessation Counseling Provided? No.

Substance Abuse

Details: Use: None.



Family History:



Mother: Aneurysm

Brother: Cancer of prostate; Heart attack; Leukemia



Procedure History:



Cardiac catheterization, left heart

CABG x 3 - Coronary artery bypass grafts x 3

Cardiac ablation using fluoroscopy guidance

Stent placement

Review of Systems: 

NEGATIVE unless bold

General: weight loss, loss of appetite, fever, chills, excessive malaise, 
fatigue, generalized weakness

HEENT : recent change in vison, eye pain, diplopia, epistaxis, sinus pain, sore 
throat, throat pain, acute hearing loss, ear pain or discharge

RESPIRATORY: shortness of breath, hemoptysis, cough, wheezing, pleuritic pains

CVS: chest pain, palpitations, irregular herat beat, low extremity swelling, 
heart murmurs

GI: see HPI

: hematuria, dysuria, incontinence,urinary frequency, impaired urine flow. 
recurrent UTIs

MS: acute arthritis, back pain, joint swelling, gout

Neurological: acute altered mentation, headaches, recent seizures, falls, recent
loss of consciousness, gait problems, focal limb weakness, numbness, tingling , 
paraesthesia

Endocrine: polydypsia, polyuria, unusual hair loss

Immunological/ Hematological; acute bleeding, easy bleeding or bruising, lymph 
node swelling, recurrent infections

Psychiatric: hallucinations. psychosis, confusion, depression, suicidal ideation

Integument: rash, jaundice, generalized





Vitals and Temp:



VitalsTmp(F)[id number]

                                        10/15 15:[id number]---

                                        10/15 11:3097.874080/54--97---

                                        10/15 08:58--------------99 2.0L/m

                                        10/15 08:2297.683481/71--------

                                        10/15 07:4497.347053/69--98---



                                        24 Hr Tmax: 97.7F (36.50c) at 10/15 11:30Vital Signs are the last 5 in the past 

48 hours.



Examination: 

Vitals: See above

General: NAD

Psych: alert ; ortiented x 3

Neck:supple

CVS: 3/6 systolic murmur.  Regularly regular

Resp: Decreased breath sounds at bases, no crackles.

Abd : Soft, NT, ND , BS +

Ext : No edema

Skin: No rash

CNS:  No gross motor/sensory defect



Labs:



Labs (Last four charted values)

WBC                 H 11.3(OCT 14)H 15.4(OCT 13)H 14.4(OCT 11)

Hgb                 L 10.1(OCT 15)L 7.2(OCT 14)L 7.6(OCT 13)L 7.9(OCT 12)

Hct                 L 31.5(OCT 15)L 23.2(OCT 14)L 24.6(OCT 13)L 26.4(OCT 12)

Plt                 286(OCT 14)329(OCT 13)341(OCT 11)

Na                  138(OCT 15)141(OCT 14)141(OCT 13)137(OCT 11)

K                   4.4(OCT 15)L 3.3(OCT 14)C 3.0(OCT 14)L 3.4(OCT 13)

CO2                 29(OCT 15)29(OCT 14)29(OCT 13)31(OCT 11)

Cl                  99(OCT 15)99(OCT 14)99(OCT 13)97(OCT 11)

Cr                  0.97(OCT 15)0.86(OCT 14)0.87(OCT 13)0.84(OCT 11)

BUN                 22(OCT 15)16(OCT 14)19(OCT 13)H 24(OCT 11)

Glucose Random      H 270(OCT 15)H 168(OCT 14)H 144(OCT 13)H 185(OCT 11)

Mg                  2.0(OCT 13)

Phos                3.1(OCT 13)

Ca                  L 8.2(OCT 15)L 7.8(OCT 14)L 8.0(OCT 13)L 8.0(OCT 11)

PT                  H 28.2(OCT 11)

INR                 H 2.60(OCT 11)

PTT                 H 43.7(OCT 11)

Troponin            <0.02(OCT 12)<0.02(OCT 11)

Diagnostic Studies: 

Reviewed. 





Extracted from:





  



                     Title: Clinical Document     Author: Dennys Aquino MD     Date: 10/12/18









                                        full H&P dictated,

#[id number]

date/time: 10/12/2018  07:05



**critical care time 25-30 minutes**

## 2019-07-18 NOTE — XMS REPORT
Summary of Care: 10/23/18 - 10/23/18

                             Created on: 2046



DESIREE MCKEON

External Reference #: [id number]

: 1940

Sex: Male



Demographics







                          Address                   *[address] [place]  [address]-

 

                          Home Phone                [phone]

 

                          Preferred Language        Unknown

 

                          Marital Status            Unknown

 

                          Alevism Affiliation     Zoroastrian

 

                          Race                      Other

 

                                        Additional Race(s)  

 

                          Ethnic Group              Non-





Author







                          Author                    [facility]

 

                          Organization              [facility]

 

                          Address                   Unknown

 

                          Phone                     Unavailable







Encounter





CHLOÉ Pedersen(DAVID) [id number] Date(s): 10/23/18 - 10/23/18

[facility] [address] [place] [address]-     [phone]
[phone]

Encounter Diagnosis

Unspecified atrial fibrillation (Final) - 

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 
(Final) - 

Hypothyroidism, unspecified (Final) - 

Obstructive sleep apnea (adult) (pediatric) (Final) - 

Presence of coronary angioplasty implant and graft (Final) - 

Long term (current) use of anticoagulants (Final) - 

Presence of aortocoronary bypass graft (Final) - 

Long term (current) use of aspirin (Final) - 

Other long term (current) drug therapy (Final) - 

Personal history of nicotine dependence (Final) - 

Long term (current) use of insulin (Final) - 

Hormone replacement therapy (Final) - 

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 

Fall from non-moving wheelchair, initial encounter (Final) - 

Unspecified injury of head, initial encounter (Final) - 10/29/18

Dehydration (Final) - 

Other specified abnormal findings of blood chemistry (Final) - 

Hypertensive heart disease with heart failure (Final) - 

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
ure (Final) - 

Type 2 diabetes mellitus without complications (Final) - 

Pure hypercholesterolemia, unspecified (Final) - 

Closed head injury (Discharge Diagnosis) - 10/23/18

Dehydration (Discharge Diagnosis) - 10/23/18

Discharge Disposition: Intermediate Care

Attending Physician: Aubrey Morales MD





Vital Signs







                    1                   2                   3



                                         Most recent to   



                                         oldest [Reference   



                                         Range]:   

 

                                        175.26 cm 

                    (10/23/18 3:27 PM)                         



                                         Height   

 

                                        97.7 DegF 

                          (10/23/18 6:20 PM)        97.5 DegF 

                          (10/23/18 5:33 PM)        97.7 DegF 

                                        (10/23/18 3:35 PM)



                                         Temperature Oral   



                                         [96.4-99.1 DegF]   

 

                                        108/50 mmHg 

                          (10/23/18 6:20 PM)        97/46 mmHg 

                          (10/23/18 5:33 PM)        93/51 mmHg 

                                        (10/23/18 3:35 PM)



                                         Blood Pressure   



                                         [/60-90 mmHg]   

 

                                        17 BRMIN 

                          (10/23/18 6:20 PM)        17 BRMIN 

                          (10/23/18 5:33 PM)        17 BRMIN 

                                        (10/23/18 3:35 PM)



                                         Respiratory Rate   



                                         [14-20 BRMIN]   

 

                                        51 bpm 

*LOW*

                          (10/23/18 3:35 PM)        80 bpm 

                          (10/23/18 3:27 PM)         



                                         Peripheral Pulse   



                                         Rate [ bpm]   

 

                                        86.364 kg 

                    (10/23/18 3:27 PM)                         



                                         Weight   

 

                                        28.12 m2 

                    (10/23/18 3:27 PM)                         



                                         Body Mass Index   







Problem List







    



              Condition     Effective Dates     Status       Health Status     Informant

 

    



                           Acute MI(Confirmed)1      Resolved  

 

    



                           Atrial                    Active  



                                         fibrillation(Confirm    



                                         ed)    

 

    



                           CHF (congestive           Active  



                                         heart    



                                         failure)(Confirmed)    

 

    



                           CAD (coronary artery      Active  



                                         disease)(Confirmed)    

 

    



                           Diabetes(Confirmed)       Active  

 

    



                           Diastolic heart           Active  



                                         failure(Confirmed)    

 

    



                           Histoplasmosis(Confi      Resolved  



                                         rmed)    

 

    



                           Hx MRSA                   Resolved  



                                         infection(Confirmed)    

 

    



                           Hypercholesteremia(C      Resolved  



                                         onfirmed)    

 

    



                           Hypertension(Confirm      Active  



                                         ed)    

 

    



                           Hypertension(Confirm      Resolved  



                                         ed)    

 

    



                           HTN                       Active  



                                         (hypertension)(Confi    



                                         rmed)    

 

    



                           Hypothyroidism(Confi      Active  



                                         rmed)    

 

    



                           Diabetes mellitus         Active  



                                         type 2, insulin    



                                         dependent(Confirmed)    

 

    



                     Moderate            Active              Chronic ; 



                                         protein-calorie    



                                         malnutrition(Confirm    



                                         ed)    

 

    



                           PAUL (obstructive          Active  



                                         sleep    



                                         apnea)(Confirmed)    

 

    



                           Poliomyelitides(Conf      Resolved  



                                         irmed)    

 

    



                           Sleep                     Active  



                                         apnea(Confirmed)    

 

    



                           Stented coronary          Resolved  



                                         artery(Confirmed)2    

 

    



                           Systolic heart            Active  



                                         failure(Confirmed)    

 

    



                           Whooping                  Resolved  



                                         cough(Confirmed)    







1x 3



225 cardiac stents



Allergies, Adverse Reactions, Alerts







   



                 Substance       Reaction        Severity        Status

 

   



                           sulfa drugs               Active

 

   



                           Reglan                    Active

 

   



                           iodine                    Active

 

   



                           Latex                     Active







Medications





NS (Bolus) IV

500 mL, 500 ml/hr, Infuse Over: 1 hr, Route: IV, 500, Drug form: INJ, ONCE, Prio
rity: STAT, Dosing Weight 86.364 kg, Start date: 10/23/18 16:38:00 CDT, Stop hazel
e: 10/23/18 16:38:00 CDT

Start Date: 10/23/18

Stop Date: 10/23/18

Status: Completed



Saline Flush 0.9%

10 mL, Route: IVP, Drug Form: INJ, Dosing Weight 86.364, kg, PRN, PRN Line Flush
, Start date: 10/23/18 15:49:00 CDT, Duration: 30 day, Stop date: 18 14:48
:00 CST

Notes: (Same as: BD Posiflush)

Start Date: 10/23/18

Stop Date: 10/23/18

Status: Discontinued



Results











 



                           Most recent to            1



                                         oldest [Reference 



                                         Range]: 

 

 



                           Neutrophils #             10.8 K/CMM



                           [1.5-8.1 K/CMM]           *HI*



                                         (10/23/18 3:56 PM)

 

 



                           Lymphocytes #             2.6 K/CMM



                           [1.0-5.5 K/CMM]           (10/23/18 3:56 PM)

 

 



                           Monocytes # [0.0-0.8      1.2 K/CMM



                           K/CMM]                    *HI*



                                         (10/23/18 3:56 PM)

 

 



                           Eosinophils #             0.3 K/CMM



                           [0.0-0.5 K/CMM]           (10/23/18 3:56 PM)

 

 



                           Basophils # [0.0-0.2      0.1 K/CMM



                           K/CMM]                    (10/23/18 3:56 PM)

 

 



                           eGFR                      47 mL/min/1.73m2 1



                                         *NA*



                                         (10/23/18 3:56 PM)

 

 



                           AGAP [10.0-20.0           18.4 mEq/L



                           mEq/L]                    (10/23/18 3:56 PM)

 

 



                           Basophils [0.0-1.0        0.8 %



                           %]                        (10/23/18 3:56 PM)

 

 



                           BUN [7-22 mg/dL]          21 mg/dL



                                         (10/23/18 3:56 PM)

 

 



                           Calcium Lvl               8.5 mg/dL



                           [8.5-10.5 mg/dL]          (10/23/18 3:56 PM)

 

 



                           Chloride Lvl [      98 mEq/L



                           mEq/L]                    (10/23/18 3:56 PM)

 

 



                           CO2 [24-32 mEq/L]         23 mEq/L



                                         *LOW*



                                         (10/23/18 3:56 PM)

 

 



                           Creatinine Lvl            1.42 mg/dL



                           [0.50-1.40 mg/dL]         *HI*



                                         (10/23/18 3:56 PM)

 

 



                           Eosinophils [0.0-4.0      2.2 %



                           %]                        (10/23/18 3:56 PM)

 

 



                           Glucose Lvl [70-99        231 mg/dL



                           mg/dL]                    *HI*



                                         (10/23/18 3:56 PM)

 

 



                           Hct [42.0-54.0 %]         37.5 %



                                         *LOW*



                                         (10/23/18 3:56 PM)

 

 



                           Hgb [14.0-18.0 g/dL]      11.7 g/dL



                                         *LOW*



                                         (10/23/18 3:56 PM)

 

 



                           INR [0.85-1.17]           1.07



                                         (10/23/18 3:56 PM)

 

 



                           Potassium Lvl             5.4 mEq/L



                           [3.5-5.1 mEq/L]           *HI*



                                         (10/23/18 3:56 PM)

 

 



                           Lymphocytes               17.2 %



                           [20.0-40.0 %]             *LOW*



                                         (10/23/18 3:56 PM)

 

 



                           MCH [27.0-31.0 pg]        24.6 pg



                                         *LOW*



                                         (10/23/18 3:56 PM)

 

 



                           MCHC [32.0-36.0           31.1 g/dL



                           g/dL]                     *LOW*



                                         (10/23/18 3:56 PM)

 

 



                           MCV [80.0-94.0 fL]        78.9 fL



                                         *LOW*



                                         (10/23/18 3:56 PM)

 

 



                           Microcyte [None           1+



                           Seen]                     *ABN*



                                         (10/23/18 3:56 PM)

 

 



                           Monocytes [2.0-12.0       8.2 %



                           %]                        (10/23/18 3:56 PM)

 

 



                           MPV [7.4-10.4 fL]         8.1 fL



                                         (10/23/18 3:56 PM)

 

 



                           Sodium Lvl [135-145       134 mEq/L



                           mEq/L]                    *LOW*



                                         (10/23/18 3:56 PM)

 

 



                           Platelet [133-450         341 K/CMM



                           K/CMM]                    (10/23/18 3:56 PM)

 

 



                           Segs [45.0-75.0 %]        71.6 %



                                         (10/23/18 3:56 PM)

 

 



                           PT [12.0-14.7             13.9 seconds



                           seconds]                  (10/23/18 3:56 PM)

 

 



                           PTT [22.9-35.8            30.4 seconds



                           seconds]                  (10/23/18 3:56 PM)

 

 



                           RBC [4.70-6.10            4.75 M/CMM



                           M/CMM]                    (10/23/18 3:56 PM)

 

 



                           RDW [11.5-14.5 %]         21.9 %



                                         *HI*



                                         (10/23/18 3:56 PM)

 

 



                           Troponin-I                <0.02 ng/mL



                           [0.00-0.40 ng/mL]         (10/23/18 3:56 PM)

 

 



                           WBC [3.7-10.4 K/CMM]      15.1 K/CMM



                                         *HI*



                                         (10/23/18 3:56 PM)







1Result Comment: The eGFR is calculated using the CKD-EPI formula. In most 
young, healthy individuals the eGFR will be >90 mL/min/1.73m2. The eGFR declines
with age. An eGFR of 60-89 may be normal in some populations, particularly the 
elderly, for whom the CKD-EPI formula has not been extensively validated. Use of
the eGFR is not recommended in the following populations:



Individuals with unstable creatinine concentrations, including pregnant patients
and those with serious co-morbid conditions.



Patients with extremes in muscle mass or diet. 



The data above are obtained from the National Kidney Disease Education Program (
NKDEP) which additionally recommends that when the eGFR is used in patients with
extremes of body mass index for purposes of drug dosing, the eGFR should be mul
tiplied by the estimated BMI.



Immunizations





Given and Recorded





   



                 Vaccine         Date            Status          Refusal Reason

 

   



                     influenza virus vaccine, inactivated     18             Given 

 

   



                     influenza virus vaccine, inactivated     18              Given 

 

   



                     pneumococcal 13-valent vaccine     18              Given 

 

   



                     diphtheria/pertussis, acel/tetanus adult     16              Given 

 

   



                     pneumococcal 23-valent vaccine     3/31/15             Given 









Not Given





   



                 Vaccine         Date            Status          Refusal Reason

 

   



                 pneumococcal 23-valent vaccine1     3/30/15         Not Given       Patient Refuses







1Result Note: already recieved vaccination previous to admission



Procedures







    



              Procedure     Date         Related Diagnosis     Body Site     Status

 

    



                           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

 

    



                           Cardiac ablation using fluoroscopy guidance        Completed

 

    



                           Cardiac catheterization, left heart        Completed

 

    



                           Stent placement2          Completed







1ablation stents



2x27



Social History







 



                           Social History Type       Response

 

 



                           Substance Abuse           Use: None.

 

 



                           Alcohol                   Never

 

 



                           Smoking Status            Former smoker; Ready to change: No; Concerns about tobacco use

 in



                                         household: No; Exposure to Tobacco Smoke None; Cigarette Smoking Last 365



                                         Days No; Reg Smoking Cessation Counseling No; Other Tobacco Frequency quit



                                         30 years ago;



                                         entered on: 19







Assessment and Plan





No data available for this section

## 2019-07-18 NOTE — NUR
VIOLETTE PARKS, DR. CHAVEZ, SPOKE TO DR. STEVAN WATERS, PTS CARDIOLOGIST; DR. STEVAN WATERS 
STATES TO CALL [facility] WITH CONSULT TO DR. STEVAN WATERS.

## 2019-07-18 NOTE — DIAGNOSTIC IMAGING REPORT
A single frontal view of the chest.



HISTORY:  Chest pain

COMPARISON:  Chest radiographs April 23, 2017 and August 6, 2015.

     

DISCUSSION:

Portable technique,  limits sensitivity of the exam.

Soft tissue attenuation partially limits sensitivity of the exam.

Overlying tubes and lines.

Left chest implanted electronic device.

Tubes/Lines:  None



Lungs and pleura:  

Diffusely increased interstitial and airspace opacities, most confluent at the

left mid to lower lung.  

A small left pleural effusion is possible.



Heart and mediastinum:  

The cardiac silhouette appears enlarged.



Bones and soft tissues:  

Multiple median sternotomy wires.  





IMPRESSION: 

1.  Moderate pulmonary edema.  Multifocal pneumonia could be a consideration

the appropriate setting.

2.  Left basilar atelectasis and possible small left effusion.

3.  Recommend short term follow up routine PA and lateral chest radiographs, in

6 to 8 weeks, to evaluate for resolution.













Signed by: Dr. Gabino Trotter D.O., M.M.M. on 7/18/2019 9:01 PM

## 2019-07-18 NOTE — XMS REPORT
Summary of Care: 18 - 18

                             Created on: 2060



DESIREE MCKEON

External Reference #: [id number]

: 1940

Sex: Male



Demographics







                          Address                   [address]

[place]  [address]

 

                          Home Phone                [phone]

 

                          Preferred Language        English

 

                          Marital Status            

 

                          Mormonism Affiliation     None

 

                          Race                      Other

 

                                        Additional Race(s)  

 

                          Ethnic Group              Non-





Author







                          Author                    [facility]

 

                          Organization              [facility]

 

                          Address                   Unknown

 

                          Phone                     Unavailable







Encounter





CHLOÉ Pedersen(DAVID) [id number] Date(s): 18 - 18

[facility] [address] [place] [address]-     [phone]
[phone]

Discharge Disposition: Home or Self Care

Attending Physician: Katie Mandujano MD

Admitting Physician: Katie Mandujano MD





Vital Signs







                    1                   2                   3



                                         Most recent to   



                                         oldest [Reference   



                                         Range]:   

 

                                        177.8 cm 

                    (18 1:34 PM)                         



                                         Height   

 

                                        97.6 DegF 

                          (18 4:13 PM)         97.7 DegF 

                          (18 11:00 AM)        97.7 DegF 

                                        (18 7:31 AM)



                                         Temperature Oral   



                                         [96.4-99.1 DegF]   

 

                                        114/71 mmHg 

                          (18 4:13 PM)         109/60 mmHg 

                          (18 11:00 AM)        116/63 mmHg 

                                        (18 7:31 AM)



                                         Blood Pressure   



                                         [/60-90 mmHg]   

 

                                        18 BRMIN 

                          (18 4:13 PM)         18 BRMIN 

                          (18 11:00 AM)        18 BRMIN 

                                        (18 7:31 AM)



                                         Respiratory Rate   



                                         [14-20 BRMIN]   

 

                                        67 bpm 

                          (18 4:13 PM)         72 bpm 

                          (18 11:00 AM)        76 bpm 

                                        (18 7:31 AM)



                                         Peripheral Pulse   



                                         Rate [ bpm]   

 

                                        113.636 kg 

                    (18 1:34 PM)                         



                                         Weight   

 

                                        35.95 m2 

                    (18 1:34 PM)                         



                                         Body Mass Index   







Problem List







    



              Condition     Effective Dates     Status       Health Status     Informant

 

    



                           Acute MI(Confirmed)1      Resolved  

 

    



                           Atrial                    Active  



                                         fibrillation(Confirm    



                                         ed)    

 

    



                           Atrial                    Active  



                                         fibrillation(Confirm    



                                         ed)    

 

    



                           CHF (congestive           Active  



                                         heart    



                                         failure)(Confirmed)    

 

    



                           CAD (coronary artery      Active  



                                         disease)(Confirmed)    

 

    



                           Diabetes(Confirmed)       Active  

 

    



                           Diastolic heart           Active  



                                         failure(Confirmed)    

 

    



                           Histoplasmosis(Confi      Resolved  



                                         rmed)    

 

    



                           Hx MRSA                   Resolved  



                                         infection(Confirmed)    

 

    



                           Hypercholesteremia(C      Resolved  



                                         onfirmed)    

 

    



                           Hypertension(Confirm      Active  



                                         ed)    

 

    



                           Hypertension(Confirm      Resolved  



                                         ed)    

 

    



                           HTN                       Active  



                                         (hypertension)(Confi    



                                         rmed)    

 

    



                           Hypothyroidism(Confi      Active  



                                         rmed)    

 

    



                           Diabetes mellitus         Active  



                                         type 2, insulin    



                                         dependent(Confirmed)    

 

    



                           PAUL (obstructive          Active  



                                         sleep    



                                         apnea)(Confirmed)    

 

    



                           Poliomyelitides(Conf      Resolved  



                                         irmed)    

 

    



                           Sleep                     Active  



                                         apnea(Confirmed)    

 

    



                           Stented coronary          Resolved  



                                         artery(Confirmed)2    

 

    



                           Systolic heart            Active  



                                         failure(Confirmed)    

 

    



                           Whooping                  Resolved  



                                         cough(Confirmed)    







1x 3



225 cardiac stents



Allergies, Adverse Reactions, Alerts







   



                 Substance       Reaction        Severity        Status

 

   



                           sulfa drugs               Active

 

   



                           Reglan                    Active

 

   



                           iodine                    Active

 

   



                           Latex                     Active







Medications





acetaminophen

650 mg, 2 tab, Route: PO, Drug form: TAB, Q4H, Dosing Weight 113.636, kg, PRN Pa
in 1-3/Temp > 100.4 F, Start date: 18 22:44:00 CDT, Duration: 30 day, Stop
date: 18 22:43:00 CDT

Notes: Do not exceed 4 gm/day.  (Same as: Tylenol)

Start Date: 18

Stop Date: 18

Status: Discontinued



AMIODarone

200 mg, 1 tab, Route: PO, Drug form: TAB, Daily, Dosing Weight 113.636, kg, Star
t date: 18 9:00:00 CDT, Duration: 30 day, Stop date: 18 9:00:00 CDT

Notes: (Same as: Cordarone)

Start Date: 18

Stop Date: 18

Status: Discontinued



aspirin

325 mg, 1 tab, Route: PO, Drug form: TAB, ONCE, Dosing Weight 113.636, kg, Prior
ity: STAT, Start date: 18 20:24:00 CDT, Stop date: 18 20:24:00 CDT

Notes: Take with food.

Start Date: 18

Stop Date: 18

Status: Completed



aspirin 81 mg tablet, enteric coated

81 mg, 1 tab, Route: PO, Drug form: ECTAB, Daily, Dosing Weight 113.636, kg, Sta
rt date: 18 9:00:00 CDT, Duration: 30 day, Stop date: 18 9:00:00 CDT

Notes: Do not crush or chew.(Same As: Ecotrin)

Start Date: 18

Stop Date: 18

Status: Discontinued



atorvastatin

5 mg, 0.5 tab, Route: PO, Drug form: TAB, Bedtime, Dosing Weight 113.636, kg, St
art date: 18 21:00:00 CDT, Duration: 30 day, Stop date: 18 21:00:00 
CDT

Notes: (Same As: Lipitor)

Start Date: 18

Stop Date: 18

Status: Discontinued



Dextrose 50% Syringe

12.5 gm, 25 mL, Route: IVP, Drug Form: INJ, Dosing Weight 113.636, kg, PRN, PRN 
Blood Glucose Results, Start date: 04/15/18 14:43:00 CDT, Duration: 30 day, Stop
date: 05/15/18 14:42:00 CDT

Start Date: 4/15/18

Stop Date: 18

Status: Discontinued



Dextrose 50% Syringe

25 gm, 50 mL, Route: IVP, Drug Form: INJ, Dosing Weight 113.636, kg, PRN, PRN Bl
ood Glucose Results, Start date: 04/15/18 14:43:00 CDT, Duration: 30 day, Stop d
ate: 05/15/18 14:42:00 CDT

Start Date: 4/15/18

Stop Date: 18

Status: Discontinued



Dulcolax Laxative

10 mg, 1 supp, Route: NJ, Drug form: SUPP, Daily, Dosing Weight 113.636, kg, PRN
Constipation, Start date: 04/15/18 11:31:00 CDT, Duration: 30 day, Stop date: 0
5/15/18 11:30:00 CDT

Notes: (Same As: Dulcolax, Bisco-Lax)

Start Date: 4/15/18

Stop Date: 18

Status: Discontinued



famotidine

20 mg, 1 tab, Route: PO, Drug form: TAB, Q12H, Dosing Weight 113.636, kg, PRN He
[place], Start date: 18 23:37:00 CDT, Duration: 30 day, Stop date: 
8 23:36:00 CDT

Notes: (Same as: Pepcid)

Start Date: 18

Stop Date: 18

Status: Discontinued



gabapentin 600 mg oral tablet

600 mg, 2 cap, Route: PO, Drug form: CAP, Q8H, Dosing Weight 113.636, kg, Start 
date: 18 0:00:00 CDT, Duration: 30 day, Stop date: 18 16:00:00 CDT

Notes: (Same as: Neurontin)

Start Date: 18

Stop Date: 18

Status: Discontinued



glucagon

1 mg, Route: IM, Drug form: PDR/INJ, PRN, Dosing Weight 113.636, kg, PRN Blood G
lucose Results, Start date: 04/15/18 14:43:00 CDT, Duration: 30 day, Stop date: 
05/15/18 14:42:00 CDT

Start Date: 4/15/18

Stop Date: 18

Status: Discontinued



Imdur

30 mg, 1 tab, Route: PO, Drug form: ERTAB, Q630AM, Dosing Weight 113.636, kg, St
art date: 18 6:30:00 CDT, Duration: 30 day, Stop date: 18 6:30:00 CD
T

Notes: (Same as:Imdur)"Do Not Crush"  Take on empty stomach/ full glass of water
.  Do not crush

Start Date: 18

Stop Date: 18

Status: Discontinued



insulin lispro

4 unit, 0.04 mL, Route: SUB-Q, Drug form: SOLN, Bedtime, Dosing Weight 113.636, 
kg, PRN Blood Glucose Results, Start date: 04/15/18 14:43:00 CDT, Duration: 30 d
ay, Stop date: 05/15/18 14:42:00 CDT

Notes: (Same as: Humalog ) Roll in palms of hands gently;  Do not shake `vigorou
sly. "Single Patient Use Only "  WASTE: F/P - Black; E - Municipal Trash Bin  St
able for 28 days at room temperature.Expires in _____ days from ______________Da
te

Start Date: 4/15/18

Stop Date: 18

Status: Discontinued



insulin lispro

3 unit, 0.03 mL, Route: SUB-Q, Drug form: SOLN, Bedtime, Dosing Weight 113.636, 
kg, PRN Blood Glucose Results, Start date: 04/15/18 14:43:00 CDT, Duration: 30 d
ay, Stop date: 05/15/18 14:42:00 CDT

Notes: (Same as: Humalog ) Roll in palms of hands gently;  Do not shake `vigorou
sly. "Single Patient Use Only "  WASTE: F/P - Black; E - Municipal Trash Bin  St
able for 28 days at room temperature.Expires in _____ days from ______________Da
te

Start Date: 4/15/18

Stop Date: 18

Status: Discontinued



insulin lispro

1 unit, 0.01 mL, Route: SUB-Q, Drug form: SOLN, Bedtime, Dosing Weight 113.636, 
kg, PRN Blood Glucose Results, Start date: 04/15/18 14:43:00 CDT, Duration: 30 d
ay, Stop date: 05/15/18 14:42:00 CDT

Notes: (Same as: Humalog ) Roll in palms of hands gently;  Do not shake `vigorou
sly. "Single Patient Use Only "  WASTE: F/P - Black; E - Municipal Trash Bin  St
able for 28 days at room temperature.Expires in _____ days from ______________Da
te

Start Date: 4/15/18

Stop Date: 18

Status: Discontinued



insulin lispro

2 unit, 0.02 mL, Route: SUB-Q, Drug form: SOLN, Bedtime, Dosing Weight 113.636, 
kg, PRN Blood Glucose Results, Start date: 04/15/18 14:43:00 CDT, Duration: 30 d
ay, Stop date: 05/15/18 14:42:00 CDT

Notes: (Same as: Humalog ) Roll in palms of hands gently;  Do not shake `vigorou
sly. "Single Patient Use Only "  WASTE: F/P - Black; E - Municipal Trash Bin  St
able for 28 days at room temperature.Expires in _____ days from ______________Da
te

Start Date: 4/15/18

Stop Date: 18

Status: Discontinued



insulin lispro

4 unit, 0.04 mL, Route: SUB-Q, Drug form: SOLN, TID-Before Meals, Dosing Weight 
113.636, kg, PRN Blood Glucose Results, Start date: 04/15/18 14:43:00 CDT, Durat
ion: 30 day, Stop date: 05/15/18 14:42:00 CDT

Notes: (Same as: Humalog ) Roll in palms of hands gently;  Do not shake `vigorou
sly. "Single Patient Use Only "  WASTE: F/P - Black; E - Municipal Trash Bin  St
able for 28 days at room temperature.Expires in _____ days from ______________Da
te

Start Date: 4/15/18

Stop Date: 18

Status: Discontinued



insulin lispro

6 unit, 0.06 mL, Route: SUB-Q, Drug form: SOLN, TID-Before Meals, Dosing Weight 
113.636, kg, PRN Blood Glucose Results, Start date: 04/15/18 14:43:00 CDT, Durat
ion: 30 day, Stop date: 05/15/18 14:42:00 CDT

Notes: (Same as: Humalog ) Roll in palms of hands gently;  Do not shake `vigorou
sly. "Single Patient Use Only "  WASTE: F/P - Black; E - Municipal Trash Bin  St
able for 28 days at room temperature.Expires in _____ days from ______________Da
te

Start Date: 4/15/18

Stop Date: 18

Status: Discontinued



insulin lispro

8 unit, 0.08 mL, Route: SUB-Q, Drug form: SOLN, TID-Before Meals, Dosing Weight 
113.636, kg, PRN Blood Glucose Results, Start date: 04/15/18 14:43:00 CDT, Durat
ion: 30 day, Stop date: 05/15/18 14:42:00 CDT

Notes: (Same as: Humalog ) Roll in palms of hands gently;  Do not shake `vigorou
sly. "Single Patient Use Only "  WASTE: F/P - Black; E - Municipal Trash Bin  St
able for 28 days at room temperature.Expires in _____ days from ______________Da
te

Start Date: 4/15/18

Stop Date: 18

Status: Discontinued



insulin lispro

10 unit, 0.1 mL, Route: SUB-Q, Drug form: SOLN, TID-Before Meals, Dosing Weight 
113.636, kg, PRN Blood Glucose Results, Start date: 04/15/18 14:43:00 CDT, Durat
ion: 30 day, Stop date: 05/15/18 14:42:00 CDT

Notes: (Same as: Humalog ) Roll in palms of hands gently;  Do not shake `vigorou
sly. "Single Patient Use Only "  WASTE: F/P - Black; E - Municipal Trash Bin  St
able for 28 days at room temperature.Expires in _____ days from ______________Da
te

Start Date: 4/15/18

Stop Date: 18

Status: Discontinued



insulin lispro

2 unit, 0.02 mL, Route: SUB-Q, Drug form: SOLN, TID-Before Meals, Dosing Weight 
113.636, kg, PRN Blood Glucose Results, Start date: 04/15/18 14:43:00 CDT, Durat
ion: 30 day, Stop date: 05/15/18 14:42:00 CDT

Notes: (Same as: Humalog ) Roll in palms of hands gently;  Do not shake `vigorou
sly. "Single Patient Use Only "  WASTE: F/P - Black; E - Municipal Trash Bin  St
able for 28 days at room temperature.Expires in _____ days from ______________Da
te

Start Date: 4/15/18

Stop Date: 18

Status: Discontinued



Lantus 100 units/mL

70 units, SUB-Q, Daily, 0 Refill(s)

Start Date: 18

Status: Ordered



Lantus 100 units/mL

17 unit, 0.17 mL, Route: SUB-Q, Drug form: SOLN, Daily, Dosing Weight 113.636, k
g, Start date: 18 9:00:00 CDT, Duration: 30 day, Stop date: 18 9:00:
00 CDT

Notes: (Same as: Lantus)Do not hold insulin without contacting prescriberWASTE: 
F/P - Black; E - Municipal Trash Bin  "single patient use only"

Start Date: 18

Stop Date: 18

Status: Discontinued



Lantus 100 units/mL

40 units, SUB-Q, Bedtime, 0 Refill(s)

Start Date: 18

Status: Ordered



Lantus Solostar Pen 100 units/mL subcutaneous solution

17 unit, 0.17 mL, Route: SUB-Q, Drug form: SOLN, Bedtime, Dosing Weight 113.636,
kg, Start date: 18 21:00:00 CDT, Duration: 30 day, Stop date: 18 21
:00:00 CDT

Notes: (Same as: Lantus)Do not hold insulin without contacting prescriberWASTE: 
F/P - Black; E - Municipal Trash Bin  "single patient use only"

Start Date: 18

Stop Date: 18

Status: Discontinued



lisinopril

2.5 mg, 0.5 tab, Route: PO, Drug form: TAB, Daily, Dosing Weight 113.636, kg, St
art date: 18 9:00:00 CDT, Duration: 30 day, Stop date: 18 9:00:00 CD
T

Notes: (Same as: Prinivil, Zestril)

Start Date: 18

Stop Date: 18

Status: Discontinued



LORazepam

0.5 mg, 1 tab, Route: PO, Drug form: TAB, Q12H, Dosing Weight 113.636, kg, PRN a
s needed for anxiety, Start date: 18 22:11:00 CDT, Duration: 30 day, Stop 
date: 18 22:10:00 CDT

Notes: (Same as: Ativan)

Start Date: 18

Stop Date: 18

Status: Discontinued



Mag-Ox 400

400 mg, 1 tab, Route: PO, Drug form: TAB, Daily, Dosing Weight 113.636, kg, Star
t date: 18 9:00:00 CDT, Duration: 30 day, Stop date: 18 9:00:00 CDT

Notes: (Same as: Mag-Ox 400)Magnesium oxide [id number]=695zg elemental magnesiumDose=
____mg magnesium oxide (___mg elemental magnesium)

Start Date: 18

Stop Date: 18

Status: Discontinued



magnesium citrate 1.745 g/30 mL oral liquid

300 ml, Route: PO, Drug Form: LIQ, Dosing Weight 113.636, kg, ONCE, Start date: 
04/15/18 11:31:00 CDT, Stop date: 04/15/18 11:31:00 CDT

Notes: (Same as: Citrate of Magnesia)Concentration: 1.745 gm / 30 mL

Start Date: 4/15/18

Stop Date: 4/15/18

Status: Completed



niacin 1000 mg oral tablet, extended release

2,000 mg, 8 cap, Route: PO, Drug form: ERCAP, Bedtime, Dosing Weight 113.636, kg
, Start date: 18 21:00:00 CDT, Duration: 30 day, Stop date: 18 21:00
:00 CDT

Notes: With food.

Start Date: 18

Stop Date: 18

Status: Discontinued



pantoprazole

40 mg, 1 tab, Route: PO, Drug form: ECTAB, Before Breakfast, Dosing Weight 113.6
36, kg, Start date: 18 7:30:00 CDT, Duration: 30 day, Stop date: 18 
7:30:00 CDT

Notes: Tablet should not be chewed or crushed.(Same as: Protonix)

Start Date: 18

Stop Date: 18

Status: Discontinued



potassium chloride

20 mEq, 1 tab, Route: PO, Drug form: ERTAB, BID, Dosing Weight 113.636, kg, Star
t date: 18 9:00:00 CDT, Duration: 30 day, Stop date: 18 17:00:00 CDT

Notes: (Same as: K-Dur 20)"Do Not Crush"For patients unable to swallow tablet, d
issolve in one half glass of water. Allow about 2 minutes for the tablets to dis
integrate. Stir before giving to prepare slurry and administer.Please exclude Pa
tients with feeding tube less than 14 [place] (Dobhoff, J-tube etc) and pediat
cornel and  patients.  With food and full glass of water

Start Date: 18

Stop Date: 18

Status: Discontinued



potassium chloride

20 mEq, PO, Daily, 0 Refill(s)

Start Date: 18

Status: Ordered



Ranexa 500 mg oral tablet, extended release

500 mg, 1 tab, Route: PO, Drug form: TAB, BID, Dosing Weight 113.636, kg, Start 
date: 18 9:00:00 CDT, Duration: 30 day, Stop date: 18 17:00:00 CDT

Notes: Same as Ranexa"Do Not Crush"

Start Date: 18

Stop Date: 18

Status: Discontinued



Saline Flush 0.9%

10 mL, Route: IVP, Drug Form: INJ, Dosing Weight 113.636, kg, PRN, PRN Line Flus
h, Start date: 18 14:21:00 CDT, Duration: 30 day, Stop date: 18 14:2
0:00 CDT

Notes: (Same as: BD Posiflush)

Start Date: 18

Stop Date: 18

Status: Discontinued



Saline Flush 0.9%

10 ml, Route: IVP, Drug Form: INJ, Dosing Weight 113.636, kg, PRN, PRN Line Flus
h, Start date: 18 22:44:00 CDT, Duration: 30 day, Stop date: 18 22:4
3:00 CDT

Notes: (Same as: BD Posiflush)

Start Date: 18

Stop Date: 18

Status: Discontinued



Saline Flush 0.9%

10 ml, Route: IVP, Drug Form: INJ, Dosing Weight 113.636, kg, Q12H, Start date: 
18 9:00:00 CDT, Duration: 30 day, Stop date: 18 21:00:00 CDT

Notes: (Same as: BD Posiflush)

Start Date: 18

Stop Date: 18

Status: Discontinued



sertraline

100 mg, 1 tab, Route: PO, Drug form: TAB, Daily, Dosing Weight 113.636, kg, Star
t date: 18 9:00:00 CDT, Duration: 30 day, Stop date: 18 9:00:00 CDT

Notes: (Same as: Zoloft)

Start Date: 18

Stop Date: 18

Status: Discontinued



Synthroid

50 microgram, 1 tab, Route: PO, Drug form: TAB, Q630AM, Dosing Weight 113.636, k
g, Start date: 18 6:30:00 CDT, Duration: 30 day, Stop date: 18 6:30:
00 CDT

Notes: Take 1 hour before or 2 hours after meal; Enteral feeds may interefere wi
th the absorption of this medication.(Same as:Levothroid, Synthroid)

Start Date: 18

Stop Date: 18

Status: Discontinued



tamsulosin

0.4 mg, 1 cap, Route: PO, Drug form: CAP, Daily, Dosing Weight 113.636, kg, Star
t date: 18 9:00:00 CDT, Duration: 30 day, Stop date: 18 9:00:00 CDT

Notes: (Same As: Flomax)  "Do Not Crush"

Start Date: 18

Stop Date: 18

Status: Discontinued



torsemide

20 mg, 1 tab, Route: PO, Drug form: TAB, BID Diuretic, Dosing Weight 113.636, kg
, Start date: 18 8:00:00 CDT, Duration: 30 day, Stop date: 18 16:00:
00 CDT

Notes: (Same As: Demadex)

Start Date: 18

Stop Date: 18

Status: Discontinued



torsemide 20 mg oral tablet

20 mg=1 tab, PO, Daily, 0 Refill(s)

Start Date: 18

Status: Ordered



Tylenol

650 mg, 2 tab, Route: PO, Drug form: TAB, Q6H, Dosing Weight 113.636, kg, PRN Pa
in Score 1-5, Start date: 18 23:37:00 CDT, Duration: 30 day, Stop date:  23:36:00 CDT

Notes: Do not exceed 4 gm/day.  (Same as: Tylenol)

Start Date: 18

Stop Date: 18

Status: Discontinued



Xarelto

20 mg, 1 tab, Route: PO, Drug form: TAB, QPM, Dosing Weight 113.636, kg, Start d
ate: 18 17:00:00 CDT, Duration: 30 day, Stop date: 18 17:00:00 CDT

Notes: (Same as: Xarelto)Administer with food

Start Date: 18

Stop Date: 18

Status: Discontinued



Results





ELECTROLYTES





                    1                   2                   3



                                         Most recent to   



                                         oldest [Reference   



                                         Range]:   

 

                                        141 mEq/L 

                          (18 3:54 AM)         137 mEq/L 

                          (18 2:28 PM)          



                                         Sodium Lvl [135-145   



                                         mEq/L]   

 

                                        3.3 mEq/L 

*LOW*

                          (18 3:54 AM)         3.4 mEq/L 

*LOW*

                          (18 2:28 PM)          



                                         Potassium Lvl   



                                         [3.5-5.1 mEq/L]   

 

                                        103 mEq/L 

                          (18 3:54 AM)         101 mEq/L 

                          (18 2:28 PM)          



                                         Chloride Lvl [   



                                         mEq/L]   

 

                                        30 mEq/L 

                          (18 3:54 AM)         31 mEq/L 

                          (18 2:28 PM)          



                                         CO2 [24-32 mEq/L]   

 

                                        11.3 mEq/L 

                          (18 3:54 AM)         8.4 mEq/L 

*LOW*

                          (18 2:28 PM)          



                                         AGAP [10.0-20.0   



                                         mEq/L]   







CHEM PANEL





                    1                   2                   3



                                         Most recent to   



                                         oldest [Reference   



                                         Range]:   

 

                                        0.65 mg/dL 

                          (18 3:54 AM)         0.68 mg/dL 

                          (18 2:28 PM)          



                                         Creatinine Lvl   



                                         [0.50-1.40 mg/dL]   

 

                                        95 mL/min/1.73m2 1

*NA*

                          (18 3:54 AM)         93 mL/min/1.73m2 2

*NA*

                          (18 2:28 PM)          



                                         eGFR   

 

                                        8 mg/dL 

                          (18 3:54 AM)         12 mg/dL 

                          (18 2:28 PM)          



                                         BUN [7-22 mg/dL]   

 

                                        18 

                    (18 2:28 PM)                          



                                         B/C Ratio [6-25]   

 

                                        129 mg/dL 

*HI*

                          (18 3:54 AM)         198 mg/dL 

*HI*

                          (18 2:28 PM)          



                                         Glucose Lvl [70-99   



                                         mg/dL]   

 

                                        7.7 g/dL 

                    (18 2:28 PM)                          



                                         Total Protein   



                                         [6.4-8.4 g/dL]   

 

                                        3.4 g/dL 

*LOW*

                    (18 2:28 PM)                          



                                         Albumin Lvl [3.5-5.0   



                                         g/dL]   

 

                                        4.3 g/dL 

*HI*

                    (18 2:28 PM)                          



                                         Globulin [2.7-4.2   



                                         g/dL]   

 

                                        0.8 

                    (18 2:28 PM)                          



                                         A/G Ratio [0.7-1.6]   

 

                                        8.5 mg/dL 

                          (18 3:54 AM)         8.8 mg/dL 

                          (18 2:28 PM)          



                                         Calcium Lvl   



                                         [8.5-10.5 mg/dL]   

 

                                        29 unit/L 

                    (18 2:28 PM)                          



                                         ALT [0-65 unit/L]   

 

                                        32 unit/L 

                    (18 2:28 PM)                          



                                         AST [0-37 unit/L]   

 

                                        133 unit/L 

                    (18 2:28 PM)                          



                                         Alk Phos [   



                                         unit/L]   

 

                                        0.7 mg/dL 

                    (18 2:28 PM)                          



                                         Bili Total [0.2-1.3   



                                         mg/dL]   

 

                                        13.0 uMol/L 

                    (18 2:28 PM)                          



                                         Ammonia [<=45.0   



                                         uMol/L]   







1Result Comment: The eGFR is calculated using the CKD-EPI formula. In most 
young, healthy individuals the eGFR will be >90 mL/min/1.73m2. The eGFR declines
with age. An eGFR of 60-89 may be normal in some populations, particularly the 
elderly, for whom the CKD-EPI formula has not been extensively validated. Use of
the eGFR is not recommended in the following populations:



Individuals with unstable creatinine concentrations, including pregnant patients
and those with serious co-morbid conditions.



Patients with extremes in muscle mass or diet. 



The data above are obtained from the National Kid[employer] Disease Education Program (
NKDEP) which additionally recommends that when the eGFR is used in patients with
extremes of body mass index for purposes of drug dosing, the eGFR should be mul
tiplied by the estimated BMI.



2Result Comment: The eGFR is calculated using the CKD-EPI formula. In most 
young, healthy individuals the eGFR will be >90 mL/min/1.73m2. The eGFR declines
with age. An eGFR of 60-89 may be normal in some populations, particularly the 
elderly, for whom the CKD-EPI formula has not been extensively validated. Use of
the eGFR is not recommended in the following populations:



Individuals with unstable creatinine concentrations, including pregnant patients
and those with serious co-morbid conditions.



Patients with extremes in muscle mass or diet. 



The data above are obtained from the National Kid[employer] Education Program (
NKDEP) which additionally recommends that when the eGFR is used in patients with
extremes of body mass index for purposes of drug dosing, the eGFR should be mul
tiplied by the estimated BMI.



CARDIAC ENZYMES





                    1                   2                   3



                                         Most recent to   



                                         oldest [Reference   



                                         Range]:   

 

                                        44 unit/L 

                    (18 2:28 PM)                          



                                         Total CK [   



                                         unit/L]   

 

                                        1.8 ng/mL 

                    (18 2:28 PM)                          



                                         CK MB [0.5-3.6   



                                         ng/mL]   

 

                                        4.1 

*HI*

                    (18 2:28 PM)                          



                                         CK MB Index   



                                         [0.0-2.5]   

 

                                        <0.02 ng/mL 

                          (18 3:54 AM)         <0.02 ng/mL 

                          (18 11:50 PM)        <0.02 ng/mL 

                                        (18 2:28 PM) 



                                         Troponin-I   



                                         [0.00-0.40 ng/mL]   

 

                                        492 pg/mL 

*HI*

                    (18 2:28 PM)                          



                                         BNP [<=100 pg/mL]   







LIPIDS





                    1                   2                   3



                                         Most recent to   



                                         oldest [Reference   



                                         Range]:   

 

                                        4.58 

                    (18 3:54 AM)                          



                                         CHD Risk [4.00-7.30]   

 

                                        151 mg/dL 

                    (18 3:54 AM)                          



                                         Chol [<=199 mg/dL]   

 

                                        135 mg/dL 

                    (18 3:54 AM)                          



                                         Trig [<=149 mg/dL]   

 

                                        33 mg/dL 

*LOW*

                    (18 3:54 AM)                          



                                         HDL [>=61 mg/dL]   

 

                                        91 mg/dL 

                    (18 3:54 AM)                          



                                         LDL (Calculated)   



                                         [<=99 mg/dL]   

 

                                        27 

*NA*

                    (18 3:54 AM)                          



                                         VLDL   







SPECIAL CHEMISTRY





                    1                   2                   3



                                         Most recent to   



                                         oldest [Reference   



                                         Range]:   

 

                                        7.1 % 

*HI*

                    (18 3:54 AM)                          



                                         Hgb A1C [<=5.6 %]   







DRUG SCREEN





                    1                   2                   3



                                         Most recent to   



                                         oldest [Reference   



                                         Range]:   

 

                                        Negative 

*NA*

                    (18 2:28 PM)                          



                                         U Amph Scr   



                                         [Negative]   

 

                                        Negative 

*NA*

                    (18 2:28 PM)                          



                                         U Gracie Scr   



                                         [Negative]   

 

                                        Negative 

*NA*

                    (18 2:28 PM)                          



                                         U Benzodia Scr   



                                         [Negative]   

 

                                        Negative 

*NA*

                    (18 2:28 PM)                          



                                         U Cocaine Scr   



                                         [Negative]   

 

                                        Positive 

*ABN*

                    (18 2:28 PM)                          



                                         U Opiate Scr   



                                         [Negative]   

 

                                        Negative 

*NA*

                    (18 2:28 PM)                          



                                         U Phencyc Scr   



                                         [Negative]   

 

                                        Negative 

*NA*

                    (18 2:28 PM)                          



                                         U Cannab Scr   



                                         [Negative]   

 

                                        See Note 

                    (18 2:28 PM)                          



                                         UDS Note   







TOXICOLOGY





                    1                   2                   3



                                         Most recent to   



                                         oldest [Reference   



                                         Range]:   

 

                                        <2 ug/ml 

*LOW*

                    (18 2:28 PM)                          



                                         Acetaminoph Lvl   



                                         [10-20 ug/ml]   

 

                                        <1.7 mg/dL 

                    (18 2:28 PM)                          



                                         Salicylate Lvl   



                                         [0.0-30.0 mg/dL]   

 

                                        <.003 % 

*NA*

                    (18 2:28 PM)                          



                                         Etoh (%)   

 

                                        <3 mg/dL 

*NA*

                    (18 2:28 PM)                          



                                         Ethanol Lvl   







URINE AND STOOL





                    1                   2                   3



                                         Most recent to   



                                         oldest [Reference   



                                         Range]:   

 

                                        Clear 

                    (18 2:28 PM)                          



                                         UA Turbidity [Clear]   

 

                                        Yellow 

*NA*

                    (18 2:28 PM)                          



                                         UA Color [Yellow]   

 

                                        6.0 

                    (18 2:28 PM)                          



                                         UA pH [5.0-8.0]   

 

                                        1.014 

                    (18 2:28 PM)                          



                                         UA Spec Grav   



                                         [<=1.030]   

 

                                        50 mg/dL 

*ABN*

                    (18 2:28 PM)                          



                                         UA Glucose [Negative   



                                         mg/dL]   

 

                                        Negative 

                    (18 2:28 PM)                          



                                         UA Blood [Negative]   

 

                                        Trace mg/dL 

*ABN*

                    (18 2:28 PM)                          



                                         UA Ketones [Negative   



                                         mg/dL]   

 

                                        30 mg/dL 

*ABN*

                    (18 2:28 PM)                          



                                         UA Protein [Negative   



                                         mg/dL]   

 

                                        2.0 mg/dL 

*HI*

                    (18 2:28 PM)                          



                                         UA Urobilinogen   



                                         [0.1-1.0 mg/dL]   

 

                                        Negative 

*NA*

                    (18 2:28 PM)                          



                                         UA Bili [Negative]   

 

                                        Negative 

                    (18 2:28 PM)                          



                                         UA Leuk Est   



                                         [Negative]   

 

                                        Negative 

                    (18 2:28 PM)                          



                                         UA Nitrite   



                                         [Negative]   

 

                                        <1 /HPF 

                    (18 2:28 PM)                          



                                         UA WBC [0-5 /HPF]   

 

                                        1 /HPF 

                    (18 2:28 PM)                          



                                         UA RBC [0-2 /HPF]   

 

                                        Occasional /LPF 

*NA*

                    (18 2:28 PM)                          



                                         UA Sq Epi [Few /LPF]   







HEMATOLOGY





                    1                   2                   3



                                         Most recent to   



                                         oldest [Reference   



                                         Range]:   

 

                                        13.7 K/CMM 

*HI*

                          (18 3:54 AM)         14.6 K/CMM 

*HI*

                          (18 2:28 PM)          



                                         WBC [3.7-10.4 K/CMM]   

 

                                        4.16 M/CMM 

*LOW*

                          (18 3:54 AM)         4.36 M/CMM 

*LOW*

                          (18 2:28 PM)          



                                         RBC [4.70-6.10   



                                         M/CMM]   

 

                                        10.4 g/dL 

*LOW*

                          (18 3:54 AM)         11.0 g/dL 

*LOW*

                          (18 2:28 PM)          



                                         Hgb [14.0-18.0 g/dL]   

 

                                        33.3 % 

*LOW*

                          (18 3:54 AM)         34.8 % 

*LOW*

                          (18 2:28 PM)          



                                         Hct [42.0-54.0 %]   

 

                                        80.1 fL 

                          (18 3:54 AM)         79.7 fL 

*LOW*

                          (18 2:28 PM)          



                                         MCV [80.0-94.0 fL]   

 

                                        25.1 pg 

*LOW*

                          (18 3:54 AM)         25.3 pg 

*LOW*

                          (18 2:28 PM)          



                                         MCH [27.0-31.0 pg]   

 

                                        31.4 g/dL 

*LOW*

                          (18 3:54 AM)         31.7 g/dL 

*LOW*

                          (18 2:28 PM)          



                                         MCHC [32.0-36.0   



                                         g/dL]   

 

                                        17.2 % 

*HI*

                          (18 3:54 AM)         16.9 % 

*HI*

                          (18 2:28 PM)          



                                         RDW [11.5-14.5 %]   

 

                                        8.7 fL 

                          (18 3:54 AM)         8.6 fL 

                          (18 2:28 PM)          



                                         MPV [7.4-10.4 fL]   

 

                                        281 K/CMM 

                          (18 3:54 AM)         283 K/CMM 

                          (18 2:28 PM)          



                                         Platelet [133-450   



                                         K/CMM]   

 

                                        68.4 % 

                          (18 3:54 AM)         68.9 % 

                          (18 2:28 PM)          



                                         Segs [45.0-75.0 %]   

 

                                        17.5 % 

*LOW*

                          (18 3:54 AM)         15.1 % 

*LOW*

                          (18 2:28 PM)          



                                         Lymphocytes   



                                         [20.0-40.0 %]   

 

                                        8.7 % 

                          (18 3:54 AM)         9.9 % 

                          (18 2:28 PM)          



                                         Monocytes [2.0-12.0   



                                         %]   

 

                                        4.4 % 

*HI*

                          (18 3:54 AM)         4.9 % 

*HI*

                          (18 2:28 PM)          



                                         Eosinophils [0.0-4.0   



                                         %]   

 

                                        1.0 % 

                          (18 3:54 AM)         1.2 % 

*HI*

                          (18 2:28 PM)          



                                         Basophils [0.0-1.0   



                                         %]   

 

                                        9.4 K/CMM 

*HI*

                          (18 3:54 AM)         10.1 K/CMM 

*HI*

                          (18 2:28 PM)          



                                         Segs-Bands #   



                                         [1.5-8.1 K/CMM]   

 

                                        2.4 K/CMM 

                          (18 3:54 AM)         2.2 K/CMM 

                          (18 2:28 PM)          



                                         Lymphocytes #   



                                         [1.0-5.5 K/CMM]   

 

                                        1.2 K/CMM 

*HI*

                          (18 3:54 AM)         1.5 K/CMM 

*HI*

                          (18 2:28 PM)          



                                         Monocytes # [0.0-0.8   



                                         K/CMM]   

 

                                        0.6 K/CMM 

*HI*

                          (18 3:54 AM)         0.7 K/CMM 

*HI*

                          (18 2:28 PM)          



                                         Eosinophils #   



                                         [0.0-0.5 K/CMM]   

 

                                        0.1 K/CMM 

                          (18 3:54 AM)         0.2 K/CMM 

                          (18 2:28 PM)          



                                         Basophils # [0.0-0.2   



                                         K/CMM]   

 

                                        14.0 seconds 

                    (18 3:54 AM)                          



                                         PT [12.0-14.7   



                                         seconds]   

 

                                        1.08 

                    (18 3:54 AM)                          



                                         INR [0.85-1.17]   







VIRAL - SEROLOGY





                    1                   2                   3



                                         Most recent to   



                                         oldest [Reference   



                                         Range]:   

 

                                        Negative 

                    (18 1:29 AM)                          



                                         Influ A [Negative]   

 

                                        Negative 

                    (18 1:29 AM)                          



                                         Influ B [Negative]   







Immunizations





Given and Recorded





   



                 Vaccine         Date            Status          Refusal Reason

 

   



                     influenza virus vaccine, inactivated     18              Given 

 

   



                     pneumococcal 13-valent vaccine     18              Given 

 

   



                     diphtheria/pertussis, acel/tetanus adult     16              Given 

 

   



                     pneumococcal 23-valent vaccine     3/31/15             Given 









Not Given





   



                 Vaccine         Date            Status          Refusal Reason

 

   



                 pneumococcal 23-valent vaccine     3/30/15         Not Given       Patient Refuses







Procedures







    



              Procedure     Date         Related Diagnosis     Body Site     Status

 

    



                           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

 

    



                           Cardiac ablation using fluoroscopy guidance        Completed

 

    



                           Cardiac catheterization, left heart        Completed

 

    



                           Stent placement2          Completed







1ablation stents



2x27



Social History







 



                           Social History Type       Response

 

 



                           Substance Abuse           Use: None.

 

 



                           Alcohol                   Past

 

 



                           Smoking Status            Former smoker; Exposure to Tobacco Smoke None; Cigarette Smoking

 Last 365



                                         Days No; Reg Smoking Cessation Counseling No; Other Tobacco Frequency quit



                                         30 years ago;



                                         entered on: 18







Assessment and Plan

Extracted from:





  



                     Title: Clinical Document     Author: Yoni Campos MD     Date: 18









                                        Progress Note - Daily



[facility]             Completed:    , 12:56 by Yoni Campos MD



RM: 202 - 1P, SE [id number] (: 1940)  M     FIN: 
[id number]



Attending: Katie Mandujano MDPhone: [phone]: Internal 
Medicine



Reason for Admission: CHANGE IN MENTAL STATUS

Working DRG: Signs & symptoms w/o [facility]

Code status: None Specified=FULL CODECurrent diet: 

Isolation: Contact



Allergies: Reglan, iodine, sulfa drugs, Latex



SUBJECTIVE





He is alert and awake. He feels mentally at his baseline. He has had a rest 
tremor in right >left hand for about one month. he states he shuffles when he 
walks. He denies weaknes or numbness or pain. No headache or vision changes. He 
can only walk with a walker. He feels unsteady with walking for a few years.



OBJECTIVE









                                        24hr Labs

                                         1059

POC Performing LocatioSee Note  

Glucose [id number]  H

                                         0735

POC Performing LocatioSee Note  

Glucose [id number]  H

                                         0355

POC Performing LocatioSee Note  

Glucose [id number]  H

                                        04/15 2133

POC Performing LocatioSee Note  

Glucose [id number]  H

                                        04/15 1607

POC Performing LocatioSee Note  

Glucose [id number]  C

                                        04/15 1130

POC Performing LocatioSee Note  

Glucose [id number]  H



Schmitt still necessary (Yes/No): Line still necessary (Yes/No): 



VitalsTmp(F)[id number]

                                         11:[id number]---

                                         07:[id number]---

                                         03:5897.454275/510952---

                                        04/15 23:5997.100230/[id number]---

                                        04/15 19:[id number]/[id number]---





                                        24 Hr Tmax: 97.7F (36.50c) at  11:00Vital Signs are the last 5 in the past 

48 hours.



DateWt(kg)Wt(lb)Ht(cm)Ht(in)Method

                                         (initial)113.64 250.94980.80 70.00Estimated



I&ORecordInOutBal

                                        4hr Tot   10    0   10

                                        1524hr Tot  320  200  120



Medications (38) Active

Scheduled Meds (19):

                                        18 AMIODarone 200 mg PO Daily

                                        18 aspirin (aspirin 81 mg tablet, enteric coated) 81 mg PO Daily

                                        18 atorvastatin 5 mg PO Bedtime

                                        18 gabapentin (gabapentin 600 mg oral tablet) 600 mg PO Q8H

                                        18 insulin glargine (Lantus Solostar Pen 100 units/mL subcutaneous solution)

 17 unit SUB-Q Bedtime 0 ml/hr

                                        18 insulin glargine (Lantus 100 units/mL) 17 unit SUB-Q Daily 0 ml/hr

                                        18 isosorbide mononitrate (Imdur) 30 mg PO Q630AM

                                        18 levothyroxine (Synthroid) 50 microgram PO Q630AM

                                        18 lisinopril 2.5 mg PO Daily

                                        18 magnesium oxide (Mag-Ox 400) 400 mg PO Daily

                                        18 niacin (niacin 1000 mg oral tablet, extended release) 2,000 mg PO Bedtime



                                        18 pantoprazole 40 mg PO Before Breakfast

                                        18 potassium chloride 20 mEq PO BID

                                        18 ranolazine (Ranexa 500 mg oral tablet, extended release) 500 mg PO BID

                                        18 rivaroxaban (Xarelto) 20 mg PO QPM

                                        18 sertraline 100 mg PO Daily

                                        18 sodium chloride (Saline Flush 0.9%) 10 ml IVP Q12H

                                        18 tamsulosin 0.4 mg PO Daily

                                        18 torsemide 20 mg PO BID Diuretic

Unscheduled Meds: None

PRN Meds (18):

                                        04/15/18 Dextrose 50% in Water IV (Dextrose 50% Syringe) 12.5 gm IVP PRN

                                        04/15/18 Dextrose 50% in Water IV (Dextrose 50% Syringe) 25 gm IVP PRN

                                        18 LORazepam 0.5 mg PO Q12H

                                        18 acetaminophen (Tylenol) 650 mg PO Q6H

                                        04/15/18 bisacodyl (Dulcolax Laxative) 10 mg NJ Daily

                                        18 famotidine 20 mg PO Q12H

                                        04/15/18 glucagon 1 mg IM PRN

                                        04/15/18 insulin lispro 2 unit SUB-Q TID-Before Meals

                                        04/15/18 insulin lispro 4 unit SUB-Q TID-Before Meals

                                        04/15/18 insulin lispro 6 unit SUB-Q TID-Before Meals

                                        04/15/18 insulin lispro 8 unit SUB-Q TID-Before Meals

                                        04/15/18 insulin lispro 10 unit SUB-Q TID-Before Meals

                                        04/15/18 insulin lispro 1 unit SUB-Q Bedtime

                                        04/15/18 insulin lispro 2 unit SUB-Q Bedtime

                                        04/15/18 insulin lispro 3 unit SUB-Q Bedtime

                                        04/15/18 insulin lispro 4 unit SUB-Q Bedtime

                                        18 sodium chloride (Saline Flush 0.9%) 10 mL IVP PRN

                                        04/13/18 sodium chloride (Saline Flush 0.9%) 10 ml IVP PRN

One Time Meds (1):

                                        04/15/18  (Completed) magnesium citrate (magnesium citrate 1.745 g/30 mL oral liquid)

 300 ml PO ONCE

Continuous Infusions: None



EXAM

GEN - NAD

HEENT - NCAT. MMM. Op clear

Ext - no c/c/e

Skin - no rash



Neuro:

Mental status: Alert and oriented x3. Language intact. Follows all commands

CN:PERRL, EOMI, no droop, facial sensation is normal

Motor: 4/5 throughout, rest tremor of the RUE, cogwheeling in the RUE, 
bradykinesia in the BUE

Sensory: intact to LTx4

Reflexes 1+ throughout, absent AJ

Cerebellar: intact ftn, hts

Gait - could not assess as he did not have his walker and did not want to fall



Assessment

                                        1. Metabolic-toxic encephalopathy due to pain med use - resolved

                                        2. Likely parkinsons disease

                                        3. Chronic pain

                                        4. CAD

                                        5. HTN

                                        6. DMT2 with neuropathy

                                        7. CHF

                                        8. Afib



Plan

Needs close outpt follow up in neurology clinic for further assessment of gait 
issues and possible parkinsons. Patient agreeable to the plan

He is on anticoagulation for afib - fall precautions discussed

Discussed case with Dr. Mandujano





Extracted from:





  



                     Title: Clinical Document     Author: Dennys Aquino MD     Date: 18









                                        full H&P dictated,

#[id number]

date/time: 2018  22:31

## 2019-07-18 NOTE — XMS REPORT
Summary of Care: 18 - 18

                             Created on: 10/10/2030



DESIREE MCKEON

External Reference #: [id number]

: 1940

Sex: Male



Demographics







                          Address                   *[address] [place]  [address]-

 

                          Home Phone                [phone]

 

                          Preferred Language        Unknown

 

                          Marital Status            Unknown

 

                          Church Affiliation     [employer]

 

                          Race                      Other

 

                                        Additional Race(s)  

 

                          Ethnic Group              Non-





Author







                          Author                    [facility]

 

                          Organization              [facility]

 

                          Address                   Unknown

 

                          Phone                     Unavailable







Encounter





HQ Slava(DAVID) [id number] Date(s): 18 - 18

[facility] [address] [place] [address]-     [phone]
[phone]

Encounter Diagnosis

Other chest pain (Final) - 18

Iron deficiency anemia, unspecified (Final) - 

Hypokalemia (Final) - 

Hypertensive heart disease with heart failure (Final) - 

Chronic diastolic (congestive) heart failure (Final) - 

Type 2 diabetes mellitus without complications (Final) - 

Paroxysmal atrial fibrillation (Final) - 

Encounter for immunization (Final) - 

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 
(Final) - 

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 

Long QT syndrome (Final) - 

Pure hypercholesterolemia, unspecified (Final) - 

Obstructive sleep apnea (adult) (pediatric) (Final) - 

Hyperlipidemia, unspecified (Final) - 

Hypothyroidism, unspecified (Final) - 

Personal history of nicotine dependence (Final) - 

Presence of aortocoronary bypass graft (Final) - 

Long term (current) use of anticoagulants (Final) - 

Long term (current) use of aspirin (Final) - 

Presence of coronary angioplasty implant and graft (Final) - 

Long term (current) use of insulin (Final) - 

Discharge Disposition: Home or Self Care

Attending Physician: Katie Mandujano MD

Admitting Physician: Katie Mandujano MD





Vital Signs







                    1                   2                   3



                                         Most recent to   



                                         oldest [Reference   



                                         Range]:   

 

                                        175.26 cm 

                          (18 9:24 PM)         175.26 cm 

                          (18 4:07 PM)          



                                         Height   

 

                                        98.3 DegF 

                          (18 12:31 PM)        98.2 DegF 

                          (18 7:46 AM)         98.4 DegF 

                                        (18 2:54 AM)



                                         Temperature Oral   



                                         [96.4-99.1 DegF]   

 

                                        104/54 mmHg 

                          (18 3:32 PM)         106/63 mmHg 

                          (18 12:31 PM)        97/55 mmHg 

                                        (18 7:46 AM)



                                         Blood Pressure   



                                         [/60-90 mmHg]   

 

                                        18 BRMIN 

                          (18 12:31 PM)        18 BRMIN 

                          (18 8:45 AM)         18 BRMIN 

                                        (18 7:46 AM)



                                         Respiratory Rate   



                                         [14-20 BRMIN]   

 

                                        63 bpm 

                          (18 3:32 PM)         65 bpm 

                          (18 12:31 PM)        65 bpm 

                                        (18 7:46 AM)



                                         Peripheral Pulse   



                                         Rate [ bpm]   

 

                                        82.273 kg 

                          (18 9:24 PM)         82.273 kg 

                          (18 4:07 PM)          



                                         Weight   

 

                                        26.79 m2 

                          (18 9:24 PM)         26.79 m2 

                          (18 4:07 PM)          



                                         Body Mass Index   







Problem List







    



              Condition     Effective Dates     Status       Health Status     Informant

 

    



                           Acute MI(Confirmed)1      Resolved  

 

    



                           Atrial                    Active  



                                         fibrillation(Confirm    



                                         ed)    

 

    



                           CHF (congestive           Active  



                                         heart    



                                         failure)(Confirmed)    

 

    



                           CAD (coronary artery      Active  



                                         disease)(Confirmed)    

 

    



                           Diabetes(Confirmed)       Active  

 

    



                           Diastolic heart           Active  



                                         failure(Confirmed)    

 

    



                           Histoplasmosis(Confi      Resolved  



                                         rmed)    

 

    



                           Hx MRSA                   Resolved  



                                         infection(Confirmed)    

 

    



                           Hypercholesteremia(C      Resolved  



                                         onfirmed)    

 

    



                           Hypertension(Confirm      Active  



                                         ed)    

 

    



                           Hypertension(Confirm      Resolved  



                                         ed)    

 

    



                           HTN                       Active  



                                         (hypertension)(Confi    



                                         rmed)    

 

    



                           Hypothyroidism(Confi      Active  



                                         rmed)    

 

    



                           Diabetes mellitus         Active  



                                         type 2, insulin    



                                         dependent(Confirmed)    

 

    



                     Moderate            Active              Chronic ; 



                                         protein-calorie    



                                         malnutrition(Confirm    



                                         ed)    

 

    



                           PAUL (obstructive          Active  



                                         sleep    



                                         apnea)(Confirmed)    

 

    



                           Poliomyelitides(Conf      Resolved  



                                         irmed)    

 

    



                           Sleep                     Active  



                                         apnea(Confirmed)    

 

    



                           Stented coronary          Resolved  



                                         artery(Confirmed)2    

 

    



                           Systolic heart            Active  



                                         failure(Confirmed)    

 

    



                           Whooping                  Resolved  



                                         cough(Confirmed)    







1x 3



225 cardiac stents



Allergies, Adverse Reactions, Alerts







   



                 Substance       Reaction        Severity        Status

 

   



                           sulfa drugs               Active

 

   



                           Reglan                    Active

 

   



                           iodine                    Active

 

   



                           Latex                     Active







Medications





acetaminophen

650 mg, 2 tab, Route: PO, Drug form: TAB, Q4H, Dosing Weight 82.273, kg, PRN Patrick
n 1-3/Temp > 100.4 F, Start date: 18 1:03:00 CDT, Duration: 30 day, Stop 
date: 10/25/18 1:02:00 CDT

Notes: Do not exceed 4 gm/day.  (Same as: Tylenol)

Start Date: 18

Stop Date: 18

Status: Discontinued



acetaminophen-hydrocodone 325 mg-5 mg oral tablet

1 tab, Route: PO, Drug Form: TAB, Dosing Weight 82.273, kg, Q4H, PRN Pain Score 
4-6, Start date: 18 1:03:00 CDT, Duration: 30 day, Stop date: 10/25/18 1:0
2:00 CDT

Notes: (Same as: Norco 325/5)  Do not exceed 4gm/day of acetaminophen.

Start Date: 18

Stop Date: 18

Status: Discontinued



aspirin 81 mg tablet, enteric coated

81 mg, 1 tab, Route: PO, Drug form: ECTAB, Daily, Dosing Weight 82.273, kg, Prio
rity: NOW, Start date: 18 9:23:00 CDT, Duration: 30 day, Stop date: 10/25/
18 9:00:00 CDT

Notes: Do not crush or chew.(Same As: Ecotrin)

Start Date: 18

Stop Date: 18

Status: Discontinued



aspirin 81 mg tablet, enteric coated

81 mg=1 tab, PO, Daily, 0 Refill(s)

Start Date: 18

Stop Date: 18

Status: Deleted



aspirin 81 mg tablet, enteric coated

81 mg=1 tab, PO, Daily, # 30 tab, 11 Refill(s), Pharmacy: [id number]
[id number]

Start Date: 18

Stop Date: 19

Status: Discontinued



azithromycin + Sodium Chloride 0.9%  mL

500 mg, Route: IVPB, [id number], Dosing Weight 82.273, kg, Start date: 18 2:0
0:00 CDT, Duration: 5 day, Stop date: 18 2:00:00 CDT, ABX Indication: Pneu
monia

Notes: (Same As: Zithromax IV)

Start Date: 18

Stop Date: 18

Status: Discontinued



Dextrose 50% Syringe

25 gm, 50 mL, Route: IVP, Drug Form: INJ, Dosing Weight 82.273, kg, PRN, PRN Blo
od Glucose Results, Start date: 18 9:14:00 CDT, Duration: 30 day, Stop hazel
e: 10/26/18 9:13:00 CDT

Start Date: 18

Stop Date: 18

Status: Discontinued



Dextrose 50% Syringe

12.5 gm, 25 mL, Route: IVP, Drug Form: INJ, Dosing Weight 82.273, kg, PRN, PRN B
lood Glucose Results, Start date: 18 9:14:00 CDT, Duration: 30 day, Stop d
ate: 10/26/18 9:13:00 CDT

Start Date: 18

Stop Date: 18

Status: Discontinued



Dextrose 50% Syringe

12.5 gm, 25 mL, Route: IVP, Drug Form: INJ, Dosing Weight 82.273, kg, PRN, PRN B
lood Glucose Results, Start date: 18 22:27:00 CDT, Duration: 30 day, Stop 
date: 10/25/18 22:26:00 CDT

Start Date: 18

Stop Date: 18

Status: Discontinued



Dextrose 50% Syringe

25 gm, 50 mL, Route: IVP, Drug Form: INJ, Dosing Weight 82.273, kg, PRN, PRN Blo
od Glucose Results, Start date: 18 22:27:00 CDT, Duration: 30 day, Stop da
te: 10/25/18 22:26:00 CDT

Start Date: 18

Stop Date: 18

Status: Discontinued



glucagon

1 mg, Route: IM, Drug form: PDR/INJ, PRN, Dosing Weight 82.273, kg, PRN Blood Gl
ucose Results, Start date: 18 9:14:00 CDT, Duration: 30 day, Stop date: 10
/26/18 9:13:00 CDT

Start Date: 18

Stop Date: 18

Status: Discontinued



glucagon

1 mg, Route: IM, Drug form: PDR/INJ, PRN, Dosing Weight 82.273, kg, PRN Blood Gl
ucose Results, Start date: 18 22:27:00 CDT, Duration: 30 day, Stop date: 1
 22:26:00 CDT

Start Date: 18

Stop Date: 18

Status: Discontinued



insulin lispro

15 unit, 0.15 mL, Route: SUB-Q, Drug form: SOLN, TID-Before Meals, Dosing Weight
82.273, kg, PRN Blood Glucose Results, Start date: 18 9:14:00 CDT, Durati
on: 30 day, Stop date: 10/26/18 9:13:00 CDT

Notes: (Same as: Humalog ) Roll in palms of hands gently;  Do not shake `vigorou
sly. "Single Patient Use Only "  WASTE: F/P - Black; E - Municipal Trash Bin  St
able for 28 days at room temperature.Expires in _____ days from ______________Da
te

Start Date: 18

Stop Date: 18

Status: Discontinued



insulin lispro

12 unit, 0.12 mL, Route: SUB-Q, Drug form: SOLN, TID-Before Meals, Dosing Weight
82.273, kg, PRN Blood Glucose Results, Start date: 18 9:14:00 CDT, Durati
on: 30 day, Stop date: 10/26/18 9:13:00 CDT

Notes: (Same as: Humalog ) Roll in palms of hands gently;  Do not shake `vigorou
sly. "Single Patient Use Only "  WASTE: F/P - Black; E - Municipal Trash Bin  St
able for 28 days at room temperature.Expires in _____ days from ______________Da
te

Start Date: 18

Stop Date: 18

Status: Discontinued



insulin lispro

6 unit, 0.06 mL, Route: SUB-Q, Drug form: SOLN, TID-Before Meals, Dosing Weight 
82.273, kg, PRN Blood Glucose Results, Start date: 18 9:14:00 CDT, Duratio
n: 30 day, Stop date: 10/26/18 9:13:00 CDT

Notes: (Same as: Humalog ) Roll in palms of hands gently;  Do not shake `vigorou
sly. "Single Patient Use Only "  WASTE: F/P - Black; E - Municipal Trash Bin  St
able for 28 days at room temperature.Expires in _____ days from ______________Da
te

Start Date: 18

Stop Date: 18

Status: Discontinued



insulin lispro

3 unit, 0.03 mL, Route: SUB-Q, Drug form: SOLN, TID-Before Meals, Dosing Weight 
82.273, kg, PRN Blood Glucose Results, Start date: 18 9:14:00 CDT, Duratio
n: 30 day, Stop date: 10/26/18 9:13:00 CDT

Notes: (Same as: Humalog ) Roll in palms of hands gently;  Do not shake `vigorou
sly. "Single Patient Use Only "  WASTE: F/P - Black; E - Municipal Trash Bin  St
able for 28 days at room temperature.Expires in _____ days from ______________Da
te

Start Date: 18

Stop Date: 18

Status: Discontinued



insulin lispro

9 unit, 0.09 mL, Route: SUB-Q, Drug form: SOLN, TID-Before Meals, Dosing Weight 
82.273, kg, PRN Blood Glucose Results, Start date: 18 9:14:00 CDT, Duratio
n: 30 day, Stop date: 10/26/18 9:13:00 CDT

Notes: (Same as: Humalog ) Roll in palms of hands gently;  Do not shake `vigorou
sly. "Single Patient Use Only "  WASTE: F/P - Black; E - Municipal Trash Bin  St
able for 28 days at room temperature.Expires in _____ days from ______________Da
te

Start Date: 18

Stop Date: 18

Status: Discontinued



insulin lispro

1 unit, 0.01 mL, Route: SUB-Q, Drug form: SOLN, Bedtime, Dosing Weight 82.273, k
g, PRN Blood Glucose Results, Start date: 18 22:27:00 CDT, Duration: 30 da
y, Stop date: 10/25/18 22:26:00 CDT

Notes: (Same as: Humalog ) Roll in palms of hands gently;  Do not shake `vigorou
sly. "Single Patient Use Only "  WASTE: F/P - Black; E - Municipal Trash Bin  St
able for 28 days at room temperature.Expires in _____ days from ______________Da
te

Start Date: 18

Stop Date: 18

Status: Discontinued



insulin lispro

3 unit, 0.03 mL, Route: SUB-Q, Drug form: SOLN, Bedtime, Dosing Weight 82.273, k
g, PRN Blood Glucose Results, Start date: 18 22:27:00 CDT, Duration: 30 da
y, Stop date: 10/25/18 22:26:00 CDT

Notes: (Same as: Humalog ) Roll in palms of hands gently;  Do not shake `vigorou
sly. "Single Patient Use Only "  WASTE: F/P - Black; E - Municipal Trash Bin  St
able for 28 days at room temperature.Expires in _____ days from ______________Da
te

Start Date: 18

Stop Date: 18

Status: Discontinued



insulin lispro

4 unit, 0.04 mL, Route: SUB-Q, Drug form: SOLN, Bedtime, Dosing Weight 82.273, k
g, PRN Blood Glucose Results, Start date: 18 22:27:00 CDT, Duration: 30 da
y, Stop date: 10/25/18 22:26:00 CDT

Notes: (Same as: Humalog ) Roll in palms of hands gently;  Do not shake `vigorou
sly. "Single Patient Use Only "  WASTE: F/P - Black; E - Municipal Trash Bin  St
able for 28 days at room temperature.Expires in _____ days from ______________Da
te

Start Date: 18

Stop Date: 18

Status: Discontinued



insulin lispro

2 unit, 0.02 mL, Route: SUB-Q, Drug form: SOLN, Bedtime, Dosing Weight 82.273, k
g, PRN Blood Glucose Results, Start date: 18 22:27:00 CDT, Duration: 30 da
y, Stop date: 10/25/18 22:26:00 CDT

Notes: (Same as: Humalog ) Roll in palms of hands gently;  Do not shake `vigorou
sly. "Single Patient Use Only "  WASTE: F/P - Black; E - Municipal Trash Bin  St
able for 28 days at room temperature.Expires in _____ days from ______________Da
te

Start Date: 18

Stop Date: 18

Status: Discontinued



isosorbide mononitrate 30 mg oral tablet, extended release

30 mg=1 tab, PO, QAM, # 30 tab, 3 Refill(s), Pharmacy: [id number]
2

Start Date: 18

Stop Date: 19

Status: Discontinued



isosorbide mononitrate extended release

30 mg, 1 tab, Route: PO, Drug form: ERTAB, QAM, Dosing Weight 82.273, kg, Priori
ty: NOW, Start date: 18 9:25:00 CDT, Duration: 30 day, Stop date: 10/25/18
9:00:00 CDT

Notes: (Same as:Imdur)"Do Not Crush"  Take on empty stomach/ full glass of water
.  Do not crush

Start Date: 18

Stop Date: 18

Status: Discontinued



K-Dur 20

40 mEq, 2 tab, Route: PO, Drug form: ERTAB, ONCE, Start date: 18 19:11:00 
CDT, Stop date: 18 19:11:00 CDT

Notes: (Same as: K-Dur 20)"Do Not Crush"For patients unable to swallow tablet, d
issolve in one half glass of water. Allow about 2 minutes for the tablets to dis
integrate. Stir before giving to prepare slurry and administer.Please exclude Pa
tients with feeding tube less than 14 Cuban (Dobhoff, J-tube etc) and pediat
cornel and  patients.  With food and full glass of water

Start Date: 18

Stop Date: 18

Status: Completed



Lasix

40 mg, 4 mL, Route: IVP, Drug form: INJ, ONCE, Dosing Weight 82.273, kg, Start d
ate: 18 9:26:00 CDT, Stop date: 18 9:26:00 CDT

Notes: (Same as: Lasix)  *** MEDICATION WASTE ***Product Size:  40 mgProduct Was
sonali:  ___ mg

Start Date: 18

Stop Date: 18

Status: Completed



lisinopril

2.5 mg, 0.5 tab, Route: PO, Drug form: TAB, Daily, Dosing Weight 82.273, kg, Christina
ority: NOW, Start date: 18 12:03:00 CDT, Duration: 30 day, Stop date: 10/2
5/18 9:00:00 CDT

Notes: (Same as: Prinivil, Zestril)

Start Date: 18

Stop Date: 18

Status: Discontinued



magnesium sulfate

2 gm, 50 mL, Route: IV, Drug form: INJ, ONCE, Dosing Weight 82.273, kg, Priority
: STAT, Start date: 18 19:26:00 CDT, Stop date: 18 19:26:00 CDT

Notes: WASTE: F/P - Sink; E - Municipal Trash Bin

Start Date: 18

Stop Date: 18

Status: Completed



magnesium sulfate 2gm / NS 50ml (premixed)

2 gm, 50 mL, Route: IVPB, Drug form: INJ, ONCE, Dosing Weight 82.273, kg, Start 
date: 18 14:34:00 CDT, Stop date: 18 14:34:00 CDT

Notes: WASTE: F/P - Sink; E - Municipal Trash Bin

Start Date: 18

Stop Date: 18

Status: Completed



metoprolol tartrate

25 mg, 1 tab, Route: PO, Drug form: TAB, Q12H, Dosing Weight 82.273, kg, Priorit
y: NOW, Start date: 18 9:22:00 CDT, Duration: 30 day, Stop date: 10/25/18 
9:00:00 CDT

Notes: (Same as: Lopressor)

Start Date: 18

Stop Date: 18

Status: Discontinued



metoprolol tartrate 25 mg oral tablet

25 mg=1 tab, PO, Q12H, Hold if heart rate less than 60 bpm or systolic blood pre
ssure less than 100 mmHg, # 60 tab, 3 Refill(s), Pharmacy: [facility] 
[id number]

Start Date: 18

Stop Date: 19

Status: Completed



morphine Sulfate

2 mg, 1 mL, Route: IVP, Drug form: INJ, Q4H, Dosing Weight 82.273, kg, PRN Pain 
Score 7-10, Start date: 18 1:03:00 CDT, Duration: 30 day, Stop date: 10/25
/18 1:02:00 CDT

Notes: (Same as:MORPhine Sulfate)

Start Date: 18

Stop Date: 18

Status: Discontinued



ondansetron

4 mg, 2 mL, Route: IVP, Drug form: INJ, Q6H, Dosing Weight 82.273, kg, PRN Nause
a & Vomiting, Start date: 18 1:03:00 CDT, Duration: 30 day, Stop date: 
10/25/18 1:02:00 CDT

Notes: (Same as: Zofran)  *** MEDICATION WASTE ***Product Size:  4 mgProduct Was
sonali:  ___ mg

Start Date: 18

Stop Date: 18

Status: Discontinued



potassium chloride

40 mEq, 2 tab, Route: PO, Drug form: ERTAB, ONCE, Dosing Weight 82.273, kg, Star
t date: 18 9:25:00 CDT, Stop date: 18 9:25:00 CDT

Notes: (Same as: K-Dur 20)For patients unable to swallow tablet, dissolve in one
half glass of water. Allow about 2 minutes for the tablets to disintegrate. Stir
before giving to prepare slurry and administer.Please exclude Patients with 
feeding tube less than 14 Cuban (Dobhoff, J-tube etc) and pediatric and neonat
al patients.  With food and full glass of water

Start Date: 18

Stop Date: 18

Status: Completed



potassium chloride

40 mEq, 2 tab, Route: PO, Drug form: ERTAB, ONCE, Dosing Weight 82.273, kg, Star
t date: 18 22:25:00 CDT, Stop date: 18 22:25:00 CDT

Notes: (Same as: K-Dur 20)"Do Not Crush"For patients unable to swallow tablet, d
issolve in one half glass of water. Allow about 2 minutes for the tablets to dis
integrate. Stir before giving to prepare slurry and administer.Please exclude Pa
tients with feeding tube less than 14 Cuban (Dobhoff, J-tube etc) and pediat
cornel and  patients.  With food and full glass of water

Start Date: 18

Stop Date: 18

Status: Completed



potassium chloride

10 mEq, 100 mL, Route: IVPB, Drug form: INJ, ONCE, Dosing Weight 82.273, kg, Sta
rt date: 18 22:25:00 CDT, Stop date: 18 22:25:00 CDT

Notes: Infuse at a rate of 10 mEq/hr.(Same as: KCL)

Start Date: 18

Stop Date: 18

Status: Completed



potassium chloride

40 mEq, 2 tab, Route: PO, Drug form: ERTAB, ONCE, Dosing Weight 82.273, kg, Star
t date: 18 6:26:00 CDT, Stop date: 18 6:26:00 CDT

Notes: (Same as: K-Dur 20)"Do Not Crush"For patients unable to swallow tablet, d
issolve in one half glass of water. Allow about 2 minutes for the tablets to dis
integrate. Stir before giving to prepare slurry and administer.Please exclude Pa
tients with feeding tube less than 14 Cuban (Dobhoff, J-tube etc) and pediat
cornel and  patients.  With food and full glass of water

Start Date: 18

Stop Date: 18

Status: Completed



potassium chloride 20 mEq oral tablet, extended release

20 mEq, PO, BID, # 60 tab, 0 Refill(s), Pharmacy: [facility] [id number]

Start Date: 18

Stop Date: 19

Status: Completed



potassium chloride 20 mEq/15 mL oral liquid

40 mEq, 30 mL, Route: PO, Drug form: LIQ, ONCE, Dosing Weight 82.273, kg, Priori
ty: STAT, Start date: 18 17:47:00 CDT, Stop date: 18 17:47:00 CDT

Notes: (Same as: Potassium Chloride)

Start Date: 18

Stop Date: 18

Status: Deleted



Ranexa 500 mg oral tablet, extended release

500 mg, 1 tab, Route: PO, Drug form: TAB, BID, Dosing Weight 82.273, kg, Start d
ate: 18 17:00:00 CDT, Duration: 30 day, Stop date: 10/25/18 9:00:00 CDT

Notes: Same as Ranexa"Do Not Crush"

Start Date: 18

Stop Date: 18

Status: Discontinued



Ranexa 500 mg oral tablet, extended release

500 mg=1 tab, PO, Daily, # 30 tab, 3 Refill(s), other

Start Date: 18

Stop Date: 19

Status: Discontinued



Rocephin + sterile water 10 mL

1 gm, Route: IVP, [id number], Dosing Weight 82.273, kg, Start date: 18 2:00:0
0 CDT, Duration: 5 day, Stop date: 18 2:00:00 CDT, ABX Indication: Pneumon
ia

Notes: (Same As: Rocephin).Use with 100 mL NS and infuse over 30 min  *** MEDICA
TION WASTE ***Product Size:  1000 mgProduct Wasted:  ___ mg

Start Date: 18

Stop Date: 18

Status: Discontinued



Xarelto

20 mg, 1 tab, Route: PO, Drug form: TAB, QPM, Dosing Weight 82.273, kg, Start da
te: 18 17:00:00 CDT, Duration: 30 day, Stop date: 10/24/18 17:00:00 CDT

Notes: (Same as: Xarelto)Administer with food

Start Date: 18

Stop Date: 18

Status: Discontinued



Results





ELECTROLYTES





                    1                   2                   3



                                         Most recent to   



                                         oldest [Reference   



                                         Range]:   

 

                                        141 mEq/L 

                          (18 6:50 AM)         138 mEq/L 

                          (18 9:42 PM)         143 mEq/L 

                                        (18 5:09 AM) 



                                         Sodium Lvl [135-145   



                                         mEq/L]   

 

                                        3.8 mEq/L 

                          (18 6:50 AM)         2.9 mEq/L 1

*CRIT*

                          (18 9:42 PM)         2.9 mEq/L 2

*CRIT*

                                        (18 5:09 AM) 



                                         Potassium Lvl   



                                         [3.5-5.1 mEq/L]   

 

                                        102 mEq/L 

                          (18 6:50 AM)         99 mEq/L 

                          (18 9:42 PM)         102 mEq/L 

                                        (18 5:09 AM) 



                                         Chloride Lvl [   



                                         mEq/L]   

 

                                        26 mEq/L 

                          (18 6:50 AM)         32 mEq/L 

                          (18 9:42 PM)         28 mEq/L 

                                        (18 5:09 AM) 



                                         CO2 [24-32 mEq/L]   

 

                                        16.8 mEq/L 

                          (18 6:50 AM)         9.9 mEq/L 

*LOW*

                          (18 9:42 PM)         15.9 mEq/L 

                                        (18 5:09 AM) 



                                         AGAP [10.0-20.0   



                                         mEq/L]   







1Result Comment: Critical Result(s) called to adriana shepherd at 2018 22:15 by ngozi. 
Read back OK.



2Result Comment: Critical Result(s) called to Nahomi Henderson at 2018 
05:36 by LT.  Read back OK.



CHEM PANEL





                    1                   2                   3



                                         Most recent to   



                                         oldest [Reference   



                                         Range]:   

 

                                        0.90 mg/dL 

                          (18 6:50 AM)         0.94 mg/dL 

                          (18 9:42 PM)         0.83 mg/dL 

                                        (18 5:09 AM) 



                                         Creatinine Lvl   



                                         [0.50-1.40 mg/dL]   

 

                                        82 mL/min/1.73m2 1

*NA*

                          (18 6:50 AM)         77 mL/min/1.73m2 2

*NA*

                          (18 9:42 PM)         85 mL/min/1.73m2 3

*NA*

                                        (18 5:09 AM) 



                                         eGFR   

 

                                        11 mg/dL 

                          (18 6:50 AM)         9 mg/dL 

                          (18 9:42 PM)         9 mg/dL 

                                        (18 5:09 AM) 



                                         BUN [7-22 mg/dL]   

 

                                        8 

                    (18 4:58 PM)                          



                                         B/C Ratio [6-25]   

 

                                        268 mg/dL 

*HI*

                          (18 6:50 AM)         297 mg/dL 

*HI*

                          (18 9:42 PM)         229 mg/dL 

*HI*

                                        (18 5:09 AM) 



                                         Glucose Lvl [70-99   



                                         mg/dL]   

 

                                        7.1 g/dL 

                    (18 4:58 PM)                          



                                         Total Protein   



                                         [6.4-8.4 g/dL]   

 

                                        2.7 g/dL 

*LOW*

                    (18 4:58 PM)                          



                                         Albumin Lvl [3.5-5.0   



                                         g/dL]   

 

                                        4.4 g/dL 

*HI*

                    (18 4:58 PM)                          



                                         Globulin [2.7-4.2   



                                         g/dL]   

 

                                        0.6 

*LOW*

                    (18 4:58 PM)                          



                                         A/G Ratio [0.7-1.6]   

 

                                        8.0 mg/dL 

*LOW*

                          (18 6:50 AM)         7.5 mg/dL 

*LOW*

                          (18 9:42 PM)         7.9 mg/dL 

*LOW*

                                        (18 5:09 AM) 



                                         Calcium Lvl   



                                         [8.5-10.5 mg/dL]   

 

                                        1.8 mg/dL 

*LOW*

                    (18 4:58 PM)                          



                                         Phosphorus [2.5-4.5   



                                         mg/dL]   

 

                                        1.8 mg/dL 

                          (18 6:50 AM)         1.9 mg/dL 

                          (18 9:42 PM)         1.7 mg/dL 

*LOW*

                                        (18 4:58 PM) 



                                         Magnesium Lvl   



                                         [1.8-2.4 mg/dL]   

 

                                        39 unit/L 

                    (18 4:58 PM)                          



                                         ALT [0-65 unit/L]   

 

                                        40 unit/L 

*HI*

                    (18 4:58 PM)                          



                                         AST [0-37 unit/L]   

 

                                        118 unit/L 

                    (18 4:58 PM)                          



                                         Alk Phos [   



                                         unit/L]   

 

                                        0.4 mg/dL 

                    (18 4:58 PM)                          



                                         Bili Total [0.2-1.3   



                                         mg/dL]   







1Result Comment: The eGFR is calculated using the CKD-EPI formula. In most 
young, healthy individuals the eGFR will be >90 mL/min/1.73m2. The eGFR declines
with age. An eGFR of 60-89 may be normal in some populations, particularly the 
elderly, for whom the CKD-EPI formula has not been extensively validated. Use of
the eGFR is not recommended in the following populations:



Individuals with unstable creatinine concentrations, including pregnant patients
and those with serious co-morbid conditions.



Patients with extremes in muscle mass or diet. 



The data above are obtained from the National Kidney Disease Education Program (
NKDEP) which additionally recommends that when the eGFR is used in patients with
extremes of body mass index for purposes of drug dosing, the eGFR should be mul
tiplied by the estimated BMI.



2Result Comment: The eGFR is calculated using the CKD-EPI formula. In most 
young, healthy individuals the eGFR will be >90 mL/min/1.73m2. The eGFR declines
with age. An eGFR of 60-89 may be normal in some populations, particularly the 
elderly, for whom the CKD-EPI formula has not been extensively validated. Use of
the eGFR is not recommended in the following populations:



Individuals with unstable creatinine concentrations, including pregnant patients
and those with serious co-morbid conditions.



Patients with extremes in muscle mass or diet. 



The data above are obtained from the National Kid[employer] Disease Education Program (
NKDEP) which additionally recommends that when the eGFR is used in patients with
extremes of body mass index for purposes of drug dosing, the eGFR should be mul
tiplied by the estimated BMI.



3Result Comment: The eGFR is calculated using the CKD-EPI formula. In most 
young, healthy individuals the eGFR will be >90 mL/min/1.73m2. The eGFR declines
with age. An eGFR of 60-89 may be normal in some populations, particularly the 
elderly, for whom the CKD-EPI formula has not been extensively validated. Use of
the eGFR is not recommended in the following populations:



Individuals with unstable creatinine concentrations, including pregnant patients
and those with serious co-morbid conditions.



Patients with extremes in muscle mass or diet. 



The data above are obtained from the National Kidney Disease Education Program (
NKDEP) which additionally recommends that when the eGFR is used in patients with
extremes of body mass index for purposes of drug dosing, the eGFR should be mul
tiplied by the estimated BMI.



CARDIAC ENZYMES





                    1                   2                   3



                                         Most recent to   



                                         oldest [Reference   



                                         Range]:   

 

                                        37 unit/L 

                    (18 4:58 PM)                          



                                         Total CK [   



                                         unit/L]   

 

                                        0.03 ng/mL 

                          (18 6:23 AM)         0.04 ng/mL 

                          (18 2:48 AM)         0.05 ng/mL 

                                        (18 4:58 PM) 



                                         Troponin-I   



                                         [0.00-0.40 ng/mL]   

 

                                        729 pg/mL 

*HI*

                    (18 4:58 PM)                          



                                         BNP [<=100 pg/mL]   







HEMATOLOGY





                    1                   2                   3



                                         Most recent to   



                                         oldest [Reference   



                                         Range]:   

 

                                        12.7 K/CMM 

*HI*

                          (18 6:50 AM)         10.4 K/CMM 

                          (18 5:09 AM)         10.4 K/CMM 

                                        (18 4:58 PM) 



                                         WBC [3.7-10.4 K/CMM]   

 

                                        3.37 M/CMM 

*LOW*

                          (18 6:50 AM)         3.48 M/CMM 

*LOW*

                          (18 5:09 AM)         3.43 M/CMM 

*LOW*

                                        (18 4:58 PM) 



                                         RBC [4.70-6.10   



                                         M/CMM]   

 

                                        7.6 g/dL 

*LOW*

                          (18 6:50 AM)         7.8 g/dL 

*LOW*

                          (18 5:09 AM)         7.8 g/dL 

*LOW*

                                        (18 4:58 PM) 



                                         Hgb [14.0-18.0 g/dL]   

 

                                        24.7 % 

*LOW*

                          (18 6:50 AM)         25.3 % 

*LOW*

                          (18 5:09 AM)         25.0 % 

*LOW*

                                        (18 4:58 PM) 



                                         Hct [42.0-54.0 %]   

 

                                        73.3 fL 

*LOW*

                          (18 6:50 AM)         72.5 fL 

*LOW*

                          (18 5:09 AM)         73.0 fL 

*LOW*

                                        (18 4:58 PM) 



                                         MCV [80.0-94.0 fL]   

 

                                        22.6 pg 

*LOW*

                          (18 6:50 AM)         22.4 pg 

*LOW*

                          (18 5:09 AM)         22.7 pg 

*LOW*

                                        (18 4:58 PM) 



                                         MCH [27.0-31.0 pg]   

 

                                        30.8 g/dL 

*LOW*

                          (18 6:50 AM)         30.9 g/dL 

*LOW*

                          (18 5:09 AM)         31.1 g/dL 

*LOW*

                                        (18 4:58 PM) 



                                         MCHC [32.0-36.0   



                                         g/dL]   

 

                                        20.7 % 

*HI*

                          (18 6:50 AM)         20.8 % 

*HI*

                          (18 5:09 AM)         20.8 % 

*HI*

                                        (18 4:58 PM) 



                                         RDW [11.5-14.5 %]   

 

                                        8.5 fL 

                          (18 6:50 AM)         8.7 fL 

                          (18 5:09 AM)         8.4 fL 

                                        (18 4:58 PM) 



                                         MPV [7.4-10.4 fL]   

 

                                        293 K/CMM 

                          (18 6:50 AM)         294 K/CMM 

                          (18 5:09 AM)         326 K/CMM 

                                        (18 4:58 PM) 



                                         Platelet [133-450   



                                         K/CMM]   

 

                                        76.1 % 

*HI*

                          (18 6:50 AM)         71.7 % 

                          (18 5:09 AM)         69.9 % 

                                        (18 4:58 PM) 



                                         Segs [45.0-75.0 %]   

 

                                        15.0 % 

*LOW*

                          (18 6:50 AM)         17.9 % 

*LOW*

                          (18 5:09 AM)         20.7 % 

                                        (18 4:58 PM) 



                                         Lymphocytes   



                                         [20.0-40.0 %]   

 

                                        5.8 % 

                          (18 6:50 AM)         5.9 % 

                          (18 5:09 AM)         7.3 % 

                                        (18 4:58 PM) 



                                         Monocytes [2.0-12.0   



                                         %]   

 

                                        2.1 % 

                          (18 6:50 AM)         3.4 % 

                          (18 5:09 AM)         1.3 % 

                                        (18 4:58 PM) 



                                         Eosinophils [0.0-4.0   



                                         %]   

 

                                        1.0 % 

                          (18 6:50 AM)         1.1 % 

*HI*

                          (18 5:09 AM)         0.8 % 

                                        (18 4:58 PM) 



                                         Basophils [0.0-1.0   



                                         %]   

 

                                        9.7 K/CMM 

*HI*

                          (18 6:50 AM)         7.5 K/CMM 

                          (18 5:09 AM)         7.3 K/CMM 

                                        (18 4:58 PM) 



                                         Neutrophils #   



                                         [1.5-8.1 K/CMM]   

 

                                        1.9 K/CMM 

                          (18 6:50 AM)         1.9 K/CMM 

                          (18 5:09 AM)         2.2 K/CMM 

                                        (18 4:58 PM) 



                                         Lymphocytes #   



                                         [1.0-5.5 K/CMM]   

 

                                        0.7 K/CMM 

                          (18 6:50 AM)         0.6 K/CMM 

                          (18 5:09 AM)         0.8 K/CMM 

                                        (18 4:58 PM) 



                                         Monocytes # [0.0-0.8   



                                         K/CMM]   

 

                                        0.3 K/CMM 

                          (18 6:50 AM)         0.3 K/CMM 

                          (18 5:09 AM)         0.1 K/CMM 

                                        (18 4:58 PM) 



                                         Eosinophils #   



                                         [0.0-0.5 K/CMM]   

 

                                        0.1 K/CMM 

                          (18 6:50 AM)         0.1 K/CMM 

                          (18 5:09 AM)         0.1 K/CMM 

                                        (18 4:58 PM) 



                                         Basophils # [0.0-0.2   



                                         K/CMM]   

 

                                        1+ 

                    (18 6:50 AM)                          



                                         Hypochrom [None   



                                         Seen]   

 

                                        1+ 

*ABN*

                          (18 6:50 AM)         1+ 

*ABN*

                          (18 5:09 AM)         1+ 

*ABN*

                                        (18 4:58 PM) 



                                         Microcyte [None   



                                         Seen]   

 

                                        Normal 

                    (18 6:50 AM)                          



                                         Plt Morph   

 

                                        26.1 seconds 

*HI*

                    (18 4:58 PM)                          



                                         PT [12.0-14.7   



                                         seconds]   

 

                                        2.36 

*HI*

                    (18 4:58 PM)                          



                                         INR [0.85-1.17]   

 

                                        37.1 seconds 

*HI*

                    (18 4:58 PM)                          



                                         PTT [22.9-35.8   



                                         seconds]   







Immunizations





Given and Recorded





   



                 Vaccine         Date            Status          Refusal Reason

 

   



                     influenza virus vaccine, inactivated     18             Given 

 

   



                     influenza virus vaccine, inactivated     18              Given 

 

   



                     pneumococcal 13-valent vaccine     18              Given 

 

   



                     diphtheria/pertussis, acel/tetanus adult     16              Given 

 

   



                     pneumococcal 23-valent vaccine     3/31/15             Given 









Not Given





   



                 Vaccine         Date            Status          Refusal Reason

 

   



                 pneumococcal 23-valent vaccine1     3/30/15         Not Given       Patient Refuses







1Result Note: already recieved vaccination previous to admission



Procedures







    



              Procedure     Date         Related Diagnosis     Body Site     Status

 

    



                           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

 

    



                           Cardiac ablation using fluoroscopy guidance        Completed

 

    



                           Cardiac catheterization, left heart        Completed

 

    



                           Stent placement2          Completed







1ablation stents



2x27



Social History







 



                           Social History Type       Response

 

 



                           Substance Abuse           Use: None.

 

 



                           Alcohol                   Never

 

 



                           Smoking Status            Former smoker; Ready to change: No; Concerns about tobacco use

 in



                                         household: No; Exposure to Tobacco Smoke None; Cigarette Smoking Last 365



                                         Days No; Reg Smoking Cessation Counseling No; Other Tobacco Frequency quit



                                         30 years ago;



                                         entered on: 19







Assessment and Plan

Extracted from:





  



                     Title: Clinical Document     Author: Ashok Velazco MD     Date: 18









                                        Progress Note

Cardiology

[facility]





Impression:

Chest pain

severe coronary artery disease status post bypass and multiple PCI's, LIMA to 
LAD as last remaining vessel

Chronic diastolic congestive heart failure

paroxysmal atrial fibrillation status post multiple ablations [id number] Vasc:4

hypertension

diabetes

hypothyroidism



Cardiologist:  at [facility]



Plan:

Continue aspirin and Xarelto

Continue isosorbide, sent over to his pharmacy

Would increase his potassium supplementation to twice a day

Agree with magnesium supplementation

Continue metoprolol at 25 mg p.o. twice a day

QTC improved with correction of his electrolyte abnormalities.  However still 
elevated at 500 ms.  I will decrease Ranexa to daily dosing

Discussed with nursing

Discussed with primary team

Discussed with patient in detail



Subjective:

Patient seen and examined.  No chest pain.  Has been chest pain-free all night 
and this morning.  No palpitations.  No bleeding.  No edema.  No fever.



Objective:

Telemetry 









VitalsTmp(F)[id number]

                                         12:[id number]---

                                         08:45------------1896 3.0L/m

                                         07:4698.19313/280880---

                                         02:5498.705046/[id number] 2.0L/m

                                         23:4598.705219/088988 2.0L/m





                                        24 Hr Tmax: 98.4F (36.89c) at  02:54Vital Signs are the last 5 in the past 

48 hours.



Gen: Alert, deconditioned

neck: No carotid bruits, No JVD

Lungs: Mildly decreased at the bases bilaterally

Heart: Regular, S1-S2

Abd: Soft, nt, nd, +bs

Ext: No edema

Neuro: No focal deficits

Skin: warm, moist





Labs (Last four charted values)

WBC                 H 12.7(SEP 26)10.4(SEP 25)10.4(SEP 24)

Hgb                 L 7.6(SEP 26)L 7.8(SEP 25)L 7.8(SEP 24)

Hct                 L 24.7(SEP 26)L 25.3(SEP 25)L 25.0(SEP 24)

Plt                 293(SEP 26)294(SEP 25)326(SEP 24)

Na                  141(SEP 26)138(SEP 25)143(SEP 25)139(SEP 24)

K                   3.8(SEP 26)C 2.9(SEP 25)C 2.9(SEP 25)C 3.0(SEP 24)

CO2                 26(SEP 26)32(SEP 25)28(SEP 25)27(SEP 24)

Cl                  102(SEP 26)99(SEP 25)102(SEP 25)102(SEP 24)

Cr                  0.90(SEP 26)0.94(SEP 25)0.83(SEP 25)0.90(SEP 24)

BUN                 11(SEP 26)9(SEP 25)9(SEP 25)7(SEP 24)

Glucose Random      H 268(SEP 26)H 297(SEP 25)H 229(SEP 25)H 231(SEP 24)

Mg                  1.8(SEP 26)1.9(SEP 25)L 1.7(SEP 24)

Phos                L 1.8(SEP 24)

Ca                  L 8.0(SEP 26)L 7.5(SEP 25)L 7.9(SEP 25)L 7.5(SEP 24)

PT                  H 26.1(SEP 24)

INR                 H 2.36(SEP 24)

PTT                 H 37.1(SEP 24)

Troponin            0.03(SEP 25)0.04(SEP 25)0.05(SEP 24)

Total CK            37(SEP 24)



Scheduled Meds (8):

                                        18 aspirin (aspirin 81 mg tablet, enteric coated) 81 mg PO Daily

                                        18 azithromycin + Sodium Chloride 0.9%  mL 500 mg IVPB [id number] 166.67

 ml/hr

                                        18 cefTRIAXone + sterile water 10 mL (Rocephin + sterile water 10 mL) 1 gm

 IVP [id number] 120 ml/hr

                                        18 isosorbide mononitrate (isosorbide mononitrate extended release) 30 mg 

PO QAM

                                        18 lisinopril 2.5 mg PO Daily

                                        18 metoprolol (metoprolol tartrate) 25 mg PO Q12H

                                        18 ranolazine (Ranexa 500 mg oral tablet, extended release) 500 mg PO BID

                                        18 rivaroxaban (Xarelto) 20 mg PO QPM







Continuous Infusions: None









 



                           Addendum                  Needs optimal medical treatment for stable angina.  He understands to

 follow-up with his



                           by Mora,                cardiologist in a couple weeks.



                                         Ashok PARKS 



                                         on 



                                         2018 



                                         15:49 





Extracted from:





  



                     Title: Clinical Document     Author: Ashok Velazco MD     Date: 18









                                        [facility]

Initial Cardiology Consultation Note



Reason for Consult: Chest pain

Chief Complaint: Chest pain



HPI:

 78-year-old male with history of severe coronary artery disease status post 
bypass multiple PCI's, LIMA to the LAD as last remaining vessel, chronic 
diastolic heart failure, paroxysmal atrial fibrillation status post multiple 
ablations on Xarelto, hypertension, diabetes, hypothyroidism, who comes to the 
hospital with complaints of chest pain.



The patient has had 3 sets of negative cardiac enzymes.  The patient's BNP is 
elevated at 729.  The patient's EKG shows sinus rhythm without any acute ST or T
wave changes suggestive of ischemia.  The EKG did look similar to prior EKGs in 
our system.  The patient had an echocardiogram performed in 2018.  
Echocardiogram showed a normal LVEF of 50-55% with restrictive diastolic 
function.  The IVC did appear dilated in size on that study.



The patient had a chest x-ray which showed a new infiltrate in the left lung 
base.



The patient at home takes niacin 2000 mg at bedtime, and metoprolol 12.5 mg p.o.
twice a day, Ranexa 500 mg p.o. twice a day, lisinopril 2.5 mg p.o. daily, 
amiodarone 200 mg p.o. daily, torsemide 20 mg p.o. daily, Lipitor 5 mg p.o. 
daily, Xarelto 20 mg p.o. nightly.



In regards to the patient's chest pain, he said the chest pain happened 
yesterday while he was sleeping.  He said he first had some trouble breathing.  
He then developed this left-sided chest pain.  The pain did radiate down his 
left arm.  It lasted for about 10 minutes or so before it went away.  He says 
that this is been happening to him before.  This is not unusual for him.  The 
pain has not worsened or increased in intensity recently.  The pain went away 
with nitroglycerin sublingual.  He has been chest pain-free here in the hospital
overnight and this morning.  He currently has no chest pain right now.



REVIEW OF SYSTEMS:

CONSTITUTIONAL: No fever. No chills. No dizziness. No weakness. 

EYES: No pain, erythema, or discharge. No blurring of vision. 

EAR, NOSE AND THROAT: No sore throat, URI symptoms. No epistaxis. No tinnitus.

CARDIOVASCULAR: + chest pain. No palpitations. No lower extremity edema.

RESPIRATORY: No shortness of breath, cough, pain with respiration, or pleuritic 
chest pain. No hemoptysis. No dyspnea. No paroxysmal nocturnal dyspnea.

GASTROINTESTINAL: Normal appetite. No nausea, vomiting, diarrhea. No pain. No 
bloating. No melena. 

GENITOURINARY: No frequency, urgency, nocturia. No hematuria or dysuria.

MUSCULOSKELETAL: No arthralgias or myalgias. 

INTEGUMENTARY: No swelling. No bruising. No contusions. No abrasions. No 
lymphangitis. 

NEUROLOGIC: No headache. No neck pain. No numbness or tingling of the 
extremities. No weakness. 

PSYCHIATRIC: No confusion. 

METABOLIC: No fatigue. No weakness. No history of thyroid, diabetes or adrenal 
problems. 

HEMATOLOGICAL: No bleeding. No petechiae. No bruising.

ALLERGY: No asthma. No urticaria.





PAST MEDICAL HISTORY:

Stented coronary artery

Acute MI

Hypercholesteremia

Poliomyelitides

Whooping cough

Histoplasmosis

Hypertension

Hx MRSA infection

PAST SURGICAL HISTORY:

Cardiac catheterization, left heart

CABG x 3 - Coronary artery bypass grafts x 3

Cardiac ablation using fluoroscopy guidance

Stent placement

FAMILY HISTORY:



Mother: Aneurysm

Brother: Cancer of prostate; Heart attack; Leukemia

SOCIAL HISTORY:

No tobacco, alcohol, or drug abuse

MEDICATIONS:

See inpatient medications below





Allergies: Reglan, iodine, sulfa drugs, Latex





VitalsTmp(F)[id number]

                                         [id number]/59--94 2.0L/m

                                         07:18------------1698 2.0L/m

                                         03:[id number]/753933 2.0L/m

                                         00:1397.899238/187214 2.0L/m

                                         21:50------------1898 2.0L/m





                                        24 Hr Tmax: 98.1F (36.72c) at  07:47Vital Signs are the last 5 in the past 

48 hours.



Gen: Alert, deconditioned

neck: No carotid bruits, No JVD

Lungs: Mildly decreased at the bases bilaterally

Heart: Regular, S1-S2

Abd: Soft, nt, nd, +bs

Ext: No edema

Neuro: No focal deficits

Skin: warm, moist







Labs (Last four charted values)

WBC                 10.4(SEP 25)10.4(SEP 24)

Hgb                 L 7.8(SEP 25)L 7.8(SEP 24)

Hct                 L 25.3(SEP 25)L 25.0(SEP 24)

Plt                 294(SEP 25)326(SEP 24)

Na                  143(SEP 25)139(SEP 24)

K                   C 2.9(SEP 25)C 3.0(SEP 24)

CO2                 28(SEP 25)27(SEP 24)

Cl                  102(SEP 25)102(SEP 24)

Cr                  0.83(SEP 25)0.90(SEP 24)

BUN                 9(SEP 25)7(SEP 24)

Glucose Random      H 229(SEP 25)H 231(SEP 24)

Mg                  L 1.7(SEP 24)

Phos                L 1.8(SEP 24)

Ca                  L 7.9(SEP 25)L 7.5(SEP 24)

PT                  H 26.1(SEP 24)

INR                 H 2.36(SEP 24)

PTT                 H 37.1(SEP 24)

Troponin            0.03(SEP 25)0.04(SEP 25)0.05(SEP 24)

Total CK            37(SEP 24)



Impression:

Chest pain

severe coronary artery disease status post bypass and multiple PCI's, LIMA to 
LAD as last remaining vessel

Chronic diastolic congestive heart failure

paroxysmal atrial fibrillation status post multiple ablations [id number] Vasc:4

hypertension

diabetes

hypothyroidism



Cardiologist:  at [facility]



Plan:

We have been consulted for further workup of the patient's chest pain.  His 
cardiac enzymes are within laboratory limits and in fact slightly better 
compared to his prior admission.  His EKG does not show any acute changes 
suggestive of ischemia.  He has been chest pain-free while in the hospital.  Per
records, the patient's last cath in  showed a patent LIMA to the LAD with 
his CPAP and is being graft to the diagonal, his RCA, and circumflex all 
occluded.  Given his extensive history, the patient needs to be optimized 
medically.  Patient is not on a long-acting nitrate.  I will start the patient 
on isosorbide 30 mg.  In addition, I am not sure if the patient was taking 
aspirin while as an outpatient.  It is not on his home medication 
reconciliation.  He was taking it while he was here during his last admission.  
His metoprolol will be increased to 25 mg p.o. twice a day.  I have resumed his 
other medications.  His potassium needs to be supplemented.  I have given him an
additional dose of oral potassium.  His BMP should be checked later on this 
afternoon.  An EKG should also be checked later on to see if his QTC improved 
with potassium supplementation.



He does have an elevated BNP and I will give him a one-time dose of IV Lasix.



I discussed the plan in detail with the patient.  All his questions were 
answered.  He understands agrees the plan.



Continuous Infusions: None



Scheduled Meds (2):

                                        18 azithromycin + Sodium Chloride 0.9%  mL 500 mg IVPB [id number] 166.67

 ml/hr

                                        18 cefTRIAXone + sterile water 10 mL (Rocephin + sterile water 10 mL) 1 gm

 IVP [id number] 120 ml/hr

## 2019-07-18 NOTE — XMS REPORT
Summary of Care: 18 - 18

                             Created on: 2089



DESIREE MCKEON

External Reference #: [id number]

: 1940

Sex: Male



Demographics







                          Address                   [address] [place]  [address]-

 

                          Home Phone                [phone]

 

                          Preferred Language        English

 

                          Marital Status            Unknown

 

                          B[employer] Affiliation     Confucianism

 

                          Race                      Other

 

                                        Additional Race(s)  

 

                          Ethnic Group              Non-





Author







                          Author                    [facility]

 

                          Organization              [facility]

 

                          Address                   Unknown

 

                          Phone                     Unavailable







Encounter





CHLOÉ Pedersen(DAVID) [id number] Date(s): 18 - 18

[facility] [address] [place] [address]-     [phone]
[phone]

Encounter Diagnosis

CKD (chronic kidney disease) (Discharge Diagnosis) - 18

Sundowning (Discharge Diagnosis) - 18

Dementia (Discharge Diagnosis) - 18

Hyperkalemia (Discharge Diagnosis) - 18

Hyperkalemia (Final) - 18

Unspecified dementia without behavioral disturbance (Final) - 

Delirium due to known physiological condition (Final) - 

Hyperlipidemia, unspecified (Final) - 

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
ough stage 4 chronic kidney disease, or unspecified chronic kidney disease 
(Final) - 

Chronic kidney disease, unspecified (Final) - 

Heart failure, unspecified (Final) - 

Long term (current) use of insulin (Final) - 

Long term (current) use of aspirin (Final) - 

Obstructive sleep apnea (adult) (pediatric) (Final) - 

Moderate protein-calorie malnutrition (Final) - 

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 

Pure hypercholesterolemia, unspecified (Final) - 

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 
(Final) - 

Unspecified atrial fibrillation (Final) - 

Hypothyroidism, unspecified (Final) - 

Shortness of breath (Final) - 

Contusion of eyeball and orbital tissues, right eye, initial encounter (Final) -


Other fall on same level, initial encounter (Final) - 

Repeated falls (Final) - 

History of falling (Final) - 

Personal history of nicotine dependence (Final) - 

Personal history of Methicillin resistant Staphylococcus aureus infection 
(Final) - 

Other long term (current) drug therapy (Final) - 

Presence of aortocoronary bypass graft (Final) - 

Discharge Disposition: Intermediate Care

Attending Physician: Nisreen Lou MD





Vital Signs







                    1                   2                   3



                                         Most recent to   



                                         oldest [Reference   



                                         Range]:   

 

                                        98 DegF 

                    (18 12:51 PM)                         



                                         Temperature Oral   



                                         [96.4-99.1 DegF]   

 

                                        124/52 mmHg 

                          (18 6:00 PM)        114/92 mmHg 

                          (18 4:32 PM)        112/60 mmHg 

                                        (18 1:59 PM)



                                         Blood Pressure   



                                         [/60-90 mmHg]   

 

                                        18 BRMIN 

                          (18 6:00 PM)        20 BRMIN 

                          (18 4:32 PM)        15 BRMIN 

                                        (18 2:01 PM)



                                         Respiratory Rate   



                                         [14-20 BRMIN]   

 

                                        58 bpm 

*LOW*

                          (18 1:59 PM)        70 bpm 

                          (18 12:51 PM)        



                                         Peripheral Pulse   



                                         Rate [ bpm]   







Problem List







    



              Condition     Effective Dates     Status       Health Status     Informant

 

    



                           Acute MI(Confirmed)1      Resolved  

 

    



                           Atrial                    Active  



                                         fibrillation(Confirm    



                                         ed)    

 

    



                           CHF (congestive           Active  



                                         heart    



                                         failure)(Confirmed)    

 

    



                           CAD (coronary artery      Active  



                                         disease)(Confirmed)    

 

    



                           Diabetes(Confirmed)       Active  

 

    



                           Diastolic heart           Active  



                                         failure(Confirmed)    

 

    



                           Histoplasmosis(Confi      Resolved  



                                         rmed)    

 

    



                           Hx MRSA                   Resolved  



                                         infection(Confirmed)    

 

    



                           Hypercholesteremia(C      Resolved  



                                         onfirmed)    

 

    



                           Hypertension(Confirm      Active  



                                         ed)    

 

    



                           Hypertension(Confirm      Resolved  



                                         ed)    

 

    



                           HTN                       Active  



                                         (hypertension)(Confi    



                                         rmed)    

 

    



                           Hypothyroidism(Confi      Active  



                                         rmed)    

 

    



                           Diabetes mellitus         Active  



                                         type 2, insulin    



                                         dependent(Confirmed)    

 

    



                     Moderate            Active              Chronic ; 



                                         protein-calorie    



                                         malnutrition(Confirm    



                                         ed)    

 

    



                           PAUL (obstructive          Active  



                                         sleep    



                                         apnea)(Confirmed)    

 

    



                           Poliomyelitides(Conf      Resolved  



                                         irmed)    

 

    



                           Sleep                     Active  



                                         apnea(Confirmed)    

 

    



                           Stented coronary          Resolved  



                                         artery(Confirmed)2    

 

    



                           Systolic heart            Active  



                                         failure(Confirmed)    

 

    



                           Whooping                  Resolved  



                                         cough(Confirmed)    







1x 3



225 cardiac stents



Allergies, Adverse Reactions, Alerts







   



                 Substance       Reaction        Severity        Status

 

   



                           sulfa drugs               Active

 

   



                           Reglan                    Active

 

   



                           iodine                    Active

 

   



                           Latex                     Active







Medications





Ativan

0.5 mg, Route: PO, Drug form: TAB, ONCE, Dosing Weight 86.364, kg, Priority: STA
T, Start date: 18 16:36:00 CST, Stop date: 18 16:36:00 CST

Start Date: 18

Stop Date: 18

Status: Completed



Kayexalate

30 gm, Route: PO, ONCE, Dosing Weight 86.364, kg, Priority: STAT, Start date:  16:18:00 CST, Stop date: 18 16:18:00 CST

Start Date: 18

Stop Date: 18

Status: Completed



Results











 



                           Most recent to            1



                                         oldest [Reference 



                                         Range]: 

 

 



                           Neutrophils #             7.0 K/CMM



                           [1.5-8.1 K/CMM]           (18 1:31 PM)

 

 



                           Lymphocytes #             2.3 K/CMM



                           [1.0-5.5 K/CMM]           (18 1:31 PM)

 

 



                           Monocytes # [0.0-0.8      1.0 K/CMM



                           K/CMM]                    *HI*



                                         (18 1:31 PM)

 

 



                           Eosinophils #             1.0 K/CMM



                           [0.0-0.5 K/CMM]           *HI*



                                         (18 1:31 PM)

 

 



                           Basophils # [0.0-0.2      0.1 K/CMM



                           K/CMM]                    (18 1:31 PM)

 

 



                           eGFR                      39 mL/min/1.73m2 1



                                         *NA*



                                         (18 1:31 PM)

 

 



                           A/G Ratio [0.7-1.6]       0.7



                                         (18 1:31 PM)

 

 



                           Albumin Lvl [3.5-5.0      3.1 g/dL



                           g/dL]                     *LOW*



                                         (18 1:31 PM)

 

 



                           Alk Phos [          125 unit/L



                           unit/L]                   (18 1:31 PM)

 

 



                           ALT [0-65 unit/L]         29 unit/L



                                         (18 1:31 PM)

 

 



                           AGAP [10.0-20.0           15.2 mEq/L



                           mEq/L]                    (18 1:31 PM)

 

 



                           AST [0-37 unit/L]         48 unit/L



                                         *HI*



                                         (18 1:31 PM)

 

 



                           B/C Ratio [6-25]          24



                                         (18 1:31 PM)

 

 



                           Basophils [0.0-1.0        1.1 %



                           %]                        *HI*



                                         (18 1:31 PM)

 

 



                           BUN [7-22 mg/dL]          40 mg/dL



                                         *HI*



                                         (18 1: PM)

 

 



                           Calcium Lvl               8.2 mg/dL



                           [8.5-10.5 mg/dL]          *LOW*



                                         (18 1:31 PM)

 

 



                           Total CK [          41 unit/L



                           unit/L]                   (18 1: PM)

 

 



                           Chloride Lvl [      106 mEq/L



                           mEq/L]                    (18 1:31 PM)

 

 



                           CO2 [24-32 mEq/L]         23 mEq/L



                                         *LOW*



                                         (18 1: PM)

 

 



                           Creatinine Lvl            1.68 mg/dL



                           [0.50-1.40 mg/dL]         *HI*



                                         (18 1: PM)

 

 



                           Eosinophils [0.0-4.0      9.1 %



                           %]                        *HI*



                                         (18: PM)

 

 



                           Globulin [2.7-4.2         4.6 g/dL



                           g/dL]                     *HI*



                                         (18 1: PM)

 

 



                           Glucose Lvl [70-99        172 mg/dL



                           mg/dL]                    *HI*



                                         (18 1:31 PM)

 

 



                           Hct [42.0-54.0 %]         30.6 %



                                         *LOW*



                                         (18 1: PM)

 

 



                           Hgb [14.0-18.0 g/dL]      9.6 g/dL



                                         *LOW*



                                         (18 1: PM)

 

 



                           INR [0.85-1.17]           1.09



                                         (18 1:31 PM)

 

 



                           Potassium Lvl             5.2 mEq/L



                           [3.5-5.1 mEq/L]           *HI*



                                         (18 1:31 PM)

 

 



                           Lymphocytes               20.2 %



                           [20.0-40.0 %]             (18 1: PM)

 

 



                           MCH [27.0-31.0 pg]        25.6 pg



                                         *LOW*



                                         (18 1:31 PM)

 

 



                           MCHC [32.0-36.0           31.4 g/dL



                           g/dL]                     *LOW*



                                         (18 1:31 PM)

 

 



                           MCV [80.0-94.0 fL]        81.3 fL



                                         (18 1:31 PM)

 

 



                           Monocytes [2.0-12.0       8.9 %



                           %]                        (18 1:31 PM)

 

 



                           MPV [7.4-10.4 fL]         8.8 fL



                                         (18 1:31 PM)

 

 



                           Sodium Lvl [135-145       139 mEq/L



                           mEq/L]                    (18 1:31 PM)

 

 



                           Occult Bld Stl            Negative



                           [Negative]                (18 1:31 PM)

 

 



                           Platelet [133-450         203 K/CMM



                           K/CMM]                    (18 1:31 PM)

 

 



                           Segs [45.0-75.0 %]        60.7 %



                                         (18 1: PM)

 

 



                           Total Protein             7.7 g/dL



                           [6.4-8.4 g/dL]            (18 1:31 PM)

 

 



                           PT [12.0-14.7             13.9 seconds



                           seconds]                  (18 1: PM)

 

 



                           PTT [22.9-35.8            33.5 seconds



                           seconds]                  (18 1:31 PM)

 

 



                           RBC [4.70-6.10            3.76 M/CMM



                           M/CMM]                    *LOW*



                                         (18 1:31 PM)

 

 



                           RDW [11.5-14.5 %]         21.5 %



                                         *HI*



                                         (18 1:31 PM)

 

 



                           Bili Total [0.2-1.3       0.4 mg/dL



                           mg/dL]                    (18 1:31 PM)

 

 



                           Troponin-I                <0.02 ng/mL



                           [0.00-0.40 ng/mL]         (18 1:31 PM)

 

 



                           WBC [3.7-10.4 K/CMM]      11.5 K/CMM



                                         *HI*



                                         (18 1:31 PM)







1Result Comment: The eGFR is calculated using the CKD-EPI formula. In most 
young, healthy individuals the eGFR will be >90 mL/min/1.73m2. The eGFR declines
with age. An eGFR of 60-89 may be normal in some populations, particularly the 
elderly, for whom the CKD-EPI formula has not been extensively validated. Use of
the eGFR is not recommended in the following populations:



Individuals with unstable creatinine concentrations, including pregnant patients
and those with serious co-morbid conditions.



Patients with extremes in muscle mass or diet. 



The data above are obtained from the National Kid[employer] Education Program (
NKDEP) which additionally recommends that when the eGFR is used in patients with
extremes of body mass index for purposes of drug dosing, the eGFR should be mul
tiplied by the estimated BMI.



Immunizations





Given and Recorded





   



                 Vaccine         Date            Status          Refusal Reason

 

   



                     influenza virus vaccine, inactivated     18             Given 

 

   



                     influenza virus vaccine, inactivated     18              Given 

 

   



                     pneumococcal 13-valent vaccine     18              Given 

 

   



                     diphtheria/pertussis, acel/tetanus adult     16              Given 

 

   



                     pneumococcal 23-valent vaccine     3/31/15             Given 









Not Given





   



                 Vaccine         Date            Status          Refusal Reason

 

   



                 pneumococcal 23-valent vaccine1     3/30/15         Not Given       Patient Refuses







1Result Note: already recieved vaccination previous to admission



Procedures







    



              Procedure     Date         Related Diagnosis     Body Site     Status

 

    



                           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

 

    



                           Cardiac ablation using fluoroscopy guidance        Completed

 

    



                           Cardiac catheterization, left heart        Completed

 

    



                           Stent placement2          Completed







1ablation stents



2x27



Social History







 



                           Social History Type       Response

 

 



                           Substance Abuse           Use: None.

 

 



                           Alcohol                   Never

 

 



                           Smoking Status            Never smoker; Exposure to Tobacco Smoke None; Cigarette Smoking

 Last 365



                                         Days No; Reg Smoking Cessation Counseling No



                                         entered on: 19







Assessment and Plan





No data available for this section

## 2019-07-18 NOTE — XMS REPORT
Clinical Summary

                             Created on: 2019



Desiree Mckeon

External Reference #: [id number]

: 1940

Sex: Male



Demographics







                          Address                   [facility] [place]  [address]

 

                          Home Phone                [phone]

 

                          Preferred Language        English

 

                          Marital Status            Unknown

 

                          Jew Affiliation     Latter-day

 

                          Race                      White

 

                          Ethnic Group              Non-





Author







                          Author                    [facility]

 

                          Organization              [facility] [place], [place]

 

                          Address                   Unknown

 

                          Phone                     Unavailable







Support







                Name            Relationship    Address         Phone

 

                    Richa ZullyJanine    EC[employer]                [address]

[place]  [address]                     [phone]

 

                    Calvin Mckeon          [employer]                [address] [place]  [address]                [phone]

 

                    Kandi Alford     [employer]                [address] [place]  [address]                [phone]







Care Team Providers







                    Care Team Member Name    Role                Phone

 

                    Josias Rueda    PCP                 [phone]







Allergies







                                        Comments



                 Active Allergy     Reactions       Severity        Noted Date 

 

                                        



Pt can't remember, it was in the 1940's



                           Iodine                    2018 

 

                                         



                 Latex           Rash            Low             2018 

 

                                        



Pt can't remember



                           Metoclopramide            2018 

 

                                        



Pt can't remember



                           Sulfa (Sulfonamide        2018 



                                         Antibiotics)    







Medications







                          End Date                  Status



              Medication     Sig          Dispensed     Refills      Start  



                                         Date  

 

                                                    Active



                     magnesium oxide (MAG-OX)     Take 400 mg         0   



                           400 mg tablet             by mouth     



                                         daily.     

 

                                                    Active



                     multivitamin per tablet     Take 1 tablet       0   



                                         by mouth     



                                         daily.     

 

                                                    Active



                     isosorbide mononitrate     Take by mouth       0   



                           (IMDUR) 30 MG 24 hr       daily Dosage     



                           tablet                    unknown .     

 

                                                    Active



              amiodarone (PACERONE) 200     Take 1 tablet     30 tablet     11             



                     MG tablet           (200 mg             8  



                                         total) by     



                                         mouth daily.     

 

                          2019                Active



              torsemide (DEMADEX) 20 MG     Take 2       360 tablet     3              



                     tablet              tablets (40         8  



                                         mg total) by     



                                         mouth 2 (two)     



                                         times daily.     

 

                          2020                Active



                 atorvastatin (LIPITOR) 40     Take 1 tablet      0                 



                     MG tablet           (40 mg total)       9  



                                         by mouth     



                                         nightly.     

 

                          2020                Active



                 levothyroxine (SYNTHROID,     Take 1 tablet      0                 



                     LEVOTHROID) 75 MCG tablet     (75 mcg             9  



                                         total) by     



                                         mouth Every     



                                         morning on an     



                                         empty     



                                         stomach.     

 

                                                    Active



                 ticagrelor (BRILINTA) 90     Take 1 tablet      0                 



                     mg Tab tablet       (90 mg total)       9  



                                         by mouth 2     



                                         (two) times     



                                         daily.     

 

                                                    Active



                 melatonin 3 mg Tab tablet     Take 1 tablet      0                 



                           (3 mg total)              9  



                                         by mouth     



                                         nightly.     

 

                          2020                Active



                 insulin NPH (HUMULIN N)     25 units        0                 



                     100 unit/mL injection     twice a day         9  



                                         before meals,     



                                         hold if NPO     



                                         or if am     



                                         sugar <90 or     



                                         pm sugar     



                                         <140.     

 

                          2020                Active



                 insulin lispro (HUMALOG)     Inject 0-16      0                 



                     100 unit/mL injection     Units               9  



                                         subcutaneousl     



                                         y as needed     



                                         (High blood     



                                         sugar).     

 

                          2020                Active



                 folic acid (FOLVITE) 1 MG     Take 1 tablet      0                 



                     tablet              (1 mg total)        9  



                                         by mouth     



                                         daily.     

 

                          2020                Active



                 acetaminophen (TYLENOL)     Take 2          0                 



                     325 MG tablet       tablets (650        9  



                                         mg total) by     



                                         mouth every 6     



                                         (six) hours     



                                         as needed for     



                                         Pain for up     



                                         to 360 days.     

 

                                                    Active



                 potassium chloride SA     Take 1 tablet      0                 



                     (K-DUR,KLOR-CON) 20 MEQ     (20 mEq             9  



                           tablet                    total) by     



                                         mouth 2 (two)     



                                         times daily     



                                         Take if     



                                         taking     



                                         demadex.     

 

                                                    Active



                 tamsulosin (FLOMAX) 0.4     Take 1          0                 



                     mg Cap 24 hr capsule     capsule (0.4        9  



                                         mg total) by     



                                         mouth nightly     



                                         Hold if SBP     



                                         less than     



                                         100.     

 

                          2020                Active



                 metoprolol (TOPROL-XL) 25     Take 0.5        0                 



                     MG 24 hr tablet     tablets (12.5       9  



                                         mg total) by     



                                         mouth nightly     



                                         Hold if HR     



                                         less than 55     



                                         or SBP less     



                                         than 105.     

 

                                                    Active



                 pantoprazole (PROTONIX)     Take 1 tablet      0                 



                     40 MG tablet        (40 mg total)       9  



                                         by mouth 2     



                                         (two) times     



                                         daily.     

 

                                                    Active



                 bisacodyl (DULCOLAX) 10     Place 1         0                 



                     mg suppository      suppository         9  



                                         (10 mg total)     



                                         rectally     



                                         daily as     



                                         needed.     

 

                                                    Active



                 docusate sodium (COLACE)     Take 1          0                 



                     100 MG capsule      capsule (100        9  



                                         mg total) by     



                                         mouth 2 (two)     



                                         times daily.     

 

                          2019                Discontinued



                     atorvastatin (LIPITOR) 10     Take 10 mg by       0   



                           MG tablet                 mouth daily.     

 

                          2019                Discontinued



                     levothyroxine (SYNTHROID,     Take 50 mcg         0   



                           LEVOTHROID) 50 MCG tablet     by mouth     



                                         daily.     

 

                          2019                Discontinued



                     pantoprazole (PROTONIX)     Take 40 mg by       0   



                           40 MG tablet              mouth 2 (two)     



                                         times daily .     

 

                          2019                Discontinued



                     sertraline (ZOLOFT) 100     Take 100 mg         0   



                           MG tablet                 by mouth     



                                         nightly .     

 

                          2019                Discontinued



                     lisinopril          Take 2.5 mg         0   



                           (PRINIVIL,ZESTRIL) 2.5 MG     by mouth     



                           tablet                    daily.     

 

                          2019                Discontinued



              rivaroxaban (XARELTO) 20     Take 1 tablet     30 tablet     3              



                     mg Tab tablet       (20 mg total)       5  



                                         by mouth     



                                         daily.     

 

                          2019                Discontinued



                 LORazepam (ATIVAN) 0.5 MG     Take 0.5 mg      0                 



                     tablet              by mouth 3          6  



                                         (three) times     



                                         daily as     



                                         needed .     

 

                          2019                Discontinued



                 HYDROcodone-acetaminophen     Take 1 tablet      0                 



                     (NORCO )  mg     by mouth            6  



                           per tabletIndications:     every 4     



                           Pain                      (four) hours     



                                         as needed .     

 

                          2019                Discontinued



                     insulin detemir (LEVEMIR)     72 Units            0   



                           100 unit/mL injection     every     



                                         morning.     

 

                          2019                Discontinued



                 insulin aspart (NOVOLOG)     Inject 10       0                 



                     100 unit/mL InPn     Units               6  



                                         subcutaneousl     



                                         y 3 (three)     



                                         times daily     



                                         before meals     



                                         Per sliding     



                                         scale. Hold     



                                         if bs is     



                                         below 200. .     

 

                          2019                Discontinued



                 insulin detemir (LEVEMIR)     Inject 42       0                 



                     100 unit/mL         Units               6  



                           injectionIndications:     subcutaneousl     



                           type 2 diabetes mellitus     y nightly     



                                         Resume as you     



                                         were taking     



                                         prior to     



                                         admission .     

 

                          2019                Discontinued



                 metFORMIN (GLUCOPHAGE)     1,000 mg 2      0                 



                     1000 MG tablet      (two) times         6  



                                         daily with     



                                         breakfast and     



                                         dinner Resume     



                                         if you were     



                                         taking prior     



                                         to admission     



                                         .     

 

                          2018                Discontinued



                 torsemide (DEMADEX) 20 MG     20 mg 2 (two)      0                 



                     tablet              times daily         6  



                                         Resume taking     



                                         as you were     



                                         taking prior     



                                         to admission     



                                         if you get     



                                         more short of     



                                         breath,     



                                         swelling or     



                                         gain more     



                                         than 3 pounds     



                                         .     

 

                          2019                Discontinued



                 potassium chloride SA     20 mEq 2        0                 



                     (K-DUR,KLOR-CON) 20 MEQ     (two) times         6  



                           tablet                    daily Resume     



                                         taking as you     



                                         were taking     



                                         prior to     



                                         admission if     



                                         you resume     



                                         your     



                                         torsemide     



                                         (demadex) .     

 

                          2019                Discontinued



              tamsulosin (FLOMAX) 0.4     Take 1       30 capsule     3              



                     mg Cp24 24 hr capsule     capsule (0.4        7  



                                         mg total) by     



                                         mouth     



                                         nightly.     

 

                          2019                Discontinued



                     gabapentin (NEURONTIN)     Take 600 mg         0   



                           300 MG                    by mouth 2     



                           capsuleIndications:       (two) times     



                           Neuropathic Pain          daily .     

 

                          2019                Discontinued



                     acetaminophen-codeine     Take 1 tablet       0   



                           (TYLENOL #3) 300-30 mg     by mouth     



                           per tablet                every 6 (six)     



                                         hours as     



                                         needed for     



                                         Pain.     

 

                          2019                Discontinued



              aspirin 81 MG chewable     Take 1 tablet     30 tablet     11             



                     tablet              (81 mg total)       8  



                                         by mouth     



                                         daily.     

 

                          2019                Discontinued



              ranolazine (RANEXA) 500     Take 1 tablet     180 tablet     3              



                     MG 12 hr tablet     (500 mg             8  



                                         total) by     



                                         mouth 2 (two)     



                                         times daily.     

 

                          08/10/2018                



              levoFLOXacin (LEVAQUIN)     Take 1 tablet     2 tablet     0              



                     750 MG tablet       (750 mg             8  



                                         total) by     



                                         mouth daily     



                                         for 2 days.     

 

                          2019                



                 bisacodyl (DULCOLAX) 5 mg     Take 2          0                 



                     EC tablet           tablets (10         9  



                                         mg total) by     



                                         mouth daily     



                                         as needed for     



                                         Constipation     



                                         for up to 30     



                                         days.     







Active Problems







 



                           Problem                   Noted Date

 

 



                           Acute congestive heart failure, unspecified heart failure type     07/10/2019

 

 



                           Encephalopathies          2019

 

 



                           Anemia                    2019

 

 



                           Folate deficiency         2019

 

 



                           NSTEMI (non-ST elevated myocardial infarction)     2019

 

 



                           Hypoglycemia              2018

 

 



                           Pneumonitis               2018

 

 



                           Acute exacerbation of CHF (congestive heart failure)     10/01/2017

 

 



                           Contusion of right shoulder     10/01/2017

 

 



                           Generalized weakness      10/01/2017

 

 



                           Fall, initial encounter     10/01/2017

 

 



                           Unstable angina           2017

 

 



                           Chest pain, unspecified type     2016

 

 



                           Head trauma, initial encounter     2016

 

 



                           Neuropathy                2016

 

 



                           Chronic combined systolic and diastolic heart failure     2016

 

 



                           Near syncope              06/15/2016

 

 



                           Atrial fibrillation       2015

 

 



                           Atrial flutter            2015

 

 



                           Acute systolic CHF (congestive heart failure)     2015

 

 



                           CAD (coronary artery disease)     2014

 

 



                           DM (diabetes mellitus)     2014

 

 



                           Atrial fibrillation, new onset     2014

 

 



                           Atrial fibrillation with RVR     2014







Encounters







                          Care Team                 Description



                     Date                Type                Specialty  

 

                                        



Orlin Trejo MD Massumi, Jessica Sarmiento MD          Acute congestive heart failure, unspecified heart

 failure type (HCC) (Primary Dx); 

SOB (shortness of breath); 

Elevated brain natriuretic peptide (BNP) level; 

Anemia, unspecified type



                     07/10/2019          Emergency           Cardiology  



                                         -    



                                         2019    

 

                                                     



                     07/10/2019          Orders Only         General Internal Medicine  

 

                                        



Andrei Terrell MD                        L CATH & PCI



                           2019                Surgery   

 

                                        



Andrei Terrell MD Giveon, Ron, MD                         NSTEMI (non-ST elevated myocardial infarction) (HCC) (Primary Dx);

 

Delirium due to another medical condition



                     2019          Hospital            Cardiology  



                           -                         Encounter   



                                         2019                Travel   

 

                                        



Jin Casey MD Massumi, Jessica Sarmiento MD          Acute systolic CHF (congestive heart failure) (HCC)

 (Primary Dx); 

Hypoglycemia; 

Chronic congestive heart failure, unspecified heart failure type (HCC); 

Atrial fibrillation, unspecified type (HCC); 

Coronary artery disease involving coronary bypass graft of native heart without 
angina pectoris



                     2018          Hospital            Cardiology  



                           -                         Encounter   



                                         2018    



after 2018



Family History







   



                 Medical History     Relation        Name            Comments

 

   



                           Stroke                    Mother  









   



                 Relation        Name            Status          Comments

 

   



                           Father                     

 

   



                           Mother                     

 

   



                           Sister                    Alive 







Social History







                                        Date



                 Tobacco Use     Types           Packs/Day       Years Used 

 

                                        Quit: 1977



                     Former Smoker       3                   20 

 

    



                                         Smokeless Tobacco: Never   



                                         Used   









   



                 Alcohol Use     Drinks/Week     oz/Week         Comments

 

   



                                         No   









 



                           Sex Assigned at Birth     Date Recorded

 

 



                                         Not on file 









                                        Industry



                           Job Start Date            Occupation 

 

                                        Not on file



                           Not on file               Not on file 









                                        Travel End



                           Travel History            Travel Start 

 





                                         No recent travel history available.







Last Filed Vital Signs







                                        Time Taken



                           Vital Sign                Reading 

 

                                        2019 11:05 AM CDT



                           Blood Pressure            121/58 

 

                                        2019 11:05 AM CDT



                           Pulse                     58 

 

                                        2019 11:05 AM CDT



                           Temperature               36.7 C (98 F) 

 

                                        2019 11:05 AM CDT



                           Respiratory Rate          20 

 

                                        2019 11:05 AM CDT



                           Oxygen Saturation         98% 

 

                                        2018  4:51 AM CDT



                           Inhaled Oxygen            3% 



                                         Concentration  

 

                                        2019  3:18 AM CDT



                           Weight                    86.2 kg (190 lb) 

 

                                        07/10/2019  6:39 PM CDT



                           Height                    172.7 cm (5' 8") 

 

                                        2019  3:18 AM CDT



                           Body Mass Index           28.89 







Plan of Treatment





Not on file



Procedures







                                        Comments



                 Procedure Name     Priority        Date/Time       Associated Diagnosis 

 

                                         



                           REPORT OF PROCEDURE -      07/15/2019  



                           ENDOSCOPY SCAN            11:02 AM CDT  

 

                                         



                           RHYTHM STRIP - SCAN       07/15/2019  



                                         11:02 AM CDT  

 

                                        



Results for this procedure are in the results section.



                     POCT-GLUCOSE METER     Routine             2019  



                                         12:25 PM CDT  

 

                                        



Results for this procedure are in the results section.



                     POCT-GLUCOSE METER     Routine             2019  



                                         8:06 AM CDT  

 

                                        



Results for this procedure are in the results section.



                     MAGNESIUM           Routine             2019  



                                         3:23 AM CDT  

 

                                        



Results for this procedure are in the results section.



                     BASIC METABOLIC PANEL (7)     Routine             2019  



                                         3:23 AM CDT  

 

                                        



Results for this procedure are in the results section.



                     CBC W/PLT COUNT & AUTO     Routine             2019  



                           DIFFERENTIAL              3:22 AM CDT  

 

                                        



Results for this procedure are in the results section.



                     CBC W/PLT COUNT & AUTO     Routine             2019  



                           DIFFERENTIAL              3:22 AM CDT  

 

                                        



Results for this procedure are in the results section.



                     POCT-GLUCOSE METER     Routine             2019  



                                         10:00 PM CDT  

 

                                        



Results for this procedure are in the results section.



                     POCT-GLUCOSE METER     Routine             2019  



                                         9:40 PM CDT  

 

                                        



Results for this procedure are in the results section.



                     RAPID DRUG SCREEN, URINE     Routine             2019  



                                         7:28 PM CDT  

 

                                        



Results for this procedure are in the results section.



                     POCT-GLUCOSE METER     Routine             2019  



                                         5:13 PM CDT  

 

                                        



Results for this procedure are in the results section.



                     POCT-GLUCOSE METER     Routine             2019  



                                         2:02 PM CDT  

 

                                        



Results for this procedure are in the results section.



                     POCT-GLUCOSE METER     Routine             2019  



                                         8:02 AM CDT  

 

                                        



Results for this procedure are in the results section.



                     MAGNESIUM           Routine             2019  



                                         4:22 AM CDT  

 

                                        



Results for this procedure are in the results section.



                     BASIC METABOLIC PANEL (7)     Routine             2019  



                                         4:22 AM CDT  

 

                                        



Results for this procedure are in the results section.



                     CBC W/PLT COUNT & AUTO     Routine             2019  



                           DIFFERENTIAL              4:21 AM CDT  

 

                                        



Results for this procedure are in the results section.



                     CBC W/PLT COUNT & AUTO     Routine             2019  



                           DIFFERENTIAL              4:21 AM CDT  

 

                                        



Results for this procedure are in the results section.



                     POCT-GLUCOSE METER     Routine             07/10/2019  



                                         11:13 PM CDT  

 

                                        



Results for this procedure are in the results section.



                     POCT-GLUCOSE METER     Routine             07/10/2019  



                                         7:41 PM CDT  

 

                                        



Results for this procedure are in the results section.



                     POCT-GLUCOSE METER     Routine             07/10/2019  



                                         7:10 PM CDT  

 

                                        



Results for this procedure are in the results section.



                     HEMOGLOBIN A1C      Routine             07/10/2019  



                                         5:10 PM CDT  

 

                                        



Results for this procedure are in the results section.



                     XR CHEST 1 VIEW     STAT                07/10/2019  



                           PORTABLE/BEDSIDE          3:31 PM CDT  

 

                                        



Results for this procedure are in the results section.



                     POCT-LACTIC ACID, VENOUS     Routine             07/10/2019  



                                         3:18 PM CDT  

 

                                        



Results for this procedure are in the results section.



                     CBC W/PLT COUNT & AUTO     STAT                07/10/2019  



                           DIFFERENTIAL              3:03 PM CDT  

 

                                        



Results for this procedure are in the results section.



                     URINALYSIS W/ MICROSCOPIC     STAT                07/10/2019  



                                         3:03 PM CDT  

 

                                        



Results for this procedure are in the results section.



                     B-TYPE NATRIURETIC FACTOR     STAT                07/10/2019  



                           (BNP)                     3:03 PM CDT  

 

                                        



Results for this procedure are in the results section.



                     TROPONIN I          STAT                07/10/2019  



                                         3:03 PM CDT  

 

                                        



Results for this procedure are in the results section.



                     CBC W/PLT COUNT & AUTO     STAT                07/10/2019  



                           DIFFERENTIAL              3:03 PM CDT  

 

                                        



Results for this procedure are in the results section.



                     BASIC METABOLIC PANEL (7)     STAT                07/10/2019  



                                         3:03 PM CDT  

 

                                         



                     ECG 12-LEAD         Routine             07/10/2019  



                                         2:53 PM CDT  

 

  



                                         Procedure



                                         Note -



                                         Interface,



                                         External



                                         Ris In -



                                         07/10/2019



                                         3:29 PM



                                         CDT



                                         Ventricula



                                         r Rate 56



                                         BPM



                                         Atrial



                                         Rate 56



                                         BPM



                                         P-R



                                         Interval



                                         208 ms



                                         QRS



                                         Duration



                                         96 ms



                                         Q-T



                                         Interval



                                         506 ms



                                         QTC



                                         Calculatio



                                         n(Bazett)



                                         488 ms



                                         P Axis 39



                                         degrees



                                         R Axis 31



                                         degrees



                                         T Axis 128



                                         degrees





                                         Sinus



                                         bradycardi



                                         a



                                         Nonspecifi



                                         c ST and T



                                         wave



                                         abnormalit



                                         y



                                         Prolonged



                                         QT



                                         Abnormal



                                         ECG



                                         When



                                         compared



                                         with ECG



                                         of



                                         04-AUG-201



                                         8 00:22,



                                         No



                                         significan



                                         t change



                                         was found

 

                                        



Results for this procedure are in the results section.



                     ECG 12-LEAD         STAT                07/10/2019  



                                         2:53 PM CDT  

 

                                         



                           RHYTHM STRIP - SCAN       2019  



                                         8:20 AM CDT  

 

                                         



                           VASCULAR DIAGRAM -SCAN      2019  



                                         4:07 PM CST  

 

                                         



                           CARDIAC CATH REPORT -      2019  



                           SCAN                      4:40 PM CST  

 

                                         



                           RHYTHM STRIP - SCAN       2019  



                                         4:40 PM CST  

 

                                         



                           REPORT OF PROCEDURE -      2019  



                           ENDOSCOPY SCAN            4:40 PM CST  

 

                                         



                           VASCULAR DIAGRAM -SCAN      2019  



                                         4:40 PM CST  

 

                                        



Results for this procedure are in the results section.



                     POCT-GLUCOSE METER     Routine             2019  



                                         2:29 PM CST  

 

                                        



Results for this procedure are in the results section.



                     MAGNESIUM           STAT                2019  



                                         11:36 AM CST  

 

                                        



Results for this procedure are in the results section.



                     HEMOGLOBIN AND HEMATOCRIT     STAT                2019  



                                         11:36 AM CST  

 

                                        



Results for this procedure are in the results section.



                     BASIC METABOLIC PANEL (7)     STAT                2019  



                                         11:36 AM CST  

 

                                        



Results for this procedure are in the results section.



                     POCT-GLUCOSE METER     Routine             2019  



                                         9:29 AM CST  

 

                                        



Results for this procedure are in the results section.



                     POCT-GLUCOSE METER     Routine             2019  



                                         11:53 PM CST  

 

                                        



Results for this procedure are in the results section.



                     POCT-GLUCOSE METER     Routine             2019  



                                         5:18 PM CST  

 

                                        



Results for this procedure are in the results section.



                     URINALYSIS W/ REFLEX     Routine             2019  



                           URINE CULTURE             12:35 PM CST  

 

                                        



Results for this procedure are in the results section.



                     POCT-GLUCOSE METER     Routine             2019  



                                         12:00 PM CST  

 

                                        



Results for this procedure are in the results section.



                     CATHETER TIP CULTURE     Routine             2019  



                                         9:38 AM CST  

 

                                        



Results for this procedure are in the results section.



                     POCT-GLUCOSE METER     Routine             2019  



                                         8:12 AM CST  

 

                                        



Results for this procedure are in the results section.



                     CBC W/PLT COUNT & AUTO     Routine             2019  



                           DIFFERENTIAL              4:39 AM CST  

 

                                        



Results for this procedure are in the results section.



                     CBC W/PLT COUNT & AUTO     Routine             2019  



                           DIFFERENTIAL              4:39 AM CST  

 

                                        



Results for this procedure are in the results section.



                     MAGNESIUM           Routine             2019  



                                         4:37 AM CST  

 

                                        



Results for this procedure are in the results section.



                     BASIC METABOLIC PANEL (7)     Routine             2019  



                                         4:37 AM CST  

 

                                        



Results for this procedure are in the results section.



                     POCT-GLUCOSE METER     Routine             2019  



                                         5:45 PM CST  

 

                                        



Results for this procedure are in the results section.



                     POCT-GLUCOSE METER     Routine             2019  



                                         12:27 PM CST  

 

                                        



Results for this procedure are in the results section.



                     HEMOGLOBIN AND HEMATOCRIT     Routine             2019  



                                         12:15 PM CST  

 

                                        



Results for this procedure are in the results section.



                     MAGNESIUM           Routine             2019  



                                         12:15 PM CST  

 

                                        



Results for this procedure are in the results section.



                     BASIC METABOLIC PANEL (7)     Routine             2019  



                                         12:15 PM CST  

 

                                        



Results for this procedure are in the results section.



                     POCT-GLUCOSE METER     Routine             2019  



                                         8:26 AM CST  

 

                                        



Results for this procedure are in the results section.



                     LACTATE DEHYDROGENASE     Routine             2019  



                           (LDH)                     6:20 AM CST  

 

                                        



Results for this procedure are in the results section.



                     KAPPA / LAMBDA LIGHT     Routine             2019  



                           CHAINS, SERUM             6:20 AM CST  

 

                                        



Results for this procedure are in the results section.



                     FERRITIN            AP Routine          2019  



                                         6:20 AM CST  

 

                                        



Results for this procedure are in the results section.



                     VITAMIN B12         Routine             2019  



                                         6:20 AM CST  

 

                                        



Results for this procedure are in the results section.



                     RETICULOCYTE COUNT     Routine             2019  



                                         6:20 AM CST  

 

                                        



Results for this procedure are in the results section.



                     PROTEIN ELECTROPHORESIS,     Routine             2019  



                           SERUM                     6:20 AM CST  

 

                                        



Results for this procedure are in the results section.



                     POCT-GLUCOSE METER     Routine             01/15/2019  



                                         9:08 PM CST  

 

                                        



Results for this procedure are in the results section.



                     MAGNESIUM           Routine             01/15/2019  



                                         5:42 AM CST  

 

                                        



Results for this procedure are in the results section.



                     BASIC METABOLIC PANEL (7)     Routine             01/15/2019  



                                         5:42 AM CST  

 

                                        



Results for this procedure are in the results section.



                     CBC W/PLT COUNT & AUTO     Routine             01/15/2019  



                           DIFFERENTIAL              4:05 AM CST  

 

                                        



Results for this procedure are in the results section.



                     CBC W/PLT COUNT & AUTO     Routine             01/15/2019  



                           DIFFERENTIAL              4:05 AM CST  

 

                                        



Results for this procedure are in the results section.



                     IRON, TIBC, % SAT.     Routine             01/15/2019  



                           (WITHOUT FERRITIN)        4:05 AM CST  

 

                                        



Results for this procedure are in the results section.



                     FERRITIN            Routine             01/15/2019  



                                         4:05 AM CST  

 

                                        



Results for this procedure are in the results section.



                     VITAMIN B12 AND FOLATE     Routine             01/15/2019  



                                         4:05 AM CST  

 

                                        



Results for this procedure are in the results section.



                     XR CHEST 1 VIEW     STAT                2019  



                           PORTABLE/BEDSIDE          11:19 PM CST  

 

                                        



Results for this procedure are in the results section.



                     POCT-GLUCOSE METER     Routine             2019  



                                         11:18 PM CST  

 

                                        



Results for this procedure are in the results section.



                     POCT-ACT            Routine             2019  



                                         6:42 PM CST  

 

                                        



Results for this procedure are in the results section.



                     POCT-ACT            Routine             2019  



                                         6:07 PM CST  

 

                                         



                           ECHOCARDIOGRAM REPORT -      2019  



                           SCAN                      5:50 PM CST  

 

                                         



                     L CATH & PCI        2019          NSTEMI (non-ST elevated 



                           5:22 PM CST               myocardial infarction) 



                                         (HCC) 

 

                                        



Results for this procedure are in the results section.



                     APTT                Routine             2019  



                                         3:18 PM CST  

 

                                        



Results for this procedure are in the results section.



                     POCT-GLUCOSE METER     Routine             2019  



                                         2:32 PM CST  

 

                                        



Results for this procedure are in the results section.



                     2D ECHO W/ DOPPLER     ASAP                2019  



                           (CW/PW/COLOR)             9:55 AM CST  

 

                                        



Results for this procedure are in the results section.



                     POCT-GLUCOSE METER     Routine             2019  



                                         7:26 AM CST  

 

                                        



Results for this procedure are in the results section.



                     CBC W/PLT COUNT & AUTO     Routine             2019  



                           DIFFERENTIAL              3:22 AM CST  

 

                                        



Results for this procedure are in the results section.



                     APTT                Routine             2019  



                                         3:22 AM CST  

 

                                        



Results for this procedure are in the results section.



                     CBC W/PLT COUNT & AUTO     Routine             2019  



                           DIFFERENTIAL              3:22 AM CST  

 

                                        



Results for this procedure are in the results section.



                     MAGNESIUM           Routine             2019  



                                         3:22 AM CST  

 

                                        



Results for this procedure are in the results section.



                     BASIC METABOLIC PANEL (7)     Routine             2019  



                                         3:22 AM CST  

 

                                        



Results for this procedure are in the results section.



                     POCT-GLUCOSE METER     Routine             2019  



                                         10:06 PM CST  

 

                                        



Results for this procedure are in the results section.



                     APTT                Routine             2019  



                                         8:17 PM CST  

 

                                        



Results for this procedure are in the results section.



                     POCT-GLUCOSE METER     Routine             2019  



                                         5:42 PM CST  

 

                                        



Results for this procedure are in the results section.



                     POCT-GLUCOSE METER     Routine             2019  



                                         12:12 PM CST  

 

                                        



Results for this procedure are in the results section.



                     APTT                Routine             2019  



                                         12:02 PM CST  

 

                                        



Results for this procedure are in the results section.



                     POCT-GLUCOSE METER     Routine             2019  



                                         9:11 AM CST  

 

                                        



Results for this procedure are in the results section.



                     XR CHEST 1 VIEW     STAT                2019  



                           PORTABLE/BEDSIDE          5:33 AM CST  

 

                                        



Results for this procedure are in the results section.



                     CBC W/PLT COUNT & AUTO     Routine             2019  



                           DIFFERENTIAL              5:25 AM CST  

 

                                        



Results for this procedure are in the results section.



                     TROPONIN I          Routine             2019  



                                         5:25 AM CST  

 

                                        



Results for this procedure are in the results section.



                     PT/APTT             Routine             2019  



                                         5:25 AM CST  

 

                                        



Results for this procedure are in the results section.



                     CBC W/PLT COUNT & AUTO     Routine             2019  



                           DIFFERENTIAL              5:25 AM CST  

 

                                        



Results for this procedure are in the results section.



                     MAGNESIUM           Routine             2019  



                                         5:25 AM CST  

 

                                        



Results for this procedure are in the results section.



                     BASIC METABOLIC PANEL (7)     Routine             2019  



                                         5:25 AM CST  

 

                                         



                           RHYTHM STRIP - SCAN       2018  



                                         3:10 PM CDT  

 

                                        



Results for this procedure are in the results section.



                     POCT-GLUCOSE METER     Routine             2018  



                                         12:00 PM CDT  

 

                                        



Results for this procedure are in the results section.



                     POCT-GLUCOSE METER     Routine             2018  



                                         7:40 AM CDT  

 

                                        



Results for this procedure are in the results section.



                     CBC W/PLT COUNT & AUTO     Routine             2018  



                           DIFFERENTIAL              4:50 AM CDT  

 

                                        



Results for this procedure are in the results section.



                     CBC W/PLT COUNT & AUTO     Routine             2018  



                           DIFFERENTIAL              4:50 AM CDT  

 

                                        



Results for this procedure are in the results section.



                     MAGNESIUM           Routine             2018  



                                         4:50 AM CDT  

 

                                        



Results for this procedure are in the results section.



                     BASIC METABOLIC PANEL (7)     Routine             2018  



                                         4:50 AM CDT  

 

                                        



Results for this procedure are in the results section.



                     POCT-GLUCOSE METER     Routine             2018  



                                         9:55 PM CDT  

 

                                        



Results for this procedure are in the results section.



                     POCT-GLUCOSE METER     Routine             2018  



                                         4:53 PM CDT  

 

                                        



Results for this procedure are in the results section.



                     POCT-GLUCOSE METER     Routine             2018  



                                         11:34 AM CDT  

 

                                        



Results for this procedure are in the results section.



                     POCT-GLUCOSE METER     Routine             2018  



                                         7:34 AM CDT  

 

                                        



Results for this procedure are in the results section.



                     MAGNESIUM           Routine             2018  



                                         6:09 AM CDT  

 

                                        



Results for this procedure are in the results section.



                     BASIC METABOLIC PANEL (7)     Routine             2018  



                                         6:09 AM CDT  

 

                                        



Results for this procedure are in the results section.



                     POCT-GLUCOSE METER     Routine             2018  



                                         9:31 PM CDT  

 

                                        



Results for this procedure are in the results section.



                     URINALYSIS WITH     Routine             2018  



                           MICROSCOPIC IF INDICATED      7:32 PM CDT  

 

                                         



                           ECHOCARDIOGRAM REPORT -      2018  



                           SCAN                      5:50 PM CDT  

 

                                        



Results for this procedure are in the results section.



                     POCT-GLUCOSE METER     Routine             2018  



                                         5:20 PM CDT  

 

                                        



Results for this procedure are in the results section.



                     2D ECHO W/ DOPPLER     Routine             2018  



                           (CW/PW/COLOR)             2:10 PM CDT  

 

                                        



Results for this procedure are in the results section.



                     POCT-GLUCOSE METER     Routine             2018  



                                         12:06 PM CDT  

 

                                        



Results for this procedure are in the results section.



                     POCT-GLUCOSE METER     Routine             2018  



                                         9:32 AM CDT  

 

                                        



Results for this procedure are in the results section.



                     MAGNESIUM           Routine             2018  



                                         4:51 AM CDT  

 

                                        



Results for this procedure are in the results section.



                     BASIC METABOLIC PANEL (7)     Routine             2018  



                                         4:51 AM CDT  

 

                                        



Results for this procedure are in the results section.



                     POCT-GLUCOSE METER     Routine             2018  



                                         3:58 AM CDT  

 

                                        



Results for this procedure are in the results section.



                     POCT-GLUCOSE METER     Routine             2018  



                                         9:37 PM CDT  

 

                                        



Results for this procedure are in the results section.



                     ED ECG INTERPRETATION     Routine             2018  



                                         8:18 PM CDT  

 

                                        



Results for this procedure are in the results section.



                     POCT-GLUCOSE METER     Routine             2018  



                                         4:38 PM CDT  

 

                                        



Results for this procedure are in the results section.



                     POCT-GLUCOSE METER     Routine             2018  



                                         11:17 AM CDT  

 

                                        



Results for this procedure are in the results section.



                     POCT-GLUCOSE METER     Routine             2018  



                                         7:41 AM CDT  

 

                                        



Results for this procedure are in the results section.



                     FERRITIN            Routine             2018  



                                         6:25 AM CDT  

 

                                        



Results for this procedure are in the results section.



                     IRON, TIBC, % SAT.     Routine             2018  



                           (WITHOUT FERRITIN)        6:25 AM CDT  

 

                                        



Results for this procedure are in the results section.



                     VITAMIN B12 AND FOLATE     Routine             2018  



                                         6:25 AM CDT  

 

                                        



Results for this procedure are in the results section.



                     MAGNESIUM           Routine             2018  



                                         6:25 AM CDT  

 

                                        



Results for this procedure are in the results section.



                     BASIC METABOLIC PANEL (7)     Routine             2018  



                                         6:25 AM CDT  

 

                                        



Results for this procedure are in the results section.



                     LEGIONELLA URINE ANTIGEN     Routine             2018  



                                         10:31 PM CDT  

 

                                        



Results for this procedure are in the results section.



                     BASIC METABOLIC PANEL (7)     Routine             2018  



                                         9:11 PM CDT  

 

                                        



Results for this procedure are in the results section.



                     POCT-GLUCOSE METER     Routine             2018  



                                         9:05 PM CDT  

 

                                        



Results for this procedure are in the results section.



                     POCT-GLUCOSE METER     Routine             2018  



                                         5:22 PM CDT  

 

                                        



Results for this procedure are in the results section.



                     URINALYSIS WITH     STAT                2018  



                           MICROSCOPIC IF INDICATED      4:15 PM CDT  

 

                                        



Results for this procedure are in the results section.



                     STREP PNEUMONIAE ANTIGEN     Routine             2018  



                                         4:09 PM CDT  

 

                                        



Results for this procedure are in the results section.



                     POCT-GLUCOSE METER     Routine             2018  



                                         12:31 PM CDT  

 

                                        



Results for this procedure are in the results section.



                     POCT-GLUCOSE METER     Routine             2018  



                                         8:51 AM CDT  

 

                                        



Results for this procedure are in the results section.



                     POCT-GLUCOSE METER     Routine             2018  



                                         8:30 AM CDT  

 

                                        



Results for this procedure are in the results section.



                     POCT-GLUCOSE METER     Routine             2018  



                                         8:14 AM CDT  

 

                                        



Results for this procedure are in the results section.



                     CBC W/PLT COUNT & AUTO     Routine             2018  



                           DIFFERENTIAL              6:25 AM CDT  

 

                                        



Results for this procedure are in the results section.



                     CBC W/PLT COUNT & AUTO     Routine             2018  



                           DIFFERENTIAL              6:25 AM CDT  

 

                                        



Results for this procedure are in the results section.



                     POCT-GLUCOSE METER     Routine             2018  



                                         2:04 AM CDT  

 

                                        



Results for this procedure are in the results section.



                     XR CHEST 1 VIEW     STAT                2018  



                           PORTABLE/BEDSIDE          1:25 AM CDT  

 

                                        



Results for this procedure are in the results section.



                     CBC W/PLT COUNT & AUTO     STAT                2018  



                           DIFFERENTIAL              12:37 AM CDT  

 

                                        



Results for this procedure are in the results section.



                     PT/APTT             STAT                2018  



                                         12:37 AM CDT  

 

                                        



Results for this procedure are in the results section.



                     COMPREHENSIVE METABOLIC     STAT                2018  



                           PANEL                     12:37 AM CDT  

 

                                        



Results for this procedure are in the results section.



                     B-TYPE NATRIURETIC FACTOR     STAT                2018  



                           (BNP)                     12:37 AM CDT  

 

                                        



Results for this procedure are in the results section.



                     CBC W/PLT COUNT & AUTO     STAT                2018  



                           DIFFERENTIAL              12:37 AM CDT  

 

                                        



Results for this procedure are in the results section.



                     TROPONIN I          STAT                2018  



                                         12:37 AM CDT  

 

                                        



Results for this procedure are in the results section.



                     MAGNESIUM           STAT                2018  



                                         12:37 AM CDT  

 

                                        



Results for this procedure are in the results section.



                     ECG 12-LEAD         STAT                2018  



                                         12:22 AM CDT  

 

                                        



Results for this procedure are in the results section.



                     POCT-GLUCOSE METER     Routine             2018  



                                         12:20 AM CDT  



after 2018



Results

* EKG-SCANNED (07/15/2019 11:02 AM CDT)



Only the most recent of 2 results within the time period is included.





 



                           Narrative                 Performed At

 

 



                                         This result has an attachment that is not available. 





* RHYTHM STRIP - SCAN (07/15/2019 11:02 AM CDT)



Only the most recent of 4 results within the time period is included.





 



                           Narrative                 Performed At

 

 



                                         This result has an attachment that is not available. 





* POC-Glucose meter (2019 12:25 PM CDT)



Only the most recent of 50 results within the time period is included.





   



                 Component       Value           Ref Range       Performed At

 

   



                 POC-Glucose Meter     234 (H)Comment: TESTED AT     70 - 110 mg/dL     [facility]



                           [facility] [address] [facility]



                                         [id number]  













                                         Specimen

 





                                         Blood









   



                 Performing Organization     Address         City/State/Zipcode     Phone Number

 

   



                 [address] [address] [phone]





                                         [facility]   





* Magnesium (2019  3:23 AM CDT)



Only the most recent of 13 results within the time period is included.





   



                 Component       Value           Ref Range       Performed At

 

   



                 Magnesium       1.9             1.6 - 2.6 mg/dL     [facility]













                                         Specimen

 





                                         Blood









   



                 Performing Organization     Address         City/[facility]/Zipcode     Phone Number

 

   



                 [facility]     [address], [place] [id number] [phone]





                                         [facility]   





* Basic Metabolic Panel (2019  3:23 AM CDT)



Only the most recent of 14 results within the time period is included.





   



                 Component       Value           Ref Range       Performed At

 

   



                 Sodium          137             136 - 145 meq/L     [facility]

 

   



                 Potassium       3.6             3.5 - 5.1 meq/L     [facility]

 

   



                 Chloride        101             98 - 107 meq/L     [facility]

 

   



                 CO2             26              22 - 29 meq/L     [facility]

 

   



                 BUN             29 (H)          7 - 21 mg/dL     [facility]

 

   



                 Creatinine      1.38 (H)        0.57 - 1.25 mg/dL     [facility]

 

   



                 Glucose         91              70 - 105 mg/dL     [facility]

 

   



                 Calcium         8.6             8.4 - 10.2 mg/dL     [facility]

 

   



                 EGFR            50Comment: ESTIMATED GFR IS     mL/min/1.73 sq m     [facility]



                           NOT AS ACCURATE AS CREATININE      [facility]



                                         CLEARANCE IN PREDICTING  



                                         GLOMERULAR FILTRATION RATE.  



                                         ESTIMATED GFR IS NOT  



                                         APPLICABLE FOR DIALYSIS  



                                         PATIENTS.  













                                         Specimen

 





                                         Blood









   



                 Performing Organization     Address         City/State/Zipcode     Phone Number

 

   



                 [facility]     [address] [place] [address] [phone]





                                         [facility]   





* CBC with platelet count + automated diff (2019  3:22 AM CDT)



Only the most recent of 10 results within the time period is included.





   



                 Component       Value           Ref Range       Performed At

 

   



                 WBC             9.9             3.5 - 10.5 K/L     [facility]

 

   



                 RBC             3.06 (L)        4.63 - 6.08 M/L     [facility]

 

   



                 Hemoglobin      8.0 (L)         13.7 - 17.5 GM/DL     [facility]

 

   



                 Hematocrit      27.1 (L)        40.1 - 51.0 %     [facility]

 

   



                 MCV             88.6            79.0 - 92.2 fL     [facility]

 

   



                 MCH             26.1            25.7 - 32.2 pg     [facility]

 

   



                 MCHC            29.5 (L)        32.3 - 36.5 GM/DL     [facility]

 

   



                 RDW             18.6 (H)        11.6 - 14.4 %     [facility]

 

   



                 Platelets       207             150 - 450 K/CU MM     [facility]

 

   



                 MPV             11.4            9.4 - 12.4 fL     [facility]

 

   



                 nRBC            0               0 - 0 /100 WBC     [facility]

 

   



                 % Neutros       75              %               [facility]

 

   



                 % Lymphs        14              %               [facility]

 

   



                 % Monos         8               %               [facility]

 

   



                 % Eos           2               %               [facility]

 

   



                 % Baso          1               %               [facility]

 

   



                 # Neutros       7.42 (H)        1.78 - 5.38 K/L     [facility]

 

   



                 # Lymphs        1.39            1.32 - 3.57 K/L     [facility]

 

   



                 # Monos         0.78            0.30 - 0.82 K/L     [facility]

 

   



                 # Eos           0.21            0.04 - 0.54 K/L     [facility]

 

   



                 # Baso          0.07            0.01 - 0.08 K/L     [facility]

 

   



                 Immature        1               0 - 1 %         [facility]



                           [facility]













                                         Specimen

 





                                         Blood









   



                 Performing Organization     Address         City/State/Zipcode     Phone Number

 

   



                 [facility]     [address] [place] [address] [phone]





                                         MEDICAL CENTER   





* Rapid drug screen, urine (2019  7:28 PM CDT)





   



                 Component       Value           Ref Range       Performed At

 

   



                 Barbiturate Screen     Negative        Negative        [facility]

 

   



                 Benzodiazepine Screen     Negative        Negative        [facility]

 

   



                 Cocaine (Metab.) Screen     Negative        Negative        [facility]

 

   



                 Methadone Screen     Negative        Negative        [facility]

 

   



                 Opiate Screen     Negative        Negative        [facility]

 

   



                 Cannabinoid Screen     Negative        Negative        [facility]

 

   



                 Amph/Methamph Screen     Negative        Negative        [facility]

 

   



                 Phencyclidine Screen     Negative        Negative        [facility]













                                         Specimen

 





                                         Urine









 



                           Narrative                 Performed At

 

 



                           [facility]     [facility]



                           Cocaine 300 ng/mL     [facility]



                                         Xzfzishnoml07 ng/mL 



                                         Hucvrtabqvnflh015 ng/mL 



                                         Barbiturate 200 ng/mL 



                                         Cejrrxbjrpxvv10 ng/mL 



                                         Iryepp324 ng/mL 



                                         Methadone 300 ng/mL 



                                         Amphetamine/ 1000 ng/mL 



                                         Methamphetamine 



                                         This assay provides an unconfirmed qualitative test result for the clinical 



                                         management of patients in emergency situations. Chain of custody not maintained.

 



                                         Some over-the-counter medications, as well as adulterants, may cause inaccurate

 



                                         results. Clinical correlation should be applied. A more comprehensive drug 



                                         screen or confirmation of a detected drug may be performed upon request. 









   



                 Performing Organization     Address         City/[facility]/Zipcode     Phone Number

 

   



                 [facility]     [address] [place] [address]     [phone] [place] [address]   





* Hemoglobin A1c (07/10/2019  5:10 PM CDT)





   



                 Component       Value           Ref Range       Performed At

 

   



                 Hemoglobin A1C     7.4 (H)         4.3 - 6.1 %     [facility]













                                         Specimen

 





                                         Blood









   



                 Performing Organization     Address         City/[facility]/Zipcode     Phone Number

 

   



                 [facility]     [address] [place] [address] [phone]





                                         [facility]   





* XR chest 1 view portable / bedside (07/10/2019  3:31 PM CDT)



Only the most recent of 4 results within the time period is included.









                                         Specimen

 











 



                           Narrative                 Performed At

 

 



                           FINAL REPORT              [facility]



                                         PATIENT ID: [id number] 



                                         Portable chest 7/10/2019 



                                         Since 2019, there is been slight worsening of the hazy pulmonary 



                                         opacities consistent with mild pulmonary edema. The heart is 



                                         borderline enlarged. There is no evidence of pneumothorax or 



                                         significant pleural effusion. 



                                         Implantable loop recorder is again projected over the left heart. 



                                         Further, there is deformity of the head of the right humerus 



                                         consistent with an incompletely healed fracture. 



                                         CONCLUSION: 



                                         Findings of mild pulmonary edema. 



                                         Signed: Cesia Cannon MD 



                                         Report Verified Date/Time:07/10/2019 16:02:27 



                                         Reading Location: [facility] Radiology Reading Room 



                                         Electronically signed by: CESIA CANNON M.D. on 07/10/2019 04:02 





                                         PM 









                                        Procedure Note

 

                                        



Interface, External Ris In - 07/10/2019  4:04 PM CDT



FINAL REPORT

 

PATIENT ID:   [id number]

 

Portable chest 7/10/2019

 

Since 2019, there is been slight worsening of the hazy pulmonary 

opacities consistent with mild pulmonary edema. The heart is 

borderline enlarged. There is no evidence of pneumothorax or 

significant pleural effusion.

 

Implantable loop recorder is again projected over the left heart. 

Further, there is deformity of the head of the right humerus 

consistent with an incompletely healed fracture.

 

CONCLUSION:

 

Findings of mild pulmonary edema.

 

Signed: Cesia Cannon MD

Report Verified Date/Time:  07/10/2019 16:02:27

 

Reading Location: [facility] Radiology Reading Room

      Electronically signed by: CESIA CANNON M.D. on 07/10/2019 04:02 PM

 









   



                 Performing Organization     Address         City/State/Zipcode     Phone Number

 

   



                                         [facility]   





* POC-Lactic Acid, Venous (07/10/2019  3:18 PM CDT)





   



                 Component       Value           Ref Range       Performed At

 

   



                 POC-Lactic Acid, Venous     1.0Comment: TESTED AT [facility]     0.9 - 1.7 mmol/L     [address]













                                         Specimen

 





                                         Blood









   



                 Performing Organization     Address         City/[facility]/Zipcode     Phone Number

 

   



                 [place] [address]     [phone]





                                         [facility]   





* Troponin I (07/10/2019  3:03 PM CDT)



Only the most recent of 3 results within the time period is included.





   



                 Component       Value           Ref Range       Performed At

 

   



                 Troponin I      <0.01           0.00 - 0.03 ng/mL     [facility]













                                         Specimen

 





                                         Blood









 



                           Narrative                 Performed At

 

 



                           Troponin I (TnI) levels must be interpreted in the context of the presenting     

[facility]



                           symptoms and the clinical findings. Elevated TnI levels indicate myocardial     [facility]



                                         damage, but are not specific for ischemic heart disease. Elevated TnI levels are

 



                                         seen in patients with other cardiac conditions (including myocarditis and 



                                         congestive heart failure), and slight TnI elevations occur in patients with 



                                         other conditions, including sepsis, renal failure, acidosis, acute neurological

 



                                         disease, and persistent tachyarrhythmia. 









   



                 Performing Organization     Address         City[facility]     Phone Number

 

   



                 [address] [address] [phone]





                                         MEDICAL CENTER   





* Urinalysis w/Microscopic (07/10/2019  3:03 PM CDT)





   



                 Component       Value           Ref Range       Performed At

 

   



                     Color, UA           Colorless           [facility]

 

   



                     Clarity, UA         Clear               [facility]

 

   



                 Specific [place], UA     1.004           1.001 - 1.035     [facility]

 

   



                 pH, UA          5.0             5.0 - 8.0       [facility]

 

   



                 Protein, UA     Negative        Negative        [facility]

 

   



                 Glucose, UA     Negative        Negative        [facility]

 

   



                 Ketones, UA     Negative        Negative        [facility]

 

   



                 Bilirubin, UA     Negative        Negative        [facility]

 

   



                 Blood, UA       Negative        Negative        [facility]

 

   



                 Nitrite, UA     Negative        Negative        [facility]

 

   



                 Leukocytes, UA     Negative        Negative        [facility]

 

   



                 Urobilinogen, UA     0.2             0.2 - 1.0 mg/dL     [facility]

 

   



                 RBC, UA         0               /HPF            [facility]

 

   



                 WBC, UA         2               /HPF            [facility]

 

   



                     Bacteria, UA        Rare                [facility]

 

   



                 Squam Epithel, UA     <1              /HPF            [facility]

 

   



                     Specimen Source     Urine, Clean Catch      [facility]













                                         Specimen

 





                                         Urine









   



                 Performing Organization     Address         City/[facility]/Zipcode     Phone Number

 

   



                 [facility]     [address] [place] [address] [phone]





                                         MEDICAL CENTER   





* B-type Natriuretic Factor (BNP) (07/10/2019  3:03 PM CDT)



Only the most recent of 2 results within the time period is included.





   



                 Component       Value           Ref Range       Performed At

 

   



                 BNP             1,251 (H)       0 - 100 pg/mL     [facility]













                                         Specimen

 





                                         Blood









   



                 Performing Organization     Address         City[facility]     Phone Number

 

   



                 [facility]     [address] [place] [address]     [phone]





                                         MEDICAL CENTER   





* ECG 12 lead (07/10/2019  2:53 PM CDT)



Only the most recent of 2 results within the time period is included.









                                         Specimen

 











 



                           Narrative                 Performed At

 

 



                           Ventricular Rate 56 BPM     [facility]



                                         Atrial Rate 56 BPM 



                                         P-R Interval 208 ms 



                                         QRS Duration 96 ms 



                                         Q-T Interval 506 ms 



                                         QTC Calculation(Bazett) 488 ms 



                                         P Axis 39 degrees 



                                         R Axis 31 degrees 



                                         T Axis 128 degrees 



                                         Sinus bradycardia 



                                         Nonspecific ST and T wave abnormality 



                                         Prolonged QT 



                                         Abnormal ECG 



                                         When compared with ECG of 04-AUG-2018 00:22, 



                                         No significant change was found 



                                         Confirmed by MD Tavarez Roberto ([id number]) on 2019 10:16:01 AM 









                                        Procedure Note

 

                                        



Interface, External Ris In - 2019 10:16 AM CDT



Ventricular Rate 56 BPM

Atrial Rate 56 BPM

P-R Interval 208 ms

QRS Duration 96 ms

Q-T Interval 506 ms

QTC Calculation(Bazett) 488 ms

P Axis 39 degrees

R Axis 31 degrees

T Axis 128 degrees



Sinus bradycardia

Nonspecific ST and T wave abnormality

Prolonged QT

Abnormal ECG

When compared with ECG of 04-AUG-2018 00:22,

No significant change was found

Confirmed by MD Tavarez Roberto ([id number]) on 2019 10:16:01 AM









   



                 Performing Organization     Address         City/[facility]/Zipcode     Phone Number

 

   



                                         [employer] [facility]   





* VASCULAR DIAGRAM -SCAN (2019  4:07 PM CST)



Only the most recent of 2 results within the time period is included.





 



                           Narrative                 Performed At

 

 



                                         This result has an attachment that is not available. 





* CARDIAC CATH REPORT - SCAN (2019  4:40 PM CST)





 



                           Narrative                 Performed At

 

 



                                         This result has an attachment that is not available. 





* Hemoglobin and hematocrit (2019 11:36 AM CST)



Only the most recent of 2 results within the time period is included.





   



                 Component       Value           Ref Range       Performed At

 

   



                 Hemoglobin      11.9 (L)        13.7 - 17.5 GM/DL     [facility]

 

   



                 Hematocrit      38.7 (L)        40.1 - 51.0 %     [facility]













                                         Specimen

 





                                         Blood









   



                 Performing Organization     Address         City/State/Zipcode     Phone Number

 

   



                 [facility]     [address] [place] [address] [phone]





                                         MEDICAL CENTER   





* Urinalysis w/Microscopic + Reflex to Culture (2019 12:35 PM CST)





   



                 Component       Value           Ref Range       Performed At

 

   



                     Color, UA           Yellow              [facility]

 

   



                     Clarity, UA         Clear               [facility]

 

   



                 Specific [place]ouse, UA     1.010           1.001 - 1.035     [facility]

 

   



                 pH, UA          5.5             5.0 - 8.0       [facility]

 

   



                 Protein, UA     Negative        Negative        [facility]

 

   



                 Glucose, UA     Negative        Negative        [facility]

 

   



                 Ketones, UA     Negative        Negative        [facility]

 

   



                 Bilirubin, UA     Negative        Negative        [facility]

 

   



                 Blood, UA       Small (A)       Negative        [facility]

 

   



                 Nitrite, UA     Negative        Negative        [facility]

 

   



                 Leukocytes, UA     Small (A)       Negative        [facility]

 

   



                 Urobilinogen, UA     0.2             0.2 - 1.0 mg/dL     [facility]

 

   



                 RBC, UA         1               /HPF            [facility]

 

   



                 WBC, UA         3               /HPF            [facility]

 

   



                     Mucus               Rare                [facility]

 

   



                 Squam Epithel, UA     1               /HPF            [facility]

 

   



                 Hyaline Casts, UA     12              /LPF            [facility]

 

   



                           Specimen Source           [facility]













                                         Specimen

 





                                         Urine









   



                 Performing Organization     Address         City/[facility]/Zipcode     Phone Number

 

   



                 [facility]     [address] [place] [address]     [phone]   





* Catheter Tip Culture (2019  9:38 AM CST)





   



                 Component       Value           Ref Range       Performed At

 

   



                     Result              No growth           [facility]













                                         Specimen

 





                                         Other









   



                 Performing Organization     Address         City/[facility]/Zipcode     Phone Number

 

   



                 [facility]     [address] [place] [address]     [phone]





                                         MEDICAL CENTER   





* Kappa / lambda light chains, serum (2019  6:20 AM CST)





   



                 Component       Value           Ref Range       Performed At

 

   



                 Kappa Lt Chain,Free     57.2 (H)        3.3 - 19.4 mg/L     [facility]

 

   



                 Lambda Lt Chain,Free     30.4 (H)        5.7 - 26.3 mg/L     [facility]

 

   



                 Kappa/Lambda,Free     1.88 (H)        0.26 - 1.65     [facility]



                           Comment:                  [facility]



                                         Free kappa/lambda ratio in  



                                         serum of normal individuals is  



                                         0.26-1.65. Excess  



                                         production of free kappa or  



                                         lambda chains can alter this  



                                         ratio. Monoclonal  



                                         free light chains are found in  



                                         serum of patients with  



                                         multiple myeloma,  



                                         Waldenstrom's  



                                         macroglobulinemia, mu-heavy  



                                         chain disease, primary  



                                         amyloidosis,  



                                         light chain deposition  



                                         disease, monoclonal gammopathy  



                                         of undetermined  



                                         significance, and  



                                         lymphoproliferative disorders.  



                                         Measurement of free light  



                                         chain concentration in serum  



                                         is useful for diagnosis,  



                                         prognosis, monitoring  



                                         disease activity and following  



                                         response to therapy of these  



                                         disorders.  













                                         Specimen

 





                                         Blood









 



                           Narrative                 Performed At

 

 



                           Performing Lab            [facility]



                            [facility]



                                          [facility] [address] [address] 



                                          DAYANA Santos MD, PhD, TRISTON 









   



                 Performing Organization     Address         City/[facility]     Phone Number

 

   



                     [facility]     [facility] [address]     [facility]ial[facility]      [address] 





* Reticulocyte count (2019  6:20 AM CST)





   



                 Component       Value           Ref Range       Performed At

 

   



                 % Retic         1.9 (H)         0.5 - 1.8 %     [facility]













                                         Specimen

 





                                         Blood









   



                 Performing Organization     Address         City/[facility]/Zipcode     Phone Number

 

   



                 [facility]     [address] [place] [address] [phone] [place] [address]   





* Protein electrophoresis, serum (2019  6:20 AM CST)





   



                 Component       Value           Ref Range       Performed At

 

   



                 Albumin Fraction     3.3 (L)         3.5 - 5.5 g/dL     [facility]

 

   



                 Alpha 1 Fraction     0.3             0.2 - 0.4 g/dL     [facility]

 

   



                 Alpha 2 Fraction     1.0 (H)         0.5 - 0.9 g/dL     [facility]

 

   



                 Beta Fraction     1.1             0.6 - 1.1 g/dL     [facility]

 

   



                 Gamma Globulin Fraction     1.8 (H)         0.7 - 1.7 g/dL     [facility]

 

   



                     Interpretation      Pattern suggestive of acute      [facility]



                           and chronic inflammatory      [facility]



                                         process. No monoclonal bands  



                                         detected.  

 

   



                     Pathologist:        Zuri Chaparro MD      [facility]



                           (electronic signature)      [facility]

 

   



                 Protein, Total     7.4             6.0 - 8.3 gm/dL     [facility]













                                         Specimen

 





                                         Blood









   



                 Performing Organization     Address         City[facility]     Phone Number

 

   



                 [address] [address] [phone]





                                         [facility]   





* Lactate dehydrogenase (LDH) (2019  6:20 AM CST)





   



                 Component       Value           Ref Range       Performed At

 

   



                 LDH             201             125 - 220 U/L     [facility]













                                         Specimen

 





                                         Blood









   



                 Performing Organization     Address         City/[facility] Hospital/[facility]     Phone Number

 

   



                 [address] [address] [phone]





                                         [facility]   





* Ferritin (2019  6:20 AM CST)



Only the most recent of 3 results within the time period is included.





   



                 Component       Value           Ref Range       Performed At

 

   



                 Ferritin        255             22 - 322 ng/mL     [facility]













                                         Specimen

 





                                         Blood









   



                 Performing Organization     Address         [facility]     Phone Number

 

   



                 [facility]      [address] [place] [address]     [phone]



 

   



                 [facility]      [address]     [place], [place] [id number] [phone]







* Vitamin B12 (2019  6:20 AM CST)





   



                 Component       Value           Ref Range       Performed At

 

   



                 Vitamin B12     721             213 - 816 pg/mL     [facility]













                                         Specimen

 





                                         Blood









   



                 Performing Organization     Address         [facility]     Phone Number

 

   



                 [address] [address] [phone]   





* Vitamin B12 and Folate (01/15/2019  4:05 AM CST)



Only the most recent of 2 results within the time period is included.





   



                 Component       Value           Ref Range       Performed At

 

   



                 Vitamin B12     705             213 - 816 pg/mL     [facility]

 

   



                 Folate          5.9 (L)         >=7.0 ng/mL     [facility]













                                         Specimen

 





                                         Blood









   



                 Performing Organization     Address         [facility]     Phone Number

 

   



                 [address]   





* Iron, TIBC, % sat. (without ferritin) (01/15/2019  4:05 AM CST)



Only the most recent of 2 results within the time period is included.





   



                 Component       Value           Ref Range       Performed At

 

   



                 Iron            67.0            40.0 - 160.0 ug/dL     [facility]

 

   



                 TIBC            271             250 - 450 ug/dL     [facility]

 

   



                 Iron % Saturation     25              20 - 55 %       [facility]













                                         Specimen

 





                                         Blood









   



                 Performing Organization     Address         City/[facility]     Phone Number

 

   



                 [address]   





* POC ACTIVATED CLOTTING TIME (2019  6:42 PM CST)



Only the most recent of 2 results within the time period is included.





   



                 Component       Value           Ref Range       Performed At

 

   



                 Activated Clotting Time     246Comment: TESTED AT [facility]     sec             [address]













                                         Specimen

 





                                         Blood









   



                 Performing Organization     Address         City/[facility]     Phone Number

 

   



                 [place] [address]     [phone] [place] [address]   





* ECHOCARDIOGRAM REPORT - SCAN (2019  5:50 PM CST)





 



                           Narrative                 Performed At

 

 



                                         This result has an attachment that is not available. 





* aPTT (2019  3:18 PM CST)



Only the most recent of 4 results within the time period is included.





   



                 Component       Value           Ref Range       Performed At

 

   



                 PTT             63.9 (H)        22.5 - 36.0 seconds     [facility]













                                         Specimen

 





                                         Blood









   



                 Performing Organization     Address         City/[facility]/Zipcode     Phone Number

 

   



                 [facility]     [address] [place] [address] [phone]





                                         [facility]   





* Transthoracic 2D echo w/ doppler (cw/pw/color) (2019  9:55 AM CST)





   



                 Component       Value           Ref Range       Performed At

 

   



                           Ejection Fraction         [facility]



                                         [facility]













                                         Specimen

 











 



                           Narrative                 Performed At

 

 



                           Transthoracic Echocardiography Report (TTE)     [facility]



                           Demographics              [facility]



                                         Patient Name Kathrin MCKEON of Study 2019 



                                         BRENT 



                                         [id number] 



                                         GenderMale 



                                         Visit Number [id number] 



                                         [place] 



                                         [id number] Number [id number] 





                                         Number 



                                         Date of Birth1940 Referring Physician Jasper Schultz 



                                         Age79 year(s) Sonographer 



                                         Rebeca Payan RDCS, 



                                         

 



                                          RVT 



                                         AnalystAlex 



                                         Dedrick Pepper MD 



                                          Physician 



                                         Procedure 



                                         Type of 



                                         Study TTE procedure:2DECHO W DOPPLER(CW/PW/COLOR) (ASAP) 



                                         Indications:Acute Chest Pain/ Suspected CAD. 



                                         Clinical History 



                                         CAD, DM, HTN, MI, PAF, CABGX3 (), NSTEMI 



                                         HGB 9.5 



                                         HCT 31 % 



                                         Height: 69 inches Weight: 67.59 kg (149 lbs) BSA: 1.82 m^2 BMI: 22 kg/m^2 



                                         HR: 66 bpm BP: 140/61 mmHg 



                                         Summary 



                                         The left ventricle is chamber size (by PSLAX dimension) is normal (male - 



                                         LVIDd 4.2-5.8cm) . Normal LV wall thickness. The following segment(s) 



                                         appear severely hypokinetic: basal inferior, basal to mid inferolateral . 



                                         The other segments contract normally. Global LV systolic function lower 



                                         limits of normal . Estimated LVEF by qualitative assessment is lower 



                                         limits of normal (50-55%) . 



                                         Estimated peak systolic PA pressure is 35-40 mmHg . 



                                         No evidence of pericardial effusion. 



                                         Signature 



                                         ---------------------------------------------------------------- 



                                         Electronically signed by Jaciel Pepper MD(Interpreting 



                                         physician) on 2019 05:28 PM 



                                         ---------------------------------------------------------------- 



                                         Findings 



                                         Left Ventricle The left ventricle is chamber size (by PSLAX 



                                         dimension) is normal (male - 



                                         LVIDd 4.2-5.8cm) . 



                                         Normal LV wall thickness. The 



                                         following segment(s) 



                                         appear severely hypokinetic: 



                                         basal inferior, basal 



                                         to mid inferolateral . The other

 



                                         segments contract 



                                         normally. Global LV systolic 



                                         function lower limits 



                                         of normal . Estimated LVEF by 



                                         qualitative 



                                         assessment is lower limits of 



                                         normal (50-55%) . 



                                         Left AtriumLA size is mildly enlarged . 



                                         Right VentricleNormal right ventricle structure and function. 



                                         Right Atrium Normal right atrium. 



                                         Aortic Valve Mild AoV cusp thickening. 



                                         Mitral Valve Mild MV leaflet thickening. Moderate mitral 



                                         regurgitation. 



                                         Tricuspid ValveMild tricuspid regurgitation. 



                                         Estimated peak systolic PA 



                                         pressure is 35-40 mmHg . 



                                         Pulmonic Valve Normal PV structure and function by limited 



                                         views 



                                         and Doppler. 



                                         AortaAortic root size (SInus of Valsalva 



                                         diameter) is 



                                         normal . 



                                         PericardiumNo evidence of pericardial effusion. 



                                         IVC/SVC/PA/PV/PleuralThe estimated RA pressure by IVC dynamics 5-10mmHg 



                                         . 



                                         Chambers/Structures 



                                         Left Atrium 



                                         LA Dimension: 4.42 cmLA Area: 21.82 cm^2 



                                         LA Volume: 63.88 ml 



                                         LA Vol. Index: 35 ml/m^2 



                                         Left Ventricle 



                                         LVIDd: 5.63 cm 



                                         LV Septum Diastolic: 1.01 cm 



                                         LV PW Diastolic: 1.1 cm 



                                         LVOT Diameter: 2.17 cm 



                                         Aorta 



                                         Ao Root S of Chasity.: 3.04 cm 



                                         Doppler/Quantitative Measurements 



                                         LVOT 



                                         Peak Velocity: 1.05 m/s Peak Gradient: 4.44 mmHg 



                                         Mean Velocity: 0.69 m/s Mean Gradient: 2.23 mmHg 



                                         LVOT Diameter: 2.17 cmLVOT VTI: 21.5 cm 



                                         LVOT Area: 3.7 cm^2 LVOT SV:79.47 ml 



                                         LVOT CO: 5.25 l/min LVOT CI: 2.88 l/min/m^2 









                                        Procedure Note

 

                                        



Interface, External Ris In - 2019  5:28 PM CST



Transthoracic Echocardiography Report (TTE)



 Demographics

 

 Patient Name   DESIREE MCKEON    Date [facility] Study       2019

                [id number]         Gender              Male

 

 Visit Number   [id number]       Race                Unknown

 

 Accession      [id number]        Room Number         6215

 Number

 

 Date of Birth  1940       Referring Physician Jasper Schultz

 

 Age            78 year(s)       Sonographer         Rebeca Payan RDCS,

                                                     RVT

 

 Analyst        Ramu Boyd        Interpreting        Jaciel Pepper MD

                                 Physician

 

Procedure



 Type of

 Study     TTE procedure:2DECHO W DOPPLER(CW/PW/COLOR) (ASAP)

 

Indications:Acute Chest Pain/ Suspected CAD.



Clinical History

CAD, DM, HTN, MI, PAF, CABGX3 (), NSTEMI

HGB 9.5

HCT 31 %



Height: 69 inches Weight: 67.59 kg (149 lbs) BSA: 1.82 m^2 BMI: 22 kg/m^2



HR: 66 bpm BP: 140/61 mmHg



 Summary

 The left ventricle is chamber size (by PSLAX dimension) is normal (male -

 LVIDd 4.2-5.8cm) . Normal LV wall thickness. The following segment(s)

 appear severely hypokinetic: basal inferior, basal to mid inferolateral .

 The other segments contract normally. Global LV systolic function lower

 limits of normal . Estimated LVEF by qualitative assessment is lower

 limits of normal (50-55%) .

 Estimated peak systolic PA pressure is 35-40 mmHg .

 No evidence of pericardial effusion.

 

 Signature

 

 ----------------------------------------------------------------

 Electronically signed by Jaciel Pepper MD(Interpreting

 physician) on 2019 05:28 PM

 ----------------------------------------------------------------

 

 Findings

 

 Left Ventricle         The left ventricle is chamber size (by PSLAX

                        dimension) is normal (male - LVIDd 4.2-5.8cm) .

                        Normal LV wall thickness. The following segment(s)

                        appear severely hypokinetic: basal inferior, basal

                        to mid inferolateral . The other segments contract

                        normally. Global LV systolic function lower limits

                        of normal . Estimated LVEF by qualitative

                        assessment is lower limits of normal (50-55%) .

 

 Left Atrium            LA size is mildly enlarged .

 

 Right Ventricle        Normal right ventricle structure and function.

 

 Right Atrium           Normal right atrium.

 

 Aortic Valve           Mild AoV cusp thickening.

 

 Mitral Valve           Mild MV leaflet thickening. Moderate mitral

                        regurgitation.

 

 Tricuspid Valve        Mild tricuspid regurgitation.

                        Estimated peak systolic PA pressure is 35-40 mmHg .

 

 Pulmonic Valve         Normal PV structure and function by limited views

                        and Doppler.

 

 Aorta                  Aortic root size (SInus of Valsalva diameter) is

                        normal .

 

 Pericardium            No evidence of pericardial effusion.

 

 IVC/SVC/PA/PV/Pleural  The estimated RA pressure by IVC dynamics 5-10mmHg

                        .

 

Chambers/Structures



 Left Atrium

 

 LA Dimension: 4.42 cm                  LA Area: 21.82 cm^2

 LA Volume: 63.88 ml

 LA Vol. Index: 35 ml/m^2

 

 Left Ventricle

 

 LVIDd: 5.63 cm

 LV Septum Diastolic: 1.01 cm

 LV PW Diastolic: 1.1 cm

 

 LVOT Diameter: 2.17 cm

 

Aorta

 

 Ao Root S of Chasity.: 3.04 cm

 

Doppler/Quantitative Measurements



 LVOT

 

 Peak Velocity: 1.05 m/s             Peak Gradient: 4.44 mmHg

 Mean Velocity: 0.69 m/s             Mean Gradient: 2.23 mmHg

 LVOT Diameter: 2.17 cm              LVOT VTI: 21.5 cm

 LVOT Area: 3.7 cm^2                 LVOT SV:79.47 ml

 LVOT CO: 5.25 l/min                 LVOT CI: 2.88 l/min/m^2

 









   



                 Performing Organization     Address         City/State/Zipcode     Phone Number

 

   



                                         [facility]   



                                         [facility]   





* PT/aPTT (2019  5:25 AM CST)



Only the most recent of 2 results within the time period is included.





   



                 Component       Value           Ref Range       Performed At

 

   



                 Protime         15.1 (H)        11.7 - 14.7 seconds     [facility]

 

   



                 INR             1.2             <=5.9           [facility]

 

   



                 PTT             48.2 (H)        22.5 - 36.0 seconds     [facility]













                                         Specimen

 





                                         Blood









 



                           Narrative                 Performed At

 

 



                           RECOMMENDED COUMADIN/WARFARIN INR THERAPY RANGES     [facility]



                           STANDARD DOSE: 2.0 - 3.0 Includes: PROPHYLAXIS for venous thrombosis,     [facility]



                                         systemic embolization; TREATMENT for venous thrombosis and/or pulmonary embolus.

 



                                         HIGH RISK: Target INR is 2.5-3.5 for patients with mechanical heart valves. 









   



                 Performing Organization     Address         [facility]     Phone Number

 

   



                 [facility]     [address] [place] [address] [phone]





                                         [facility]ICAL [facility]ENTER   





* Urinalysis with Microscopic If Indicated (2018  7:32 PM CDT)



Only the most recent of 2 results within the time period is included.





   



                 Component       Value           Ref Range       Performed At

 

   



                     Color, UA           Yellow              [facility]

 

   



                     Clarity, UA         Clear               [facility]

 

   



                 Specific [place], UA     1.009           1.001 - 1.035     [facility]

 

   



                 pH, UA          6.0             5.0 - 8.0       [facility]

 

   



                 Protein, UA     Negative        Negative        [facility]

 

   



                 Glucose, UA     Negative        Negative        [facility]

 

   



                 Ketones, UA     Negative        Negative        [facility]

 

   



                 Bilirubin, UA     Negative        Negative        [facility]

 

   



                 Blood, UA       Negative        Negative        [facility]

 

   



                 Nitrite, UA     Negative        Negative        [facility]

 

   



                 Leukocytes, UA     Negative        Negative        [facility]

 

   



                 Urobilinogen, UA     0.2             0.2 - 1.0 mg/dL     [facility]

 

   



                           Specimen Source           [facility]













                                         Specimen

 





                                         Urine









   



                 Performing Organization     Address         [facility]     Phone Number

 

   



                 [facility]     [address] [place] [address] [phone]





                                         [facility]   





* ECHOCARDIOGRAM REPORT - SCAN (2018  5:50 PM CDT)





 



                           Narrative                 Performed At

 

 



                                         This result has an attachment that is not available. 





* 2D Echo W/Doppler(CW/PW/Color) (2018  2:10 PM CDT)





   



                 Component       Value           Ref Range       Performed At

 

   



                           Ejection Fraction         [facility]



                                         [facility]













                                         Specimen

 











 



                           Narrative                 Performed At

 

 



                           Transthoracic Echocardiography Report (TTE)     [facility]



                           Demographics              [facility]



                                         Patient Name DESIREE MCKEON Date of Study 2018 



                                         BRENT 



                                         [id number]

 



                                         Male 



                                         Visit Number 



                                         [id number] 



                                         [id number] Room Number 1419

 



                                         Number 



                                         Date of Birth1Referring Physician SVETA Capps 



                                         Age79 



                                         year(s)Sonographer Jorge Luis Nicolas 



                                         Interpreting

 



                                         Physician KASEY López 



                                         Procedure 



                                         Type of 



                                         Study TTE procedure:2DECHO W DOPPLER(CW/PW/COLOR) (Routine) 



                                         Indications:Shortness of breath. 



                                         Clinical History 



                                         HGB 7.2 



                                         HCT 26.0 % 



                                         AFIB, CAD, DM, CHF, OLD MI, HTN, PAF, PAUL, GERD 



                                         Contrast Medium: Definity. Amount - 2 ml 



                                         Height: 69 inches Weight: 85.73 kg (189 lbs) BSA: 2.02 m^2 BMI: 27.91 kg/m^2 



                                         HR: 67 bpm BP: 117/57 mmHg 



                                         Summary 



                                         The left ventricle is chamber size (by vol index) is mildly enlarged (male 



                                         - LVED 75-89ml/m2). Normal LV wall thickness. The following segment(s) 



                                         appear akinetic: basal inferior.Basal inferolateral is hypokinetic . LVEF 



                                         by Dawkins's method of disk assessment is mildly reduced (40-44%) . 



                                         Global RV systolic function is mildly reduced . RV chamber size is mildly 



                                         enlarged . 



                                         Mid to moderate mitral regurgitation. 



                                         Estimated peak systolic PA pressure is 40-45 mmHg . 



                                         Signature 



                                         ---------------------------------------------------------------- 



                                         Electronically signed by Jaciel Pepper MD(Interpreting 



                                         physician) on 2018 05:07 PM 



                                         ---------------------------------------------------------------- 



                                         Findings 



                                         Left Ventricle The left ventricle is chamber size (by vol 



                                         index) 



                                         is mildly enlarged (male - LVED

 



                                         75-89ml/m2). Normal 



                                         LV wall thickness. The following

 



                                         segment(s) appear 



                                         akinetic: basal inferior.Basal 





                                         inferolateral is 



                                         hypokinetic . LVEF by Dawkins's

 



                                         method of disk 



                                         assessment is mildly reduced 



                                         (40-44%) . LV 



                                         endocardium is adequately 



                                         visualized with IV 



                                         ultrasound enhancing agent. 



                                         Left AtriumLA size is mildly enlarged (35-41 ml/m2) . 



                                         Right VentricleGlobal RV systolic function is mildly reduced . 





                                         RV 



                                         chamber size is mildly enlarged

 



                                         . 



                                         Right Atrium RA size is mildly dilated. 



                                         Aortic Valve Normal AoV structure. 



                                         Mitral Valve Mild MV leaflet thickening. 



                                         Mid to moderate mitral 



                                         regurgitation. 



                                         Tricuspid ValveTV structure is normal. 



                                         Mild tricuspid regurgitation. 



                                         Estimated peak systolic PA 



                                         pressure is 40-45 mmHg . 



                                         Pulmonic Valve Normal PV structure appears normal by available 





                                         views. 



                                         A trace of pulmonary 



                                         regurgitation. 



                                         AortaAortic root size (SInus of Valsalva 



                                         diameter) is 



                                         normal . 



                                         PericardiumNo pericardial effusion is visualized. 



                                         IVC/SVC/PA/PV/PleuralThe estimated RA pressure by IVC dynamics 16-20mmHg 



                                         . 



                                         Chambers/Structures 



                                         Left Atrium 



                                         LA Volume: 69.9 mlLA Area: 22.65 



                                         cm^2 



                                         LA Vol. Index: 35 ml/m^2 



                                         Left Ventricle 



                                         LVIDd: 5.42 cm 





                                         LVEDV:142.23 ml 



                                         LV Septum Diastolic: 0.98 cm 



                                         LV PW Diastolic: 0.87 cm 



                                         LVEDV Dawkins's:157.2 ml LV Length:

 



                                         8.84 cm 



                                         LVESV Dawkins's:93.97 ml 



                                         LVEF Dawkins's: 40.2 % LVEDVI: 





                                         78 ml/m^2 



                                         

 



                                         LVESVI: 47 ml/m^2 



                                         LVOT Diameter: 1.94 cm 



                                         Doppler/Quantitative Measurements 



                                         Aortic Valve 



                                         Peak Velocity: 1.68 m/sMean Velocity: 1.05 





                                         m/s 



                                         Peak Gradient: 11.35 mmHgMean Gradient: 5.24 



                                         mmHg 



                                         AV Area (continuity): 2.38 cm^2 



                                         AV VTI: 33.32 cm 



                                         AV DVI: 0.81 



                                         LVOT 



                                         Peak Velocity: 1.26 m/s Peak Gradient: 6.32 mmHg 



                                         Mean Velocity: 0.83 m/s Mean Gradient: 3.21 mmHg 



                                         LVOT Diameter: 1.94 cmLVOT VTI: 26.83 cm 



                                         LVOT Area: 2.96 cm^2LVOT SV:79.27 ml 



                                         LVOT CO: 5.31 l/min LVOT CI: 2.63 l/min/m^2 



                                         Tricuspid Valve 



                                         TR Velocity: 2.57 m/s 



                                         TR Gradient: 26.42 mmHg 









                                        Procedure Note

 

                                        



Interface, External Ris In - 2018  5:07 PM CDT



Transthoracic Echocardiography Report (TTE)



 Demographics

 

 Patient Name   DESIREE MCKEON     Date of Study       2018

                BRENT

 

 MRN            [id number]          Gender              Male

 

 Visit Number   [id number]        Race                Unknown

 

 Accession      [id number]         Room Number         1419

 Number

 

 Date of Birth  1940        Referring Physician SVETA Capps

 

 Age            78 year(s)        Sonographer         Jorge Luis Nicolas

 

                                  Interpreting        Physician KASEY López

 

Procedure



 Type of

 Study     TTE procedure:2DECHO W DOPPLER(CW/PW/COLOR) (Routine)

 

Indications:Shortness of breath.



Clinical History

HGB 7.2

HCT 26.0 %

AFIB, CAD, DM, CHF, OLD MI, HTN, PAF, PAUL, GERD



Contrast Medium: Definity. Amount - 2 ml



Height: 69 inches Weight: 85.73 kg (189 lbs) BSA: 2.02 m^2 BMI: 27.91 kg/m^2



HR: 67 bpm BP: 117/57 mmHg



 Summary

 The left ventricle is chamber size (by vol index) is mildly enlarged (male

 - LVED 75-89ml/m2). Normal LV wall thickness. The following segment(s)

 appear akinetic: basal inferior.Basal inferolateral is hypokinetic . LVEF

 by Dawkins's method of disk assessment is mildly reduced (40-44%) .

 Global RV systolic function is mildly reduced . RV chamber size is mildly

 enlarged .

 Mid to moderate mitral regurgitation.

 Estimated peak systolic PA pressure is 40-45 mmHg .

 

 Signature

 

 ----------------------------------------------------------------

 Electronically signed by Jaciel Pepper MD(Interpreting

 physician) on 2018 05:07 PM

 ----------------------------------------------------------------

 

 Findings

 

 Left Ventricle         The left ventricle is chamber size (by vol index)

                        is mildly enlarged (male - LVED 75-89ml/m2). Normal

                        LV wall thickness. The following segment(s) appear

                        akinetic: basal inferior.Basal inferolateral is

                        hypokinetic . LVEF by Dawkins's method of disk

                        assessment is mildly reduced (40-44%) . LV

                        endocardium is adequately visualized with IV

                        ultrasound enhancing agent.

 

 Left Atrium            LA size is mildly enlarged (35-41 ml/m2) .

 

 Right Ventricle        Global RV systolic function is mildly reduced . RV

                        chamber size is mildly enlarged .

 

 Right Atrium           RA size is mildly dilated.

 

 Aortic Valve           Normal AoV structure.

 

 Mitral Valve           Mild MV leaflet thickening.

                        Mid to moderate mitral regurgitation.

 

 Tricuspid Valve        TV structure is normal.

                        Mild tricuspid regurgitation.

                        Estimated peak systolic PA pressure is 40-45 mmHg .

 

 Pulmonic Valve         Normal PV structure appears normal by available

                        views.

                        A trace of pulmonary regurgitation.

 

 Aorta                  Aortic root size (SInus of Valsalva diameter) is

                        normal .

 

 Pericardium            No pericardial effusion is visualized.

 

 IVC/SVC/PA/PV/Pleural  The estimated RA pressure by IVC dynamics 16-20mmHg

                        .

 

Chambers/Structures



 Left Atrium

 

 LA Volume: 69.9 ml                      LA Area: 22.65 cm^2

 LA Vol. Index: 35 ml/m^2

 

 Left Ventricle

 

 LVIDd: 5.42 cm                                 LVEDV:142.23 ml

 LV Septum Diastolic: 0.98 cm

 LV PW Diastolic: 0.87 cm

 LVEDV Dawkins's:157.2 ml                       LV Length: 8.84 cm

 LVESV Dawkins's:93.97 ml

 LVEF Dawkins's: 40.2 %                         LVEDVI: 78 ml/m^2

                                                LVESVI: 47 ml/m^2

 LVOT Diameter: 1.94 cm

 

Doppler/Quantitative Measurements



 Aortic Valve

 

 Peak Velocity: 1.68 m/s                  Mean Velocity: 1.05 m/s

 Peak Gradient: 11.35 mmHg                Mean Gradient: 5.24 mmHg

 AV Area (continuity): 2.38 cm^2

 AV VTI: 33.32 cm

 

 AV DVI: 0.81

 

 LVOT

 

 Peak Velocity: 1.26 m/s             Peak Gradient: 6.32 mmHg

 Mean Velocity: 0.83 m/s             Mean Gradient: 3.21 mmHg

 LVOT Diameter: 1.94 cm              LVOT VTI: 26.83 cm

 LVOT Area: 2.96 cm^2                LVOT SV:79.27 ml

 LVOT CO: 5.31 l/min                 LVOT CI: 2.63 l/min/m^2

 

 Tricuspid Valve

 

 TR Velocity: 2.57 m/s

 TR Gradient: 26.42 mmHg

 









   



                 Performing Organization     Address         [facility]     Phone Number

 

   



                                         [facility]   



                                         [facility]   





* ED ECG Interpretation (2018  8:18 PM CDT)





 



                           Narrative                 Performed At

 

 



                                         Jin Casey MD 20188:18 PM 



                                         ECG/EKG Interpretation 



                                         Date/Time: 2018 12:22 AM 



                                         Performed by: JIN CASEY 



                                         Authorized by: JIN CASEY 



                                         The ECG was interpreted by ED physician. The ECG is interpreted as other 



                                         rhythm. Rate is normal rate. Heart rate is 66 BPM. 



                                         Conduction: conduction normal. ST segments abnormal. T waves abnormal. 



                                         Axis is normal. 



                                         Other findings include: prolonged QTc interval. Clinical Impression: 



                                         abnormal ECGECG reviewed and does not meet STEMI criteria. Patient 



                                         tolerance: Patient tolerated the procedure well with no immediate 



                                         complications 





* Legionella antigen, urine (2018 10:31 PM CDT)





   



                 Component       Value           Ref Range       Performed At

 

   



                     Legionella Urine Antigen     Negative - see commentComment:      [facility]





                           Negative for L. pneumophila      [facility]



                                         serogroup 1 antigen,  



                                         suggesting no recent or  



                                         current infection with this  



                                         serogroup. Legionellosis  



                                         cannot be ruled out since  



                                         other serogroups and species  



                                         may cause disease.  













                                         Specimen

 





                                         Urine









   



                 Performing Organization     Address         City/State/[facility]     Phone Number

 

   



                 [address] [address]     [phone]





                                         MEDICAL CENTER   





* Strep pneumoniae antigen (2018  4:09 PM CDT)





   



                 Component       Value           Ref Range       Performed At

 

   



                     Strep pneumoniae Antigen     Presumptive negative for     Presumptive negative for 

                                         [facility]



                     pneumococcal pneumonia - see     pneumococcal pneumonia -     [facility]



                           comment                   see comment, Presumptive 



                                         negative for pneumococcal 



                                         meningitis - see comment 













                                         Specimen

 





                                         Urine









 



                           Narrative                 Performed At

 

 



                                         Presumptive negative for pneumococcal pneumonia, suggesting no current or recent

                                         [facility]



                                         pneumococcal infection. Infection due to S. pneumoniae cannot be ruled out since

                                         [facility]



                                         the antigen present in the sample may be below the detection limit of the test.

 









   



                 Performing Organization     Address         City[facility]     Phone Number

 

   



                 [id number] [place] [address]     [phone]





                                         MEDICAL CENTER   





* Comprehensive metabolic panel (2018 12:37 AM CDT)





   



                 Component       Value           Ref Range       Performed At

 

   



                 Protein, Total     7.4             6.0 - 8.3 gm/dL     [facility]

 

   



                 Albumin         3.7             3.5 - 5.0 g/dL     [facility]

 

   



                 Alkaline Phosphatase     111             40 - 150 U/L     [facility]

 

   



                 Total Bilirubin     0.7             0.2 - 1.2 mg/dL     [facility]

 

   



                 Sodium          134 (L)         136 - 145 meq/L     [facility]

 

   



                 Potassium       4.3             3.5 - 5.1 meq/L     [facility]

 

   



                 Chloride        102             98 - 107 meq/L     [facility]

 

   



                 CO2             24              22 - 29 meq/L     [facility]

 

   



                 BUN             21              7 - 21 mg/dL     [facility]

 

   



                 Creatinine      1.29 (H)        0.57 - 1.25 mg/dL     [facility]

 

   



                 Glucose         125 (H)         70 - 105 mg/dL     [facility]

 

   



                 Calcium         8.5             8.4 - 10.2 mg/dL     [facility]

 

   



                 AST             34              5 - 34 U/L      [facility]

 

   



                 ALT             18              6 - 55 U/L      [facility]

 

   



                 EGFR            54Comment: ESTIMATED GFR IS     mL/min/1.73 sq m     [facility]



                           NOT AS ACCURATE AS CREATININE      [facility]



                                         CLEARANCE IN PREDICTING  



                                         GLOMERULAR FILTRATION RATE.  



                                         ESTIMATED GFR IS NOT  



                                         APPLICABLE FOR DIALYSIS  



                                         PATIENTS.  













                                         Specimen

 





                                         Blood









   



                 Performing Organization     Address         City/State/Zipcode     Phone Number

 

   



                 [facility]     [address] [place] [address] [phone]





                                         MEDICAL CENTER   





after 2018



Insurance







     



            Payer      Benefit     Subscriber ID     Type       Phone      Address



                                         Plan /    



                                         Group    

 

     



                 MEDICARE        MEDICARE        xxxxxxxxxxx     Medicare  



                                         PART A    

 

     



                 MEDICARE        MEDICARE        xxxxxxxxxxx     Medicare  



                                         PART A    

 

     



                 AETNA - [employer]GD CARE     AETNA           xxxxxxxxxx      Comm  



                                         INDEMNITY    



                                         NON CONTR    

 

     



                 MEDICAID        MEDICAID        xxxxxxxxx       [place]    









     



            Guarantor Name     Account     Relation to     Date of     Phone      Billing Address



                     Type                Patient             Birth  

 

     



            Desiree Mckeon     Personal/F     Self       1940     [phone] [address] [facility]               (Home)              [place] [address]







Advance Directives





For more information, please contact:



[facility] [place] [place]



[address] [place] [address] [phone]









                          Date Inactivated          Comments



                           Code Status               Date Activated  

 

                          2019  7:27 PM         



                           Full Code                 7/10/2019  5:22 PM  









  



                           This code status was determined by:     Patient 









                                                     

 

                          7/10/2019  2:44 PM         



                           Full Code                 2019  5:06 AM  









  



                           This code status was determined by:     Patient 









                                                     

 

                          2018  4:50 PM          



                           Full Code                 2018  5:55 AM  









  



                           This code status was determined by:     Patient 









                                                     

 

                          2018  7:28 PM         



                           Full Code                 2018  9:33 PM  









  



                           This code status was determined by:     Patient 









                                                     

 

                          10/4/2017  3:54 AM         



                           Full Code                 10/1/2017  7:54 AM  









  



                           This code status was determined by:     Patient

## 2019-07-18 NOTE — XMS REPORT
Summary of Care: 16 - 17

                             Created on: 2075



DESIREE MCKEON

External Reference #: [id number]

: 1940

Sex: Male



Demographics







                          Address                   [address]

[place]  [address]

 

                          Home Phone                [phone]

 

                          Preferred Language        English

 

                          Marital Status            

 

                          Mormonism Affiliation     None

 

                          Race                      White/

 

                          Ethnic Group              Non-





Author







                          Author                    [facility]

 

                          Organization              [facility]

 

                          Address                   Unknown

 

                          Phone                     Unavailable







Encounter





CHLOÉ Pedersen(DAVID) [id number] Date(s): 16 - 17

[facility] [address] [place] [address]-     [phone]
[phone]

Discharge Disposition: Skilled Nursing Facility

Attending Physician: Job Ochoa MD

Admitting Physician: Job Ochoa MD





Vital Signs







                    1                   2                   3



                                         Most recent to   



                                         oldest [Reference   



                                         Range]:   

 

                                        175.26 cm 

                          (17 10:43 AM)         175.26 cm 

                          (17 7:27 AM)          175.26 cm 

                                        (17 2:24 AM)



                                         Height   

 

                                        97.4 DegF 

                          (17 12:00 PM)        98.6 DegF 

                          (17 8:00 AM)         97.8 DegF 

                                        (17 5:18 AM)



                                         Temperature Oral   



                                         [96.4-99.1 DegF]   

 

                                        127/81 mmHg 

                          (17 12:00 PM)        139/77 mmHg 

                          (17 8:00 AM)         138/72 mmHg 

                                        (17 5:18 AM)



                                         Blood Pressure   



                                         [/60-90 mmHg]   

 

                                        18 BRMIN 

                          (17 12:00 PM)        18 BRMIN 

                          (17 8:00 AM)         16 BRMIN 

                                        (17 7:52 AM)



                                         Respiratory Rate   



                                         [14-20 BRMIN]   

 

                                        70 bpm 

                          (17 12:00 PM)        65 bpm 

                          (17 8:00 AM)         67 bpm 

                                        (17 5:18 AM)



                                         Peripheral Pulse   



                                         Rate [ bpm]   

 

                                        102.5 kg 

                          (1/3/17 5:39 PM)          92.727 kg 

                          (16 10:16 AM)       92.727 kg 

                                        (16 7:22 AM)



                                         Weight   

 

                                        30.19 m2 

                          (16 10:16 AM)       30.19 m2 

                          (16 6:49 PM)         



                                         Body Mass Index   







Problem List







    



              Condition     Effective Dates     Status       Health Status     Informant

 

    



                           Acute MI(Confirmed)1      Resolved  

 

    



                           Atrial                    Active  



                                         fibrillation(Confirm    



                                         ed)    

 

    



                           Atrial                    Resolved  



                                         fibrillation(Confirm    



                                         ed)    

 

    



                           CAD (coronary artery      Active  



                                         disease)(Confirmed)    

 

    



                           Diabetes(Confirmed)       Resolved  

 

    



                           Diastolic heart           Active  



                                         failure(Confirmed)    

 

    



                           Histoplasmosis(Confi      Resolved  



                                         rmed)    

 

    



                           Hypercholesteremia(C      Resolved  



                                         onfirmed)    

 

    



                           Hypertension(Confirm      Resolved  



                                         ed)    

 

    



                           HTN                       Active  



                                         (hypertension)(Confi    



                                         rmed)    

 

    



                           Hypothyroidism(Confi      Active  



                                         rmed)    

 

    



                           Diabetes mellitus         Active  



                                         type 2, insulin    



                                         dependent(Confirmed)    

 

    



                           PAUL (obstructive          Active  



                                         sleep    



                                         apnea)(Confirmed)    

 

    



                           Poliomyelitides(Conf      Resolved  



                                         irmed)    

 

    



                           Sleep                     Resolved  



                                         apnea(Confirmed)    

 

    



                           Stented coronary          Resolved  



                                         artery(Confirmed)2    

 

    



                           Systolic heart            Active  



                                         failure(Confirmed)    

 

    



                           Whooping                  Resolved  



                                         cough(Confirmed)    







1x 3



225 cardiac stents



Allergies, Adverse Reactions, Alerts







   



                 Substance       Reaction        Severity        Status

 

   



                           Latex                     Active

 

   



                           sulfa drugs               Active







Medications





*Rn pls get Fleet Enema from [facility]*

*Rn pls get Fleet Enema from [facility]*, Reminder, Drug form: MISC, Route: MISC, Q3H, 
17 18:49:00 CST, Stop date: 17 0:00:00 CST

Start Date: 17

Stop Date: 17

Status: Voided With Results



acetaminophen

650 mg, 2 tab, Route: PO, Drug form: TAB, Q6H, Dosing Weight 92.727, kg, PRN Patrick
n 1-3/Temp > 99.5 F, Start date: 16 8:10:00 CST, Duration: 30 day, Stop 
date: 17 8:09:00 CST

Notes: Do not exceed 4 gm/day.  (Same as: Tylenol)

Start Date: 16

Stop Date: 17

Status: Discontinued



acetaminophen

650 mg, 2 tab, Route: PO, Drug form: TAB, Q4H, Dosing Weight 91.364, kg, PRN Patrick
n 1-3/Temp > 100.4 F, Start date: 16 18:43:00 CST, Duration: 30 day, Stop 
date: 17 18:42:00 CST

Notes: Do not exceed 4 gm/day.  (Same as: Tylenol)

Start Date: 16

Stop Date: 17

Status: Discontinued



acetaminophen-hydrocodone 325 mg-10 mg oral tablet

1 tab, Route: PO, Drug Form: TAB, Dosing Weight 102.5, kg, Q4H, PRN Pain Score 1
-3, Start date: 17 10:26:00 CST, Duration: 30 day, Stop date: 17 10:
25:00 CST

Notes: Do not exceed 4gm/day of acetaminophen.  (Same as: Norco 325/10)

Start Date: 17

Stop Date: 17

Status: Discontinued



acetaminophen-hydrocodone 325 mg-5 mg oral tablet

1 tab, Route: PO, Drug Form: TAB, Dosing Weight 91.364, kg, Q4H, PRN Pain Score 
4-6, Start date: 16 18:43:00 CST, Duration: 30 day, Stop date: 17 18
:42:00 CST

Notes: (Same as: Norco 325/5)  Do not exceed 4gm/day of acetaminophen.

Start Date: 16

Stop Date: 16

Status: Discontinued



AMIODarone

200 mg, 1 tab, Route: PO, Drug form: TAB, Daily, Dosing Weight 92.727, kg, Start
date: 16 9:00:00 CST, Duration: 30 day, Stop date: 17 9:00:00 CST

Notes: (Same as: Cordarone)

Start Date: 16

Stop Date: 17

Status: Discontinued



Ancef + sodium chloride 0.9%  mL

1 gm, Route: IVPB, ABXQ8H, Dosing Weight 92.727, kg, Start date: 16 16:00:
00 CST, Duration: 30 day, Stop date: 17 8:00:00 CST

Notes: (Same As: Ancef, Kefzol)  *** MEDICATION WASTE ***Product Size:  1000 mgP
roduct Wasted:  ___ mg

Start Date: 16

Stop Date: 17

Status: Discontinued



aspirin 325 mg tablet, enteric coated

325 mg, Route: PO, Drug form: ECTAB, Daily, Dosing Weight 102.5, kg, Start date:
17 9:00:00 CST, Duration: 30 day, Stop date: 17 9:00:00 CST

Start Date: 17

Stop Date: 17

Status: Canceled



Ativan

2 mg, 1 mL, Route: IVP, Drug form: INJ, ONCE, Dosing Weight 92.727, kg, PRN Anxi
ety, Priority: STAT, Start date: 16 16:59:00 CST

Notes: (Same as: Ativan)

Start Date: 16

Stop Date: 16

Status: Completed



Ativan

0.5 mg, 1 tab, Route: PO, Drug form: TAB, TID, Dosing Weight 102.5, kg, PRN Anxi
ety, Start date: 17 7:55:00 CST, Duration: 30 day, Stop date: 17 7:5
4:00 CST

Notes: (Same as: Ativan)

Start Date: 17

Stop Date: 17

Status: Discontinued



atorvastatin

5 mg, 0.5 tab, Route: PO, Drug form: TAB, Bedtime, Dosing Weight 92.727, kg, Sta
rt date: 16 21:00:00 CST, Duration: 30 day, Stop date: 17 21:00:00 C
ST

Notes: (Same As: Lipitor)

Start Date: 16

Stop Date: 17

Status: Discontinued



atropine

0.5 mg, 5 mL, Route: IV, Drug form: INJ, PRN, Dosing Weight 102.5, kg, PRN Brandon
cardia, Start date: 17 1:45:00 CST, Duration: 30 day, Stop date: 17 
1:44:00 CST

Start Date: 17

Stop Date: 17

Status: Discontinued



Benadryl

25 mg, 1 tab, Route: PO, Drug form: TAB, Daily, Dosing Weight 92.727, kg, Start 
date: 16 9:00:00 CST, Duration: 30 day, Stop date: 17 9:00:00 CST

Start Date: 16

Stop Date: 17

Status: Discontinued



calcium carbonate 500 mg (200 mg elemental calcium) oral tablet

1,000 mg, 2 tab, Route: PO, Drug form: CHEWTAB, PRN, Dosing Weight 92.727, kg, P
RN Abnormal Lab Result, FOR ICU USE ONLY, Start date: 17 19:45:00 CST, Dur
ation: 30 day, Stop date: 17 19:44:00 CST

Notes: (Same As: Tums)Calcium Carbonate 500 bt=972 mg elemental calcium   Dose=_
_____ mg calcium carbonate (______ mg elemental calcium)

Start Date: 17

Stop Date: 17

Status: Discontinued



calcium carbonate 500 mg (200 mg elemental calcium) oral tablet

500 mg, 1 tab, Route: PO, Drug form: CHEWTAB, PRN, Dosing Weight 92.727, kg, PRN
Abnormal Lab Result, FOR ICU USE ONLY, Start date: 17 19:45:00 CST, Stop 
date: 17 19:44:00 CST

Notes: (Same As: Tums)Calcium Carbonate 500 nk=980 mg elemental calcium   Dose=_
_____ mg calcium carbonate (______ mg elemental calcium)

Start Date: 17

Stop Date: 17

Status: Discontinued



calcium carbonate 500 mg (200 mg elemental calcium) oral tablet

500 mg, 1 tab, Route: PO, Drug form: CHEWTAB, PRN, Dosing Weight 92.727, kg, PRN
Abnormal Lab Result, FOR ICU USE ONLY, Start date: 17 10:13:00 CST, Durat
ion: 30 day, Stop date: 17 10:12:00 CST

Notes: (Same As: Tums)Calcium Carbonate 500 yu=455 mg elemental calcium   Dose=_
_____ mg calcium carbonate (______ mg elemental calcium)

Start Date: 17

Stop Date: 17

Status: Discontinued



calcium carbonate 500 mg (200 mg elemental calcium) oral tablet

1,000 mg, 2 tab, Route: PO, Drug form: CHEWTAB, PRN, Dosing Weight 92.727, kg, P
RN Abnormal Lab Result, FOR ICU USE ONLY, Start date: 17 10:13:00 CST, Dur
ation: 30 day, Stop date: 17 10:12:00 CST

Notes: (Same As: Tums)Calcium Carbonate 500 yw=154 mg elemental calcium   Dose=_
_____ mg calcium carbonate (______ mg elemental calcium)

Start Date: 17

Stop Date: 17

Status: Discontinued



calcium gluconate + sodium chloride 0.9% INJ 50 mL

1 gm, 10 mL, Route: IVPB, PRN, Dosing Weight 92.727, kg, PRN Abnormal Lab Result
, Start date: 17 19:45:00 CST, Duration: 30 day, Stop date: 17 19:44
:00 CST, FOR ICU USE ONLY

Notes: WASTE: F/P - Sink; E - Municipal Trash Bin

Start Date: 17

Stop Date: 17

Status: Discontinued



calcium gluconate + sodium chloride 0.9% INJ 50 mL

1 gm, 10 mL, Route: IVPB, PRN, Dosing Weight 92.727, kg, PRN Abnormal Lab Result
, Start date: 17 10:13:00 CST, Duration: 30 day, Stop date: 17 10:12
:00 CST, FOR ICU USE ONLY

Notes: WASTE: F/P - Sink; E - Municipal Trash Bin

Start Date: 17

Stop Date: 17

Status: Discontinued



clopidogrel

75 mg, 1 tab, Route: PO, Drug form: TAB, Daily, Dosing Weight 92.727, kg, Start 
date: 16 9:00:00 CST, Duration: 30 day, Stop date: 17 9:00:00 CST

Notes: (Same As: Plavix)

Start Date: 16

Stop Date: 17

Status: Discontinued



Deep Sea Nasal Spray

1 spray, Route: Each Affected Nostril, PRN, Drug form: SOLN, PRN Congestion, Sta
rt date: 17 8:45:00 CST, Duration: 30 day, Stop date: 17 8:44:00 CST

Notes: (Same as: Wilkes, Deep Sea Nasal Spray).

Start Date: 17

Stop Date: 17

Status: Discontinued



Dextrose 50% Syringe

12.5 gm, 25 mL, Route: IVP, Drug Form: INJ, Dosing Weight 92.727, kg, PRN, PRN B
lood Glucose Results, Start date: 17 1:04:00 CST, Duration: 30 day, Stop d
ate: 17 1:03:00 CST

Start Date: 17

Stop Date: 17

Status: Discontinued



Dextrose 50% Syringe

25 gm, 50 mL, Route: IVP, Drug Form: INJ, Dosing Weight 92.727, kg, PRN, PRN Blo
od Glucose Results, Start date: 17 1:04:00 CST, Duration: 30 day, Stop hazel
e: 17 1:03:00 CST

Start Date: 17

Stop Date: 17

Status: Discontinued



Dextrose 50% Syringe

25 gm, 50 mL, Route: IVP, Drug Form: INJ, Dosing Weight 92.727, kg, PRN, PRN Blo
od Glucose Results, Start date: 17 9:27:00 CST, Duration: 30 day, Stop hazel
e: 17 9:26:00 CST

Start Date: 1/3/17

Stop Date: 17

Status: Discontinued



Dextrose 50% Syringe

12.5 gm, 25 mL, Route: IVP, Drug Form: INJ, Dosing Weight 92.727, kg, PRN, PRN B
lood Glucose Results, Start date: 17 9:27:00 CST, Duration: 30 day, Stop d
ate: 17 9:26:00 CST

Start Date: 1/3/17

Stop Date: 17

Status: Discontinued



Dextrose 50% Syringe

25 gm, 50 mL, Route: IVP, Drug Form: INJ, Dosing Weight 92.727, kg, PRN, PRN Blo
od Glucose Results, Start date: 16 19:53:00 CST, Duration: 30 day, Stop da
te: 17 19:52:00 CST

Start Date: 16

Stop Date: 17

Status: Discontinued



Dextrose 50% Syringe

12.5 gm, 25 mL, Route: IVP, Drug Form: INJ, Dosing Weight 92.727, kg, PRN, PRN B
lood Glucose Results, Start date: 16 19:53:00 CST, Duration: 30 day, Stop 
date: 17 19:52:00 CST

Start Date: 16

Stop Date: 17

Status: Discontinued



Dextrose 50% Syringe

12.5 gm, 25 mL, Route: IVP, Drug Form: INJ, Dosing Weight 91.364, kg, PRN, PRN B
lood Glucose Results, Start date: 16 19:00:00 CST, Duration: 30 day, Stop 
date: 17 18:59:00 CST

Start Date: 16

Stop Date: 16

Status: Discontinued



Dextrose 50% Syringe

25 gm, 50 mL, Route: IVP, Drug Form: INJ, Dosing Weight 91.364, kg, PRN, PRN Blo
od Glucose Results, Start date: 16 19:00:00 CST, Duration: 30 day, Stop da
te: 17 18:59:00 CST

Start Date: 16

Stop Date: 16

Status: Discontinued



Dilaudid

0.5 mg, 0.5 mL, Route: IVP, Drug form: INJ, Q4H, Dosing Weight 102.5, kg, PRN Pa
in Score 7-10, Start date: 17 10:25:00 CST, Duration: 30 day, Stop date: 0
17 10:24:00 CST

Start Date: 17

Stop Date: 17

Status: Discontinued



docusate

100 mg, 1 cap, Route: PO, Drug form: CAP, BID, Dosing Weight 91.364, kg, Start d
ate: 16 9:00:00 CST, Duration: 30 day, Stop date: 17 17:00:00 CST

Notes: (Same as: Colace) (Do Not Crush)

Start Date: 16

Stop Date: 17

Status: Discontinued



DOPamine 800 mg/250ml D5W premix 800 mg

800 mg, 250 mL, Rate: Titrate, Start Dose: 5 microgram/kg/min, Titration: 2.5 mi
crogram/kg/min every 15 minutes, Goal(s): MAP >=65 mmHg, Max Dose: 20 
microgram/kg/min, Route: IV, Dosing Weight 92.727 kg, Total Volume: 250, Start 
date: 16 17:42:...

Notes: (Same as: Intropin) Administer by either central venous catheter or perip
herally-inserted central catheter (PICC) line.  Final conc=3.2 mg/ml. Premix sol
ution.

Start Date: 16

Stop Date: 17

Status: Discontinued



Draw vancomcyin trough level 0-30 min prior to dose

Draw vancomcyin trough level 0-30 min prior to dose, see order comments, Drug fo
rm: MISC, Route: MISC, Q24H, 17 9:00:00 CST, Duration: 1 doses or times, S
top date: 17 9:00:00 CST

Start Date: 17

Stop Date: 17

Status: Completed



EPINEPHrine 1 mg/1 ml (PF) INJ AMP 8 mg + sodium chloride 0.9%  mL

8 mg, 8 mL, Rate: Titrate, Start Dose: 5 microgram/min, Titration: 0.5 microgram
/min every 2 min, Goal(s): MAP >=65 mmHg, Max Dose: 35 microgram/min, Route: IV,
Dosing Weight 92.727 kg, Total Volume: 250, Priority: STAT, Start date: 16
19:17:00...

Notes: (Same as: Adrenalin) Suremed - Injectable drug used as inhalation treatme
nt.  *** MEDICATION WASTE ***Product Size:  1 mgProduct Wasted:  ___ mg

Start Date: 16

Stop Date: 17

Status: Discontinued



fentaNYL

25 microgram, 0.5 mL, Route: IV, Drug form: INJ, Q4H, Dosing Weight 92.727, kg, 
PRN Pain Score 7-10, Start date: 17 3:28:00 CST, Duration: 30 day, Stop da
te: 17 3:27:00 CST

Notes: (Same as: Sublimaze)  Preservative free.

Start Date: 1/3/17

Stop Date: 17

Status: Discontinued



fentaNYL 5 microgram/ml NS 250ml (Premix) 1,250 microgram

1,250 microgram, 250 mL, Rate: Titrate, Start Dose: 50 microgram/hr, Titration: 
25 micrograms/hour every 15 minutes, Goal(s): -2, Max Dose: 300 microgram/hr, [place]: IV, Dosing Weight 92.727 kg, Total Volume: 250, Start date: 16 19:33:
00 CST, Dur...

Notes: Concentration: 5 microgram / ml

Start Date: 16

Stop Date: 17

Status: Discontinued



Fleet Enema

133 mL, Route: LA, Dosing Weight 102.5, kg, ONCE, Start date: 17 18:19:00 
CST, Stop date: 17 18:19:00 CST

Start Date: 17

Stop Date: 17

Status: Completed



Flexeril

10 mg, Route: PO, Drug form: TAB, TID, Dosing Weight 92.727, kg, PRN Spasm, Star
t date: 16 10:59:00 CST, Duration: 30 day, Stop date: 17 10:58:00 CS
T

Start Date: 16

Stop Date: 16

Status: Discontinued



Flexeril

10 mg, 1 tab, Route: PO, Drug form: TAB, TID, Dosing Weight 92.727, kg, PRN Spas
m, Start date: 16 11:08:00 CST, Duration: 30 day, Stop date: 17 11:0
7:00 CST

Notes: (Same As: Flexeril)

Start Date: 16

Stop Date: 17

Status: Discontinued



furosemide

40 mg, 4 mL, Route: IVP, Drug form: INJ, ONCE, Dosing Weight 102.5, kg, Start da
te: 17 20:33:00 CST, Stop date: 17 20:33:00 CST

Notes: (Same as: Lasix)  *** MEDICATION WASTE ***Product Size:  40 mgProduct Was
sonali:  ___ mg

Start Date: 1/3/17

Stop Date: 1/3/17

Status: Completed



glucagon

1 mg, Route: IM, Drug form: PDR/INJ, PRN, Dosing Weight 92.727, kg, PRN Blood Gl
ucose Results, Start date: 17 9:27:00 CST, Duration: 30 day, Stop date:  9:26:00 CST

Start Date: 1/3/17

Stop Date: 17

Status: Discontinued



glucagon

1 mg, Route: IM, Drug form: PDR/INJ, PRN, Dosing Weight 92.727, kg, PRN Blood Gl
ucose Results, Start date: 16 19:53:00 CST, Duration: 30 day, Stop date: 0
17 19:52:00 CST

Start Date: 16

Stop Date: 17

Status: Discontinued



glucagon

1 mg, Route: IM, Drug form: PDR/INJ, PRN, Dosing Weight 91.364, kg, PRN Blood Gl
ucose Results, Start date: 16 19:00:00 CST, Duration: 30 day, Stop date: 0
17 18:59:00 CST

Start Date: 16

Stop Date: 16

Status: Discontinued



Imdur

30 mg, 1 tab, Route: PO, Drug form: ERTAB, QAM, Dosing Weight 92.727, kg, Start 
date: 16 9:00:00 CST, Duration: 30 day, Stop date: 17 9:00:00 CST

Notes: (Same as:Imdur)"Do Not Crush"  Take on empty stomach/ full glass of water
.  Do not crush

Start Date: 16

Stop Date: 17

Status: Discontinued



insulin aspart

4 unit, 0.04 mL, Route: SUB-Q, Drug form: SOLN, Bedtime, Dosing Weight 92.727, k
g, PRN Blood Glucose Results, Start date: 17 9:41:00 CST, Duration: 30 day
, Stop date: 17 9:40:00 CST

Notes: Roll in palms of hands gently;  Do not shake vigorously. (Same as: NovoLO
G)"single patient use only"WASTE: F/P - Black; E - Municipal Trash Bin  Stable f
or 28 days at room temperature.Expires in _____ days from ______________Date

Start Date: 1/3/17

Stop Date: 17

Status: Discontinued



insulin aspart

1 unit, 0.01 mL, Route: SUB-Q, Drug form: SOLN, Bedtime, Dosing Weight 92.727, k
g, PRN Blood Glucose Results, Start date: 17 9:41:00 CST, Duration: 30 day
, Stop date: 17 9:40:00 CST

Notes: Roll in palms of hands gently;  Do not shake vigorously. (Same as: Vernon MINAYA)"single patient use only"WASTE: F/P - Black; E - Municipal Trash Bin  Stable f
or 28 days at room temperature.Expires in _____ days from ______________Date

Start Date: 1/3/17

Stop Date: 17

Status: Discontinued



insulin aspart

2 unit, 0.02 mL, Route: SUB-Q, Drug form: SOLN, Bedtime, Dosing Weight 92.727, k
g, PRN Blood Glucose Results, Start date: 17 9:41:00 CST, Duration: 30 day
, Stop date: 17 9:40:00 CST

Notes: Roll in palms of hands gently;  Do not shake vigorously. (Same as: Vernon MINAYA)"single patient use only"WASTE: F/P - Black; E - Municipal Trash Bin  Stable f
or 28 days at room temperature.Expires in _____ days from ______________Date

Start Date: 1/3/17

Stop Date: 17

Status: Discontinued



insulin aspart

3 unit, 0.03 mL, Route: SUB-Q, Drug form: SOLN, Bedtime, Dosing Weight 92.727, k
g, PRN Blood Glucose Results, Start date: 17 9:41:00 CST, Duration: 30 day
, Stop date: 17 9:40:00 CST

Notes: Roll in palms of hands gently;  Do not shake vigorously. (Same as: NovoTORO
G)"single patient use only"WASTE: F/P - Black; E - Municipal Trash Bin  Stable f
or 28 days at room temperature.Expires in _____ days from ______________Date

Start Date: 1/3/17

Stop Date: 17

Status: Discontinued



insulin aspart

15 unit, 0.15 mL, Route: SUB-Q, Drug form: SOLN, TID-Before Meals, Dosing Weight
92.727, kg, PRN Blood Glucose Results, Start date: 17 9:27:00 CST, Durati
on: 30 day, Stop date: 17 9:26:00 CST

Notes: Roll in palms of hands gently;  Do not shake vigorously. (Same as: NovoTORO
G)"single patient use only"WASTE: F/P - Black; E - Municipal Trash Bin  Stable f
or 28 days at room temperature.Expires in _____ days from ______________Date

Start Date: 1/3/17

Stop Date: 17

Status: Discontinued



insulin aspart

12 unit, 0.12 mL, Route: SUB-Q, Drug form: SOLN, TID-Before Meals, Dosing Weight
92.727, kg, PRN Blood Glucose Results, Start date: 17 9:27:00 CST, Durati
on: 30 day, Stop date: 17 9:26:00 CST

Notes: Roll in palms of hands gently;  Do not shake vigorously. (Same as: NovoTORO
G)"single patient use only"WASTE: F/P - Black; E - Municipal Trash Bin  Stable f
or 28 days at room temperature.Expires in _____ days from ______________Date

Start Date: 1/3/17

Stop Date: 17

Status: Discontinued



insulin aspart

9 unit, 0.09 mL, Route: SUB-Q, Drug form: SOLN, TID-Before Meals, Dosing Weight 
92.727, kg, PRN Blood Glucose Results, Start date: 17 9:27:00 CST, Duratio
n: 30 day, Stop date: 17 9:26:00 CST

Notes: Roll in palms of hands gently;  Do not shake vigorously. (Same as: Vernon MINAYA)"single patient use only"WASTE: F/P - Black; E - Municipal Trash Bin  Stable f
or 28 days at room temperature.Expires in _____ days from ______________Date

Start Date: 1/3/17

Stop Date: 17

Status: Discontinued



insulin aspart

6 unit, 0.06 mL, Route: SUB-Q, Drug form: SOLN, TID-Before Meals, Dosing Weight 
92.727, kg, PRN Blood Glucose Results, Start date: 17 9:27:00 CST, Duratio
n: 30 day, Stop date: 17 9:26:00 CST

Notes: Roll in palms of hands gently;  Do not shake vigorously. (Same as: Vernon MINAYA)"single patient use only"WASTE: F/P - Black; E - Municipal Trash Bin  Stable f
or 28 days at room temperature.Expires in _____ days from ______________Date

Start Date: 1/3/17

Stop Date: 17

Status: Discontinued



insulin aspart

3 unit, 0.03 mL, Route: SUB-Q, Drug form: SOLN, TID-Before Meals, Dosing Weight 
92.727, kg, PRN Blood Glucose Results, Start date: 17 9:27:00 CST, Duratio
n: 30 day, Stop date: 17 9:26:00 CST

Notes: Roll in palms of hands gently;  Do not shake vigorously. (Same as: Vernon MINAYA)"single patient use only"WASTE: F/P - Black; E - Municipal Trash Bin  Stable f
or 28 days at room temperature.Expires in _____ days from ______________Date

Start Date: 1/3/17

Stop Date: 17

Status: Discontinued



insulin aspart

12 unit, 0.12 mL, Route: SUB-Q, Drug form: SOLN, Sliding Scale, Dosing Weight 92
.727, kg, PRN Blood Glucose Results, Start date: 16 19:53:00 CST, Duration
: 30 day, Stop date: 17 19:52:00 CST

Notes: Roll in palms of hands gently;  Do not shake vigorously. (Same as: Vernon MINAYA)"single patient use only"WASTE: F/P - Black; E - Municipal Trash Bin  Stable f
or 28 days at room temperature.Expires in _____ days from ______________Date

Start Date: 16

Stop Date: 17

Status: Discontinued



insulin aspart

9 unit, 0.09 mL, Route: SUB-Q, Drug form: SOLN, Sliding Scale, Dosing Weight 92.
727, kg, PRN Blood Glucose Results, Start date: 16 19:53:00 CST, Duration:
30 day, Stop date: 17 19:52:00 CST

Notes: Roll in palms of hands gently;  Do not shake vigorously. (Same as: NovoTORO MINAYA)"single patient use only"WASTE: F/P - Black; E - [facility] Trash Bin  Stable f
or 28 days at room temperature.Expires in _____ days from ______________Date

Start Date: 16

Stop Date: 17

Status: Discontinued



insulin aspart

6 unit, 0.06 mL, Route: SUB-Q, Drug form: SOLN, Sliding Scale, Dosing Weight 92.
727, kg, PRN Blood Glucose Results, Start date: 16 19:53:00 CST, Duration:
30 day, Stop date: 17 19:52:00 CST

Notes: Roll in palms of hands gently;  Do not shake vigorously. (Same as: NovoTORO MINAYA)"single patient use only"WASTE: F/P - Black; E - [facility] Trash Bin  Stable f
or 28 days at room temperature.Expires in _____ days from ______________Date

Start Date: 16

Stop Date: 17

Status: Discontinued



insulin aspart

3 unit, 0.03 mL, Route: SUB-Q, Drug form: SOLN, Sliding Scale, Dosing Weight 92.
727, kg, PRN Blood Glucose Results, Start date: 16 19:53:00 CST, Duration:
30 day, Stop date: 17 19:52:00 CST

Notes: Roll in palms of hands gently;  Do not shake vigorously. (Same as: Vernon MINAYA)"single patient use only"WASTE: F/P - Black; E - [facility] Trash Bin  Stable f
or 28 days at room temperature.Expires in _____ days from ______________Date

Start Date: 16

Stop Date: 17

Status: Discontinued



insulin aspart

15 unit, 0.15 mL, Route: SUB-Q, Drug form: SOLN, Sliding Scale, Dosing Weight 92
.727, kg, PRN Blood Glucose Results, Start date: 16 19:53:00 CST, Duration
: 30 day, Stop date: 17 19:52:00 CST

Notes: Roll in palms of hands gently;  Do not shake vigorously. (Same as: Vernon MINAYA)"single patient use only"WASTE: F/P - Black; E - [facility] Trash Bin  Stable f
or 28 days at room temperature.Expires in _____ days from ______________Date

Start Date: 16

Stop Date: 17

Status: Discontinued



insulin aspart

1 unit, 0.01 mL, Route: SUB-Q, Drug form: SOLN, Bedtime, Dosing Weight 91.364, k
g, PRN Blood Glucose Results, Start date: 16 19:00:00 CST, Duration: 30 da
y, Stop date: 17 18:59:00 CST

Notes: Roll in palms of hands gently;  Do not shake vigorously. (Same as: Vernon MINAYA)"single patient use only"WASTE: F/P - Black; E - [facility] Trash Bin  Stable f
or 28 days at room temperature.Expires in _____ days from ______________Date

Start Date: 16

Stop Date: 16

Status: Discontinued



insulin aspart

2 unit, 0.02 mL, Route: SUB-Q, Drug form: SOLN, Bedtime, Dosing Weight 91.364, k
g, PRN Blood Glucose Results, Start date: 16 19:00:00 CST, Duration: 30 da
y, Stop date: 17 18:59:00 CST

Notes: Roll in palms of hands gently;  Do not shake vigorously. (Same as: Vernon MINAYA)"single patient use only"WASTE: F/P - Black; E - [facility] Trash Bin  Stable f
or 28 days at room temperature.Expires in _____ days from ______________Date

Start Date: 16

Stop Date: 16

Status: Discontinued



insulin aspart

4 unit, 0.04 mL, Route: SUB-Q, Drug form: SOLN, Bedtime, Dosing Weight 91.364, k
g, PRN Blood Glucose Results, Start date: 16 19:00:00 CST, Duration: 30 da
y, Stop date: 17 18:59:00 CST

Notes: Roll in palms of hands gently;  Do not shake vigorously. (Same as: Vernon MINAYA)"single patient use only"WASTE: F/P - Black; E - [facility] Trash Bin  Stable f
or 28 days at room temperature.Expires in _____ days from ______________Date

Start Date: 16

Stop Date: 16

Status: Discontinued



insulin aspart

3 unit, 0.03 mL, Route: SUB-Q, Drug form: SOLN, Bedtime, Dosing Weight 91.364, k
g, PRN Blood Glucose Results, Start date: 16 19:00:00 CST, Duration: 30 da
y, Stop date: 17 18:59:00 CST

Notes: Roll in palms of hands gently;  Do not shake vigorously. (Same as: Vernon MINAYA)"single patient use only"WASTE: F/P - Black; E - [facility] Trash Bin  Stable f
or 28 days at room temperature.Expires in _____ days from ______________Date

Start Date: 16

Stop Date: 16

Status: Discontinued



insulin aspart

6 unit, 0.06 mL, Route: SUB-Q, Drug form: SOLN, TID-Before Meals, Dosing Weight 
91.364, kg, PRN Blood Glucose Results, Start date: 16 19:00:00 CST, Durati
on: 30 day, Stop date: 17 18:59:00 CST

Notes: Roll in palms of hands gently;  Do not shake vigorously. (Same as: Vernon MINAYA)"single patient use only"WASTE: F/P - Black; E - [facility] Trash Bin  Stable f
or 28 days at room temperature.Expires in _____ days from ______________Date

Start Date: 16

Stop Date: 16

Status: Discontinued



insulin aspart

4 unit, 0.04 mL, Route: SUB-Q, Drug form: SOLN, TID-Before Meals, Dosing Weight 
91.364, kg, PRN Blood Glucose Results, Start date: 16 19:00:00 CST, Durati
on: 30 day, Stop date: 17 18:59:00 CST

Notes: Roll in palms of hands gently;  Do not shake vigorously. (Same as: Vernon MINAYA)"single patient use only"WASTE: F/P - Black; E - [facility] Trash Bin  Stable f
or 28 days at room temperature.Expires in _____ days from ______________Date

Start Date: 16

Stop Date: 16

Status: Discontinued



insulin aspart

2 unit, 0.02 mL, Route: SUB-Q, Drug form: SOLN, TID-Before Meals, Dosing Weight 
91.364, kg, PRN Blood Glucose Results, Start date: 16 19:00:00 CST, Durati
on: 30 day, Stop date: 17 18:59:00 CST

Notes: Roll in palms of hands gently;  Do not shake vigorously. (Same as: Vernon MINAYA)"single patient use only"WASTE: F/P - Black; E - [facility] Trash Bin  Stable f
or 28 days at room temperature.Expires in _____ days from ______________Date

Start Date: 16

Stop Date: 16

Status: Discontinued



insulin aspart

8 unit, 0.08 mL, Route: SUB-Q, Drug form: SOLN, TID-Before Meals, Dosing Weight 
91.364, kg, PRN Blood Glucose Results, Start date: 16 19:00:00 CST, Durati
on: 30 day, Stop date: 17 18:59:00 CST

Notes: Roll in palms of hands gently;  Do not shake vigorously. (Same as: Vernon MINAYA)"single patient use only"WASTE: F/P - Black; E - Municipal Trash Bin  Stable f
or 28 days at room temperature.Expires in _____ days from ______________Date

Start Date: 16

Stop Date: 16

Status: Discontinued



insulin aspart

10 unit, 0.1 mL, Route: SUB-Q, Drug form: SOLN, TID-Before Meals, Dosing Weight 
91.364, kg, PRN Blood Glucose Results, Start date: 16 19:00:00 CST, Durati
on: 30 day, Stop date: 17 18:59:00 CST

Notes: Roll in palms of hands gently;  Do not shake vigorously. (Same as: Vernon MINAYA)"single patient use only"WASTE: F/P - Black; E - [facility] Trash Bin  Stable f
or 28 days at room temperature.Expires in _____ days from ______________Date

Start Date: 16

Stop Date: 16

Status: Discontinued



insulin detemir

20 unit, 0.2 mL, Route: SUB-Q, Drug form: INJ, Q12H, Dosing Weight 92.727, kg, P
riority: STAT, Start date: 17 9:27:00 CST, Duration: 30 day, Stop date:  9:00:00 CST

Notes: Same as LevemirDo not hold insulin without contacting prescriberWASTE: F/
P - Black; E - Municipal Trash Bin  "single patient use only"

Start Date: 1/3/17

Stop Date: 17

Status: Discontinued



Insulin regular 100 unit + sodium chloride 0.9% INJ 99 mL

99 mL, Rate: Start Insulin Drip Per ICU Protocol, Dosing Weight 92.727, kg, Rout
e: IVPB, Total Volume: 100, Start Date: 17 1:04:00 CST, Duration: 30 day, 
Stop date: 17 1:03:00 CST, Replace Every: 24 hr

Notes: (Same as: Humulin R and NovoLIN R)WASTE: F/P - Black; E - Municipal Trash
Bin  (Do not shake)

Start Date: 17

Stop Date: 17

Status: Discontinued



Klor-Con

20 mEq, 1 tab, Route: PO, Drug form: ERTAB, TID, Dosing Weight 102.5, kg, Start 
date: 17 9:00:00 CST, Duration: 30 day, Stop date: 17 17:00:00 CST

Notes: (Same as: K-Dur 20)"Do Not Crush"  With food and full glass of water

Start Date: 17

Stop Date: 17

Status: Discontinued



lactulose

30 ml, Route: OGT, Drug Form: SYRP, Dosing Weight 92.727, kg, TID, Start date: 0
17 13:00:00 CST, Duration: 30 day, Stop date: 17 9:00:00 CST

Start Date: 17

Stop Date: 17

Status: Canceled



Lantus 100 units/mL

72 unit, Route: SUB-Q, Drug form: SOLN, Daily, Dosing Weight 92.727, kg, Start d
ate: 16 9:00:00 CST, Duration: 30 day, Stop date: 17 9:00:00 CST

Start Date: 16

Stop Date: 16

Status: Deleted



Lantus Solostar Pen 100 units/mL subcutaneous solution

42 unit, Route: SUB-Q, Drug form: SOLN, Bedtime, Dosing Weight 92.727, kg, Start
date: 16 21:00:00 CST, Duration: 30 day, Stop date: 17 21:00:00 CST

Start Date: 16

Stop Date: 16

Status: Deleted



Lasix

40 mg, 4 mL, Route: IVP, Drug form: INJ, Q12H, Dosing Weight 102.5, kg, Start da
te: 17 9:00:00 CST, Duration: 30 day, Stop date: 17 21:00:00 CST

Notes: (Same as: Lasix)  *** MEDICATION WASTE ***Product Size:  40 mgProduct Was
sonali:  ___ mg

Start Date: 17

Stop Date: 17

Status: Discontinued



Lasix

40 mg, 4 mL, Route: IVP, Drug form: INJ, Q8H, Dosing Weight 102.5, kg, Start hazel
e: 17 8:00:00 CST, Duration: 30 day, Stop date: 17 0:00:00 CST

Notes: (Same as: Lasix)  *** MEDICATION WASTE ***Product Size:  40 mgProduct Was
sonali:  ___ mg

Start Date: 17

Stop Date: 17

Status: Discontinued



Lasix

40 mg, 4 mL, Route: IVP, Drug form: INJ, Daily, Dosing Weight 102.5, kg, Start d
ate: 17 9:00:00 CST, Duration: 30 day, Stop date: 17 9:00:00 CST

Notes: (Same as: Lasix)  *** MEDICATION WASTE ***Product Size:  40 mgProduct Was
sonali:  ___ mg

Start Date: 17

Stop Date: 17

Status: Discontinued



Levemir FlexPen

42 unit, 0.42 mL, Route: SUB-Q, Drug form: INJ, Bedtime, Start date: 16 21
:00:00 CST, Duration: 30 day, Stop date: 17 21:00:00 CST

Notes: Same as LevemirDo not hold insulin without contacting prescriberWASTE: F/
P - Black; E - Municipal Trash Bin  "single patient use only"

Start Date: 16

Stop Date: 17

Status: Discontinued



Levemir FlexPen

72 unit, 0.72 mL, Route: SUB-Q, Drug form: INJ, QAM, Start date: 16 9:00:0
0 CST, Duration: 30 day, Stop date: 17 9:00:00 CST

Notes: Same as LevemirDo not hold insulin without contacting prescriberWASTE: F/
P - Black; E - Municipal Trash Bin  "single patient use only"

Start Date: 16

Stop Date: 17

Status: Discontinued



lidocaine topical 5% ointment

1 appl, TOP, TID, # 35 gm, 0 Refill(s)

Start Date: 17

Stop Date: 17

Status: Ordered



lidocaine topical 5% ointment

1 appl, Route: TOP, TID, Drug form: OINT, Start date: 17 13:00:00 CST, Dur
ation: 30 day, Stop date: 17 9:00:00 CST

Notes: (Same as: Xylocaine)

Start Date: 17

Stop Date: 17

Status: Discontinued



lisinopril

2.5 mg, 0.5 tab, Route: PO, Drug form: TAB, Daily, Dosing Weight 92.727, kg, Sta
rt date: 16 9:00:00 CST, Duration: 30 day, Stop date: 17 9:00:00 CST

Notes: (Same as: Prinivil, Zestril)

Start Date: 16

Stop Date: 17

Status: Discontinued



magnesium oxide

800 mg, 2 tab, Route: PO, Drug form: TAB, PRN, Dosing Weight 92.727, kg, PRN Abn
ormal Lab Result, FOR ICU USE ONLY, Start date: 17 19:45:00 CST, Duration:
30 day, Stop date: 17 19:44:00 CST

Notes: (Same as: Mag-Ox 400)Magnesium oxide 190bg=564th elemental magnesiumDose=
____mg magnesium oxide (___mg elemental magnesium)

Start Date: 17

Stop Date: 17

Status: Discontinued



magnesium oxide

800 mg, 2 tab, Route: PO, Drug form: TAB, PRN, Dosing Weight 92.727, kg, PRN Abn
ormal Lab Result, FOR ICU USE ONLY, Start date: 17 10:13:00 CST, Duration:
30 day, Stop date: 17 10:12:00 CST

Notes: (Same as: Mag-Ox 400)Magnesium oxide 416mk=098lu elemental magnesiumDose=
____mg magnesium oxide (___mg elemental magnesium)

Start Date: 17

Stop Date: 17

Status: Discontinued



magnesium sulfate

2 gm, 50 mL, Route: IVPB, Drug form: INJ, PRN, Dosing Weight 92.727, kg, PRN Abn
ormal Lab Result, Start date: 17 19:45:00 CST, Duration: 30 day, Stop date
: 17 19:44:00 CST, FOR ICU USE ONLY

Notes: WASTE: F/P - Sink; E - Municipal Trash Bin

Start Date: 17

Stop Date: 17

Status: Discontinued



magnesium sulfate

2 gm, 50 mL, Route: IVPB, Drug form: INJ, PRN, Dosing Weight 92.727, kg, PRN Abn
ormal Lab Result, Start date: 17 10:13:00 CST, Duration: 30 day, Stop date
: 17 10:12:00 CST, FOR ICU USE ONLY

Notes: WASTE: F/P - Sink; E - Municipal Trash Bin

Start Date: 17

Stop Date: 17

Status: Discontinued



metFORMIN 500 mg oral tablet

1,000 mg, 2 tab, Route: PO, Drug form: TAB, BID-Meals, Dosing Weight 92.727, kg,
Start date: 16 17:00:00 CST, Duration: 30 day, Stop date: 17 8:00:00
CST

Notes: (Same as: Glucophage)  Take with meal

Start Date: 16

Stop Date: 17

Status: Discontinued



midazolam 50mg/ NS 50ml drip (premixed) 50 mg

50 mg, 50 mL, Rate: Titrate, Start Dose: 1 mg/hr, Titration: Rebolus 1 mg IV and
/or Titrate by 1 milligram/hour every 30 minutes, Goal(s): -2, Max Dose: 10 mg/h
r, Route: IV, Dosing Weight 92.727 kg, Total Volume: 50, Start date: 16 19
:33:00 CST,...

Notes: (Same as: Versed)

Start Date: 16

Stop Date: 17

Status: Discontinued



MiraLax

17 gm, 1 pkt, Route: PO, Drug form: PWDR, BID, Dosing Weight 102.5, kg, BID unti
l BM, Start date: 17 9:00:00 CST, Duration: 30 day, Stop date: 17 17
:00:00 CST

Notes: Dissolve in 8 oz of water or juice.(Same as: Miralax)

Start Date: 17

Stop Date: 17

Status: Discontinued



morphine Sulfate

2 mg, 0.5 mL, Route: IVP, Drug form: SOLN, ONCE, Dosing Weight 102.5, kg, Start 
date: 17 17:35:00 CST, Stop date: 17 17:35:00 CST

Notes: (Same as:MORPhine Sulfate)

Start Date: 17

Stop Date: 17

Status: Completed



morphine Sulfate

2 mg, 1 mL, Route: IVP, Drug form: INJ, Q4H, Dosing Weight 91.364, kg, PRN Pain 
Score 7-10, Start date: 16 18:43:00 CST, Duration: 30 day, Stop date:  18:42:00 CST

Notes: (Same as:MORPhine Sulfate)

Start Date: 16

Stop Date: 16

Status: Discontinued



Multaq

400 mg, 1 tab, Route: PO, Drug form: TAB, BID, Dosing Weight 92.727, kg, Start d
ate: 17 9:00:00 CST, Duration: 30 day, Stop date: 17 21:00:00 CST

Notes: Same as: Multaq

Start Date: 1/3/17

Stop Date: 1/3/17

Status: Canceled



Multaq

400 mg, 1 tab, Route: PO, Drug form: TAB, BID, Dosing Weight 92.727, kg, Start d
ate: 16 9:00:00 CST, Duration: 30 day, Stop date: 17 17:00:00 CST

Notes: Same as: Multaq

Start Date: 16

Stop Date: 16

Status: Discontinued



Neurontin

600 mg, 2 cap, Route: PO, Drug form: CAP, Q8H, Dosing Weight 92.727, kg, Start d
ate: 16 16:00:00 CST, Duration: 30 day, Stop date: 17 8:00:00 CST

Notes: (Same as: Neurontin)

Start Date: 16

Stop Date: 17

Status: Discontinued



niacin

2,000 mg, 4 tab, Route: PO, Drug form: ERTAB, Bedtime, Dosing Weight 92.727, kg,
Start date: 16 21:00:00 CST, Duration: 30 day, Stop date: 17 21:00:
00 CST

Notes: (Same as: Niaspan)"Do Not Crush"Non-Formulary Item  With food.

Start Date: 16

Stop Date: 17

Status: Discontinued



nitroglycerin 0.4 mg sublingual tablet

0.4 mg, 1 tab, Route: SL, Drug form: TAB, Q5Min, Dosing Weight 102.5, kg, PRN Ch
est Pain, Start date: 17 1:45:00 CST, Duration: 30 day, Stop date: 
7 1:44:00 CST

Notes: (Same as:Nitroquick, Nitrostat)"Do Not Crush"  Sublingual tablet

Start Date: 17

Stop Date: 17

Status: Discontinued



Norco 5/325 oral tablet

1 tab, Route: PO, Drug Form: TAB, Dosing Weight 92.727, kg, Q4H, PRN Pain Score 
7-10, Start date: 16 8:10:00 CST, Duration: 30 day, Stop date: 17 8:
09:00 CST

Notes: (Same as: Norco 325/5)  Do not exceed 4gm/day of acetaminophen.

Start Date: 16

Stop Date: 17

Status: Discontinued



norepinephrine 4 mg/4 ml inj 8 mg + D5W 242 mL

8 mg, 8 mL, Rate: Titrate, Start Dose: 5 microgram/min, Titration: 2 microgram/m
in every 2-5 minutes, Goal(s): MAP >=65 mmHg, Max Dose: 70 microgram/min, Route:
IV, Dosing Weight 92.727 kg, Total Volume: 250, Start date: 16 16:58:00 
CST, Durati...

Notes: Not for direct administration - DILUTE. Protect from light. (Same as:Levo
phed). Administer by either central venous catheter or peripherally-inserted adama
tral catheter (PICC) line.

Start Date: 16

Stop Date: 17

Status: Discontinued



NS (Bolus) IV

1,000 mL, 1,000 ml/hr, Infuse Over: 1 hr, Route: IV, 1,000, Drug form: INJ, ONCE
, Priority: STAT, Dosing Weight 92.727 kg, Start date: 16 19:22:00 CST, Du
ration: 1 doses or times, Stop date: 16 19:22:00 CST

Start Date: 16

Stop Date: 16

Status: Completed



NS (Bolus) IV

1,000 mL, 1,000 ml/hr, Infuse Over: 1 hr, Route: IV, 1,000, Drug form: INJ, ONCE
, Priority: STAT, Dosing Weight 92.727 kg, Start date: 16 19:22:00 CST, Du
ration: 1 doses or times, Stop date: 16 19:22:00 CST

Start Date: 16

Stop Date: 16

Status: Completed



NS (Bolus) IV

1,000 mL, 1,000 ml/hr, Infuse Over: 1 hr, Route: IV, ONCE, Priority: STAT, Dosin
g Weight 92.727 kg, Start date: 16 19:22:00 CST, Duration: 1 doses or time
s, Stop date: 16 19:22:00 CST

Start Date: 16

Stop Date: 16

Status: Completed



Ocean 0.65% nasal solution

1 appl, Route: Each Affected Nostril, PRN, Drug form: GEL, PRN Nasal dryness, St
art date: 17 8:29:00 CST, Duration: 30 day, Stop date: 17 8:28:00 CS
T

Notes: (Same As: Ayr)

Start Date: 17

Stop Date: 17

Status: Discontinued



ondansetron

4 mg, 2 mL, Route: IVP, Drug form: INJ, Q6H, Dosing Weight 91.364, kg, PRN Nause
a & Vomiting, Start date: 16 18:43:00 CST, Duration: 30 day, Stop date: 
17 18:42:00 CST

Notes: (Same as: Zofran)  *** MEDICATION WASTE ***Product Size:  4 mgProduct Was
sonali:  ___ mg

Start Date: 16

Stop Date: 17

Status: Discontinued



pantoprazole

40 mg, Route: IVP, Drug form: INJ, Daily, Dosing Weight 92.727, kg, Patient is N
PO, Start date: 17 9:00:00 CST, Duration: 30 day, Stop date: 17 9:00
:00 CST

Notes: For IV push reconstitute with 10 ml 0.9% sodium chloride and push over 2 
minutes. (Same as: Protonix)

Start Date: 17

Stop Date: 1/3/17

Status: Discontinued



pantoprazole

40 mg, 1 tab, Route: PO, Drug form: ECTAB, BID, Dosing Weight 92.727, kg, Start 
date: 16 9:00:00 CST, Duration: 30 day, Stop date: 17 17:00:00 CST

Notes: Tablet should not be chewed or crushed.(Same as: Protonix)

Start Date: 16

Stop Date: 17

Status: Discontinued



pneumococcal 13-valent vaccine

0.5 mL, Route: IM, Daily, Start date: 16 9:00:00 CST, Duration: 1 doses or
times, Stop date: 16 9:00:00 CST

Start Date: 16

Stop Date: 16

Status: Deleted



polyethylene glycol 3350 with electrolytes

4 Liter, Route: PO, Drug Form: PDR/REC, Dosing Weight 102.5, kg, ONCE, Start hazel
e: 17 11:11:00 CST, Duration: 1 doses or times, Stop date: 17 11:11:
00 CST

Notes: (polyethylene glycol electrolyte solution 4 Liter  bottle)  (Same as: Margie glez, Colyte)

Start Date: 17

Stop Date: 17

Status: Completed



potassium chloride

20 mEq, 1 tab, Route: PO, Drug form: ERTAB, BID, Dosing Weight 92.727, kg, Start
date: 16 9:00:00 CST, Duration: 30 day, Stop date: 17 17:00:00 CST

Notes: (Same as: K-Dur 20)"Do Not Crush"  With food and full glass of water

Start Date: 16

Stop Date: 17

Status: Discontinued



potassium chloride

20 mEq, 100 mL, Route: IVPB, Drug form: INJ, PRN, Dosing Weight 92.727, kg, PRN 
Abnormal Lab Result, Via central line, Start date: 17 19:45:00 CST, Durati
on: 30 day, Stop date: 17 19:44:00 CST, FOR ICU USE ONLY

Notes: (Same as: KCL)  Infuse no faster than 10 mEq/hr if given peripherally.

Start Date: 17

Stop Date: 17

Status: Discontinued



potassium chloride

20 mEq, 15 mL, Route: NJ, Drug form: LIQ, PRN, Dosing Weight 92.727, kg, PRN Abn
ormal Lab Result, Start date: 17 19:45:00 CST, Duration: 30 day, Stop date
: 17 19:44:00 CST, FOR ICU USE ONLY

Notes: (Same as: Potassium Chloride)

Start Date: 17

Stop Date: 17

Status: Discontinued



potassium chloride

20 mEq, 1 tab, Route: PO, Drug form: ERTAB, PRN, Dosing Weight 92.727, kg, PRN A
bnormal Lab Result, Start date: 17 19:45:00 CST, Duration: 30 day, Stop da
te: 17 19:44:00 CST, FOR ICU USE ONLY

Notes: (Same as: K-Dur 20)"Do Not Crush"  With food and full glass of water

Start Date: 17

Stop Date: 17

Status: Discontinued



potassium chloride

10 mEq, 100 mL, Route: IVPB, Drug form: INJ, PRN, Dosing Weight 92.727, kg, PRN 
Abnormal Lab Result, Via peripheral line, Start date: 17 19:45:00 CST, Dur
ation: 30 day, Stop date: 17 19:44:00 CST, FOR ICU USE ONLY

Notes: Infuse at a rate of 10 mEq/hr.(Same as: KCL)

Start Date: 17

Stop Date: 17

Status: Discontinued



potassium chloride

20 mEq, 15 mL, Route: NJ, Drug form: LIQ, PRN, Dosing Weight 92.727, kg, PRN Abn
ormal Lab Result, Start date: 17 10:13:00 CST, Duration: 30 day, Stop date
: 17 10:12:00 CST, FOR ICU USE ONLY

Notes: (Same as: Potassium Chloride)

Start Date: 17

Stop Date: 17

Status: Discontinued



potassium chloride

20 mEq, 1 tab, Route: PO, Drug form: ERTAB, PRN, Dosing Weight 92.727, kg, PRN A
bnormal Lab Result, Start date: 17 10:13:00 CST, Duration: 30 day, Stop da
te: 17 10:12:00 CST, FOR ICU USE ONLY

Notes: (Same as: K-Dur 20)"Do Not Crush"  With food and full glass of water

Start Date: 17

Stop Date: 17

Status: Discontinued



potassium chloride

10 mEq, 100 mL, Route: IVPB, Drug form: INJ, PRN, Dosing Weight 92.727, kg, PRN 
Abnormal Lab Result, Via peripheral line, Start date: 17 10:13:00 CST, Dur
ation: 30 day, Stop date: 17 10:12:00 CST, FOR ICU USE ONLY

Notes: Infuse at a rate of 10 mEq/hr.(Same as: KCL)

Start Date: 17

Stop Date: 17

Status: Discontinued



potassium chloride

20 mEq, 100 mL, Route: IV Central, Drug form: INJ, PRN, Dosing Weight 92.727, kg
, PRN Abnormal Lab Result, Via central line, Start date: 17 10:13:00 CST, 
Duration: 30 day, Stop date: 17 10:12:00 CST, FOR ICU USE ONLY

Notes: (Same as: KCL)  Infuse no faster than 10 mEq/hr if given peripherally.

Start Date: 17

Stop Date: 17

Status: Discontinued



potassium chloride

40 mEq, 30 mL, Route: PO, Drug form: LIQ, ONCE, Dosing Weight 102.5, kg, PRN Abn
ormal Lab Result, Electrolyte replacement, Start date: 17 7:46:00 CST

Notes: (Same as: Potassium Chloride)

Start Date: 17

Stop Date: 17

Status: Completed



potassium phosphate + sodium chloride 0.9%  mL

30 mmol, 10 mL, Route: IVPB, PRN, Dosing Weight 92.727, kg, PRN Abnormal Lab Res
ult, Start date: 17 10:13:00 CST, Duration: 30 day, Stop date: 17 10
:12:00 CST, FOR ICU USE ONLY

Notes: (Same as: K Phosphate.)  1 mMol phoshate has 1.47 mEq potassium  Infuse o
jon 4 hours

Start Date: 17

Stop Date: 17

Status: Discontinued



potassium phosphate + sodium chloride 0.9%  mL

45 mmol, 15 mL, Route: IVPB, PRN, Dosing Weight 92.727, kg, PRN Abnormal Lab Res
ult, Start date: 17 10:13:00 CST, Duration: 30 day, Stop date: 17 10
:12:00 CST, FOR ICU USE ONLY

Notes: (Same as: K Phosphate.)  1 mMol phoshate has 1.47 mEq potassium  Infuse o
jon 4 hours

Start Date: 17

Stop Date: 17

Status: Discontinued



potassium phosphate + sodium chloride 0.9%  mL

15 mmol, 5 mL, Route: IVPB, PRN, Dosing Weight 92.727, kg, PRN Abnormal Lab Resu
lt, Start date: 17 10:13:00 CST, Duration: 30 day, Stop date: 17 10:
12:00 CST, FOR ICU USE ONLY

Notes: (Same as: K Phosphate.)  1 mMol phoshate has 1.47 mEq potassium  Infuse o
jon 4 hours

Start Date: 17

Stop Date: 17

Status: Discontinued



potassium phosphate + sodium chloride 0.9%  mL

45 mmol, 15 mL, Route: IVPB, PRN, Dosing Weight 92.727, kg, PRN Abnormal Lab Res
ult, Start date: 17 19:45:00 CST, Duration: 30 day, Stop date: 17 19
:44:00 CST, FOR ICU USE ONLY

Notes: (Same as: K Phosphate.)  1 mMol phoshate has 1.47 mEq potassium  Infuse o
jon 4 hours

Start Date: 17

Stop Date: 17

Status: Discontinued



potassium phosphate + sodium chloride 0.9%  mL

15 mmol, 5 mL, Route: IVPB, PRN, Dosing Weight 92.727, kg, PRN Abnormal Lab Resu
lt, Start date: 17 19:45:00 CST, Duration: 30 day, Stop date: 17 19:
44:00 CST, FOR ICU USE ONLY

Notes: (Same as: K Phosphate.)  1 mMol phoshate has 1.47 mEq potassium  Infuse o
jon 4 hours

Start Date: 17

Stop Date: 17

Status: Discontinued



potassium phosphate + sodium chloride 0.9%  mL

30 mmol, 10 mL, Route: IVPB, PRN, Dosing Weight 92.727, kg, PRN Abnormal Lab Res
ult, Start date: 17 19:45:00 CST, Duration: 30 day, Stop date: 17 19
:44:00 CST, FOR ICU USE ONLY

Notes: (Same as: K Phosphate.)  1 mMol phoshate has 1.47 mEq potassium  Infuse o
jon 4 hours

Start Date: 17

Stop Date: 17

Status: Discontinued



potassium phosphate-sodium phosphate 250 mg-280 mg-160 mg oral powder for recons
titution

2 pkt, Route: PO, Drug Form: PDR/REC, Dosing Weight 92.727, kg, PRN, PRN Abnorma
l Lab Result, FOR ICU USE ONLY, Start date: 17 19:45:00 CST, Duration: 30 
day, Stop date: 17 19:44:00 CST

Notes: (Same as: Phos-NaK)  Each 1.5 gm pkt has 250mg phosphorous. Mix w/2.5oz w
ater and stir.

Start Date: 17

Stop Date: 17

Status: Discontinued



potassium phosphate-sodium phosphate 250 mg-280 mg-160 mg oral powder for recons
titution

2 pkt, Route: PO, Drug Form: PDR/REC, Dosing Weight 92.727, kg, PRN, PRN Abnorma
l Lab Result, FOR ICU USE ONLY, Start date: 17 10:13:00 CST, Duration: 30 
day, Stop date: 17 10:12:00 CST

Notes: (Same as: Phos-NaK)  Each 1.5 gm pkt has 250mg phosphorous. Mix w/2.5oz w
ater and stir.

Start Date: 17

Stop Date: 17

Status: Discontinued



Ranexa 500 mg oral tablet, extended release

500 mg, 1 tab, Route: PO, Drug form: TAB, BID, Dosing Weight 92.727, kg, Start d
ate: 16 9:00:00 CST, Duration: 30 day, Stop date: 17 17:00:00 CST

Notes: Same as Ranexa"Do Not Crush"

Start Date: 16

Stop Date: 17

Status: Discontinued



Saline Flush 0.9%

10 ml, Route: IVP, Drug Form: INJ, Dosing Weight 102.5, kg, PRN, PRN Line Flush,
Start date: 17 14:52:00 CST, Duration: 30 day, Stop date: 17 14:51:
00 CST

Notes: (Same as: BD Posiflush)

Start Date: 17

Stop Date: 17

Status: Discontinued



Saline Flush 0.9%

10 ml, Route: IVP, Drug Form: INJ, Dosing Weight 102.5, kg, Q12H, Start date:  21:00:00 CST, Duration: 30 day, Stop date: 17 9:00:00 CST

Notes: (Same as: BD Posiflush)

Start Date: 17

Stop Date: 17

Status: Discontinued



sertraline

100 mg, 1 tab, Route: PO, Drug form: TAB, Daily, Dosing Weight 92.727, kg, Start
date: 16 9:00:00 CST, Duration: 30 day, Stop date: 17 9:00:00 CST

Notes: (Same as: Zoloft)

Start Date: 16

Stop Date: 17

Status: Discontinued



sodium bicarbonate 8.4% additive 150 mEq + D5W 1,000 mL

1,000 mL, Rate: 100 ml/hr, Infuse over: 11.5 hr, Route: IV, Dosing Weight 92.727
kg, Total Volume: 1,150, Start date: 16 19:27:00 CST, Duration: 30 day, S
top date: 17 19:26:00 CST

Notes: (sodium bicarb 8.4% (1 mEq/ml) 50 ml VL)

Start Date: 16

Stop Date: 17

Status: Discontinued



sodium chloride 0.9% 1000 ml INJ 1,000 mL

1,000 mL, Rate: 25 ml/hr, Infuse over: 40 hr, Route: IV, Dosing Weight 102.5 kg,
Total Volume: 1,000, Start date: 01/10/17 10:38:00 CST, Duration: 1 day, Stop d
ate: 17 10:37:00 CST

Start Date: 1/10/17

Stop Date: 1/10/17

Status: Discontinued



sodium chloride 0.9%  mL

250 mL, Rate: 30 ml/hr, Infuse over: 8.3 hr, Route: IV, Dosing Weight 102.5 kg, 
Total Volume: 250, Start date: 17 10:11:00 CST, Duration: 30 day, Stop hazel
e: 17 10:10:00 CST

Start Date: 17

Stop Date: 17

Status: Discontinued



sodium phosphate + D5W 235 mL

45 mmol, 15 mL, Route: IVPB, PRN, Dosing Weight 92.727, kg, PRN Abnormal Lab Res
ult, Start date: 17 10:13:00 CST, Duration: 30 day, Stop date: 17 10
:12:00 CST, FOR ICU USE ONLY

Start Date: 17

Stop Date: 17

Status: Discontinued



sodium phosphate + D5W 240 mL

30 mmol, 10 mL, Route: IVPB, PRN, Dosing Weight 92.727, kg, PRN Abnormal Lab Res
ult, Start date: 17 10:13:00 CST, Duration: 30 day, Stop date: 17 10
:12:00 CST, FOR ICU USE ONLY

Start Date: 17

Stop Date: 17

Status: Discontinued



sodium phosphate + D5W 245 mL

15 mmol, 5 mL, Route: IVPB, PRN, Dosing Weight 92.727, kg, PRN Abnormal Lab Resu
lt, Start date: 17 10:13:00 CST, Duration: 30 day, Stop date: 17 10:
12:00 CST, FOR ICU USE ONLY

Start Date: 17

Stop Date: 17

Status: Discontinued



sodium phosphate + D5W 250 mL

15 mmol, 5 mL, Route: IVPB, PRN, Dosing Weight 92.727, kg, PRN Abnormal Lab Resu
lt, Start date: 16 8:11:00 CST, Duration: 30 day, Stop date: 17 8:10
:00 CST

Start Date: 16

Stop Date: 17

Status: Discontinued



sodium phosphate + D5W 250 mL

15 mmol, 5 mL, Route: IVPB, PRN, Dosing Weight 92.727, kg, PRN Abnormal Lab Resu
lt, Start date: 17 19:45:00 CST, Duration: 30 day, Stop date: 17 19:
44:00 CST, FOR ICU USE ONLY

Start Date: 17

Stop Date: 17

Status: Discontinued



sodium phosphate + D5W 250 mL

45 mmol, 15 mL, Route: IVPB, PRN, Dosing Weight 92.727, kg, PRN Abnormal Lab Res
ult, Start date: 17 19:45:00 CST, Duration: 30 day, Stop date: 17 19
:44:00 CST, FOR ICU USE ONLY

Start Date: 17

Stop Date: 17

Status: Discontinued



sodium phosphate + D5W 250 mL

30 mmol, 10 mL, Route: IVPB, PRN, Dosing Weight 92.727, kg, PRN Abnormal Lab Res
ult, Start date: 17 19:45:00 CST, Duration: 30 day, Stop date: 17 19
:44:00 CST, FOR ICU USE ONLY

Start Date: 17

Stop Date: 17

Status: Discontinued



Synthroid

50 microgram, 1 tab, Route: PO, Drug form: TAB, Q630AM, Dosing Weight 92.727, kg
, Start date: 16 6:30:00 CST, Duration: 30 day, Stop date: 17 6:30:0
0 CST

Notes: Take 1 hour before or 2 hours after meal; Enteral feeds may interefere wi
th the absorption of this medication.(Same as:Levothroid, Synthroid)

Start Date: 16

Stop Date: 17

Status: Discontinued



torsemide

30 mg, 3 tab, Route: PO, Drug form: TAB, Daily, Dosing Weight 92.727, kg, Start 
date: 16 9:00:00 CST, Duration: 30 day, Stop date: 17 9:00:00 CST

Notes: (Same As: Demadex)

Start Date: 16

Stop Date: 1/3/17

Status: Discontinued



tramadol 50 mg oral tablet

50 mg, 1 tab, Route: PO, Drug form: TAB, Q6H, Dosing Weight 92.727, kg, PRN Pain
Score 4-6, Start date: 16 8:10:00 CST, Duration: 30 day, Stop date:  8:09:00 CST

Notes: Not to exceed 400mg/day. (Same As: Ultram)

Start Date: 16

Stop Date: 17

Status: Discontinued



vancomycin + sodium chloride 0.9%  mL

1,000 mg, Route: IVPB, [id number], Dosing Weight 92.727, kg, Start date: 17 1
0:00:00 CST, Duration: 30 day, Stop date: 17 22:00:00 CST

Notes: TIME CRITICAL MEDICATION(Same As: Vancocin)Infusion rate< 1000 mg: infuse
over 1 [id number] - 1500 mg: infuse over 1.5 [id number] - 2000 mg: infuse over 2 
hours> 2001 mg: infuse over 2.5 hours  *** MEDICATION WASTE ***Product Size:  
1000 mgProduct Wasted:  ___ mg

Start Date: 1/3/17

Stop Date: 17

Status: Discontinued



vancomycin + sodium chloride 0.9%  mL

1 gm, Route: IV, ONCE, Dosing Weight 92.727, kg, Start date: 17 1:02:00 CS
T, Stop date: 17 1:02:00 CST

Notes: TIME CRITICAL MEDICATION(Same As: Vancocin)Infusion rate< 1000 mg: infuse
over 1 [id number] - 1500 mg: infuse over 1.5 [id number] - 2000 mg: infuse over 2 
hours> 2001 mg: infuse over 2.5 hours  *** MEDICATION WASTE ***Product Size:  
1000 mgProduct Wasted:  ___ mg

Start Date: 17

Stop Date: 17

Status: Completed



vancomycin + sodium chloride 0.9%  mL

1,000 mg, Route: IVPB, [id number], Dosing Weight 91.364, kg, Start date: 16 1
8:00:00 CST, Duration: 30 day, Stop date: 17 9:00:00 CST

Notes: TIME CRITICAL MEDICATION(Same As: Vancocin)Infusion rate< 1000 mg: infuse
over 1 [id number] - 1500 mg: infuse over 1.5 [id number] - 2000 mg: infuse over 2 
hours> 2001 mg: infuse over 2.5 hours  *** MEDICATION WASTE ***Product Size:  
1000 mgProduct Wasted:  ___ mg

Start Date: 16

Stop Date: 16

Status: Discontinued



Xanax 0.5 mg oral tablet

0.5 mg, 1 tab, Route: PO, Drug form: TAB, BID, Dosing Weight 92.727, kg, PRN as 
needed for anxiety, Start date: 16 8:07:00 CST, Duration: 30 day, Stop hazel
e: 17 8:06:00 CST

Notes: With food or milk(Same as: Xanax)

Start Date: 16

Stop Date: 17

Status: Discontinued



Xarelto

20 mg, 1 tab, Route: PO, Drug form: TAB, QPM, Dosing Weight 92.727, kg, Start da
te: 16 17:00:00 CST, Duration: 30 day, Stop date: 17 17:00:00 CST

Notes: (Same as: Xarelto)Administer with food

Start Date: 16

Stop Date: 17

Status: Discontinued



Zosyn + sodium chloride 0.9% 100 ml  mL

3.375 gm, Route: IVPB, ABXQ8H, Dosing Weight 92.727, kg, CrCl >=20 ml/min infuse
over 4 hours, Start date: 17 1:00:00 CST, Duration: 30 day, Stop date: 
17 1:30:00 CST

Notes: (Same as: Zosyn)  Dosing based on Piperacillin component

Start Date: 17

Stop Date: 17

Status: Discontinued



Zosyn + sodium chloride 0.9% 100 ml  mL

3.375 gm, Route: IVPB, ABXQ8H, Dosing Weight 91.364, kg, CrCl >=20 ml/min infuse
over 4 hours, Start date: 16 18:00:00 CST, Duration: 30 day, Stop date: 
17 10:00:00 CST

Notes: (Same as: Zosyn)  Dosing based on Piperacillin component

Start Date: 16

Stop Date: 16

Status: Discontinued



Results





BLOOD BANK RESULTS





                    1                   2                   3



                                         Most recent to   



                                         oldest [Reference   



                                         Range]:   

 

                                        A NEG 

*Unknown*

                          (17 12:06 PM)         A NEG 

*Unknown*

                          (17 11:19 AM)          



                                         ABO/Rh   

 

                                        Negative 

                          (17 12:06 PM)         Negative 

                          (17 11:19 AM)          



                                         Antibody Scrn   

 

                                        Product available 1

                          (17 10:05 AM)         Product available 2

                          (17 9:50 AM)           



                                         RBC product   







1Result Comment: 2017 13:47  ASBHAVSA

called  to  lisa



2Result Comment: 2017 12:45  ASBHAVSA

called  to  donal



ELECTROLYTES





                    1                   2                   3



                                         Most recent to   



                                         oldest [Reference   



                                         Range]:   

 

                                        137 mEq/L 

                          (17 6:45 AM)         136 mEq/L 

                          (17 5:13 AM)          134 mEq/L 

*LOW*

                                        (17 4:51 AM) 



                                         Sodium Lvl [135-145   



                                         mEq/L]   

 

                                        2.9 mEq/L 1

*CRIT*

                          (17 6:45 AM)         3.6 mEq/L 

                          (17 5:13 AM)          3.8 mEq/L 

                                        (17 11:21 PM) 



                                         Potassium Lvl   



                                         [3.5-5.1 mEq/L]   

 

                                        98 mEq/L 

                          (17 6:45 AM)         97 mEq/L 

                          (17 5:13 AM)          96 mEq/L 

                                        (17 4:51 AM) 



                                         Chloride Lvl [   



                                         mEq/L]   

 

                                        31 mEq/L 

                          (17 6:45 AM)         31 mEq/L 

                          (17 5:13 AM)          31 mEq/L 

                                        (17 4:51 AM) 



                                         CO2 [24-32 mEq/L]   

 

                                        10.9 mEq/L 

                          (17 6:45 AM)         11.6 mEq/L 

                          (17 5:13 AM)          10.4 mEq/L 

                                        (17 4:51 AM) 



                                         AGAP [10.0-20.0   



                                         mEq/L]   







1Result Comment: Critical Result(s) called to griselda reyes at 2017 07:34
by hp.  Read back OK.



CHEM PANEL





                    1                   2                   3



                                         Most recent to   



                                         oldest [Reference   



                                         Range]:   

 

                                        0.59 mg/dL 

                          (17 6:45 AM)         0.47 mg/dL 

*LOW*

                          (17 5:13 AM)          0.53 mg/dL 

                                        (17 4:51 AM) 



                                         Creatinine Lvl   



                                         [0.50-1.40 mg/dL]   

 

                                        99 mL/min/1.73m2 1

*NA*

                          (17 6:45 AM)         109 mL/min/1.73m2 2

*NA*

                          (17 5:13 AM)          104 mL/min/1.73m2 3

*NA*

                                        (17 4:51 AM) 



                                         eGFR   

 

                                        11 mg/dL 

                          (17 6:45 AM)         11 mg/dL 

                          (17 5:13 AM)          11 mg/dL 

                                        (17 4:51 AM) 



                                         BUN [7-22 mg/dL]   

 

                                        19 

                          (17 6:45 AM)         22 

                          (17 5:35 AM)          22 

                                        (17 4:51 AM) 



                                         B/C Ratio [6-25]   

 

                                        221 mg/dL 

*HI*

                          (17 6:45 AM)         173 mg/dL 

*HI*

                          (17 5:13 AM)          188 mg/dL 

*HI*

                                        (17 4:51 AM) 



                                         Glucose Lvl [70-99   



                                         mg/dL]   

 

                                        6.4 g/dL 

                          (17 6:45 AM)         6.3 g/dL 

*LOW*

                          (17 5:35 AM)          6.4 g/dL 

                                        (17 4:51 AM) 



                                         Total Protein   



                                         [6.4-8.4 g/dL]   

 

                                        2.2 g/dL 

*LOW*

                          (17 6:45 AM)         2.2 g/dL 

*LOW*

                          (17 5:35 AM)          2.2 g/dL 

*LOW*

                                        (17 4:51 AM) 



                                         Albumin Lvl [3.5-5.0   



                                         g/dL]   

 

                                        4.2 g/dL 

                          (17 6:45 AM)         4.1 g/dL 

                          (17 5:35 AM)          4.2 g/dL 

                                        (17 4:51 AM) 



                                         Globulin [2.7-4.2   



                                         g/dL]   

 

                                        0.5 

*LOW*

                          (17 6:45 AM)         0.5 

*LOW*

                          (17 5:35 AM)          0.5 

*LOW*

                                        (17 4:51 AM) 



                                         A/G Ratio [0.7-1.6]   

 

                                        8.1 mg/dL 

*LOW*

                          (17 6:45 AM)         7.7 mg/dL 

*LOW*

                          (17 5:13 AM)          7.7 mg/dL 

*LOW*

                                        (17 4:51 AM) 



                                         Calcium Lvl   



                                         [8.5-10.5 mg/dL]   

 

                                        2.1 mg/dL 

*LOW*

                          (17 4:27 PM)          2.6 mg/dL 

                          (17 4:10 AM)          2.2 mg/dL 

*LOW*

                                        (1/3/17 10:50 AM) 



                                         Phosphorus [2.5-4.5   



                                         mg/dL]   

 

                                        2.0 mg/dL 

                          (17 4:27 PM)          1.9 mg/dL 

                          (17 4:10 AM)          2.0 mg/dL 

                                        (1/3/17 10:50 AM) 



                                         Magnesium Lvl   



                                         [1.8-2.4 mg/dL]   

 

                                        67 unit/L 

*HI*

                          (17 6:45 AM)         114 unit/L 

*HI*

                          (17 5:35 AM)          233 unit/L 

*HI*

                                        (17 4:51 AM) 



                                         ALT [0-65 unit/L]   

 

                                        79 unit/L 

*HI*

                          (17 6:45 AM)         58 unit/L 

*HI*

                          (17 5:35 AM)          144 unit/L 

*HI*

                                        (17 4:51 AM) 



                                         AST [0-37 unit/L]   

 

                                        162 unit/L 

*HI*

                          (17 6:45 AM)         210 unit/L 

*HI*

                          (17 5:35 AM)          215 unit/L 

*HI*

                                        (17 4:51 AM) 



                                         Alk Phos [   



                                         unit/L]   

 

                                        0.8 mg/dL 

                          (17 6:45 AM)         1.6 mg/dL 

*HI*

                          (17 5:35 AM)          1.4 mg/dL 

*HI*

                                        (17 4:51 AM) 



                                         Bili Total [0.2-1.3   



                                         mg/dL]   







1Result Comment: The eGFR is calculated using the CKD-EPI formula. In most 
young, healthy individuals the eGFR will be >90 mL/min/1.73m2. The eGFR declines
with age. An eGFR of 60-89 may be normal in some populations, particularly the 
elderly, for whom the CKD-EPI formula has not been extensively validated. Use of
the eGFR is not recommended in the following populations:



Individuals with unstable creatinine concentrations, including pregnant patients
and those with serious co-morbid conditions.



Patients with extremes in muscle mass or diet. 



The data above are obtained from the National Kid[employer] Education Program (
NKDEP) which additionally recommends that when the eGFR is used in patients with
extremes of body mass index for purposes of drug dosing, the eGFR should be mul
tiplied by the estimated BMI.



2Result Comment: The eGFR is calculated using the CKD-EPI formula. In most 
young, healthy individuals the eGFR will be >90 mL/min/1.73m2. The eGFR declines
with age. An eGFR of 60-89 may be normal in some populations, particularly the 
elderly, for whom the CKD-EPI formula has not been extensively validated. Use of
the eGFR is not recommended in the following populations:



Individuals with unstable creatinine concentrations, including pregnant patients
and those with serious co-morbid conditions.



Patients with extremes in muscle mass or diet. 



The data above are obtained from the National Kid[employer] Education Program (
NKDEP) which additionally recommends that when the eGFR is used in patients with
extremes of body mass index for purposes of drug dosing, the eGFR should be mul
tiplied by the estimated BMI.



3Result Comment: The eGFR is calculated using the CKD-EPI formula. In most 
young, healthy individuals the eGFR will be >90 mL/min/1.73m2. The eGFR declines
with age. An eGFR of 60-89 may be normal in some populations, particularly the 
elderly, for whom the CKD-EPI formula has not been extensively validated. Use of
the eGFR is not recommended in the following populations:



Individuals with unstable creatinine concentrations, including pregnant patients
and those with serious co-morbid conditions.



Patients with extremes in muscle mass or diet. 



The data above are obtained from the National Kid[employer] Disease Education Program (
NKDEP) which additionally recommends that when the eGFR is used in patients with
extremes of body mass index for purposes of drug dosing, the eGFR should be mul
tiplied by the estimated BMI.



CARDIAC ENZYMES





                    1                   2                   3



                                         Most recent to   



                                         oldest [Reference   



                                         Range]:   

 

                                        147 unit/L 

                          (17 9:49 AM)          2180 unit/L 

*HI*

                          (17 6:01 AM)          151 unit/L 

                                        (16 5:38 PM) 



                                         Total CK [   



                                         unit/L]   

 

                                        16.4 ng/mL 

*HI*

                          (17 6:01 AM)          1.5 ng/mL 

                          (16 5:38 PM)        3.5 ng/mL 

                                        (16 5:14 AM) 



                                         CK MB [0.5-3.6   



                                         ng/mL]   

 

                                        0.8 

                          (17 6:01 AM)          1.0 

                          (16 5:38 PM)        1.4 

                                        (16 5:14 AM) 



                                         CK MB Index   



                                         [0.0-2.5]   

 

                                        0.14 ng/mL 

                          (17 6:01 AM)          <0.02 ng/mL 

                          (16 5:38 PM)        <0.02 ng/mL 

                                        (16 5:14 AM) 



                                         Troponin-I   



                                         [0.00-0.40 ng/mL]   







LIPIDS





                    1                   2                   3



                                         Most recent to   



                                         oldest [Reference   



                                         Range]:   

 

                                        4.37 

                          (17 6:25 PM)          See Note 

                          (17 5:21 PM)          2.74 

*LOW*

                                        (17 4:08 PM) 



                                         CHD Risk [4.00-7.30]   

 

                                        83 mg/dL 

                          (17 6:25 PM)          74 mg/dL 

                          (17 4:08 PM)           



                                         Chol [<=199 mg/dL]   

 

                                        See Note 

                    (17 5:21 PM)                          



                                         Chol [<=199]   

 

                                        117 mg/dL 

                          (17 6:25 PM)          95 mg/dL 

                          (17 4:08 PM)           



                                         Trig [<=149 mg/dL]   

 

                                        See Note 1

                    (17 5:21 PM)                          



                                         Trig [<=149]   

 

                                        19 mg/dL 

*LOW*

                          (17 6:25 PM)          27 mg/dL 

*LOW*

                          (17 4:08 PM)           



                                         HDL [>=61 mg/dL]   

 

                                        See Note 

                    (17 5:21 PM)                          



                                         HDL [>=61]   

 

                                        41 mg/dL 

                          (17 6:25 PM)          28 mg/dL 

                          (17 4:08 PM)           



                                         LDL (Calculated)   



                                         [<=99 mg/dL]   

 

                                        See Note 

                    (17 5:21 PM)                          



                                         LDL (Calculated)   



                                         [<=99]   

 

                                        23 

*NA*

                          (17 6:25 PM)          See Note 

                          (17 5:21 PM)          19 

*NA*

                                        (17 4:08 PM) 



                                         VLDL   







1Result Comment: sample qns to recollect spoke to jessica at 2017 17:42



SPECIAL CHEMISTRY





                    1                   2                   3



                                         Most recent to   



                                         oldest [Reference   



                                         Range]:   

 

                                        7.7 % 

*HI*

                          (17 5:21 PM)          8.0 % 

*HI*

                          (17 4:10 AM)           



                                         Hgb A1C [<=5.6 %]   







PARATHYROID PROFILE





                    1                   2                   3



                                         Most recent to   



                                         oldest [Reference   



                                         Range]:   

 

                                        1.05 mMol/L 

                    (17 11:56 AM)                          



                                         Ca Ion WB [1.05-1.25   



                                         mMol/L]   

 

                                        1.04 mMol/L 

*LOW*

                    (17 11:56 AM)                          



                                         Ca Norm WB   



                                         [1.05-1.25 mMol/L]   







TOXICOLOGY





                    1                   2                   3



                                         Most recent to   



                                         oldest [Reference   



                                         Range]:   

 

                                        1000 

*NA*

                    (17 9:49 AM)                          



                                         Vanco Tr TND   

 

                                        2.3 ug/ml 

*NA*

                    (17 6:15 AM)                          



                                         Vanco Lvl   

 

                                        9.5 ug/ml 

*NA*

                    (17 9:49 AM)                          



                                         Vanco Tr   







URINE AND STOOL





                    1                   2                   3



                                         Most recent to   



                                         oldest [Reference   



                                         Range]:   

 

                                        Clear 

                          (17 6:25 PM)          Slight 

*ABN*

                          (17 4:26 AM)           



                                         UA Turbidity [Clear]   

 

                                        [place] 

*NA*

                    (17 6:25 PM)                          



                                         UA Color   

 

                                        Yellow 

*NA*

                    (17 4:26 AM)                          



                                         UA Color [Yellow]   

 

                                        5.0 

                          (17 6:25 PM)          5.0 

                          (17 4:26 AM)           



                                         UA pH [5.0-8.0]   

 

                                        1.019 

                          (17 6:25 PM)          1.012 

                          (17 4:26 AM)           



                                         UA Spec Grav   



                                         [<=1.030]   

 

                                        Negative mg/dL 

*NA*

                          (17 6:25 PM)          150 mg/dL 

*ABN*

                          (17 4:26 AM)           



                                         UA Glucose [Negative   



                                         mg/dL]   

 

                                        Moderate 

*ABN*

                          (17 6:25 PM)          Large 

*ABN*

                          (17 4:26 AM)           



                                         UA Blood [Negative]   

 

                                        Negative mg/dL 

*NA*

                          (17 6:25 PM)          Negative mg/dL 

*NA*

                          (17 4:26 AM)           



                                         UA Ketones [Negative   



                                         mg/dL]   

 

                                        30 mg/dL 

*ABN*

                          (17 6:25 PM)          Negative mg/dL 

                          (17 4:26 AM)           



                                         UA Protein [Negative   



                                         mg/dL]   

 

                                        4.0 mg/dL 

*HI*

                          (17 6:25 PM)          <=1.0 mg/dL 

*NA*

                          (17 4:26 AM)           



                                         UA Urobilinogen   



                                         [0.1-1.0 mg/dL]   

 

                                        Negative 

*NA*

                          (17 6:25 PM)          Negative 

*NA*

                          (17 4:26 AM)           



                                         UA Bili [Negative]   

 

                                        Trace 

*ABN*

                          (17 6:25 PM)          Small 

*ABN*

                          (17 4:26 AM)           



                                         UA Leuk Est   



                                         [Negative]   

 

                                        Negative 

                          (17 6:25 PM)          Negative 

                          (17 4:26 AM)           



                                         UA Nitrite   



                                         [Negative]   

 

                                        6 /HPF 

*HI*

                          (17 6:25 PM)          9 /HPF 

*HI*

                          (17 4:26 AM)           



                                         UA WBC [0-5 /HPF]   

 

                                        31 /HPF 

*HI*

                          (17 6:25 PM)          1 /HPF 

                          (17 4:26 AM)           



                                         UA RBC [0-2 /HPF]   

 

                                        Occasional /HPF 

*NA*

                          (17 6:25 PM)          Occasional /HPF 

*NA*

                          (17 4:26 AM)           



                                         UA Bacteria [None   



                                         Seen /HPF]   

 

                                        None Seen 

*NA*

                    (17 6:25 PM)                          



                                         UA Sq Epi   

 

                                        Occasional /LPF 

*NA*

                    (17 4:26 AM)                          



                                         UA Sq Epi [Few /LPF]   

 

                                        Few /LPF 

*NA*

                    (17 4:26 AM)                          



                                         UA Mucus [None Seen   



                                         /LPF]   

 

                                        Positive 

*ABN*

                    (17 9:17 PM)                          



                                         Occult Bld Stl   



                                         [Negative]   







IMMUNOLOGY





                    1                   2                   3



                                         Most recent to   



                                         oldest [Reference   



                                         Range]:   

 

                                        Negative 

*NA*

                    (17 4:51 AM)                          



                                         Hep Bs Ag [Negative]   

 

                                        Negative 

*NA*

                    (17 4:51 AM)                          



                                         Hep B Core IgM   



                                         [Negative]   

 

                                        Negative 

*NA*

                    (17 4:51 AM)                          



                                         Hep A IgM [Negative]   

 

                                        Negative 

*NA*

                    (17 4:51 AM)                          



                                         Hep C Ab   







HEMATOLOGY





                    1                   2                   3



                                         Most recent to   



                                         oldest [Reference   



                                         Range]:   

 

                                        9.9 K/CMM 

                          (17 6:45 AM)         12.8 K/CMM 

*HI*

                          (17 5:13 AM)          15.4 K/CMM 

*HI*

                                        (17 4:51 AM) 



                                         WBC [3.7-10.4 K/CMM]   

 

                                        3.18 M/CMM 

*LOW*

                          (17 6:45 AM)         3.34 M/CMM 

*LOW*

                          (17 5:13 AM)          2.98 M/CMM 

*LOW*

                                        (17 4:51 AM) 



                                         RBC [4.70-6.10   



                                         M/CMM]   

 

                                        8.8 g/dL 

*LOW*

                          (17 6:45 AM)         9.2 g/dL 

*LOW*

                          (17 5:13 AM)          8.3 g/dL 

*LOW*

                                        (17 4:51 AM) 



                                         Hgb [14.0-18.0 g/dL]   

 

                                        27.1 % 

*LOW*

                          (17 6:45 AM)         28.6 % 

*LOW*

                          (17 5:13 AM)          25.5 % 

*LOW*

                                        (17 4:51 AM) 



                                         Hct [42.0-54.0 %]   

 

                                        85.4 fL 

                          (17 6:45 AM)         85.5 fL 

                          (17 5:13 AM)          85.7 fL 

                                        (17 4:51 AM) 



                                         MCV [80.0-94.0 fL]   

 

                                        27.8 pg 

                          (17 6:45 AM)         27.5 pg 

                          (17 5:13 AM)          27.8 pg 

                                        (17 4:51 AM) 



                                         MCH [27.0-31.0 pg]   

 

                                        32.6 g/dL 

                          (17 6:45 AM)         32.2 g/dL 

                          (17 5:13 AM)          32.5 g/dL 

                                        (17 4:51 AM) 



                                         MCHC [32.0-36.0   



                                         g/dL]   

 

                                        17.8 % 

*HI*

                          (17 6:45 AM)         17.8 % 

*HI*

                          (17 5:13 AM)          18.1 % 

*HI*

                                        (17 4:51 AM) 



                                         RDW [11.5-14.5 %]   

 

                                        371 K/CMM 

                          (17 6:45 AM)         301 K/CMM 

                          (17 5:13 AM)          250 K/CMM 

                                        (17 4:51 AM) 



                                         Platelet [133-450   



                                         K/CMM]   

 

                                        8.5 fL 

                          (17 6:45 AM)         8.5 fL 

                          (17 5:13 AM)          8.7 fL 

                                        (17 4:51 AM) 



                                         MPV [7.4-10.4 fL]   

 

                                        69.9 % 

                          (17 6:45 AM)         78.2 % 

*HI*

                          (17 5:13 AM)          75.8 % 

*HI*

                                        (17 4:51 AM) 



                                         Segs [45.0-75.0 %]   

 

                                        17.1 % 

*LOW*

                          (17 6:45 AM)         10.2 % 

*LOW*

                          (17 5:13 AM)          11.1 % 

*LOW*

                                        (17 4:51 AM) 



                                         Lymphocytes   



                                         [20.0-40.0 %]   

 

                                        6.9 % 

                          (17 6:45 AM)         6.7 % 

                          (17 5:13 AM)          7.2 % 

                                        (17 4:51 AM) 



                                         Monocytes [2.0-12.0   



                                         %]   

 

                                        5.0 % 

*HI*

                          (17 6:45 AM)         3.7 % 

                          (17 5:13 AM)          4.4 % 

*HI*

                                        (17 4:51 AM) 



                                         Eosinophils [0.0-4.0   



                                         %]   

 

                                        1.1 % 

*HI*

                          (17 6:45 AM)         1.2 % 

*HI*

                          (17 5:13 AM)          1.5 % 

*HI*

                                        (17 4:51 AM) 



                                         Basophils [0.0-1.0   



                                         %]   

 

                                        6.9 K/CMM 

                          (17 6:45 AM)         10.0 K/CMM 

*HI*

                          (17 5:13 AM)          11.6 K/CMM 

*HI*

                                        (17 4:51 AM) 



                                         Segs-Bands #   



                                         [1.5-8.1 K/CMM]   

 

                                        1.7 K/CMM 

                          (17 6:45 AM)         1.3 K/CMM 

                          (17 5:13 AM)          1.7 K/CMM 

                                        (17 4:51 AM) 



                                         Lymphocytes #   



                                         [1.0-5.5 K/CMM]   

 

                                        0.7 K/CMM 

                          (17 6:45 AM)         0.9 K/CMM 

*HI*

                          (17 5:13 AM)          1.1 K/CMM 

*HI*

                                        (17 4:51 AM) 



                                         Monocytes # [0.0-0.8   



                                         K/CMM]   

 

                                        0.5 K/CMM 

                          (17 6:45 AM)         0.5 K/CMM 

                          (17 5:13 AM)          0.7 K/CMM 

*HI*

                                        (17 4:51 AM) 



                                         Eosinophils #   



                                         [0.0-0.5 K/CMM]   

 

                                        0.1 K/CMM 

                          (17 6:45 AM)         0.2 K/CMM 

                          (17 5:13 AM)          0.2 K/CMM 

                                        (17 4:51 AM) 



                                         Basophils # [0.0-0.2   



                                         K/CMM]   

 

                                        24.4 seconds 

*HI*

                    (16 8:54 PM)                          



                                         PT [12.0-14.7   



                                         seconds]   

 

                                        2.15 

*HI*

                    (16 8:54 PM)                          



                                         INR [0.85-1.17]   

 

                                        35.4 seconds 

                          (17 5:13 AM)          41.7 seconds 

*HI*

                          (16 8:54 PM)         



                                         PTT [22.9-35.8   



                                         seconds]   







BACTERIAL - SEROLOGY





                    1                   2                   3



                                         Most recent to   



                                         oldest [Reference   



                                         Range]:   

 

                                        Negative 

                    (16 8:09 PM)                          



                                         MRSA by PCR   







Immunizations





Given and Recorded





   



                 Vaccine         Date            Status          Refusal Reason

 

   



                     diphtheria/pertussis, acel/tetanus adult     17              Given 

 

   



                     pneumococcal 23-valent vaccine     3/31/15             Given 









Not Given





   



                 Vaccine         Date            Status          Refusal Reason

 

   



                 pneumococcal 23-valent vaccine     3/30/15         Not Given       Patient Refuses







Procedures







   



                 Procedure       Date            Related Diagnosis     Body Site

 

   



                                         CABG x 3 - Coronary artery bypass grafts x 31   

 

   



                                         Cardiac catheterization, left heart   







1ablation stents



Social History







 



                           Social History Type       Response

 

 



                           Substance Abuse           Use: None.

 

 



                           Alcohol                   Never

 

 



                           Smoking Status            Former smoker; Exposure to Tobacco Smoke None; Other Tobacco Frequency

 quit



                                         30 years ago; Cigarette Smoking Last 365 Days No; Reg Smoking Cessation



                                         Counseling No







Assessment and Plan

Extracted from:





  



                     Title: Clinical Document     Author: Juliann Garibay     Date: 17



                                         MD 









                                        Cardiology

[facility]





Impression:



Junctional rhythm possibly sick sinus syndrome in the setting of sepsis in 
addition to being on both amio and multaq.  Currently in sinus rhythm

Anemia

Bradycardia

Septic shock

severe coronary artery disease status post bypass and multiple PCI's, LIMA to 
LAD as last remaining vessel

chronic diastolic congestive heart failure

paroxysmal atrial fibrillation status post multiple ablations [id number] Vasc:4

LLE cellulitis

SANTIAGO

hypertension

diabetes

hypothyroidism

Abnormal EKG

Prolonged qtc



Plan:



Mild drop in Hb overnight

No active bleed since re-initiation of Xarelto

He understands he is at high risk of bleeding complications including GIB, ICH 
and eventually death

However, he is at high risk for thromboembolic complications as well

He was advised to follow up with Dr. Terrell, his cardiologist in 2 wks

Repeat CBC in 1 wk

Return to the hospital in case of any bleed, hypotension or syncope

Watchman implant should be considered by primary cardiologist



Subjective:



Patient seen and examined.

Telemetry reviewed:

Alert

No CP

No SOB

No N/V



Objective





VitalsTmp(F)[id number]

                                         08:[id number]---

                                         07:52------------1698 36%

                                         05:[id number]---

                                        01/10 21:07------------2099 4.0L/m

                                        01/10 20:[id number]---





                                        24 Hr Tmax: 98.6F (37.00c) at  08:00Vital Signs are the last 5 in the past 

48 hours.



Gen: alert

Neck: No carotid bruits, thick neck

Lungs: ctab

Heart: Regular, normal s1 s2

Abd: Soft, nt, nd, +bs

Ext: cellulitis L leg resolved, Right lower extremity with no edema.

Neuro: alert, awake

Skin: no erythema



Labs (Last four charted values)

WBC                 9.9()H 12.8()H 15.4()H 18.7()

Hgb                 L 8.8()L 9.2()L 8.3()L 8.4()

Hct                 L 27.1()L 28.6()L 25.5()L 25.4()

Plt                 371()301()250()237()

Na                  137()136()L 134()137()

K                   C 2.9()3.6()3.8()L 3.4()

CO2                 31()31()31()H 33()

Cl                  98()97()96()98()

Cr                  0.59()L 0.47()0.53()0.58()

BUN                 11()11()11()13()

Glucose Random      H 221()H 173()H 188()H 186()

Mg                  2.0()1.9()2.0()2.3()

Phos                L 2.1()2.6()L 2.2()L 2.2()

Ca                  L 8.1()L 7.7()L 7.7()L 7.8()

PT                  H 24.4(DEC 28)

INR                 H 2.15(DEC 28)

PTT                 35.4()H 41.7(DEC 28)

Troponin            0.14()<0.02(DEC 31)<0.02(DEC 31)<0.02(DEC 30)

CK MB               H 16.4()1.5(DEC 31)3.5(DEC 31)H 5.1(DEC 30)

Total CK            147()H 2180()151(DEC 31)H 242(DEC 31)Scheduled 
Meds (15):

                                        17 AMIODarone 200 mg PO Daily

                                        17  (Suspended) atorvastatin 5 mg PO Bedtime

                                        16 clopidogrel 75 mg PO Daily

                                        17 furosemide (Lasix) 40 mg IVP Q12H

                                        17 gabapentin (Neurontin) 600 mg PO Q8H

                                        17 insulin detemir 20 unit SUB-Q Q12H

                                        16 levothyroxine (Synthroid) 50 microgram PO Q630AM

                                        17 lidocaine topical (lidocaine topical 5% ointment) 1 appl TOP TID

                                        17 pantoprazole 40 mg PO BID

                                        17 piperacillin-tazobactam + sodium chloride 0.9% 100 ml  mL (Zosyn

 + sodium chloride 0.9% 100 ml  mL) 3.375 gm IVPB ABXQ8H 25 ml/hr

                                        17 polyethylene glycol 3350 (MiraLax) 17 gm PO BID

                                        17 potassium chloride (Klor-Con) 20 mEq PO TID

                                        01/10/17 rivaroxaban (Xarelto) 20 mg PO QPM

                                        17 sertraline 100 mg PO Daily

                                        17 sodium chloride (Saline Flush 0.9%) 10 ml IVP Q12H









 



                           Addendum                  I contacted Dr. Terrell and updated him on his patient's progress



                                         by 



                                         Juliann Garibay MD 



                                         on 



                                         2017 



                                         11:08

## 2019-07-18 NOTE — XMS REPORT
Summary of Care: 16 - 16

                             Created on: 2114



DESIREE MCKEON

External Reference #: [id number]

: 1940

Sex: Male



Demographics







                          Address                   [address]

[place]  [address]

 

                          Home Phone                [phone]

 

                          Preferred Language        English

 

                          Marital Status            

 

                          Uatsdin Affiliation     None

 

                          Race                      White/

 

                          Ethnic Group              Non-





Author







                          Author                    [facility]

 

                          Organization              [facility]

 

                          Address                   Unknown

 

                          Phone                     Unavailable







Encounter





CHLOÉ Pedersen(DAVID) [id number] Date(s): 16 - 16

[facility] [address] [place] [address]     [phone]
[phone]

Discharge Disposition: Home or Self Care

Attending Physician: Katie Mandujano MD

Admitting Physician: Katie Mandujano MD





Vital Signs







                    1                   2                   3



                                         Most recent to   



                                         oldest [Reference   



                                         Range]:   

 

                                        175.26 cm 

                          (16 4:57 AM)          167.64 cm 

                          (16 9:40 PM)           



                                         Height   

 

                                        98.1 DegF 

                          (16 3:52 AM)          97.5 DegF 

                          (16 12:36 AM)         97.6 DegF 

                                        (16 7:13 PM)



                                         Temperature Oral   



                                         [96.4-99.1 DegF]   

 

                                        125/78 mmHg 

                          (16 3:52 AM)          128/73 mmHg 

                          (16 12:36 AM)         111/68 mmHg 

                                        (16 7:13 PM)



                                         Blood Pressure   



                                         [/60-90 mmHg]   

 

                                        16 BRMIN 

                          (16 3:52 AM)          18 BRMIN 

                          (16 12:36 AM)         16 BRMIN 

                                        (16 8:07 PM)



                                         Respiratory Rate   



                                         [14-20 BRMIN]   

 

                                        72 bpm 

                          (16 3:52 AM)          75 bpm 

                          (16 12:36 AM)         69 bpm 

                                        (16 7:13 PM)



                                         Peripheral Pulse   



                                         Rate [ bpm]   

 

                                        95.455 kg 

                          (16 4:57 AM)          90.909 kg 

                          (16 9:40 PM)           



                                         Weight   

 

                                        31.08 m2 

                          (16 4:57 AM)          32.35 m2 

                          (16 9:40 PM)           



                                         Body Mass Index   







Problem List







    



              Condition     Effective Dates     Status       Health Status     Informant

 

    



                           Acute MI(Confirmed)1      Resolved  

 

    



                           Atrial                    Active  



                                         fibrillation(Confirm    



                                         ed)    

 

    



                           Atrial                    Resolved  



                                         fibrillation(Confirm    



                                         ed)    

 

    



                           CAD (coronary artery      Active  



                                         disease)(Confirmed)    

 

    



                           Diabetes(Confirmed)       Resolved  

 

    



                           Diastolic heart           Active  



                                         failure(Confirmed)    

 

    



                           Histoplasmosis(Confi      Resolved  



                                         rmed)    

 

    



                           Hypercholesteremia(C      Resolved  



                                         onfirmed)    

 

    



                           Hypertension(Confirm      Resolved  



                                         ed)    

 

    



                           HTN                       Active  



                                         (hypertension)(Confi    



                                         rmed)    

 

    



                           Hypothyroidism(Confi      Active  



                                         rmed)    

 

    



                           Diabetes mellitus         Active  



                                         type 2, insulin    



                                         dependent(Confirmed)    

 

    



                           PAUL (obstructive          Active  



                                         sleep    



                                         apnea)(Confirmed)    

 

    



                           Poliomyelitides(Conf      Resolved  



                                         irmed)    

 

    



                           Sleep                     Resolved  



                                         apnea(Confirmed)    

 

    



                           Stented coronary          Resolved  



                                         artery(Confirmed)2    

 

    



                           Systolic heart            Active  



                                         failure(Confirmed)    

 

    



                           Whooping                  Resolved  



                                         cough(Confirmed)    







1x 3



225 cardiac stents



Allergies, Adverse Reactions, Alerts







   



                 Substance       Reaction        Severity        Status

 

   



                           NKDA                      Active







Medications





AMIODarone

200 mg, 1 tab, Route: PO, Drug form: TAB, Daily, Dosing Weight 95.455, kg, Start
date: 16 9:00:00 CDT, Duration: 30 day, Stop date: 16 9:00:00 CDT

Notes: (Same as: Cordarone)

Start Date: 16

Stop Date: 16

Status: Discontinued



AMIODarone 200 mg oral tablet

200 mg=1 tab, PO, Daily, # 90 tab, 3 Refill(s)

Start Date: 16

Status: Ordered



aspirin

325 mg, Route: PO, Drug form: ECTAB, ONCE, Dosing Weight 90.909, kg, Priority: S
TAT, Start date: 16 0:43:00 CDT, Stop date: 16 0:43:00 CDT

Start Date: 16

Stop Date: 16

Status: Completed



aspirin 300 mg rectal suppository

300 mg, 1 supp, Route: RI, Drug form: SUPP, ONCE, Dosing Weight 90.909, kg, Prio
rity: STAT, Start date: 16 0:29:00 CDT, Stop date: 16 0:29:00 CDT

Notes: Refrigerate.

Start Date: 16

Stop Date: 16

Status: Discontinued



Ativan

0.5 mg, 1 tab, Route: PO, Drug form: TAB, BID, Dosing Weight 95.455, kg, PRN as 
needed for anxiety, Start date: 16 22:27:00 CDT, Duration: 30 day, Stop da
te: 16 22:26:00 CDT

Notes: (Same as: Ativan)

Start Date: 16

Stop Date: 16

Status: Discontinued



Ativan

1 mg, 0.5 mL, Route: IV, Drug form: INJ, ONCE, Dosing Weight 95.455, kg, Start d
ate: 16 5:22:00 CDT, Stop date: 16 5:22:00 CDT

Notes: (Same as: Ativan)

Start Date: 16

Stop Date: 16

Status: Completed



Ativan

2 mg, 1 mL, Route: IVP, Drug form: INJ, ONCE, Dosing Weight 95.455, kg, PRN Anxi
ety, Start date: 16 18:12:00 CDT

Notes: (Same as: Ativan)

Start Date: 16

Stop Date: 16

Status: Completed



clopidogrel

75 mg, 1 tab, Route: PO, Drug form: TAB, Daily, Dosing Weight 95.455, kg, Start 
date: 16 9:00:00 CDT, Duration: 30 day, Stop date: 16 9:00:00 CDT

Notes: (Same As: Plavix)

Start Date: 16

Stop Date: 16

Status: Discontinued



Dextrose 50% Syringe

25 gm, 50 mL, Route: IVP, Drug Form: INJ, Dosing Weight 95.455, kg, PRN, PRN Blo
od Glucose Results, Start date: 16 9:27:00 CDT, Duration: 30 day, Stop hazel
e: 16 9:26:00 CDT

Start Date: 16

Stop Date: 16

Status: Discontinued



Dextrose 50% Syringe

12.5 gm, 25 mL, Route: IVP, Drug Form: INJ, Dosing Weight 95.455, kg, PRN, PRN B
lood Glucose Results, Start date: 16 9:27:00 CDT, Duration: 30 day, Stop d
ate: 16 9:26:00 CDT

Start Date: 16

Stop Date: 16

Status: Discontinued



enoxaparin

40 mg, 0.4 mL, Route: SUB-Q, Drug form: INJ, [id number], Dosing Weight 95.455, kg,
Start date: 16 10:00:00 CDT, Duration: 30 day, Stop date: 16 10:00:
00 CDT

Notes: (Same as: Lovenox)

Start Date: 16

Stop Date: 16

Status: Discontinued



glucagon

1 mg, Route: IM, Drug form: PDR/INJ, PRN, Dosing Weight 95.455, kg, PRN Blood Gl
ucose Results, Start date: 16 9:27:00 CDT, Duration: 30 day, Stop date:  9:26:00 CDT

Start Date: 16

Stop Date: 16

Status: Discontinued



Haldol

0.5 mg, 0.1 mL, Route: IM, Drug form: INJ, ONCE, Dosing Weight 95.455, kg, Start
date: 16 16:30:00 CDT, Stop date: 16 16:30:00 CDT

Notes: (Same as: Haldol)

Start Date: 16

Stop Date: 16

Status: Completed



Haldol

0.5 mg, 0.01 mL, Route: IM, Drug form: INJ, ONCE, Dosing Weight 95.455, kg, Star
t date: 16 11:27:00 CDT, Stop date: 16 11:27:00 CDT

Notes: (Same as: Haldol Decanoate) not for IV use, long acting formulation.

Start Date: 16

Stop Date: 16

Status: Deleted



Imdur

30 mg, 1 tab, Route: PO, Drug form: ERTAB, QAM, Dosing Weight 95.455, kg, Start 
date: 16 9:00:00 CDT, Duration: 30 day, Stop date: 16 9:00:00 CDT

Notes: (Same as:Imdur)"Do Not Crush"  Take on empty stomach/ full glass of water
.  Do not crush

Start Date: 16

Stop Date: 16

Status: Discontinued



insulin aspart

4 unit, 0.04 mL, Route: SUB-Q, Drug form: SOLN, Bedtime, Dosing Weight 95.455, k
g, PRN Blood Glucose Results, Start date: 16 9:27:00 CDT, Duration: 30 day
, Stop date: 16 9:26:00 CDT

Notes: Roll in palms of hands gently;  Do not shake vigorously. (Same as: Vernon MINAYA)"single patient use only"WASTE: F/P - Black; E - Municipal Trash Bin  Stable f
or 28 days at room temperature.Expires in _____ days from ______________Date

Start Date: 16

Stop Date: 16

Status: Discontinued



insulin aspart

2 unit, 0.02 mL, Route: SUB-Q, Drug form: SOLN, TID-Before Meals, Dosing Weight 
95.455, kg, PRN Blood Glucose Results, Start date: 16 9:27:00 CDT, Duratio
n: 30 day, Stop date: 16 9:26:00 CDT

Notes: Roll in palms of hands gently;  Do not shake vigorously. (Same as: Vernon MINAYA)"single patient use only"WASTE: F/P - Black; E - Municipal Trash Bin  Stable f
or 28 days at room temperature.Expires in _____ days from ______________Date

Start Date: 16

Stop Date: 16

Status: Discontinued



insulin aspart

1 unit, 0.01 mL, Route: SUB-Q, Drug form: SOLN, TID-Before Meals, Dosing Weight 
95.455, kg, PRN Blood Glucose Results, Start date: 16 9:27:00 CDT, Duratio
n: 30 day, Stop date: 16 9:26:00 CDT

Notes: Roll in palms of hands gently;  Do not shake vigorously. (Same as: NovoTORO MINAYA)"single patient use only"WASTE: F/P - Black; E - Municipal Trash Bin  Stable f
or 28 days at room temperature.Expires in _____ days from ______________Date

Start Date: 16

Stop Date: 16

Status: Discontinued



insulin aspart

5 unit, 0.05 mL, Route: SUB-Q, Drug form: SOLN, TID-Before Meals, Dosing Weight 
95.455, kg, PRN Blood Glucose Results, Start date: 16 9:27:00 CDT, Duratio
n: 30 day, Stop date: 16 9:26:00 CDT

Notes: Roll in palms of hands gently;  Do not shake vigorously. (Same as: NovoTORO MINAYA)"single patient use only"WASTE: F/P - Black; E - Municipal Trash Bin  Stable f
or 28 days at room temperature.Expires in _____ days from ______________Date

Start Date: 16

Stop Date: 16

Status: Discontinued



insulin aspart

1 unit, 0.01 mL, Route: SUB-Q, Drug form: SOLN, Bedtime, Dosing Weight 95.455, k
g, PRN Blood Glucose Results, Start date: 16 9:27:00 CDT, Duration: 30 day
, Stop date: 16 9:26:00 CDT

Notes: Roll in palms of hands gently;  Do not shake vigorously. (Same as: NovoTORO MINAYA)"single patient use only"WASTE: F/P - Black; E - Municipal Trash Bin  Stable f
or 28 days at room temperature.Expires in _____ days from ______________Date

Start Date: 16

Stop Date: 16

Status: Discontinued



insulin aspart

3 unit, 0.03 mL, Route: SUB-Q, Drug form: SOLN, Bedtime, Dosing Weight 95.455, k
g, PRN Blood Glucose Results, Start date: 16 9:27:00 CDT, Duration: 30 day
, Stop date: 16 9:26:00 CDT

Notes: Roll in palms of hands gently;  Do not shake vigorously. (Same as: Vernon MINAYA)"single patient use only"WASTE: F/P - Black; E - Municipal Trash Bin  Stable f
or 28 days at room temperature.Expires in _____ days from ______________Date

Start Date: 16

Stop Date: 16

Status: Discontinued



insulin aspart

2 unit, 0.02 mL, Route: SUB-Q, Drug form: SOLN, Bedtime, Dosing Weight 95.455, k
g, PRN Blood Glucose Results, Start date: 16 9:27:00 CDT, Duration: 30 day
, Stop date: 16 9:26:00 CDT

Notes: Roll in palms of hands gently;  Do not shake vigorously. (Same as: Vernon MINAYA)"single patient use only"WASTE: F/P - Black; E - Municipal Trash Bin  Stable f
or 28 days at room temperature.Expires in _____ days from ______________Date

Start Date: 16

Stop Date: 16

Status: Discontinued



insulin aspart

4 unit, 0.04 mL, Route: SUB-Q, Drug form: SOLN, TID-Before Meals, Dosing Weight 
95.455, kg, PRN Blood Glucose Results, Start date: 16 9:27:00 CDT, Duratio
n: 30 day, Stop date: 16 9:26:00 CDT

Notes: Roll in palms of hands gently;  Do not shake vigorously. (Same as: Vernon MINAYA)"single patient use only"WASTE: F/P - Black; E - Municipal Trash Bin  Stable f
or 28 days at room temperature.Expires in _____ days from ______________Date

Start Date: 16

Stop Date: 16

Status: Discontinued



insulin aspart

3 unit, 0.03 mL, Route: SUB-Q, Drug form: SOLN, TID-Before Meals, Dosing Weight 
95.455, kg, PRN Blood Glucose Results, Start date: 16 9:27:00 CDT, Pipe
n: 30 day, Stop date: 16 9:26:00 CDT

Notes: Roll in palms of hands gently;  Do not shake vigorously. (Same as: NovoLO
G)"single patient use only"WASTE: F/P - Black; E - Municipal Trash Bin  Stable f
or 28 days at room temperature.Expires in _____ days from ______________Date

Start Date: 16

Stop Date: 16

Status: Discontinued



Lantus 100 units/mL

72 unit, Route: SUB-Q, Drug form: SOLN, Daily, Dosing Weight 95.455, kg, Start d
ate: 16 9:00:00 CDT, Duration: 30 day, Stop date: 16 9:00:00 CDT

Start Date: 16

Stop Date: 16

Status: Canceled



Lantus Solostar Pen 100 units/mL subcutaneous solution

42 unit, Route: SUB-Q, Drug form: SOLN, Bedtime, Dosing Weight 95.455, kg, Start
date: 16 21:00:00 CDT, Duration: 30 day, Stop date: 16 21:00:00 CDT

Start Date: 16

Stop Date: 16

Status: Deleted



Levemir FlexPen

42 unit, 0.42 mL, Route: SUB-Q, Drug form: INJ, Bedtime, Start date: 16 21
:00:00 CDT, Duration: 30 day, Stop date: 16 21:00:00 CDT

Notes: Same as LevemirDo not hold insulin without contacting prescriberWASTE: F/
P - Black; E - Municipal Trash Bin  "single patient use only"

Start Date: 16

Stop Date: 16

Status: Discontinued



lisinopril

2.5 mg, 0.5 tab, Route: PO, Drug form: TAB, Daily, Dosing Weight 95.455, kg, Sta
rt date: 16 9:00:00 CDT, Duration: 30 day, Stop date: 16 9:00:00 CDT

Notes: (Same as: Prinivil, Zestril)

Start Date: 16

Stop Date: 16

Status: Discontinued



lisinopril 2.5 mg oral tablet

2.5 mg=1 tab, PO, Daily, # 90 tab, 1 Refill(s)

Start Date: 16

Status: Ordered



niacin 1000 mg oral tablet, extended release

2,000 mg, 4 tab, Route: PO, Drug form: ERTAB, Bedtime, Dosing Weight 95.455, kg,
Start date: 16 21:00:00 CDT, Duration: 30 day, Stop date: 16 21:00:
00 CDT

Notes: (Same as: Niaspan)"Do Not Crush"Non-Formulary Item  With food.

Start Date: 16

Stop Date: 16

Status: Discontinued



Norco 10/325 oral tablet

1 tab, Route: PO, Drug Form: TAB, Dosing Weight 95.455, kg, Q6H, PRN Pain Score 
4-6, Start date: 16 22:26:00 CDT, Duration: 30 day, Stop date: 16 22
:25:00 CDT

Notes: Do not exceed 4gm/day of acetaminophen.  (Same as: Norco 325/10)

Start Date: 16

Stop Date: 16

Status: Discontinued



pantoprazole

40 mg, 1 tab, Route: PO, Drug form: ECTAB, BID, Dosing Weight 95.455, kg, Start 
date: 16 17:00:00 CDT, Duration: 30 day, Stop date: 16 9:00:00 CDT

Notes: Tablet should not be chewed or crushed.(Same as: Protonix)

Start Date: 16

Stop Date: 16

Status: Discontinued



potassium chloride

20 mEq, 1 tab, Route: PO, Drug form: ERTAB, BID, Dosing Weight 95.455, kg, Start
date: 16 17:00:00 CDT, Duration: 30 day, Stop date: 16 9:00:00 CDT

Notes: (Same as: K-Dur 20)"Do Not Crush"  With food and full glass of water

Start Date: 16

Stop Date: 16

Status: Discontinued



Ranexa 500 mg oral tablet, extended release

500 mg, 1 tab, Route: PO, Drug form: TAB, BID, Dosing Weight 95.455, kg, Start d
ate: 16 17:00:00 CDT, Duration: 30 day, Stop date: 16 9:00:00 CDT

Notes: Same as Ranexa"Do Not Crush"

Start Date: 16

Stop Date: 16

Status: Discontinued



Saline Flush 0.9%

10 ml, Route: IVP, Drug Form: INJ, Dosing Weight 90.909, kg, Q12H, Start date: 0
16 9:00:00 CDT, Duration: 30 day, Stop date: 16 21:00:00 CDT

Notes: (Same as: BD Posiflush)

Start Date: 16

Stop Date: 16

Status: Discontinued



Saline Flush 0.9%

10 ml, Route: IVP, Drug Form: INJ, Dosing Weight 90.909, kg, PRN, PRN Line Flush
, Start date: 16 3:48:00 CDT, Duration: 30 day, Stop date: 16 3:47:0
0 CDT

Notes: (Same as: BD Posiflush)

Start Date: 16

Stop Date: 16

Status: Discontinued



Synthroid

50 microgram, 1 tab, Route: PO, Drug form: TAB, Daily, Dosing Weight 95.455, kg,
Start date: 16 9:00:00 CDT, Duration: 30 day, Stop date: 16 9:00:00 
CDT

Notes: Take 1 hour before or 2 hours after meal; Enteral feeds may interefere wi
th the absorption of this medication.(Same as:Levothroid, Synthroid)

Start Date: 16

Stop Date: 16

Status: Discontinued



torsemide

30 mg, 3 tab, Route: PO, Drug form: TAB, Daily, Dosing Weight 95.455, kg, Start 
date: 16 9:00:00 CDT, Duration: 30 day, Stop date: 16 9:00:00 CDT

Notes: (Same As: Demadex)

Start Date: 16

Stop Date: 16

Status: Discontinued



Xarelto 20 mg oral tablet

20 mg=1 tab, PO, QPM, # 30 tab, 3 Refill(s)

Start Date: 16

Status: Ordered



Results





ELECTROLYTES





                    1                   2                   3



                                         Most recent to   



                                         oldest [Reference   



                                         Range]:   

 

                                        134 mEq/L 

*LOW*

                    (16 10:22 PM)                          



                                         Sodium Lvl [135-145   



                                         mEq/L]   

 

                                        3.6 mEq/L 

                    (16 10:22 PM)                          



                                         Potassium Lvl   



                                         [3.5-5.1 mEq/L]   

 

                                        100 mEq/L 

                    (16 10:22 PM)                          



                                         Chloride Lvl [   



                                         mEq/L]   

 

                                        27 mEq/L 

                    (16 10:22 PM)                          



                                         CO2 [24-32 mEq/L]   

 

                                        10.6 mEq/L 

                    (16 10:22 PM)                          



                                         AGAP [10.0-20.0   



                                         mEq/L]   







CHEM PANEL





                    1                   2                   3



                                         Most recent to   



                                         oldest [Reference   



                                         Range]:   

 

                                        0.69 mg/dL 

                          (16 11:08 AM)         0.91 mg/dL 

                          (16 10:22 PM)          



                                         Creatinine Lvl   



                                         [0.50-1.40 mg/dL]   

 

                                        93 mL/min/1.73m2 1

*NA*

                          (16 11:08 AM)         82 mL/min/1.73m2 2

*NA*

                          (16 10:22 PM)          



                                         eGFR   

 

                                        18 mg/dL 

                    (16 10:22 PM)                          



                                         BUN [7-22 mg/dL]   

 

                                        20 

                    (16 10:22 PM)                          



                                         B/C Ratio [6-25]   

 

                                        236 mg/dL 

*HI*

                    (16 10:22 PM)                          



                                         Glucose Lvl [70-99   



                                         mg/dL]   

 

                                        7.8 g/dL 

                    (16 10:22 PM)                          



                                         Total Protein   



                                         [6.4-8.4 g/dL]   

 

                                        3.8 g/dL 

                    (16 10:22 PM)                          



                                         Albumin Lvl [3.5-5.0   



                                         g/dL]   

 

                                        4.0 g/dL 

                    (16 10:22 PM)                          



                                         Globulin [2.7-4.2   



                                         g/dL]   

 

                                        1.0 

                    (16 10:22 PM)                          



                                         A/G Ratio [0.7-1.6]   

 

                                        8.4 mg/dL 

*LOW*

                    (16 10:22 PM)                          



                                         Calcium Lvl   



                                         [8.5-10.5 mg/dL]   

 

                                        32 unit/L 

                    (16 10:22 PM)                          



                                         ALT [0-65 unit/L]   

 

                                        39 unit/L 

*HI*

                    (16 10:22 PM)                          



                                         AST [0-37 unit/L]   

 

                                        131 unit/L 

                    (16 10:22 PM)                          



                                         Alk Phos [   



                                         unit/L]   

 

                                        0.6 mg/dL 

                    (16 10:22 PM)                          



                                         Bili Total [0.2-1.3   



                                         mg/dL]   

 

                                        23.0 uMol/L 

                          (16 4:20 PM)          26.0 uMol/L 

                          (16 11:08 AM)         <10.0 uMol/L 

                                        (16 11:23 PM) 



                                         Ammonia [<=45.0   



                                         uMol/L]   







1Result Comment: The eGFR is calculated using the CKD-EPI formula. In most 
young, healthy individuals the eGFR will be >90 mL/min/1.73m2. The eGFR declines
with age. An eGFR of 60-89 may be normal in some populations, particularly the 
elderly, for whom the CKD-EPI formula has not been extensively validated. Use of
the eGFR is not recommended in the following populations:



Individuals with unstable creatinine concentrations, including pregnant patients
and those with serious co-morbid conditions.



Patients with extremes in muscle mass or diet. 



The data above are obtained from the [employer] Education Program (
[facility]) which additionally recommends that when the eGFR is used in patients with
extremes of body mass index for purposes of drug dosing, the eGFR should be mul
tiplied by the estimated BMI.



2Result Comment: The eGFR is calculated using the CKD-EPI formula. In most 
young, healthy individuals the eGFR will be >90 mL/min/1.73m2. The eGFR declines
with age. An eGFR of 60-89 may be normal in some populations, particularly the 
elderly, for whom the CKD-EPI formula has not been extensively validated. Use of
the eGFR is not recommended in the following populations:



Individuals with unstable creatinine concentrations, including pregnant patients
and those with serious co-morbid conditions.



Patients with extremes in muscle mass or diet. 



The data above are obtained from the National Kid[employer] Education Program (
[employer]) which additionally recommends that when the eGFR is used in patients with
extremes of body mass index for purposes of drug dosing, the eGFR should be mul
tiplied by the estimated BMI.



CARDIAC ENZYMES





                    1                   2                   3



                                         Most recent to   



                                         oldest [Reference   



                                         Range]:   

 

                                        146 unit/L 

                    (16 10:22 PM)                          



                                         Total CK [   



                                         unit/L]   

 

                                        4.5 ng/mL 

*HI*

                    (16 10:22 PM)                          



                                         CK MB [0.5-3.6   



                                         ng/mL]   

 

                                        <0.02 ng/mL 

                    (16 10:22 PM)                          



                                         Troponin-I   



                                         [0.00-0.40 ng/mL]   







LIPIDS





                    1                   2                   3



                                         Most recent to   



                                         oldest [Reference   



                                         Range]:   

 

                                        6.05 

                    (16 6:44 AM)                          



                                         CHD Risk [4.00-7.30]   

 

                                        242 mg/dL 

*HI*

                    (16 6:44 AM)                          



                                         Chol [<=199 mg/dL]   

 

                                        265 mg/dL 

*HI*

                    (16 6:44 AM)                          



                                         Trig [<=149 mg/dL]   

 

                                        40 mg/dL 

*LOW*

                    (16 6:44 AM)                          



                                         HDL [>=61 mg/dL]   

 

                                        149 mg/dL 

*HI*

                    (16 6:44 AM)                          



                                         LDL (Calculated)   



                                         [<=99 mg/dL]   

 

                                        53 

*NA*

                    (16 6:44 AM)                          



                                         VLDL   







SPECIAL CHEMISTRY





                    1                   2                   3



                                         Most recent to   



                                         oldest [Reference   



                                         Range]:   

 

                                        11.0 % 

*HI*

                    (16 6:44 AM)                          



                                         Hgb A1C [<=5.6 %]   







ANEMIA STUDY





                    1                   2                   3



                                         Most recent to   



                                         oldest [Reference   



                                         Range]:   

 

                                        370 pg/mL 

                    (16 4:20 PM)                          



                                         Vitamin B12 Lvl   



                                         [254-1320 pg/mL]   







DRUG SCREEN





                    1                   2                   3



                                         Most recent to   



                                         oldest [Reference   



                                         Range]:   

 

                                        Negative 

*NA*

                    (16 11:23 PM)                          



                                         U Amph Scr   



                                         [Negative]   

 

                                        Negative 

*NA*

                    (16 11:23 PM)                          



                                         U Gracie Scr   



                                         [Negative]   

 

                                        Negative 

*NA*

                    (16 11:23 PM)                          



                                         U Benzodia Scr   



                                         [Negative]   

 

                                        Negative 

*NA*

                    (16 11:23 PM)                          



                                         U Cocaine Scr   



                                         [Negative]   

 

                                        Negative 

*NA*

                    (16 11:23 PM)                          



                                         U Opiate Scr   



                                         [Negative]   

 

                                        Negative 

*NA*

                    (16 11:23 PM)                          



                                         U Phencyc Scr   



                                         [Negative]   

 

                                        Negative 

*NA*

                    (16 11:23 PM)                          



                                         U Cannab Scr   



                                         [Negative]   

 

                                        See Note 

*NA*

                    (16 11:23 PM)                          



                                         UDS Note   







URINE AND STOOL





                    1                   2                   3



                                         Most recent to   



                                         oldest [Reference   



                                         Range]:   

 

                                        Clear 

                    (16 11:23 PM)                          



                                         UA Turbidity [Clear]   

 

                                        Ltyellow 

*NA*

                    (16 11:23 PM)                          



                                         UA Color   

 

                                        5.0 

                    (16 11:23 PM)                          



                                         UA pH [5.0-8.0]   

 

                                        1.014 

                    (16 11:23 PM)                          



                                         UA Spec Grav   



                                         [<=1.030]   

 

                                        50 mg/dL 

*ABN*

                    (16 11:23 PM)                          



                                         UA Glucose [Negative   



                                         mg/dL]   

 

                                        Negative 

                    (16 11:23 PM)                          



                                         UA Blood [Negative]   

 

                                        Negative mg/dL 

*NA*

                    (16 11:23 PM)                          



                                         UA Ketones [Negative   



                                         mg/dL]   

 

                                        Negative mg/dL 

                    (16 11:23 PM)                          



                                         UA Protein [Negative   



                                         mg/dL]   

 

                                        <=1.0 mg/dL 

*NA*

                    (16 11:23 PM)                          



                                         UA Urobilinogen   



                                         [0.1-1.0 mg/dL]   

 

                                        Negative 

*NA*

                    (16 11:23 PM)                          



                                         UA Bili [Negative]   

 

                                        Negative 

                    (16 11:23 PM)                          



                                         UA Leuk Est   



                                         [Negative]   

 

                                        Negative 

                    (16 11:23 PM)                          



                                         UA Nitrite   



                                         [Negative]   

 

                                        <1 /HPF 

                    (16 11:23 PM)                          



                                         UA WBC [0-5 /HPF]   

 

                                        1 /HPF 

                    (16 11:23 PM)                          



                                         UA RBC [0-2 /HPF]   

 

                                        None Seen 

*NA*

                    (16 11:23 PM)                          



                                         UA Sq Epi   







HEMATOLOGY





                    1                   2                   3



                                         Most recent to   



                                         oldest [Reference   



                                         Range]:   

 

                                        9.0 K/CMM 

                    (16 10:22 PM)                          



                                         WBC [3.7-10.4 K/CMM]   

 

                                        4.70 M/CMM 

                    (16 10:22 PM)                          



                                         RBC [4.70-6.10   



                                         M/CMM]   

 

                                        11.2 g/dL 

*LOW*

                    (16 10:22 PM)                          



                                         Hgb [14.0-18.0 g/dL]   

 

                                        35.4 % 

*LOW*

                    (16 10:22 PM)                          



                                         Hct [42.0-54.0 %]   

 

                                        75.2 fL 

*LOW*

                    (16 10:22 PM)                          



                                         MCV [80.0-94.0 fL]   

 

                                        23.8 pg 

*LOW*

                    (16 10:22 PM)                          



                                         MCH [27.0-31.0 pg]   

 

                                        31.6 g/dL 

*LOW*

                    (16 10:22 PM)                          



                                         MCHC [32.0-36.0   



                                         g/dL]   

 

                                        16.9 % 

*HI*

                    (16 10:22 PM)                          



                                         RDW [11.5-14.5 %]   

 

                                        237 K/CMM 

                          (16 11:08 AM)         225 K/CMM 

                          (16 10:22 PM)          



                                         Platelet [133-450   



                                         K/CMM]   

 

                                        8.4 fL 

                    (16 10:22 PM)                          



                                         MPV [7.4-10.4 fL]   

 

                                        64.5 % 

                    (16 10:22 PM)                          



                                         Segs [45.0-75.0 %]   

 

                                        21.8 % 

                    (16 10:22 PM)                          



                                         Lymphocytes   



                                         [20.0-40.0 %]   

 

                                        11.1 % 

                    (16 10:22 PM)                          



                                         Monocytes [2.0-12.0   



                                         %]   

 

                                        1.6 % 

                    (16 10:22 PM)                          



                                         Eosinophils [0.0-4.0   



                                         %]   

 

                                        1.0 % 

                    (16 10:22 PM)                          



                                         Basophils [0.0-1.0   



                                         %]   

 

                                        5.8 K/CMM 

                    (16 10:22 PM)                          



                                         Segs-Bands #   



                                         [1.5-8.1 K/CMM]   

 

                                        2.0 K/CMM 

                    (16 10:22 PM)                          



                                         Lymphocytes #   



                                         [1.0-5.5 K/CMM]   

 

                                        1.0 K/CMM 

*HI*

                    (16 10:22 PM)                          



                                         Monocytes # [0.0-0.8   



                                         K/CMM]   

 

                                        0.1 K/CMM 

                    (16 10:22 PM)                          



                                         Eosinophils #   



                                         [0.0-0.5 K/CMM]   

 

                                        0.1 K/CMM 

                    (16 10:22 PM)                          



                                         Basophils # [0.0-0.2   



                                         K/CMM]   

 

                                        1+ 

*ABN*

                    (16 10:22 PM)                          



                                         Microcyte [None   



                                         Seen]   

 

                                        22.2 seconds 

*HI*

                    (16 10:22 PM)                          



                                         PT [12.0-14.7   



                                         seconds]   

 

                                        1.91 

*HI*

                    (16 10:22 PM)                          



                                         INR [0.85-1.17]   

 

                                        34.5 seconds 

                    (16 10:22 PM)                          



                                         PTT [22.9-35.8   



                                         seconds]   







Immunizations





Given and Recorded





   



                 Vaccine         Date            Status          Refusal Reason

 

   



                     diphtheria/pertussis, acel/tetanus adult     16              Given 

 

   



                     pneumococcal 23-valent vaccine     3/31/15             Given 









Not Given





   



                 Vaccine         Date            Status          Refusal Reason

 

   



                 pneumococcal 23-valent vaccine     3/30/15         Not Given       Patient Refuses







Procedures







   



                 Procedure       Date            Related Diagnosis     Body Site

 

   



                                         CABG x 3 - Coronary artery bypass grafts x 31   

 

   



                                         Cardiac catheterization, left heart   







1ablation stents



Social History







 



                           Social History Type       Response

 

 



                           Substance Abuse           Use: None.

 

 



                           Alcohol                   Never

 

 



                           Smoking Status            Former smoker; Exposure to Tobacco Smoke None; Other Tobacco Frequency

 quit



                                         30 years ago; Cigarette Smoking Last 365 Days No; Reg Smoking Cessation



                                         Counseling No







Assessment and Plan

Extracted from:





  



                     Title: Clinical Document     Author: Yoni Campos MD     Date: 16









                                        Progress Note - Daily



[facility]             Completed:    Monday, AUG 08, 
2016, 15:00 by Yoni Campos MD



RM: 200 - 1P, SE [id number] [id number] (: 1940)  M     FIN: 
[id number]



Attending: Katie Mandujano MDPhone: [phone]: Internal 
Medicine



Reason for Admission: ACUTE ENCEPHALOPATHY

Working DRG: None Documented

Code status: None Specified=FULL CODECurrent diet: 

Isolation: None Documented



Allergies: NKDA



SUBJECTIVE



Daughter at bedside - patient was agitated overnight, not making sense, wanting 
to go home. He is calme today though still intermittently confused. The patient 
has chronic pain which has not recently changed. he was started on Nucynta a 
couple weeks ago. No headache or vision change. No dizziness,f ever, chest pain,
shortness of breath. No numbness or tingling reported. No incontinence. Daughter
states his glucose has been out of control lately. He has no history of short 
term memory loss prior to one week ago.





OBJECTIVE









                                        24hr Labs

                                         0738

Glucose CTD530  H

                                         0242

Glucose RID155  H

                                         2030

Glucose RGR661  H

                                         1751

Glucose MRW239  H



Schmitt still necessary (Yes/No): Line still necessary (Yes/No): 



VitalsTmp(F)[id number]

                                         07:58------------1698 21%

                                         02:4797.482380/328273---

                                         23:4398.330620/293310---

                                         20:25------------1699---

                                         20:0097.710303/[id number]---





                                        24 Hr Tmax: 98.2F (36.78c) at  23:43Vital Signs are the last 5 in the past 

48 hours.



DateWt(kg)Wt(lb)Ht(cm)Ht(in)Method

                                         95.45 [phone] 69.00Estimated

                                         (initial) 90.91 200.00Estimated

                                        167.64 66.00Stated



I&ORecordInOutBal

                                        824hr Tot   11    0   11

                                        724hr Tot    0    0    0



Medications (31) Active

Scheduled Meds (13):

                                        16 AMIODarone 200 mg PO Daily

                                        16 clopidogrel 75 mg PO Daily

                                        16 enoxaparin 40 mg SUB-Q [id number]

                                        16 insulin detemir (Levemir FlexPen) 42 unit SUB-Q Bedtime

                                        16 isosorbide mononitrate (Imdur) 30 mg PO QAM

                                        16 levothyroxine (Synthroid) 50 microgram PO Daily

                                        16 lisinopril 2.5 mg PO Daily

                                        16 niacin (niacin 1000 mg oral tablet, extended release) 2,000 mg PO Bedtime



                                        16 pantoprazole 40 mg PO BID

                                        16 potassium chloride 20 mEq PO BID

                                        16 ranolazine (Ranexa 500 mg oral tablet, extended release) 500 mg PO BID

                                        16 sodium chloride (Saline Flush 0.9%) 10 ml IVP Q12H

                                        16 torsemide 30 mg PO Daily

Unscheduled Meds: None

PRN Meds (15):

                                        16 Dextrose 50% in Water IV (Dextrose 50% Syringe) 12.5 gm IVP PRN

                                        16 Dextrose 50% in Water IV (Dextrose 50% Syringe) 25 gm IVP PRN

                                        16 LORazepam (Ativan) 0.5 mg PO BID

                                        16 acetaminophen-hydrocodone (Norco 10/325 oral tablet) 1 tab PO Q6H

                                        16 glucagon 1 mg IM PRN

                                        16 insulin aspart 1 unit SUB-Q TID-Before Meals

                                        16 insulin aspart 2 unit SUB-Q TID-Before Meals

                                        16 insulin aspart 3 unit SUB-Q TID-Before Meals

                                        16 insulin aspart 4 unit SUB-Q TID-Before Meals

                                        16 insulin aspart 5 unit SUB-Q TID-Before Meals

                                        16 insulin aspart 1 unit SUB-Q Bedtime

                                        16 insulin aspart 2 unit SUB-Q Bedtime

                                        16 insulin aspart 3 unit SUB-Q Bedtime

                                        16 insulin aspart 4 unit SUB-Q Bedtime

                                        16 sodium chloride (Saline Flush 0.9%) 10 ml IVP PRN

One Time Meds (3):

                                        16  (Completed) LORazepam (Ativan) 1 mg IV ONCE

                                        16  (Deleted) haloperidol (Haldol) 0.5 mg IM ONCE

                                        16  (Completed) haloperidol (Haldol) 0.5 mg IM ONCE

Continuous Infusions: None



EXAM

GEN - NAD

HEENT - NC/AT> MMM. Op clear

Ext - no c/c/e

Skin - no rash



Neuro:

Mental status: Alert and oriented x3. Language intact. Follows all commands. 
Some confused speech at times and rambling with poor attention

CN: PERRL, EOMI, no droop, facial sensation is normal

Motor: 5/5 throughout

Sensory: intact to LTx4. Impaired prioreception in the toes

Cerebellar: intact ftn, hts

Abnormal movements: none



Assessment

                                        1. Encephalopathy

                                        2. Unsteady gait - some proprioreceptive loss in toes with likely diabetic neuropathy



                                        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

                                        4. Afib

                                        5. HTN

                                        6. Hypothyroid

                                        7. HLD

                                        8. CAD

                                        9. CHF



Plan

MRI reviewed - no acute process

Check B12, ammonia, thyroid function

advised to stop nucynta as this was recently started

takes xarelto for afib as outpt - resume per primary

PT/OT

redirection and supportive care

If labs unremakrable and no improvement in symptoms, consider LP to rule out 
aseptic process

Will follow

## 2019-07-18 NOTE — XMS REPORT
Summary of Care: 6/10/16 - 16

                             Created on: 2097



DESIREE MCKEON

External Reference #: [id number]

: 1940

Sex: Male



Demographics







                          Address                   [address]

[place]  [address]

 

                          Home Phone                [phone]

 

                          Preferred Language        English

 

                          Marital Status            

 

                          Worship Affiliation     None

 

                          Race                      White/

 

                          Ethnic Group              Non-





Author







                          Author                    [facility]

 

                          Organization              [facility]

 

                          Address                   Unknown

 

                          Phone                     Unavailable







Encounter





HQ Slava(DAVID) [id number] Date(s): 6/10/16 - 16

[facility] [address] [place] [address]-     [phone]
[phone]

Discharge Disposition: Home

Attending Physician: Katie Mandujano MD

Admitting Physician: Katie Mandujano MD





Vital Signs







                    1                   2                   3



                                         Most recent to   



                                         oldest [Reference   



                                         Range]:   

 

                                        175.26 cm 

                          (6/10/16 11:40 PM)        175.26 cm 

                          (6/10/16 6:10 PM)          



                                         Height   

 

                                        97.6 DegF 

                          (16 6:31 PM)         97.5 DegF 

                          (16 3:12 PM)         97.4 DegF 

                                        (16 11:34 AM)



                                         Temperature Oral   



                                         [96.4-99.1 DegF]   

 

                                        146/82 mmHg 

*HI*

                          (16 6:31 PM)         122/94 mmHg 

                          (16 3:12 PM)         102/49 mmHg 

                                        (16 11:34 AM)



                                         Blood Pressure   



                                         [/60-90 mmHg]   

 

                                        18 BRMIN 

                          (16 6:31 PM)         17 BRMIN 

                          (16 3:12 PM)         17 BRMIN 

                                        (16 11:34 AM)



                                         Respiratory Rate   



                                         [14-20 BRMIN]   

 

                                        64 bpm 

                          (16 6:31 PM)         56 bpm 

*LOW*

                          (16 3:12 PM)         61 bpm 

                                        (16 11:34 AM)



                                         Peripheral Pulse   



                                         Rate [ bpm]   

 

                                        98.636 kg 

                          (6/10/16 11:40 PM)        98.636 kg 

                          (6/10/16 6:10 PM)          



                                         Weight   

 

                                        32.11 m2 

                          (6/10/16 11:40 PM)        32.11 m2 

                          (6/10/16 6:10 PM)          



                                         Body Mass Index   







Problem List







    



              Condition     Effective Dates     Status       Health Status     Informant

 

    



                           Acute MI(Confirmed)1      Resolved  

 

    



                           Atrial                    Active  



                                         fibrillation(Confirm    



                                         ed)    

 

    



                           CAD (coronary artery      Active  



                                         disease)(Confirmed)    

 

    



                           Diabetes(Confirmed)       Resolved  

 

    



                           Diastolic heart           Active  



                                         failure(Confirmed)    

 

    



                           Histoplasmosis(Confi      Resolved  



                                         rmed)    

 

    



                           Hypercholesteremia(C      Resolved  



                                         onfirmed)    

 

    



                           Hypertension(Confirm      Resolved  



                                         ed)    

 

    



                           HTN                       Active  



                                         (hypertension)(Confi    



                                         rmed)    

 

    



                           Hypothyroidism(Confi      Active  



                                         rmed)    

 

    



                           Diabetes mellitus         Active  



                                         type 2, insulin    



                                         dependent(Confirmed)    

 

    



                           PAUL (obstructive          Active  



                                         sleep    



                                         apnea)(Confirmed)    

 

    



                           Poliomyelitides(Conf      Resolved  



                                         irmed)    

 

    



                           Sleep                     Resolved  



                                         apnea(Confirmed)    

 

    



                           Stented coronary          Resolved  



                                         artery(Confirmed)2    

 

    



                           Systolic heart            Active  



                                         failure(Confirmed)    

 

    



                           Whooping                  Resolved  



                                         cough(Confirmed)    







1x 3



225 cardiac stents



Allergies, Adverse Reactions, Alerts







   



                 Substance       Reaction        Severity        Status

 

   



                           NKDA                      Active







Medications





atorvastatin

5 mg, 0.5 tab, Route: PO, Drug form: TAB, Bedtime, Dosing Weight 98.636, kg, Sta
rt date: 16 0:22:00 CDT, Duration: 30 day, Stop date: 07/10/16 21:00:00 CD
T

Notes: (Same As: Lipitor)

Start Date: 16

Stop Date: 16

Status: Discontinued



Benadryl

25 mg, 1 tab, Route: PO, Drug form: TAB, Daily, Dosing Weight 98.636, kg, Start 
date: 16 9:00:00 CDT, Duration: 30 day, Stop date: 07/10/16 9:00:00 CDT

Start Date: 16

Stop Date: 16

Status: Discontinued



Benadryl

25 mg, PO, Daily, 0 Refill(s)

Start Date: 6/10/16

Status: Ordered



clopidogrel

75 mg, 1 tab, Route: PO, Drug form: TAB, Daily, Dosing Weight 98.636, kg, Start 
date: 16 9:00:00 CDT, Duration: 30 day, Stop date: 07/10/16 9:00:00 CDT

Notes: (Same As: Plavix)

Start Date: 16

Stop Date: 16

Status: Discontinued



clopidogrel

75 mg, Route: PO, Drug form: TAB, Daily, Dosing Weight 98.636, kg, Start date: 0
16 9:00:00 CDT, Duration: 30 day, Stop date: 07/10/16 9:00:00 CDT

Start Date: 16

Stop Date: 16

Status: Canceled



Dextrose 50% Syringe

25 gm, 50 mL, Route: IVP, Drug Form: INJ, Dosing Weight 98.636, kg, PRN, PRN Blo
od Glucose Results, Start date: 16 0:09:00 CDT, Duration: 30 day, Stop hazel
e: 16 0:08:00 CDT

Start Date: 16

Stop Date: 16

Status: Discontinued



Dextrose 50% Syringe

12.5 gm, 25 mL, Route: IVP, Drug Form: INJ, Dosing Weight 98.636, kg, PRN, PRN B
lood Glucose Results, Start date: 16 0:09:00 CDT, Duration: 30 day, Stop d
ate: 16 0:08:00 CDT

Start Date: 16

Stop Date: 16

Status: Discontinued



glucagon

1 mg, Route: IM, Drug form: PDR/INJ, PRN, Dosing Weight 98.636, kg, PRN Blood Gl
ucose Results, Start date: 16 0:09:00 CDT, Duration: 30 day, Stop date:  0:08:00 CDT

Start Date: 16

Stop Date: 16

Status: Discontinued



Imdur

30 mg, 1 tab, Route: PO, Drug form: ERTAB, QAM, Dosing Weight 98.636, kg, Start 
date: 16 9:00:00 CDT, Duration: 30 day, Stop date: 07/10/16 9:00:00 CDT

Notes: (Same as:Imdur)"Do Not Crush"  Take on empty stomach/ full glass of water
.  Do not crush

Start Date: 16

Stop Date: 16

Status: Discontinued



insulin aspart

4 unit, 0.04 mL, Route: SUB-Q, Drug form: SOLN, Bedtime, Dosing Weight 98.636, k
g, PRN Blood Glucose Results, Start date: 16 0:09:00 CDT, Duration: 30 day
, Stop date: 16 0:08:00 CDT

Notes: Roll in palms of hands gently;  Do not shake vigorously. (Same as: Vernon MINAYA)"single patient use only"WASTE: F/P - Black; E - Municipal Trash Bin  Stable f
or 28 days at room temperature.Expires in _____ days from ______________Date

Start Date: 16

Stop Date: 16

Status: Discontinued



insulin aspart

3 unit, 0.03 mL, Route: SUB-Q, Drug form: SOLN, Bedtime, Dosing Weight 98.636, k
g, PRN Blood Glucose Results, Start date: 16 0:09:00 CDT, Duration: 30 day
, Stop date: 16 0:08:00 CDT

Notes: Roll in palms of hands gently;  Do not shake vigorously. (Same as: Vernon MINAYA)"single patient use only"WASTE: F/P - Black; E - Municipal Trash Bin  Stable f
or 28 days at room temperature.Expires in _____ days from ______________Date

Start Date: 16

Stop Date: 16

Status: Discontinued



insulin aspart

2 unit, 0.02 mL, Route: SUB-Q, Drug form: SOLN, Bedtime, Dosing Weight 98.636, k
g, PRN Blood Glucose Results, Start date: 16 0:09:00 CDT, Duration: 30 day
, Stop date: 16 0:08:00 CDT

Notes: Roll in palms of hands gently;  Do not shake vigorously. (Same as: Vernon MINAYA)"single patient use only"WASTE: F/P - Black; E - Municipal Trash Bin  Stable f
or 28 days at room temperature.Expires in _____ days from ______________Date

Start Date: 16

Stop Date: 16

Status: Discontinued



insulin aspart

6 unit, 0.06 mL, Route: SUB-Q, Drug form: SOLN, TID-Before Meals, Dosing Weight 
98.636, kg, PRN Blood Glucose Results, Start date: 16 0:09:00 CDT, Duratio
n: 30 day, Stop date: 16 0:08:00 CDT

Notes: Roll in palms of hands gently;  Do not shake vigorously. (Same as: Vernon MINAYA)"single patient use only"WASTE: F/P - Black; E - Municipal Trash Bin  Stable f
or 28 days at room temperature.Expires in _____ days from ______________Date

Start Date: 16

Stop Date: 16

Status: Discontinued



insulin aspart

8 unit, 0.08 mL, Route: SUB-Q, Drug form: SOLN, TID-Before Meals, Dosing Weight 
98.636, kg, PRN Blood Glucose Results, Start date: 16 0:09:00 CDT, Duratio
n: 30 day, Stop date: 16 0:08:00 CDT

Notes: Roll in palms of hands gently;  Do not shake vigorously. (Same as: Vernon MINAYA)"single patient use only"WASTE: F/P - Black; E - Municipal Trash Bin  Stable f
or 28 days at room temperature.Expires in _____ days from ______________Date

Start Date: 16

Stop Date: 16

Status: Discontinued



insulin aspart

4 unit, 0.04 mL, Route: SUB-Q, Drug form: SOLN, TID-Before Meals, Dosing Weight 
98.636, kg, PRN Blood Glucose Results, Start date: 16 0:09:00 CDT, Duratio
n: 30 day, Stop date: 16 0:08:00 CDT

Notes: Roll in palms of hands gently;  Do not shake vigorously. (Same as: Vernon MINAYA)"single patient use only"WASTE: F/P - Black; E - Municipal Trash Bin  Stable f
or 28 days at room temperature.Expires in _____ days from ______________Date

Start Date: 16

Stop Date: 16

Status: Discontinued



insulin aspart

2 unit, 0.02 mL, Route: SUB-Q, Drug form: SOLN, TID-Before Meals, Dosing Weight 
98.636, kg, PRN Blood Glucose Results, Start date: 16 0:09:00 CDT, Duratio
n: 30 day, Stop date: 16 0:08:00 CDT

Notes: Roll in palms of hands gently;  Do not shake vigorously. (Same as: Vernon MINAYA)"single patient use only"WASTE: F/P - Black; E - Municipal Trash Bin  Stable f
or 28 days at room temperature.Expires in _____ days from ______________Date

Start Date: 16

Stop Date: 16

Status: Discontinued



insulin aspart

10 unit, 0.1 mL, Route: SUB-Q, Drug form: SOLN, TID-Before Meals, Dosing Weight 
98.636, kg, PRN Blood Glucose Results, Start date: 16 0:09:00 CDT, Duratio
n: 30 day, Stop date: 16 0:08:00 CDT

Notes: Roll in palms of hands gently;  Do not shake vigorously. (Same as: Vernon MINAYA)"single patient use only"WASTE: F/P - Black; E - Municipal Trash Bin  Stable f
or 28 days at room temperature.Expires in _____ days from ______________Date

Start Date: 16

Stop Date: 16

Status: Discontinued



insulin aspart

1 unit, 0.01 mL, Route: SUB-Q, Drug form: SOLN, Bedtime, Dosing Weight 98.636, k
g, PRN Blood Glucose Results, Start date: 16 0:09:00 CDT, Duration: 30 day
, Stop date: 16 0:08:00 CDT

Notes: Roll in palms of hands gently;  Do not shake vigorously. (Same as: NovoLO
G)"single patient use only"WASTE: F/P - Black; E - Municipal Trash Bin  Stable f
or 28 days at room temperature.Expires in _____ days from ______________Date

Start Date: 16

Stop Date: 16

Status: Discontinued



insulin glargine

42 unit, Route: SUB-Q, Drug form: SOLN, Bedtime, Dosing Weight 98.636, kg, Start
date: 16 21:00:00 CDT, Duration: 30 day, Stop date: 07/10/16 21:00:00 CDT

Start Date: 16

Stop Date: 16

Status: Discontinued



insulin lispro

Route: SUB-Q, Drug form: SOLN, TID-Before Meals, Dosing Weight 98.636, kg, Start
date: 16 7:30:00 CDT, Duration: 30 day, Stop date: 07/10/16 16:30:00 CDT

Start Date: 16

Stop Date: 16

Status: Deleted



Lantus

72 unit, Route: SUB-Q, Daily, Dosing Weight 98.636, kg, Start date: 16 9:0
0:00 CDT, Duration: 30 day, Stop date: 07/10/16 9:00:00 CDT

Start Date: 16

Stop Date: 16

Status: Discontinued



metFORMIN 1000 mg oral tablet

1,000 mg, 2 tab, Route: PO, Drug form: TAB, BID-Meals, Dosing Weight 98.636, kg,
Start date: 16 8:00:00 CDT, Duration: 30 day, Stop date: 07/10/16 17:00:00
CDT

Notes: (Same as: Glucophage)  Take with meal

Start Date: 16

Stop Date: 16

Status: Discontinued



metoprolol tartrate

25 mg, 1 tab, Route: PO, Drug form: TAB, Q6H, Dosing Weight 98.636, kg, Start da
te: 16 0:00:00 CDT, Duration: 30 day, Stop date: 07/10/16 18:00:00 CDT

Notes: (Same as: Lopressor)

Start Date: 16

Stop Date: 16

Status: Discontinued



morphine Sulfate

2 mg, 1 mL, Route: IV, Drug form: INJ, Q3H, Dosing Weight 98.636, kg, PRN Pain S
core 7-10, Start date: 16 0:08:00 CDT, Duration: 30 day, Stop date:  0:07:00 CDT

Notes: (Same as:MORPhine Sulfate)

Start Date: 16

Stop Date: 16

Status: Discontinued



morphine Sulfate

2 mg, 1 mL, Route: IVP, Drug form: INJ, Q15Min, Dosing Weight 98.636, kg, PRN Ch
est Pain, Start date: 06/10/16 22:58:00 CDT, Duration: 2 doses or times, Stop da
te: Limited # of times

Notes: (Same as:MORPhine Sulfate)

Start Date: 6/10/16

Stop Date: 16

Status: Discontinued



morphine Sulfate

4 mg, 2 mL, Route: IVP, Drug form: INJ, ONCE, Dosing Weight 98.636, kg, Priority
: STAT, Start date: 06/10/16 19:05:00 CDT, Stop date: 06/10/16 19:05:00 CDT

Notes: (Same as:MORPhine Sulfate)

Start Date: 6/10/16

Stop Date: 6/10/16

Status: Completed



Multaq

400 mg, 1 tab, Route: PO, Drug form: TAB, BID, Dosing Weight 98.636, kg, Start d
ate: 16 9:00:00 CDT, Duration: 30 day, Stop date: 07/10/16 17:00:00 CDT

Notes: Same as: Multaq

Start Date: 16

Stop Date: 16

Status: Discontinued



Neurontin

600 mg, 2 cap, Route: PO, Drug form: CAP, TID, Dosing Weight 98.636, kg, Start d
ate: 16 9:00:00 CDT, Duration: 30 day, Stop date: 07/10/16 17:00:00 CDT

Notes: (Same as: Neurontin)

Start Date: 16

Stop Date: 16

Status: Discontinued



niacin 1000 mg oral tablet, extended release

2,000 mg, 8 cap, Route: PO, Drug form: ERCAP, Bedtime, Dosing Weight 98.636, kg,
Start date: 16 21:00:00 CDT, Duration: 30 day, Stop date: 07/10/16 21:00:
00 CDT

Notes: With food.

Start Date: 16

Stop Date: 16

Status: Discontinued



nitroglycerin

0.4 mg, 1 tab, Route: SL, Drug form: TAB, ONCE, Dosing Weight 98.636, kg, Priori
ty: STAT, Start date: 06/10/16 19:05:00 CDT, Stop date: 06/10/16 19:05:00 CDT

Notes: (Same as:Nitroquick, Nitrostat)"Do Not Crush"  Sublingual tablet

Start Date: 6/10/16

Stop Date: 6/10/16

Status: Completed



nitroglycerin 2% ointment

0.5 inch, Route: TOP, Drug Form: OINT, Dosing Weight 98.636, kg, TID, Start date
: 16 6:00:00 CDT, Duration: 30 day, Stop date: 07/10/16 18:00:00 CDT

Notes: 1 gram is approximately 1 inch of nitroglycerin ointment    (20 mg NTG pe
r gram) (Same as:Nitro-Bid)

Start Date: 16

Stop Date: 16

Status: Discontinued



nitroglycerin SL Tab

0.4 mg, 1 tab, Route: SL, Drug form: TAB, Q5Min, Dosing Weight 98.636, kg, PRN C
hest Pain, Start date: 06/10/16 22:58:00 CDT, Duration: 3 doses or times, Stop d
ate: Limited # of times

Notes: (Same as:Nitroquick, Nitrostat)"Do Not Crush"  Sublingual tablet

Start Date: 6/10/16

Stop Date: 16

Status: Discontinued



Norco 10/325 oral tablet

1 tab, Route: PO, Drug Form: TAB, Dosing Weight 98.636, kg, QID, PRN Pain Score 
6-10, Start date: 06/10/16 23:56:00 CDT, Duration: 30 day, Stop date: 07/10/16 2
3:55:00 CDT

Notes: Do not exceed 4gm/day of acetaminophen.  (Same as: Norco 325/10)

Start Date: 6/10/16

Stop Date: 16

Status: Discontinued



ondansetron

4 mg, 1 tab, Route: PO, Drug form: TAB, Q8H, Dosing Weight 98.636, kg, PRN Nause
a & Vomiting, Start date: 06/10/16 22:58:00 CDT, Duration: 30 day, Stop date: 
07/10/16 22:57:00 CDT

Notes: (Same as: Zofran)

Start Date: 6/10/16

Stop Date: 16

Status: Discontinued



pantoprazole

40 mg, 1 tab, Route: PO, Drug form: ECTAB, BID, Dosing Weight 98.636, kg, Start 
date: 16 9:00:00 CDT, Duration: 30 day, Stop date: 07/10/16 17:00:00 CDT

Notes: Tablet should not be chewed or crushed.(Same as: Protonix)

Start Date: 16

Stop Date: 16

Status: Discontinued



potassium chloride

20 mEq, 1 tab, Route: PO, Drug form: ERTAB, BID, Dosing Weight 98.636, kg, Start
date: 16 9:00:00 CDT, Duration: 30 day, Stop date: 07/10/16 17:00:00 CDT

Notes: (Same as: K-Dur 20)"Do Not Crush"  With food and full glass of water

Start Date: 16

Stop Date: 16

Status: Discontinued



Ranexa 500 mg oral tablet, extended release

500 mg=1 tab, PO, BID, # 60 tab, 0 Refill(s)

Start Date: 16

Status: Ordered



Saline Flush 0.9%

10 ml, Route: IVP, Drug Form: INJ, Dosing Weight 98.636, kg, Q12H, Start date: 0
16 9:00:00 CDT, Duration: 30 day, Stop date: 07/10/16 21:00:00 CDT

Notes: (Same as: BD Posiflush)

Start Date: 16

Stop Date: 16

Status: Discontinued



Saline Flush 0.9%

10 ml, Route: IVP, Drug Form: INJ, Dosing Weight 98.636, kg, PRN, PRN Line Flush
, Start date: 06/10/16 22:58:00 CDT, Duration: 30 day, Stop date: 07/10/16 22:57
:00 CDT

Notes: (Same as: BD Posiflush)

Start Date: 6/10/16

Stop Date: 16

Status: Discontinued



Saline Flush 0.9%

10 mL, Route: IVP, Drug Form: INJ, Dosing Weight 98.636, kg, PRN, PRN Line Flush
, Start date: 06/10/16 18:28:00 CDT, Duration: 30 day, Stop date: 07/10/16 18:27
:00 CDT

Notes: (Same as: BD Posiflush)

Start Date: 6/10/16

Stop Date: 16

Status: Discontinued



sertraline

100 mg, 1 tab, Route: PO, Drug form: TAB, Daily, Dosing Weight 98.636, kg, Start
date: 16 9:00:00 CDT, Duration: 30 day, Stop date: 07/10/16 9:00:00 CDT

Notes: (Same as: Zoloft)

Start Date: 16

Stop Date: 16

Status: Discontinued



Synthroid

50 microgram, 1 tab, Route: PO, Drug form: TAB, Q630AM, Dosing Weight 98.636, kg
, Start date: 16 6:30:00 CDT, Duration: 30 day, Stop date: 07/10/16 6:30:0
0 CDT

Notes: Take 1 hour before or 2 hours after meal; Enteral feeds may interefere wi
th the absorption of this medication.(Same as:Levothroid, Synthroid)

Start Date: 16

Stop Date: 16

Status: Discontinued



torsemide

20 mg, 1 tab, Route: PO, Drug form: TAB, BID, Dosing Weight 98.636, kg, Start da
te: 16 0:22:00 CDT, Duration: 30 day, Stop date: 07/10/16 17:00:00 CDT

Notes: (Same As: Demadex)

Start Date: 16

Stop Date: 16

Status: Discontinued



torsemide 20 mg oral tablet

20 mg=1 tab, PO, BID, 0 Refill(s)

Start Date: 6/10/16

Status: Ordered



Xanax 0.5 mg oral tablet

0.5 mg, 1 tab, Route: AZ, Drug form: TAB, BID, Dosing Weight 98.636, kg, PRN as 
needed for anxiety, Start date: 06/10/16 23:56:00 CDT, Duration: 30 day, Stop da
te: 07/10/16 23:55:00 CDT

Notes: With food or milk(Same as: Xanax)

Start Date: 6/10/16

Stop Date: 16

Status: Discontinued



Results





ELECTROLYTES





                    1                   2                   3



                                         Most recent to   



                                         oldest [Reference   



                                         Range]:   

 

                                        138 mEq/L 

                    (6/10/16 6:56 PM)                          



                                         Sodium Lvl [135-145   



                                         mEq/L]   

 

                                        3.6 mEq/L 

                    (6/10/16 6:56 PM)                          



                                         Potassium Lvl   



                                         [3.5-5.1 mEq/L]   

 

                                        102 mEq/L 

                    (6/10/16 6:56 PM)                          



                                         Chloride Lvl [   



                                         mEq/L]   

 

                                        24 mEq/L 

                    (6/10/16 6:56 PM)                          



                                         CO2 [24-32 mEq/L]   

 

                                        15.6 mEq/L 

                    (6/10/16 6:56 PM)                          



                                         AGAP [10.0-20.0   



                                         mEq/L]   







CHEM PANEL





                    1                   2                   3



                                         Most recent to   



                                         oldest [Reference   



                                         Range]:   

 

                                        0.93 mg/dL 

                    (6/10/16 6:56 PM)                          



                                         Creatinine Lvl   



                                         [0.50-1.40 mg/dL]   

 

                                        80 mL/min/1.73m2 1

*NA*

                    (6/10/16 6:56 PM)                          



                                         eGFR   

 

                                        13 mg/dL 

                    (6/10/16 6:56 PM)                          



                                         BUN [7-22 mg/dL]   

 

                                        14 

                    (6/10/16 6:56 PM)                          



                                         B/C Ratio [6-25]   

 

                                        195 mg/dL 

*HI*

                    (6/10/16 6:56 PM)                          



                                         Glucose Lvl [70-99   



                                         mg/dL]   

 

                                        7.6 g/dL 

                          (16 12:53 AM)        6.8 g/dL 

                          (6/10/16 6:56 PM)          



                                         Total Protein   



                                         [6.4-8.4 g/dL]   

 

                                        3.4 g/dL 

*LOW*

                          (16 12:53 AM)        3.1 g/dL 

*LOW*

                          (6/10/16 6:56 PM)          



                                         Albumin Lvl [3.5-5.0   



                                         g/dL]   

 

                                        4.2 g/dL 

*HI*

                          (16 12:53 AM)        3.7 g/dL 

                          (6/10/16 6:56 PM)          



                                         Globulin [2.0-4.0   



                                         g/dL]   

 

                                        0.8 

                          (16 12:53 AM)        0.8 

                          (6/10/16 6:56 PM)          



                                         A/G Ratio [0.7-1.6]   

 

                                        8.2 mg/dL 

*LOW*

                    (6/10/16 6:56 PM)                          



                                         Calcium Lvl   



                                         [8.5-10.5 mg/dL]   

 

                                        29 unit/L 

                          (16 12:53 AM)        27 unit/L 

                          (6/10/16 6:56 PM)          



                                         ALT [0-65 unit/L]   

 

                                        31 unit/L 

                          (16 12:53 AM)        33 unit/L 

                          (6/10/16 6:56 PM)          



                                         AST [0-37 unit/L]   

 

                                        112 unit/L 

                          (16 12:53 AM)        99 unit/L 

                          (6/10/16 6:56 PM)          



                                         Alk Phos [   



                                         unit/L]   

 

                                        0.2 mg/dL 

                          (16 12:53 AM)        0.2 mg/dL 

                          (6/10/16 6:56 PM)          



                                         Bili Total [0.2-1.3   



                                         mg/dL]   

 

                                        0.1 mg/dL 

                    (16 12:53 AM)                          



                                         Bili Direct [0.0-0.3   



                                         mg/dL]   

 

                                        0.1 mg/dL 

                    (16 12:53 AM)                          



                                         Bili Indirect   



                                         [0.0-1.0 mg/dL]   







1Result Comment: The eGFR is calculated using the CKD-EPI formula. In most 
young, healthy individuals the eGFR will be >90 mL/min/1.73m2. The eGFR declines
with age. An eGFR of 60-89 may be normal in some populations, particularly the 
elderly, for whom the CKD-EPI formula has not been extensively validated. Use of
the eGFR is not recommended in the following populations:



Individuals with unstable creatinine concentrations, including pregnant patients
and those with serious co-morbid conditions.



Patients with extremes in muscle mass or diet. 



The data above are obtained from the National Kidney Disease Education Program (
NKDEP) which additionally recommends that when the eGFR is used in patients with
extremes of body mass index for purposes of drug dosing, the eGFR should be mul
tiplied by the estimated BMI.



CARDIAC ENZYMES





                    1                   2                   3



                                         Most recent to   



                                         oldest [Reference   



                                         Range]:   

 

                                        115 unit/L 

                          (16 6:51 AM)         110 unit/L 

                          (16 12:53 AM)        117 unit/L 

                                        (6/10/16 6:56 PM) 



                                         Total CK [   



                                         unit/L]   

 

                                        4.6 ng/mL 

*HI*

                          (16 6:51 AM)         3.9 ng/mL 

*HI*

                          (16 12:53 AM)        3.5 ng/mL 

                                        (6/10/16 6:56 PM) 



                                         CK MB [0.5-3.6   



                                         ng/mL]   

 

                                        4.0 

*HI*

                          (16 6:51 AM)         3.5 

*HI*

                          (16 12:53 AM)        3.0 

*HI*

                                        (6/10/16 6:56 PM) 



                                         CK MB Index   



                                         [0.0-2.5]   

 

                                        0.07 ng/mL 

                          (16 6:51 AM)         0.03 ng/mL 

                          (16 12:53 AM)        <0.02 ng/mL 

                                        (6/10/16 6:56 PM) 



                                         Troponin-I   



                                         [0.00-0.40 ng/mL]   

 

                                        91 pg/mL 

                    (6/10/16 6:56 PM)                          



                                         BNP [<=100 pg/mL]   







LIPIDS





                    1                   2                   3



                                         Most recent to   



                                         oldest [Reference   



                                         Range]:   

 

                                        5.00 

                    (16 12:53 AM)                          



                                         CHD Risk [4.00-7.30]   

 

                                        175 mg/dL 

                    (16 12:53 AM)                          



                                         Chol [<=199 mg/dL]   

 

                                        386 mg/dL 

*HI*

                    (16 12:53 AM)                          



                                         Trig [<=149 mg/dL]   

 

                                        35 mg/dL 

*LOW*

                    (16 12:53 AM)                          



                                         HDL [>=61 mg/dL]   

 

                                        63 mg/dL 

                    (16 12:53 AM)                          



                                         LDL (Calculated)   



                                         [<=99 mg/dL]   

 

                                        77 

*NA*

                    (16 12:53 AM)                          



                                         VLDL   







DRUG SCREEN





                    1                   2                   3



                                         Most recent to   



                                         oldest [Reference   



                                         Range]:   

 

                                        Negative 

*NA*

                    (6/10/16 8:57 PM)                          



                                         U Amph Scr   



                                         [Negative]   

 

                                        Negative 

*NA*

                    (6/10/16 8:57 PM)                          



                                         U Gracie Scr   



                                         [Negative]   

 

                                        Positive 

*ABN*

                    (6/10/16 8:57 PM)                          



                                         U Benzodia Scr   



                                         [Negative]   

 

                                        Negative 

*NA*

                    (6/10/16 8:57 PM)                          



                                         U Cocaine Scr   



                                         [Negative]   

 

                                        Positive 

*ABN*

                    (6/10/16 8:57 PM)                          



                                         U Opiate Scr   



                                         [Negative]   

 

                                        Negative 

*NA*

                    (6/10/16 8:57 PM)                          



                                         U Phencyc Scr   



                                         [Negative]   

 

                                        Negative 

*NA*

                    (6/10/16 8:57 PM)                          



                                         U Cannab Scr   



                                         [Negative]   

 

                                        See Note 

                    (6/10/16 8:57 PM)                          



                                         UDS Note   







HEMATOLOGY





                    1                   2                   3



                                         Most recent to   



                                         oldest [Reference   



                                         Range]:   

 

                                        7.3 K/CMM 

                    (6/10/16 6:56 PM)                          



                                         WBC [3.7-10.4 K/CMM]   

 

                                        4.71 M/CMM 

                    (6/10/16 6:56 PM)                          



                                         RBC [4.70-6.10   



                                         M/CMM]   

 

                                        11.2 g/dL 

*LOW*

                    (6/10/16 6:56 PM)                          



                                         Hgb [14.0-18.0 g/dL]   

 

                                        36.7 % 

*LOW*

                    (6/10/16 6:56 PM)                          



                                         Hct [42.0-54.0 %]   

 

                                        77.9 fL 

*LOW*

                    (6/10/16 6:56 PM)                          



                                         MCV [80.0-94.0 fL]   

 

                                        23.8 pg 

*LOW*

                    (6/10/16 6:56 PM)                          



                                         MCH [27.0-31.0 pg]   

 

                                        30.6 g/dL 

*LOW*

                    (6/10/16 6:56 PM)                          



                                         MCHC [32.0-36.0   



                                         g/dL]   

 

                                        16.7 % 

*HI*

                    (6/10/16 6:56 PM)                          



                                         RDW [11.5-14.5 %]   

 

                                        179 K/CMM 

                    (6/10/16 6:56 PM)                          



                                         Platelet [133-450   



                                         K/CMM]   

 

                                        9.0 fL 

                    (6/10/16 6:56 PM)                          



                                         MPV [7.4-10.4 fL]   

 

                                        63.5 % 

                    (6/10/16 6:56 PM)                          



                                         Segs [45.0-75.0 %]   

 

                                        23.9 % 

                    (6/10/16 6:56 PM)                          



                                         Lymphocytes   



                                         [20.0-40.0 %]   

 

                                        10.2 % 

                    (6/10/16 6:56 PM)                          



                                         Monocytes [2.0-12.0   



                                         %]   

 

                                        1.5 % 

                    (6/10/16 6:56 PM)                          



                                         Eosinophils [0.0-4.0   



                                         %]   

 

                                        0.9 % 

                    (6/10/16 6:56 PM)                          



                                         Basophils [0.0-1.0   



                                         %]   

 

                                        4.7 K/CMM 

                    (6/10/16 6:56 PM)                          



                                         Segs-Bands #   



                                         [1.5-8.1 K/CMM]   

 

                                        1.8 K/CMM 

                    (6/10/16 6:56 PM)                          



                                         Lymphocytes #   



                                         [1.0-5.5 K/CMM]   

 

                                        0.7 K/CMM 

                    (6/10/16 6:56 PM)                          



                                         Monocytes # [0.0-0.8   



                                         K/CMM]   

 

                                        0.1 K/CMM 

                    (6/10/16 6:56 PM)                          



                                         Eosinophils #   



                                         [0.0-0.5 K/CMM]   

 

                                        0.1 K/CMM 

                    (6/10/16 6:56 PM)                          



                                         Basophils # [0.0-0.2   



                                         K/CMM]   

 

                                        See Note 

                    (6/10/16 6:56 PM)                          



                                         RBC Morph   

 

                                        1+ 

                    (6/10/16 6:56 PM)                          



                                         Hypochrom [None   



                                         Seen]   

 

                                        1+ 

*ABN*

                    (6/10/16 6:56 PM)                          



                                         Microcyte [None   



                                         Seen]   

 

                                        Normal 

                    (6/10/16 6:56 PM)                          



                                         Plt Morph   

 

                                        15.9 seconds 

*HI*

                    (6/10/16 6:56 PM)                          



                                         PT [12.0-14.7   



                                         seconds]   

 

                                        1.24 

*HI*

                    (6/10/16 6:56 PM)                          



                                         INR [0.85-1.17]   

 

                                        23.5 seconds 

                    (6/10/16 6:56 PM)                          



                                         PTT [22.9-35.8   



                                         seconds]   







Immunizations





Given and Recorded





   



                 Vaccine         Date            Status          Refusal Reason

 

   



                     pneumococcal 23-valent vaccine     3/31/15             Given 









Not Given





   



                 Vaccine         Date            Status          Refusal Reason

 

   



                 pneumococcal 23-valent vaccine     3/30/15         Not Given       Patient Refuses







Procedures







   



                 Procedure       Date            Related Diagnosis     Body Site

 

   



                                         CABG x 3 - Coronary artery bypass grafts x 31   

 

   



                                         Cardiac catheterization, left heart   







1ablation stents



Social History







 



                           Social History Type       Response

 

 



                           Substance Abuse           Use: None.

 

 



                           Alcohol                   Never

 

 



                           Smoking Status            Former smoker; Exposure to Tobacco Smoke None; Other Tobacco Frequency

 quit



                                         30 years ago; Cigarette Smoking Last 365 Days No; Reg Smoking Cessation



                                         Counseling No







Assessment and Plan

Extracted from:





  



                     Title: Clinical Document     Author: Katie Mandujano MD     Date: 16









                                        Progress Note

[facility]

CC:

follow up on his chest pain



SUBJECTIVE:

pt seen/examined. he is resting comfortably in bed, awake, no chest pain 
currently



OBJECTIVE:

Vital Signs (last 24 hrs)_____Last Charted___________

Temp Oral97.6 DegF  ( 08:10)

Heart Rate PeripheralL 56bpm  (:10)

Resp Rate    16 BRMIN  (:10)

FVJ571 mmHg  (:10)

DBPL 50mmHg  (:10)

GeK0838 %  (:10)

Ihljot89.636 kg  (SPENCER 10 23:40)

Xybgqb219.26 cm  (SPENCER 10 23:40)

BMI32.11  (SPENCER 10 23:40)







Medications:

Scheduled Meds (18):atorvastatin, clopidogrel, diphenhydrAMINE (Benadryl), 
dronedarone (Multaq), gabapentin (Neurontin), insulin glargine, insulin glargine
(Lantus), isosorbide mononitrate (Imdur), levothyroxine (Synthroid), metFORMIN 
(metFORMIN 1000 mg oral tablet), metoprolol (metoprolol tartrate), niacin 
(niacin 1000 mg oral tablet, extended release), nitroglycerin (nitroglycerin 2% 
ointment), pantoprazole, potassium chloride, sertraline, sodium chloride (Saline
Flush 0.9%), torsemide

Unscheduled Meds: None

PRN Meds (19):ALPRAZolam (Xanax 0.5 mg oral tablet), Dextrose 50% in Water IV 
(Dextrose 50% Syringe), Dextrose 50% in Water IV (Dextrose 50% Syringe), 
acetaminophen-hydrocodone (Norco 10/325 oral tablet), glucagon, insulin aspart, 
insulin aspart, insulin aspart, insulin aspart, insulin aspart, insulin aspart, 
insulin aspart, insulin aspart, insulin aspart, morphine Sulfate, morphine 
Sulfate, nitroglycerin (nitroglycerin SL Tab), ondansetron, sodium chloride 
(Saline Flush 0.9%)

One Time Meds (2):(Completed) morphine Sulfate, (not done) nitroglycerin

Continuous Infusions: None



Labs (Last four charted values)

WBC                 7.3(SPENCER 10)

Hgb                 L 11.2(SPENCER 10)

Hct                 L 36.7(SPENCER 10)

Plt                 179(SPENCER 10)

Na                  138(SPENCER 10)

K                   3.6(SPENCER 10)

CO2                 24(SPENCER 10)

Cl                  102(SPENCER 10)

Cr                  0.93(SPENCER 10)

BUN                 13(SPENCER 10)

Glucose Random      H 195(SPENCER 10)

Ca                  L 8.2(SPENCER 10)

PT                  H 15.9(SPENCER 10)

INR                 H 1.24(SPENCER 10)

PTT                 23.5(SPENCER 10)

Troponin            0.07()0.03()<0.02(SPENCER 10)

CK MB               H 4.6()H 3.9()3.5(SPENCER 10)

Total CK            115()110()117(SPENCER 10)



EXAM:

HEENT: nc/at, eomi

Neck: no jvd, supple

Heart: s1s2, no murmurs, rubs, gallops

Chest: clear to auscultation, no wheezes, rales, rhonchi

Abd: NT, ND, soft, BS +

Ext: pedal edema, no clubbing, cyanosis

Skin: no rash







IMPRESSION:

Chest pain

CAD

D. HF

Anxiety

DM



PLAN:

resumed home meds

hold metformin - incase of cardiac procedure

follow up cards recs

reviewed labs - cardiac enzymes have been cycled, highest was 0.07





Plan of care discussed with patient and nursing.

## 2019-07-18 NOTE — XMS REPORT
Summary of Care: 4/30/15 - 4/30/15

                             Created on: 2096



DESIREE MCKEON

External Reference #: [id number]

: 1940

Sex: Male



Demographics







                          Address                   [address]

[place]  [address]

 

                          Home Phone                [phone]

 

                          Preferred Language        English

 

                          Marital Status            

 

                          Bapt[employer] Affiliation     Unknown

 

                          Race                      White/

 

                          Ethnic Group              Non-





Author







                          Organization              Unknown

 

                          Address                   Unknown

 

                          Phone                     Unavailable







Encounter





CHLOÉ Pedersen(DAVID) [id number] Date(s): 4/30/15 - 4/30/15

[facility] [address] [place] [address]-     [phone]
[phone]

Discharge Diagnosis: Diabetic hypoglycemia

Discharge Disposition: Home

Physician Attending: En Alberto MD





Vital Signs







  



                     Most recent to      1                   2



                                         oldest [Reference  



                                         Range]:  

 

  



                     Temperature Oral     97.9 DegF           98.8 DegF



                     [96.4-99.1 DegF]     (4/30/15 5:19 AM)     (4/30/15 1:25 AM)

 

  



                     Blood Pressure      127/77 mmHg         135/71 mmHg



                     [/60-90 mmHg]     (4/30/15 5:19 AM)     (4/30/15 1:25 AM)

 

  



                     Respiratory Rate     18 BRMIN            20 BRMIN



                     [14-20 BRMIN]       (4/30/15 5:19 AM)     (4/30/15 1:25 AM)

 

  



                     Peripheral Pulse     88 bpm              89 bpm



                     Rate [ bpm]     (4/30/15 5:19 AM)     (4/30/15 1:25 AM)

 

  



                           Weight                    100 kg 



                                         (4/30/15 1:25 AM) 







Problem List







    



              Condition     Effective Dates     Status       Health Status     Informant

 

    



                           Acute MI(Confirmed)1      Resolved  

 

    



                           Diabetes(Confirmed)       Resolved  

 

    



                           Histoplasmosis(Confi      Resolved  



                                         rmed)    

 

    



                           Hypercholesteremia(C      Resolved  



                                         onfirmed)    

 

    



                           Hypertension(Confirm      Resolved  



                                         ed)    

 

    



                           Poliomyelitides(Conf      Resolved  



                                         irmed)    

 

    



                           Sleep                     Resolved  



                                         apnea(Confirmed)    

 

    



                           Stented coronary          Resolved  



                                         artery(Confirmed)2    

 

    



                           Whooping                  Resolved  



                                         cough(Confirmed)    







1x 3



225 cardiac stents



Allergies, Adverse Reactions, Alerts







   



                 Substance       Reaction        Severity        Status

 

   



                           NKDA                      Active







Medications





No data available for this section



Results





No data available for this section



Immunizations







  



                     Vaccine             Date                Refusal Reason

 

  



                           pneumococcal 23-valent vaccine     3/31/15 

 

  



                     pneumococcal 23-valent vaccine     3/30/15             Patient Refuses







Procedures







   



                 Procedure       Date            Related Diagnosis     Body Site

 

   



                                         CABG x 3 - Coronary artery bypass grafts x 3   

 

   



                                         Cardiac catheterization, left heart   







Social History







 



                           Social History Type       Response

 

 



                           Substance Abuse           Use: None.

 

 



                           Alcohol                   Never

 

 



                           Smoking Status            Former smoker; Exposure to Tobacco Smoke None; Cigarette Smoking

 Last 365



                                         Days No; Reg Smoking Cessation Counseling No







Assessment and Plan





No data available for this section

## 2019-07-18 NOTE — XMS REPORT
Summary of Care: 19 - 6/3/19

                             Created on: 2051



DESIREE MCKEON

External Reference #: [id number]

: 1940

Sex: Male



Demographics







                          Address                   [address] [place]  [address]-

 

                          Home Phone                [phone]

 

                          Preferred Language        English

 

                          Marital Status            Unknown

 

                          Sabianist Affiliation     Episcopal

 

                          Race                      Other

 

                                        Additional Race(s)  

 

                          Ethnic Group              Non-





Author







                          Author                    [facility]

 

                          Organization              [facility]

 

                          Address                   Unknown

 

                          Phone                     Unavailable







Encounter





CHLOÉ Pedersen(DAVID) [id number] Date(s): 19 - 6/3/19

[facility] [address] [place] [address]-     [phone]
[phone]

Discharge Disposition: Skilled Nursing Facility

Attending Physician: Russ Amador MD

Admitting Physician: Russ Amador MD





Vital Signs







                    1                   2                   3



                                         Most recent to   



                                         oldest [Reference   



                                         Range]:   

 

                                        172.72 cm 

                    (19 7:27 AM)                         



                                         Height   

 

                                        98.2 DegF 

                          (6/3/19 11:37 AM)         97.9 DegF 

                          (6/3/19 7:49 AM)          98 DegF 

                                        (6/3/19 3:54 AM)



                                         Temperature Oral   



                                         [96.4-99.1 DegF]   

 

                                        107/59 mmHg 

                          (6/3/19 11:37 AM)         105/49 mmHg 

                          (6/3/19 7:49 AM)          108/66 mmHg 

                                        (6/3/19 3:54 AM)



                                         Blood Pressure   



                                         [/60-90 mmHg]   

 

                                        18 BRMIN 

                          (6/3/19 11:37 AM)         18 BRMIN 

                          (6/3/19 7:49 AM)          18 BRMIN 

                                        (6/3/19 3:54 AM)



                                         Respiratory Rate   



                                         [14-20 BRMIN]   

 

                                        57 bpm 

*LOW*

                          (6/3/19 11:37 AM)         61 bpm 

                          (6/3/19 7:49 AM)          60 bpm 

                                        (6/3/19 3:54 AM)



                                         Peripheral Pulse   



                                         Rate [ bpm]   

 

                                        88.636 kg 

                    (19 7:27 AM)                         



                                         Weight   

 

                                        29.71 m2 

                    (19 7:27 AM)                         



                                         Body Mass Index   







Problem List







    



              Condition     Effective Dates     Status       Health Status     Informant

 

    



                           Acute MI(Confirmed)1      Resolved  

 

    



                           Atrial                    Active  



                                         fibrillation(Confirm    



                                         ed)    

 

    



                           CHF (congestive           Active  



                                         heart    



                                         failure)(Confirmed)    

 

    



                           CAD (coronary artery      Active  



                                         disease)(Confirmed)    

 

    



                           Diabetes(Confirmed)       Active  

 

    



                           Diastolic heart           Active  



                                         failure(Confirmed)    

 

    



                           Histoplasmosis(Confi      Resolved  



                                         rmed)    

 

    



                           Hx MRSA                   Resolved  



                                         infection(Confirmed)    

 

    



                           Hypercholesteremia(C      Resolved  



                                         onfirmed)    

 

    



                           Hypertension(Confirm      Active  



                                         ed)    

 

    



                           Hypertension(Confirm      Resolved  



                                         ed)    

 

    



                           HTN                       Active  



                                         (hypertension)(Confi    



                                         rmed)    

 

    



                           Hypothyroidism(Confi      Active  



                                         rmed)    

 

    



                           Diabetes mellitus         Active  



                                         type 2, insulin    



                                         dependent(Confirmed)    

 

    



                     Moderate            Active              Chronic ; 



                                         protein-calorie    



                                         malnutrition(Confirm    



                                         ed)    

 

    



                           PAUL (obstructive          Active  



                                         sleep    



                                         apnea)(Confirmed)    

 

    



                           Poliomyelitides(Conf      Resolved  



                                         irmed)    

 

    



                           Sleep                     Active  



                                         apnea(Confirmed)    

 

    



                           Stented coronary          Resolved  



                                         artery(Confirmed)2    

 

    



                           Systolic heart            Active  



                                         failure(Confirmed)    

 

    



                           Whooping                  Resolved  



                                         cough(Confirmed)    







1x 3



225 cardiac stents



Allergies, Adverse Reactions, Alerts







   



                 Substance       Reaction        Severity        Status

 

   



                           sulfa drugs               Active

 

   



                           Reglan                    Active

 

   



                           iodine                    Active

 

   



                           Latex                     Active







Medications





acetaminophen

650 mg, 2 tab, Route: PO, Drug form: TAB, Q4H, Dosing Weight 88.636, kg, PRN Patrick
n 1-3/Temp > 100.4 F, Start date: 19 14:08:00 CDT, Duration: 30 day, Stop 
date: 19 14:07:00 CDT

Notes: Do not exceed 4 gm/day.  (Same as: Tylenol)

Start Date: 19

Stop Date: 6/3/19

Status: Discontinued



acetaminophen 325 mg oral tablet

650 mg, 2 tab, Route: PO, Drug form: TAB, Q6H, Dosing Weight 88.636, kg, PRN Patrick
n 1-3/Temp > 100.4 F, Start date: 19 17:15:00 CDT, Duration: 30 day, Stop 
date: 19 17:14:00 CDT

Notes: Do not exceed 4 gm/day.  (Same as: Tylenol)

Start Date: 19

Stop Date: 6/3/19

Status: Discontinued



AMIODarone

200 mg, 1 tab, Route: PO, Drug form: TAB, Daily, Dosing Weight 88.636, kg, Start
date: 19 9:00:00 CDT, Duration: 30 day, Stop date: 19 9:00:00 CDT

Notes: (Same as: Cordarone)

Start Date: 6/3/19

Stop Date: 6/3/19

Status: Discontinued



Artificial Tears

BOTH EYES, PRN, for dry eye, 0 Refill(s)

Start Date: 19

Status: Ordered



aspirin

325 mg, 1 tab, Route: PO, Drug form: TAB, ONCE, Dosing Weight 88.636, kg, Priori
ty: STAT, Start date: 19 8:04:00 CDT, Stop date: 19 8:04:00 CDT

Notes: Take with food.

Start Date: 19

Stop Date: 19

Status: Completed



aspirin 81 mg tablet, enteric coated

81 mg, 1 tab, Route: PO, Drug form: ECTAB, Daily, Dosing Weight 88.636, kg, Star
t date: 19 9:00:00 CDT, Duration: 30 day, Stop date: 19 9:00:00 CDT

Notes: Do not crush or chew.(Same As: Ecotrin)

Start Date: 6/3/19

Stop Date: 6/3/19

Status: Discontinued



atorvastatin

40 mg, 1 tab, Route: PO, Drug form: TAB, Bedtime, Dosing Weight 88.636, kg, Star
t date: 19 21:00:00 CDT, Duration: 30 day, Stop date: 19 21:00:00 CD
T

Notes: (Same as: Lipitor)

Start Date: 19

Stop Date: 6/3/19

Status: Discontinued



atorvastatin 40 mg oral tablet

40 mg=1 tab, PO, Bedtime, # 30 tab, 0 Refill(s)

Start Date: 19

Status: Ordered



bisacodyl 5 mg oral enteric coated tablet

10 mg=2 tab, PO, Daily, PRN Constipation, # 20 tab, 0 Refill(s)

Start Date: 19

Stop Date: 19

Status: Ordered



cefTRIAXone + sterile water 10 mL

1 gm, Route: IVPB, ONCE, Dosing Weight 88.636, kg, Priority: STAT, Start date: 0
19 10:31:00 CDT, Stop date: 19 10:31:00 CDT, ABX Indication: Urinary 
Tract Infection

Notes: (Same As: Rocephin).Use with 100 mL NS and infuse over 30 min  *** MEDICA
TION WASTE ***Product Size:  1000 mgProduct Wasted:  ___ mg

Start Date: 19

Stop Date: 19

Status: Completed



cholestyramine 4 g/9 g oral powder

4 gm, PO, BID, PRN diarrhea, # 1134 gm, 0 Refill(s)

Start Date: 19

Status: Ordered



Colace 100 mg oral capsule

100 mg, 1 cap, Route: PO, Drug form: CAP, BID, Dosing Weight 88.636, kg, Start d
ate: 19 9:00:00 CDT, Duration: 30 day, Stop date: 19 17:00:00 CDT

Notes: (Same as: Colace) (Do Not Crush)

Start Date: 6/3/19

Stop Date: 6/3/19

Status: Discontinued



Colace 100 mg oral capsule

100 mg=1 cap, PO, BID, # 60 cap, 0 Refill(s)

Start Date: 19

Status: Ordered



Dextrose 50% Syringe

25 gm, 50 mL, Route: IVP, Drug Form: INJ, Dosing Weight 88.636, kg, PRN, PRN Blo
od Glucose Results, Start date: 19 22:30:00 CDT, Duration: 30 day, Stop da
te: 19 22:29:00 CDT

Start Date: 19

Stop Date: 6/3/19

Status: Discontinued



Dextrose 50% Syringe

12.5 gm, 25 mL, Route: IVP, Drug Form: INJ, Dosing Weight 88.636, kg, PRN, PRN B
lood Glucose Results, Start date: 19 22:30:00 CDT, Duration: 30 day, Stop 
date: 19 22:29:00 CDT

Start Date: 19

Stop Date: 6/3/19

Status: Discontinued



Dextrose 50% Syringe

12.5 gm, 25 mL, Route: IVP, Drug Form: INJ, Dosing Weight 88.636, kg, PRN, PRN B
lood Glucose Results, Start date: 19 14:08:00 CDT, Duration: 30 day, Stop 
date: 19 14:07:00 CDT

Start Date: 19

Stop Date: 6/3/19

Status: Discontinued



Dextrose 50% Syringe

25 gm, 50 mL, Route: IVP, Drug Form: INJ, Dosing Weight 88.636, kg, PRN, PRN Blo
od Glucose Results, Start date: 19 14:08:00 CDT, Duration: 30 day, Stop da
te: 19 14:07:00 CDT

Start Date: 19

Stop Date: 6/3/19

Status: Discontinued



enoxaparin

40 mg, 0.4 mL, Route: SUB-Q, Drug form: INJ, [id number], Dosing Weight 88.636, kg,
Start date: 19 17:08:00 CDT, Stop date: 19 17:08:00 CDT

Notes: (Same as: Lovenox)

Start Date: 19

Stop Date: 6/3/19

Status: Discontinued



folic acid

1 mg, 1 tab, Route: PO, Drug form: TAB, Daily, Dosing Weight 88.636, kg, Start d
ate: 19 9:00:00 CDT, Duration: 30 day, Stop date: 19 9:00:00 CDT

Notes: (Same as: Folvite)

Start Date: 6/3/19

Stop Date: 6/3/19

Status: Discontinued



folic acid 1 mg oral tablet

1 mg=1 tab, PO, Daily, # 30 tab, 0 Refill(s)

Start Date: 19

Status: Ordered



gabapentin 100 mg oral capsule

100 mg=1 cap, PO, TID, # 90 cap, 1 Refill(s)

Start Date: 19

Status: Ordered



gabapentin 100 mg oral capsule

100 mg, 1 cap, Route: PO, Drug form: CAP, TID, Dosing Weight 88.636, kg, Start d
ate: 19 9:00:00 CDT, Duration: 30 day, Stop date: 19 17:00:00 CDT

Notes: (Same as: Neurontin)

Start Date: 6/3/19

Stop Date: 6/3/19

Status: Discontinued



glucagon

1 mg, Route: IM, Drug form: PDR/INJ, PRN, Dosing Weight 88.636, kg, PRN Blood Gl
ucose Results, Start date: 19 22:30:00 CDT, Duration: 30 day, Stop date: 0
19 22:29:00 CDT

Start Date: 19

Stop Date: 6/3/19

Status: Discontinued



glucagon

1 mg, Route: IM, Drug form: PDR/INJ, PRN, Dosing Weight 88.636, kg, PRN Blood Gl
ucose Results, Start date: 19 14:08:00 CDT, Duration: 30 day, Stop date: 0
19 14:07:00 CDT

Start Date: 19

Stop Date: 6/3/19

Status: Discontinued



Humulin R 100 units/mL injectable solution

SUB-Q, Before Meals & Bedtime, -250 give 2 units -300 give 4 units 
-350 give 6 units -004 give 8 units Bg over 400 call MD, 0 Refill(s)

Start Date: 19

Stop Date: 6/3/19

Status: Discontinued



insulin lispro

5 unit, 0.05 mL, Route: SUB-Q, Drug form: SOLN, TID-Before Meals, Dosing Weight 
88.636, kg, PRN Blood Glucose Results, Start date: 19 22:30:00 CDT, Durati
on: 30 day, Stop date: 19 22:29:00 CDT

Notes: (Same as: Humalog) Roll in palms of hands gently; Do not shake vigorously
.  WASTE: F/P - Black; E - Municipal Trash BinStable for 28 days at room tempera
ture.Expires in _____ days from ______________Date

Start Date: 19

Stop Date: 6/3/19

Status: Discontinued



insulin lispro

3 unit, 0.03 mL, Route: SUB-Q, Drug form: SOLN, TID-Before Meals, Dosing Weight 
88.636, kg, PRN Blood Glucose Results, Start date: 19 22:30:00 CDT, Durati
on: 30 day, Stop date: 19 22:29:00 CDT

Notes: (Same as: Humalog) Roll in palms of hands gently; Do not shake vigorously
.  WASTE: F/P - Black; E - Municipal Trash BinStable for 28 days at [facility].Expires in _____ days from ______________Date

Start Date: 19

Stop Date: 6/3/19

Status: Discontinued



insulin lispro

1 unit, 0.01 mL, Route: SUB-Q, Drug form: SOLN, TID-Before Meals, Dosing Weight 
88.636, kg, PRN Blood Glucose Results, Start date: 19 22:30:00 CDT, Durati
on: 30 day, Stop date: 19 22:29:00 CDT

Notes: (Same as: Humalog) Roll in palms of hands gently; Do not shake vigorously
.  WASTE: F/P - Black; E - Municipal Trash BinStable for 28 days at [facility].Expires in _____ days from ______________Date

Start Date: 19

Stop Date: 6/3/19

Status: Discontinued



insulin lispro

2 unit, 0.02 mL, Route: SUB-Q, Drug form: SOLN, TID-Before Meals, Dosing Weight 
88.636, kg, PRN Blood Glucose Results, Start date: 19 22:30:00 CDT, Durati
on: 30 day, Stop date: 19 22:29:00 CDT

Notes: (Same as: Humalog) Roll in palms of hands gently; Do not shake vigorously
.  WASTE: F/P - Black; E - Municipal Trash BinStable for 28 days at [facility].Expires in _____ days from ______________Date

Start Date: 19

Stop Date: 6/3/19

Status: Discontinued



insulin lispro

4 unit, 0.04 mL, Route: SUB-Q, Drug form: SOLN, TID-Before Meals, Dosing Weight 
88.636, kg, PRN Blood Glucose Results, Start date: 19 22:30:00 CDT, Durati
on: 30 day, Stop date: 19 22:29:00 CDT

Notes: (Same as: Humalog) Roll in palms of hands gently; Do not shake vigorously
.  WASTE: F/P - Black; E - Municipal Trash BinStable for 28 days at room tempera
ture.Expires in _____ days from ______________Date

Start Date: 19

Stop Date: 6/3/19

Status: Discontinued



isosorbide mononitrate

30 mg, 1 tab, Route: PO, Drug form: ERTAB, QAM, Dosing Weight 88.636, kg, Start 
date: 19 9:00:00 CDT, Duration: 30 day, Stop date: 19 9:00:00 CDT

Notes: (Same as:Imdur)"Do Not Crush"  Take on empty stomach/ full glass of water
.  Do not crush

Start Date: 6/3/19

Stop Date: 6/3/19

Status: Discontinued



Lantus 100 units/mL

20 unit, SUB-Q, Bedtime, # 10 mL, 3 Refill(s)

Start Date: 19

Status: Ordered



Lantus 100 units/mL

20 unit, 0.2 mL, Route: SUB-Q, Drug form: SOLN, Bedtime, Dosing Weight 88.636, k
g, Start date: 19 21:00:00 CDT, Duration: 30 day, Stop date: 19 21:0
0:00 CDT

Notes: (Same as: Lantus)Do not hold insulin without contacting prescriberWASTE: 
F/P - Black; E - Municipal Trash Bin"single patient use only"Stable for 28 days 
at room temperature  Expires in  _____ days from ______________Date

Start Date: 19

Stop Date: 6/3/19

Status: Discontinued



Lasix

40 mg, 4 mL, Route: IVP, Drug form: INJ, BID Diuretic, Dosing Weight 88.636, kg,
Start date: 19 16:00:00 CDT, Duration: 30 day, Stop date: 19 8:00:00
CDT

Notes: (Same as: Lasix)  *** MEDICATION WASTE ***Product Size:  40 mgProduct Was
sonali:  ___ mg

Start Date: 19

Stop Date: 6/3/19

Status: Discontinued



levothyroxine

75 microgram, PO, Daily, 0 Refill(s)

Start Date: 19

Status: Ordered



levothyroxine

75 microgram, 1 tab, Route: PO, Drug form: TAB, Daily, Dosing Weight 88.636, kg,
Start date: 19 9:00:00 CDT, Duration: 30 day, Stop date: 19 9:00:00 
CDT

Notes: Take 1 hour before or 2 hours after meal; Enteral feeds may interefere wi
th the absorption of this medication. (Same as:Synthroid, Levothroid)

Start Date: 6/3/19

Stop Date: 6/3/19

Status: Discontinued



magnesium oxide

400 mg, 1 tab, Route: PO, Drug form: TAB, Daily, Dosing Weight 88.636, kg, Start
date: 19 9:00:00 CDT, Duration: 30 day, Stop date: 19 9:00:00 CDT

Notes: (Same as: Mag-Ox 400)Magnesium oxide [id number]=188hh elemental magnesiumDose=
____mg magnesium oxide (___mg elemental magnesium)

Start Date: 6/3/19

Stop Date: 6/3/19

Status: Discontinued



magnesium oxide 400 mg oral tablet

400 mg=1 tab, PO, Daily, 0 Refill(s)

Start Date: 19

Status: Ordered



melatonin 3 mg oral tablet

3 mg=1 tab, PO, Bedtime, PRN for insomnia, # 60 tab, 0 Refill(s)

Start Date: 19

Stop Date: 19

Status: Ordered



metoprolol tartrate

25 mg, 1 tab, Route: PO, Drug form: TAB, Q12H, Dosing Weight 88.636, kg, Start d
ate: 19 21:00:00 CDT, Duration: 30 day, Stop date: 19 9:00:00 CDT

Notes: (Same as: Lopressor)

Start Date: 19

Stop Date: 6/3/19

Status: Discontinued



morphine Sulfate

6 mg, 3 mL, Route: PO, Drug form: SOLN, Q4H, Dosing Weight 88.636, kg, PRN Pain 
Score 7-10, Start date: 19 14:09:00 CDT, Duration: 30 day, Stop date:  14:08:00 CDT

Notes: (Same as:MORPhine Sulfate)

Start Date: 19

Stop Date: 6/3/19

Status: Discontinued



Norco 5/325 oral tablet

1 tab, Route: PO, Drug Form: TAB, Dosing Weight 88.636, kg, Q6H, PRN Pain Score 
4-6, Start date: 19 14:09:00 CDT, Duration: 30 day, Stop date: 19 14
:08:00 CDT

Notes: (Same as: Norco 325/5)  Do not exceed 4gm/day of acetaminophen.

Start Date: 19

Stop Date: 6/3/19

Status: Discontinued



pantoprazole

40 mg, 1 tab, Route: PO, Drug form: ECTAB, Daily, Dosing Weight 88.636, kg, Star
t date: 19 9:00:00 CDT, Duration: 30 day, Stop date: 19 9:00:00 CDT

Notes: Tablet should not be chewed or crushed.(Same as: Protonix)

Start Date: 6/3/19

Stop Date: 6/3/19

Status: Discontinued



pantoprazole

40 mg, PO, Daily, # 30 tab, 0 Refill(s)

Start Date: 19

Stop Date: 19

Status: Ordered



potassium chloride

20 mEq, PO, BID, 0 Refill(s)

Start Date: 19

Status: Ordered



potassium chloride

20 mEq, 1 tab, Route: PO, Drug form: ERTAB, BID, Dosing Weight 88.636, kg, Start
date: 19 9:00:00 CDT, Duration: 30 day, Stop date: 19 17:00:00 CDT

Notes: (Same as: K-Dur 20)"Do Not Crush"  Give with food and full glass of water
For patients unable to swallow tablet, dissolve in one half glass of water. Allo
w about 2 minutes for the tablets to disintegrate. Stir before giving to prepare
slurry and administer.Please exclude Patients with feeding tube less than 14 
[place] (Dobhoff, J-tube etc) and pediatric and  patients.

Start Date: 6/3/19

Stop Date: 6/3/19

Status: Discontinued



potassium chloride 20 mEq oral tablet, extended release

20 mEq, 1 tab, Route: PO, Drug form: ERTAB, ONCE, Dosing Weight 88.636, kg, Star
t date: 19 8:49:00 CDT, Stop date: 19 8:49:00 CDT

Notes: (Same as: K-Dur 20)"Do Not Crush"  Give with food and full glass of water
For patients unable to swallow tablet, dissolve in one half glass of water. Allo
w about 2 minutes for the tablets to disintegrate. Stir before giving to prepare
slurry and administer.Please exclude Patients with feeding tube less than 14 
[facility] [place] (Dobhoff, J-tube etc) and pediatric and  patients.

Start Date: 6/3/19

Stop Date: 6/3/19

Status: Completed



Rocephin + sterile water 10 mL

1 gm, Route: IVP, [id number], Dosing Weight 88.636, kg, Start date: 19 11:00:
00 CDT, Duration: 6 day, Stop date: 19 11:00:00 CDT, ABX Indication: Urina
ry Tract Infection

Notes: (Same As: Rocephin).Use with 100 mL NS and infuse over 30 min  *** MEDICA
TION WASTE ***Product Size:  1000 mgProduct Wasted:  ___ mg

Start Date: 6/3/19

Stop Date: 6/3/19

Status: Discontinued



tamsulosin

0.4 mg, 1 cap, Route: PO, Drug form: CAP, Daily, Dosing Weight 88.636, kg, Start
date: 19 9:00:00 CDT, Duration: 30 day, Stop date: 19 9:00:00 CDT

Notes: (Same As: Flomax)  "Do Not Crush"

Start Date: 6/3/19

Stop Date: 6/3/19

Status: Discontinued



tamsulosin 0.4 mg oral capsule

0.4 mg=1 cap, PO, Daily, 0 Refill(s)

Start Date: 19

Status: Ordered



ticagrelor

90 mg, 1 tab, Route: PO, Drug form: TAB, BID, Dosing Weight 88.636, kg, Start da
te: 19 9:00:00 CDT, Duration: 30 day, Stop date: 19 17:00:00 CDT

Notes: (Same as: Brilinta)

Start Date: 6/3/19

Stop Date: 6/3/19

Status: Discontinued



ticagrelor 90 mg oral tablet

90 mg=1 tab, PO, BID, 0 Refill(s)

Start Date: 19

Status: Ordered



torsemide

20 mg, PO, Daily, # 20 tab, 0 Refill(s)

Start Date: 19

Status: Ordered



Vantin 200 mg oral tablet

200 mg=1 tab, PO, Q12H, X 8 day, # 16 tab, 0 Refill(s), Pharmacy: [facility] [id number]

Start Date: 6/3/19

Stop Date: 19

Status: Ordered



Zoloft

25 mg, 0.5 tab, Route: PO, Drug form: TAB, Daily, Dosing Weight 88.636, kg, Star
t date: 19 9:00:00 CDT, Duration: 30 day, Stop date: 19 9:00:00 CDT

Notes: (Same as:  Zoloft)

Start Date: 6/3/19

Stop Date: 6/3/19

Status: Discontinued



Zoloft 25 mg oral tablet

25 mg=1 tab, PO, Daily, 0 Refill(s)

Start Date: 19

Status: Ordered



ZyrTEC

10 mg, 2 tab, Route: PO, Drug form: TAB, Daily, Dosing Weight 88.636, kg, Start 
date: 19 9:00:00 CDT, Duration: 30 day, Stop date: 19 9:00:00 CDT

Notes: (Same As: Zyrtec)

Start Date: 6/3/19

Stop Date: 6/3/19

Status: Discontinued



ZyrTEC

10 mg, PO, Daily, 0 Refill(s)

Start Date: 19

Status: Ordered



Results











                    1                   2                   3



                                         Most recent to   



                                         oldest [Reference   



                                         Range]:   

 

                                        7.7 K/CMM 

                          (6/3/19 3:12 AM)          8.0 K/CMM 

                          (19 8:23 AM)           



                                         Neutrophils #   



                                         [1.5-8.1 K/CMM]   

 

                                        2.2 K/CMM 

                          (6/3/19 3:12 AM)          2.0 K/CMM 

                          (19 8:23 AM)           



                                         Lymphocytes #   



                                         [1.0-5.5 K/CMM]   

 

                                        1.0 K/CMM 

*HI*

                          (6/3/19 3:12 AM)          1.2 K/CMM 

*HI*

                          (19 8:23 AM)           



                                         Monocytes # [0.0-0.8   



                                         K/CMM]   

 

                                        0.1 K/CMM 

                          (6/3/19 3:12 AM)          0.2 K/CMM 

                          (19 8:23 AM)           



                                         Eosinophils #   



                                         [0.0-0.5 K/CMM]   

 

                                        0.1 K/CMM 

                          (6/3/19 3:12 AM)          0.1 K/CMM 

                          (19 8:23 AM)           



                                         Basophils # [0.0-0.2   



                                         K/CMM]   

 

                                        275 pg/mL 

*HI*

                    (19 8:23 AM)                          



                                         BNP [<=100 pg/mL]   

 

                                        67 mL/min/1.73m2 1

*NA*

                          (6/3/19 3:12 AM)          68 mL/min/1.73m2 2

*NA*

                          (19 8:23 AM)           



                                         eGFR   

 

                                        9.3 mEq/L 

*LOW*

                          (6/3/19 3:12 AM)          11.5 mEq/L 

                          (19 8:23 AM)           



                                         AGAP [10.0-20.0   



                                         mEq/L]   

 

                                        0.6 % 

                          (6/3/19 3:12 AM)          0.7 % 

                          (19 8:23 AM)           



                                         Basophils [0.0-1.0   



                                         %]   

 

                                        16 mg/dL 

                          (6/3/19 3:12 AM)          18 mg/dL 

                          (19 8:23 AM)           



                                         BUN [7-22 mg/dL]   

 

                                        8.2 mg/dL 

*LOW*

                          (6/3/19 3:12 AM)          8.2 mg/dL 

*LOW*

                          (19 8:23 AM)           



                                         Calcium Lvl   



                                         [8.5-10.5 mg/dL]   

 

                                        104 mEq/L 

                          (6/3/19 3:12 AM)          107 mEq/L 

                          (19 8:23 AM)           



                                         Chloride Lvl [   



                                         mEq/L]   

 

                                        28 mEq/L 

                          (6/3/19 3:12 AM)          26 mEq/L 

                          (19 8:23 AM)           



                                         CO2 [24-32 mEq/L]   

 

                                        1.06 mg/dL 

                          (6/3/19 3:12 AM)          1.05 mg/dL 

                          (19 8:23 AM)           



                                         Creatinine Lvl   



                                         [0.50-1.40 mg/dL]   

 

                                        0.9 % 

                          (6/3/19 3:12 AM)          1.5 % 

                          (19 8:23 AM)           



                                         Eosinophils [0.0-4.0   



                                         %]   

 

                                        159 mg/dL 

*HI*

                          (6/3/19 3:12 AM)          99 mg/dL 

                          (19 8:23 AM)           



                                         Glucose Lvl [70-99   



                                         mg/dL]   

 

                                        30.5 % 

*LOW*

                          (6/3/19 3:12 AM)          31.6 % 

*LOW*

                          (19 8:23 AM)           



                                         Hct [42.0-54.0 %]   

 

                                        9.7 g/dL 

*LOW*

                          (6/3/19 3:12 AM)          10.1 g/dL 

*LOW*

                          (19 8:23 AM)           



                                         Hgb [14.0-18.0 g/dL]   

 

                                        8.0 % 

*HI*

                    (6/3/19 3:12 AM)                          



                                         Hgb A1C [<=5.6 %]   

 

                                        3.3 mEq/L 

*LOW*

                          (6/3/19 3:12 AM)          3.5 mEq/L 

                          (19 8:23 AM)           



                                         Potassium Lvl   



                                         [3.5-5.1 mEq/L]   

 

                                        19.6 % 

*LOW*

                          (6/3/19 3:12 AM)          17.5 % 

*LOW*

                          (19 8:23 AM)           



                                         Lymphocytes   



                                         [20.0-40.0 %]   

 

                                        25.9 pg 

*LOW*

                          (6/3/19 3:12 AM)          25.6 pg 

*LOW*

                          (19 8:23 AM)           



                                         MCH [27.0-31.0 pg]   

 

                                        31.8 g/dL 

*LOW*

                          (6/3/19 3:12 AM)          31.9 g/dL 

*LOW*

                          (19 8:23 AM)           



                                         MCHC [32.0-36.0   



                                         g/dL]   

 

                                        81.5 fL 

                          (6/3/19 3:12 AM)          80.4 fL 

                          (19 8:23 AM)           



                                         MCV [80.0-94.0 fL]   

 

                                        1.9 mg/dL 

                    (6/3/19 3:12 AM)                          



                                         Magnesium Lvl   



                                         [1.8-2.4 mg/dL]   

 

                                        9.4 % 

                          (6/3/19 3:12 AM)          10.2 % 

                          (19 8:23 AM)           



                                         Monocytes [2.0-12.0   



                                         %]   

 

                                        9.2 fL 

                          (6/3/19 3:12 AM)          9.7 fL 

                          (19 8:23 AM)           



                                         MPV [7.4-10.4 fL]   

 

                                        138 mEq/L 

                          (6/3/19 3:12 AM)          141 mEq/L 

                          (19 8:23 AM)           



                                         Sodium Lvl [135-145   



                                         mEq/L]   

 

                                        197 K/CMM 

                          (6/3/19 3:12 AM)          196 K/CMM 

                          (19 8:23 AM)           



                                         Platelet [133-450   



                                         K/CMM]   

 

                                        69.5 % 

                          (6/3/19 3:12 AM)          70.1 % 

                          (19 8:23 AM)           



                                         Segs [45.0-75.0 %]   

 

                                        3.74 M/CMM 

*LOW*

                          (6/3/19 3:12 AM)          3.93 M/CMM 

*LOW*

                          (19 8:23 AM)           



                                         RBC [4.70-6.10   



                                         M/CMM]   

 

                                        16.5 % 

*HI*

                          (6/3/19 3:12 AM)          16.9 % 

*HI*

                          (19 8:23 AM)           



                                         RDW [11.5-14.5 %]   

 

                                        0.04 ng/mL 

                          (19 5:48 PM)          <0.02 ng/mL 

                          (19 12:05 PM)         <0.02 ng/mL 

                                        (19 8:23 AM) 



                                         Troponin-I   



                                         [0.00-0.40 ng/mL]   

 

                                        Many /HPF 

*ABN*

                    (19 9:17 AM)                          



                                         UA Bacteria [None   



                                         Seen /HPF]   

 

                                        Negative 

*NA*

                    (19 9:17 AM)                          



                                         UA Bili [Negative]   

 

                                        Negative 

                    (19 9:17 AM)                          



                                         UA Blood [Negative]   

 

                                        Ltyellow 

*NA*

                    (19 9:17 AM)                          



                                         UA Color   

 

                                        Negative mg/dL 

*NA*

                    (19 9:17 AM)                          



                                         UA Glucose [Negative   



                                         mg/dL]   

 

                                        Negative mg/dL 

*NA*

                    (19 9:17 AM)                          



                                         UA Ketones [Negative   



                                         mg/dL]   

 

                                        Large 

*ABN*

                    (19 9:17 AM)                          



                                         UA Leuk Est   



                                         [Negative]   

 

                                        Few /LPF 

*NA*

                    (19 9:17 AM)                          



                                         UA Mucus [None Seen   



                                         /LPF]   

 

                                        Negative 

                    (19 9:17 AM)                          



                                         UA Nitrite   



                                         [Negative]   

 

                                        7.0 

                    (19 9:17 AM)                          



                                         UA pH [5.0-8.0]   

 

                                        Negative mg/dL 

                    (19 9:17 AM)                          



                                         UA Protein [Negative   



                                         mg/dL]   

 

                                        4 /HPF 

*HI*

                    (19 9:17 AM)                          



                                         UA RBC [0-2 /HPF]   

 

                                        1.012 

                    (19 9:17 AM)                          



                                         UA Spec Grav   



                                         [<=1.030]   

 

                                        None Seen 

*NA*

                    (19 9:17 AM)                          



                                         UA Sq Epi   

 

                                        Clear 

                    (19 9:17 AM)                          



                                         UA Turbidity [Clear]   

 

                                        <=1.0 mg/dL 

*NA*

                    (19 9:17 AM)                          



                                         UA Urobilinogen   



                                         [0.1-1.0 mg/dL]   

 

                                        47 /HPF 

*HI*

                    (19 9:17 AM)                          



                                         UA WBC [0-5 /HPF]   

 

                                        11.1 K/CMM 

*HI*

                          (6/3/19 3:12 AM)          11.4 K/CMM 

*HI*

                          (19 8:23 AM)           



                                         WBC [3.7-10.4 K/CMM]   







1Result Comment: The eGFR is calculated using the CKD-EPI formula. In most 
young, healthy individuals the eGFR will be >90 mL/min/1.73m2. The eGFR declines
with age. An eGFR of 60-89 may be normal in some populations, particularly the 
elderly, for whom the CKD-EPI formula has not been extensively validated. Use of
the eGFR is not recommended in the following populations:



Individuals with unstable creatinine concentrations, including pregnant patients
and those with serious co-morbid conditions.



Patients with extremes in muscle mass or diet. 



The data above are obtained from the National Kid[employer] Education Program (
NK[employer]) which additionally recommends that when the eGFR is used in patients with
extremes of body mass index for purposes of drug dosing, the eGFR should be mul
tiplied by the estimated BMI.



2Result Comment: The eGFR is calculated using the CKD-EPI formula. In most 
young, healthy individuals the eGFR will be >90 mL/min/1.73m2. The eGFR declines
with age. An eGFR of 60-89 may be normal in some populations, particularly the 
elderly, for whom the CKD-EPI formula has not been extensively validated. Use of
the eGFR is not recommended in the following populations:



Individuals with unstable creatinine concentrations, including pregnant patients
and those with serious co-morbid conditions.



Patients with extremes in muscle mass or diet. 



The data above are obtained from the National Kid[employer] Education Program (
NK[employer]) which additionally recommends that when the eGFR is used in patients with
extremes of body mass index for purposes of drug dosing, the eGFR should be mul
tiplied by the estimated BMI.



Immunizations





Given and Recorded





   



                 Vaccine         Date            Status          Refusal Reason

 

   



                     influenza virus vaccine, inactivated     18             Given 

 

   



                     influenza virus vaccine, inactivated     18              Given 

 

   



                     pneumococcal 13-valent vaccine     18              Given 

 

   



                     diphtheria/pertussis, acel/tetanus adult     16              Given 

 

   



                     pneumococcal 23-valent vaccine     3/31/15             Given 









Not Given





   



                 Vaccine         Date            Status          Refusal Reason

 

   



                 pneumococcal 23-valent vaccine1     3/30/15         Not Given       Patient Refuses







1Result Note: already recieved vaccination previous to admission



Procedures







    



              Procedure     Date         Related Diagnosis     Body Site     Status

 

    



                           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

 

    



                           Cardiac ablation using fluoroscopy guidance        Completed

 

    



                           Cardiac catheterization, left heart        Completed

 

    



                           Stent placement2          Completed







1ablation stents



2x27



Social History







 



                           Social History Type       Response

 

 



                           Substance Abuse           Use: None.

 

 



                           Alcohol                   Never

 

 



                           Smoking Status            Never smoker; Exposure to Tobacco Smoke None; Cigarette Smoking

 Last 365



                                         Days No; Reg Smoking Cessation Counseling No



                                         entered on: 19







Assessment and Plan

Extracted from:





  



                     Title: Cardiology Consultation     Author: Marquez Brannon MD     Date: 19









                                         Impression and Plan



Chest pain, possible acute coronary syndrome

Acute on chronic combined systolic and diastolic CHF -cardiomegaly with 
pulmonary vascular congestion on chest x-ray, elevated BNP

CAD with a history of three-vessel CABG and multiple coronary stents

Recent non-ST elevation MI in 2019

Paroxysmal atrial fibrillation

Acute UTI

Hypertension

Diabetes mellitus type 2

Hyperlipidemia

Obstructive sleep apnea



Plan:



Admit for observation

Serial cardiac enzymes and EKGs to rule out acute coronary syndrome

Obtain records from [facility] where patient is undergone multiple 
cardiac cath and angioplasty procedures and most recently was transferred there 
in January of this year per his request to have the procedures done by his 
regular cardiologist Dr. Andrei Terrell

Repeat echocardiogram

Resume cardiac meds including aspirin, Brilinta, amiodarone, beta-blocker, IV 
Lasix and statins

Further recommendations per his response to treatment and clinical course



Thank you for this consultation.  I shall follow him with you.





Extracted from:





  



                     Title: General Admission H&P *     Author: Russ Amador MD     Date: 19









                                         Impression and Plan



Chest Pain

SOB

Acute on chronic combined systolic and diastolic CHF Exacerbation

CAD with a history of three-vessel CABG and multiple coronary stents

Recent NSTEMI in 2019

Paroxysmal atrial fibrillation

Hypertension

Diabetes mellitus type 2

Hyperlipidemia

Obstructive sleep apnea

Acute UTI

BPH

Hypothyroidism

Plan:

                                        -Tele

                                        -O2 prn

                                        -Trend CE,

                                        -start pain meds prn

                                        -Consult Cardiology

                                        -Start IV Lasix 40 mg IV BID

                                        -Daily weight, I/Os.

                                        -Monitor Vitals

                                        -Start IV Rocephin

                                        -F/u , Urine Cx

                                        -Start SSI+FSBG,

                                        -Reviewed and Ordered Labs

                                        -Reviewed  Imaging

                                        -Resume cardiac meds including aspirin, Brilinta, amiodarone, beta-blocker,  and

 statins

                                        -Reconcile Home Meds Once available



DVT PPX: Lovenox



Patient seen by Russ Amador MD

Internal Medicine Hospitalist

## 2019-07-18 NOTE — XMS REPORT
Summary of Care: 18 - 18

                             Created on: 2064



DESIREE MCKEON

External Reference #: [id number]

: 1940

Sex: Male



Demographics







                          Address                   *[address] [place]  [address]-

 

                          Home Phone                [phone]

 

                          Preferred Language        Unknown

 

                          Marital Status            Unknown

 

                          Worship Affiliation     Denominational

 

                          Race                      Other

 

                                        Additional Race(s)  

 

                          Ethnic Group              Non-





Author







                          Author                    [facility]

 

                          Organization              [facility]

 

                          Address                   Unknown

 

                          Phone                     Unavailable







Encounter





HQ Slava(DAVID) [id number] Date(s): 18 - 18

[facility] [address] [place] [address]-     [phone]
[phone]

Encounter Diagnosis

Pneumonia, unspecified organism (Final) - 18

Acute on chronic diastolic (congestive) heart failure (Final) - 

Respiratory failure, unspecified with hypoxia (Final) - 

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 
(Final) - 

Hypothyroidism, unspecified (Final) - 

Presence of aortocoronary bypass graft (Final) - 

Presence of coronary angioplasty implant and graft (Final) - 

Sleep apnea, unspecified (Final) - 

Long term (current) use of anticoagulants (Final) - 

Paroxysmal atrial fibrillation (Final) - 

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 

Pure hypercholesterolemia, unspecified (Final) - 

Personal history of Methicillin resistant Staphylococcus aureus infection 
(Final) - 

Personal history of poliomyelitis (Final) - 

Hypertensive heart disease with heart failure (Final) - 

Hypotension, unspecified (Final) - 

Type 2 diabetes mellitus with diabetic neuropathy, unspecified (Final) - 

Essential tremor (Final) - 

Syncope and collapse (Final) - 

Personal history of nicotine dependence (Final) - 

Iron deficiency anemia, unspecified (Final) - 

Discharge Disposition: Skilled Nursing Facility

Attending Physician: Katie Mandujano MD

Admitting Physician: Katie Mandujano MD





Vital Signs







                    1                   2                   3



                                         Most recent to   



                                         oldest [Reference   



                                         Range]:   

 

                                        175.26 cm 

                          (18 12:35 AM)        172.72 cm 

                          (18 2:54 PM)          



                                         Height   

 

                                        97.6 DegF 

                          (18 8:41 PM)         97.8 DegF 

                          (18 3:25 PM)         98 DegF 

                                        (18 11:37 AM)



                                         Temperature Oral   



                                         [96.4-99.1 DegF]   

 

                                        114/72 mmHg 

                          (18 8:41 PM)         121/80 mmHg 

                          (18 3:25 PM)         134/71 mmHg 

                                        (18 11:37 AM)



                                         Blood Pressure   



                                         [/60-90 mmHg]   

 

                                        20 BRMIN 

                          (18 8:41 PM)         16 BRMIN 

                          (18 7:52 PM)         18 BRMIN 

                                        (18 3:25 PM)



                                         Respiratory Rate   



                                         [14-20 BRMIN]   

 

                                        78 bpm 

                          (18 8:41 PM)         70 bpm 

                          (18 3:25 PM)         80 bpm 

                                        (18 11:37 AM)



                                         Peripheral Pulse   



                                         Rate [ bpm]   

 

                                        86.165 kg 

                          (18 12:35 AM)        111.364 kg 

                          (18 2:54 PM)          



                                         Weight   

 

                                        28.05 m2 

                          (18 12:35 AM)        37.33 m2 

                          (18 2:54 PM)          



                                         Body Mass Index   







Problem List







    



              Condition     Effective Dates     Status       Health Status     Informant

 

    



                           Acute MI(Confirmed)1      Resolved  

 

    



                           Atrial                    Active  



                                         fibrillation(Confirm    



                                         ed)    

 

    



                           CHF (congestive           Active  



                                         heart    



                                         failure)(Confirmed)    

 

    



                           CAD (coronary artery      Active  



                                         disease)(Confirmed)    

 

    



                           Diabetes(Confirmed)       Active  

 

    



                           Diastolic heart           Active  



                                         failure(Confirmed)    

 

    



                           Histoplasmosis(Confi      Resolved  



                                         rmed)    

 

    



                           Hx MRSA                   Resolved  



                                         infection(Confirmed)    

 

    



                           Hypercholesteremia(C      Resolved  



                                         onfirmed)    

 

    



                           Hypertension(Confirm      Active  



                                         ed)    

 

    



                           Hypertension(Confirm      Resolved  



                                         ed)    

 

    



                           HTN                       Active  



                                         (hypertension)(Confi    



                                         rmed)    

 

    



                           Hypothyroidism(Confi      Active  



                                         rmed)    

 

    



                           Diabetes mellitus         Active  



                                         type 2, insulin    



                                         dependent(Confirmed)    

 

    



                     Moderate            Active              Chronic ; 



                                         protein-calorie    



                                         malnutrition(Confirm    



                                         ed)    

 

    



                           PAUL (obstructive          Active  



                                         sleep    



                                         apnea)(Confirmed)    

 

    



                           Poliomyelitides(Conf      Resolved  



                                         irmed)    

 

    



                           Sleep                     Active  



                                         apnea(Confirmed)    

 

    



                           Stented coronary          Resolved  



                                         artery(Confirmed)2    

 

    



                           Systolic heart            Active  



                                         failure(Confirmed)    

 

    



                           Whooping                  Resolved  



                                         cough(Confirmed)    







1x 3



225 cardiac stents



Allergies, Adverse Reactions, Alerts







   



                 Substance       Reaction        Severity        Status

 

   



                           sulfa drugs               Active

 

   



                           Reglan                    Active

 

   



                           iodine                    Active

 

   



                           Latex                     Active







Medications





**RN-Wait to give 9:00 vanc  on 8/15 till trough drawn******

**RN-Wait to give 9:00 vanc  on 8/15 till trough drawn******, Attn:RN, Drug form
: MISC, Route: MISC, ONCE, 08/15/18 8:00:00 CDT, Stop date: 08/15/18 8:00:00 CDT

Start Date: 8/15/18

Stop Date: 8/15/18

Status: Completed



acetaminophen

650 mg, 2 tab, Route: PO, Drug form: TAB, Q4H, Dosing Weight 111.364, kg, PRN Pa
in 1-3/Temp > 100.4 F, Start date: 18 21:59:00 CDT, Duration: 30 day, Stop
date: 18 21:58:00 CDT

Notes: Do not exceed 4 gm/day.  (Same as: Tylenol)

Start Date: 18

Stop Date: 18

Status: Discontinued



albuterol-ipratropium 2.5-0.5 mg inhalation solution

3 ml, Route: NEB, Drug Form: SOLN, Dosing Weight 86.165, kg, RQ4H, Start date: 0
18 15:00:00 CDT, Duration: 30 day, Stop date: 18 11:00:00 CDT

Notes: (Same as: Duoneb)

Start Date: 18

Stop Date: 18

Status: Discontinued



AMIODarone

200 mg, 1 tab, Route: PO, Drug form: TAB, Daily, Dosing Weight 111.364, kg, Star
t date: 18 9:00:00 CDT, Duration: 30 day, Stop date: 18 9:00:00 CDT

Notes: (Same as: Cordarone)

Start Date: 18

Stop Date: 18

Status: Discontinued



aspirin 81 mg tablet, enteric coated

81 mg, 1 tab, Route: PO, Drug form: ECTAB, Daily, Dosing Weight 86.165, kg, Star
t date: 18 9:00:00 CDT, Duration: 30 day, Stop date: 18 9:00:00 CDT

Notes: Do not crush or chew.(Same As: Ecotrin)

Start Date: 18

Stop Date: 18

Status: Discontinued



Ativan

0.5 mg, 1 tab, Route: PO, Drug form: TAB, Daily, Dosing Weight 111.364, kg, Star
t date: 18 10:15:00 CDT, Duration: 30 day, Stop date: 18 9:00:00 CDT

Notes: (Same as: Ativan)

Start Date: 18

Stop Date: 18

Status: Discontinued



atorvastatin

5 mg, 0.5 tab, Route: PO, Drug form: TAB, Bedtime, Dosing Weight 111.364, kg, St
art date: 18 21:00:00 CDT, Duration: 30 day, Stop date: 18 21:00:00 
CDT

Notes: (Same As: Lipitor)

Start Date: 18

Stop Date: 18

Status: Discontinued



Augmentin 875 mg oral tablet

875 mg=1 tab, PO, Q12H, X 14 day, # 28 tab, 0 Refill(s), other

Start Date: 18

Stop Date: 9/3/18

Status: Completed



Benadryl

25 mg, 1 tab, Route: PO, Drug form: TAB, Daily, Dosing Weight 111.364, kg, PRN I
tching, Start date: 18 22:01:00 CDT, Duration: 30 day, Stop date: 18
22:00:00 CDT

Start Date: 18

Stop Date: 18

Status: Discontinued



benzonatate

200 mg, 2 cap, Route: PO, Drug form: CAP, TID, Dosing Weight 86.165, kg, Priorit
y: STAT, Start date: 08/15/18 22:30:00 CDT, Duration: 30 day, Stop date: 
8 17:00:00 CDT

Notes: (Same As: Tessalon Perles)"Do Not Crush"

Start Date: 8/15/18

Stop Date: 18

Status: Discontinued



Bumex

1 mg, 4 mL, Route: IVP, Drug form: INJ, ONCE, Dosing Weight 86.165, kg, Start da
te: 18 9:49:00 CDT, Stop date: 18 9:49:00 CDT

Notes: (Same As: Bumex)

Start Date: 18

Stop Date: 18

Status: Completed



cefepime + Sodium Chloride 0.9%  mL

1 gm, Route: IVPB, [id number], Dosing Weight 86.165, kg, (CrCl 30 - 49 ml/min), Sta
rt date: 18 16:00:00 CDT, Duration: 10 day, Stop date: 18 4:00:00 CD
T, ABX Indication: Pneumonia

Notes: (Same As: Maxipime)  *** MEDICATION WASTE ***Product Size:  1000 mgProduc
t Wasted:  ___ mg

Start Date: 18

Stop Date: 8/15/18

Status: Discontinued



Dextrose 50% Syringe

25 gm, 50 mL, Route: IVP, Drug Form: INJ, Dosing Weight 86.165, kg, PRN, PRN Blo
od Glucose Results, Start date: 18 13:56:00 CDT, Duration: 30 day, Stop da
te: 18 13:55:00 CDT

Start Date: 18

Stop Date: 18

Status: Discontinued



Dextrose 50% Syringe

12.5 gm, 25 mL, Route: IVP, Drug Form: INJ, Dosing Weight 86.165, kg, PRN, PRN B
lood Glucose Results, Start date: 18 13:56:00 CDT, Duration: 30 day, Stop 
date: 18 13:55:00 CDT

Start Date: 18

Stop Date: 18

Status: Discontinued



doxycycline monohydrate 100 mg oral tablet

100 mg=1 tab, PO, Q12H, X 14 day, # 28 tab, 0 Refill(s), other

Start Date: 18

Stop Date: 9/3/18

Status: Completed



famotidine

20 mg, 1 tab, Route: PO, Drug form: TAB, Q12H, Dosing Weight 111.364, kg, Start 
date: 18 22:01:00 CDT, Duration: 30 day, Stop date: 09/10/18 21:00:00 CDT

Notes: (Same as: Pepcid)

Start Date: 18

Stop Date: 18

Status: Discontinued



gabapentin 600 mg oral tablet

600 mg, 2 cap, Route: PO, Drug form: CAP, Q12H, Dosing Weight 111.364, kg, Start
date: 18 9:00:00 CDT, Duration: 30 day, Stop date: 18 21:00:00 CDT

Notes: (Same as: Neurontin)

Start Date: 18

Stop Date: 18

Status: Discontinued



glucagon

1 mg, Route: IM, Drug form: PDR/INJ, PRN, Dosing Weight 86.165, kg, PRN Blood Gl
ucose Results, Start date: 18 13:56:00 CDT, Duration: 30 day, Stop date: 0
18 13:55:00 CDT

Start Date: 18

Stop Date: 18

Status: Discontinued



insulin glargine

76 unit, 0.76 mL, Route: SUB-Q, Drug form: SOLN, Daily, Start date: 18 9:0
0:00 CDT, Duration: 30 day, Stop date: 18 9:00:00 CDT

Notes: (Same as: Lantus)Do not hold insulin without contacting prescriberWASTE: 
F/P - Black; E - Municipal Trash Bin  "single patient use only"

Start Date: 18

Stop Date: 18

Status: Discontinued



insulin glargine

20 unit, 0.2 mL, Route: SUB-Q, Drug form: SOLN, Bedtime, Start date: 18 21
:00:00 CDT, Duration: 30 day, Stop date: 18 21:00:00 CDT

Notes: (Same as: Lantus)Do not hold insulin without contacting prescriberWASTE: 
F/P - Black; E - Municipal Trash Bin  "single patient use only"

Start Date: 18

Stop Date: 18

Status: Discontinued



insulin glargine

40 unit, 0.4 mL, Route: SUB-Q, Drug form: SOLN, Bedtime, Start date: 18 22
:27:00 CDT, Duration: 30 day, Stop date: 18 21:00:00 CDT

Notes: (Same as: Lantus)Do not hold insulin without contacting prescriberWASTE: 
F/P - Black; E - Municipal Trash Bin  "single patient use only"

Start Date: 18

Stop Date: 18

Status: Discontinued



insulin lispro

2 unit, 0.02 mL, Route: SUB-Q, Drug form: SOLN, Bedtime, Dosing Weight 86.165, k
g, PRN Blood Glucose Results, Start date: 18 13:56:00 CDT, Duration: 30 da
y, Stop date: 18 13:55:00 CDT

Notes: (Same as: Humalog ) Roll in palms of hands gently;  Do not shake `vigorou
sly. "Single Patient Use Only "  WASTE: F/P - Black; E - Municipal Trash Bin  St
able for 28 days at room temperature.Expires in _____ days from ______________Da
te

Start Date: 18

Stop Date: 18

Status: Discontinued



insulin lispro

1 unit, 0.01 mL, Route: SUB-Q, Drug form: SOLN, Bedtime, Dosing Weight 86.165, k
g, PRN Blood Glucose Results, Start date: 18 13:56:00 CDT, Duration: 30 da
y, Stop date: 18 13:55:00 CDT

Notes: (Same as: Humalog ) Roll in palms of hands gently;  Do not shake `vigorou
sly. "Single Patient Use Only "  WASTE: F/P - Black; E - Municipal Trash Bin  St
able for 28 days at room temperature.Expires in _____ days from ______________Da
te

Start Date: 18

Stop Date: 18

Status: Discontinued



insulin lispro

4 unit, 0.04 mL, Route: SUB-Q, Drug form: SOLN, Bedtime, Dosing Weight 86.165, k
g, PRN Blood Glucose Results, Start date: 18 13:56:00 CDT, Duration: 30 da
y, Stop date: 18 13:55:00 CDT

Notes: (Same as: Humalog ) Roll in palms of hands gently;  Do not shake `vigorou
sly. "Single Patient Use Only "  WASTE: F/P - Black; E - Municipal Trash Bin  St
able for 28 days at room temperature.Expires in _____ days from ______________Da
te

Start Date: 18

Stop Date: 18

Status: Discontinued



insulin lispro

3 unit, 0.03 mL, Route: SUB-Q, Drug form: SOLN, Bedtime, Dosing Weight 86.165, k
g, PRN Blood Glucose Results, Start date: 18 13:56:00 CDT, Duration: 30 da
y, Stop date: 18 13:55:00 CDT

Notes: (Same as: Humalog ) Roll in palms of hands gently;  Do not shake `vigorou
sly. "Single Patient Use Only "  WASTE: F/P - Black; E - Municipal Trash Bin  St
able for 28 days at room temperature.Expires in _____ days from ______________Da
te

Start Date: 18

Stop Date: 18

Status: Discontinued



insulin lispro

5 unit, 0.05 mL, Route: SUB-Q, Drug form: SOLN, TID-Before Meals, Dosing Weight 
86.165, kg, PRN Blood Glucose Results, Start date: 18 13:56:00 CDT, Durati
on: 30 day, Stop date: 18 13:55:00 CDT

Notes: (Same as: Humalog ) Roll in palms of hands gently;  Do not shake `vigorou
sly. "Single Patient Use Only "  WASTE: F/P - Black; E - Municipal Trash Bin  St
able for 28 days at room temperature.Expires in _____ days from ______________Da
te

Start Date: 18

Stop Date: 18

Status: Discontinued



insulin lispro

3 unit, 0.03 mL, Route: SUB-Q, Drug form: SOLN, TID-Before Meals, Dosing Weight 
86.165, kg, PRN Blood Glucose Results, Start date: 18 13:56:00 CDT, Durati
on: 30 day, Stop date: 18 13:55:00 CDT

Notes: (Same as: Humalog ) Roll in palms of hands gently;  Do not shake `vigorou
sly. "Single Patient Use Only "  WASTE: F/P - Black; E - Municipal Trash Bin  St
able for 28 days at room temperature.Expires in _____ days from ______________Da
te

Start Date: 18

Stop Date: 18

Status: Discontinued



insulin lispro

4 unit, 0.04 mL, Route: SUB-Q, Drug form: SOLN, TID-Before Meals, Dosing Weight 
86.165, kg, PRN Blood Glucose Results, Start date: 18 13:56:00 CDT, Durati
on: 30 day, Stop date: 18 13:55:00 CDT

Notes: (Same as: Humalog ) Roll in palms of hands gently;  Do not shake `vigorou
sly. "Single Patient Use Only "  WASTE: F/P - Black; E - Municipal Trash Bin  St
able for 28 days at room temperature.Expires in _____ days from ______________Da
te

Start Date: 18

Stop Date: 18

Status: Discontinued



insulin lispro

1 unit, 0.01 mL, Route: SUB-Q, Drug form: SOLN, TID-Before Meals, Dosing Weight 
86.165, kg, PRN Blood Glucose Results, Start date: 18 13:56:00 CDT, Durati
on: 30 day, Stop date: 18 13:55:00 CDT

Notes: (Same as: Humalog ) Roll in palms of hands gently;  Do not shake `vigorou
sly. "Single Patient Use Only "  WASTE: F/P - Black; E - Municipal Trash Bin  St
able for 28 days at room temperature.Expires in _____ days from ______________Da
te

Start Date: 18

Stop Date: 18

Status: Discontinued



insulin lispro

2 unit, 0.02 mL, Route: SUB-Q, Drug form: SOLN, TID-Before Meals, Dosing Weight 
86.165, kg, PRN Blood Glucose Results, Start date: 18 13:56:00 CDT, Durati
on: 30 day, Stop date: 18 13:55:00 CDT

Notes: (Same as: Humalog ) Roll in palms of hands gently;  Do not shake `vigorou
sly. "Single Patient Use Only "  WASTE: F/P - Black; E - Municipal Trash Bin  St
able for 28 days at room temperature.Expires in _____ days from ______________Da
te

Start Date: 18

Stop Date: 18

Status: Discontinued



Lantus 100 units/mL

10 unit, SUB-Q, Bedtime, # 3 mL, 0 Refill(s), other

Start Date: 18

Stop Date: 19

Status: Completed



Lantus 100 units/mL

76 unit, Route: SUB-Q, Drug form: SOLN, Daily, Dosing Weight 111.364, kg, Start 
date: 18 9:00:00 CDT, Duration: 30 day, Stop date: 18 9:00:00 CDT

Start Date: 18

Stop Date: 18

Status: Deleted



Lantus 100 units/mL

40 unit, Route: SUB-Q, Drug form: SOLN, Bedtime, Dosing Weight 111.364, kg, Star
t date: 18 21:00:00 CDT, Duration: 30 day, Stop date: 18 21:00:00 CD
T

Start Date: 18

Stop Date: 18

Status: Deleted



Lantus 100 units/mL

65 unit, SUB-Q, Daily, # 3 mL, 0 Refill(s), other

Start Date: 18

Stop Date: 19

Status: Completed



Lasix

40 mg, 4 mL, Route: IVP, Drug form: INJ, Daily, Dosing Weight 86.165, kg, Priori
ty: NOW, Start date: 18 14:57:00 CDT, Duration: 30 day, Stop date: 
8 9:00:00 CDT

Notes: (Same as: Lasix)  *** MEDICATION WASTE ***Product Size:  40 mgProduct Was
sonali:  ___ mg

Start Date: 18

Stop Date: 18

Status: Discontinued



Lasix

40 mg, 4 mL, Route: IVP, Drug form: INJ, TID, Dosing Weight 86.165, kg, Start da
te: 18 13:00:00 CDT, Duration: 30 day, Stop date: 09/15/18 9:00:00 CDT

Notes: (Same as: Lasix)  *** MEDICATION WASTE ***Product Size:  40 mgProduct Was
sonali:  ___ mg

Start Date: 18

Stop Date: 18

Status: Discontinued



lisinopril

2.5 mg, 0.5 tab, Route: PO, Drug form: TAB, Daily, Dosing Weight 111.364, kg, St
art date: 18 9:00:00 CDT, Duration: 30 day, Stop date: 18 9:00:00 CD
T

Notes: (Same as: Prinivil, Zestril)

Start Date: 18

Stop Date: 18

Status: Discontinued



LORazepam

1 mg, 1 tab, Route: PO, Drug form: TAB, Bedtime, Dosing Weight 111.364, kg, Star
t date: 18 21:00:00 CDT, Duration: 30 day, Stop date: 18 21:00:00 CD
T

Notes: (Same as: Ativan)

Start Date: 18

Stop Date: 18

Status: Discontinued



LORazepam

0.5 mg, 1 tab, Route: PO, Drug form: TAB, Daily, Dosing Weight 111.364, kg, Star
t date: 18 9:00:00 CDT, Duration: 30 day, Stop date: 18 9:00:00 CDT

Notes: (Same as: Ativan)

Start Date: 18

Stop Date: 18

Status: Voided With Results



LORazepam

0.5 mg, 1 tab, Route: PO, Drug form: TAB, Bedtime, Dosing Weight 86.165, kg, Sta
rt date: 18 21:00:00 CDT, Duration: 30 day, Stop date: 18 21:00:00 C
DT

Notes: (Same as: Ativan)

Start Date: 18

Stop Date: 18

Status: Canceled



LORazepam 0.5 mg oral tablet

1 mg=2 tab, PO, Bedtime, 0 Refill(s)

Start Date: 18

Stop Date: 19

Status: Discontinued



magnesium sulfate 2gm / NS 50ml (premixed)

2 gm, 50 mL, Route: IVPB, Drug form: INJ, ONCE, Dosing Weight 86.165, kg, Start 
date: 18 9:40:00 CDT, Stop date: 18 9:40:00 CDT

Notes: WASTE: F/P - Sink; E - Municipal Trash Bin

Start Date: 18

Stop Date: 18

Status: Completed



meropenem + Sodium Chloride 0.9%  mL

500 mg, Route: IVPB, [id number], Dosing Weight 86.165, kg, CrCL >=50ml/min, Extended
infusion, infuse over 3 hours, Start date: 18 3:00:00 CDT, Duration: 5 
day, Stop date: 18 22:00:00 CDT, ABX Indication: Pneumonia

Notes: Same as Merrem  *** MEDICATION WASTE ***Product Size:  500 mgProduct Wast
ed:  ___ mg

Start Date: 18

Stop Date: 18

Status: Discontinued



meropenem + sterile water 10 mL

500 mg, Route: IVP, ONCE, Start date: 08/15/18 20:39:00 CDT, Stop date: 08/15/18
20:39:00 CDT, ABX Indication: Pneumonia

Notes: Same as Merrem  *** MEDICATION WASTE ***Product Size:  500 mgProduct Wast
ed:  ___ mg

Start Date: 8/15/18

Stop Date: 8/15/18

Status: Completed



metoprolol tartrate

12.5 mg, 0.5 tab, Route: PO, Drug form: TAB, Q12H, Dosing Weight 86.165, kg, Christina
ority: NOW, Start date: 18 11:12:00 CDT, Duration: 30 day, Stop date:  9:00:00 CDT

Notes: (Same as: Lopressor)

Start Date: 18

Stop Date: 18

Status: Discontinued



metoprolol tartrate 25 mg oral tablet

12.5 mg=0.5 tab, PO, Q12H, 0 Refill(s)

Start Date: 18

Stop Date: 18

Status: Discontinued



niacin 1000 mg oral tablet, extended release

2,000 mg, 4 tab, Route: PO, Drug form: ERTAB, Bedtime, Dosing Weight 111.364, kg
, Start date: 18 21:00:00 CDT, Duration: 30 day, Stop date: 18 21:00
:00 CDT

Notes: (Same as: Niaspan)"Do Not Crush"Non-Formulary Item  With food.

Start Date: 18

Stop Date: 18

Status: Discontinued



Norco 10/325 oral tablet

1 tab, Route: PO, Drug Form: TAB, Dosing Weight 111.364, kg, Q6H, PRN Pain Score
4-6, Start date: 18 22:01:00 CDT, Duration: 30 day, Stop date: 18 2
2:00:00 CDT

Notes: Do not exceed 4gm/day of acetaminophen.  (Same as: Norco 325/10)

Start Date: 18

Stop Date: 18

Status: Discontinued



Norco 10/325 oral tablet

1 tab, PO, Q6H, PRN Pain Score 4-6, 0 Refill(s)

Start Date: 18

Stop Date: 19

Status: Completed



ondansetron

4 mg, Route: IVP, Q6H, Dosing Weight 111.364, kg, PRN Nausea & Vomiting, Start 
date: 18 21:59:00 CDT, Duration: 30 day, Stop date: 18 21:58:00 CDT

Start Date: 18

Stop Date: 18

Status: Discontinued



pantoprazole

40 mg, 1 tab, Route: PO, Drug form: ECTAB, Daily, Dosing Weight 111.364, kg, Sta
rt date: 18 9:00:00 CDT, Duration: 30 day, Stop date: 18 9:00:00 CDT

Notes: Tablet should not be chewed or crushed.(Same as: Protonix)

Start Date: 18

Stop Date: 18

Status: Canceled



pantoprazole

40 mg, 1 tab, Route: PO, Drug form: ECTAB, Q12H, Dosing Weight 86.165, kg, Start
date: 18 9:00:00 CDT, Duration: 30 day, Stop date: 18 21:00:00 CDT

Notes: Tablet should not be chewed or crushed.(Same as: Protonix)

Start Date: 18

Stop Date: 18

Status: Discontinued



potassium chloride

20 mEq, 1 tab, Route: PO, Drug form: ERTAB, Daily, Dosing Weight 111.364, kg, St
art date: 18 9:00:00 CDT, Duration: 30 day, Stop date: 18 9:00:00 CD
T

Notes: (Same as: K-Dur 20)"Do Not Crush"For patients unable to swallow tablet, d
issolve in one half glass of water. Allow about 2 minutes for the tablets to dis
integrate. Stir before giving to prepare slurry and administer.Please exclude Pa
tients with feeding tube less than 14 Vietnamese (Dobhoff, J-tube etc) and pediat
cornel and  patients.  With food and full glass of water

Start Date: 18

Stop Date: 18

Status: Discontinued



potassium chloride

40 mEq, 2 tab, Route: PO, Drug form: ERTAB, ONCE, Dosing Weight 86.165, kg, Star
t date: 18 15:32:00 CDT, Stop date: 18 15:32:00 CDT

Notes: (Same as: K-Dur 20)"Do Not Crush"For patients unable to swallow tablet, d
issolve in one half glass of water. Allow about 2 minutes for the tablets to dis
integrate. Stir before giving to prepare slurry and administer.Please exclude Pa
tients with feeding tube less than 14 Vietnamese (Dobhoff, J-tube etc) and pediat
cornel and  patients.  With food and full glass of water

Start Date: 18

Stop Date: 18

Status: Completed



potassium chloride 20 mEq oral tablet, extended release

40 mEq, 2 tab, Route: PO, Drug form: ERTAB, ONCE, Dosing Weight 86.165, kg, Star
t date: 18 9:40:00 CDT, Stop date: 18 9:40:00 CDT

Notes: (Same as: K-Dur 20)"Do Not Crush"For patients unable to swallow tablet, d
issolve in one half glass of water. Allow about 2 minutes for the tablets to dis
integrate. Stir before giving to prepare slurry and administer.Please exclude Pa
tients with feeding tube less than 14 Vietnamese (Dobhoff, J-tube etc) and pediat
cornel and  patients.  With food and full glass of water

Start Date: 18

Stop Date: 18

Status: Completed



primidone

12.5 mg, Route: PO, Bedtime, Dosing Weight 86.165, kg, Start date: 18 21:0
0:00 CDT, Duration: 30 day, Stop date: 18 21:00:00 CDT

Start Date: 18

Stop Date: 18

Status: Canceled



Ranexa 500 mg oral tablet, extended release

500 mg, 1 tab, Route: PO, Drug form: TAB, BID, Dosing Weight 111.364, kg, Start 
date: 18 9:00:00 CDT, Duration: 30 day, Stop date: 18 17:00:00 CDT

Notes: Same as Ranexa"Do Not Crush"

Start Date: 18

Stop Date: 18

Status: Discontinued



rivaroxaban

15 mg, 1 tab, Route: PO, Drug form: TAB, Bedtime, Start date: 18 22:21:00 
CDT, Duration: 30 day, Stop date: 18 21:00:00 CDT

Notes: (Same as: Xarelto)Administer with food

Start Date: 18

Stop Date: 18

Status: Deleted



rivaroxaban

20 mg, 1 tab, Route: PO, Drug form: TAB, QPM, Start date: 18 22:25:00 CDT,
Duration: 30 day, Stop date: 18 17:00:00 CDT

Notes: (Same as: Xarelto)Administer with food

Start Date: 18

Stop Date: 18

Status: Discontinued



Robitussin

100 mg, 5 mL, Route: PO, Drug Form: LIQ, Dosing Weight 86.165, kg, Q4H, NOW, Sta
rt date: 18 14:58:00 CDT, Duration: 30 day, Stop date: 18 12:00:00 C
DT

Notes: (Same as: Robitussin)

Start Date: 18

Stop Date: 18

Status: Discontinued



Saline Flush 0.9%

10 ml, Route: IVP, Drug Form: INJ, Dosing Weight 111.364, kg, PRN, PRN Line Flus
h, Start date: 18 21:59:00 CDT, Duration: 30 day, Stop date: 18 21:5
8:00 CDT

Notes: (Same as: BD Posiflush)

Start Date: 18

Stop Date: 18

Status: Discontinued



sertraline

100 mg, 1 tab, Route: PO, Drug form: TAB, Daily, Dosing Weight 111.364, kg, Star
t date: 18 9:00:00 CDT, Duration: 30 day, Stop date: 18 9:00:00 CDT

Notes: (Same as: Zoloft)

Start Date: 18

Stop Date: 18

Status: Discontinued



Sodium Chloride 0.9% IV 1,000 mL

1,000 mL, Rate: 125 ml/hr, Infuse over: 8 hr, Route: IV, Dosing Weight 111.364 k
g, Total Volume: 1,000, Start date: 18 21:59:00 CDT, Duration: 30 day, Sto
p date: 18 21:58:00 CDT, 2.34, m2

Start Date: 18

Stop Date: 18

Status: Discontinued



Synthroid

50 microgram, 1 tab, Route: PO, Drug form: TAB, Q630AM, Dosing Weight 111.364, k
g, Start date: 18 6:30:00 CDT, Duration: 30 day, Stop date: 18 6:30:
00 CDT

Notes: Take 1 hour before or 2 hours after meal; Enteral feeds may interefere wi
th the absorption of this medication.(Same as:Levothroid, Synthroid)

Start Date: 18

Stop Date: 18

Status: Discontinued



tamsulosin

0.4 mg, 1 cap, Route: PO, Drug form: CAP, Daily, Dosing Weight 111.364, kg, Star
t date: 18 9:00:00 CDT, Duration: 30 day, Stop date: 18 9:00:00 CDT

Notes: (Same As: Flomax)  "Do Not Crush"

Start Date: 18

Stop Date: 18

Status: Discontinued



torsemide

20 mg, 1 tab, Route: PO, Drug form: TAB, Daily, Dosing Weight 86.165, kg, Start 
date: 18 9:00:00 CDT, Duration: 30 day, Stop date: 18 9:00:00 CDT

Notes: (Same As: Demadex)

Start Date: 18

Stop Date: 18

Status: Discontinued



torsemide

20 mg, 1 tab, Route: PO, Drug form: TAB, Daily, Dosing Weight 86.165, kg, Start 
date: 18 9:00:00 CDT, Duration: 30 day, Stop date: 18 9:00:00 CDT

Notes: (Same As: Demadex)

Start Date: 18

Stop Date: 18

Status: Canceled



trazodone 50 mg oral tablet

50 mg, 1 tab, Route: PO, Drug form: TAB, Bedtime, Dosing Weight 111.364, kg, PRN
Insomnia, Start date: 18 22:01:00 CDT, Duration: 30 day, Stop date:  22:00:00 CDT

Notes: (Same As: Desyrel)

Start Date: 18

Stop Date: 18

Status: Discontinued



vancomycin + Dextrose 5% in Water  mL

1,000 mg, Route: IVPB, [id number], Dosing Weight 86.165, kg, Start date: 18 1
9:00:00 CDT, Duration: 10 day, Stop date: 18 9:00:00 CDT, ABX Indication: 
Other (specify in Comments)

Notes: TIME CRITICAL MEDICATION(Same As: Vancocin)Infusion rate< 1000 mg: infuse
over 1 [id number] - 1500 mg: infuse over 1.5 [id number] - 2000 mg: infuse over 2 
hours> 2001 mg: infuse over 2.5 hoursFor adult patients only: Round to nearest 
250 mg per Medical Staff approval  *** MEDICATION WASTE ***Product Size:  1000 
mgProduct Wasted:  ___ mg

Start Date: 18

Stop Date: 18

Status: Discontinued



vancomycin + Sodium Chloride 0.9%  mL

2,250 mg, Route: IVPB, ONCE, Dosing Weight 111.364, kg, Priority: STAT, Start da
te: 18 20:38:00 CDT, Stop date: 18 20:38:00 CDT, ABX Indication: ED 
- Suspected Sepsis

Notes: TIME CRITICAL MEDICATION(Same As: Vancocin)Infusion rate< 1000 mg: infuse
over 1 [id number] - 1500 mg: infuse over 1.5 [id number] - 2000 mg: infuse over 2 
hours> 2001 mg: infuse over 2.5 hoursFor adult patients only: Round to nearest 
250 mg per Medical Staff approval  *** MEDICATION WASTE ***Product Size:  1000 
mgProduct Wasted:  _750_ mg

Start Date: 18

Stop Date: 18

Status: Completed



Xarelto

20 mg, Route: PO, Drug form: TAB, QPM, Dosing Weight 111.364, kg, Start date:  17:00:00 CDT, Duration: 30 day, Stop date: 18 17:00:00 CDT

Start Date: 18

Stop Date: 18

Status: Deleted



Zosyn + Sodium Chloride 0.9%  mL

3.375 gm, Route: IVPB, ONCE, Dosing Weight 111.364, kg, Priority: STAT, Start da
te: 18 20:38:00 CDT, Stop date: 18 20:38:00 CDT, ABX Indication: ED 
- Suspected Sepsis

Notes: (Same as: Zosyn)Dosing based on Piperacillin component  *** MEDICATION WA
SARY ***Product Size:  3375 mgProduct Wasted:  ___ mg

Start Date: 18

Stop Date: 18

Status: Completed



Results





ELECTROLYTES





                    1                   2                   3



                                         Most recent to   



                                         oldest [Reference   



                                         Range]:   

 

                                        141 mEq/L 

                          (18 6:39 AM)         134 mEq/L 

*LOW*

                          (18 7:11 AM)         138 mEq/L 

                                        (18 6:43 AM) 



                                         Sodium Lvl [135-145   



                                         mEq/L]   

 

                                        4.1 mEq/L 

                          (18 6:39 AM)         4.4 mEq/L 

                          (18 7:11 AM)         3.4 mEq/L 

*LOW*

                                        (18 6:43 AM) 



                                         Potassium Lvl   



                                         [3.5-5.1 mEq/L]   

 

                                        99 mEq/L 

                          (18 6:39 AM)         97 mEq/L 

                          (18 7:11 AM)         98 mEq/L 

                                        (18 6:43 AM) 



                                         Chloride Lvl [   



                                         mEq/L]   

 

                                        33 mEq/L 

*HI*

                          (18 6:39 AM)         33 mEq/L 

*HI*

                          (18 7:11 AM)         31 mEq/L 

                                        (18 6:43 AM) 



                                         CO2 [24-32 mEq/L]   

 

                                        13.1 mEq/L 

                          (18 6:39 AM)         8.4 mEq/L 

*LOW*

                          (18 7:11 AM)         12.4 mEq/L 

                                        (18 6:43 AM) 



                                         AGAP [10.0-20.0   



                                         mEq/L]   







CHEM PANEL





                    1                   2                   3



                                         Most recent to   



                                         oldest [Reference   



                                         Range]:   

 

                                        1.04 mg/dL 

                          (18 6:39 AM)         0.81 mg/dL 

                          (18 7:11 AM)         0.79 mg/dL 

                                        (18 6:43 AM) 



                                         Creatinine Lvl   



                                         [0.50-1.40 mg/dL]   

 

                                        69 mL/min/1.73m2 1

*NA*

                          (18 6:39 AM)         86 mL/min/1.73m2 2

*NA*

                          (18 7:11 AM)         87 mL/min/1.73m2 3

*NA*

                                        (18 6:43 AM) 



                                         eGFR   

 

                                        16 mg/dL 

                          (18 6:39 AM)         15 mg/dL 

                          (18 7:11 AM)         18 mg/dL 

                                        (18 6:43 AM) 



                                         BUN [7-22 mg/dL]   

 

                                        19 

                    (18 7:11 AM)                          



                                         B/C Ratio [6-25]   

 

                                        41 mg/dL 4

*CRIT*

                          (18 6:39 AM)         110 mg/dL 

*HI*

                          (18 7:11 AM)         102 mg/dL 

*HI*

                                        (18 6:43 AM) 



                                         Glucose Lvl [70-99   



                                         mg/dL]   

 

                                        6.7 g/dL 

                          (18 7:11 AM)         7.4 g/dL 

                          (18 3:40 PM)          



                                         Total Protein   



                                         [6.4-8.4 g/dL]   

 

                                        2.4 g/dL 

*LOW*

                          (18 7:11 AM)         2.8 g/dL 

*LOW*

                          (18 3:40 PM)          



                                         Albumin Lvl [3.5-5.0   



                                         g/dL]   

 

                                        4.3 g/dL 

*HI*

                          (18 7:11 AM)         4.6 g/dL 

*HI*

                          (18 3:40 PM)          



                                         Globulin [2.7-4.2   



                                         g/dL]   

 

                                        0.6 

*LOW*

                          (18 7:11 AM)         0.6 

*LOW*

                          (18 3:40 PM)          



                                         A/G Ratio [0.7-1.6]   

 

                                        7.9 mg/dL 

*LOW*

                          (18 6:39 AM)         7.8 mg/dL 

*LOW*

                          (18 7:11 AM)         7.6 mg/dL 

*LOW*

                                        (18 6:43 AM) 



                                         Calcium Lvl   



                                         [8.5-10.5 mg/dL]   

 

                                        2.2 mg/dL 

                          (18 6:39 AM)         2.4 mg/dL 

                          (18 7:11 AM)         1.8 mg/dL 

                                        (18 6:43 AM) 



                                         Magnesium Lvl   



                                         [1.8-2.4 mg/dL]   

 

                                        28 unit/L 

                          (18 7:11 AM)         25 unit/L 

                          (18 3:40 PM)          



                                         ALT [0-65 unit/L]   

 

                                        54 unit/L 

*HI*

                          (18 7:11 AM)         42 unit/L 

*HI*

                          (18 3:40 PM)          



                                         AST [0-37 unit/L]   

 

                                        123 unit/L 

                          (18 7:11 AM)         111 unit/L 

                          (18 3:40 PM)          



                                         Alk Phos [   



                                         unit/L]   

 

                                        1.0 mg/dL 

                          (18 7:11 AM)         0.8 mg/dL 

                          (18 3:40 PM)          



                                         Bili Total [0.2-1.3   



                                         mg/dL]   

 

                                        0.4 mg/dL 

*HI*

                    (18 3:40 PM)                          



                                         Bili Direct [0.0-0.3   



                                         mg/dL]   

 

                                        0.4 mg/dL 

                    (18 3:40 PM)                          



                                         Bili Indirect   



                                         [0.0-1.0 mg/dL]   

 

                                        27 unit/L 

*LOW*

                    (18 3:40 PM)                          



                                         Lipase Lvl [   



                                         unit/L]   

 

                                        1.7 mMol/L 

                          (18 4:01 AM)         2.1 mMol/L 

                          (18 12:47 AM)        2.8 mMol/L 

*HI*

                                        (18 10:03 PM) 



                                         Lactic Acid Lvl   



                                         [0.5-2.2 mMol/L]   

 

                                        0.44 ng/mL 

*HI*

                          (18 6:00 AM)         4.43 ng/mL 5

*CRIT*

                          (18 3:34 PM)          



                                         Procalcitonin Lvl   



                                         [0.00-0.10 ng/mL]   







1Result Comment: The eGFR is calculated using the CKD-EPI formula. In most 
young, healthy individuals the eGFR will be >90 mL/min/1.73m2. The eGFR declines
with age. An eGFR of 60-89 may be normal in some populations, particularly the 
elderly, for whom the CKD-EPI formula has not been extensively validated. Use of
the eGFR is not recommended in the following populations:



Individuals with unstable creatinine concentrations, including pregnant patients
and those with serious co-morbid conditions.



Patients with extremes in muscle mass or diet. 



The data above are obtained from the National Kid[employer] Education Program (
NKDEP) which additionally recommends that when the eGFR is used in patients with
extremes of body mass index for purposes of drug dosing, the eGFR should be mul
tiplied by the estimated BMI.



2Result Comment: The eGFR is calculated using the CKD-EPI formula. In most 
young, healthy individuals the eGFR will be >90 mL/min/1.73m2. The eGFR declines
with age. An eGFR of 60-89 may be normal in some populations, particularly the 
elderly, for whom the CKD-EPI formula has not been extensively validated. Use of
the eGFR is not recommended in the following populations:



Individuals with unstable creatinine concentrations, including pregnant patients
and those with serious co-morbid conditions.



Patients with extremes in muscle mass or diet. 



The data above are obtained from the National Kid[employer] Education Program (
NKDEP) which additionally recommends that when the eGFR is used in patients with
extremes of body mass index for purposes of drug dosing, the eGFR should be mul
tiplied by the estimated BMI.



3Result Comment: The eGFR is calculated using the CKD-EPI formula. In most 
young, healthy individuals the eGFR will be >90 mL/min/1.73m2. The eGFR declines
with age. An eGFR of 60-89 may be normal in some populations, particularly the 
elderly, for whom the CKD-EPI formula has not been extensively validated. Use of
the eGFR is not recommended in the following populations:



Individuals with unstable creatinine concentrations, including pregnant patients
and those with serious co-morbid conditions.



Patients with extremes in muscle mass or diet. 



The data above are obtained from the National Kid[employer] Disease Education Program (
NKDEP) which additionally recommends that when the eGFR is used in patients with
extremes of body mass index for purposes of drug dosing, the eGFR should be mul
tiplied by the estimated BMI.



4Result Comment: Critical Result(s) called to MILES Waters at 2018 
07:41 by bobby.  Read back OK.



5Result Comment: Critical Result(s) called to MILES Warner at 2018 
17:57 by bobby.  Read back OK.



CARDIAC ENZYMES





                    1                   2                   3



                                         Most recent to   



                                         oldest [Reference   



                                         Range]:   

 

                                        35 unit/L 

                          (18 8:22 PM)         31 unit/L 

                          (18 3:40 PM)          



                                         Total CK [   



                                         unit/L]   

 

                                        1.1 ng/mL 

                          (18 8:22 PM)         1.1 ng/mL 

                          (18 3:40 PM)          



                                         CK MB [0.5-3.6   



                                         ng/mL]   

 

                                        3.1 

*HI*

                          (18 8:22 PM)         3.5 

*HI*

                          (18 3:40 PM)          



                                         CK MB Index   



                                         [0.0-2.5]   

 

                                        0.22 ng/mL 

                          (18 8:37 AM)         0.29 ng/mL 

                          (18 4:01 AM)         0.34 ng/mL 

                                        (18 8:22 PM) 



                                         Troponin-I   



                                         [0.00-0.40 ng/mL]   

 

                                        579 pg/mL 

*HI*

                    (18 3:40 PM)                          



                                         BNP [<=100 pg/mL]   

 

                                        2611 pg/mL 

*HI*

                    (8/15/18 9:20 PM)                          



                                         proBNP [0-450 pg/mL]   







SPECIAL CHEMISTRY





                    1                   2                   3



                                         Most recent to   



                                         oldest [Reference   



                                         Range]:   

 

                                        5.3 % 

                    (18 7:11 AM)                          



                                         Hgb A1C [<=5.6 %]   







TOXICOLOGY





                    1                   2                   3



                                         Most recent to   



                                         oldest [Reference   



                                         Range]:   

 

                                        0900 

*NA*

                    (8/15/18 9:06 AM)                          



                                         Vanco Tr TND   

 

                                        17.1 ug/ml 

*NA*

                    (8/15/18 9:06 AM)                          



                                         Tylero Tr   







URINE AND STOOL





                    1                   2                   3



                                         Most recent to   



                                         oldest [Reference   



                                         Range]:   

 

                                        Clear 

                    (18 8:22 PM)                          



                                         UA Turbidity [Clear]   

 

                                        Yellow 

*NA*

                    (18 8:22 PM)                          



                                         UA Color [Yellow]   

 

                                        7.0 

                    (18 8:22 PM)                          



                                         UA pH [5.0-8.0]   

 

                                        1.010 

                    (18 8:22 PM)                          



                                         UA Spec Grav   



                                         [<=1.030]   

 

                                        Negative mg/dL 

*NA*

                    (18 8:22 PM)                          



                                         UA Glucose [Negative   



                                         mg/dL]   

 

                                        Negative 

                    (18 8:22 PM)                          



                                         UA Blood [Negative]   

 

                                        Negative mg/dL 

*NA*

                    (18 8:22 PM)                          



                                         UA Ketones [Negative   



                                         mg/dL]   

 

                                        Negative mg/dL 

                    (18 8:22 PM)                          



                                         UA Protein [Negative   



                                         mg/dL]   

 

                                        <=1.0 mg/dL 

*NA*

                    (18 8:22 PM)                          



                                         UA Urobilinogen   



                                         [0.1-1.0 mg/dL]   

 

                                        Negative 

*NA*

                    (18 8:22 PM)                          



                                         UA Bili [Negative]   

 

                                        Negative 

                    (18 8:22 PM)                          



                                         UA Leuk Est   



                                         [Negative]   

 

                                        Negative 

                    (18 8:22 PM)                          



                                         UA Nitrite   



                                         [Negative]   

 

                                        Occasional /LPF 

*NA*

                    (18 8:22 PM)                          



                                         UA Sq Epi [Few /LPF]   

 

                                        Few /LPF 

*NA*

                    (18 8:22 PM)                          



                                         UA Mucus [None Seen   



                                         /LPF]   

 

                                        Negative 

                    (18 10:03 PM)                          



                                         Occult Bld Stl   



                                         [Negative]   







HEMATOLOGY





                    1                   2                   3



                                         Most recent to   



                                         oldest [Reference   



                                         Range]:   

 

                                        13.1 K/CMM 

*HI*

                          (18 7:11 AM)         12.5 K/CMM 

*HI*

                          (18 6:43 AM)         12.8 K/CMM 

*HI*

                                        (18 1:12 PM) 



                                         WBC [3.7-10.4 K/CMM]   

 

                                        3.22 M/CMM 

*LOW*

                          (18 7:11 AM)         3.13 M/CMM 

*LOW*

                          (18 6:43 AM)         3.14 M/CMM 

*LOW*

                                        (18 1:12 PM) 



                                         RBC [4.70-6.10   



                                         M/CMM]   

 

                                        7.6 g/dL 

*LOW*

                          (18 7:11 AM)         7.2 g/dL 

*LOW*

                          (18 6:43 AM)         7.1 g/dL 

*LOW*

                                        (8/17/18 1:12 PM) 



                                         Hgb [14.0-18.0 g/dL]   

 

                                        24.3 % 

*LOW*

                          (18 7:11 AM)         23.9 % 

*LOW*

                          (18 6:43 AM)         24.0 % 

*LOW*

                                        (18 1:12 PM) 



                                         Hct [42.0-54.0 %]   

 

                                        75.4 fL 

*LOW*

                          (18 7:11 AM)         76.5 fL 

*LOW*

                          (18 6:43 AM)         76.5 fL 

*LOW*

                                        (18 1:12 PM) 



                                         MCV [80.0-94.0 fL]   

 

                                        23.6 pg 

*LOW*

                          (18 7:11 AM)         23.0 pg 

*LOW*

                          (18 6:43 AM)         22.7 pg 

*LOW*

                                        (18 1:12 PM) 



                                         MCH [27.0-31.0 pg]   

 

                                        31.3 g/dL 

*LOW*

                          (18 7:11 AM)         30.0 g/dL 

*LOW*

                          (18 6:43 AM)         29.7 g/dL 

*LOW*

                                        (18 1:12 PM) 



                                         MCHC [32.0-36.0   



                                         g/dL]   

 

                                        20.2 % 

*HI*

                          (18 7:11 AM)         20.1 % 

*HI*

                          (18 6:43 AM)         20.5 % 

*HI*

                                        (18 1:12 PM) 



                                         RDW [11.5-14.5 %]   

 

                                        7.8 fL 

                          (18 7:11 AM)         7.9 fL 

                          (18 6:43 AM)         7.7 fL 

                                        (18 1:12 PM) 



                                         MPV [7.4-10.4 fL]   

 

                                        381 K/CMM 

                          (18 7:11 AM)         337 K/CMM 

                          (18 6:43 AM)         319 K/CMM 

                                        (18 1:12 PM) 



                                         Platelet [133-450   



                                         K/CMM]   

 

                                        70.3 % 

                          (18 7:11 AM)         78.5 % 

*HI*

                          (18 6:43 AM)         79.9 % 

*HI*

                                        (18 6:00 AM) 



                                         Segs [45.0-75.0 %]   

 

                                        4.0 % 

                    (18 3:40 PM)                          



                                         Bands [0.0-11.0 %]   

 

                                        15.9 % 

*LOW*

                          (18 7:11 AM)         9.8 % 

*LOW*

                          (18 6:43 AM)         7.8 % 

*LOW*

                                        (18 6:00 AM) 



                                         Lymphocytes   



                                         [20.0-40.0 %]   

 

                                        0.0 % 

                    (18 3:40 PM)                          



                                         Atypical Lymphs   



                                         [<=0.0 %]   

 

                                        8.7 % 

                          (18 7:11 AM)         9.1 % 

                          (18 6:43 AM)         8.8 % 

                                        (18 6:00 AM) 



                                         Monocytes [2.0-12.0   



                                         %]   

 

                                        3.4 % 

                          (18 7:11 AM)         1.9 % 

                          (18 6:43 AM)         2.6 % 

                                        (18 6:00 AM) 



                                         Eosinophils [0.0-4.0   



                                         %]   

 

                                        1.7 % 

*HI*

                          (18 7:11 AM)         0.7 % 

                          (18 6:43 AM)         0.9 % 

                                        (18 6:00 AM) 



                                         Basophils [0.0-1.0   



                                         %]   

 

                                        9.2 K/CMM 

*HI*

                          (18 7:11 AM)         9.9 K/CMM 

*HI*

                          (18 6:43 AM)         9.8 K/CMM 

*HI*

                                        (18 6:00 AM) 



                                         Neutrophils #   



                                         [1.5-8.1 K/CMM]   

 

                                        2.1 K/CMM 

                          (18 7:11 AM)         1.2 K/CMM 

                          (18 6:43 AM)         1.0 K/CMM 

                                        (18 6:00 AM) 



                                         Lymphocytes #   



                                         [1.0-5.5 K/CMM]   

 

                                        1.1 K/CMM 

*HI*

                          (18 7:11 AM)         1.1 K/CMM 

*HI*

                          (18 6:43 AM)         1.1 K/CMM 

*HI*

                                        (18 6:00 AM) 



                                         Monocytes # [0.0-0.8   



                                         K/CMM]   

 

                                        0.4 K/CMM 

                          (18 7:11 AM)         0.2 K/CMM 

                          (18 6:43 AM)         0.3 K/CMM 

                                        (18 6:00 AM) 



                                         Eosinophils #   



                                         [0.0-0.5 K/CMM]   

 

                                        0.2 K/CMM 

                          (18 7:11 AM)         0.1 K/CMM 

                          (18 6:43 AM)         0.1 K/CMM 

                                        (18 6:00 AM) 



                                         Basophils # [0.0-0.2   



                                         K/CMM]   

 

                                        See Note 

                    (18 4:01 AM)                          



                                         RBC Morph   

 

                                        1+ 

*ABN*

                    (18 3:40 PM)                          



                                         Anisocyte [None   



                                         Seen]   

 

                                        Moderate 

*ABN*

                    (18 4:01 AM)                          



                                         Polychrom [None   



                                         Seen]   

 

                                        Slight 

*Unknown*

                    (18 3:40 PM)                          



                                         Polychrom   

 

                                        1+ 

                    (18 4:01 AM)                          



                                         Hypochrom [None   



                                         Seen]   

 

                                        1+ 

*ABN*

                          (18 7:11 AM)         1+ 

*ABN*

                          (18 6:43 AM)         1+ 

*ABN*

                                        (18 6:00 AM) 



                                         Microcyte [None   



                                         Seen]   

 

                                        Moderate 

*ABN*

                    (18 4:01 AM)                          



                                         Target Cell [None   



                                         Seen]   

 

                                        Slight 

*Unknown*

                    (18 3:40 PM)                          



                                         Target Cell   

 

                                        Normal 

                          (18 4:01 AM)         Normal 

                          (18 3:40 PM)          



                                         Plt Morph   

 

                                        28.7 seconds 

*HI*

                    (18 3:40 PM)                          



                                         PT [12.0-14.7   



                                         seconds]   

 

                                        2.66 

*HI*

                    (18 3:40 PM)                          



                                         INR [0.85-1.17]   

 

                                        42.1 seconds 

*HI*

                    (18 3:40 PM)                          



                                         PTT [22.9-35.8   



                                         seconds]   







Immunizations





Given and Recorded





   



                 Vaccine         Date            Status          Refusal Reason

 

   



                     influenza virus vaccine, inactivated     18             Given 

 

   



                     influenza virus vaccine, inactivated     18              Given 

 

   



                     pneumococcal 13-valent vaccine     18              Given 

 

   



                     diphtheria/pertussis, acel/tetanus adult     16              Given 

 

   



                     pneumococcal 23-valent vaccine     3/31/15             Given 









Not Given





   



                 Vaccine         Date            Status          Refusal Reason

 

   



                 pneumococcal 23-valent vaccine1     3/30/15         Not Given       Patient Refuses







1Result Note: already recieved vaccination previous to admission



Procedures







    



              Procedure     Date         Related Diagnosis     Body Site     Status

 

    



                           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

 

    



                           Cardiac ablation using fluoroscopy guidance        Completed

 

    



                           Cardiac catheterization, left heart        Completed

 

    



                           Stent placement2          Completed







1ablation stents



2x27



Social History







 



                           Social History Type       Response

 

 



                           Substance Abuse           Use: None.

 

 



                           Alcohol                   Never

 

 



                           Smoking Status            Former smoker; Ready to change: No; Concerns about tobacco use

 in



                                         household: No; Exposure to Tobacco Smoke None; Cigarette Smoking Last 365



                                         Days No; Reg Smoking Cessation Counseling No; Other Tobacco Frequency quit



                                         30 years ago;



                                         entered on: 19







Assessment and Plan

Extracted from:





  



                     Title: Clinical Document     Author: Katie Mandujano MD     Date: 18









                                        Progress Note

[facility]

CC:

alda barr h[facility]



SUBJECTIVE:

pt seen/examined, sleepy this am.



OBJECTIVE:

Vital Signs (last 24 hrs)_____Last Charted___________

Temp Oral98 DegF  (AUG 20 11:37)

Heart Rate [id number] bpm  (AUG 20 11:37)

Resp Rate    18 BRMIN  (AUG 20 11:37)

CVO962 mmHg  (AUG 20 11:)

DBP71 mmHg  (AUG 20 11:37)

RlU679 %  (AUG 20 11:)







Medications:

Scheduled Meds (23):AMIODarone, LORazepam, LORazepam (Ativan), albuterol-
ipratropium (albuterol-ipratropium 2.5-0.5 mg inhalation solution), aspirin 
(aspirin 81 mg tablet, enteric coated), atorvastatin, benzonatate, famotidine, 
gabapentin (gabapentin 600 mg oral tablet), guaiFENesin (Robitussin), insulin 
glargine, insulin glargine, levothyroxine (Synthroid), meropenem + Sodium 
Chloride 0.9%  mL, metoprolol (metoprolol tartrate), niacin (niacin 1000 
mg oral tablet, extended release), pantoprazole, ranolazine (Ranexa 500 mg oral 
tablet, extended release), rivaroxaban, sertraline, tamsulosin, torsemide, 
vancomycin + Dextrose 5% in Water  mL

Unscheduled Meds: None

PRN Meds (17):Dextrose 50% in Water IV (Dextrose 50% Syringe), Dextrose 50% in 
Water IV (Dextrose 50% Syringe), acetaminophen-hydrocodone (Norco 10/325 oral 
tablet), acetaminophen, diphenhydrAMINE (Benadryl), glucagon, insulin lispro, 
insulin lispro, insulin lispro, insulin lispro, insulin lispro, insulin lispro, 
insulin lispro, insulin lispro, insulin lispro, sodium chloride (Saline Flush 
0.9%), trazodone (trazodone 50 mg oral tablet)

One Time Meds: None

Continuous Infusions: None



Labs (Last four charted values)

WBC                 H 13.1(AUG 19)H 12.5(AUG 18)H 12.8(AUG 17)H 12.3(AUG 16)

Hgb                 L 7.6(AUG 19)L 7.2(AUG 18)L 7.1(AUG 17)L 7.4(AUG 16)

Hct                 L 24.3(AUG 19)L 23.9(AUG 18)L 24.0(AUG 17)L 24.2(AUG 16)

Plt                 381(AUG 19)337(AUG 18)319(AUG 17)276(AUG 16)

Na                  141(AUG 20)L 134(AUG 19)138(AUG 18)140(AUG 17)

K                   4.1(AUG 20)4.4(AUG 19)L 3.4(AUG 18)L 3.1(AUG 17)

CO2                 H 33(AUG 20)H 33(AUG 19)31(AUG 18)H 35(AUG 17)

Cl                  99(AUG 20)97(AUG 19)98(AUG 18)97(AUG 17)

Cr                  1.04(AUG 20)0.81(AUG 19)0.79(AUG 18)0.85(AUG 17)

BUN                 16(AUG 20)15(AUG 19)18(AUG 18)14(AUG 17)

Glucose Random      C 41(AUG 20)H 110(AUG 19)H 102(AUG 18)H 150(AUG 17)

Mg                  2.2(AUG 20)2.4(AUG 19)1.8(AUG 18)

Ca                  L 7.9(AUG 20)L 7.8(AUG 19)L 7.6(AUG 18)L 7.5(AUG 17)

PT                  H 28.7(AUG 12)

INR                 H 2.66(AUG 12)

PTT                 H 42.1(AUG 12)

Troponin            0.22(AUG 13)0.29(AUG 13)0.34(AUG 12)0.12(AUG 12)

CK MB               1.1(AUG 12)1.1(AUG 12)

Total CK            35(AUG 12)31(AUG 12)



EXAM:

HEENT: nc/at, eomi

Neck: no jvd, supple

Heart: s1s2, no murmurs, rubs, gallops

Chest: clear to auscultation, no wheezes, rales, rhonchi

Abd: NT, ND, soft, BS +

Ext: no edema, clubbing, cyanosis

Skin: no rash







IMPRESSION:

                                        1.  Pneumonia.

                                        2.  Pulmonary edema.

                                        3.  Anemia.

                                        4.  Diabetes mellitus type 2.

                                        5.  Atrial fibrillation.

                                        6.  Tremors.

                                        7.  Hypothyroidism.

                                        8.  Paroxysmal atrial fibrillation

                                        9.  Congestive heart failure, diastolic.

                                        10.  Coronary artery disease.





PLAN:

iv abx for now

looking at switching him back to po lasix.

look at sending him to snf.

continue with pt/ot

monitor blood glucose - will decrease insulin at night.







Plan of care discussed with patient and nursing.





Extracted from:





  



                     Title: Pulmonary Consult Note     Author: Corbin Self MD     Date: 8/15/18













Pulmonary and Critical Care Consult Note

Corbin Self MD



Reason for consult:

Pneumonia

Worsening Pulmonary Opacities





HPI:

                                        79 y/o male with past medical history significant for CAD status post CABG and congestive

 heart failure who presented with increasing dyspnea and nonproductive cough 
that has been ongoing for the better part of the week.  No fevers no chills no 
sputum production.  He did began to feel weak.  In the ER was profoundly 
hypoxic. Was admitted for diuresis and started on antibiotics. Patient continued
to worsen during his hospital stay and a repeat chest xray showed consolidative 
opacities bilaterally now superimposed on previous interstitial opacities seen. 
Patient states he feels weaker than when arriving and remains dyspnic. Deneis 
new fevers or chills. No sputum production. No hemoptysis. No pleurisy. Smoked 
in distance past. No sick contacts that of which he is aware.





Review of systems:

                                        14 point review of systems is negative except as per hpi







Allergies



Allergies (4) ActiveReaction

ReglanNone documented

iodineNone documented

sulfa drugsNone documented

LatexNone documented







Procedure History

Cardiac catheterization, left heart

CABG x 3 - Coronary artery bypass grafts x 3

Cardiac ablation using fluoroscopy guidance

Stent placement





Past Medical History

Stented coronary artery

Acute MI

Hypercholesteremia

Poliomyelitides

Whooping cough

Histoplasmosis

Hypertension

Hx MRSA infection





Family History



Mother: Aneurysm

Brother: Cancer of prostate; Heart attack; Leukemia





Social history

Alcohol

Details: Past

Tobacco

Details: Use: Former smoker.  Tobacco smoke exposure: None.  Did the Patient 
Smoke Cigarettes Anytime During the Last 365 Days? No.  Cessation Counseling 
Provided? Yes.

Details: Use: Former smoker.  Tobacco smoke exposure: None.  Did the Patient 
Smoke Cigarettes Anytime During the Last 365 Days? No.  Cessation Counseling 
Provided? No.

Details: Use: Former smoker.  Tobacco smoke exposure: None.  Other Tobacco 
Frequency quit 30 years ago.  Did the Patient Smoke Cigarettes Anytime During 
the Last 365 Days? No.  Cessation Counseling Provided? No.

Substance Abuse

Details: Use: None.





Home Meds

No qualifying data available





                                        ---------------------------------------------------------------------------------------------------------------------------------------



Scheduled Meds (23):

                                        18 AMIODarone 200 mg PO Daily

                                        18 LORazepam 1 mg PO Bedtime

                                        18 LORazepam 0.5 mg PO Daily

                                        18 albuterol-ipratropium (albuterol-ipratropium 2.5-0.5 mg inhalation solution)

 3 ml NEB RQ4H

                                        18 aspirin (aspirin 81 mg tablet, enteric coated) 81 mg PO Daily

                                        18 atorvastatin 5 mg PO Bedtime

                                        18 cefepime + Sodium Chloride 0.9%  mL 1 gm IVPB [id number] 25 ml/hr

                                        18 famotidine 20 mg PO Q12H

                                        18 furosemide (Lasix) 40 mg IVP Daily

                                        18 gabapentin (gabapentin 600 mg oral tablet) 600 mg PO Q12H

                                        18 guaiFENesin (Robitussin) 100 mg PO Q4H

                                        18 insulin glargine 40 unit SUB-Q Bedtime 0 ml/hr

                                        18 insulin glargine 76 unit SUB-Q Daily 0 ml/hr

                                        18 levothyroxine (Synthroid) 50 microgram PO [id number]

                                        18 lisinopril 2.5 mg PO Daily

                                        18 niacin (niacin 1000 mg oral tablet, extended release) 2,000 mg PO Bedtime



                                        18 pantoprazole 40 mg IVP Q12H

                                        18 potassium chloride 20 mEq PO Daily

                                        18 ranolazine (Ranexa 500 mg oral tablet, extended release) 500 mg PO BID

                                        18 rivaroxaban 20 mg PO QPM

                                        18 sertraline 100 mg PO Daily

                                        18 tamsulosin 0.4 mg PO Daily

                                        18 vancomycin + Dextrose 5% in Water  mL 1,000 mg IVPB [id number] 250 ml/hr







Labs (Last four charted values)

WBC                 H 12.6(AUG 14)H 12.8(AUG 13)H 16.2(AUG 12)

Hgb                 L 7.2(AUG 14)L 7.3(AUG 13)L 7.1(AUG 12)

Hct                 L 24.0(AUG 14)L 23.8(AUG 13)L 23.4(AUG 12)

Plt                 248(AUG 14)277(AUG 13)307(AUG 12)

Na                  135(AUG 14)138(AUG 13)L 133(AUG 12)

K                   3.8(AUG 14)4.1(AUG 13)4.6(AUG 12)

CO2                 31(AUG 14)31(AUG 13)31(AUG 12)

Cl                  98(AUG 14)100(AUG 13)95(AUG 12)

Cr                  1.05(AUG 14)1.39(AUG 13)1.35(AUG 12)

BUN                 21(AUG 14)H 29(AUG 13)H 27(AUG 12)

Glucose Random      73(AUG 14)87(AUG 13)82(AUG 12)

Ca                  L 8.0(AUG 14)L 8.0(AUG 13)L 8.2(AUG 12)

PT                  H 28.7(AUG 12)

INR                 H 2.66(AUG 12)

PTT                 H 42.1(AUG 12)

Troponin            0.22(AUG 13)0.29(AUG 13)0.34(AUG 12)0.12(AUG 12)

CK MB               1.1(AUG 12)1.1(AUG 12)

Total CK            35(AUG 12)31(AUG 12)



Objective:

I&ORecordInOutBal

                                        1524hr Tot  594    0  594

                                        1424hr Tot 1362  700  662





Lines, Tubes, and Drains:

                                        2018 15:21 Peripheral Lines: Antecubital Right 18 gauge Over the needle catheter







                                        08/15/2018 15:17

SpO2 [id number]

                                        08/15/2018 07:28

Oxygen Therapy ModeNasal cannula







Vital Signs (last 24 hrs)_____Last Charted___________

Temp Oral98.2 DegF  (AUG 15 15:17)

Heart Rate Yuclpcojsb37 bpm  (AUG 15 15:17)

Resp Rate    16 BRMIN  (AUG 15 15:17)

TXB833 mmHg  (AUG 15 15:17)

DBP63 mmHg  (AUG 15 15:17)

NtW376 %  (AUG 15 15:)





Exam:

General: Mild distress, nasal cannula oxygen in place, eating

HEENT: no pallor, anicteric sclera

Cardiovascular: regular, no murmur

Respiratory: Rales bilaterally, no rhonchi

Abdomen: soft, non-tender, +BS

Extremities: no edema, no cyanosis

Neurologic: Awake, Nonfocal

Skin: no breakdown



Problems:

Pneumonia

Worsening pulmonary infiltrates

Prior granulomatous disease

Pleural effusion



Plan:

                                        -Patient with progressive upper lobe predominant consolidative and groundglass parenchymal

 process

                                        -Continue vancomycin and add meropenem, stop cefepime.  Pro-calcitonin was elevated.



                                        -Patient is hemodynamically stable and afebrile, somewhat confounding for the degree

 of pulmonary involvement of the parenchymal process if it were a bacterial 
pneumonia

                                        -Noninfectious etiologies are also possible, there is probable contribution of pulmonary

 edema, there also may be a noninfectious inflammatory process that is ongoing. 
Recheck pro BNP

                                        -Monitor respiratory status closely, saturating 85% on 4 L nasal cannula oxygen,

 benefit from high flow nasal cannula for work of breathing if further worsening
occurs.

                                        -We will plan for bronchoscopy tomorrow, patient n.p.o. at midnight

                                        -Pleural effusion appears to small to tap at this time

                                        -Sequela of prior granulomatous disease with some bronchial narrowing.





Corbin Self MD

Pulmonary and Critical Care

## 2019-07-18 NOTE — XMS REPORT
Summary of Care: 8/12/15 - 8/12/15

                             Created on: 2123



DESIREE MCKEON

External Reference #: [id number]

: 1940

Sex: Male



Demographics







                          [place]  [address]

 

                          Home Phone                [phone]

 

                          Preferred Language        English

 

                          Marital Status            

 

                          Yazidism Affiliation     None

 

                          Race                      Unknown

 

                          Ethnic Group              Non-





Author







                          Author                    [facility]

 

                          Organization              [facility]

 

                          Address                   Unknown

 

                          Phone                     Unavailable







Encounter





CHLOÉ Pedersen(DAVID) [id number] Date(s): 8/12/15 - 8/12/15

[facility] [id number] [place] [address]-     [phone]
[phone]

Discharge Diagnosis: Episode of generalized weakness

Discharge Disposition: Home

Attending Physician: Pamela Anderson DO





Vital Signs







                    1                   2                   3



                                         Most recent to   



                                         oldest [Reference   



                                         Range]:   

 

                                        175.26 cm 

                    (8/12/15 2:43 PM)                         



                                         Height   









                    1                   2                   3



                                         Most recent to   



                                         oldest [Reference   



                                         Range]:   

 

                                        98.5 DegF 

                          (8/12/15 6:27 PM)         98.3 DegF 

                          (8/12/15 2:43 PM)          



                                         Temperature Oral   



                                         [96.4-99.1 DegF]   









                    1                   2                   3



                                         Most recent to   



                                         oldest [Reference   



                                         Range]:   

 

                                        103/55 mmHg 

                          (8/12/15 6:27 PM)         122/57 mmHg 

                          (8/12/15 5:15 PM)         143/80 mmHg 

*HI*

                                        (8/12/15 2:43 PM)



                                         Blood Pressure   



                                         [/60-90 mmHg]   









                    1                   2                   3



                                         Most recent to   



                                         oldest [Reference   



                                         Range]:   

 

                                        13 BRMIN 

*LOW*

                          (8/12/15 6:27 PM)         16 BRMIN 

                          (8/12/15 5:15 PM)         18 BRMIN 

                                        (8/12/15 2:43 PM)



                                         Respiratory Rate   



                                         [14-20 BRMIN]   









                    1                   2                   3



                                         Most recent to   



                                         oldest [Reference   



                                         Range]:   

 

                                        87 bpm 

                          (8/12/15 5:15 PM)         93 bpm 

                          (8/12/15 2:43 PM)          



                                         Peripheral Pulse   



                                         Rate [ bpm]   









                    1                   2                   3



                                         Most recent to   



                                         oldest [Reference   



                                         Range]:   

 

                                        94.091 kg 

                    (8/12/15 2:43 PM)                         



                                         Weight   









                    1                   2                   3



                                         Most recent to   



                                         oldest [Reference   



                                         Range]:   

 

                                        30.63 m2 

                    (8/12/15 2:43 PM)                         



                                         Body Mass Index   







Problem List







    



              Condition     Effective Dates     Status       Health Status     Informant

 

    



                           Acute MI(Confirmed)1      Resolved  

 

    



                           Atrial                    Active  



                                         fibrillation(Confirm    



                                         ed)    

 

    



                           CAD (coronary artery      Active  



                                         disease)(Confirmed)    

 

    



                           Diabetes(Confirmed)       Resolved  

 

    



                           Diastolic heart           Active  



                                         failure(Confirmed)    

 

    



                           Histoplasmosis(Confi      Resolved  



                                         rmed)    

 

    



                           Hypercholesteremia(C      Resolved  



                                         onfirmed)    

 

    



                           Hypertension(Confirm      Resolved  



                                         ed)    

 

    



                           HTN                       Active  



                                         (hypertension)(Confi    



                                         rmed)    

 

    



                           Hypothyroidism(Confi      Active  



                                         rmed)    

 

    



                           Diabetes mellitus         Active  



                                         type 2, insulin    



                                         dependent(Confirmed)    

 

    



                           PAUL (obstructive          Active  



                                         sleep    



                                         apnea)(Confirmed)    

 

    



                           Poliomyelitides(Conf      Resolved  



                                         irmed)    

 

    



                           Sleep                     Resolved  



                                         apnea(Confirmed)    

 

    



                           Stented coronary          Resolved  



                                         artery(Confirmed)2    

 

    



                           Systolic heart            Active  



                                         failure(Confirmed)    

 

    



                           Whooping                  Resolved  



                                         cough(Confirmed)    







1x 3



225 cardiac stents



Allergies, Adverse Reactions, Alerts







   



                 Substance       Reaction        Severity        Status

 

   



                           NKDA                      Active







Medications





Keflex 500 mg oral capsule

500 mg=1 cap, PO, QID, X 10 day, # 40 cap, 0 Refill(s)

Start Date: 8/12/15

Stop Date: 8/22/15

Status: Ordered



morphine Sulfate

2 mg, Route: IVP, ONCE, Dosing Weight 94.091, kg, Start date: 08/12/15 17:08:00,
Stop date: 08/12/15 17:08:00

Start Date: 8/12/15

Stop Date: 8/12/15

Status: Completed



morphine Sulfate

2 mg, Route: IVP, Drug form: INJ, ONCE, Dosing Weight 94.091, kg, Priority: STAT
, Start date: 08/12/15 18:09:00, Stop date: 08/12/15 18:09:00

Start Date: 8/12/15

Stop Date: 8/12/15

Status: Completed



triamcinolone topical 0.5% ointment

1 appl, TOP, BID, X 14 day, # 15 gm, 0 Refill(s)

Start Date: 8/12/15

Stop Date: 8/26/15

Status: Ordered



Results





ELECTROLYTES





 



                           Most recent to            1



                                         oldest [Reference 



                                         Range]: 

 

 



                           Sodium Lvl [135-145       135 mEq/L



                           mEq/L]                    (8/12/15 3:39 PM)

 

 



                           Potassium Lvl             3.6 mEq/L



                           [3.5-5.1 mEq/L]           (8/12/15 3:39 PM)

 

 



                           Chloride Lvl [      98 mEq/L



                           mEq/L]                    (8/12/15 3:39 PM)

 

 



                           CO2 [24-32 mEq/L]         27 mEq/L



                                         (8/12/15 3:39 PM)

 

 



                           AGAP [10.0-20.0           13.6 mEq/L



                           mEq/L]                    (8/12/15 3:39 PM)







CHEM PANEL





 



                           Most recent to            1



                                         oldest [Reference 



                                         Range]: 

 

 



                           Creatinine Lvl            0.8 mg/dL



                           [0.5-1.4 mg/dL]           (8/12/15 3:39 PM)

 

 



                           eGFR                      88 mL/min/1.73m2 1



                                         *NA*



                                         (8/12/15 3:39 PM)

 

 



                           BUN [7-22 mg/dL]          12 mg/dL



                                         (8/12/15 3:39 PM)

 

 



                           B/C Ratio [6-25]          15



                                         (8/12/15 3:39 PM)

 

 



                           Glucose Lvl [70-99        279 mg/dL



                           mg/dL]                    *HI*



                                         (8/12/15 3:39 PM)

 

 



                           Total Protein             8.2 g/dL



                           [6.4-8.4 g/dL]            (8/12/15 3:39 PM)

 

 



                           Albumin Lvl [3.5-5.0      3.7 g/dL



                           g/dL]                     (8/12/15 3:39 PM)

 

 



                           Globulin [2.0-4.0         4.5 g/dL



                           g/dL]                     *HI*



                                         (8/12/15 3:39 PM)

 

 



                           A/G Ratio [0.7-1.6]       0.8



                                         (8/12/15 3:39 PM)

 

 



                           Calcium Lvl               8.9 mg/dL



                           [8.5-10.5 mg/dL]          (8/12/15 3:39 PM)

 

 



                           Magnesium Lvl             1.7 mg/dL



                           [1.8-2.4 mg/dL]           *LOW*



                                         (8/12/15 3:39 PM)

 

 



                           ALT [0-65 unit/L]         31 unit/L



                                         (8/12/15 3:39 PM)

 

 



                           AST [0-37 unit/L]         33 unit/L



                                         (8/12/15 3:39 PM)

 

 



                           Alk Phos [          141 unit/L



                           unit/L]                   *HI*



                                         (8/12/15 3:39 PM)

 

 



                           Bili Total [0.2-1.3       0.3 mg/dL



                           mg/dL]                    (8/12/15 3:39 PM)







1Result Comment: The eGFR is calculated using the CKD-EPI formula. In most 
young, healthy individuals the eGFR will be >90 mL/min/1.73m2. The eGFR declines
with age. An eGFR of 60-89 may be normal in some populations, particularly the 
elderly, for whom the CKD-EPI formula has not been extensively validated. Use of
the eGFR is not recommended in the following populations:



Individuals with unstable creatinine concentrations, including pregnant patients
and those with serious co-morbid conditions.



Patients with extremes in muscle mass or diet. 



The data above are obtained from the National Kid[employer] Disease Education Program (
NKDEP) which additionally recommends that when the eGFR is used in patients with
extremes of body mass index for purposes of drug dosing, the eGFR should be mul
tiplied by the estimated BMI.



CARDIAC ENZYMES





 



                           Most recent to            1



                                         oldest [Reference 



                                         Range]: 

 

 



                           Total CK [          52 unit/L



                           unit/L]                   (8/12/15 3:39 PM)

 

 



                           Troponin-I                <0.02 ng/mL



                           [0.00-0.40 ng/mL]         (8/12/15 3:39 PM)

 

 



                           BNP [<=100 pg/mL]         211 pg/mL



                                         *HI*



                                         (8/12/15 3:39 PM)







URINE AND STOOL





 



                           Most recent to            1



                                         oldest [Reference 



                                         Range]: 

 

 



                           UA Turbidity [Clear]      Clear



                                         (8/12/15 4:46 PM)

 

 



                           UA Color [Yellow]         Yellow



                                         *NA*



                                         (8/12/15 4:46 PM)

 

 



                           UA pH [5.0-8.0]           6.0



                                         (8/12/15 4:46 PM)

 

 



                           UA Spec Grav              1.015



                           [<=1.030]                 (8/12/15 4:46 PM)

 

 



                           UA Glucose [Negative      500 mg/dL



                           mg/dL]                    *ABN*



                                         (8/12/15 4:46 PM)

 

 



                           UA Blood [Negative]       Negative



                                         (8/12/15 4:46 PM)

 

 



                           UA Ketones [Negative      Negative mg/dL



                           mg/dL]                    *NA*



                                         (8/12/15 4:46 PM)

 

 



                           UA Protein [Negative      Negative mg/dL



                           mg/dL]                    (8/12/15 4:46 PM)

 

 



                           UA Urobilinogen           <=1.0 mg/dL



                           [0.1-1.0 mg/dL]           *NA*



                                         (8/12/15 4:46 PM)

 

 



                           UA Bili [Negative]        Negative



                                         *NA*



                                         (8/12/15 4:46 PM)

 

 



                           UA Leuk Est               Negative



                           [Negative]                (8/12/15 4:46 PM)

 

 



                           UA Nitrite                Negative



                           [Negative]                (8/12/15 4:46 PM)

 

 



                           UA WBC [0-5 /HPF]         <1 /HPF



                                         (8/12/15 4:46 PM)

 

 



                           UA RBC [0-2 /HPF]         3 /HPF



                                         *HI*



                                         (8/12/15 4:46 PM)

 

 



                           UA Sq Epi [Few /LPF]      Occasional /LPF



                                         *NA*



                                         (8/12/15 4:46 PM)







HEMATOLOGY





 



                           Most recent to            1



                                         oldest [Reference 



                                         Range]: 

 

 



                           WBC [3.7-10.4 K/CMM]      9.5 K/CMM



                                         (8/12/15 3:39 PM)

 

 



                           RBC [4.70-6.10            4.89 M/CMM



                           M/CMM]                    (8/12/15 3:39 PM)

 

 



                           Hgb [14.0-18.0 g/dL]      12.1 g/dL



                                         *LOW*



                                         (8/12/15 3:39 PM)

 

 



                           Hct [42.0-54.0 %]         37.7 %



                                         *LOW*



                                         (8/12/15 3:39 PM)

 

 



                           MCV [80.0-94.0 fL]        77.2 fL



                                         *LOW*



                                         (8/12/15 3:39 PM)

 

 



                           MCH [27.0-31.0 pg]        24.7 pg



                                         *LOW*



                                         (8/12/15 3:39 PM)

 

 



                           MCHC [32.0-36.0           32.0 g/dL



                           g/dL]                     (8/12/15 3:39 PM)

 

 



                           RDW [11.5-14.5 %]         23.0 %



                                         *HI*



                                         (8/12/15 3:39 PM)

 

 



                           Platelet [133-450         304 K/CMM



                           K/CMM]                    (8/12/15 3:39 PM)

 

 



                           MPV [7.4-10.4 fL]         8.5 fL



                                         (8/12/15 3:39 PM)

 

 



                           Segs [45.0-75.0 %]        61.8 %



                                         (8/12/15 3:39 PM)

 

 



                           Lymphocytes               27.2 %



                           [20.0-40.0 %]             (8/12/15 3:39 PM)

 

 



                           Monocytes [2.0-12.0       9.3 %



                           %]                        (8/12/15 3:39 PM)

 

 



                           Eosinophils [0.0-4.0      0.8 %



                           %]                        (8/12/15 3:39 PM)

 

 



                           Basophils [0.0-1.0        0.9 %



                           %]                        (8/12/15 3:39 PM)

 

 



                           Segs-Bands #              5.9 K/CMM



                           [1.5-8.1 K/CMM]           (8/12/15 3:39 PM)

 

 



                           Lymphocytes #             2.6 K/CMM



                           [1.0-5.5 K/CMM]           (8/12/15 3:39 PM)

 

 



                           Monocytes # [0.0-0.8      0.9 K/CMM



                           K/CMM]                    *HI*



                                         (8/12/15 3:39 PM)

 

 



                           Eosinophils #             0.1 K/CMM



                           [0.0-0.5 K/CMM]           (8/12/15 3:39 PM)

 

 



                           Basophils # [0.0-0.2      0.1 K/CMM



                           K/CMM]                    (8/12/15 3:39 PM)

 

 



                           Microcyte [None           1+



                           Seen]                     *ABN*



                                         (8/12/15 3:39 PM)

 

 



                           PT [12.0-14.7             23.7 seconds



                           seconds]                  *HI*



                                         (8/12/15 3:39 PM)

 

 



                           INR [0.85-1.17]           2.05



                                         *HI*



                                         (8/12/15 3:39 PM)

 

 



                           PTT [22.9-35.8            38.8 seconds



                           seconds]                  *HI*



                                         (8/12/15 3:39 PM)







Immunizations







  



                     Vaccine             Date                Refusal Reason

 

  



                           pneumococcal 23-valent vaccine     3/31/15 

 

  



                     pneumococcal 23-valent vaccine     3/30/15             Patient Refuses







Procedures







   



                 Procedure       Date            Related Diagnosis     Body Site

 

   



                                         CABG x 3 - Coronary artery bypass grafts x 3   

 

   



                                         Cardiac catheterization, left heart   







Social History







 



                           Social History Type       Response

 

 



                           Substance Abuse           Use: None.

 

 



                           Alcohol                   Never

 

 



                           Smoking Status            Former smoker; Exposure to Tobacco Smoke None; Cigarette Smoking

 Last 365



                                         Days No; Reg Smoking Cessation Counseling No







Assessment and Plan





No data available for this section

## 2019-07-18 NOTE — XMS REPORT
Summary of Care: 17 - 17

                             Created on: 2057



DESIREE MCKEON

External Reference #: [id number]

: 1940

Sex: Male



Demographics







                          Address                   [address]

[place]  [address]

 

                          Home Phone                [phone]

 

                          Preferred Language        English

 

                          Marital Status            

 

                          Church Affiliation     None

 

                          Race                      White/

 

                          Ethnic Group              Non-





Author







                          Author                    [facility]

 

                          Organization              [facility]

 

                          Address                   Unknown

 

                          Phone                     Unavailable







Encounter





CHLOÉ Pedersen(DAVID) [id number] Date(s): 17 - 17

[facility] [address] [place] [address]-     [phone]
[phone]

Discharge Diagnosis: Bilateral lower extremity edema

Discharge Diagnosis: SOB (shortness of breath)

Discharge Disposition: Home or Self Care

Attending Physician: Jaciel Miranda DO





Vital Signs







  



                     Most recent to      1                   2



                                         oldest [Reference  



                                         Range]:  

 

  



                           Height                    175.26 cm 



                                         (17 2:22 PM) 

 

  



                     Temperature Oral     98 DegF             98.1 DegF



                     [96.4-99.1 DegF]     (17 7:01 PM)     (17 2:22 PM)

 

  



                     Blood Pressure      150/60 mmHg         122/56 mmHg



                     [/60-90 mmHg]     *HI*                (17 2:22 PM)



                                         (17 7:01 PM) 

 

  



                     Respiratory Rate     18 BRMIN            18 BRMIN



                     [14-20 BRMIN]       (17 7:01 PM)     (17 2:22 PM)

 

  



                     Peripheral Pulse     70 bpm              69 bpm



                     Rate [ bpm]     (17 7:01 PM)     (17 2:22 PM)

 

  



                           Weight                    100 kg 



                                         (17 2:22 PM) 

 

  



                           Body Mass Index           32.56 m2 



                                         (17 2:22 PM) 







Problem List







    



              Condition     Effective Dates     Status       Health Status     Informant

 

    



                           Acute MI(Confirmed)1      Resolved  

 

    



                           Atrial                    Active  



                                         fibrillation(Confirm    



                                         ed)    

 

    



                           Atrial                    Active  



                                         fibrillation(Confirm    



                                         ed)    

 

    



                           CAD (coronary artery      Active  



                                         disease)(Confirmed)    

 

    



                           Diabetes(Confirmed)       Active  

 

    



                           Diastolic heart           Active  



                                         failure(Confirmed)    

 

    



                           Histoplasmosis(Confi      Resolved  



                                         rmed)    

 

    



                           Hypercholesteremia(C      Resolved  



                                         onfirmed)    

 

    



                           Hypertension(Confirm      Active  



                                         ed)    

 

    



                           HTN                       Active  



                                         (hypertension)(Confi    



                                         rmed)    

 

    



                           Hypothyroidism(Confi      Active  



                                         rmed)    

 

    



                           Diabetes mellitus         Active  



                                         type 2, insulin    



                                         dependent(Confirmed)    

 

    



                           PAUL (obstructive          Active  



                                         sleep    



                                         apnea)(Confirmed)    

 

    



                           Poliomyelitides(Conf      Resolved  



                                         irmed)    

 

    



                           Sleep                     Active  



                                         apnea(Confirmed)    

 

    



                           Stented coronary          Resolved  



                                         artery(Confirmed)2    

 

    



                           Systolic heart            Active  



                                         failure(Confirmed)    

 

    



                           Whooping                  Resolved  



                                         cough(Confirmed)    







1x 3



225 cardiac stents



Allergies, Adverse Reactions, Alerts







   



                 Substance       Reaction        Severity        Status

 

   



                           Latex                     Active

 

   



                           sulfa drugs               Active







Medications





aspirin

325 mg, 1 tab, Route: PO, Drug form: TAB, ONCE, Dosing Weight 100, kg, Priority:
STAT, Start date: 17 18:27:00 CDT, Stop date: 17 18:27:00 CDT

Notes: Take with food.

Start Date: 17

Stop Date: 17

Status: Completed



Saline Flush 0.9%

10 mL, Route: IVP, Drug Form: INJ, Dosing Weight 84.545, kg, PRN, PRN Line Flush
, Start date: 17 14:24:00 CDT, Duration: 30 day, Stop date: 10/22/17 14:23
:00 CDT

Notes: (Same as: BD Posiflush)

Start Date: 17

Stop Date: 17

Status: Discontinued



Results





ELECTROLYTES





 



                           Most recent to            1



                                         oldest [Reference 



                                         Range]: 

 

 



                           Sodium Lvl [135-145       133 mEq/L



                           mEq/L]                    *LOW*



                                         (17 2:34 PM)

 

 



                           Potassium Lvl             4.2 mEq/L



                           [3.5-5.1 mEq/L]           (17 2:34 PM)

 

 



                           Chloride Lvl [      97 mEq/L



                           mEq/L]                    (17 2:34 PM)

 

 



                           CO2 [24-32 mEq/L]         29 mEq/L



                                         (17 2:34 PM)

 

 



                           AGAP [10.0-20.0           11.2 mEq/L



                           mEq/L]                    (17 2:34 PM)







CHEM PANEL





 



                           Most recent to            1



                                         oldest [Reference 



                                         Range]: 

 

 



                           Creatinine Lvl            0.93 mg/dL



                           [0.50-1.40 mg/dL]         (17 2:34 PM)

 

 



                           eGFR                      79 mL/min/1.73m2 1



                                         *NA*



                                         (17 2:34 PM)

 

 



                           BUN [7-22 mg/dL]          14 mg/dL



                                         (17 2:34 PM)

 

 



                           B/C Ratio [6-25]          15



                                         (17 2:34 PM)

 

 



                           Glucose Lvl [70-99        319 mg/dL



                           mg/dL]                    *HI*



                                         (17 2:34 PM)

 

 



                           Total Protein             7.4 g/dL



                           [6.4-8.4 g/dL]            (17 2:34 PM)

 

 



                           Albumin Lvl [3.5-5.0      3.3 g/dL



                           g/dL]                     *LOW*



                                         (17 2:34 PM)

 

 



                           Globulin [2.7-4.2         4.1 g/dL



                           g/dL]                     (17 2:34 PM)

 

 



                           A/G Ratio [0.7-1.6]       0.8



                                         (17 2:34 PM)

 

 



                           Calcium Lvl               8.4 mg/dL



                           [8.5-10.5 mg/dL]          *LOW*



                                         (17 2:34 PM)

 

 



                           ALT [0-65 unit/L]         32 unit/L



                                         (17 2:34 PM)

 

 



                           AST [0-37 unit/L]         34 unit/L



                                         (17 2:34 PM)

 

 



                           Alk Phos [          83 unit/L



                           unit/L]                   (17 2:34 PM)

 

 



                           Bili Total [0.2-1.3       0.4 mg/dL



                           mg/dL]                    (17 2:34 PM)







1Result Comment: The eGFR is calculated using the CKD-EPI formula. In most 
young, healthy individuals the eGFR will be >90 mL/min/1.73m2. The eGFR declines
with age. An eGFR of 60-89 may be normal in some populations, particularly the 
elderly, for whom the CKD-EPI formula has not been extensively validated. Use of
the eGFR is not recommended in the following populations:



Individuals with unstable creatinine concentrations, including pregnant patients
and those with serious co-morbid conditions.



Patients with extremes in muscle mass or diet. 



The data above are obtained from the National Kid[employer] Disease Education Program (
NKDEP) which additionally recommends that when the eGFR is used in patients with
extremes of body mass index for purposes of drug dosing, the eGFR should be mul
tiplied by the estimated BMI.



CARDIAC ENZYMES





 



                           Most recent to            1



                                         oldest [Reference 



                                         Range]: 

 

 



                           Total CK [          68 unit/L



                           unit/L]                   (17 2:34 PM)

 

 



                           CK MB [0.5-3.6            2.8 ng/mL



                           ng/mL]                    (17 2:34 PM)

 

 



                           CK MB Index               4.1



                           [0.0-2.5]                 *HI*



                                         (17 2:34 PM)

 

 



                           Troponin-I                <0.02 ng/mL



                           [0.00-0.40 ng/mL]         (17 2:34 PM)

 

 



                           BNP [<=100 pg/mL]         164 pg/mL



                                         *HI*



                                         (17 2:34 PM)







URINE AND STOOL





 



                           Most recent to            1



                                         oldest [Reference 



                                         Range]: 

 

 



                           UA Turbidity [Clear]      Clear



                                         (17 7:04 PM)

 

 



                           UA Color                  Ltyellow



                                         *NA*



                                         (17 7:04 PM)

 

 



                           UA pH [5.0-8.0]           7.0



                                         (17 7:04 PM)

 

 



                           UA Spec Grav              1.003



                           [<=1.030]                 (17 7:04 PM)

 

 



                           UA Glucose [Negative      500 mg/dL



                           mg/dL]                    *ABN*



                                         (17 7:04 PM)

 

 



                           UA Blood [Negative]       Negative



                                         (17 7:04 PM)

 

 



                           UA Ketones [Negative      Negative mg/dL



                           mg/dL]                    *NA*



                                         (17 7:04 PM)

 

 



                           UA Protein [Negative      Negative mg/dL



                           mg/dL]                    (17 7:04 PM)

 

 



                           UA Urobilinogen           <=1.0 mg/dL



                           [0.1-1.0 mg/dL]           *NA*



                                         (17 7:04 PM)

 

 



                           UA Bili [Negative]        Negative



                                         *NA*



                                         (17 7:04 PM)

 

 



                           UA Leuk Est               Negative



                           [Negative]                (17 7:04 PM)

 

 



                           UA Nitrite                Negative



                           [Negative]                (17 7:04 PM)

 

 



                           UA WBC [0-5 /HPF]         <1 /HPF



                                         (17 7:04 PM)

 

 



                           UA RBC [0-2 /HPF]         1 /HPF



                                         (17 7:04 PM)

 

 



                           UA Sq Epi [Few /LPF]      Occasional /LPF



                                         *NA*



                                         (17 7:04 PM)







HEMATOLOGY





 



                           Most recent to            1



                                         oldest [Reference 



                                         Range]: 

 

 



                           WBC [3.7-10.4 K/CMM]      8.4 K/CMM



                                         (17 2:34 PM)

 

 



                           RBC [4.70-6.10            3.74 M/CMM



                           M/CMM]                    *LOW*



                                         (17 2:34 PM)

 

 



                           Hgb [14.0-18.0 g/dL]      10.5 g/dL



                                         *LOW*



                                         (17 2:34 PM)

 

 



                           Hct [42.0-54.0 %]         32.0 %



                                         *LOW*



                                         (17 2:34 PM)

 

 



                           MCV [80.0-94.0 fL]        85.6 fL



                                         (17 2:34 PM)

 

 



                           MCH [27.0-31.0 pg]        28.1 pg



                                         (17 2:34 PM)

 

 



                           MCHC [32.0-36.0           32.8 g/dL



                           g/dL]                     (17 2:34 PM)

 

 



                           RDW [11.5-14.5 %]         16.7 %



                                         *HI*



                                         (17 2:34 PM)

 

 



                           Platelet [133-450         209 K/CMM



                           K/CMM]                    (17 2:34 PM)

 

 



                           MPV [7.4-10.4 fL]         9.3 fL



                                         (17 2:34 PM)

 

 



                           Segs [45.0-75.0 %]        68.2 %



                                         (17 2:34 PM)

 

 



                           Lymphocytes               19.3 %



                           [20.0-40.0 %]             *LOW*



                                         (17 2:34 PM)

 

 



                           Monocytes [2.0-12.0       9.6 %



                           %]                        (17 2:34 PM)

 

 



                           Eosinophils [0.0-4.0      1.8 %



                           %]                        (17 2:34 PM)

 

 



                           Basophils [0.0-1.0        1.1 %



                           %]                        *HI*



                                         (17 2:34 PM)

 

 



                           Segs-Bands #              5.7 K/CMM



                           [1.5-8.1 K/CMM]           (17 2:34 PM)

 

 



                           Lymphocytes #             1.6 K/CMM



                           [1.0-5.5 K/CMM]           (17 2:34 PM)

 

 



                           Monocytes # [0.0-0.8      0.8 K/CMM



                           K/CMM]                    (17 2:34 PM)

 

 



                           Eosinophils #             0.2 K/CMM



                           [0.0-0.5 K/CMM]           (17 2:34 PM)

 

 



                           Basophils # [0.0-0.2      0.1 K/CMM



                           K/CMM]                    (17 2:34 PM)







Immunizations





Given and Recorded





   



                 Vaccine         Date            Status          Refusal Reason

 

   



                     diphtheria/pertussis, acel/tetanus adult     17              Given 

 

   



                     pneumococcal 23-valent vaccine     3/31/15             Given 









Not Given





   



                 Vaccine         Date            Status          Refusal Reason

 

   



                 pneumococcal 23-valent vaccine     3/30/15         Not Given       Patient Refuses







Procedures







   



                 Procedure       Date            Related Diagnosis     Body Site

 

   



                                         CABG x 3 - Coronary artery bypass grafts x 31   

 

   



                                         Cardiac ablation using fluoroscopy guidance   

 

   



                                         Cardiac catheterization, left heart   







1ablation stents



Social History







 



                           Social History Type       Response

 

 



                           Substance Abuse           Use: None.

 

 



                           Alcohol                   Past

 

 



                           Smoking Status            Former smoker; Exposure to Tobacco Smoke None; Other Tobacco Frequency

 quit



                                         30 years ago; Cigarette Smoking Last 365 Days No; Reg Smoking Cessation



                                         Counseling No







Assessment and Plan





No data available for this section

## 2019-07-18 NOTE — XMS REPORT
Summary of Care: 18 - 18

                             Created on: 2040



DESIREE MCKEON

External Reference #: [id number]

: 1940

Sex: Male



Demographics







                          Address                   [address]

[place]  [address]

 

                          Home Phone                [phone]

 

                          Preferred Language        English

 

                          Marital Status            

 

                          Worship Affiliation     Zoroastrian

 

                          Race                      Other

 

                                        Additional Race(s)  

 

                          Ethnic Group              Non-





Author







                          Author                    [facility]

 

                          Organization              [facility]

 

                          Address                   Unknown

 

                          Phone                     Unavailable







Encounter





CHLOÉ Pedersen(DAVID) [id number] Date(s): 18 - 18

[facility] [address] [place] [address]-     [phone]
[phone]

Discharge Disposition: Skilled Nursing Facility

Attending Physician: Katie Mandujano MD

Admitting Physician: Katie Mandujano MD





Vital Signs







                    1                   2                   3



                                         Most recent to   



                                         oldest [Reference   



                                         Range]:   

 

                                        175.26 cm 

                          (18 4:20 AM)         175.26 cm 

                          (18 11:23 PM)         



                                         Height   

 

                                        87.955 kg 

                          (18 4:29 AM)         89.261 kg 

                          (18 1:23 PM)         89.261 kg 

                                        (18 4:17 AM)



                                         Current Weight   

 

                                        98.4 DegF 

                          (18 8:00 PM)         97.7 DegF 

                          (18 3:47 PM)         97.7 DegF 

                                        (18 11:15 AM)



                                         Temperature Oral   



                                         [96.4-99.1 DegF]   

 

                                        109/57 mmHg 

                          (18 8:00 PM)         121/57 mmHg 

                          (18 3:47 PM)         111/57 mmHg 

                                        (18 11:15 AM)



                                         Blood Pressure   



                                         [/60-90 mmHg]   

 

                                        18 BRMIN 

                          (18 8:00 PM)         18 BRMIN 

                          (18 3:47 PM)         18 BRMIN 

                                        (18 11:15 AM)



                                         Respiratory Rate   



                                         [14-20 BRMIN]   

 

                                        74 bpm 

                          (18 8:00 PM)         71 bpm 

                          (18 3:47 PM)         82 bpm 

                                        (18 11:15 AM)



                                         Peripheral Pulse   



                                         Rate [ bpm]   

 

                                        89.091 kg 

                          (18 4:20 AM)         104.545 kg 

                          (18 11:23 PM)         



                                         Weight   

 

                                        29 m2 

                          (18 4:20 AM)         34.04 m2 

                          (18 11:23 PM)         



                                         Body Mass Index   







Problem List







    



              Condition     Effective Dates     Status       Health Status     Informant

 

    



                           Acute MI(Confirmed)1      Resolved  

 

    



                           Atrial                    Active  



                                         fibrillation(Confirm    



                                         ed)    

 

    



                           Atrial                    Active  



                                         fibrillation(Confirm    



                                         ed)    

 

    



                           CHF (congestive           Active  



                                         heart    



                                         failure)(Confirmed)    

 

    



                           CAD (coronary artery      Active  



                                         disease)(Confirmed)    

 

    



                           Diabetes(Confirmed)       Active  

 

    



                           Diastolic heart           Active  



                                         failure(Confirmed)    

 

    



                           Histoplasmosis(Confi      Resolved  



                                         rmed)    

 

    



                           Hx MRSA                   Resolved  



                                         infection(Confirmed)    

 

    



                           Hypercholesteremia(C      Resolved  



                                         onfirmed)    

 

    



                           Hypertension(Confirm      Active  



                                         ed)    

 

    



                           Hypertension(Confirm      Resolved  



                                         ed)    

 

    



                           HTN                       Active  



                                         (hypertension)(Confi    



                                         rmed)    

 

    



                           Hypothyroidism(Confi      Active  



                                         rmed)    

 

    



                           Diabetes mellitus         Active  



                                         type 2, insulin    



                                         dependent(Confirmed)    

 

    



                           PAUL (obstructive          Active  



                                         sleep    



                                         apnea)(Confirmed)    

 

    



                           Poliomyelitides(Conf      Resolved  



                                         irmed)    

 

    



                           Sleep                     Active  



                                         apnea(Confirmed)    

 

    



                           Stented coronary          Resolved  



                                         artery(Confirmed)2    

 

    



                           Systolic heart            Active  



                                         failure(Confirmed)    

 

    



                           Whooping                  Resolved  



                                         cough(Confirmed)    







1x 3



225 cardiac stents



Allergies, Adverse Reactions, Alerts







   



                 Substance       Reaction        Severity        Status

 

   



                           sulfa drugs               Active

 

   



                           Reglan                    Active

 

   



                           iodine                    Active

 

   



                           Latex                     Active







Medications





albuterol-ipratropium 2.5-0.5 mg inhalation solution

3 mL, Route: NEB, Drug Form: SOLN, Dosing Weight 104.545, kg, ONCE, STAT, Start 
date: 18 23:58:00 CDT, Stop date: 18 23:58:00 CDT

Start Date: 18

Stop Date: 18

Status: Completed



AMIODarone

200 mg, 1 tab, Route: PO, Drug form: TAB, Daily, Dosing Weight 89.091, kg, Start
date: 18 9:00:00 CDT, Duration: 30 day, Stop date: 18 9:00:00 CDT

Notes: (Same as: Cordarone)

Start Date: 18

Stop Date: 18

Status: Discontinued



aspirin

81 mg, Route: PO, Drug form: ECTAB, ONCE, Dosing Weight 104.545, kg, Priority: S
TAT, Start date: 18 2:43:00 CDT, Stop date: 18 2:43:00 CDT

Start Date: 18

Stop Date: 18

Status: Completed



aspirin 81 mg tablet, chewable

81 mg, 1 tab, Route: PO, Drug form: CHEWTAB, Daily, Dosing Weight 89.091, kg, St
art date: 18 9:00:00 CDT, Duration: 30 day, Stop date: 18 9:00:00 CD
T

Notes: Take with food.

Start Date: 18

Stop Date: 18

Status: Discontinued



atorvastatin

5 mg, Route: PO, Drug form: TAB, Bedtime, Dosing Weight 89.091, kg, Start date: 
18 21:00:00 CDT, Duration: 30 day, Stop date: 18 21:00:00 CDT

Start Date: 18

Stop Date: 18

Status: Canceled



atorvastatin

40 mg, 1 tab, Route: PO, Drug form: TAB, Bedtime, Dosing Weight 89.091, kg, Star
t date: 18 21:00:00 CDT, Duration: 30 day, Stop date: 18 21:00:00 CD
T

Notes: (Same as: Lipitor)

Start Date: 18

Stop Date: 18

Status: Discontinued



Bumex

2 mg, 8 mL, Route: IVP, Drug form: INJ, BID, Dosing Weight 89.091, kg, Start hazel
e: 18 17:00:00 CDT, Duration: 30 day, Stop date: 18 9:00:00 CDT

Notes: (Same As: Bumex)

Start Date: 18

Stop Date: 18

Status: Discontinued



calcium gluconate + Sodium Chloride 0.9%  mL

2 gm, 20 mL, Route: IVPB, PRN, Dosing Weight 89.091, kg, PRN Abnormal Lab Result
, For NON-ICU Patients Only., Start date: 18 10:10:00 CDT, Duration: 30 da
y, Stop date: 18 10:09:00 CDT

Notes: WASTE: F/P - Sink; E - Municipal Trash Bin

Start Date: 18

Stop Date: 18

Status: Discontinued



calcium gluconate + Sodium Chloride 0.9%  mL

3 gm, 30 mL, Route: IVPB, PRN, Dosing Weight 89.091, kg, PRN Abnormal Lab Result
, For NON-ICU Patients Only., Start date: 18 10:10:00 CDT, Duration: 30 da
y, Stop date: 18 10:09:00 CDT

Notes: WASTE: F/P - Sink; E - Municipal Trash Bin

Start Date: 18

Stop Date: 18

Status: Discontinued



Dextrose 50% Syringe

25 gm, 50 mL, Route: IVP, Drug Form: INJ, Dosing Weight 89.091, kg, PRN, PRN Blo
od Glucose Results, Start date: 18 8:40:00 CDT, Duration: 30 day, Stop hazel
e: 18 8:39:00 CDT

Start Date: 18

Stop Date: 18

Status: Discontinued



Dextrose 50% Syringe

12.5 gm, 25 mL, Route: IVP, Drug Form: INJ, Dosing Weight 89.091, kg, PRN, PRN B
lood Glucose Results, Start date: 18 8:40:00 CDT, Duration: 30 day, Stop d
ate: 18 8:39:00 CDT

Start Date: 18

Stop Date: 18

Status: Discontinued



famotidine

20 mg, 1 tab, Route: PO, Drug form: TAB, Q12H, Dosing Weight 89.091, kg, PRN Hea
rtburn, Start date: 18 13:11:00 CDT, Duration: 30 day, Stop date: 18
13:10:00 CDT

Notes: (Same as: Pepcid)

Start Date: 18

Stop Date: 18

Status: Discontinued



fentaNYL

50 microgram, Route: IVP, ONCE, Dosing Weight 104.545, kg, Priority: STAT, Start
date: 18 23:57:00 CDT, Stop date: 18 23:57:00 CDT

Start Date: 18

Stop Date: 18

Status: Completed



fentaNYL

50 microgram, Route: IV, ONCE, Dosing Weight 104.545, kg, Start date: 18 3
:00:00 CDT, Stop date: 18 3:00:00 CDT

Start Date: 18

Stop Date: 18

Status: Completed



gabapentin 600 mg oral tablet

600 mg, 2 cap, Route: PO, Drug form: CAP, TID, Dosing Weight 89.091, kg, Start d
ate: 18 17:00:00 CDT, Duration: 30 day, Stop date: 18 13:00:00 CDT

Notes: (Same as: Neurontin)

Start Date: 18

Stop Date: 18

Status: Discontinued



glucagon

1 mg, Route: IM, Drug form: PDR/INJ, PRN, Dosing Weight 89.091, kg, PRN Blood Gl
ucose Results, Start date: 18 8:40:00 CDT, Duration: 30 day, Stop date:  8:39:00 CDT

Start Date: 18

Stop Date: 18

Status: Discontinued



Humalog

5 unit, 0.05 mL, Route: SUB-Q, Drug form: SOLN, ONCE, Start date: 18 12:55
:00 CDT, Stop date: 18 12:55:00 CDT

Notes: Roll in palms of hands gently;  Do not shake `vigorously. (Same as: Humal
og )"Single Patient Use Only "WASTE: F/P - Black; E - Municipal Trash Bin  Stabl
e for 28 days at room temperature.Expires in _____ days from ______________Date

Start Date: 18

Stop Date: 18

Status: Completed



Imdur

60 mg, 2 tab, Route: PO, Drug form: ERTAB, QAM, Dosing Weight 89.091, kg, Priori
ty: NOW, Start date: 18 10:15:00 CDT, Duration: 30 day, Stop date: 
8 9:00:00 CDT

Notes: (Same as:Imdur)"Do Not Crush"  Take on empty stomach/ full glass of water
.  Do not crush

Start Date: 18

Stop Date: 18

Status: Discontinued



Imdur

30 mg, Route: PO, Drug form: ERTAB, QAM, Dosing Weight 89.091, kg, Start date: 0
18 9:00:00 CDT, Duration: 30 day, Stop date: 18 9:00:00 CDT

Start Date: 18

Stop Date: 18

Status: Canceled



insulin aspart

5 unit, Route: SUB-Q, ONCE, Dosing Weight 89.091, kg, Start date: 18 12:53
:00 CDT, Stop date: 18 12:53:00 CDT

Start Date: 18

Stop Date: 18

Status: Deleted



insulin glargine

70 unit, 0.7 mL, Route: SUB-Q, Drug form: SOLN, Daily, Start date: 18 9:00
:00 CDT, Duration: 30 day, Stop date: 18 9:00:00 CDT

Notes: (Same as: Lantus)Do not hold insulin without contacting prescriberWASTE: 
F/P - Black; E - Municipal Trash Bin  "single patient use only"

Start Date: 18

Stop Date: 18

Status: Discontinued



insulin glargine

40 unit, 0.4 mL, Route: SUB-Q, Drug form: SOLN, Bedtime, Start date: 18 21
:00:00 CDT, Duration: 30 day, Stop date: 18 21:00:00 CDT

Notes: (Same as: Lantus)Do not hold insulin without contacting prescriberWASTE: 
F/P - Black; E - Municipal Trash Bin  "single patient use only"

Start Date: 18

Stop Date: 18

Status: Discontinued



insulin lispro

10 unit, 0.1 mL, Route: SUB-Q, Drug form: SOLN, TID-Before Meals, Dosing Weight 
89.091, kg, PRN Blood Glucose Results, Start date: 18 8:40:00 CDT, Duratio
n: 30 day, Stop date: 18 8:39:00 CDT

Notes: (Same as: Humalog ) Roll in palms of hands gently;  Do not shake `vigorou
sly. "Single Patient Use Only "  WASTE: F/P - Black; E - Municipal Trash Bin  St
able for 28 days at room temperature.Expires in _____ days from ______________Da
te

Start Date: 18

Stop Date: 18

Status: Discontinued



insulin lispro

1 unit, 0.01 mL, Route: SUB-Q, Drug form: SOLN, Bedtime, Dosing Weight 89.091, k
g, PRN Blood Glucose Results, Start date: 18 8:40:00 CDT, Duration: 30 day
, Stop date: 18 8:39:00 CDT

Notes: (Same as: Humalog ) Roll in palms of hands gently;  Do not shake `vigorou
sly. "Single Patient Use Only "  WASTE: F/P - Black; E - Municipal Trash Bin  St
able for 28 days at room temperature.Expires in _____ days from ______________Da
te

Start Date: 18

Stop Date: 18

Status: Discontinued



insulin lispro

4 unit, 0.04 mL, Route: SUB-Q, Drug form: SOLN, TID-Before Meals, Dosing Weight 
89.091, kg, PRN Blood Glucose Results, Start date: 18 8:40:00 CDT, Duratio
n: 30 day, Stop date: 18 8:39:00 CDT

Notes: (Same as: Humalog ) Roll in palms of hands gently;  Do not shake `vigorou
sly. "Single Patient Use Only "  WASTE: F/P - Black; E - Municipal Trash Bin  St
able for 28 days at room temperature.Expires in _____ days from ______________Da
te

Start Date: 18

Stop Date: 18

Status: Discontinued



insulin lispro

6 unit, 0.06 mL, Route: SUB-Q, Drug form: SOLN, TID-Before Meals, Dosing Weight 
89.091, kg, PRN Blood Glucose Results, Start date: 18 8:40:00 CDT, Duratio
n: 30 day, Stop date: 18 8:39:00 CDT

Notes: (Same as: Humalog ) Roll in palms of hands gently;  Do not shake `vigorou
sly. "Single Patient Use Only "  WASTE: F/P - Black; E - Municipal Trash Bin  St
able for 28 days at room temperature.Expires in _____ days from ______________Da
te

Start Date: 18

Stop Date: 18

Status: Discontinued



insulin lispro

2 unit, 0.02 mL, Route: SUB-Q, Drug form: SOLN, TID-Before Meals, Dosing Weight 
89.091, kg, PRN Blood Glucose Results, Start date: 18 8:40:00 CDT, Duratio
n: 30 day, Stop date: 18 8:39:00 CDT

Notes: (Same as: Humalog ) Roll in palms of hands gently;  Do not shake `vigorou
sly. "Single Patient Use Only "  WASTE: F/P - Black; E - Municipal Trash Bin  St
able for 28 days at room temperature.Expires in _____ days from ______________Da
te

Start Date: 18

Stop Date: 18

Status: Discontinued



insulin lispro

8 unit, 0.08 mL, Route: SUB-Q, Drug form: SOLN, TID-Before Meals, Dosing Weight 
89.091, kg, PRN Blood Glucose Results, Start date: 18 8:40:00 CDT, Duratio
n: 30 day, Stop date: 18 8:39:00 CDT

Notes: (Same as: Humalog ) Roll in palms of hands gently;  Do not shake `vigorou
sly. "Single Patient Use Only "  WASTE: F/P - Black; E - Municipal Trash Bin  St
able for 28 days at room temperature.Expires in _____ days from ______________Da
te

Start Date: 18

Stop Date: 18

Status: Discontinued



insulin lispro

4 unit, 0.04 mL, Route: SUB-Q, Drug form: SOLN, Bedtime, Dosing Weight 89.091, k
g, PRN Blood Glucose Results, Start date: 18 8:40:00 CDT, Duration: 30 day
, Stop date: 18 8:39:00 CDT

Notes: (Same as: Humalog ) Roll in palms of hands gently;  Do not shake `vigorou
sly. "Single Patient Use Only "  WASTE: F/P - Black; E - Municipal Trash Bin  St
able for 28 days at room temperature.Expires in _____ days from ______________Da
te

Start Date: 18

Stop Date: 18

Status: Discontinued



insulin lispro

2 unit, 0.02 mL, Route: SUB-Q, Drug form: SOLN, Bedtime, Dosing Weight 89.091, k
g, PRN Blood Glucose Results, Start date: 18 8:40:00 CDT, Duration: 30 day
, Stop date: 18 8:39:00 CDT

Notes: (Same as: Humalog ) Roll in palms of hands gently;  Do not shake `vigorou
sly. "Single Patient Use Only "  WASTE: F/P - Black; E - Municipal Trash Bin  St
able for 28 days at room temperature.Expires in _____ days from ______________Da
te

Start Date: 18

Stop Date: 18

Status: Discontinued



insulin lispro

3 unit, 0.03 mL, Route: SUB-Q, Drug form: SOLN, Bedtime, Dosing Weight 89.091, k
g, PRN Blood Glucose Results, Start date: 18 8:40:00 CDT, Duration: 30 day
, Stop date: 18 8:39:00 CDT

Notes: (Same as: Humalog ) Roll in palms of hands gently;  Do not shake `vigorou
sly. "Single Patient Use Only "  WASTE: F/P - Black; E - Municipal Trash Bin  St
able for 28 days at room temperature.Expires in _____ days from ______________Da
te

Start Date: 18

Stop Date: 18

Status: Discontinued



insulin lispro

10 unit, 0.1 mL, Route: SUB-Q, Drug form: SOLN, ONCE, Dosing Weight 89.091, kg, 
Start date: 18 16:30:00 CDT, Stop date: 18 16:30:00 CDT

Notes: (Same as: Humalog ) Roll in palms of hands gently;  Do not shake `vigorou
sly. "Single Patient Use Only "  WASTE: F/P - Black; E - Municipal Trash Bin  St
able for 28 days at room temperature.Expires in _____ days from ______________Da
te

Start Date: 18

Stop Date: 18

Status: Completed



isosorbide mononitrate 60 mg oral tablet, extended release

60 mg=1 tab, PO, QAM, # 30 tab, 0 Refill(s), Pharmacy: [id number]
2

Start Date: 18

Status: Suspended



K-Dur 20

40 mEq, 2 tab, Route: PO, Drug form: ERTAB, ONCE, Dosing Weight 89.091, kg, Prio
rity: NOW, Start date: 18 18:04:00 CDT, Stop date: 18 18:04:00 CDT

Notes: (Same as: K-Dur 20)"Do Not Crush"For patients unable to swallow tablet, d
issolve in one half glass of water. Allow about 2 minutes for the tablets to dis
integrate. Stir before giving to prepare slurry and administer.Please exclude Pa
tients with feeding tube less than 14 [place] (Dobhoff, J-tube etc) and pediat
cornel and  patients.  With food and full glass of water

Start Date: 18

Stop Date: 18

Status: Deleted



Lantus 100 units/mL

Route: SUB-Q, Drug form: SOLN, Daily, Dosing Weight 89.091, kg, Start date:  9:00:00 CDT, Duration: 30 day, Stop date: 18 9:00:00 CDT

Start Date: 18

Stop Date: 18

Status: Deleted



Lantus 100 units/mL

Route: SUB-Q, Drug form: SOLN, Bedtime, Dosing Weight 89.091, kg, Start date:  21:00:00 CDT, Duration: 30 day, Stop date: 18 21:00:00 CDT

Start Date: 18

Stop Date: 18

Status: Deleted



lidocaine topical 5% ointment

1 appl, Route: TOP, TID, Drug form: OINT, Start date: 18 17:00:00 CDT, Dur
ation: 30 day, Stop date: 18 13:00:00 CDT

Notes: (Same as: Xylocaine)

Start Date: 18

Stop Date: 18

Status: Discontinued



lisinopril

5 mg, 1 tab, Route: PO, Drug form: TAB, Daily, Dosing Weight 89.091, kg, Start d
ate: 18 9:00:00 CDT, Duration: 30 day, Stop date: 18 9:00:00 CDT

Notes: (Same as: Prinivil, Zestril)

Start Date: 18

Stop Date: 18

Status: Discontinued



lisinopril

2.5 mg, Route: PO, Drug form: TAB, Daily, Dosing Weight 89.091, kg, Start date: 
18 9:00:00 CDT, Duration: 30 day, Stop date: 18 9:00:00 CDT

Start Date: 18

Stop Date: 18

Status: Canceled



Mag-Ox 400

400 mg, 1 tab, Route: PO, Drug form: TAB, Daily, Dosing Weight 89.091, kg, Start
date: 18 9:00:00 CDT, Duration: 30 day, Stop date: 18 9:00:00 CDT

Notes: (Same as: Mag-Ox 400)Magnesium oxide [id number]=[id number] elemental magnesiumDose=
____mg magnesium oxide (___mg elemental magnesium)

Start Date: 18

Stop Date: 18

Status: Discontinued



magnesium oxide

800 mg, 2 tab, Route: PO, Drug form: TAB, PRN, Dosing Weight 89.091, kg, PRN Abn
ormal Lab Result, For NON-ICU Patients Only., Start date: 18 10:10:00 CDT,
Duration: 30 day, Stop date: 18 10:09:00 CDT

Notes: (Same as: Mag-Ox 400)Magnesium oxide [id number]=[id number] elemental magnesiumDose=
____mg magnesium oxide (___mg elemental magnesium)

Start Date: 18

Stop Date: 18

Status: Discontinued



magnesium sulfate

1 gm, 100 mL, Route: IVPB, Drug form: INJ, PRN, Dosing Weight 89.091, kg, PRN Ab
normal Lab Result, For NON-ICU Patients Only., Start date: 18 10:10:00 CDT
, Duration: 30 day, Stop date: 18 10:09:00 CDT

Notes: WASTE: F/P - Sink; E - Municipal Trash Bin

Start Date: 18

Stop Date: 18

Status: Discontinued



magnesium sulfate

2 gm, 50 mL, Route: IVPB, Drug form: INJ, PRN, Dosing Weight 89.091, kg, PRN Abn
ormal Lab Result, For NON-ICU Patients Only., Start date: 18 10:10:00 CDT,
Duration: 30 day, Stop date: 18 10:09:00 CDT

Notes: WASTE: F/P - Sink; E - Municipal Trash Bin

Start Date: 18

Stop Date: 18

Status: Discontinued



niacin 1000 mg oral tablet, extended release

2,000 mg, 4 tab, Route: PO, Drug form: ERTAB, Bedtime, Dosing Weight 89.091, kg,
Start date: 18 21:00:00 CDT, Duration: 30 day, Stop date: 18 21:00:
00 CDT

Notes: (Same as: Niaspan)"Do Not Crush"Non-Formulary Item  With food.

Start Date: 18

Stop Date: 18

Status: Discontinued



Nitrostat 0.4 mg sublingual tablet

0.4 mg, 1 tab, Route: SL, Drug form: TAB, Q5Min, Dosing Weight 89.091, kg, PRN C
hest Pain, Start date: 18 10:15:00 CDT, Duration: 3 doses or times, Stop d
ate: Limited # of times

Notes: (Same as:Nitroquick, Nitrostat)"Do Not Crush"  Sublingual tablet

Start Date: 18

Stop Date: 18

Status: Discontinued



Norco 5/325 oral tablet

1 tab, Route: PO, Drug Form: TAB, Dosing Weight 89.091, kg, Q4H, PRN Pain Score 
6-10, Start date: 18 13:11:00 CDT, Duration: 30 day, Stop date: 18 1
3:10:00 CDT

Notes: (Same as: Norco 325/5)  Do not exceed 4gm/day of acetaminophen.

Start Date: 18

Stop Date: 18

Status: Discontinued



pantoprazole

40 mg, 1 tab, Route: PO, Drug form: ECTAB, Daily, Dosing Weight 89.091, kg, Star
t date: 18 9:00:00 CDT, Duration: 30 day, Stop date: 18 9:00:00 CDT

Notes: Tablet should not be chewed or crushed.(Same as: Protonix)

Start Date: 18

Stop Date: 18

Status: Discontinued



potassium chloride

40 mEq, 2 tab, Route: PO, Drug form: ERTAB, Q4H, Dosing Weight 89.091, kg, Prior
ity: NOW, Start date: 18 11:59:00 CDT, Duration: 2 doses or times, Stop da
te: 18 12:00:00 CDT

Notes: (Same as: K-Dur 20)"Do Not Crush"For patients unable to swallow tablet, d
issolve in one half glass of water. Allow about 2 minutes for the tablets to dis
integrate. Stir before giving to prepare slurry and administer.Please exclude Pa
tients with feeding tube less than 14 [place] (Dobhoff, J-tube etc) and pediat
cornel and  patients.  With food and full glass of water

Start Date: 18

Stop Date: 18

Status: Completed



potassium chloride

20 mEq, 1 tab, Route: PO, Drug form: ERTAB, PRN, Dosing Weight 89.091, kg, PRN A
bnormal Lab Result, For NON-ICU Patients Only, Start date: 18 10:10:00 CDT
, Duration: 30 day, Stop date: 18 10:09:00 CDT

Notes: (Same as: K-Dur 20)"Do Not Crush"For patients unable to swallow tablet, d
issolve in one half glass of water. Allow about 2 minutes for the tablets to dis
integrate. Stir before giving to prepare slurry and administer.Please exclude Pa
tients with feeding tube less than 14 [place] (Dobhoff, J-tube etc) and pediat
cornel and  patients.  With food and full glass of water

Start Date: 18

Stop Date: 18

Status: Discontinued



potassium chloride

20 mEq, 15 mL, Route: NJ, Drug form: LIQ, PRN, Dosing Weight 89.091, kg, PRN Abn
ormal Lab Result, For NON-ICU Patients Only, Start date: 18 10:10:00 CDT, 
Duration: 30 day, Stop date: 18 10:09:00 CDT

Notes: (Same as: Potassium Chloride)

Start Date: 18

Stop Date: 18

Status: Discontinued



potassium chloride

20 mEq, 1 tab, Route: PO, Drug form: ERTAB, Daily, Dosing Weight 89.091, kg, Sta
rt date: 18 9:00:00 CDT, Duration: 30 day, Stop date: 18 9:00:00 CDT

Notes: (Same as: K-Dur 20)"Do Not Crush"For patients unable to swallow tablet, d
issolve in one half glass of water. Allow about 2 minutes for the tablets to dis
integrate. Stir before giving to prepare slurry and administer.Please exclude Pa
tients with feeding tube less than 14 [place] (Dobhoff, J-tube etc) and pediat
cornel and  patients.  With food and full glass of water

Start Date: 18

Stop Date: 18

Status: Discontinued



potassium chloride + Sodium Chloride 0.9% IV 95 mL

10 mEq, 5 mL, Route: IVPB, PRN, Dosing Weight 89.091, kg, PRN Abnormal Lab Resul
t, For NON-ICU Patients Only, Start date: 18 10:10:00 CDT, Duration: 30 da
y, Stop date: 18 10:09:00 CDT

Notes: MUST be Diluted before use(Same as: KCl)  *** MEDICATION WASTE ***Product
Size:  40 mEqProduct Wasted:  ___ mEq

Start Date: 18

Stop Date: 18

Status: Discontinued



potassium phosphate + Sodium Chloride 0.9%  mL

30 mmol, 10 mL, Route: IVPB, PRN, Dosing Weight 89.091, kg, PRN Abnormal Lab Res
ult, For NON-ICU Patients Only., Start date: 18 10:10:00 CDT, Duration: 30
day, Stop date: 18 10:09:00 CDT

Notes: (Same as: K Phosphate.)  1 mMol phoshate has 1.47 mEq potassium  Infuse o
jon 4 hours

Start Date: 18

Stop Date: 18

Status: Discontinued



potassium phosphate + Sodium Chloride 0.9%  mL

15 mmol, 5 mL, Route: IVPB, PRN, Dosing Weight 89.091, kg, PRN Abnormal Lab Resu
lt, For NON-ICU Patients Only., Start date: 18 10:10:00 CDT, Duration: 30 
day, Stop date: 18 10:09:00 CDT

Notes: (Same as: K Phosphate.)  1 mMol phoshate has 1.47 mEq potassium  Infuse o
jon 4 hours

Start Date: 18

Stop Date: 18

Status: Discontinued



potassium phosphate-sodium phosphate 250 mg-280 mg-160 mg oral powder for recons
titution

2 pkt, Route: PO, Drug Form: PDR/REC, Dosing Weight 89.091, kg, PRN, PRN Abnorma
l Lab Result, For NON-ICU Patients Only, Start date: 18 10:10:00 CDT, Dura
tion: 30 day, Stop date: 18 10:09:00 CDT

Notes: (Same as: Phos-NaK)  Each 1.5 gm pkt has 250mg phosphorous. Mix w/2.5oz w
ater and stir.

Start Date: 18

Stop Date: 18

Status: Discontinued



Ranexa 500 mg oral tablet, extended release

500 mg, 1 tab, Route: PO, Drug form: TAB, BID, Dosing Weight 89.091, kg, Start d
ate: 18 17:00:00 CDT, Duration: 30 day, Stop date: 18 9:00:00 CDT

Start Date: 18

Stop Date: 18

Status: Canceled



ranolazine

500 mg, 1 tab, Route: PO, Drug form: TAB, BID, Dosing Weight 89.091, kg, Start d
ate: 18 17:00:00 CDT, Duration: 30 day, Stop date: 18 9:00:00 CDT

Notes: Same as Ranexa"Do Not Crush"

Start Date: 18

Stop Date: 18

Status: Discontinued



Saline Flush 0.9%

10 ml, Route: IVP, Drug Form: INJ, Dosing Weight 89.091, kg, Q12H, Start date: 0
18 9:00:00 CDT, Duration: 30 day, Stop date: 18 21:00:00 CDT

Notes: (Same as: BD Posiflush)

Start Date: 18

Stop Date: 18

Status: Discontinued



Saline Flush 0.9%

10 ml, Route: IVP, Drug Form: INJ, Dosing Weight 89.091, kg, PRN, PRN Line Flush
, Start date: 18 5:13:00 CDT, Duration: 30 day, Stop date: 18 5:12:0
0 CDT

Notes: (Same as: BD Posiflush)

Start Date: 18

Stop Date: 18

Status: Discontinued



sertraline

100 mg, 1 tab, Route: PO, Drug form: TAB, Daily, Dosing Weight 89.091, kg, Start
date: 18 9:00:00 CDT, Duration: 30 day, Stop date: 18 9:00:00 CDT

Notes: (Same as: Zoloft)

Start Date: 18

Stop Date: 18

Status: Discontinued



sodium phosphate + Dextrose 5% in Water  mL

30 mmol, 10 mL, Route: IVPB, PRN, Dosing Weight 89.091, kg, PRN Abnormal Lab Res
ult, For NON-ICU Patients Only., Start date: 18 10:10:00 CDT, Duration: 30
day, Stop date: 18 10:09:00 CDT

Start Date: 18

Stop Date: 18

Status: Discontinued



sodium phosphate + Dextrose 5% in Water  mL

15 mmol, 5 mL, Route: IVPB, PRN, Dosing Weight 89.091, kg, PRN Abnormal Lab Resu
lt, For NON-ICU Patients Only., Start date: 18 10:10:00 CDT, Duration: 30 
day, Stop date: 18 10:09:00 CDT

Start Date: 18

Stop Date: 18

Status: Discontinued



Synthroid

50 microgram, 1 tab, Route: PO, Drug form: TAB, Q630AM, Dosing Weight 89.091, kg
, Start date: 18 6:30:00 CDT, Duration: 30 day, Stop date: 18 6:30:0
0 CDT

Notes: Take 1 hour before or 2 hours after meal; Enteral feeds may interefere wi
th the absorption of this medication.(Same as:Levothroid, Synthroid)

Start Date: 18

Stop Date: 18

Status: Discontinued



tamsulosin

0.4 mg, 1 cap, Route: PO, Drug form: CAP, Daily, Dosing Weight 89.091, kg, Start
date: 18 9:00:00 CDT, Duration: 30 day, Stop date: 18 9:00:00 CDT

Notes: (Same As: Flomax)  "Do Not Crush"

Start Date: 18

Stop Date: 18

Status: Discontinued



torsemide

20 mg, 2 tab, Route: PO, Drug form: TAB, Daily, Dosing Weight 89.091, kg, Start 
date: 18 9:00:00 CDT, Duration: 30 day, Stop date: 18 9:00:00 CDT

Notes: (Same As: Demadex)

Start Date: 18

Stop Date: 18

Status: Discontinued



torsemide

20 mg, Route: PO, Drug form: TAB, Daily, Dosing Weight 89.091, kg, Start date: 0
18 9:00:00 CDT, Duration: 30 day, Stop date: 18 9:00:00 CDT

Start Date: 18

Stop Date: 18

Status: Canceled



trazodone 50 mg oral tablet

50 mg, 1 tab, Route: PO, Drug form: TAB, Bedtime, Dosing Weight 89.091, kg, PRN 
Sleep, Start date: 18 13:11:00 CDT, Duration: 30 day, Stop date: 18 
13:10:00 CDT

Notes: (Same As: Desyrel)

Start Date: 18

Stop Date: 18

Status: Discontinued



Tylenol

650 mg, 2 tab, Route: PO, Drug form: TAB, Q6H, Dosing Weight 89.091, kg, PRN Patrick
n Score 1-5, Start date: 18 13:11:00 CDT, Duration: 30 day, Stop date:  13:10:00 CDT

Notes: Do not exceed 4 gm/day.  (Same as: Tylenol)

Start Date: 18

Stop Date: 18

Status: Discontinued



Tylenol with Codeine #3 oral tablet

1 - 2 tab, PO, Q4H, PRN Pain, X 4 day, # 36 tab, 0 Refill(s), other

Start Date: 18

Stop Date: 18

Status: Ordered



Xarelto

20 mg, 1 tab, Route: PO, Drug form: TAB, QPM, Dosing Weight 89.091, kg, Start da
te: 18 17:00:00 CDT, Duration: 30 day, Stop date: 18 17:00:00 CDT

Notes: (Same as: Xarelto)Administer with food

Start Date: 18

Stop Date: 18

Status: Canceled



Xarelto

20 mg, 1 tab, Route: PO, Drug form: TAB, QPM, Dosing Weight 89.091, kg, Start da
te: 18 17:00:00 CDT, Duration: 30 day, Stop date: 18 17:00:00 CDT

Notes: (Same as: Xarelto)Administer with food

Start Date: 18

Stop Date: 18

Status: Discontinued



Xarelto

20 mg, Route: PO, QPM, Dosing Weight 89.091, kg, Start date: 18 17:00:00 C
DT, Duration: 30 day, Stop date: 18 17:00:00 CDT

Start Date: 18

Stop Date: 18

Status: Canceled



Results





ELECTROLYTES





                    1                   2                   3



                                         Most recent to   



                                         oldest [Reference   



                                         Range]:   

 

                                        133 mEq/L 

*LOW*

                          (18 7:16 AM)         135 mEq/L 

                          (18 5:31 AM)         137 mEq/L 

                                        (18 12:27 AM) 



                                         Sodium Lvl [135-145   



                                         mEq/L]   

 

                                        5.3 mEq/L 

*HI*

                          (18 7:16 AM)         3.4 mEq/L 

*LOW*

                          (18 5:31 AM)         3.4 mEq/L 

*LOW*

                                        (18 12:27 AM) 



                                         Potassium Lvl   



                                         [3.5-5.1 mEq/L]   

 

                                        97 mEq/L 

                          (18 7:16 AM)         99 mEq/L 

                          (18 5:31 AM)         103 mEq/L 

                                        (18 12:27 AM) 



                                         Chloride Lvl [   



                                         mEq/L]   

 

                                        29 mEq/L 

                          (18 7:16 AM)         29 mEq/L 

                          (18 5:31 AM)         28 mEq/L 

                                        (18 12:27 AM) 



                                         CO2 [24-32 mEq/L]   

 

                                        12.3 mEq/L 

                          (18 7:16 AM)         10.4 mEq/L 

                          (18 5:31 AM)         9.4 mEq/L 

*LOW*

                                        (18 12:27 AM) 



                                         AGAP [10.0-20.0   



                                         mEq/L]   







CHEM PANEL





                    1                   2                   3



                                         Most recent to   



                                         oldest [Reference   



                                         Range]:   

 

                                        1.03 mg/dL 

                          (18 7:16 AM)         0.96 mg/dL 

                          (18 5:31 AM)         0.85 mg/dL 

                                        (18 12:27 AM) 



                                         Creatinine Lvl   



                                         [0.50-1.40 mg/dL]   

 

                                        70 mL/min/1.73m2 1

*NA*

                          (18 7:16 AM)         76 mL/min/1.73m2 2

*NA*

                          (18 5:31 AM)         85 mL/min/1.73m2 3

*NA*

                                        (18 12:27 AM) 



                                         eGFR   

 

                                        16 mg/dL 

                          (18 7:16 AM)         18 mg/dL 

                          (18 5:31 AM)         11 mg/dL 

                                        (18 12:27 AM) 



                                         BUN [7-22 mg/dL]   

 

                                        13 

                    (18 12:27 AM)                          



                                         B/C Ratio [6-25]   

 

                                        323 mg/dL 

*HI*

                          (18 7:16 AM)         288 mg/dL 

*HI*

                          (18 5:31 AM)         255 mg/dL 

*HI*

                                        (18 12:27 AM) 



                                         Glucose Lvl [70-99   



                                         mg/dL]   

 

                                        7.0 g/dL 

                    (18 12:27 AM)                          



                                         Total Protein   



                                         [6.4-8.4 g/dL]   

 

                                        3.4 g/dL 

*LOW*

                    (18 12:27 AM)                          



                                         Albumin Lvl [3.5-5.0   



                                         g/dL]   

 

                                        3.6 g/dL 

                    (18 12:27 AM)                          



                                         Globulin [2.7-4.2   



                                         g/dL]   

 

                                        0.9 

                    (18 12:27 AM)                          



                                         A/G Ratio [0.7-1.6]   

 

                                        8.3 mg/dL 

*LOW*

                          (18 7:16 AM)         7.8 mg/dL 

*LOW*

                          (18 5:31 AM)         8.0 mg/dL 

*LOW*

                                        (18 12:27 AM) 



                                         Calcium Lvl   



                                         [8.5-10.5 mg/dL]   

 

                                        24 unit/L 

                    (18 12:27 AM)                          



                                         ALT [0-65 unit/L]   

 

                                        28 unit/L 

                    (18 12:27 AM)                          



                                         AST [0-37 unit/L]   

 

                                        109 unit/L 

                    (18 12:27 AM)                          



                                         Alk Phos [   



                                         unit/L]   

 

                                        0.8 mg/dL 

                    (18 12:27 AM)                          



                                         Bili Total [0.2-1.3   



                                         mg/dL]   







1Result Comment: The eGFR is calculated using the CKD-EPI formula. In most 
young, healthy individuals the eGFR will be >90 mL/min/1.73m2. The eGFR declines
with age. An eGFR of 60-89 may be normal in some populations, particularly the 
elderly, for whom the CKD-EPI formula has not been extensively validated. Use of
the eGFR is not recommended in the following populations:



Individuals with unstable creatinine concentrations, including pregnant patients
and those with serious co-morbid conditions.



Patients with extremes in muscle mass or diet. 



The data above are obtained from the National Kid[employer] Disease Education Program (
NKDEP) which additionally recommends that when the eGFR is used in patients with
extremes of body mass index for purposes of drug dosing, the eGFR should be mul
tiplied by the estimated BMI.



2Result Comment: The eGFR is calculated using the CKD-EPI formula. In most 
young, healthy individuals the eGFR will be >90 mL/min/1.73m2. The eGFR declines
with age. An eGFR of 60-89 may be normal in some populations, particularly the 
elderly, for whom the CKD-EPI formula has not been extensively validated. Use of
the eGFR is not recommended in the following populations:



Individuals with unstable creatinine concentrations, including pregnant patients
and those with serious co-morbid conditions.



Patients with extremes in muscle mass or diet. 



The data above are obtained from the National Kid[employer] Disease Education Program (
NKDEP) which additionally recommends that when the eGFR is used in patients with
extremes of body mass index for purposes of drug dosing, the eGFR should be mul
tiplied by the estimated BMI.



3Result Comment: The eGFR is calculated using the CKD-EPI formula. In most 
young, healthy individuals the eGFR will be >90 mL/min/1.73m2. The eGFR declines
with age. An eGFR of 60-89 may be normal in some populations, particularly the 
elderly, for whom the CKD-EPI formula has not been extensively validated. Use of
the eGFR is not recommended in the following populations:



Individuals with unstable creatinine concentrations, including pregnant patients
and those with serious co-morbid conditions.



Patients with extremes in muscle mass or diet. 



The data above are obtained from the National Kid[employer] Disease Education Program (
NKDEP) which additionally recommends that when the eGFR is used in patients with
extremes of body mass index for purposes of drug dosing, the eGFR should be mul
tiplied by the estimated BMI.



CARDIAC ENZYMES





                    1                   2                   3



                                         Most recent to   



                                         oldest [Reference   



                                         Range]:   

 

                                        31 unit/L 

                          (18 9:55 AM)         29 unit/L 

                          (18 7:02 AM)         33 unit/L 

                                        (18 3:20 AM) 



                                         Total CK [   



                                         unit/L]   

 

                                        1.1 ng/mL 

                    (18 12:27 AM)                          



                                         CK MB [0.5-3.6   



                                         ng/mL]   

 

                                        3.1 

*HI*

                    (18 12:27 AM)                          



                                         CK MB Index   



                                         [0.0-2.5]   

 

                                        <0.02 ng/mL 

                          (18 9:55 AM)         <0.02 ng/mL 

                          (18 7:02 AM)         <0.02 ng/mL 

                                        (18 3:20 AM) 



                                         Troponin-I   



                                         [0.00-0.40 ng/mL]   

 

                                        411 pg/mL 

*HI*

                    (18 7:02 AM)                          



                                         BNP [<=100 pg/mL]   







SPECIAL CHEMISTRY





                    1                   2                   3



                                         Most recent to   



                                         oldest [Reference   



                                         Range]:   

 

                                        8.8 % 

*HI*

                    (18 5:31 AM)                          



                                         Hgb A1C [<=5.6 %]   







URINE AND STOOL





                    1                   2                   3



                                         Most recent to   



                                         oldest [Reference   



                                         Range]:   

 

                                        Clear 

                    (18 12:38 AM)                          



                                         UA Turbidity [Clear]   

 

                                        Ltyellow 

*NA*

                    (18 12:38 AM)                          



                                         UA Color   

 

                                        6.0 

                    (18 12:38 AM)                          



                                         UA pH [5.0-8.0]   

 

                                        1.012 

                    (18 12:38 AM)                          



                                         UA Spec Grav   



                                         [<=1.030]   

 

                                        500 mg/dL 

*ABN*

                    (18 12:38 AM)                          



                                         UA Glucose [Negative   



                                         mg/dL]   

 

                                        Negative 

                    (18 12:38 AM)                          



                                         UA Blood [Negative]   

 

                                        Trace mg/dL 

*ABN*

                    (18 12:38 AM)                          



                                         UA Ketones [Negative   



                                         mg/dL]   

 

                                        Negative mg/dL 

                    (18 12:38 AM)                          



                                         UA Protein [Negative   



                                         mg/dL]   

 

                                        <=1.0 mg/dL 

*NA*

                    (18 12:38 AM)                          



                                         UA Urobilinogen   



                                         [0.1-1.0 mg/dL]   

 

                                        Negative 

*NA*

                    (18 12:38 AM)                          



                                         UA Bili [Negative]   

 

                                        Negative 

                    (18 12:38 AM)                          



                                         UA Leuk Est   



                                         [Negative]   

 

                                        Negative 

                    (18 12:38 AM)                          



                                         UA Nitrite   



                                         [Negative]   

 

                                        2 /HPF 

                    (18 12:38 AM)                          



                                         UA RBC [0-2 /HPF]   

 

                                        Occasional /HPF 

*NA*

                    (18 12:38 AM)                          



                                         UA Bacteria [None   



                                         Seen /HPF]   

 

                                        None Seen 

*NA*

                    (18 12:38 AM)                          



                                         UA Sq Epi   







HEMATOLOGY





                    1                   2                   3



                                         Most recent to   



                                         oldest [Reference   



                                         Range]:   

 

                                        11.9 K/CMM 

*HI*

                          (18 7:16 AM)         9.5 K/CMM 

                          (18 5:31 AM)         10.6 K/CMM 

*HI*

                                        (18 7:02 AM) 



                                         WBC [3.7-10.4 K/CMM]   

 

                                        3.40 M/CMM 

*LOW*

                          (18 7:16 AM)         3.24 M/CMM 

*LOW*

                          (18 5:31 AM)         3.90 M/CMM 

*LOW*

                                        (18 7:02 AM) 



                                         RBC [4.70-6.10   



                                         M/CMM]   

 

                                        8.4 g/dL 

*LOW*

                          (18 7:16 AM)         8.0 g/dL 

*LOW*

                          (18 5:31 AM)         9.7 g/dL 

*LOW*

                                        (18 7:02 AM) 



                                         Hgb [14.0-18.0 g/dL]   

 

                                        26.7 % 

*LOW*

                          (18 7:16 AM)         25.1 % 

*LOW*

                          (18 5:31 AM)         30.3 % 

*LOW*

                                        (18 7:02 AM) 



                                         Hct [42.0-54.0 %]   

 

                                        78.6 fL 

*LOW*

                          (18 7:16 AM)         77.6 fL 

*LOW*

                          (18:31 AM)         77.6 fL 

*LOW*

                                        (18 7:02 AM) 



                                         MCV [80.0-94.0 fL]   

 

                                        24.7 pg 

*LOW*

                          (18 7:16 AM)         24.8 pg 

*LOW*

                          (18 5:31 AM)         25.0 pg 

*LOW*

                                        (18 7:02 AM) 



                                         MCH [27.0-31.0 pg]   

 

                                        31.5 g/dL 

*LOW*

                          (18 7:16 AM)         31.9 g/dL 

*LOW*

                          (18 5:31 AM)         32.2 g/dL 

                                        (18 7:02 AM) 



                                         MCHC [32.0-36.0   



                                         g/dL]   

 

                                        16.9 % 

*HI*

                          (18 7:16 AM)         16.9 % 

*HI*

                          (18 5:31 AM)         17.0 % 

*HI*

                                        (18 7:02 AM) 



                                         RDW [11.5-14.5 %]   

 

                                        9.3 fL 

                          (18 7:16 AM)         9.4 fL 

                          (18 5:31 AM)         9.5 fL 

                                        (18 7:02 AM) 



                                         MPV [7.4-10.4 fL]   

 

                                        224 K/CMM 

                          (18 7:16 AM)         213 K/CMM 

                          (18 5:31 AM)         229 K/CMM 

                                        (18 7:02 AM) 



                                         Platelet [133-450   



                                         K/CMM]   

 

                                        78.4 % 

*HI*

                          (18 7:16 AM)         69.9 % 

                          (18 5:31 AM)         79.6 % 

*HI*

                                        (18 7:02 AM) 



                                         Segs [45.0-75.0 %]   

 

                                        10.5 % 

*LOW*

                          (18 7:16 AM)         17.1 % 

*LOW*

                          (18 5:31 AM)         10.7 % 

*LOW*

                                        (18 7:02 AM) 



                                         Lymphocytes   



                                         [20.0-40.0 %]   

 

                                        6.6 % 

                          (18 7:16 AM)         8.1 % 

                          (18 5:31 AM)         8.2 % 

                                        (18 7:02 AM) 



                                         Monocytes [2.0-12.0   



                                         %]   

 

                                        3.5 % 

                          (18 7:16 AM)         3.9 % 

                          (18 5:31 AM)         0.8 % 

                                        (18 7:02 AM) 



                                         Eosinophils [0.0-4.0   



                                         %]   

 

                                        1.0 % 

                          (18 7:16 AM)         1.0 % 

                          (18 5:31 AM)         0.7 % 

                                        (18 7:02 AM) 



                                         Basophils [0.0-1.0   



                                         %]   

 

                                        9.3 K/CMM 

*HI*

                          (18 7:16 AM)         6.7 K/CMM 

                          (18 5:31 AM)         8.4 K/CMM 

*HI*

                                        (18 7:02 AM) 



                                         Segs-Bands #   



                                         [1.5-8.1 K/CMM]   

 

                                        1.3 K/CMM 

                          (18 7:16 AM)         1.6 K/CMM 

                          (18 5:31 AM)         1.1 K/CMM 

                                        (18 7:02 AM) 



                                         Lymphocytes #   



                                         [1.0-5.5 K/CMM]   

 

                                        0.8 K/CMM 

                          (18 7:16 AM)         0.8 K/CMM 

                          (18 5:31 AM)         0.9 K/CMM 

*HI*

                                        (18 7:02 AM) 



                                         Monocytes # [0.0-0.8   



                                         K/CMM]   

 

                                        0.4 K/CMM 

                          (18 7:16 AM)         0.4 K/CMM 

                          (18 5:31 AM)         0.1 K/CMM 

                                        (18 7:02 AM) 



                                         Eosinophils #   



                                         [0.0-0.5 K/CMM]   

 

                                        0.1 K/CMM 

                          (18 7:16 AM)         0.1 K/CMM 

                          (18 5:31 AM)         0.1 K/CMM 

                                        (18 7:02 AM) 



                                         Basophils # [0.0-0.2   



                                         K/CMM]   

 

                                        1+ 

*ABN*

                          (18 7:16 AM)         1+ 

*ABN*

                          (18 5:31 AM)         1+ 

*ABN*

                                        (18 7:02 AM) 



                                         Microcyte [None   



                                         Seen]   

 

                                        28.3 seconds 

*HI*

                    (18 12:27 AM)                          



                                         PT [12.0-14.7   



                                         seconds]   

 

                                        2.61 

*HI*

                    (18 12:27 AM)                          



                                         INR [0.85-1.17]   

 

                                        41.7 seconds 

*HI*

                    (18 12:27 AM)                          



                                         PTT [22.9-35.8   



                                         seconds]   







Immunizations





Given and Recorded





   



                 Vaccine         Date            Status          Refusal Reason

 

   



                     influenza virus vaccine, inactivated     18              Given 

 

   



                     pneumococcal 13-valent vaccine     18              Given 

 

   



                     diphtheria/pertussis, acel/tetanus adult     16              Given 

 

   



                     pneumococcal 23-valent vaccine     3/31/15             Given 









Not Given





   



                 Vaccine         Date            Status          Refusal Reason

 

   



                 pneumococcal 23-valent vaccine     3/30/15         Not Given       Patient Refuses







Procedures







    



              Procedure     Date         Related Diagnosis     Body Site     Status

 

    



                           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

 

    



                           Cardiac ablation using fluoroscopy guidance        Completed

 

    



                           Cardiac catheterization, left heart        Completed

 

    



                           Stent placement2          Completed







1ablation stents



2x27



Social History







 



                           Social History Type       Response

 

 



                           Substance Abuse           Use: None.

 

 



                           Alcohol                   Past

 

 



                           Smoking Status            Former smoker; Exposure to Tobacco Smoke None; Cigarette Smoking

 Last 365



                                         Days No; Reg Smoking Cessation Counseling No; Other Tobacco Frequency quit



                                         30 years ago;



                                         entered on: 18







Assessment and Plan

Extracted from:





  



                     Title: Clinical Document     Author: Juliann Garibay     Date: 18



                                         MD 









                                        Cardiology

[facility]





Impression:

Stable angina

severe coronary artery disease status post bypass and multiple PCI's, LIMA to 
LAD as last remaining vessel

acute on chronic diastolic congestive heart failure

paroxysmal atrial fibrillation status post multiple ablations [id number] Vasc:4

hypertension

diabetes

hypothyroidism



Cardiologist:  at [facility]



Plan:



Consult discharged yesterday due to frailty

Pending Snowden/rehab eval

Well-controlled double product

We will repeat EKG for QTC considering ongoing amiodarone and Ranexa

Continue Xarelto

Normal sinus rhythm on telemetry

Okay to OH home from CV standpoint

Follow-up with his outpatient cardiologist within 2 weeks of discharge





Subjective:



Patient seen and examined.

Telemetry reviewed: Normal sinus rhythm

No chest pain overnight



Objective





VitalsTmp(F)[id number]

                                         07:[id number]---

                                         04:0098.037383/567153---

                                         00:[id number]---

                                         20:0098.699210/737979---

                                         15:2298.563412/[id number]---





                                        24 Hr Tmax: 98.7F (37.06c) at  00:00Vital Signs are the last 5 in the past 

48 hours.



General:  Awake and Alert, NAD

HEENT: Neck Supple, mild JVD

CVS: Regular rate, Normal S1S2

LUNGS: mildly decreased bs

ABD: Soft, Non-tender, + BS

EXT: No edema, + pedal pulses

Skin: No ulcers

Neuro: Awake and Alert, Oriented x 3





Labs (Last four charted values)

WBC                 H 11.9()9.5()H 10.6()H 12.2()

Hgb                 L 8.4()L 8.0()L 9.7()L 8.5()

Hct                 L 26.7()L 25.1()L 30.3()L 27.2()

Plt                 224(SPENCER 25)213( 24)229( 22)234()

Na                  L 133( 25)135( 24)137()

K                   H 5.3( 25)L 3.4( 24)L 3.4( 22)

CO2                 29(SPENCER 25)29(SPENCER 24)28()

Cl                  97( 25)99(SPENCER 24)103( 22)

Cr                  1.03()0.96( 24)0.85()

BUN                 16()18( 24)11()

Glucose Random      H 323()H 288()H 255()

Ca                  L 8.3()L 7.8()L 8.0()

PT                  H 28.3()

INR                 H 2.61()

PTT                 H 41.7()

Troponin            <0.02()<0.02()<0.02()<0.02()

CK MB               1.1()

Total CK            31()29()33()35()Scheduled Meds (20):

                                        18 AMIODarone 200 mg PO Daily

                                        18 aspirin (aspirin 81 mg tablet, chewable) 81 mg PO Daily

                                        18 atorvastatin 40 mg PO Bedtime

                                        18 gabapentin (gabapentin 600 mg oral tablet) 600 mg PO TID

                                        18 insulin glargine 40 unit SUB-Q Bedtime 0 ml/hr

                                        18 insulin glargine 70 unit SUB-Q Daily 0 ml/hr

                                        18 isosorbide mononitrate (Imdur) 60 mg PO QAM

                                        18 levothyroxine (Synthroid) 50 microgram PO Q630AM

                                        18 lidocaine topical (lidocaine topical 5% ointment) 1 appl TOP TID

                                        18 lisinopril 5 mg PO Daily

                                        18 magnesium oxide (Mag-Ox 400) 400 mg PO Daily

                                        18 niacin (niacin 1000 mg oral tablet, extended release) 2,000 mg PO Bedtime



                                        18 pantoprazole 40 mg PO Daily

                                        18 potassium chloride 20 mEq PO Daily

                                        18 ranolazine 500 mg PO BID

                                        18 rivaroxaban (Xarelto) 20 mg PO QPM

                                        18 sertraline 100 mg PO Daily

                                        18 sodium chloride (Saline Flush 0.9%) 10 ml IVP Q12H

                                        18 tamsulosin 0.4 mg PO Daily

                                        18 torsemide 20 mg PO Daily





Extracted from:





  



                     Title: Clinical Document     Author: Juliann Garibay     Date: 18



                                         MD 









                                        Chief Complaint:

Chest pain

HPI:

The patient is a 77-year-old male that has extensive past cardiovascular history
with multiple PCI's and is followed by  at [facility] and 
presents this time with recurrent chest pain.



He mentioned he is chest pain is on and off lasting for several hours each time 
and unrelated to physical effort.



His EKG this time show normal sinus rhythm without ST or T-wave abnormalities 
and he had so far 3 sets of normal troponin.  His BNP is mildly elevated and he 
has signs of volume overload on physical examination on his chest x-ray.



From a cardiac standpoint he also has history of paroxysmal atrial fibrillation 
[id number] Vasc score of 4 and is chronically anticoagulated with Xarelto.  His INR
is elevated on presentation confirming adherence to medical therapy.

Review of Systems

General/Constitutional:

Fatigue no. Weakness no. Weight gain no. Headaches no.

Allergy/Immunology:

Colds no. Cough no.

HEENT/Neck:

Dizziness no. Change in vision no.

Respiratory:

Chest congestion no. Cough no. Pain with breathing no. Shortness of breath yes. 
Swelling of the legs no. Wheezing no.

Cardiovascular:

Chest pain yes. Claudication no. Dyspnea on exertion yes. Palpitations no.

Gastrointestinal:

Abdominal pain no. Change in bowel habits no. Constipation no. Diarrhea no. 
Nausea no.

Hematology:

Easy bleeding no. Easy bruising no.

Musculoskeletal:

Back pain y. Myalgias no



Past medical history:

Stented coronary artery

Acute MI

Hypercholesteremia

Poliomyelitides

Whooping cough

Histoplasmosis

Hypertension

Hx MRSA infection

Past Surgical History:

Cardiac catheterization, left heart

CABG x 3 - Coronary artery bypass grafts x 3

Cardiac ablation using fluoroscopy guidance

Stent placement

Family History:



Mother: Aneurysm

Brother: Cancer of prostate; Heart attack; Leukemia

Social History:

Alcohol

Details: Past

Tobacco

Details: Use: Former smoker.  Tobacco smoke exposure: None.  Did the Patient 
Smoke Cigarettes Anytime During the Last 365 Days? No.  Cessation Counseling 
Provided? Yes.

Details: Use: Former smoker.  Tobacco smoke exposure: None.  Did the Patient 
Smoke Cigarettes Anytime During the Last 365 Days? No.  Cessation Counseling 
Provided? No.

Details: Use: Former smoker.  Tobacco smoke exposure: None.  Other Tobacco 
Frequency quit 30 years ago.  Did the Patient Smoke Cigarettes Anytime During 
the Last 365 Days? No.  Cessation Counseling Provided? No.

Substance Abuse

Details: Use: None.

List of Medications:



Scheduled Meds (2):

                                        18 aspirin (aspirin 81 mg tablet, chewable) 81 mg PO Daily

                                        18 sodium chloride (Saline Flush 0.9%) 10 ml IVP Q12H



Continuous Infusions: None



Scheduled Meds (2):aspirin (aspirin 81 mg tablet, chewable), sodium chloride 
(Saline Flush 0.9%)

Unscheduled Meds: None

PRN Meds (13):Dextrose 50% in Water IV (Dextrose 50% Syringe), Dextrose 50% in 
Water IV (Dextrose 50% Syringe), glucagon, insulin lispro, insulin lispro, 
insulin lispro, insulin lispro, insulin lispro, insulin lispro, insulin lispro, 
insulin lispro, insulin lispro, sodium chloride (Saline Flush 0.9%)

One Time Meds (4):(Completed) albuterol-ipratropium (albuterol-ipratropium 2.5-
0.5 mg inhalation solution), (Completed) aspirin, (Completed) fentaNYL, 
(Completed) fentaNYL

Continuous Infusions: None



Labs (Last four charted values)

WBC                 H 10.6()H 12.2()

Hgb                 L 9.7()L 8.5()

Hct                 L 30.3()L 27.2()

Plt                 229()234()

Na                  137()

K                   L 3.4()

CO2                 28()

Cl                  103()

Cr                  0.85()

BUN                 11()

Glucose Random      H 255()

Ca                  L 8.0()

PT                  H 28.3()

INR                 H 2.61()

PTT                 H 41.7()

Troponin            <0.02()<0.02()<0.02()

CK MB               1.1()

Total CK            29()33()35()Laboratory:



Physical Exam:



VitalsTmp(F)[id number]

                                         08:[id number]/[id number]---

                                         05:00----[id number] 5.0L/m

                                         03:20----53827/183135 5.0L/m

                                         01:17----71001/694284---

                                         00:10------------1898 5.0L/m





                                        24 Hr Tmax: 99.2F (37.33c) at  23:23Vital Signs are the last 5 in the past 

48 hours.



HEENT: Neck Supple

CVS: Regular rate, Normal S1S2 no murmur

LUNGS: Bibasilar Rales

ABD: Soft, Non-tender, + BS

EXT: No ankle edema, weakly palpable palpable distal pulses

Skin: No ulcers

Neuro: Grossly non-focal



Assessment and plan:

Patient is a 77-year-old male with a very complex past cardiovascular history at
present this time with lung congestion, shortness of breath and chest pain.



He has extensive CAD status post CABG and multiple PCI's with last stress test 
showing partially reversible moderate inferolateral defect back in 2017.  He follows with Dr. Terrell.



At the present we will focus on controlling his double product and volume 
overload to decrease his LVEDP since he has established CAD and will have chest 
pain in case of increased demand for oxygen from the myocardium.



We will keep him off Xarelto from now until we confirm that there is no need for
repeat angiogram.  We will put him on aspirin only for now.  Has 3 sets of 
troponin negative and EKG without acute ischemic signs.  We will follow closely.



List of diagnosis:



Chest pain

CAD status post multiple PCI and CABG

Chronic diastolic CHF

MPI in 2017 with partially reversible moderate inferolateral defect

Hypertension

Diabetes

Hyperlipidemia

Paroxysmal atrial fibrillation chads 2 vasc: 4 on Xarelto

?? SSS

## 2019-07-18 NOTE — XMS REPORT
Summary of Care: 6/18/15 - 6/22/15

                             Created on: 2101



DESIREE MCKEON

External Reference #: [id number]

: 1940

Sex: Male



Demographics







                          Address                   [address]

[place]  [address]

 

                          Home Phone                [phone]

 

                          Preferred Language        English

 

                          Marital Status            

 

                          Congregational Affiliation     None

 

                          Race                      White/

 

                          Ethnic Group              Non-





Author







                          Organization              Unknown

 

                          Address                   Unknown

 

                          Phone                     Unavailable







Encounter





CHLOÉ Pedersen(DAVID) [id number] Date(s): 6/18/15 - 6/22/15

[facility] [address] [place] [address]-     [phone]
[phone]

Discharge Disposition: Home

Physician Attending: Katie Mandujano MD

Physician Admitting: Katie Mandujano MD





Vital Signs







                    1                   2                   3



                                         Most recent to   



                                         oldest [Reference   



                                         Range]:   

 

                                        175.26 cm 

                          (6/18/15 10:00 PM)        175.26 cm 

                          (6/18/15 4:13 PM)          



                                         Height   

 

                                        98 DegF 

                          (6/22/15 11:25 AM)        98 DegF 

                          (6/22/15 7:20 AM)         98.0 DegF 

                                        (6/22/15 4:00 AM)



                                         Temperature Oral   



                                         [96.4-99.1 DegF]   

 

                                        110/66 mmHg 

                          (6/22/15 11:25 AM)        135/78 mmHg 

                          (6/22/15 7:20 AM)         145/84 mmHg 

*HI*

                                        (6/22/15 4:00 AM)



                                         Blood Pressure   



                                         [/60-90 mmHg]   

 

                                        18 BRMIN 

                          (6/22/15 11:25 AM)        18 BRMIN 

                          (6/22/15 7:20 AM)         18 BRMIN 

                                        (6/22/15 4:00 AM)



                                         Respiratory Rate   



                                         [14-20 BRMIN]   

 

                                        82 bpm 

                          (6/22/15 11:25 AM)        81 bpm 

                          (6/22/15 7:20 AM)         82 bpm 

                                        (6/22/15 4:00 AM)



                                         Peripheral Pulse   



                                         Rate [ bpm]   

 

                                        105 kg 

                          (6/18/15 10:00 PM)        103.182 kg 

                          (6/18/15 4:13 PM)          



                                         Weight   

 

                                        34.18 m2 

                          (6/18/15 10:00 PM)        33.59 m2 

                          (6/18/15 4:13 PM)          



                                         Body Mass Index   







Problem List







    



              Condition     Effective Dates     Status       Health Status     Informant

 

    



                           Acute MI(Confirmed)1      Resolved  

 

    



                           Atrial                    Active  



                                         fibrillation(Confirm    



                                         ed)    

 

    



                           CAD (coronary artery      Active  



                                         disease)(Confirmed)    

 

    



                           Diabetes(Confirmed)       Resolved  

 

    



                           Diastolic heart           Active  



                                         failure(Confirmed)    

 

    



                           Histoplasmosis(Confi      Resolved  



                                         rmed)    

 

    



                           Hypercholesteremia(C      Resolved  



                                         onfirmed)    

 

    



                           Hypertension(Confirm      Resolved  



                                         ed)    

 

    



                           HTN                       Active  



                                         (hypertension)(Confi    



                                         rmed)    

 

    



                           Hypothyroidism(Confi      Active  



                                         rmed)    

 

    



                           Diabetes mellitus         Active  



                                         type 2, insulin    



                                         dependent(Confirmed)    

 

    



                           PAUL (obstructive          Active  



                                         sleep    



                                         apnea)(Confirmed)    

 

    



                           Poliomyelitides(Conf      Resolved  



                                         irmed)    

 

    



                           Sleep                     Resolved  



                                         apnea(Confirmed)    

 

    



                           Stented coronary          Resolved  



                                         artery(Confirmed)2    

 

    



                           Systolic heart            Active  



                                         failure(Confirmed)    

 

    



                           Whooping                  Resolved  



                                         cough(Confirmed)    







1x 3



225 cardiac stents



Allergies, Adverse Reactions, Alerts







   



                 Substance       Reaction        Severity        Status

 

   



                           NKDA                      Active







Medications





acetaminophen-hydrocodone 325 mg-10 mg oral tablet

1 tab, Route: PO, Drug Form: TAB, Dosing Weight 103.182, kg, Q4H, PRN Pain Score
4-6, Start date: 06/18/15 21:46:00, Duration: 30 day, Stop date: 07/18/15 21:45
:00

Notes: Do not exceed 4gm/day of acetaminophen.  (Same as: Norco 325/10)

Start Date: 6/18/15

Stop Date: 6/22/15

Status: Discontinued



aspirin

325 mg, 1 tab, Route: PO, Drug form: TAB, ONCE, Dosing Weight 103.182, kg, Start
date: 06/18/15 21:33:00, Stop date: 06/18/15 21:33:00

Notes: Take with food.

Start Date: 6/18/15

Stop Date: 6/18/15

Status: Completed



aspirin 325 mg tablet

325 mg, 1 tab, Route: PO, Drug form: TAB, Daily, Start date: 06/19/15 9:00:00, D
uration: 30 day, Stop date: 07/18/15 9:00:00

Notes: Take with food.

Start Date: 6/19/15

Stop Date: 6/22/15

Status: Discontinued



aspirin 325 mg tablet

325 mg, Route: PO, Drug form: TAB, Daily, Dosing Weight 103.182, kg, Priority: S
TAT, Start date: 06/18/15 21:46:00, Duration: 30 day, Stop date: 07/18/15 9:00:0
0

Start Date: 6/18/15

Stop Date: 6/18/15

Status: Deleted



atorvastatin

5 mg, 0.5 tab, Route: PO, Drug form: TAB, Bedtime, Dosing Weight 103.182, kg, St
art date: 06/19/15 21:00:00, Duration: 30 day, Stop date: 07/18/15 21:00:00

Notes: (Same As: Lipitor)

Start Date: 6/19/15

Stop Date: 6/22/15

Status: Discontinued



atorvastatin 10 mg oral tablet

5 mg=0.5 tab, PO, Bedtime, # 30 tab, 0 Refill(s)

Start Date: 6/18/15

Status: Ordered



atropine

0.5 mg, 5 mL, Route: IVP, Drug form: INJ, PRN, PRN Bradycardia, Start date: 06/2
0/15 14:23:00, Duration: 30 day, Stop date: 07/20/15 14:22:00

Start Date: 6/20/15

Stop Date: 6/22/15

Status: Discontinued



Benadryl

50 mg, 2 tab, Route: PO, Drug form: TAB, Before Transfusion, Dosing Weight 103.1
82, kg, PRN Blood Transfusion, Start date: 06/18/15 22:34:00, Duration: 30 day, 
Stop date: 07/18/15 22:33:00

Start Date: 6/18/15

Stop Date: 6/22/15

Status: Discontinued



calcium carbonate 500 mg (200 mg elemental calcium) oral tablet

1,000 mg, 2 tab, Route: PO, Drug form: CHEWTAB, PRN, Dosing Weight 105, kg, PRN 
Abnormal Lab Result, FOR ICU USE ONLY, Start date: 06/19/15 13:42:00, Duration: 
30 day, Stop date: 07/19/15 13:41:00

Notes: (Same As: Tums)Calcium Carbonate 500 qi=293 mg elemental calcium   Dose=_
_____ mg calcium carbonate (______ mg elemental calcium)

Start Date: 6/19/15

Stop Date: 6/22/15

Status: Discontinued



calcium carbonate 500 mg (200 mg elemental calcium) oral tablet

500 mg, 1 tab, Route: PO, Drug form: CHEWTAB, PRN, Dosing Weight 105, kg, PRN Ab
normal Lab Result, FOR ICU USE ONLY, Start date: 06/19/15 13:42:00, Duration: 30
day, Stop date: 07/19/15 13:41:00

Notes: (Same As: Tums)Calcium Carbonate 500 pl=623 mg elemental calcium   Dose=_
_____ mg calcium carbonate (______ mg elemental calcium)

Start Date: 6/19/15

Stop Date: 6/22/15

Status: Discontinued



calcium gluconate + Sodium Chloride 0.9% IV 50 mL

1 gm, 10 mL, Route: IVPB, PRN, Dosing Weight 105, kg, PRN Abnormal Lab Result, S
tart date: 06/19/15 13:42:00, Duration: 30 day, Stop date: 07/19/15 13:41:00, FO
R ICU USE ONLY

Special Instructions: FOR ICU USE ONLY

Start Date: 6/19/15

Stop Date: 6/22/15

Status: Discontinued



clopidogrel

75 mg, 1 tab, Route: PO, Drug form: TAB, Daily, Dosing Weight 103.182, kg, Start
date: 06/19/15 9:00:00, Duration: 30 day, Stop date: 07/18/15 9:00:00

Notes: (Same As: Plavix)

Start Date: 6/19/15

Stop Date: 6/22/15

Status: Discontinued



clopidogrel 75 mg oral tablet

75 mg=1 tab, PO, Daily, # 30 tab, 0 Refill(s)

Start Date: 6/18/15

Status: Ordered



Dextrose 50% Syringe

25 gm, 50 mL, Route: IVP, Drug Form: INJ, Dosing Weight 103.182, kg, PRN, PRN Bl
ood Glucose Results, Start date: 06/18/15 21:43:00, Duration: 30 day, Stop date:
07/18/15 21:42:00

Start Date: 6/18/15

Stop Date: 6/21/15

Status: Discontinued



Dextrose 50% Syringe

12.5 gm, 25 mL, Route: IVP, Drug Form: INJ, Dosing Weight 103.182, kg, PRN, PRN 
Blood Glucose Results, Start date: 06/18/15 21:43:00, Duration: 30 day, Stop hazel
e: 07/18/15 21:42:00

Start Date: 6/18/15

Stop Date: 6/21/15

Status: Discontinued



Dextrose 50% Syringe

25 gm, 50 mL, Route: IVP, Drug Form: INJ, Dosing Weight 105, kg, PRN, PRN Blood 
Glucose Results, Start date: 06/21/15 16:24:00, Duration: 30 day, Stop date: 07/
21/15 16:23:00

Start Date: 6/21/15

Stop Date: 6/22/15

Status: Discontinued



Dextrose 50% Syringe

12.5 gm, 25 mL, Route: IVP, Drug Form: INJ, Dosing Weight 105, kg, PRN, PRN Bloo
d Glucose Results, Start date: 06/21/15 16:24:00, Duration: 30 day, Stop date: 0
7/21/15 16:23:00

Start Date: 6/21/15

Stop Date: 6/22/15

Status: Discontinued



gabapentin 600 mg oral tablet

600 mg=1 tab, PO, TID, # 270 tab, 0 Refill(s)

Start Date: 6/18/15

Status: Ordered



gabapentin 600 mg oral tablet

600 mg, 2 cap, Route: PO, Drug form: CAP, TID, Dosing Weight 103.182, kg, Start 
date: 06/19/15 9:00:00, Duration: 30 day, Stop date: 07/18/15 17:00:00

Notes: (Same as: Neurontin)

Start Date: 6/19/15

Stop Date: 6/22/15

Status: Discontinued



glucagon

1 mg, Route: IM, Drug form: PDR/INJ, PRN, Dosing Weight 103.182, kg, PRN Blood G
lucose Results, Start date: 06/18/15 21:43:00, Duration: 30 day, Stop date: 07/1
8/15 21:42:00

Start Date: 6/18/15

Stop Date: 6/21/15

Status: Discontinued



glucagon

1 mg, Route: IM, Drug form: PDR/INJ, PRN, Dosing Weight 105, kg, PRN Blood Gluco
se Results, Start date: 06/21/15 16:24:00, Duration: 30 day, Stop date: 07/21/15
16:23:00

Start Date: 6/21/15

Stop Date: 6/22/15

Status: Discontinued



Heparin - one time bolus for ACS

4,000 unit, 4 mL, Route: IV, Drug form: INJ, ONCE, Dosing Weight 103.182, kg, Pr
iority: STAT, Start date: 06/18/15 18:04:00, Stop date: 06/18/15 18:04:00

Start Date: 6/18/15

Stop Date: 6/18/15

Status: Completed



Heparin 30 unit/kg Bolus (Heparin Dosing Weight)

Route: IVP, PRN, 2,500 unit, 2.5 mL, Drug form: INJ, PRN, Heparin Protocol, Star
t date: 06/18/15 18:04:00 Stop date: 07/18/15 18:03:00, 30 day

Start Date: 6/18/15

Stop Date: 6/20/15

Status: Discontinued



Heparin 60 unit/kg Bolus (Heparin Dosing Weight)

Route: IVP, PRN, 5,000 unit, 5 mL, Drug form: INJ, PRN, Heparin Protocol, Start 
date: 06/18/15 18:04:00 Stop date: 07/18/15 18:03:00, 30 day

Start Date: 6/18/15

Stop Date: 6/20/15

Status: Discontinued



heparin additive 25,000 unit [12 unit/kg/hr] + Premix Diluent Dextrose 5% 500 mL

500 mL, Rate: 20.09 ml/hr, Infuse over: 24.9 hr, Route: IV, Dosing Weight 83.7 k
g, Total Volume: 500 mL, Start date: 06/18/15 18:04:00, Duration: 30 day, Stop d
ate: 07/18/15 18:03:00

Start Date: 6/18/15

Stop Date: 6/20/15

Status: Discontinued



Humalog Pen 100 units/mL subcutaneous injection

sliding scale, SUB-Q, TID-Before Meals, # 3 mL, 0 Refill(s)

Start Date: 6/18/15

Status: Ordered



Imdur

30 mg, 1 tab, Route: PO, Drug form: ERTAB, QAM, Dosing Weight 103.182, kg, Start
date: 06/19/15 9:00:00, Duration: 30 day, Stop date: 07/18/15 9:00:00

Notes: (Same as:Imdur)"Do Not Crush"  Take on empty stomach/ full glass of water
.  Do not crush

Start Date: 6/19/15

Stop Date: 6/22/15

Status: Discontinued



Imdur 30 mg oral tablet, extended release

30 mg=1 tab, PO, QAM, # 30 tab, 0 Refill(s)

Start Date: 6/18/15

Status: Ordered



insulin aspart

5 unit, 0.05 mL, Route: SUB-Q, Drug form: SOLN, Sliding Scale, Dosing Weight 103
.182, kg, PRN Blood Glucose Results, Start date: 06/18/15 21:43:00, Duration: 30
day, Stop date: 07/18/15 21:42:00

Notes: Roll in palms of hands gently;  Do not shake vigorously. (Same as: NovoLO
G)"single patient use only"  Stable for 28 days at room temperature.Expires in _
____ days from ______________Date

Start Date: 6/18/15

Stop Date: 6/21/15

Status: Discontinued



insulin aspart

4 unit, 0.04 mL, Route: SUB-Q, Drug form: SOLN, Sliding Scale, Dosing Weight 103
.182, kg, PRN Blood Glucose Results, Start date: 06/18/15 21:43:00, Duration: 30
day, Stop date: 07/18/15 21:42:00

Notes: Roll in palms of hands gently;  Do not shake vigorously. (Same as: NovoLO
G)"single patient use only"  Stable for 28 days at room temperature.Expires in _
____ days from ______________Date

Start Date: 6/18/15

Stop Date: 6/21/15

Status: Discontinued



insulin aspart

3 unit, 0.03 mL, Route: SUB-Q, Drug form: SOLN, Sliding Scale, Dosing Weight 103
.182, kg, PRN Blood Glucose Results, Start date: 06/18/15 21:43:00, Duration: 30
day, Stop date: 07/18/15 21:42:00

Notes: Roll in palms of hands gently;  Do not shake vigorously. (Same as: NovoLO
G)"single patient use only"  Stable for 28 days at room temperature.Expires in _
____ days from ______________Date

Start Date: 6/18/15

Stop Date: 6/21/15

Status: Discontinued



insulin aspart

1 unit, 0.01 mL, Route: SUB-Q, Drug form: SOLN, Sliding Scale, Dosing Weight 103
.182, kg, PRN Blood Glucose Results, Start date: 06/18/15 21:43:00, Duration: 30
day, Stop date: 07/18/15 21:42:00

Notes: Roll in palms of hands gently;  Do not shake vigorously. (Same as: NovoLO
G)"single patient use only"  Stable for 28 days at room temperature.Expires in _
____ days from ______________Date

Start Date: 6/18/15

Stop Date: 6/21/15

Status: Discontinued



insulin aspart

2 unit, 0.02 mL, Route: SUB-Q, Drug form: SOLN, Sliding Scale, Dosing Weight 103
.182, kg, PRN Blood Glucose Results, Start date: 06/18/15 21:43:00, Duration: 30
day, Stop date: 07/18/15 21:42:00

Notes: Roll in palms of hands gently;  Do not shake vigorously. (Same as: NovoLO
G)"single patient use only"  Stable for 28 days at room temperature.Expires in _
____ days from ______________Date

Start Date: 6/18/15

Stop Date: 6/21/15

Status: Discontinued



insulin aspart

3 unit, 0.03 mL, Route: SUB-Q, Drug form: SOLN, TID-Before Meals, Dosing Weight 
105, kg, PRN Blood Glucose Results, Start date: 06/21/15 16:24:00, Duration: 30 
day, Stop date: 07/21/15 16:23:00

Notes: Roll in palms of hands gently;  Do not shake vigorously. (Same as: NovoLO
G)"single patient use only"  Stable for 28 days at room temperature.Expires in _
____ days from ______________Date

Start Date: 6/21/15

Stop Date: 6/22/15

Status: Discontinued



insulin aspart

6 unit, 0.06 mL, Route: SUB-Q, Drug form: SOLN, TID-Before Meals, Dosing Weight 
105, kg, PRN Blood Glucose Results, Start date: 06/21/15 16:24:00, Duration: 30 
day, Stop date: 07/21/15 16:23:00

Notes: Roll in palms of hands gently;  Do not shake vigorously. (Same as: NovoLO
G)"single patient use only"  Stable for 28 days at room temperature.Expires in _
____ days from ______________Date

Start Date: 6/21/15

Stop Date: 6/22/15

Status: Discontinued



insulin aspart

15 unit, 0.15 mL, Route: SUB-Q, Drug form: SOLN, TID-Before Meals, Dosing Weight
105, kg, PRN Blood Glucose Results, Start date: 06/21/15 16:24:00, Duration: 30 
day, Stop date: 07/21/15 16:23:00

Notes: Roll in palms of hands gently;  Do not shake vigorously. (Same as: NovoLO
G)"single patient use only"  Stable for 28 days at room temperature.Expires in _
____ days from ______________Date

Start Date: 6/21/15

Stop Date: 6/22/15

Status: Discontinued



insulin aspart

12 unit, 0.12 mL, Route: SUB-Q, Drug form: SOLN, TID-Before Meals, Dosing Weight
105, kg, PRN Blood Glucose Results, Start date: 06/21/15 16:24:00, Duration: 30 
day, Stop date: 07/21/15 16:23:00

Notes: Roll in palms of hands gently;  Do not shake vigorously. (Same as: NovoLO
G)"single patient use only"  Stable for 28 days at room temperature.Expires in _
____ days from ______________Date

Start Date: 6/21/15

Stop Date: 6/22/15

Status: Discontinued



insulin aspart

9 unit, 0.09 mL, Route: SUB-Q, Drug form: SOLN, TID-Before Meals, Dosing Weight 
105, kg, PRN Blood Glucose Results, Start date: 06/21/15 16:24:00, Duration: 30 
day, Stop date: 07/21/15 16:23:00

Notes: Roll in palms of hands gently;  Do not shake vigorously. (Same as: NovoLO
G)"single patient use only"  Stable for 28 days at room temperature.Expires in _
____ days from ______________Date

Start Date: 6/21/15

Stop Date: 6/22/15

Status: Discontinued



insulin glargine

42 unit, Route: SUB-Q, Drug form: SOLN, Bedtime, Dosing Weight 103.182, kg, Star
t date: 06/19/15 21:00:00, Duration: 30 day, Stop date: 07/18/15 21:00:00

Start Date: 6/19/15

Stop Date: 6/18/15

Status: Deleted



Klor-Con M20 oral tablet, extended release

20 mEq=1 tab, PO, BID, # 180 tab, 0 Refill(s)

Start Date: 6/18/15

Status: Ordered



Lasix

40 mg, 4 mL, Route: IVP, Drug form: INJ, BID, Dosing Weight 105, kg, Start date:
06/20/15 9:00:00, Duration: 30 day, Stop date: 07/19/15 17:00:00

Notes: (Same as: Lasix)  *** MEDICATION WASTE ***Product Size:  40 mgProduct Was
sonali:  ___ mg

Start Date: 6/20/15

Stop Date: 6/22/15

Status: Discontinued



Lasix

40 mg, 4 mL, Route: IVP, Drug form: INJ, Q8H, Dosing Weight 105, kg, Priority: N
OW, Start date: 06/19/15 8:39:00, Duration: 30 day, Stop date: 07/19/15 8:00:00

Notes: (Same as: Lasix)  *** MEDICATION WASTE ***Product Size:  40 mgProduct Was
sonali:  ___ mg

Start Date: 6/19/15

Stop Date: 6/20/15

Status: Discontinued



Lasix 40 mg oral tablet

40 mg=1 tab, PO, BID, # 30 tab, 0 Refill(s)

Start Date: 6/22/15

Stop Date: 6/22/15

Status: Discontinued



Levemir FlexPen

50 unit, 0.5 mL, Route: SUB-Q, Drug form: INJ, QAM, Start date: 06/22/15 9:00:00
, Duration: 30 day, Stop date: 07/21/15 9:00:00

Notes: Same as LevemirDo not hold insulin without contacting prescriber  "single
patient use only"

Start Date: 6/22/15

Stop Date: 6/22/15

Status: Discontinued



Levemir FlexPen

65 unit, 0.65 mL, Route: SUB-Q, Drug form: INJ, Bedtime, Start date: 06/20/15 21
:00:00, Stop date: 07/19/15 21:00:00

Notes: Same as LevemirDo not hold insulin without contacting prescriber  "single
patient use only"

Start Date: 6/20/15

Stop Date: 6/22/15

Status: Discontinued



Levemir FlexPen

42 unit, 0.42 mL, Route: SUB-Q, Drug form: INJ, Bedtime, Start date: 06/19/15 21
:00:00, Duration: 30 day, Stop date: 07/18/15 21:00:00

Notes: Same as LevemirDo not hold insulin without contacting prescriber  "single
patient use only"

Start Date: 6/19/15

Stop Date: 6/20/15

Status: Discontinued



Lovenox

40 mg, 0.4 mL, Route: SUB-Q, Drug form: INJ, [id number], Dosing Weight 105, kg, Pr
iority: Routine, Start date: 06/20/15 8:00:00, Duration: 30 day, Stop date: 07/1
9/15 8:00:00

Notes: (Same as: Lovenox)

Start Date: 6/20/15

Stop Date: 6/22/15

Status: Discontinued



magnesium oxide

800 mg, 2 tab, Route: PO, Drug form: TAB, PRN, Dosing Weight 105, kg, PRN Abnorm
al Lab Result, FOR ICU USE ONLY, Start date: 06/19/15 13:42:00, Duration: 30 day
, Stop date: 07/19/15 13:41:00

Notes: (Same as: Mag-Ox 400)Magnesium oxide [id number] elemental magnesiumDose=
____mg magnesium oxide (___mg elemental magnesium)

Start Date: 6/19/15

Stop Date: 6/22/15

Status: Discontinued



magnesium sulfate

2 gm, 50 mL, Route: IVPB, Drug form: INJ, PRN, Dosing Weight 105, kg, PRN Abnorm
al Lab Result, Start date: 06/19/15 13:42:00, Duration: 30 day, Stop date: 07/19
/15 13:41:00, FOR ICU USE ONLY

Special Instructions: FOR ICU USE ONLY

Start Date: 6/19/15

Stop Date: 6/22/15

Status: Discontinued



metFORMIN 1000 mg oral tablet

1,000 mg=1 tab, PO, BID-Meals, # 30 tab, 0 Refill(s)

Start Date: 6/18/15

Status: Ordered



morphine Sulfate

2 mg, 1 mL, Route: IVP, Drug form: INJ, Q3H, Dosing Weight 103.182, kg, PRN Pain
Score 4-6, Start date: 06/18/15 21:46:00, Duration: 30 day, Stop date: 07/18/15 
21:45:00

Notes: (Same as:MORPhine Sulfate)

Start Date: 6/18/15

Stop Date: 6/22/15

Status: Discontinued



morphine Sulfate

1 mg, 0.5 mL, Route: IV, Drug form: INJ, Q4H, Dosing Weight 105, kg, PRN Pain Sc
ore 1-3, Start date: 06/19/15 15:57:00, Duration: 30 day, Stop date: 07/19/15 15
:56:00

Notes: (Same as:MORPhine Sulfate)

Start Date: 6/19/15

Stop Date: 6/22/15

Status: Discontinued



Multaq

400 mg, 1 tab, Route: PO, Drug form: TAB, BID, Dosing Weight 103.182, kg, Start 
date: 06/19/15 9:00:00, Duration: 30 day, Stop date: 07/18/15 17:00:00

Notes: Same as: Multaq

Start Date: 6/19/15

Stop Date: 6/22/15

Status: Discontinued



Neutra-Phos

2 pkt, Route: PO, Drug Form: PDR/REC, Dosing Weight 105, kg, PRN, PRN Abnormal L
ab Result, FOR ICU USE ONLY, Start date: 06/19/15 13:42:00, Duration: 30 day, St
op date: 07/19/15 13:41:00

Notes: (Same as: Neutra-Phos)  Each 1.25 gm pkt has 250mg phosphorous. Mix w/2.5
oz water and stir.

Start Date: 6/19/15

Stop Date: 6/22/15

Status: Discontinued



niacin 1000 mg oral tablet, extended release

2,000 mg=2 tab, PO, Bedtime, # 90 tab, 0 Refill(s)

Start Date: 6/18/15

Status: Ordered



niacin 250 mg oral capsule, extended release

2,000 mg, 8 cap, Route: PO, Drug form: ERCAP, Bedtime, Dosing Weight 103.182, kg
, Start date: 06/19/15 21:00:00, Duration: 30 day, Stop date: 07/18/15 21:00:00

Notes: With food.

Start Date: 6/19/15

Stop Date: 6/22/15

Status: Discontinued



nitroglycerin 0.4 mg sublingual tablet

0.4 mg, 1 tab, Route: SL, Drug form: TAB, Q5Min, PRN Chest Pain, Start date: 06/
20/15 14:23:00, Duration: 30 day, Stop date: 07/20/15 14:22:00

Notes: (Same as:Nitroquick, Nitrostat)"Do Not Crush"  Sublingual tablet

Start Date: 6/20/15

Stop Date: 6/22/15

Status: Discontinued



Norco 10/325 oral tablet

1 tab, PO, QID, PRN for pain, # 24 tab, 0 Refill(s)

Start Date: 6/18/15

Stop Date: 6/24/15

Status: Ordered



ondansetron

4 mg, 2 mL, Route: IVP, Drug form: INJ, Q8H, Dosing Weight 103.182, kg, PRN Naus
ea & Vomiting, Start date: 06/18/15 21:46:00, Duration: 30 day, Stop date: 
07/18/15 21:45:00

Notes: (Same as: Zofran)  *** MEDICATION WASTE ***Product Size:  4 mgProduct Was
sonali:  ___ mg

Start Date: 6/18/15

Stop Date: 6/22/15

Status: Discontinued



pantoprazole

40 mg, Route: IVP, Drug form: INJ, Before Dinner, Dosing Weight 103.182, kg, Pat
ient is NPO, Start date: 06/19/15 16:30:00, Duration: 30 day, Stop date:  16:30:00

Notes: For IV push reconstitute with 10 ml 0.9% sodium chloride and push over 2 
minutes. (Same as: Protonix)

Start Date: 6/19/15

Stop Date: 6/22/15

Status: Discontinued



pantoprazole

40 mg, Route: PO, Drug form: ECTAB, BID, Dosing Weight 103.182, kg, Start date: 
06/19/15 9:00:00, Duration: 30 day, Stop date: 07/18/15 17:00:00

Start Date: 6/19/15

Stop Date: 6/18/15

Status: Canceled



pantoprazole 40 mg oral enteric coated tablet

40 mg=1 tab, PO, BID, # 90 tab, 0 Refill(s)

Start Date: 6/18/15

Status: Ordered



potassium chloride

10 mEq, 100 mL, Route: IVPB, Drug form: INJ, PRN, Dosing Weight 105, kg, PRN Abn
ormal Lab Result, Via peripheral line, Start date: 06/19/15 13:42:00, Duration: 
30 day, Stop date: 07/19/15 13:41:00, FOR ICU USE ONLY

Special Instructions: FOR ICU USE ONLY

Notes: Infuse at a rate of 10 mEq/hr.(Same as: KCL)

Start Date: 6/19/15

Stop Date: 6/22/15

Status: Discontinued



potassium chloride

20 mEq, 100 mL, Route: IVPB, Drug form: INJ, PRN, Dosing Weight 105, kg, PRN Abn
ormal Lab Result, Via central line, Start date: 06/19/15 13:42:00, Duration: 30 
day, Stop date: 07/19/15 13:41:00, FOR ICU USE ONLY

Special Instructions: FOR ICU USE ONLY

Notes: (Same as: KCL)  Infuse no faster than 10 mEq/hr if given peripherally.

Start Date: 6/19/15

Stop Date: 6/22/15

Status: Discontinued



potassium chloride

20 mEq, 15 mL, Route: NJ, Drug form: LIQ, PRN, Dosing Weight 105, kg, PRN Abnorm
al Lab Result, Start date: 06/19/15 13:42:00, Duration: 30 day, Stop date: 07/19
/15 13:41:00, FOR ICU USE ONLY

Special Instructions: FOR ICU USE ONLY

Notes: (Same as: Potassium Chloride)

Start Date: 6/19/15

Stop Date: 6/22/15

Status: Discontinued



potassium chloride

20 mEq, 1 tab, Route: PO, Drug form: ERTAB, PRN, Dosing Weight 105, kg, PRN Abno
rmal Lab Result, Start date: 06/19/15 13:42:00, Duration: 30 day, Stop date: 07/
19/15 13:41:00, FOR ICU USE ONLY

Special Instructions: FOR ICU USE ONLY

Notes: (Same as: K-Dur 20)"Do Not Crush"  With food and full glass of water

Start Date: 6/19/15

Stop Date: 6/22/15

Status: Discontinued



potassium chloride 20 mEq oral tablet, extended release

20 mEq, 1 tab, Route: PO, Drug form: ERTAB, Q12H, Dosing Weight 105, kg, Priorit
y: Routine, Start date: 06/20/15 9:00:00, Duration: 30 day, Stop date: 07/19/15 
21:00:00

Notes: (Same as: K-Dur 20)"Do Not Crush"  With food and full glass of water

Start Date: 6/20/15

Stop Date: 6/22/15

Status: Discontinued



potassium phosphate + Sodium Chloride 0.9%  mL

45 mmol, 15 mL, Route: IVPB, PRN, Dosing Weight 105, kg, PRN Abnormal Lab Result
, Start date: 06/19/15 13:42:00, Duration: 30 day, Stop date: 07/19/15 13:41:00,
FOR ICU USE ONLY

Special Instructions: FOR ICU USE ONLY

Notes: (Same as: K Phosphate.)  1 mMol phoshate has 1.47 mEq potassium  Infuse o
jon 4 hours

Start Date: 6/19/15

Stop Date: 6/22/15

Status: Discontinued



potassium phosphate + Sodium Chloride 0.9%  mL

30 mmol, 10 mL, Route: IVPB, PRN, Dosing Weight 105, kg, PRN Abnormal Lab Result
, Start date: 06/19/15 13:42:00, Duration: 30 day, Stop date: 07/19/15 13:41:00,
FOR ICU USE ONLY

Special Instructions: FOR ICU USE ONLY

Notes: (Same as: K Phosphate.)  1 mMol phoshate has 1.47 mEq potassium  Infuse o
jon 4 hours

Start Date: 6/19/15

Stop Date: 6/22/15

Status: Discontinued



potassium phosphate + Sodium Chloride 0.9%  mL

15 mmol, 5 mL, Route: IVPB, PRN, Dosing Weight 105, kg, PRN Abnormal Lab Result,
Start date: 06/19/15 13:42:00, Duration: 30 day, Stop date: 07/19/15 13:41:00, 
FOR ICU USE ONLY

Special Instructions: FOR ICU USE ONLY

Notes: (Same as: K Phosphate.)  1 mMol phoshate has 1.47 mEq potassium  Infuse o
jon 4 hours

Start Date: 6/19/15

Stop Date: 6/22/15

Status: Discontinued



sertraline 100 mg oral tablet

100 mg=1 tab, PO, Daily, # 30 tab, 0 Refill(s)

Start Date: 6/18/15

Status: Ordered



Sodium Chloride 0.9% (Bolus) IV

500 mL, 500 ml/hr, Infuse Over: 1 hr, Route: IV, ONCE, Priority: STAT, Dosing We
ight 103.182 kg, Start date: 06/18/15 16:33:00, Duration: 1 doses or times, Stop
date: 06/18/15 16:33:00

Start Date: 6/18/15

Stop Date: 6/18/15

Status: Completed



sodium phosphate + Dextrose 5% in Water  mL

45 mmol, 15 mL, Route: IVPB, PRN, Dosing Weight 105, kg, PRN Abnormal Lab Result
, Start date: 06/19/15 13:42:00, Duration: 30 day, Stop date: 07/19/15 13:41:00,
FOR ICU USE ONLY

Special Instructions: FOR ICU USE ONLY

Start Date: 6/19/15

Stop Date: 6/22/15

Status: Discontinued



sodium phosphate + Dextrose 5% in Water  mL

30 mmol, 10 mL, Route: IVPB, PRN, Dosing Weight 105, kg, PRN Abnormal Lab Result
, Start date: 06/19/15 13:42:00, Duration: 30 day, Stop date: 07/19/15 13:41:00,
FOR ICU USE ONLY

Special Instructions: FOR ICU USE ONLY

Start Date: 6/19/15

Stop Date: 6/22/15

Status: Discontinued



sodium phosphate + Dextrose 5% in Water  mL

15 mmol, 5 mL, Route: IVPB, PRN, Dosing Weight 105, kg, PRN Abnormal Lab Result,
Start date: 06/19/15 13:42:00, Duration: 30 day, Stop date: 07/19/15 13:41:00, 
FOR ICU USE ONLY

Special Instructions: FOR ICU USE ONLY

Start Date: 6/19/15

Stop Date: 6/22/15

Status: Discontinued



Synthroid

50 microgram, 1 tab, Route: PO, Drug form: TAB, Q630AM, Dosing Weight 103.182, k
g, Start date: 06/19/15 6:30:00, Duration: 30 day, Stop date: 07/18/15 6:30:00

Notes: Take 1 hour before or 2 hours after meal; Enteral feeds may interefere wi
th the absorption of this medication.(Same as:Levothroid, Synthroid)

Start Date: 6/19/15

Stop Date: 6/22/15

Status: Discontinued



Synthroid 50 mcg (0.05 mg) oral tablet

50 microgram=1 tab, PO, Daily, # 30 tab, 0 Refill(s)

Start Date: 6/18/15

Status: Ordered



torsemide 10 mg oral tablet

30 mg=3 tab, PO, Daily, # 90 tab, 0 Refill(s)

Start Date: 6/22/15

Stop Date: 7/22/15

Status: Ordered



torsemide 10 mg oral tablet

30 mg=3 tab, PO, Daily, # 90 tab, 0 Refill(s)

Start Date: 6/18/15

Stop Date: 6/22/15

Status: Discontinued



Tylenol

650 mg, 2 tab, Route: PO, Drug form: TAB, Before Transfusion, Dosing Weight 103.
182, kg, PRN Pain Score 1-3, Start date: 06/18/15 22:32:00, Duration: 30 day, St
op date: 07/18/15 22:31:00

Notes: Do not exceed 4 gm/day.  (Same as: Tylenol)

Start Date: 6/18/15

Stop Date: 6/22/15

Status: Discontinued



Xanax 0.5 mg oral tablet

0.5 mg, 1 tab, Route: PO, Drug form: TAB, BID, Dosing Weight 103.182, kg, PRN as
needed for anxiety, Start date: 06/18/15 21:29:00, Duration: 30 day, Stop date: 
07/18/15 21:28:00

Notes: With food or milk(Same as: Xanax)

Start Date: 6/18/15

Stop Date: 6/22/15

Status: Discontinued



Xanax 0.5 mg oral tablet

0.5 mg=1 tab, PO, BID, PRN anxiety, stress, # 20 tab, 0 Refill(s)

Start Date: 6/18/15

Stop Date: 6/28/15

Status: Ordered



Results





BLOOD BANK RESULTS





                    1                   2                   3



                                         Most recent to   



                                         oldest [Reference   



                                         Range]:   

 

                                        A NEG 

*Unknown*

                    (6/18/15 10:08 PM)                          



                                         ABO/Rh   

 

                                        Negative 

                    (6/18/15 10:08 PM)                          



                                         Antibody Scrn   

 

                                        Product available 1

                    (6/18/15 9:43 PM)                          



                                         RBC product   







1Result Comment: 2015 23:35  [id number]

Called to WILSON Luther at 2015 23:35 by Mendel Heredia.



ELECTROLYTES





                    1                   2                   3



                                         Most recent to   



                                         oldest [Reference   



                                         Range]:   

 

                                        137 mEq/L 

                          (6/22/15 6:01 AM)         137 mEq/L 

                          (6/21/15 3:54 AM)         136 mEq/L 

                                        (6/20/15 5:07 AM) 



                                         Sodium Lvl [135-145   



                                         mEq/L]   

 

                                        4.8 mEq/L 

                          (6/22/15 6:01 AM)         4.3 mEq/L 

                          (6/21/15 3:54 AM)         3.7 mEq/L 

                                        (6/20/15 5:07 AM) 



                                         Potassium Lvl   



                                         [3.5-5.1 mEq/L]   

 

                                        97 mEq/L 

                          (6/22/15 6:01 AM)         99 mEq/L 

                          (6/21/15 3:54 AM)         100 mEq/L 

                                        (6/20/15 5:07 AM) 



                                         Chloride Lvl [   



                                         mEq/L]   

 

                                        31 mEq/L 

                          (6/22/15 6:01 AM)         31 mEq/L 

                          (6/21/15 3:54 AM)         27 mEq/L 

                                        (6/20/15 5:07 AM) 



                                         CO2 [24-32 mEq/L]   

 

                                        13.8 mEq/L 

                          (6/22/15 6:01 AM)         11.3 mEq/L 

                          (6/21/15 3:54 AM)         12.7 mEq/L 

                                        (6/20/15 5:07 AM) 



                                         AGAP [10.0-20.0   



                                         mEq/L]   







CHEM PANEL





                    1                   2                   3



                                         Most recent to   



                                         oldest [Reference   



                                         Range]:   

 

                                        1.0 mg/dL 

                          (6/22/15 6:01 AM)         0.9 mg/dL 

                          (6/21/15 3:54 AM)         0.8 mg/dL 

                                        (6/20/15 5:07 AM) 



                                         Creatinine Lvl   



                                         [0.5-1.4 mg/dL]   

 

                                        74 mL/min/1.73m2 2

*NA*

                          (6/22/15 6:01 AM)         84 mL/min/1.73m2 3

*NA*

                          (6/21/15 3:54 AM)         89 mL/min/1.73m2 4

*NA*

                                        (6/20/15 5:07 AM) 



                                         eGFR   

 

                                        16 mg/dL 

                          (6/22/15 6:01 AM)         11 mg/dL 

                          (6/21/15 3:54 AM)         10 mg/dL 

                                        (6/20/15 5:07 AM) 



                                         BUN [7-22 mg/dL]   

 

                                        12 

                    (6/18/15 4:21 PM)                          



                                         B/C Ratio [6-25]   

 

                                        301 mg/dL 5

*HI*

                          (6/22/15 6:01 AM)         236 mg/dL 6

*HI*

                          (6/21/15 3:54 AM)         269 mg/dL 7

*HI*

                                        (6/20/15 5:07 AM) 



                                         Glucose Lvl [70-99   



                                         mg/dL]   

 

                                        7.2 g/dL 

                    (6/18/15 4:21 PM)                          



                                         Total Protein   



                                         [6.4-8.4 g/dL]   

 

                                        3.2 g/dL 

*LOW*

                    (6/18/15 4:21 PM)                          



                                         Albumin Lvl [3.5-5.0   



                                         g/dL]   

 

                                        4.0 g/dL 

                    (6/18/15 4:21 PM)                          



                                         Globulin [2.0-4.0   



                                         g/dL]   

 

                                        0.8 

                    (6/18/15 4:21 PM)                          



                                         A/G Ratio [0.7-1.6]   

 

                                        8.6 mg/dL 

                          (6/22/15 6:01 AM)         8.7 mg/dL 

                          (6/21/15 3:54 AM)         8.3 mg/dL 

*LOW*

                                        (6/20/15 5:07 AM) 



                                         Calcium Lvl   



                                         [8.5-10.5 mg/dL]   

 

                                        3.3 mg/dL 

                    (6/19/15 9:10 PM)                          



                                         Phosphorus [2.5-4.5   



                                         mg/dL]   

 

                                        2.0 mg/dL 

                          (6/22/15 6:01 AM)         2.2 mg/dL 

                          (6/21/15 3:54 AM)         2.0 mg/dL 

                                        (6/20/15 5:07 AM) 



                                         Magnesium Lvl   



                                         [1.8-2.4 mg/dL]   

 

                                        66 unit/L 

*HI*

                    (6/18/15 4:21 PM)                          



                                         ALT [0-65 unit/L]   

 

                                        76 unit/L 

*HI*

                    (6/18/15 4:21 PM)                          



                                         AST [0-37 unit/L]   

 

                                        179 unit/L 

*HI*

                    (6/18/15 4:21 PM)                          



                                         Alk Phos [   



                                         unit/L]   

 

                                        0.5 mg/dL 

                    (6/18/15 4:21 PM)                          



                                         Bili Total [0.2-1.3   



                                         mg/dL]   

 

                                        1.8 mMol/L 

                    (6/18/15 4:21 PM)                          



                                         Lactic Acid Lvl   



                                         [0.5-2.2 mMol/L]   







2Result Comment: The eGFR is calculated using the CKD-EPI formula. In most 
young, healthy individuals the eGFR will be >90 mL/min/1.73m2. The eGFR declines
with age. An eGFR of 60-89 may be normal in some populations, particularly the 
elderly, for whom the CKD-EPI formula has not been extensively validated. Use of
the eGFR is not recommended in the following populations:



Individuals with unstable creatinine concentrations, including pregnant patients
and those with serious co-morbid conditions.



Patients with extremes in muscle mass or diet. 



The data above are obtained from the National Kid[employer] Disease Education Program (
NKDEP) which additionally recommends that when the eGFR is used in patients with
extremes of body mass index for purposes of drug dosing, the eGFR should be mul
tiplied by the estimated BMI.



3Result Comment: The eGFR is calculated using the CKD-EPI formula. In most 
young, healthy individuals the eGFR will be >90 mL/min/1.73m2. The eGFR declines
with age. An eGFR of 60-89 may be normal in some populations, particularly the 
elderly, for whom the CKD-EPI formula has not been extensively validated. Use of
the eGFR is not recommended in the following populations:



Individuals with unstable creatinine concentrations, including pregnant patients
and those with serious co-morbid conditions.



Patients with extremes in muscle mass or diet. 



The data above are obtained from the National Kid[employer] Disease Education Program (
NKDEP) which additionally recommends that when the eGFR is used in patients with
extremes of body mass index for purposes of drug dosing, the eGFR should be mul
tiplied by the estimated BMI.



4Result Comment: The eGFR is calculated using the CKD-EPI formula. In most 
young, healthy individuals the eGFR will be >90 mL/min/1.73m2. The eGFR declines
with age. An eGFR of 60-89 may be normal in some populations, particularly the 
elderly, for whom the CKD-EPI formula has not been extensively validated. Use of
the eGFR is not recommended in the following populations:



Individuals with unstable creatinine concentrations, including pregnant patients
and those with serious co-morbid conditions.



Patients with extremes in muscle mass or diet. 



The data above are obtained from the National Kid[employer] Disease Education Program (
NKDEP) which additionally recommends that when the eGFR is used in patients with
extremes of body mass index for purposes of drug dosing, the eGFR should be mul
tiplied by the estimated BMI.



5Interpretive Data: Adult reference range values reflect the clinical guidelines

of the [employer] Association.



6Interpretive Data: Adult reference range values reflect the clinical guidelines

of the American Diabetes Association.



7Interpretive Data: Adult reference range values reflect the clinical guidelines

of the American [employer] Association.



CARDIAC ENZYMES





                    1                   2                   3



                                         Most recent to   



                                         oldest [Reference   



                                         Range]:   

 

                                        173 unit/L 

                          (6/19/15 7:53 AM)         173 unit/L 

                          (6/19/15 7:53 AM)         200 unit/L 

*HI*

                                        (6/18/15 10:08 PM) 



                                         Total CK [   



                                         unit/L]   

 

                                        6.5 ng/mL 

*HI*

                          (6/19/15 7:53 AM)         8.0 ng/mL 

*HI*

                          (6/18/15 10:08 PM)        9.3 ng/mL 

*HI*

                                        (6/18/15 4:21 PM) 



                                         CK MB [0.5-3.6   



                                         ng/mL]   

 

                                        3.8 

*HI*

                          (6/19/15 7:53 AM)         4.0 

*HI*

                          (6/18/15 10:08 PM)        4.1 

*HI*

                                        (6/18/15 4:21 PM) 



                                         CK MB Index   



                                         [0.0-2.5]   

 

                                        0.52 ng/mL 8

*CRIT*

                          (6/19/15 7:53 AM)         0.83 ng/mL 9

*CRIT*

                          (6/18/15 10:08 PM)        1.01 ng/mL 10

*CRIT*

                                        (6/18/15 4:21 PM) 



                                         Troponin-I   



                                         [0.00-0.40 ng/mL]   

 

                                        284 pg/mL 11

*HI*

                    (6/18/15 4:21 PM)                          



                                         BNP [<=100 pg/mL]   







8Result Comment: Critical Result(s) called to l duogo at 2015 08:33 by sb.
 Read back OK.



9Result Comment: Critical Result(s) called to kameron soto at 2015 22:56 by 
lgb.  Read back OK.



10Result Comment: Critical Result(s) called to jenise strange at 2015 
16:58_ byAN_.  Read back OK.



11Interpretive Data: Elevated results are in line with increasing severity of

congestive heart failure. Minor elevations between 100 and 300

may be seen with Myocardial Ischemia, Sodium retaining drugs,

and compensated/treated heart failure.



LIPIDS





                    1                   2                   3



                                         Most recent to   



                                         oldest [Reference   



                                         Range]:   

 

                                        2.18 

*LOW*

                    (6/19/15 7:53 AM)                          



                                         CHD Risk [4.00-7.30]   

 

                                        72 mg/dL 

                    (6/19/15 7:53 AM)                          



                                         Chol [<=199 mg/dL]   

 

                                        71 mg/dL 

                    (6/19/15 7:53 AM)                          



                                         Trig [<=149 mg/dL]   

 

                                        33 mg/dL 

*LOW*

                    (6/19/15 7:53 AM)                          



                                         HDL [>=61 mg/dL]   

 

                                        25 mg/dL 

                    (6/19/15 7:53 AM)                          



                                         LDL (Calculated)   



                                         [<=99 mg/dL]   

 

                                        14 

*NA*

                    (6/19/15 7:53 AM)                          



                                         VLDL   







URINE AND STOOL





                    1                   2                   3



                                         Most recent to   



                                         oldest [Reference   



                                         Range]:   

 

                                        Clear 

                          (6/21/15 10:12 AM)        Clear 

                          (6/19/15 3:22 AM)          



                                         UA Turbidity [Clear]   

 

                                        Ltyellow 

*NA*

                          (6/21/15 10:12 AM)        Ltyellow 

*NA*

                          (6/19/15 3:22 AM)          



                                         UA Color   

 

                                        7.0 

                          (6/21/15 10:12 AM)        5.0 

                          (6/19/15 3:22 AM)          



                                         UA pH [5.0-8.0]   

 

                                        1.005 

                          (6/21/15 10:12 AM)        1.011 

                          (6/19/15 3:22 AM)          



                                         UA Spec Grav   



                                         [<=1.030]   

 

                                        50 mg/dL 

*ABN*

                          (6/21/15 10:12 AM)        Negative mg/dL 

*NA*

                          (6/19/15 3:22 AM)          



                                         UA Glucose [Negative   



                                         mg/dL]   

 

                                        Negative 

                          (6/21/15 10:12 AM)        Negative 

                          (6/19/15 3:22 AM)          



                                         UA Blood [Negative]   

 

                                        Negative mg/dL 

*NA*

                          (6/21/15 10:12 AM)        Negative mg/dL 

*NA*

                          (6/19/15 3:22 AM)          



                                         UA Ketones [Negative   



                                         mg/dL]   

 

                                        Negative mg/dL 

                          (6/21/15 10:12 AM)        Negative mg/dL 

                          (6/19/15 3:22 AM)          



                                         UA Protein [Negative   



                                         mg/dL]   

 

                                        <=1.0 mg/dL 

*NA*

                          (6/21/15 10:12 AM)        <=1.0 mg/dL 

*NA*

                          (6/19/15 3:22 AM)          



                                         UA Urobilinogen   



                                         [0.1-1.0 mg/dL]   

 

                                        Negative 

*NA*

                          (6/21/15 10:12 AM)        Negative 

*NA*

                          (6/19/15 3:22 AM)          



                                         UA Bili [Negative]   

 

                                        Negative 

                          (6/21/15 10:12 AM)        Negative 

                          (6/19/15 3:22 AM)          



                                         UA Leuk Est   



                                         [Negative]   

 

                                        Negative 

                          (6/21/15 10:12 AM)        Negative 

                          (6/19/15 3:22 AM)          



                                         UA Nitrite   



                                         [Negative]   

 

                                        <1 /HPF 

                          (6/21/15 10:12 AM)        <1 /HPF 

                          (6/19/15 3:22 AM)          



                                         UA WBC [0-5 /HPF]   

 

                                        <1 /HPF 

                          (6/21/15 10:12 AM)        <1 /HPF 

                          (6/19/15 3:22 AM)          



                                         UA RBC [0-2 /HPF]   

 

                                        None Seen 

*NA*

                          (6/21/15 10:12 AM)        None Seen 

*NA*

                          (6/19/15 3:22 AM)          



                                         UA Sq Epi   

 

                                        2 /LPF 

                    (6/19/15 3:22 AM)                          



                                         UA Hyal Cast [0-2   



                                         /LPF]   

 

                                        Few /LPF 

*NA*

                    (6/19/15 3:22 AM)                          



                                         UA Mucus [None Seen   



                                         /LPF]   







HEMATOLOGY





                    1                   2                   3



                                         Most recent to   



                                         oldest [Reference   



                                         Range]:   

 

                                        8.2 K/CMM 

                          (6/22/15 6:01 AM)         9.5 K/CMM 

                          (6/20/15 5:07 AM)         6.9 K/CMM 

                                        (6/19/15 7:53 AM) 



                                         WBC [3.7-10.4 K/CMM]   

 

                                        4.17 M/CMM 

*LOW*

                          (6/22/15 6:01 AM)         4.14 M/CMM 

*LOW*

                          (6/20/15 5:07 AM)         4.15 M/CMM 

*LOW*

                                        (6/19/15 7:53 AM) 



                                         RBC [4.70-6.10   



                                         M/CMM]   

 

                                        9.7 g/dL 

*LOW*

                          (6/22/15 6:01 AM)         9.8 g/dL 

*LOW*

                          (6/20/15 5:07 AM)         9.6 g/dL 

*LOW*

                                        (6/19/15 7:53 AM) 



                                         Hgb [14.0-18.0 g/dL]   

 

                                        31.2 % 

*LOW*

                          (6/22/15 6:01 AM)         30.6 % 

*LOW*

                          (6/20/15 5:07 AM)         31.4 % 

*LOW*

                                        (6/19/15 7:53 AM) 



                                         Hct [42.0-54.0 %]   

 

                                        74.9 fL 

*LOW*

                          (6/22/15 6:01 AM)         74.0 fL 

*LOW*

                          (6/20/15 5:07 AM)         75.8 fL 

*LOW*

                                        (6/19/15 7:53 AM) 



                                         MCV [80.0-94.0 fL]   

 

                                        23.3 pg 

*LOW*

                          (6/22/15 6:01 AM)         23.6 pg 

*LOW*

                          (6/20/15 5:07 AM)         23.2 pg 

*LOW*

                                        (6/19/15 7:53 AM) 



                                         MCH [27.0-31.0 pg]   

 

                                        31.1 g/dL 

*LOW*

                          (6/22/15 6:01 AM)         32.0 g/dL 

                          (6/20/15 5:07 AM)         30.6 g/dL 

*LOW*

                                        (6/19/15 7:53 AM) 



                                         MCHC [32.0-36.0   



                                         g/dL]   

 

                                        18.8 % 

*HI*

                          (6/22/15 6:01 AM)         19.0 % 

*HI*

                          (6/20/15 5:07 AM)         18.8 % 

*HI*

                                        (6/19/15 7:53 AM) 



                                         RDW [11.5-14.5 %]   

 

                                        226 K/CMM 

                          (6/22/15 6:01 AM)         220 K/CMM 

                          (6/20/15 5:07 AM)         209 K/CMM 

                                        (6/19/15 7:53 AM) 



                                         Platelet [133-450   



                                         K/CMM]   

 

                                        8.8 fL 

                          (6/22/15 6:01 AM)         8.6 fL 

                          (6/20/15 5:07 AM)         8.7 fL 

                                        (6/19/15 7:53 AM) 



                                         MPV [7.4-10.4 fL]   

 

                                        61.7 % 

                          (6/22/15 6:01 AM)         75.9 % 

*HI*

                          (6/20/15 5:07 AM)         53.8 % 

                                        (6/19/15 7:53 AM) 



                                         Segs [45.0-75.0 %]   

 

                                        24.1 % 

                          (6/22/15 6:01 AM)         14.1 % 

*LOW*

                          (6/20/15 5:07 AM)         31.0 % 

                                        (6/19/15 7:53 AM) 



                                         Lymphocytes   



                                         [20.0-40.0 %]   

 

                                        8.7 % 

                          (6/22/15 6:01 AM)         7.7 % 

                          (6/20/15 5:07 AM)         8.2 % 

                                        (6/19/15 7:53 AM) 



                                         Monocytes [2.0-12.0   



                                         %]   

 

                                        3.6 % 

                          (6/22/15 6:01 AM)         1.9 % 

                          (6/20/15 5:07 AM)         4.4 % 

*HI*

                                        (6/19/15 7:53 AM) 



                                         Eosinophils [0.0-4.0   



                                         %]   

 

                                        1.9 % 

*HI*

                          (6/22/15 6:01 AM)         0.4 % 

                          (6/20/15 5:07 AM)         2.6 % 

*HI*

                                        (6/19/15 7:53 AM) 



                                         Basophils [0.0-1.0   



                                         %]   

 

                                        5.1 K/CMM 

                          (6/22/15 6:01 AM)         7.3 K/CMM 

                          (6/20/15 5:07 AM)         3.7 K/CMM 

                                        (6/19/15 7:53 AM) 



                                         Segs-Bands #   



                                         [1.5-8.1 K/CMM]   

 

                                        2.0 K/CMM 

                          (6/22/15 6:01 AM)         1.3 K/CMM 

                          (6/20/15 5:07 AM)         2.1 K/CMM 

                                        (6/19/15 7:53 AM) 



                                         Lymphocytes #   



                                         [1.0-5.5 K/CMM]   

 

                                        0.7 K/CMM 

                          (6/22/15 6:01 AM)         0.7 K/CMM 

                          (6/20/15 5:07 AM)         0.6 K/CMM 

                                        (6/19/15 7:53 AM) 



                                         Monocytes # [0.0-0.8   



                                         K/CMM]   

 

                                        0.3 K/CMM 

                          (6/22/15 6:01 AM)         0.2 K/CMM 

                          (6/20/15 5:07 AM)         0.3 K/CMM 

                                        (6/19/15 7:53 AM) 



                                         Eosinophils #   



                                         [0.0-0.5 K/CMM]   

 

                                        0.2 K/CMM 

                          (6/22/15 6:01 AM)         0.2 K/CMM 

                          (6/19/15 7:53 AM)         0.1 K/CMM 

                                        (6/18/15 4:21 PM) 



                                         Basophils # [0.0-0.2   



                                         K/CMM]   

 

                                        1+ 

*ABN*

                          (6/22/15 6:01 AM)         1+ 

*ABN*

                          (6/20/15 5:07 AM)         1+ 

*ABN*

                                        (6/19/15 7:53 AM) 



                                         Microcyte [None   



                                         Seen]   

 

                                        15.6 seconds 

*HI*

                          (6/19/15 7:53 AM)         16.8 seconds 

*HI*

                          (6/18/15 10:08 PM)        15.7 seconds 

*HI*

                                        (6/18/15 4:21 PM) 



                                         PT [12.0-14.7   



                                         seconds]   

 

                                        1.23 12

*HI*

                          (6/19/15 7:53 AM)         1.35 13

*HI*

                          (6/18/15 10:08 PM)        1.24 14

*HI*

                                        (6/18/15 4:21 PM) 



                                         INR [0.85-1.17]   

 

                                        46.9 seconds 15

*HI*

                          (6/20/15 6:20 AM)         >200 seconds 16, 17

*CRIT*

                          (6/20/15 5:07 AM)         62.7 seconds 18

*HI*

                                        (6/19/15 9:10 PM) 



                                         PTT [22.9-35.8   



                                         seconds]   







12Interpretive Data: RECOMMENDED RANGES FOR PROTIME INR:

   2.0-3.0 for most medical and surgical thromboembolic states.

   2.5-3.5 for artificial heart valves and recurrent embolism.



INR SHOULD BE USED ONLY FOR PATIENTS ON STABLE ANTICOAGULANT THERAPY.



13Interpretive Data: RECOMMENDED RANGES FOR PROTIME INR:

   2.0-3.0 for most medical and surgical thromboembolic states.

   2.5-3.5 for artificial heart valves and recurrent embolism.



INR SHOULD BE USED ONLY FOR PATIENTS ON STABLE ANTICOAGULANT THERAPY.



14Interpretive Data: RECOMMENDED RANGES FOR PROTIME INR:

   2.0-3.0 for most medical and surgical thromboembolic states.

   2.5-3.5 for artificial heart valves and recurrent embolism.



INR SHOULD BE USED ONLY FOR PATIENTS ON STABLE ANTICOAGULANT THERAPY.



15Interpretive Data: Heparin Therapeutic Range:  57 - 92 Seconds



16Result Comment: Critical Result(s) called to nelsy dixon at 2015 05:59 
by glenna.  Read back OK.



will recollect another specimen



17Interpretive Data: Heparin Therapeutic Range:  57 - 92 Seconds



18Interpretive Data: Heparin Therapeutic Range:  57 - 92 Seconds



Immunizations







  



                     Vaccine             Date                Refusal Reason

 

  



                           pneumococcal 23-valent vaccine     3/31/15 

 

  



                     pneumococcal 23-valent vaccine     3/30/15             Patient Refuses







Procedures







   



                 Procedure       Date            Related Diagnosis     Body Site

 

   



                                         CABG x 3 - Coronary artery bypass grafts x 3   

 

   



                                         Cardiac catheterization, left heart   







Social History







 



                           Social History Type       Response

 

 



                           Substance Abuse           Use: None.

 

 



                           Alcohol                   Never

 

 



                           Smoking Status            Former smoker; Exposure to Tobacco Smoke None; Cigarette Smoking

 Last 365



                                         Days No; Reg Smoking Cessation Counseling No







Assessment and Plan

Extracted from:





  



                     Title: Clinical Document     Author: Faustino Villagomez MD     Date: 6/22/15









                                        Progress Note

Cardiology

[facility]





Impression:



acute on chronic diastolic CHF

Hypertension

Sleep apnea

CAD hx of multiple stents

Elevated troponin  likely secondary to severe underlying CAD and acute heart 
failure/anemia

Anemia s/p tranfusion





Plan:



We discussed the need for close f/u

he will need a cardiac cath to ensure no acute changes- he has had multiple 
stents in the past

by Dr Terrell in [facility]



pt would like to be discharged and have close outpt f/u and cath by Dr Terrell

He understands the risks of not completing workup during hospital stay



cont ASA/plavix



cont multaq for parox afib

no anticoagulation due to microcytic anemia



H/H stable



ok to DC w/ close cardiac f/u





Subjective:



Patient Seen and Examined.

He walked w/ therapy this AM w/ walker

no chest pain or sob

doesn't feel palpitations

wants to go home





Objective:

Telemetry  NSR, brief parox afib, nsvt







Vitals and Temp:



VitalsTmp(F)[id number]

                                         [id number]---

                                         04:[id number]/295325---

                                         00:0097.198656/8316------

                                         20:[id number]/112305---

                                         15:3397.881132/685571---



                                        24 Hr Tmax: 98.3F (36.83c) at  20:00Vital Signs are the last 5 in the past 

48 hours.







General:  Awake and Alert, NAD

HEENT: Neck Supple,\

CVS: Regular rate, Normal S1S2

LUNGS: CTA, No rales or wheezing

ABD: Soft, Non-tender, + BS

EXT: No edema, \

Skin: No ulcers

Neuro: Awake and Alert, Oriented x 3









Labs (Last four charted values)

WBC                 8.2()9.5()6.9()7.8()

Hgb                 L 9.7()L 9.8()L 9.6(SPENCER 19)L 8.4(SPENCER 18)

Hct                 L 31.2(SPENCER 22)L 30.6(SPENCER 20)L 31.4(SPENCER 19)L 27.3(SPENCER 18)

Plt                 226(SPENCER 22)220(SPENCER 20)209(SPENCER 19)225(SPENCER 18)

Na                  137(SPENCER 22)137(SPENCER 21)136(SPENCER 20)139(SPENCER 19)

K                   4.8(SPENCER 22)4.3(SPENCER 21)3.7(SPENCER 20)3.6(SPENCER 19)

CO2                 31(SPENCER 22)31(SPENCER 21)27(SPENCER 20)H 33(SPENCER 19)

Cl                  97(SPENCER 22)99(SPENCER 21)100(SPENCER 20)101(SPENCER 19)

Cr                  1.0(SPENCER 22)0.9(SPENCER 21)0.8(SPENCER 20)1.0(SPENCER 19)

BUN                 16(SPENCER 22)11(SPENCER 21)10(SPENCER 20)10(SPENCER 19)

Glucose Random      H 301(SPENCER 22)H 236(SPENCRE 21)H 269(SPENCER 20)H 263(SPENCER 19)

Mg                  2.0(SPENCER 22)2.2(SPENCER 21)2.0(SPENCER 20)2.2(SPENCER 19)

Phos                3.3(SPENCER 19)

Ca                  8.6(SPENCER 22)8.7(SPENCER 21)L 8.3(SPENCER 20)L 7.8(SPENCER 19)

PT                  H 15.6(SPENCER 19)H 16.8(SPENCER 18)H 15.7(SPENCER 18)

INR                 H 1.23(SPENCER 19)H 1.35(SPENCER 18)H 1.24(SPENCER 18)

PTT                 H 46.9(SPENCER 20)C >200(SPENCER 20)H 62.7(SPENCER 19)H 79.5(SPENCER 19)

Troponin            C 0.52(SPENCER 19)C 0.83(SPENCER 18)C 1.01(SPENCER 18)

CK MB               H 6.5(SPENCER 19)H 8.0(SPENCER 18)H 9.3(SPENCER 18)

Total CK            173(SPENCER 19)173(SPENCER 19)H 200(SPENCER 18)H 227(SPENCER 18)



Scheduled Meds (14):

                                        06/19/15 aspirin (aspirin 325 mg tablet) 325 mg PO Daily

                                        06/19/15 atorvastatin 5 mg PO Bedtime

                                        06/19/15 clopidogrel 75 mg PO Daily

                                        06/19/15 dronedarone (Multaq) 400 mg PO BID

                                        06/20/15 enoxaparin (Lovenox) 40 mg SUB-Q [id number]

                                        06/20/15 furosemide (Lasix) 40 mg IVP BID

                                        06/19/15 gabapentin (gabapentin 600 mg oral tablet) 600 mg PO TID

                                        06/20/15 insulin detemir (Levemir FlexPen) 50 unit SUB-Q Bedtime

                                        06/22/15 insulin detemir (Levemir FlexPen) 50 unit SUB-Q QAM

                                        06/19/15 isosorbide mononitrate (Imdur) 30 mg PO QAM

                                        06/19/15 levothyroxine (Synthroid) 50 microgram PO [id number]

                                        06/19/15 niacin (niacin 250 mg oral capsule, extended release) 2,000 mg PO Bedtime



                                        06/19/15 pantoprazole 40 mg IVP Before Dinner

                                        06/20/15 potassium chloride (potassium chloride 20 mEq oral tablet, extended release)

 20 mEq PO Q12H



Continuous Infusions: None





Extracted from:





  



                     Title: Clinical Document     Author: Trevor Montano MD     Date: 6/18/15









                                        History and Physical

Attending: Samara Delgado MDPhone: [phone]: Emergency 
Medicine Service

Code status: None Specified=FULL CODE

Reason for Admission: DIRECT ADM

Working DRG: None Documented

Isolation: None Documented

Consulting Physicians: (none on file)



CC: SOB



HPI: This is a 73 yo w/ below PMHx who p/w 2-3 day h/o SOB. SOB on exertion only
but resolves w/ rest. Denies chest pain but had discomfort, retrosternal and 
radiated to L arm, occurred on exertion, resolved w/ exertion. N/V multiple 
times (N/B). Watery stools, no melena/hematochezia. Sugars to 50s 1 day PTA, 
pale and diaphoretic.  Acting strange, according to daughter, pt was not 
listening to daughter. Mental status has since improved. Pt also c/o L foot 
pain, he is diabetic and has neuropathy. Denies fevers/chills, cough, abd pain, 
N/V, abd pain, bowel/urianry changes.



PMHx:

CAD

HTN

paroxysmal afib

CHF

HLD

hypothyroidism

T2DM



PSHx:

CABG x 3

multiple cardiac stents (x25?)



FHx:

reviewed and noncontributory



SHx:

Former smoker, quit in 1970s

Denies EtOH and illicit drugs



Meds:

 Medication List

   Active Medications

       Ordered

           dronedarone: 400 mg, 1 tab, PO, BID, 60 tab.

           heparin: 5,000 unit, 5 mL, IVP, PRN, PRN: Heparin Protocol.

           heparin: 2,500 unit, 2.5 mL, IVP, PRN, PRN: Heparin Protocol.

           heparin 25,000 unit [12 unit/kg/hr] + Premix Diluent Dextrose 5% 500

             mL: 20.09 ml/hr, IV, Stop: 07/18/15 18:03:00.

           insulin glargine: 72 unit, SUB-Q, Daily, 0 Refill(s).

           insulin glargine: 42 unit, SUB-Q, Bedtime, 10 ml, 0 Refill(s).

   Medications Inactivated in the Last 72 Hours

       heparin: 4,000 unit, 4 mL, IV, ONCE.

       heparin: 5,000 unit, 1 mL, PYXIS, ONCE.

       heparin: 25,000 unit, PYXIS, ONCE.

       Sodium Chloride 0.9% IV: 500 mL, PYXIS, ONCE.

       Sodium Chloride 0.9% IV: 500 mL, 500 ml/hr, IV, ONCE.





Allergies: NKDA





ROS: See HPI.

         All other systems reviewed by myself are negative unless noted above.





Physical Exam:

VitalsTmp(F)[id number]

                                         20:[id number]/[id number]---

                                         18:30----94246/[id number]---

                                         17:30----11250/[id number]---

                                         16:43----[id number]/[id number]---

                                         16:35----[id number]---





                                        24 Hr Tmax: 98.0F (36.67c) at  20:08Vital Signs are the last 5 in the past 

48 hours.





General: NAD, nontoxic appearing

HEENT: NCAT, PERRL, MMM, no JVD

Cardiovascular: RRR, S1S2

Respiratory: CTAB

Abdomen: +BS, NT/ND

Extremities: no b/l LE edema

Skin: mild erythema and signficant peeling and dryiness of feet especially 
plantar areas, very superficial R heel ulcer

Neurologic: comprehension and speech intact, CN III-XII grossly intact

Musculoskeletal: symmetric strength in all extremities



Labs:

                                        24hr Labs

                                         1628

Glucose VJF149  H

                                         1621

Sodium Zag227  

Potassium Lvl3.7  

Chloride Ssh400  

  

AGAP13.7  

Glucose Sdi090  H

Creatinine Lvl1.3  

BUN16  

B/C Ratio12  

Total Protein7.2  

Albumin Lvl3.2  L

Globulin4.0  

A/G Ratio0.8  

Calcium Lvl8.3  L

ALT66  H

AST76  H

Alk Lhlh732  H

Bili Total0.5  

eGFR54  

Lactic Acid Lvl1.8  

Total   H

CK MB9.3  H

CK MB Index4.1  H

ZUN444  H

Troponin-I1.01  C

PT15.7  H

INR1.24  H

PTT29.9  

WBC7.8  

RBC3.70  L

Hgb8.4  L

Hct27.3  L

MCV73.8  L

MCH22.7  L

MCHC30.7  L

RDW18.7  H

Qsidqgno921  

MPV9.0  

Segs58.2  

Monocytes8.8  

Tsgaotkoxnz67.3  

Eosinophils3.1  

Basophils1.6  H

Segs-Bands #4.5  

Lymphocytes #2.2  

Monocytes #0.7  

Eosinophils #0.2  

Basophils #0.1  

Microcyte1+  





Micro:

none



Imaging:

CT head: 1. No acute abnormality.

                                        2. Diffuse cerebral atrophy and mild chronic small vessel ischemic change.



CXR: pending



EKG: sinus tachy arrhythmia, no major ST abnormalities



Assessment and Plan: 73 yo p/w SOB and chest discomfort, found to have elevated 
troponin and given his h/o CAD was admitted for NSTEMI.



# NSTEMI: aspirin, plavix, heparin gtt, cardiology consulted, EKG okay, trend 
cardiac enzymes, NPO, ordered CXR



# hypotension: resolved w/ iv fluids



# afib: currently in sinus rhythm, c/w home regimen (currently normotensive so 
dronedarone should be okay)



# DM: SSI + home lantus dose



# chronic microcytic anemia: H/H a little lower than earlier this year, given 
his CAD hx and active NSTEMI will transfuse 1u to get to his prior values



# hypothyroidism/HLD/CHF: c/w home regimen except hold toresemide because of 
hypotension (pt does not look volume overloaded at all)



Prophylaxis: heparin gtt, PPI

Diet: NPO



Trevor Silvaurnnaman

## 2023-01-09 NOTE — NUR
[facility] CALLED FOR TRANSPORT Opzelura Counseling:  I discussed with the patient the risks of Opzelura including but not limited to nasopharngitis, bronchitis, ear infection, eosinophila, hives, diarrhea, folliculitis, tonsillitis, and rhinorrhea.  Taken orally, this medication has been linked to serious infections; higher rate of mortality; malignancy and lymphoproliferative disorders; major adverse cardiovascular events; thrombosis; thrombocytopenia, anemia, and neutropenia; and lipid elevations.